# Patient Record
Sex: MALE | Race: WHITE | NOT HISPANIC OR LATINO | Employment: OTHER | URBAN - METROPOLITAN AREA
[De-identification: names, ages, dates, MRNs, and addresses within clinical notes are randomized per-mention and may not be internally consistent; named-entity substitution may affect disease eponyms.]

---

## 2017-01-20 ENCOUNTER — CLINICAL SUPPORT (OUTPATIENT)
Dept: CARDIOLOGY | Facility: CLINIC | Age: 77
End: 2017-01-20
Payer: MEDICARE

## 2017-01-20 ENCOUNTER — OFFICE VISIT (OUTPATIENT)
Dept: CARDIOLOGY | Facility: CLINIC | Age: 77
End: 2017-01-20
Payer: MEDICARE

## 2017-01-20 VITALS
WEIGHT: 241.19 LBS | HEIGHT: 72 IN | HEART RATE: 121 BPM | SYSTOLIC BLOOD PRESSURE: 123 MMHG | BODY MASS INDEX: 32.67 KG/M2 | DIASTOLIC BLOOD PRESSURE: 79 MMHG

## 2017-01-20 DIAGNOSIS — I48.0 PAF (PAROXYSMAL ATRIAL FIBRILLATION): ICD-10-CM

## 2017-01-20 DIAGNOSIS — I48.3 TYPICAL ATRIAL FLUTTER: ICD-10-CM

## 2017-01-20 DIAGNOSIS — Z95.0 CARDIAC PACEMAKER IN SITU: ICD-10-CM

## 2017-01-20 DIAGNOSIS — I25.10 CORONARY ARTERY DISEASE INVOLVING NATIVE CORONARY ARTERY OF NATIVE HEART WITHOUT ANGINA PECTORIS: Chronic | ICD-10-CM

## 2017-01-20 DIAGNOSIS — I49.5 SINUS NODE DYSFUNCTION: ICD-10-CM

## 2017-01-20 DIAGNOSIS — I48.19 PERSISTENT ATRIAL FIBRILLATION: Primary | ICD-10-CM

## 2017-01-20 PROCEDURE — 1160F RVW MEDS BY RX/DR IN RCRD: CPT | Mod: S$GLB,,, | Performed by: INTERNAL MEDICINE

## 2017-01-20 PROCEDURE — 1159F MED LIST DOCD IN RCRD: CPT | Mod: S$GLB,,, | Performed by: INTERNAL MEDICINE

## 2017-01-20 PROCEDURE — 99499 UNLISTED E&M SERVICE: CPT | Mod: S$GLB,,, | Performed by: INTERNAL MEDICINE

## 2017-01-20 PROCEDURE — 1126F AMNT PAIN NOTED NONE PRSNT: CPT | Mod: S$GLB,,, | Performed by: INTERNAL MEDICINE

## 2017-01-20 PROCEDURE — 93000 ELECTROCARDIOGRAM COMPLETE: CPT | Mod: S$GLB,,, | Performed by: INTERNAL MEDICINE

## 2017-01-20 PROCEDURE — 93280 PM DEVICE PROGR EVAL DUAL: CPT | Mod: S$GLB,,, | Performed by: INTERNAL MEDICINE

## 2017-01-20 PROCEDURE — 3078F DIAST BP <80 MM HG: CPT | Mod: S$GLB,,, | Performed by: INTERNAL MEDICINE

## 2017-01-20 PROCEDURE — 99215 OFFICE O/P EST HI 40 MIN: CPT | Mod: S$GLB,,, | Performed by: INTERNAL MEDICINE

## 2017-01-20 PROCEDURE — 99999 PR PBB SHADOW E&M-EST. PATIENT-LVL III: CPT | Mod: PBBFAC,,, | Performed by: INTERNAL MEDICINE

## 2017-01-20 PROCEDURE — 3074F SYST BP LT 130 MM HG: CPT | Mod: S$GLB,,, | Performed by: INTERNAL MEDICINE

## 2017-01-20 PROCEDURE — 1157F ADVNC CARE PLAN IN RCRD: CPT | Mod: S$GLB,,, | Performed by: INTERNAL MEDICINE

## 2017-01-20 NOTE — PROGRESS NOTES
Subjective:    Patient ID:  Heri Muhammad is a 76 y.o. male who presents for follow-up of Atrial Fibrillation (6 month f/u w/ ppm check )      HPI 77 yo male with CAD, atrial fibrillation, atrial flutter, sinus node dysfunction, PPM.  Dual chamber PPM implanted (06/30/14).   Presented with fatigue, found to be in atrial fibrillation. Placed on amiodarone, underwent DCCV.  Symptoms with AF have historically been fatigue and lightheadedness.  DCCV (10/15/15) >> recurrence. DCCV (11/23/15), amiodarone increased to 400 mg daily. Developed atrial flutter.  (01/29/16) PVI + RFA for atrial flutter. Amiodarone discontinued.  Did well for initially in terms of atrial fibrillation, but continued to note dyspnea.  Underwent LHC/RHC 6/10/16, no obstructive CAD, normal rt sided filling pressures.  Developed persistent recurrence >> DCCV 11/22/16.  Device interrogation reveals he went back out of rhythm 1/19/17.  Notes fatigue and irritability.  On Keppra for tremors.    Review of Systems   Constitution: Positive for weakness. Negative for malaise/fatigue.   Cardiovascular: Negative for chest pain, dyspnea on exertion, irregular heartbeat, leg swelling, near-syncope, orthopnea, palpitations, paroxysmal nocturnal dyspnea and syncope.   Respiratory: Negative for cough and shortness of breath.    Neurological: Negative for dizziness and light-headedness.   All other systems reviewed and are negative.       Objective:    Physical Exam   Constitutional: He is oriented to person, place, and time. He appears well-developed and well-nourished.   Eyes: Conjunctivae are normal. No scleral icterus.   Neck: No JVD present. No tracheal deviation present.   Cardiovascular: Regular rhythm and normal heart sounds.  Tachycardia present.  PMI is not displaced.    Pulmonary/Chest: Effort normal and breath sounds normal. No respiratory distress.   Abdominal: Soft. There is no hepatosplenomegaly. There is no tenderness.   Musculoskeletal: He  exhibits no edema (lower extremity) or tenderness.   Neurological: He is alert and oriented to person, place, and time.   Skin: Skin is warm and dry. No rash noted.   Psychiatric: He has a normal mood and affect. His behavior is normal.         Assessment:       1. Persistent atrial fibrillation    2. Sinus node dysfunction    3. Typical atrial flutter    4. Coronary artery disease involving native coronary artery of native heart without angina pectoris    5. Cardiac pacemaker in situ         Plan:           Atrial flutter, suspect atypical.  Offered anti-arrhythmic therapy vs RFA.  He prefers the latter, and I agree with this.  RFA for atrial flutter + repeat PVI.  Risks and benefits discussed, he would like to proceed.  CTA of chest to delineate PV anatomy.  Anesthesia (General).  If we can proceed in the next couple of weeks:  GALILEO day of procedure, hold Eliquis evening prior to procedure.  If we cannot proceed in next couple of weeks:  DCCV (can defer GALILEO as he has been compliant with Eliquis) and initiate Multaq 400 mg BID.  Hold for one week prior to procedure.

## 2017-01-23 DIAGNOSIS — I48.0 PAF (PAROXYSMAL ATRIAL FIBRILLATION): Primary | ICD-10-CM

## 2017-01-26 ENCOUNTER — TELEPHONE (OUTPATIENT)
Dept: CARDIOLOGY | Facility: CLINIC | Age: 77
End: 2017-01-26

## 2017-01-26 NOTE — TELEPHONE ENCOUNTER
Dr Graham  Spoke with Delmy with Dr Alves's office main [place]. Patient to have Ablation but no openings until March.    Isaias wants him to have cardioversion with you next week.  Please intiate multaq post conversion. Please see Dr alves's notes 1/20/17.    Schedule with patient and let me know when. Please advise

## 2017-01-27 NOTE — TELEPHONE ENCOUNTER
DCCV SHEDULED FOR WED 2/1 AT 10 AM PER DR RUIZ TO SCHEDULE WED.  PATIENT AWARE TO NOT HOLD BLOOD THINNERS.

## 2017-01-31 ENCOUNTER — TELEPHONE (OUTPATIENT)
Dept: NEUROLOGY | Facility: CLINIC | Age: 77
End: 2017-01-31

## 2017-01-31 NOTE — TELEPHONE ENCOUNTER
----- Message from Sylwia Webster sent at 1/31/2017 11:15 AM CST -----  Contact: Melania-wife  States she received a message of an earlier appt in March, but it was sent through [facility], she doesn't use the computer that often. Needs to know if there is an earlier appt.  Please call back at

## 2017-02-01 PROBLEM — I48.91 ATRIAL FIBRILLATION: Status: ACTIVE | Noted: 2017-02-01

## 2017-02-05 ENCOUNTER — TELEPHONE (OUTPATIENT)
Dept: ELECTROPHYSIOLOGY | Facility: CLINIC | Age: 77
End: 2017-02-05

## 2017-02-05 DIAGNOSIS — I48.3 TYPICAL ATRIAL FLUTTER: Primary | ICD-10-CM

## 2017-02-05 DIAGNOSIS — I48.91 ATRIAL FIBRILLATION, UNSPECIFIED TYPE: ICD-10-CM

## 2017-02-06 RX ORDER — FUROSEMIDE 20 MG/1
TABLET ORAL
Qty: 90 TABLET | Refills: 3 | Status: SHIPPED | OUTPATIENT
Start: 2017-02-06 | End: 2017-11-06 | Stop reason: SDUPTHER

## 2017-02-07 RX ORDER — PRIMIDONE 250 MG/1
TABLET ORAL
Qty: 360 TABLET | Refills: 3 | Status: SHIPPED | OUTPATIENT
Start: 2017-02-07 | End: 2018-03-05 | Stop reason: SDUPTHER

## 2017-02-22 ENCOUNTER — CLINICAL SUPPORT (OUTPATIENT)
Dept: CARDIOLOGY | Facility: CLINIC | Age: 77
End: 2017-02-22
Payer: MEDICARE

## 2017-02-22 DIAGNOSIS — Z95.0 CARDIAC PACEMAKER IN SITU: ICD-10-CM

## 2017-02-22 DIAGNOSIS — I49.5 SINUS NODE DYSFUNCTION: ICD-10-CM

## 2017-02-22 DIAGNOSIS — I48.19 PERSISTENT ATRIAL FIBRILLATION: ICD-10-CM

## 2017-02-22 PROCEDURE — 93280 PM DEVICE PROGR EVAL DUAL: CPT | Mod: S$GLB,,, | Performed by: INTERNAL MEDICINE

## 2017-03-01 ENCOUNTER — TELEPHONE (OUTPATIENT)
Dept: ELECTROPHYSIOLOGY | Facility: CLINIC | Age: 77
End: 2017-03-01

## 2017-03-01 DIAGNOSIS — I48.4 ATYPICAL ATRIAL FLUTTER: ICD-10-CM

## 2017-03-01 DIAGNOSIS — I48.0 PAF (PAROXYSMAL ATRIAL FIBRILLATION): Primary | Chronic | ICD-10-CM

## 2017-03-01 NOTE — TELEPHONE ENCOUNTER
ABLATION EDUCATION CHECKLIST    3/15/17 @ 0815 AM - CTA   This test has been ordered for you @ [facility], 2nd floor Radiology (next to lab).   Please fast four hours prior to procedure.   Please arrive at least 15-20 minutes prior to CTA.     3/16/17 @ 1130 AM - Lab Work   Pre-procedure labs have been ordered for you @ [facility]  Be sure to arrive at your scheduled time for this lab work!  You do not have to fast for this lab work!    3/23/17 @ 6 AM - GALILEO & Ablation (arrial time)  Report to Cardiology Waiting Room on 3rd floor of the Hospital    (Do not report to clinic)  Directions for Reporting to Cardiology Waiting Area in the Hospital  If you park in the Parking Garage:  Take elevators to the 2nd floor  Walk up ramp and turn right by Gold Elevators  Take elevator to the 3rd floor  Upon exiting the elevator, turn away from the clinic areas  Walk long diallo around to front of hospital to area with windows overlooking [facility]  Check in at Reception Desk  OR  If family is dropping you off:  Have them drop you off at the front of the Hospital  (Near the ER, where all the flags are hung).  Take the E elevators to the 3rd floor.  Check in at the Reception Desk in the waiting room.    Do not eat or drink anything after: 12 mn on the night before your procedure    Medications:   HOLD apixaban (Eliquis) on the evening prior AND morning of your procedure. YOUR LAST DOSE: ON MORNING OF Wednesday, March 22, 2017.   HOLD your fluid pill on the morning of your procedure- furosemide (Lasix).   You may take your OTHER usual morning medications with a sip of water    You will be spending the night after your procedure  You will need someone to drive you home the day after your procedure.    Your pain during your procedure will be managed by the anesthesia team.     THE ABOVE INSTRUCTIONS WERE GIVEN TO THE PATIENT VERBALLY AND THEY VERBALIZED UNDERSTANDING.  THEY DO NOT REQUIRE ANY SPECIAL NEEDS AND  DO NOT HAVE ANY LEARNING BARRIERS.    Any need to reschedule or cancel procedures, or any questions regarding your procedures should be addressed directly with the Arrhythmia Department Nurses at the following phone number: [phone]

## 2017-03-02 ENCOUNTER — OFFICE VISIT (OUTPATIENT)
Dept: NEUROLOGY | Facility: CLINIC | Age: 77
End: 2017-03-02
Payer: MEDICARE

## 2017-03-02 ENCOUNTER — LAB VISIT (OUTPATIENT)
Dept: LAB | Facility: HOSPITAL | Age: 77
End: 2017-03-02
Attending: INTERNAL MEDICINE
Payer: MEDICARE

## 2017-03-02 VITALS
BODY MASS INDEX: 32.51 KG/M2 | DIASTOLIC BLOOD PRESSURE: 69 MMHG | HEIGHT: 72 IN | WEIGHT: 240 LBS | SYSTOLIC BLOOD PRESSURE: 116 MMHG | HEART RATE: 76 BPM | RESPIRATION RATE: 20 BRPM

## 2017-03-02 DIAGNOSIS — R41.3 MEMORY LOSS: ICD-10-CM

## 2017-03-02 DIAGNOSIS — R25.1 TREMOR: Primary | ICD-10-CM

## 2017-03-02 LAB
ALBUMIN SERPL BCP-MCNC: 3.7 G/DL
ALP SERPL-CCNC: 93 U/L
ALT SERPL W/O P-5'-P-CCNC: 17 U/L
ANION GAP SERPL CALC-SCNC: 6 MMOL/L
AST SERPL-CCNC: 16 U/L
BASOPHILS # BLD AUTO: 0.05 K/UL
BASOPHILS NFR BLD: 0.9 %
BILIRUB SERPL-MCNC: 0.3 MG/DL
BUN SERPL-MCNC: 11 MG/DL
CALCIUM SERPL-MCNC: 8.8 MG/DL
CHLORIDE SERPL-SCNC: 103 MMOL/L
CO2 SERPL-SCNC: 31 MMOL/L
CREAT SERPL-MCNC: 0.8 MG/DL
DIFFERENTIAL METHOD: ABNORMAL
EOSINOPHIL # BLD AUTO: 0 K/UL
EOSINOPHIL NFR BLD: 0 %
ERYTHROCYTE [DISTWIDTH] IN BLOOD BY AUTOMATED COUNT: 15 %
EST. GFR  (AFRICAN AMERICAN): >60 ML/MIN/1.73 M^2
EST. GFR  (NON AFRICAN AMERICAN): >60 ML/MIN/1.73 M^2
GLUCOSE SERPL-MCNC: 92 MG/DL
HCT VFR BLD AUTO: 45.8 %
HGB BLD-MCNC: 15.6 G/DL
LYMPHOCYTES # BLD AUTO: 2.2 K/UL
LYMPHOCYTES NFR BLD: 40.9 %
MCH RBC QN AUTO: 31.2 PG
MCHC RBC AUTO-ENTMCNC: 34.1 %
MCV RBC AUTO: 92 FL
MONOCYTES # BLD AUTO: 0.5 K/UL
MONOCYTES NFR BLD: 9.7 %
NEUTROPHILS # BLD AUTO: 2.6 K/UL
NEUTROPHILS NFR BLD: 48.3 %
PLATELET # BLD AUTO: 190 K/UL
PMV BLD AUTO: 10.6 FL
POTASSIUM SERPL-SCNC: 4.2 MMOL/L
PROT SERPL-MCNC: 7.2 G/DL
RBC # BLD AUTO: 5 M/UL
SODIUM SERPL-SCNC: 140 MMOL/L
VIT B12 SERPL-MCNC: 315 PG/ML
WBC # BLD AUTO: 5.38 K/UL

## 2017-03-02 PROCEDURE — 80184 ASSAY OF PHENOBARBITAL: CPT

## 2017-03-02 PROCEDURE — 1160F RVW MEDS BY RX/DR IN RCRD: CPT | Mod: S$GLB,,, | Performed by: PSYCHIATRY & NEUROLOGY

## 2017-03-02 PROCEDURE — 3074F SYST BP LT 130 MM HG: CPT | Mod: S$GLB,,, | Performed by: PSYCHIATRY & NEUROLOGY

## 2017-03-02 PROCEDURE — 36415 COLL VENOUS BLD VENIPUNCTURE: CPT | Mod: PO

## 2017-03-02 PROCEDURE — 1157F ADVNC CARE PLAN IN RCRD: CPT | Mod: S$GLB,,, | Performed by: PSYCHIATRY & NEUROLOGY

## 2017-03-02 PROCEDURE — 85025 COMPLETE CBC W/AUTO DIFF WBC: CPT

## 2017-03-02 PROCEDURE — 3078F DIAST BP <80 MM HG: CPT | Mod: S$GLB,,, | Performed by: PSYCHIATRY & NEUROLOGY

## 2017-03-02 PROCEDURE — 82607 VITAMIN B-12: CPT

## 2017-03-02 PROCEDURE — 1126F AMNT PAIN NOTED NONE PRSNT: CPT | Mod: S$GLB,,, | Performed by: PSYCHIATRY & NEUROLOGY

## 2017-03-02 PROCEDURE — 1159F MED LIST DOCD IN RCRD: CPT | Mod: S$GLB,,, | Performed by: PSYCHIATRY & NEUROLOGY

## 2017-03-02 PROCEDURE — 99999 PR PBB SHADOW E&M-EST. PATIENT-LVL III: CPT | Mod: PBBFAC,,, | Performed by: PSYCHIATRY & NEUROLOGY

## 2017-03-02 PROCEDURE — 80053 COMPREHEN METABOLIC PANEL: CPT

## 2017-03-02 PROCEDURE — 99214 OFFICE O/P EST MOD 30 MIN: CPT | Mod: S$GLB,,, | Performed by: PSYCHIATRY & NEUROLOGY

## 2017-03-02 RX ORDER — LEVETIRACETAM 500 MG/1
1000 TABLET ORAL 2 TIMES DAILY
Qty: 180 TABLET | Refills: 0 | Status: SHIPPED | OUTPATIENT
Start: 2017-03-02 | End: 2017-04-22 | Stop reason: SDUPTHER

## 2017-03-02 NOTE — PROGRESS NOTES
Date of service:  3/2/2017    Chief complaint:  Memory Loss, Tremor    Interval history:  The patient is a 76 y.o. male seen previously for memory loss and essential tremor.  Regarding the former, he feels that his memory is a bit worse, if anything.  He is presently taking Namenda without significant side effects.  The patient feels his tremor has worsened since his last visit.  He reports good compliance with his Neurontin and Mysoline.    History of present illness:  The patient is a 76 y.o. male referred for evaluation of memory loss.  I have seen him previously for tremor.  This issue began a few months ago. It involves short-term memory as well as long-term memory.  He may have some difficulties with executive function, though he is not clear on this.  There are may be issues with multiple step processes. He has no clear problems with ADLs. He does not get lost in familiar areas. There are no hallucinations. There are no clear personality changes.  He does not endorse depression.    Of note, the patient has had episodes of shortness of breath as well.  His wife reports that there are times where he seems more confused, but they deny that this maps to the times where he has issues with breathing.    Finally, the patient's propranolol was stopped by cardiology when his metoprolol was started.  He feels like his tremor is worse since this happened.      Past Medical History:   Diagnosis Date    A-fib     Anticoagulant long-term use     eliquis    Atrial flutter     Benign essential tremor     Cancer     skin cancer on back    Cardiac pacemaker in situ     Coronary artery disease     Cough     Dizziness     intermittant    DJD (degenerative joint disease) of knee     ED (erectile dysfunction)     Hard of hearing 11/2016    History of cataract     HTN (hypertension)     Hyperlipidemia     Memory loss     Seizure disorder     Seizures     last episode 1995    Sinus node dysfunction 08/2016        Past Surgical History:   Procedure Laterality Date    ANKLE SURGERY Right     pins and screws    ATRIAL ABLATION SURGERY      CARDIAC PACEMAKER PLACEMENT  2014    CARDIAC PACEMAKER PLACEMENT      CARDIAC PACEMAKER PLACEMENT  2014    CARDIOVERSION      CATARACT EXTRACTION      OU    COLONOSCOPY      due in 2014    EYE SURGERY      Jesse cataract    HERNIA REPAIR      abdominal    JOINT REPLACEMENT      bill shoulders       Family History   Problem Relation Age of Onset    Heart disease Mother     Cancer Father      colon    Blindness Father      accident    Cataracts Father     Hypertension Father     Heart disease Brother     Melanoma Neg Hx     Amblyopia Neg Hx     Diabetes Neg Hx     Glaucoma Neg Hx     Macular degeneration Neg Hx     Retinal detachment Neg Hx     Strabismus Neg Hx     Stroke Neg Hx     Thyroid disease Neg Hx        Social history:  Social History     Social History    Marital status:      Spouse name: N/A    Number of children: N/A    Years of education: N/A     Occupational History    retired      Social History Main Topics    Smoking status: Never Smoker    Smokeless tobacco: Never Used    Alcohol use No    Drug use: No    Sexual activity: Not on file     Other Topics Concern    Not on file     Social History Narrative        Review of Systems:  A 14 system review is documented in the patient's chart.  It is noteworthy as above.  It is otherwise negative.  Please see the patient's chart for further details.    Physical exam:  /69  Pulse 76  Resp 20  Ht 6' (1.829 m)  Wt 108.9 kg (240 lb)  BMI 32.55 kg/m2  General: Well developed, well nourished.  No acute distress.  HEENT: Atraumatic, normocephalic.  Neck: Supple, trachea midline.  Cardiovascular: Regular rate and rhythm.  Pulmonary: No increased work of breathing.  Abdomen/GI: No guarding.  Musculoskeletal: No obvious joint deformities, moves all extremities well.    Neurological  "exam:  Mental status: Awake and alert.  Oriented x3.  Speech fluent and appropriate.  Recent and remote memory appear to be largely intact.  Fund of knowledge grossly normal.  MMSE = 26/30.  Cranial nerves: Pupils equal round and reactive to light, extraocular movements intact, facial strength and sensation intact bilaterally, palate and tongue midline, hearing grossly intact bilaterally.  Motor: 5 out of 5 strength throughout the upper and lower extremities bilaterally. Normal bulk and tone.  Sensation: Intact to light touch and temperature bilaterally.  DTR: 2+ at the knees and biceps bilaterally.  Coordination: Finger-nose-finger testing intact bilaterally with a more pronounced intention tremor than in the past.  Gait: Normal gait. Good tandem.    Data base:  Previous notes reviewed.      Labs checked by cardiology yesterday include a BMP, CBC, and coags that were essentially unremarkable.    CT head:  "1.  Moderate cerebral volume loss with frontoparietal predominance.  2.  Mild chronic ischemic microvascular change.  3.  Chronic paranasal sinus disease."    EEG:  "IMPRESSION: This is a mildly abnormal EEG, temporal slowing, greatest on the left.  CLINICAL IMPLICATION: Temporal slowing is nonspecific and most often associated with vascular disease in patients of this age group. No epileptiform potentials or electrographic seizures are identified at this time, however."    Neuropsychological testing:  "Mr. Muhammad was referred for Neuropsychological Evaluation on an outpatient basis due to subjective memory decline for the past 1-2 years. His general cognitive abilities as assessed by the RBANS were within the average range, with mild impairment in language due to poor performance on semantic fluency, and no impairment in immediate verbal memory, visuospatial/constructional abilities, attention, or delayed memory (with mildly impaired figure recall). Further assessment of specific cognitive abilities revealed " "mild deficits in semantic fluency and constructional abilities, with intact naming, verbal comprehension, phonemic fluency, abstract reasoning, psychomotor speed, and mental flexibility. Additional memory assessment revealed average immediate auditory/verbal memory, average delayed auditory/verbal memory, average immediate visual memory, and mildly impaired delayed visual memory. Personality test data suggested that depression and anxiety were not likely contributing factors to his cognitive performance. Neuropsychological testing revealed mild impairment in semantic fluency and delayed visual memory; and variability in constructional ability (performances in the average and mildly impaired ranges). The deficits revealed are not sufficient to warrant a diagnosis of mild cognitive impairment. Reassurance may prove useful. Repeat testing is indicated if there is evidence of further decline in the future."    Assessment and plan:  The patient is a 76 y.o. male with memory loss, found to have minimal memory issues and no apparent contribution from depression/anxiety.  I have provided him with reassurance, but we will continue his Namenda as well.  Medication side effects have been discussed.  We will repeat a vitamin B12 level.  He is to take an OTC supplement for this.    His essential tremor is now not well controlled.  Previously, propranolol had worked well.  This, though, was stopped by cardiology and switched to metoprolol.  We will continue his Mysoline and increase his Keppra to 1000 mg BID.  We will have him seen in movement disorder clinic for further recommendations.    "

## 2017-03-03 ENCOUNTER — PATIENT MESSAGE (OUTPATIENT)
Dept: ELECTROPHYSIOLOGY | Facility: CLINIC | Age: 77
End: 2017-03-03

## 2017-03-03 DIAGNOSIS — E53.8 VITAMIN B12 DEFICIENCY: Primary | ICD-10-CM

## 2017-03-07 ENCOUNTER — TELEPHONE (OUTPATIENT)
Dept: NEUROLOGY | Facility: CLINIC | Age: 77
End: 2017-03-07

## 2017-03-07 LAB
PHENOBARBITAL: 28 MCG/ML
PRIMIDONE SERPL-MCNC: 18.9 MCG/ML

## 2017-03-07 NOTE — TELEPHONE ENCOUNTER
Patient's wife notified of critical Primidone value.  Patient is Jena.  He denies any dizziness and expresses no change is drowsiness or fatigue.  Instructed to go to ER for dizziness or excessive drowsiness.

## 2017-03-15 ENCOUNTER — TELEPHONE (OUTPATIENT)
Dept: ELECTROPHYSIOLOGY | Facility: CLINIC | Age: 77
End: 2017-03-15

## 2017-03-15 ENCOUNTER — HOSPITAL ENCOUNTER (OUTPATIENT)
Dept: RADIOLOGY | Facility: HOSPITAL | Age: 77
Discharge: HOME | End: 2017-03-15
Attending: INTERNAL MEDICINE
Payer: MEDICARE

## 2017-03-15 ENCOUNTER — TELEPHONE (OUTPATIENT)
Dept: CARDIOLOGY | Facility: CLINIC | Age: 77
End: 2017-03-15

## 2017-03-15 DIAGNOSIS — I48.4 ATYPICAL ATRIAL FLUTTER: ICD-10-CM

## 2017-03-15 DIAGNOSIS — I48.0 PAF (PAROXYSMAL ATRIAL FIBRILLATION): Chronic | ICD-10-CM

## 2017-03-15 PROCEDURE — 71275 CT ANGIOGRAPHY CHEST: CPT | Mod: 26,,, | Performed by: RADIOLOGY

## 2017-03-15 PROCEDURE — 25500020 PHARM REV CODE 255: Performed by: INTERNAL MEDICINE

## 2017-03-15 PROCEDURE — 71275 CT ANGIOGRAPHY CHEST: CPT | Mod: TC

## 2017-03-15 RX ADMIN — IOHEXOL 100 ML: 350 INJECTION, SOLUTION INTRAVENOUS at 07:03

## 2017-03-15 NOTE — TELEPHONE ENCOUNTER
Mrs. Muhammad confirmed CTA appt today at 345 with lab work at 3 pm. I reminded her that Mr. Muhammad should be fasting 4 hours prior. We also discussed pre-procedure instructions which she has confirmed she received. Verbalized understanding of all discussed. Denied further questions, needs, and concerns.

## 2017-03-15 NOTE — TELEPHONE ENCOUNTER
Patient called about GALILEO scheduled on 3/23/17. Sppoke with wife. Pre-procedural questions asked.  Denies swallowing issues and esophageal issues. Denies previous sedation/anesthesia problems. States does not have sleep apnea.  Denies recent trauma/surgery/radiation therapy to head/neck/airway. States is able to move neck without difficulty. GALILEO described to patient. Instructed NPO past midnight and to have a designated  to drive patient home after the procedures due to sedation being given. Instructed to report to   [facility] at 0600 am/  Verbalizes understanding. Questions answered.

## 2017-03-22 ENCOUNTER — ANESTHESIA EVENT (OUTPATIENT)
Dept: MEDSURG UNIT | Facility: HOSPITAL | Age: 77
End: 2017-03-22
Payer: MEDICARE

## 2017-03-23 ENCOUNTER — HOSPITAL ENCOUNTER (OUTPATIENT)
Dept: CARDIOLOGY | Facility: CLINIC | Age: 77
Discharge: HOME | End: 2017-03-23
Attending: INTERNAL MEDICINE | Admitting: INTERNAL MEDICINE
Payer: MEDICARE

## 2017-03-23 ENCOUNTER — HOSPITAL ENCOUNTER (OUTPATIENT)
Facility: HOSPITAL | Age: 77
Discharge: HOME | End: 2017-03-24
Attending: INTERNAL MEDICINE | Admitting: INTERNAL MEDICINE
Payer: MEDICARE

## 2017-03-23 ENCOUNTER — OUTPATIENT CASE MANAGEMENT (OUTPATIENT)
Dept: ADMINISTRATIVE | Facility: OTHER | Age: 77
End: 2017-03-23

## 2017-03-23 ENCOUNTER — ANESTHESIA (OUTPATIENT)
Dept: MEDSURG UNIT | Facility: HOSPITAL | Age: 77
End: 2017-03-23
Payer: MEDICARE

## 2017-03-23 DIAGNOSIS — I48.4 ATYPICAL ATRIAL FLUTTER: ICD-10-CM

## 2017-03-23 DIAGNOSIS — I48.91 AF (ATRIAL FIBRILLATION): ICD-10-CM

## 2017-03-23 LAB
ABO + RH BLD: NORMAL
AORTIC ATHEROMA: YES
BLD GP AB SCN CELLS X3 SERPL QL: NORMAL
MITRAL VALVE MOBILITY: NORMAL
MITRAL VALVE REGURGITATION: ABNORMAL
POC ACTIVATED CLOTTING TIME K: 147 SEC (ref 74–137)
POC ACTIVATED CLOTTING TIME K: 229 SEC (ref 74–137)
POC ACTIVATED CLOTTING TIME K: 291 SEC (ref 74–137)
POC ACTIVATED CLOTTING TIME K: 337 SEC (ref 74–137)
POC ACTIVATED CLOTTING TIME K: 374 SEC (ref 74–137)
POC ACTIVATED CLOTTING TIME K: 379 SEC (ref 74–137)
POC ACTIVATED CLOTTING TIME K: 389 SEC (ref 74–137)
POC ACTIVATED CLOTTING TIME K: 410 SEC (ref 74–137)
POC ACTIVATED CLOTTING TIME K: 430 SEC (ref 74–137)
SAMPLE: ABNORMAL
TRICUSPID VALVE REGURGITATION: ABNORMAL

## 2017-03-23 PROCEDURE — 93662 INTRACARDIAC ECG (ICE): CPT

## 2017-03-23 PROCEDURE — 93656 COMPRE EP EVAL ABLTJ ATR FIB: CPT | Mod: 22,,, | Performed by: INTERNAL MEDICINE

## 2017-03-23 PROCEDURE — 93355 ECHO TRANSESOPHAGEAL (TEE): CPT

## 2017-03-23 PROCEDURE — D9220A PRA ANESTHESIA: Mod: ANES,,, | Performed by: ANESTHESIOLOGY

## 2017-03-23 PROCEDURE — 76937 US GUIDE VASCULAR ACCESS: CPT | Mod: 26,,, | Performed by: ANESTHESIOLOGY

## 2017-03-23 PROCEDURE — 27201037 HC PRESSURE MONITORING SET UP

## 2017-03-23 PROCEDURE — 93005 ELECTROCARDIOGRAM TRACING: CPT | Mod: 59

## 2017-03-23 PROCEDURE — 63600175 PHARM REV CODE 636 W HCPCS: Performed by: NURSE ANESTHETIST, CERTIFIED REGISTERED

## 2017-03-23 PROCEDURE — 93312 ECHO TRANSESOPHAGEAL: CPT | Mod: 26,,, | Performed by: INTERNAL MEDICINE

## 2017-03-23 PROCEDURE — 25000003 PHARM REV CODE 250: Performed by: INTERNAL MEDICINE

## 2017-03-23 PROCEDURE — 25500020 PHARM REV CODE 255

## 2017-03-23 PROCEDURE — 93325 DOPPLER ECHO COLOR FLOW MAPG: CPT | Mod: 26,,, | Performed by: INTERNAL MEDICINE

## 2017-03-23 PROCEDURE — 37000009 HC ANESTHESIA EA ADD 15 MINS: Performed by: INTERNAL MEDICINE

## 2017-03-23 PROCEDURE — 86900 BLOOD TYPING SEROLOGIC ABO: CPT

## 2017-03-23 PROCEDURE — 25000003 PHARM REV CODE 250: Performed by: NURSE ANESTHETIST, CERTIFIED REGISTERED

## 2017-03-23 PROCEDURE — 93010 ELECTROCARDIOGRAM REPORT: CPT | Mod: 76,,, | Performed by: INTERNAL MEDICINE

## 2017-03-23 PROCEDURE — 25000003 PHARM REV CODE 250

## 2017-03-23 PROCEDURE — 36620 INSERTION CATHETER ARTERY: CPT | Mod: 59,,, | Performed by: ANESTHESIOLOGY

## 2017-03-23 PROCEDURE — 76937 US GUIDE VASCULAR ACCESS: CPT | Performed by: ANESTHESIOLOGY

## 2017-03-23 PROCEDURE — 93655 ICAR CATH ABLTJ DSCRT ARRHYT: CPT | Mod: ,,, | Performed by: INTERNAL MEDICINE

## 2017-03-23 PROCEDURE — 93613 INTRACARDIAC EPHYS 3D MAPG: CPT | Mod: ,,, | Performed by: INTERNAL MEDICINE

## 2017-03-23 PROCEDURE — 93010 ELECTROCARDIOGRAM REPORT: CPT | Mod: ,,, | Performed by: INTERNAL MEDICINE

## 2017-03-23 PROCEDURE — 93320 DOPPLER ECHO COMPLETE: CPT | Mod: 26,,, | Performed by: INTERNAL MEDICINE

## 2017-03-23 PROCEDURE — 63600175 PHARM REV CODE 636 W HCPCS: Performed by: NURSE PRACTITIONER

## 2017-03-23 PROCEDURE — 93662 INTRACARDIAC ECG (ICE): CPT | Mod: 26,,, | Performed by: INTERNAL MEDICINE

## 2017-03-23 PROCEDURE — 37000008 HC ANESTHESIA 1ST 15 MINUTES: Performed by: INTERNAL MEDICINE

## 2017-03-23 PROCEDURE — 86901 BLOOD TYPING SEROLOGIC RH(D): CPT

## 2017-03-23 PROCEDURE — D9220A PRA ANESTHESIA: Mod: CRNA,,, | Performed by: NURSE ANESTHETIST, CERTIFIED REGISTERED

## 2017-03-23 PROCEDURE — 63600175 PHARM REV CODE 636 W HCPCS

## 2017-03-23 RX ORDER — IBUPROFEN 400 MG/1
400 TABLET ORAL EVERY 6 HOURS PRN
Status: DISCONTINUED | OUTPATIENT
Start: 2017-03-23 | End: 2017-03-24 | Stop reason: HOSPADM

## 2017-03-23 RX ORDER — PROPOFOL 10 MG/ML
VIAL (ML) INTRAVENOUS
Status: DISCONTINUED | OUTPATIENT
Start: 2017-03-23 | End: 2017-03-23

## 2017-03-23 RX ORDER — DEXAMETHASONE SODIUM PHOSPHATE 4 MG/ML
INJECTION, SOLUTION INTRA-ARTICULAR; INTRALESIONAL; INTRAMUSCULAR; INTRAVENOUS; SOFT TISSUE
Status: DISCONTINUED | OUTPATIENT
Start: 2017-03-23 | End: 2017-03-23

## 2017-03-23 RX ORDER — CYCLOBENZAPRINE HCL 5 MG
5 TABLET ORAL 3 TIMES DAILY PRN
Status: DISCONTINUED | OUTPATIENT
Start: 2017-03-23 | End: 2017-03-24 | Stop reason: HOSPADM

## 2017-03-23 RX ORDER — ATORVASTATIN CALCIUM 10 MG/1
10 TABLET, FILM COATED ORAL NIGHTLY
Status: DISCONTINUED | OUTPATIENT
Start: 2017-03-23 | End: 2017-03-24 | Stop reason: HOSPADM

## 2017-03-23 RX ORDER — LAMOTRIGINE 100 MG/1
100 TABLET ORAL 2 TIMES DAILY
Status: DISCONTINUED | OUTPATIENT
Start: 2017-03-23 | End: 2017-03-24 | Stop reason: HOSPADM

## 2017-03-23 RX ORDER — CEFAZOLIN SODIUM 2 G/50ML
2 SOLUTION INTRAVENOUS
Status: COMPLETED | OUTPATIENT
Start: 2017-03-23 | End: 2017-03-23

## 2017-03-23 RX ORDER — ONDANSETRON 2 MG/ML
INJECTION INTRAMUSCULAR; INTRAVENOUS
Status: DISCONTINUED | OUTPATIENT
Start: 2017-03-23 | End: 2017-03-23

## 2017-03-23 RX ORDER — PROTAMINE SULFATE 10 MG/ML
INJECTION, SOLUTION INTRAVENOUS
Status: DISCONTINUED | OUTPATIENT
Start: 2017-03-23 | End: 2017-03-23

## 2017-03-23 RX ORDER — SUCCINYLCHOLINE CHLORIDE 20 MG/ML
INJECTION INTRAMUSCULAR; INTRAVENOUS
Status: DISCONTINUED | OUTPATIENT
Start: 2017-03-23 | End: 2017-03-23

## 2017-03-23 RX ORDER — PHENYLEPHRINE HYDROCHLORIDE 10 MG/ML
INJECTION INTRAVENOUS
Status: DISCONTINUED | OUTPATIENT
Start: 2017-03-23 | End: 2017-03-23

## 2017-03-23 RX ORDER — PRIMIDONE 250 MG/1
500 TABLET ORAL 2 TIMES DAILY
Status: DISCONTINUED | OUTPATIENT
Start: 2017-03-23 | End: 2017-03-24 | Stop reason: HOSPADM

## 2017-03-23 RX ORDER — PANTOPRAZOLE SODIUM 40 MG/1
40 TABLET, DELAYED RELEASE ORAL DAILY
Status: DISCONTINUED | OUTPATIENT
Start: 2017-03-23 | End: 2017-03-24 | Stop reason: HOSPADM

## 2017-03-23 RX ORDER — MIDAZOLAM HYDROCHLORIDE 1 MG/ML
INJECTION, SOLUTION INTRAMUSCULAR; INTRAVENOUS
Status: DISCONTINUED | OUTPATIENT
Start: 2017-03-23 | End: 2017-03-23

## 2017-03-23 RX ORDER — VASOPRESSIN 20 [USP'U]/ML
INJECTION, SOLUTION INTRAMUSCULAR; SUBCUTANEOUS
Status: DISCONTINUED | OUTPATIENT
Start: 2017-03-23 | End: 2017-03-23

## 2017-03-23 RX ORDER — ASPIRIN 81 MG/1
81 TABLET ORAL DAILY
Status: DISCONTINUED | OUTPATIENT
Start: 2017-03-23 | End: 2017-03-24 | Stop reason: HOSPADM

## 2017-03-23 RX ORDER — FUROSEMIDE 10 MG/ML
40 INJECTION INTRAMUSCULAR; INTRAVENOUS 2 TIMES DAILY
Status: DISCONTINUED | OUTPATIENT
Start: 2017-03-23 | End: 2017-03-23

## 2017-03-23 RX ORDER — FUROSEMIDE 10 MG/ML
40 INJECTION INTRAMUSCULAR; INTRAVENOUS DAILY
Status: DISCONTINUED | OUTPATIENT
Start: 2017-03-24 | End: 2017-03-24 | Stop reason: HOSPADM

## 2017-03-23 RX ORDER — HEPARIN SODIUM 1000 [USP'U]/ML
INJECTION, SOLUTION INTRAVENOUS; SUBCUTANEOUS
Status: DISCONTINUED | OUTPATIENT
Start: 2017-03-23 | End: 2017-03-23

## 2017-03-23 RX ORDER — SODIUM CHLORIDE 9 MG/ML
INJECTION, SOLUTION INTRAVENOUS CONTINUOUS PRN
Status: DISCONTINUED | OUTPATIENT
Start: 2017-03-23 | End: 2017-03-23

## 2017-03-23 RX ORDER — MEMANTINE HYDROCHLORIDE 5 MG/1
10 TABLET ORAL 2 TIMES DAILY
Status: DISCONTINUED | OUTPATIENT
Start: 2017-03-23 | End: 2017-03-24 | Stop reason: HOSPADM

## 2017-03-23 RX ORDER — LEVETIRACETAM 500 MG/1
1000 TABLET ORAL 2 TIMES DAILY
Status: DISCONTINUED | OUTPATIENT
Start: 2017-03-23 | End: 2017-03-24 | Stop reason: HOSPADM

## 2017-03-23 RX ORDER — SODIUM CHLORIDE 9 MG/ML
INJECTION, SOLUTION INTRAVENOUS ONCE
Status: COMPLETED | OUTPATIENT
Start: 2017-03-23 | End: 2017-03-23

## 2017-03-23 RX ORDER — FUROSEMIDE 10 MG/ML
INJECTION INTRAMUSCULAR; INTRAVENOUS
Status: DISCONTINUED | OUTPATIENT
Start: 2017-03-23 | End: 2017-03-23

## 2017-03-23 RX ORDER — ROCURONIUM BROMIDE 10 MG/ML
INJECTION, SOLUTION INTRAVENOUS
Status: DISCONTINUED | OUTPATIENT
Start: 2017-03-23 | End: 2017-03-23

## 2017-03-23 RX ORDER — METOPROLOL SUCCINATE 25 MG/1
25 TABLET, EXTENDED RELEASE ORAL 2 TIMES DAILY
Status: DISCONTINUED | OUTPATIENT
Start: 2017-03-23 | End: 2017-03-24 | Stop reason: HOSPADM

## 2017-03-23 RX ORDER — FENTANYL CITRATE 50 UG/ML
INJECTION, SOLUTION INTRAMUSCULAR; INTRAVENOUS
Status: DISCONTINUED | OUTPATIENT
Start: 2017-03-23 | End: 2017-03-23

## 2017-03-23 RX ORDER — LIDOCAINE HCL/PF 100 MG/5ML
SYRINGE (ML) INTRAVENOUS
Status: DISCONTINUED | OUTPATIENT
Start: 2017-03-23 | End: 2017-03-23

## 2017-03-23 RX ORDER — NITROGLYCERIN 0.4 MG/1
0.4 TABLET SUBLINGUAL EVERY 5 MIN PRN
Status: DISCONTINUED | OUTPATIENT
Start: 2017-03-23 | End: 2017-03-24 | Stop reason: HOSPADM

## 2017-03-23 RX ADMIN — LEVETIRACETAM 1000 MG: 500 TABLET, FILM COATED ORAL at 09:03

## 2017-03-23 RX ADMIN — APIXABAN 5 MG: 5 TABLET, FILM COATED ORAL at 09:03

## 2017-03-23 RX ADMIN — ASPIRIN 81 MG: 81 TABLET, COATED ORAL at 05:03

## 2017-03-23 RX ADMIN — MIDAZOLAM HYDROCHLORIDE 2 MG: 1 INJECTION INTRAMUSCULAR; INTRAVENOUS at 07:03

## 2017-03-23 RX ADMIN — FUROSEMIDE 40 MG: 10 INJECTION, SOLUTION INTRAMUSCULAR; INTRAVENOUS at 12:03

## 2017-03-23 RX ADMIN — FENTANYL CITRATE 50 MCG: 50 INJECTION, SOLUTION INTRAMUSCULAR; INTRAVENOUS at 09:03

## 2017-03-23 RX ADMIN — ONDANSETRON 4 MG: 2 INJECTION INTRAMUSCULAR; INTRAVENOUS at 09:03

## 2017-03-23 RX ADMIN — PHENYLEPHRINE HYDROCHLORIDE 40 MCG/MIN: 10 INJECTION INTRAVENOUS at 08:03

## 2017-03-23 RX ADMIN — PHENYLEPHRINE HYDROCHLORIDE 200 MCG: 10 INJECTION INTRAVENOUS at 08:03

## 2017-03-23 RX ADMIN — FENTANYL CITRATE 100 MCG: 50 INJECTION, SOLUTION INTRAMUSCULAR; INTRAVENOUS at 07:03

## 2017-03-23 RX ADMIN — DEXAMETHASONE SODIUM PHOSPHATE 4 MG: 4 INJECTION, SOLUTION INTRAMUSCULAR; INTRAVENOUS at 09:03

## 2017-03-23 RX ADMIN — PHENYLEPHRINE HYDROCHLORIDE 100 MCG: 10 INJECTION INTRAVENOUS at 08:03

## 2017-03-23 RX ADMIN — LAMOTRIGINE 100 MG: 100 TABLET ORAL at 09:03

## 2017-03-23 RX ADMIN — METOPROLOL SUCCINATE 25 MG: 25 TABLET, EXTENDED RELEASE ORAL at 09:03

## 2017-03-23 RX ADMIN — CEFAZOLIN SODIUM 2 G: 2 SOLUTION INTRAVENOUS at 08:03

## 2017-03-23 RX ADMIN — HEPARIN SODIUM 10000 UNITS: 1000 INJECTION INTRAVENOUS; SUBCUTANEOUS at 09:03

## 2017-03-23 RX ADMIN — SUCCINYLCHOLINE CHLORIDE 160 MG: 20 INJECTION, SOLUTION INTRAMUSCULAR; INTRAVENOUS at 07:03

## 2017-03-23 RX ADMIN — HEPARIN SODIUM 5000 UNITS: 1000 INJECTION INTRAVENOUS; SUBCUTANEOUS at 09:03

## 2017-03-23 RX ADMIN — PHENYLEPHRINE HYDROCHLORIDE 200 MCG: 10 INJECTION INTRAVENOUS at 09:03

## 2017-03-23 RX ADMIN — PROTAMINE SULFATE 80 MG: 10 INJECTION, SOLUTION INTRAVENOUS at 01:03

## 2017-03-23 RX ADMIN — PANTOPRAZOLE SODIUM 40 MG: 40 TABLET, DELAYED RELEASE ORAL at 05:03

## 2017-03-23 RX ADMIN — HEPARIN SODIUM 5000 UNITS/HR: 1000 INJECTION, SOLUTION INTRAVENOUS; SUBCUTANEOUS at 09:03

## 2017-03-23 RX ADMIN — SODIUM CHLORIDE: 0.9 INJECTION, SOLUTION INTRAVENOUS at 07:03

## 2017-03-23 RX ADMIN — HEPARIN SODIUM 5000 UNITS: 1000 INJECTION INTRAVENOUS; SUBCUTANEOUS at 10:03

## 2017-03-23 RX ADMIN — HEPARIN SODIUM 2500 UNITS: 1000 INJECTION INTRAVENOUS; SUBCUTANEOUS at 11:03

## 2017-03-23 RX ADMIN — ROCURONIUM BROMIDE 10 MG: 10 INJECTION, SOLUTION INTRAVENOUS at 07:03

## 2017-03-23 RX ADMIN — VASOPRESSIN 2 UNITS: 20 INJECTION, SOLUTION INTRAMUSCULAR; SUBCUTANEOUS at 09:03

## 2017-03-23 RX ADMIN — SODIUM CHLORIDE, SODIUM GLUCONATE, SODIUM ACETATE, POTASSIUM CHLORIDE, MAGNESIUM CHLORIDE, SODIUM PHOSPHATE, DIBASIC, AND POTASSIUM PHOSPHATE: .53; .5; .37; .037; .03; .012; .00082 INJECTION, SOLUTION INTRAVENOUS at 08:03

## 2017-03-23 RX ADMIN — PRIMIDONE 500 MG: 250 TABLET ORAL at 09:03

## 2017-03-23 RX ADMIN — PROPOFOL 100 MG: 10 INJECTION, EMULSION INTRAVENOUS at 07:03

## 2017-03-23 RX ADMIN — LIDOCAINE HYDROCHLORIDE 60 MG: 20 INJECTION, SOLUTION INTRAVENOUS at 07:03

## 2017-03-23 RX ADMIN — MEMANTINE 10 MG: 5 TABLET ORAL at 09:03

## 2017-03-23 RX ADMIN — ATORVASTATIN CALCIUM 10 MG: 10 TABLET, FILM COATED ORAL at 09:03

## 2017-03-23 RX ADMIN — SODIUM CHLORIDE 1000 ML: 0.9 INJECTION, SOLUTION INTRAVENOUS at 06:03

## 2017-03-23 NOTE — H&P
History and Physical Exam  Echocardiology    3/23/2017    Heri Muhammad  Age: 76 y.o.  [facility]  Admit Date: 3/23/2017  Length of Stay: 0    SUBJECTIVE:     Reason for consult: GALILEO    History of Presenting Illness:   Patient is a 76 y.o. male with history of CAD, atrial fibrillation s/p PVI, atrial flutter s/p RFA CTI, sinus node dysfunction s/p PPM (dual chamber PPM implanted 06/30/14) who presents for PVI and ablation of atypical atrial flutter. GALILEO requested prior to procedure. Patient denies difficultly with prior GALILEO. He is NPO except for medications since last night. He denies dysphagia or cervical spine dysmotility.     Review of Systems:  General: No syncope, fatigue, fevers, chills, nausea, or vomiting  Neuro: No focal weakness or numbness  HEENT: no change in vision or pallor  Cardiac: No chest pain, palpitations, orthopnea, or PND  Pulmonary: + dyspnea. No cough, hemoptysis, or wheezing  GI: No abdominal distention, pain, constipation, or diarrhea  Hematologic: No night sweats, easy bruising, or enlarged lymph nodes  Extremities: No edema, claudication, arthralgias, or myalgias  Derm: No cyanosis or rash    Past Medical History:   Diagnosis Date    A-fib     Anticoagulant long-term use     eliquis    Atrial flutter     Benign essential tremor     Cancer     skin cancer on back    Cardiac pacemaker in situ     Coronary artery disease     Cough     Dizziness     intermittant    DJD (degenerative joint disease) of knee     ED (erectile dysfunction)     Hard of hearing 11/2016    History of cataract     HTN (hypertension)     Hyperlipidemia     Memory loss     Seizure disorder     Seizures     last episode 1995    Sinus node dysfunction 08/2016       Past Surgical History:   Procedure Laterality Date    ANKLE SURGERY Right     pins and screws    ATRIAL ABLATION SURGERY      CARDIAC PACEMAKER PLACEMENT  2014    CARDIAC PACEMAKER PLACEMENT      CARDIAC PACEMAKER  "PLACEMENT  2014    CARDIOVERSION      CATARACT EXTRACTION      OU    COLONOSCOPY      due in 2014    EYE SURGERY      Jesse cataract    HERNIA REPAIR      abdominal    JOINT REPLACEMENT      bill shoulders       Current Facility-Administered Medications   Medication    cefazolin (ANCEF) 2 gram in dextrose 5% 50 mL IVPB (premix)       Review of patient's allergies indicates:   Allergen Reactions    Bactroban [mupirocin calcium] Rash     Other reaction(s): Rash       Family History   Problem Relation Age of Onset    Heart disease Mother     Cancer Father      colon    Blindness Father      accident    Cataracts Father     Hypertension Father     Heart disease Brother     Melanoma Neg Hx     Amblyopia Neg Hx     Diabetes Neg Hx     Glaucoma Neg Hx     Macular degeneration Neg Hx     Retinal detachment Neg Hx     Strabismus Neg Hx     Stroke Neg Hx     Thyroid disease Neg Hx        Social History   Substance Use Topics    Smoking status: Never Smoker    Smokeless tobacco: Never Used    Alcohol use No       OBJECTIVE:     /77 (BP Location: Left arm, Patient Position: Lying, BP Method: Automatic)  Pulse (!) 116  Temp 97.8 °F (36.6 °C) (Oral)   Resp 20  Ht 6' 1" (1.854 m)  Wt 106.6 kg (235 lb)  SpO2 95%  BMI 31 kg/m2  Body mass index is 31 kg/(m^2).    Temp:  [97.8 °F (36.6 °C)]   Pulse:  [116]   Resp:  [20]   BP: (130-132)/(77-86)   SpO2:  [95 %]     No intake or output data in the 24 hours ending 03/23/17 0710    Physical Exam:  General: CM in NAD  HEENT: No conjunctival injection, pallor, or icterus. No xanthelasma or Pietro's sign present.   Neuro: No focal motor or sensory deficits. Cranial nerves intact.   Neck: No JVD. No carotid bruits.   Heart: PMI not displaced. No thrills or heave. S1, S2 present. No murmurs, rubs, or gallops.   Pulses: Regular. Carotid 2+, radial 2+, dorsalis pedis 2+, posterior tibial 2+.   Pulmonary: Clear to auscultation bilaterally.   Abdominal: Soft, " non-tender. No hepatomegaly or abdominal distention. No abdominal bruits. No pulsatile mass.   Extremities: No clubbing, cyanosis, or edema.   Skin: No bruising, rash, ulceration, xanthomata, or splinter hemorrhage present.     Laboratory:   No results for input(s): NA, K, CL, CO2, BUN, CREATININE, GLU, ANIONGAP in the last 168 hours.  No results for input(s): AST, ALT, ALKPHOS, BILITOT, BILIDIR, ALBUMIN in the last 168 hours.  No results for input(s): CALCIUM, MG, PHOS in the last 168 hours.  Lab Results   Component Value Date     11/27/2015     (H) 10/13/2015     Lab Results   Component Value Date    CHOL 164 10/12/2015    HDL 32 (L) 10/12/2015    TRIG 118 10/12/2015     No results found for: HGBA1C  Lab Results   Component Value Date    TSH 2.426 07/05/2016     No results for input(s): NITRITE, LEUKOCYTESUR, WBCUA, BACTERIA in the last 168 hours.    Invalid input(s): EPITHELIALCE, COGRCA No results for input(s): WBC, HGB, HCT, PLT, GRAN in the last 168 hours.  No results for input(s): PTT, INR in the last 168 hours.  No results for input(s): PHART, TFY6KXW, PO2ART, BVW1DCJ, S3NVLNNJ in the last 168 hours.  No results for input(s): LACTATE in the last 168 hours.  No results for input(s): TROPONINI, CPKMB, CPK, MB in the last 168 hours.        Investigation Results:    EF   Date Value Ref Range Status   05/24/2016 60 55 - 65    03/21/2016 60 55 - 65    01/27/2016 55 55 - 65    10/12/2015 55 55 - 65    01/03/2014 60         ASSESSMENT/PLAN:   Assessment:  76 y.o. male with history of CAD, atrial fibrillation s/p PVI, atrial flutter s/p RFA CTI, sinus node dysfunction, PPM.Dual chamber PPM implanted (06/30/14) presenting for RFA of atypical atrial flutter.     Plan:  1. GALILEO with anesthesia support prior to RFA    The purpose, risks, benefits, and alternatives of transesophageal echo have been explained to the patient. Specifically, the patient understands the risk of sedation, esophageal injury or  perforation, bleeding, infection, stroke, and death. All questions have been answered. The patient has agreed to proceed with the transesophageal echocardiogram procedure. Consent form has been signed, witnessed, and placed in the chart.     Randy Gimenez  Cardiology Fellow  Pager: [phone]

## 2017-03-23 NOTE — ANESTHESIA PREPROCEDURE EVALUATION
03/23/2017  Heri Muhammad is a 76 y.o., male.    [facility] Anesthesia Evaluation    I have reviewed the Patient Summary Reports.        Review of Systems  Anesthesia Hx:  No problems with previous Anesthesia    Social:  Non-Smoker    Hematology/Oncology:  Hematology Normal   Oncology Normal     EENT/Dental:EENT/Dental Normal   Cardiovascular:   Pacemaker Hypertension Dysrhythmias atrial fibrillation CHF (Diastolic dysfunction) FOWLER    Pulmonary:   Shortness of breath    Renal/:  Renal/ Normal     Hepatic/GI:  Hepatic/GI Normal    Musculoskeletal:  Musculoskeletal Normal    Neurological:   Seizures    Endocrine:  Endocrine Normal    Dermatological:  Skin Normal    Psych:  Psychiatric Normal           Physical Exam  General:  Well nourished    Airway/Jaw/Neck:  Airway Findings: Mouth Opening: Normal Tongue: Normal  General Airway Assessment: Adult  Mallampati: II  Improves to II with phonation.  TM Distance: Normal, at least 6 cm  Jaw/Neck Findings:  Neck ROM: Normal ROM      Dental:  Dental Findings: In tact   Chest/Lungs:  Chest/Lungs Findings: Clear to auscultation, Normal Respiratory Rate     Heart/Vascular:  Heart Findings: Rate: Normal  Rhythm: Regular Rhythm  Sounds: Normal             Anesthesia Plan  Type of Anesthesia, risks & benefits discussed:  Anesthesia Type:  general  Patient's Preference: General  Intra-op Monitoring Plan:   Intra-op Monitoring Plan Comments:   Post Op Pain Control Plan:   Post Op Pain Control Plan Comments:   Induction:   IV  Beta Blocker:  Patient is on a Beta-Blocker and has received one dose within the past 24 hours (No further documentation required).       Informed Consent: Patient understands risks and agrees with Anesthesia plan.  Questions answered. Anesthesia consent signed with patient.  ASA Score: 3     Day of Surgery Review of History & Physical: I have  interviewed and examined the patient. I have reviewed the patient's H&P dated:  There are no significant changes.          Ready For Surgery From Anesthesia Perspective.

## 2017-03-23 NOTE — PROCEDURES
Procedure Date: 03/23/2017    Procedure Name: Pulmonary Vein Isolation, RFA    Procedure Indication: symptomatic paroxysmal afib    : Deep Esparza MD  Secondary : Akshat Padilla MD    Procedure Details: The patient was taken to the EP lab and underwent general anesthesia/intubation. The patient's bilateral groins were prepped and draped in usual sterile fashion. Time out was performed. Bilateral groin areas overlying femoral veins were identified. 1 9Fr and 2 7.5Fr sheaths were introduced into the left groin, and 2 7.5Fr sheath was introduced into the right groin. ICE catheter was also introduced. Preprocedure ICE survey showed no pericardial effusion. Nadine and CS electrode catheters were introduced. The patient was fully anticoagulated with IV heparin.     2 transseptal punctures were made under ICE guidance and Agilis sheath with irrigated 4mm ablation catheter and SL1 with Lasso catheter were introduced. RF ablation was applied to re-connected areas around the pulmonary veings. The veins all demonstrated both entrance and exit block. The tachycardia then entrained around the distal, mid, and proximal CS indicative of mitral annular flutter. A mitral annular line was then made. Following that, the activation changed such that the posterior roof the the LA was now within the circuit. A roof line was then made. Again, the activation changed and entrainment of the tachycardia mapped to the base of the left atrial appendage. A few lesions were made with care not to isolate the appendage. Additional atrial tachycardias were induced each terminated with pacing. Please see the report in Epic for full details. 50mg of protamine was given after a test injection. A post-ablation ICE survey was done showing no pericardial effusion. Once ACT was <180sec all sheaths were flushed and removed with manual pressure held until hemostasis was achieved. Both sites were covered with sterile gauze and  tegaderm bandage. There were no immediate complications.     Estimated blood loss: <50cc    Plan:  - Post-groin access and sedation monitoring  - post-procedure EKG  - Re-start Eliquis 5 mg PO bid  6 hrs after hemostasis is achieved. Should there be any groin bleeding prior to starting eliquis, delay resumption of eliquis for 6 hours after hemostasis is re-achieved.   - Continue protonix  - Motrin PRN for pleuritic pain  - Lasix 40 mg IV now and then again in AM    Procedure performed with Dr Esparza.    Akshat Padilla MD  Cardiology Fellow

## 2017-03-23 NOTE — TRANSFER OF CARE
"Anesthesia Transfer of Care Note    Patient: Heri Muhammad    Procedure(s) Performed: Procedure(s) (LRB):  ABLATION (N/A)  TRANSESOPHAGEAL ECHOCARDIOGRAM (GALILEO) (N/A)    Patient location: PACU    Anesthesia Type: general    Transport from OR: Transported from OR on room air with adequate spontaneous ventilation    Post pain: adequate analgesia    Post assessment: tolerated procedure well and no apparent anesthetic complications    Post vital signs: stable    Level of consciousness: awake    Nausea/Vomiting: no nausea/vomiting    Complications: none          Last vitals:   Visit Vitals    /67    Pulse 78    Temp 37.1 °C (98.7 °F) (Oral)    Resp 16    Ht 6' 1" (1.854 m)    Wt 106.6 kg (235 lb)    SpO2 96%    BMI 31 kg/m2     "

## 2017-03-23 NOTE — H&P
Electrophysiology H&P  Attending Physician: Deep Esparza MD  Procedure: ABLATION (N/A), TRANSESOPHAGEAL ECHOCARDIOGRAM (GALILEO) (N/A)    HPI:   76 y.o. gentleman with history of CAD, atrial fibrillation s/p PVI, atrial flutter s/p RFA CTI, sinus node dysfunction, PPM.Dual chamber PPM implanted (06/30/14). He has had recurrent atypical atrial flutter despite multiple DCCVs. He presents today for a re-do PVI and ablation of atypical atrial flutter. He states he feels well and denies any specific symptoms at this time.    ROS:    Constitution: negative for - fever, chills, weight loss or weight gain  HENT: negative for - sore throat, rhinorrhea, or headache  Eyes: negative for - blurred or double vision  Cardiovascular: no chest pain or dyspnea on exertion  Pulmonary: no cough, shortness of breath, or wheezing  Gastrointestinal: negative for - abdominal pain, nausea, vomiting, or diarrhea  : negative for - dysuria  Neurological: negative for - focal weakness or sensory changes  PMH:     Past Medical History:   Diagnosis Date    A-fib     Anticoagulant long-term use     eliquis    Atrial flutter     Benign essential tremor     Cancer     skin cancer on back    Cardiac pacemaker in situ     Coronary artery disease     Cough     Dizziness     intermittant    DJD (degenerative joint disease) of knee     ED (erectile dysfunction)     Hard of hearing 11/2016    History of cataract     HTN (hypertension)     Hyperlipidemia     Memory loss     Seizure disorder     Seizures     last episode 1995    Sinus node dysfunction 08/2016     Past Surgical History:   Procedure Laterality Date    ANKLE SURGERY Right     pins and screws    ATRIAL ABLATION SURGERY      CARDIAC PACEMAKER PLACEMENT  2014    CARDIAC PACEMAKER PLACEMENT      CARDIAC PACEMAKER PLACEMENT  2014    CARDIOVERSION      CATARACT EXTRACTION      OU    COLONOSCOPY      due in 2014    EYE SURGERY      Jesse cataract    HERNIA REPAIR       abdominal    JOINT REPLACEMENT      bill shoulders     Allergies:     Review of patient's allergies indicates:   Allergen Reactions    Bactroban [mupirocin calcium] Rash     Other reaction(s): Rash     Medications:     No current facility-administered medications on file prior to encounter.      Current Outpatient Prescriptions on File Prior to Encounter   Medication Sig Dispense Refill    apixaban (ELIQUIS) 5 mg Tab Take 1 tablet (5 mg total) by mouth 2 (two) times daily. 180 tablet 3    aspirin (ECOTRIN) 81 MG EC tablet Take 81 mg by mouth once daily.      atorvastatin (LIPITOR) 10 MG tablet TAKE 1 TABLET EVERY EVENING 90 tablet 3    furosemide (LASIX) 20 MG tablet TAKE 1 TABLET EVERY MORNING 90 tablet 3    lamotrigine (LAMICTAL) 100 MG tablet Take 1 tablet (100 mg total) by mouth 2 (two) times daily. 180 tablet 3    memantine (NAMENDA) 10 MG Tab Take 1 tablet (10 mg total) by mouth 2 (two) times daily. 60 tablet 11    metoprolol succinate (TOPROL-XL) 25 MG 24 hr tablet Take 1 tablet (25 mg total) by mouth 2 (two) times daily. 180 tablet 5    primidone (MYSOLINE) 250 MG Tab TAKE 2 TABLETS TWICE DAILY 360 tablet 3    cyclobenzaprine (FLEXERIL) 5 MG tablet Take 1 tablet (5 mg total) by mouth 3 (three) times daily as needed for Muscle spasms. 30 tablet 0    ketoconazole (NIZORAL) 2 % cream Apply topically once daily. (Patient taking differently: Apply topically daily as needed. ) 15 g 0    ketoconazole (NIZORAL) 2 % shampoo Wash beard with medicated shampoo at least 2x/week - let soak at least 5 minutes prior to rinsing 120 mL 5    nitroGLYCERIN (NITROSTAT) 0.4 MG SL tablet Place 1 tablet (0.4 mg total) under the tongue every 5 (five) minutes as needed for Chest pain. 25 tablet 6       Inpatient Medications   Continuous Infusions:   Scheduled Meds:   PRN Meds:ceFAZolin (ANCEF) IVPB     Social History:     Social History   Substance Use Topics    Smoking status: Never Smoker    Smokeless tobacco: Never  Used    Alcohol use No     Family History:     Family History   Problem Relation Age of Onset    Heart disease Mother     Cancer Father      colon    Blindness Father      accident    Cataracts Father     Hypertension Father     Heart disease Brother     Melanoma Neg Hx     Amblyopia Neg Hx     Diabetes Neg Hx     Glaucoma Neg Hx     Macular degeneration Neg Hx     Retinal detachment Neg Hx     Strabismus Neg Hx     Stroke Neg Hx     Thyroid disease Neg Hx      Physical Exam:     Vitals:  Temp:  [97.8 °F (36.6 °C)]   Pulse:  [116]   Resp:  [20]   BP: (130-132)/(77-86)   SpO2:  [95 %]  on RA I/O's:  No intake or output data in the 24 hours ending 03/23/17 0703     Constitutional: NAD, conversant  HEENT: Sclera anicteric, PERRLA, EOMI  Neck: No JVD, no carotid bruits  CV: RRR, no murmur, normal S1/S2  Pulm: CTAB, no wheezes, rales, or ronchi  GI: Abdomen soft, NTND, +BS  Extremities: No LE edema, warm and well perfused  Skin: No ecchymosis, erythema, or ulcers  Psych: AOx3, appropriate affect  Neuro: CNII-XII intact, no focal deficits    Labs:     No results for input(s): NA, K, CL, CO2, BUN, CREATININE, GLU, ANIONGAP in the last 168 hours.  No results for input(s): TROPONINI, BNP in the last 168 hours. No results for input(s): WBC, HGB, HCT, PLT in the last 168 hours.  No results for input(s): AST, ALT, ALKPHOS, BILITOT, ALBUMIN in the last 168 hours.     Imaging:     EF   Date Value Ref Range Status   05/24/2016 60 55 - 65    03/21/2016 60 55 - 65    01/27/2016 55 55 - 65    10/12/2015 55 55 - 65    01/03/2014 60         EKG: atypical atrial flutter       Assessment:   76 y.o. gentleman with history of CAD, atrial fibrillation s/p PVI, atrial flutter s/p RFA CTI, sinus node dysfunction, PPM.Dual chamber PPM implanted (06/30/14). He has had recurrent atypical atrial flutter despite multiple DCCVs. He presents today for a re-do PVI and ablation of atypical atrial flutter.     Plan:   - I discussed the  nature of EP study and ablation, including possible transseptal puncture. We discused risks and benefits at length. Our discussion included, but was not limited to the risk of death, infection, bleeding, stroke, MI, cardiac perforation, embolism, cardiac tamponade, skin burns, and other organic injury including the possibility for need for surgery or pacemaker implantation.  - GALILEO +/- RFA    Discussed with Dr Esparza.    Signed:  Akshat Padilla MD  Cardiology Fellow, PGY-6  Pager: [phone]  3/23/2017 7:03 AM

## 2017-03-23 NOTE — PLAN OF CARE
"Problem: Patient Care Overview  Goal: Plan of Care Review  Outcome: Ongoing (interventions implemented as appropriate)  Pt called c/o right groin pain. Pt states "It just came on and it's right by my hip join." gauze dressing to right groin is c/d/i. No active bleeding. No hematoma noted. Vss. resp even and unlabored. Wife at bedside. Dr Hall notified and came to floor to assess pt. No new orders given at this time. Nurse call bell within reach. Will continue to monitor      "

## 2017-03-23 NOTE — PROGRESS NOTES
Act 147. Luisa with cath lab notified. Awaiting staff to discontinue sheaths in ep recovery. Vss.

## 2017-03-23 NOTE — IP AVS SNAPSHOT
[facility]  [address]  [facility] [address]  Phone: [phone]           Patient Discharge Instructions     Our goal is to set you up for success. This packet includes information on your condition, medications, and your home care. It will help you to care for yourself so you don't get sicker and need to go back to the hospital.     Please ask your nurse if you have any questions.        There are many details to remember when preparing to leave the hospital. Here is what you will need to do:    1. Take your medicine. If you are prescribed medications, review your Medication List in the following pages. You may have new medications to  at the pharmacy and others that you'll need to stop taking. Review the instructions for how and when to take your medications. Talk with your doctor or nurses if you are unsure of what to do.     2. Go to your follow-up appointments. Specific follow-up information is listed in the following pages. Your may be contacted by a transition nurse or clinical provider about future appointments. Be sure we have all of the phone numbers to reach you, if needed. Please contact your provider's office if you are unable to make an appointment.     3. Watch for warning signs. Your doctor or nurse will give you detailed warning signs to watch for and when to call for assistance. These instructions may also include educational information about your condition. If you experience any of warning signs to your health, call your doctor.               [facility]  Unless otherwise directed by your provider, please contact [facility], our nurse care line that is available for 24/7 assistance.     [phone] (toll-free)    Registered nurses in the [facility] provide clinical advisement, health education, appointment booking, and other advisory services.                    ** Verify the list of medication(s) below is accurate and up  to date. Carry this with you in case of emergency. If your medications have changed, please notify your healthcare provider.             Medication List      START taking these medications        Additional Info                      pantoprazole 40 MG tablet   Commonly known as:  PROTONIX   Quantity:  30 tablet   Refills:  3   Dose:  40 mg    Last time this was given:  40 mg on 3/23/2017  5:17 PM   Instructions:  Take 1 tablet (40 mg total) by mouth once daily.     Begin Date    AM    Noon    PM    Bedtime         CHANGE how you take these medications        Additional Info                      * ketoconazole 2 % cream   Commonly known as:  NIZORAL   Quantity:  15 g   Refills:  0   What changed:    - when to take this  - reasons to take this    Instructions:  Apply topically once daily.     Begin Date    AM    Noon    PM    Bedtime       * ketoconazole 2 % shampoo   Commonly known as:  NIZORAL   Quantity:  120 mL   Refills:  5   What changed:  Another medication with the same name was changed. Make sure you understand how and when to take each.    Instructions:  Wash beard with medicated shampoo at least 2x/week - let soak at least 5 minutes prior to rinsing     Begin Date    AM    Noon    PM    Bedtime       metoprolol succinate 25 MG 24 hr tablet   Commonly known as:  TOPROL-XL   Quantity:  180 tablet   Refills:  5   Dose:  50 mg   What changed:  how much to take    Last time this was given:  25 mg on 3/23/2017  9:00 PM   Instructions:  Take 2 tablets (50 mg total) by mouth 2 (two) times daily.     Begin Date    AM    Noon    PM    Bedtime       * Notice:  This list has 2 medication(s) that are the same as other medications prescribed for you. Read the directions carefully, and ask your doctor or other care provider to review them with you.      CONTINUE taking these medications        Additional Info                      apixaban 5 mg Tab   Commonly known as:  ELIQUIS   Quantity:  180 tablet   Refills:  3   Dose:   5 mg    Last time this was given:  5 mg on 3/23/2017  9:24 PM   Instructions:  Take 1 tablet (5 mg total) by mouth 2 (two) times daily.     Begin Date    AM    Noon    PM    Bedtime       aspirin 81 MG EC tablet   Commonly known as:  ECOTRIN   Refills:  0   Dose:  81 mg    Last time this was given:  81 mg on 3/23/2017  5:17 PM   Instructions:  Take 81 mg by mouth once daily.     Begin Date    AM    Noon    PM    Bedtime       atorvastatin 10 MG tablet   Commonly known as:  LIPITOR   Quantity:  90 tablet   Refills:  3    Last time this was given:  10 mg on 3/23/2017  9:16 PM   Instructions:  TAKE 1 TABLET EVERY EVENING     Begin Date    AM    Noon    PM    Bedtime       cyclobenzaprine 5 MG tablet   Commonly known as:  FLEXERIL   Quantity:  30 tablet   Refills:  0   Dose:  5 mg    Instructions:  Take 1 tablet (5 mg total) by mouth 3 (three) times daily as needed for Muscle spasms.     Begin Date    AM    Noon    PM    Bedtime       furosemide 20 MG tablet   Commonly known as:  LASIX   Quantity:  90 tablet   Refills:  3    Instructions:  TAKE 1 TABLET EVERY MORNING     Begin Date    AM    Noon    PM    Bedtime       lamotrigine 100 MG tablet   Commonly known as:  LAMICTAL   Quantity:  180 tablet   Refills:  3   Dose:  100 mg    Last time this was given:  100 mg on 3/23/2017  9:18 PM   Instructions:  Take 1 tablet (100 mg total) by mouth 2 (two) times daily.     Begin Date    AM    Noon    PM    Bedtime       levetiracetam 500 MG Tab   Commonly known as:  KEPPRA   Quantity:  180 tablet   Refills:  0   Dose:  1000 mg    Last time this was given:  1,000 mg on 3/23/2017  9:16 PM   Instructions:  Take 2 tablets (1,000 mg total) by mouth 2 (two) times daily.     Begin Date    AM    Noon    PM    Bedtime       memantine 10 MG Tab   Commonly known as:  NAMENDA   Quantity:  60 tablet   Refills:  11   Dose:  10 mg    Last time this was given:  10 mg on 3/23/2017  9:16 PM   Instructions:  Take 1 tablet (10 mg total) by mouth 2  (two) times daily.     Begin Date    AM    Noon    PM    Bedtime       nitroGLYCERIN 0.4 MG SL tablet   Commonly known as:  NITROSTAT   Quantity:  25 tablet   Refills:  6   Dose:  0.4 mg    Instructions:  Place 1 tablet (0.4 mg total) under the tongue every 5 (five) minutes as needed for Chest pain.     Begin Date    AM    Noon    PM    Bedtime       primidone 250 MG Tab   Commonly known as:  MYSOLINE   Quantity:  360 tablet   Refills:  3    Last time this was given:  500 mg on 3/23/2017  9:18 PM   Instructions:  TAKE 2 TABLETS TWICE DAILY     Begin Date    AM    Noon    PM    Bedtime            Where to Get Your Medications      These medications were sent to [id number] - [facility], [address] [facility], [address] [facility] [address]     Phone:  [phone]     metoprolol succinate 25 MG 24 hr tablet    pantoprazole 40 MG tablet                  Please bring to all follow up appointments:    1. A copy of your discharge instructions.  2. All medicines you are currently taking in their original bottles.  3. Identification and insurance card.    Please arrive 15 minutes ahead of scheduled appointment time.    Please call 24 hours in advance if you must reschedule your appointment and/or time.        Your Scheduled Appointments     Apr 06, 2017  1:00 PM CDT   New Patient with Anastacia Quinn MD   [facility]    [address] [facility] [address] [place] [address]   [phone]            May 03, 2017  9:25 AM CDT   Fasting Lab with [facility], [place] [facility]    [address] [facility] [place] [address]   [phone]            Jun 21, 2017  1:40 PM CDT   Established Patient Visit with Akshat Naik Jr., MD   [facility]    [address] [facility] [address] [place] [address]   [phone]              Follow-up Information     Follow up with Deep Esparza MD In 6 weeks.    Specialties:  Electrophysiology,  "Cardiology    Contact information:    [facility]  [facility] [address]  [phone]        Referrals     Future Orders    Ambulatory referral to Outpatient Case Management     Questions:    Does the patient have a chronic or uncontrolled disease process?:      Does the patient have a new diagnosis of a catastrophic or life altering illness/treatment?:      Does the patient have any psycho-social issues that may affect their ability to adhere to treatment plan?:      Does patient have any behaviors or circumstances that may impede ability to adhere to treatment plan?:      Is patient at risk for admission/readmission?:            Discharge Instructions       Remove dressing after 24 hours.  Showers are ok. No soaking site in water.  No lifting >10lb for 1 week.  Avoid straining and climbing stairs if possible.  Monitor site daily for drainage, redness. Call MD if you start to run fever.  If bleeding occurs, Lie flat and hold pressure for 15 minutes. If still bleeding, call 911.  Monitor for any swelling or pain in the area.      Admission Information     Date & Time Provider Department CSN    3/23/2017  6:00 AM Deep Esparza MD [facility] [id number]      Care Providers     Provider Role Specialty Primary office phone    Deep Esparza MD Attending Provider Electrophysiology [phone]    Deep Esparza MD Surgeon  Electrophysiology [phone]      Your Vitals Were     BP Pulse Temp Resp Height Weight    136/64 (BP Location: Left arm, Patient Position: Lying, BP Method: Automatic) 78 98.1 °F (36.7 °C) (Oral) 18 6' 1" (1.854 m) 106.6 kg (235 lb)    SpO2 BMI             96% 31 kg/m2         Recent Lab Values     No lab values to display.      Allergies as of 3/24/2017        Reactions    Bactroban [Mupirocin Calcium] Rash    Other reaction(s): Rash      Advance Directives     An advance directive is a document which, in the event you are no longer able to make decisions for yourself, tells " your healthcare team what kind of treatment you do or do not want to receive, or who you would like to make those decisions for you.  If you do not currently have an advance directive, [facility] encourages you to create one.  For more information call:  [phone] ([phone]), [phone] ([phone]),  or log on to [email or web address].        Language Assistance Services     ATTENTION: Language assistance services are available, free of charge. Please call [phone].      ATENCIÓN: Si habla español, tiene a marks disposición servicios gratuitos de asistencia lingüística. Llame al [phone].     CHÚ Ý: N?u b?n nói Ti?ng Vi?t, có các d?ch v? h? tr? ngôn ng? mi?n phí dành cho b?n. G?i s? [phone].        Eliquis Informaiton            [facility] complies with applicable Federal civil rights laws and does not discriminate on the basis of race, color, national origin, age, disability, or sex.

## 2017-03-23 NOTE — ANESTHESIA PROCEDURE NOTES
Arterial    Diagnosis: Atrial Fibrillation    Patient location during procedure: done in OR  Procedure start time: 3/23/2017 8:10 AM  Timeout: 3/23/2017 8:09 AM  Procedure end time: 3/23/2017 8:11 AM  Staffing  Anesthesiologist: DOREEN DEL RIO  Performed by: anesthesiologist   Anesthesiologist was present at the time of the procedure.  Preanesthetic Checklist  Completed: patient identified, site marked, surgical consent, pre-op evaluation, timeout performed, IV checked, risks and benefits discussed, monitors and equipment checked and anesthesia consent given  Arterial Line  Skin Prep: chlorhexidine gluconate  Local Infiltration: none  Orientation: left  Location: radial  Catheter Size: 20 G{[facility] ANESTHESIA BLOCK ART PLACEMENT  Vessel Caliber: small, patent, compressibility normal  Needle advanced into vessel with real time Ultrasound guidance.  Guidewire confirmed in vessel.  Sterile sheath used.  Image recorded and saved.Insertion Attempts: 2  Assessment  Dressing: secured with tape and tegaderm

## 2017-03-23 NOTE — NURSING TRANSFER
Nursing Transfer Note      3/23/2017     Transfer To: [facility]    Transfer via stretcher    Transfer with cardiac monitoring, tele box 317 room    Transported by dulce kirk     Medicines sent: none    Chart send with patient: Yes    Notified: spouse    Patient reassessed at: 3/23/17 1600

## 2017-03-23 NOTE — ANESTHESIA POSTPROCEDURE EVALUATION
"Anesthesia Post Evaluation    Patient: Heri Muhammad    Procedure(s) Performed: Procedure(s) (LRB):  ABLATION (N/A)  TRANSESOPHAGEAL ECHOCARDIOGRAM (GALILEO) (N/A)    Final Anesthesia Type: general  Patient location during evaluation: PACU  Patient participation: Yes- Able to Participate  Level of consciousness: awake and alert  Post-procedure vital signs: reviewed and stable  Pain management: adequate  Airway patency: patent  PONV status at discharge: No PONV  Anesthetic complications: no      Cardiovascular status: blood pressure returned to baseline and stable  Respiratory status: unassisted  Hydration status: euvolemic  Follow-up not needed.        Visit Vitals    /72    Pulse 81    Temp 36.9 °C (98.4 °F) (Oral)    Resp 16    Ht 6' 1" (1.854 m)    Wt 106.6 kg (235 lb)    SpO2 95%    BMI 31 kg/m2       Pain/Jono Score: Pain Assessment Performed: Yes (3/23/2017  3:00 PM)  Presence of Pain: denies (3/23/2017  3:00 PM)  Pain Rating Prior to Med Admin: 0 (3/23/2017  3:00 PM)  Jono Score: 10 (3/23/2017  3:00 PM)      "

## 2017-03-23 NOTE — PLAN OF CARE
Problem: Patient Care Overview  Goal: Plan of Care Review  Outcome: Ongoing (interventions implemented as appropriate)  Received report from CLARENCE Jha. Patient s/p RFA, AAOx3. VSS, no c/o pain or discomfort at this time, resp even and unlabored. Bilateral groins gauze/tegaderm dressing is CDI.  PT Pulses 2+.  DP pulses 1+. Right groin gauze dressing with bloody saturation. No hematoma noted.  Manual pressure held by Henny Cantu RN x 5 minutes. Hemostasis achieved. Post procedure protocol reviewed with patient and patient's family. Understanding verbalized. Family members at bedside. Nurse call bell within reach. Will continue to monitor per post procedure protocol.

## 2017-03-23 NOTE — PROGRESS NOTES
Please note the following patient has been assigned to Yoli Edwards RN with Outpatient Complex Care Mgmt for screening.    Please contact [facility] at ext [phone] with questions.    Thank you    Karlie Hernandez, SSC

## 2017-03-23 NOTE — ANESTHESIA RELEASE NOTE
"Anesthesia Release from PACU Note    Patient: Heri Muhammad    Procedure(s) Performed: Procedure(s) (LRB):  ABLATION (N/A)  TRANSESOPHAGEAL ECHOCARDIOGRAM (GALILEO) (N/A)    Anesthesia type: GEN    Post pain: Adequate analgesia reported    Post assessment: no apparent anesthetic complications, tolerated procedure well and no evidence of recall    Post vital signs: /72  Pulse 81  Temp 36.9 °C (98.4 °F) (Oral)   Resp 16  Ht 6' 1" (1.854 m)  Wt 106.6 kg (235 lb)  SpO2 95%  BMI 31 kg/m2    Level of consciousness: awake, alert and oriented    Nausea/Vomiting: no nausea/no vomiting    Complications: none    Airway Patency: patent    Respiratory: unassisted, spontaneous ventilation, room air    Cardiovascular: stable and blood pressure at baseline    Hydration: euvolemic    "

## 2017-03-24 VITALS
DIASTOLIC BLOOD PRESSURE: 64 MMHG | BODY MASS INDEX: 31.14 KG/M2 | RESPIRATION RATE: 18 BRPM | TEMPERATURE: 98 F | WEIGHT: 235 LBS | HEIGHT: 73 IN | HEART RATE: 78 BPM | SYSTOLIC BLOOD PRESSURE: 136 MMHG | OXYGEN SATURATION: 96 %

## 2017-03-24 PROCEDURE — 63600175 PHARM REV CODE 636 W HCPCS: Performed by: INTERNAL MEDICINE

## 2017-03-24 PROCEDURE — 25000003 PHARM REV CODE 250: Performed by: INTERNAL MEDICINE

## 2017-03-24 RX ORDER — PANTOPRAZOLE SODIUM 40 MG/1
40 TABLET, DELAYED RELEASE ORAL DAILY
Qty: 30 TABLET | Refills: 3 | Status: SHIPPED | OUTPATIENT
Start: 2017-03-24 | End: 2017-06-21

## 2017-03-24 RX ORDER — METOPROLOL SUCCINATE 25 MG/1
50 TABLET, EXTENDED RELEASE ORAL 2 TIMES DAILY
Qty: 180 TABLET | Refills: 5 | Status: SHIPPED | OUTPATIENT
Start: 2017-03-24 | End: 2017-04-26 | Stop reason: ALTCHOICE

## 2017-03-24 RX ADMIN — LAMOTRIGINE 100 MG: 100 TABLET ORAL at 09:03

## 2017-03-24 RX ADMIN — PRIMIDONE 500 MG: 250 TABLET ORAL at 09:03

## 2017-03-24 RX ADMIN — LEVETIRACETAM 1000 MG: 500 TABLET, FILM COATED ORAL at 09:03

## 2017-03-24 RX ADMIN — APIXABAN 5 MG: 5 TABLET, FILM COATED ORAL at 09:03

## 2017-03-24 RX ADMIN — MEMANTINE 10 MG: 5 TABLET ORAL at 09:03

## 2017-03-24 RX ADMIN — FUROSEMIDE 40 MG: 10 INJECTION, SOLUTION INTRAMUSCULAR; INTRAVENOUS at 05:03

## 2017-03-24 RX ADMIN — METOPROLOL SUCCINATE 25 MG: 25 TABLET, EXTENDED RELEASE ORAL at 10:03

## 2017-03-24 RX ADMIN — ASPIRIN 81 MG: 81 TABLET, COATED ORAL at 09:03

## 2017-03-24 RX ADMIN — PANTOPRAZOLE SODIUM 40 MG: 40 TABLET, DELAYED RELEASE ORAL at 09:03

## 2017-03-24 NOTE — PLAN OF CARE
Problem: Patient Care Overview  Goal: Plan of Care Review  Outcome: Ongoing (interventions implemented as appropriate)  Patient is aaox3.  Bed low and locked.  Call bell in reach.  No complaints voiced.

## 2017-03-24 NOTE — PLAN OF CARE
Problem: Patient Care Overview  Goal: Plan of Care Review  Outcome: Ongoing (interventions implemented as appropriate)  Plan of care discussed with patient and family. All questions answered. Lexa [facility] chris CDI. R side tender. Plan for dc home today. Will monitor.

## 2017-03-24 NOTE — DISCHARGE SUMMARY
[facility]  Cardiology  Discharge Summary      Patient Name: Heri Muhammad  MRN: [id number]  Admission Date: 3/23/2017  Hospital Length of Stay: 1 days  Discharge Date and Time:  03/24/2017 8:02 AM  Attending Physician: Deep Esparza MD  Discharging Provider: Akshat Padilla MD  Primary Care Physician: Mel Hansen MD    Procedure(s) (LRB):  ABLATION (N/A)  TRANSESOPHAGEAL ECHOCARDIOGRAM (GALILEO) (N/A)     Indwelling Lines/Drains at time of discharge:  Lines/Drains/Airways          No matching active lines, drains, or airways          Hospital Course 76 y.o. male with history of CAD, atrial fibrillation s/p PVI, atrial flutter s/p RFA CTI, sinus node dysfunction s/p PPM (dual chamber PPM implanted 06/30/14) who presents for PVI and ablation of atypical atrial flutter. Please see the full report in Epic for details. He successfully tolerated the procedure and there were no complications apparent at the time of discharge. Toprol XL will be increased to 50 mg BID. He will f/u with Dr Esparza in 6 weeks.    Pending Diagnostic Studies:     None          Final Active Diagnoses:    Diagnosis Date Noted POA    AF (atrial fibrillation) [I48.91] 03/23/2017 Yes      Problems Resolved During this Admission:    Diagnosis Date Noted Date Resolved POA       Discharged Condition: good    Follow Up:  Follow-up Information     Follow up with Deep Esparza MD In 6 weeks.    Specialties:  Electrophysiology, Cardiology    Contact information:    [address] [place] [address] [id number] [phone]          Patient Instructions:     Ambulatory referral to Outpatient Case Management   Referral Priority: Routine Referral Type: Consultation   Referral Reason: Specialty Services Required    Number of Visits Requested: 1        Medications:  Reconciled Home Medications:   Current Discharge Medication List      START taking these medications    Details   pantoprazole (PROTONIX) 40 MG tablet Take 1 tablet (40 mg  total) by mouth once daily.  Qty: 30 tablet, Refills: 3         CONTINUE these medications which have CHANGED    Details   metoprolol succinate (TOPROL-XL) 25 MG 24 hr tablet Take 2 tablets (50 mg total) by mouth 2 (two) times daily.  Qty: 180 tablet, Refills: 5         CONTINUE these medications which have NOT CHANGED    Details   apixaban (ELIQUIS) 5 mg Tab Take 1 tablet (5 mg total) by mouth 2 (two) times daily.  Qty: 180 tablet, Refills: 3      aspirin (ECOTRIN) 81 MG EC tablet Take 81 mg by mouth once daily.      atorvastatin (LIPITOR) 10 MG tablet TAKE 1 TABLET EVERY EVENING  Qty: 90 tablet, Refills: 3      furosemide (LASIX) 20 MG tablet TAKE 1 TABLET EVERY MORNING  Qty: 90 tablet, Refills: 3      lamotrigine (LAMICTAL) 100 MG tablet Take 1 tablet (100 mg total) by mouth 2 (two) times daily.  Qty: 180 tablet, Refills: 3    Associated Diagnoses: Epilepsy without status epilepticus, not intractable      levetiracetam (KEPPRA) 500 MG Tab Take 2 tablets (1,000 mg total) by mouth 2 (two) times daily.  Qty: 180 tablet, Refills: 0    Associated Diagnoses: Memory loss      memantine (NAMENDA) 10 MG Tab Take 1 tablet (10 mg total) by mouth 2 (two) times daily.  Qty: 60 tablet, Refills: 11      primidone (MYSOLINE) 250 MG Tab TAKE 2 TABLETS TWICE DAILY  Qty: 360 tablet, Refills: 3      cyclobenzaprine (FLEXERIL) 5 MG tablet Take 1 tablet (5 mg total) by mouth 3 (three) times daily as needed for Muscle spasms.  Qty: 30 tablet, Refills: 0      ketoconazole (NIZORAL) 2 % cream Apply topically once daily.  Qty: 15 g, Refills: 0    Associated Diagnoses: Rash      ketoconazole (NIZORAL) 2 % shampoo Wash beard with medicated shampoo at least 2x/week - let soak at least 5 minutes prior to rinsing  Qty: 120 mL, Refills: 5    Associated Diagnoses: Seborrheic dermatitis      nitroGLYCERIN (NITROSTAT) 0.4 MG SL tablet Place 1 tablet (0.4 mg total) under the tongue every 5 (five) minutes as needed for Chest pain.  Qty: 25 tablet,  Refills: 6             Akshat Padilla MD  Cardiology  [facility]

## 2017-03-24 NOTE — DISCHARGE INSTRUCTIONS
Remove dressing after 24 hours.  Showers are ok. No soaking site in water.  No lifting >10lb for 1 week.  Avoid straining and climbing stairs if possible.  Monitor site daily for drainage, redness. Call MD if you start to run fever.  If bleeding occurs, Lie flat and hold pressure for 15 minutes. If still bleeding, call 911.  Monitor for any swelling or pain in the area.

## 2017-03-27 ENCOUNTER — OUTPATIENT CASE MANAGEMENT (OUTPATIENT)
Dept: ADMINISTRATIVE | Facility: OTHER | Age: 77
End: 2017-03-27

## 2017-03-27 NOTE — PROGRESS NOTES
3/27/17-RN ADALBERTO Called patient's wife in attempt to screen for OPCM. Patient's spouse declined need for assistance at this time. Will send contact information for OPCM and encouraged spouse to contact OPCM in the event that needs develop. Verbalized understanding. Will close case at this time.

## 2017-03-31 ENCOUNTER — TELEPHONE (OUTPATIENT)
Dept: ELECTROPHYSIOLOGY | Facility: CLINIC | Age: 77
End: 2017-03-31

## 2017-03-31 NOTE — TELEPHONE ENCOUNTER
Dr. Esparza notified pt c/o's right side chest pain that feels like someone stomped on his chest. Pt reports pain worsens when he lays down, and feels better when he is sitting up straight or standing. Rates pain 7-8/10 at worst. Pt/wife report pt took Tramadol last night and he was able to fall asleep sitting in recliner.   Instructed pt's wife to have pt start taking Motrin 800 mg three times daily. Instructed them to report to ER if pain worsens over weekend. Also instructed to call clinic on Monday to update us on how pt is doing. Understanding verbalized.

## 2017-04-03 ENCOUNTER — TELEPHONE (OUTPATIENT)
Dept: ELECTROPHYSIOLOGY | Facility: CLINIC | Age: 77
End: 2017-04-03

## 2017-04-03 DIAGNOSIS — I31.9 PERICARDITIS, UNSPECIFIED CHRONICITY, UNSPECIFIED TYPE: Primary | ICD-10-CM

## 2017-04-03 NOTE — TELEPHONE ENCOUNTER
Wife reports pt says chest pain now in upper back. Rates it 5-6/10. He has to sit up really straight for it to go away, but is more comfortable laying down. Dr. Esparza notified of this,a nd wants pt to continue Motrin and get echo tomorrow.

## 2017-04-03 NOTE — TELEPHONE ENCOUNTER
----- Message from Delmy Pat RN sent at 4/3/2017 11:04 AM CDT -----  Contact: Wife of pt called(Melania)      ----- Message -----     From: Brooklynn Messer     Sent: 4/3/2017   8:59 AM       To: [facility] Rn Staff    Wife of pt called(Melania), states she has spoken with Adelina on Friday and was informed to f/u today. Ph [phone]. Thank you

## 2017-04-04 ENCOUNTER — HOSPITAL ENCOUNTER (OUTPATIENT)
Dept: CARDIOLOGY | Facility: CLINIC | Age: 77
Discharge: HOME | End: 2017-04-04
Payer: MEDICARE

## 2017-04-04 DIAGNOSIS — I31.9 PERICARDITIS, UNSPECIFIED CHRONICITY, UNSPECIFIED TYPE: ICD-10-CM

## 2017-04-04 LAB
DIASTOLIC DYSFUNCTION: YES
RETIRED EF AND QEF - SEE NOTES: 65 (ref 55–65)

## 2017-04-04 PROCEDURE — 93307 TTE W/O DOPPLER COMPLETE: CPT | Mod: S$GLB,,, | Performed by: INTERNAL MEDICINE

## 2017-04-05 ENCOUNTER — TELEPHONE (OUTPATIENT)
Dept: ELECTROPHYSIOLOGY | Facility: CLINIC | Age: 77
End: 2017-04-05

## 2017-04-05 NOTE — TELEPHONE ENCOUNTER
Notified pt/wife echo shows no pleural effusion, overload, and normal EF. Understanding verbalized.  Wife also report pt no longer having pain. Pt to stop Motrin by tomorrow as long as pain stays away.

## 2017-04-05 NOTE — TELEPHONE ENCOUNTER
----- Message from Deep Esparza MD sent at 4/4/2017  3:04 PM CDT -----  Echo looks fine, with no evidence of effusion, overload, and normal EF.  Please relay to patient.

## 2017-04-06 ENCOUNTER — OFFICE VISIT (OUTPATIENT)
Dept: NEUROLOGY | Facility: CLINIC | Age: 77
End: 2017-04-06
Payer: MEDICARE

## 2017-04-06 VITALS
HEIGHT: 72 IN | SYSTOLIC BLOOD PRESSURE: 139 MMHG | DIASTOLIC BLOOD PRESSURE: 87 MMHG | BODY MASS INDEX: 32.51 KG/M2 | WEIGHT: 240 LBS | HEART RATE: 83 BPM

## 2017-04-06 DIAGNOSIS — G40.909 SEIZURE DISORDER: ICD-10-CM

## 2017-04-06 DIAGNOSIS — I48.0 PAF (PAROXYSMAL ATRIAL FIBRILLATION): Chronic | ICD-10-CM

## 2017-04-06 DIAGNOSIS — Z79.01 CURRENT USE OF LONG TERM ANTICOAGULATION: ICD-10-CM

## 2017-04-06 DIAGNOSIS — G25.0 ESSENTIAL TREMOR: Primary | ICD-10-CM

## 2017-04-06 DIAGNOSIS — R41.3 MEMORY LOSS: ICD-10-CM

## 2017-04-06 PROCEDURE — 99499 UNLISTED E&M SERVICE: CPT | Mod: S$GLB,,, | Performed by: PSYCHIATRY & NEUROLOGY

## 2017-04-06 PROCEDURE — 99215 OFFICE O/P EST HI 40 MIN: CPT | Mod: S$GLB,,, | Performed by: PSYCHIATRY & NEUROLOGY

## 2017-04-06 PROCEDURE — 1160F RVW MEDS BY RX/DR IN RCRD: CPT | Mod: S$GLB,,, | Performed by: PSYCHIATRY & NEUROLOGY

## 2017-04-06 PROCEDURE — 3075F SYST BP GE 130 - 139MM HG: CPT | Mod: S$GLB,,, | Performed by: PSYCHIATRY & NEUROLOGY

## 2017-04-06 PROCEDURE — 1157F ADVNC CARE PLAN IN RCRD: CPT | Mod: S$GLB,,, | Performed by: PSYCHIATRY & NEUROLOGY

## 2017-04-06 PROCEDURE — 3079F DIAST BP 80-89 MM HG: CPT | Mod: S$GLB,,, | Performed by: PSYCHIATRY & NEUROLOGY

## 2017-04-06 PROCEDURE — 99999 PR PBB SHADOW E&M-EST. PATIENT-LVL III: CPT | Mod: PBBFAC,,, | Performed by: PSYCHIATRY & NEUROLOGY

## 2017-04-06 PROCEDURE — 1125F AMNT PAIN NOTED PAIN PRSNT: CPT | Mod: S$GLB,,, | Performed by: PSYCHIATRY & NEUROLOGY

## 2017-04-06 PROCEDURE — 1159F MED LIST DOCD IN RCRD: CPT | Mod: S$GLB,,, | Performed by: PSYCHIATRY & NEUROLOGY

## 2017-04-06 NOTE — PROGRESS NOTES
Heri Muhammad I. Chief Complaints during this visit:  New Patient visit for evaluation of tremors     Referring Physician:     Akshat Naik Jr., MD  [address] [place] [address]       Primary Care Physician:  Mel Hansen MD  [address] [place] [address]      History of present illness: Accompanied today by his wife. Pt states he is here for evaluation of tremors that he states are hereditary. He has been shaking most of his life he thinks. His brother, son, father, paternal grandfather also had tremor. Tremors are largely in his hands but his head and arms can also shake particularly in stressful situations. 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 Right now he is on Keppra for tremors. On Keppra 1000mg BID. Propranolol used to help him but he had to stop propranolol to get on metoprolol for his heart, was having bradycardia (per patient, but not clear per chart). He does not drink alcohol and does not know if this effects the tremor.     Thinks they are taking primidone (250mg BID)  for seizures as well as Lamictal for seizures as well. Keppra was started for tremor - tremors are reduced but not resolved on Keppra. Last seizure > 20 years ago.     He has tried gabapentin as well for the tremor up to 600mg TID which he states made the shaking worse.     Overall, right now he states the tremors are fairly tolerable. About a 3/10 compared to 8/10 when he was on gabapentin     II.  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 Yes  Falls (Musculoskeletal)?No  Cognitive impairment (Neurologic)?Yes  Psychoses (Psychiatric)?No  Pain/Paresthesia (Neurologic)?Yes  Visual changes  (Eyes)?No  Moles / skin changes (Skin)?No  Stridor / SOB (Pulm)?Yes  Bruising (Heme)?No      III.  Past Medical History:   Diagnosis Date    A-fib     Anticoagulant long-term use     eliquis    Atrial flutter     Benign essential tremor     Cancer     skin cancer on back    Cardiac pacemaker in situ     Coronary artery disease     Cough     Dizziness     intermittant    DJD (degenerative joint disease) of knee     ED (erectile dysfunction)     Hard of hearing 11/2016    History of cataract     HTN (hypertension)     Hyperlipidemia     Memory loss     Seizure disorder     Seizures     last episode 1995    Sinus node dysfunction 08/2016     Family History   Problem Relation Age of Onset    Heart disease Mother     Cancer Father      colon    Blindness Father      accident    Cataracts Father     Hypertension Father     Tremor Father     Heart disease Brother     Tremor Brother     Tremor Paternal Grandfather     Melanoma Neg Hx     Amblyopia Neg Hx     Diabetes Neg Hx     Glaucoma Neg Hx     Macular degeneration Neg Hx     Retinal detachment Neg Hx     Strabismus Neg Hx     Stroke Neg Hx     Thyroid disease Neg Hx      Social History     Social History    Marital status:      Spouse name: N/A    Number of children: N/A    Years of education: N/A     Occupational History    retired      Social History Main Topics    Smoking status: Never Smoker    Smokeless tobacco: Never Used    Alcohol use No    Drug use: No    Sexual activity: Not Asked     Other Topics Concern    None     Social History Narrative     Current Outpatient Prescriptions on File Prior to Visit   Medication Sig Dispense Refill    apixaban (ELIQUIS) 5 mg Tab Take 1 tablet (5 mg total) by mouth 2 (two) times daily. 180 tablet 3    aspirin (ECOTRIN) 81 MG EC tablet Take 81 mg by mouth once daily.      atorvastatin (LIPITOR) 10 MG tablet TAKE 1 TABLET EVERY EVENING 90 tablet 3    cyclobenzaprine  (FLEXERIL) 5 MG tablet Take 1 tablet (5 mg total) by mouth 3 (three) times daily as needed for Muscle spasms. 30 tablet 0    furosemide (LASIX) 20 MG tablet TAKE 1 TABLET EVERY MORNING 90 tablet 3    ketoconazole (NIZORAL) 2 % cream Apply topically once daily. (Patient taking differently: Apply topically daily as needed. ) 15 g 0    ketoconazole (NIZORAL) 2 % shampoo Wash beard with medicated shampoo at least 2x/week - let soak at least 5 minutes prior to rinsing 120 mL 5    lamotrigine (LAMICTAL) 100 MG tablet Take 1 tablet (100 mg total) by mouth 2 (two) times daily. 180 tablet 3    levetiracetam (KEPPRA) 500 MG Tab Take 2 tablets (1,000 mg total) by mouth 2 (two) times daily. 180 tablet 0    memantine (NAMENDA) 10 MG Tab Take 1 tablet (10 mg total) by mouth 2 (two) times daily. 60 tablet 11    metoprolol succinate (TOPROL-XL) 25 MG 24 hr tablet Take 2 tablets (50 mg total) by mouth 2 (two) times daily. 180 tablet 5    nitroGLYCERIN (NITROSTAT) 0.4 MG SL tablet Place 1 tablet (0.4 mg total) under the tongue every 5 (five) minutes as needed for Chest pain. 25 tablet 6    pantoprazole (PROTONIX) 40 MG tablet Take 1 tablet (40 mg total) by mouth once daily. 30 tablet 3    primidone (MYSOLINE) 250 MG Tab TAKE 2 TABLETS TWICE DAILY 360 tablet 3     No current facility-administered medications on file prior to visit.        Current Neuro medications: Keppra 1000mg BID, Primidone 250mg BID, lamictal 100mg BID, Namenda 10mg BID,   PRIOR NEURO MEDICATIONS TRIED:  Propranolol - worked the best but stopped because of bradycardia, Gabapentin - titrated up to 600mg TID then stopped.     Current regimen:     Previous Medications tried and response:  Primidone  - yes, see above  Propranolol- yes, see above  Topamax - no  Gabapentin - yes see above  Mirtazapine - no   Clonazepam - no   Diazepam - no  Lorazepam - no    Onfi - no   Clozapine - no      On any medications that worsen tremor?  Lithium - no   VPA - previously  "on for seizures, but raised ammonia levels   Stimulants - no  Steroids - no      Review of patient's allergies indicates:   Allergen Reactions    Bactroban [mupirocin calcium] Rash     Other reaction(s): Rash         IV. Physical Exam     Vitals:    04/06/17 1303   BP: 139/87   BP Location: Left arm   Patient Position: Sitting   BP Method: Automatic   Pulse: 83   Weight: 108.8 kg (239 lb 15.5 oz)   Height: 6' (1.829 m)       General appearance: Well nourished, well developed, no acute distress   HEENT: NC/AT, MMM, normal pharynx          -------------------------------------------------------------  Affect: full       Orientation to time & place:  Oriented to time, place, person and situation       Attention & concentration:  Able to spell WORLD forwards but backwards -1     Memory:  Recent and remote memory intact  Language:  Spontaneous, fluent; able to repeat and name objects        Fund of knowledge:  Can describe the president but cannot state his name     -------------------------------------------------------  Cranial nerves: optic discs not visualized, pupils equal round and reactive, extraocular movements intact,       facial sensation intact, face symmetrical, hearing intact to whisper, palate raises midline, shoulder shrug strength normal, tongue protrudes midline.        -------------------------------------------------------  Muscle Bulk: all 4 extremities normal                                                   Muscle strength:  5/5 in all 4 extremities        Sensation: All extremities grossly intact to touch               --------------------------------------------------------------  Unified Parkinson's Disease Rating Scale (motor part only)      UPDRS Motor Examination      Speech  0 - Normal.   Facial Expression  1 - Minimal Hypomimia, could be normal "Poker Face".   Rigidity     Neck 0 - Absent.   Upper Extremity: Right 3 - Marked, but full range of motion easily achieved.   Upper Extremity: " Left 0 - Absent.   Lower Extremity: Right 0 - Absent.   Lower Extremity: Left 0 - Absent.     Finger Taps      right 2 - Moderately impaired. Definite and early fatiguing. May have occasional arrests in movement.   left 1 - Mild slowing and/or reduction in amplitude.   Hand Movements      right 2 - Moderately impaired. Definite and early fatiguing. May have occasional arrests in movement.   left 2 - Moderately impaired. Definite and early fatiguing. May have occasional arrests in movement.   Pronation/supination of Hands      right 0 - Normal.   left 1 - Mild slowing and/or reduction in amplitude.     Toe Tapping    right 2 - Moderately impaired. Definite and early fatiguing. May have occasional arrests in movement.   left 3 - Severely impaired. Frequent hesitation in initiating movements or arrests in ongoing movement.     Leg Agility      right 1 - Mild slowing and/or reduction in amplitude.   left 2 - Moderately impaired. Definite and early fatiguing. May have occasional arrests in movement.   Arising from Chair  0 - Normal.   Posture  1 - Not quite erect, slightly stooped posture; could be normal for older person.   Gait  1 - Walks slowly, may shuffle with short steps, but no festination (hastening steps) or propulsion.   Freezing of gait 0   Postural Stability (Response to sudden, strong posterior displacement produced by pull on shoulders while patient erect with eyes open and feet slightly apart.   Deferred due to patient height   Body Bradykinesia and Hypokinesia (Combining slowness, hesitancy, decreased armswing, small amplitude, and poverty of movement in general)  1 - Minimal slowness, giving movement a deliberate character; could be normal for some persons. Possibly reduced amplitude.     Tremor at Rest:      Face, lips, chin 1 - Slight and infrequently present.    Hands:      right 0 - Absent.    left 0 - Absent.    Feet:     right 0 - Absent.    left 0 - Absent.    Constancy of REST tremor: 3- Forehead  "tremor present >75% of exam   Postural tremor:    right 1 - Slight and infrequently present.    left 1 - Slight and infrequently present.    Kinetic tremor:    right 0 - Absent.    left 1 - Slight and infrequently present.    Dyskinesias present? No       Total Motor UPDRS:  29      V.  Laboratory/ Radiological Data:   Lab Results   Component Value Date    TSH 2.426 07/05/2016      Lab Results   Component Value Date    LSTZSIZJ78 315 03/02/2017                VI.   Problem List Items Addressed This Visit     Essential tremor - Primary    Overview     Present for most of patient's life with positive family history. Previously responsive to propranolol which was stopped by an inpatient cardiologist last year in [place] of metoprolol. 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          Current Assessment & Plan     Complicated regimen currently with primidone at max, keppra 1000mg BID. Both of which likely helping tremor.     - Dr. Quinn to contact Dr. Sheehan to find out if we can transition him back to propranolol in hopes of reducting other tremor medications and for better control of tremor.          Seizure disorder    Overview     Managed by Dr. Naik.  Last seizure >20 years ago.         PAF (paroxysmal atrial fibrillation) (Chronic)    Overview     Managed by Dr. Esparza         Memory loss    Overview     Managed by Dr. Naik, on Namenda               Return in about 3 months (around 7/6/2017) for ET.     Guillermo Sapp MD  Movement Disorders Fellow  [facility]     See Fellow's note.  We saw the patient together, I examined individually, and we discussed the assessment and plan as she documented (and I edited) in her note.  We spent much of this visit trying to establish goal of care.  Patient used words like "doesn't work" in describing keppra, but after much discussion, it seems his tremor is less than prior to use of this medication.  He is currently well-controlled, in my opinion, with only 3/10 on my " ""how annoying is your tremor" scale, but it took significant education for him to understand that we are shooting for low numbers, not ZERO.  At end of visit, I'm still not 100% sure that he understood (or that he is reporting accurately).  If we could reduce the polypharmacy (by going back to propranolol), I think that would be of great benefit in the end.  If change is not possible, he'll simply continue on current medications.      "

## 2017-04-06 NOTE — LETTER
April 10, 2017      Akshat Naik Jr., MD  [address] [facility] [address] [place] [address]           [facility]  [address] [facility] [place] [address]  Phone: [phone]  Fax: [phone]          Patient: Heri Muhammad   MR Number: [id number]   YOB: 1940   Date of Visit: 4/6/2017       Dear Dr. Akshat Naik Jr.:    Thank you for referring Heri Muhammad to me for evaluation. Attached you will find relevant portions of my assessment and plan of care.    If you have questions, please do not hesitate to call me. I look forward to following Heri Muhammad along with you.    Sincerely,    Anastacia Quinn MD    Enclosure  CC:  No Recipients    If you would like to receive this communication electronically, please contact [email or web address] or [phone] to request more information on [facility] access.    For providers and/or their staff who would like to refer a patient to [facility], please contact us through our one-stop-shop provider referral line, [facility], at [phone].    If you feel you have received this communication in error or would no longer like to receive these types of communications, please e-mail [email or web address]

## 2017-04-06 NOTE — ASSESSMENT & PLAN NOTE
Complicated regimen currently with primidone at max, keppra 1000mg BID. Both of which likely helping tremor.     - Dr. Quinn to contact Dr. Sheehan to find out if we can transition him back to propranolol in hopes of reducting other tremor medications and for better control of tremor.

## 2017-04-06 NOTE — Clinical Note
Deep, could we use propranolol instead of metoprolol for this man?  He got more tremor suppression when on propranolol in past.   Thank you, [place]

## 2017-04-10 ENCOUNTER — TELEPHONE (OUTPATIENT)
Dept: ELECTROPHYSIOLOGY | Facility: CLINIC | Age: 77
End: 2017-04-10

## 2017-04-10 NOTE — TELEPHONE ENCOUNTER
----- Message from Deep Esparza MD sent at 4/10/2017 11:40 AM CDT -----  Contact: pt's wife-Cheryl  Resume Motrin at 400 mg TID for one week.  Then discontinue.  If recurs >> obtain CTA of chest to further evaluate.  ----- Message -----     From: Adelina Lynn RN     Sent: 4/10/2017  11:05 AM       To: Deep Esparza MD     Spoke with pt's wife this am--she reports he stopped Ibuprofen 800 mg TID last Thursday. Pain in right chest, around to back returned on Saturday. Pt again had to sit up in chair to sleep over weekend. He will restart Ibuprofen TID. How long would you recommend he continue taking it? Is there anything else he needs to do?  ----- Message -----     From: Stacey Sam     Sent: 4/10/2017   8:29 AM       To: Adelina Lynn RN    Pt's wife called this am because her  is still experiencing pains in his chest.  She said she spoke to you last week about this.  She can be reached @ [phone].  Thanks!!

## 2017-04-10 NOTE — TELEPHONE ENCOUNTER
Spoke with pt's wife and advised her of plan. She will call back next week to let us know how he is doing.

## 2017-04-17 ENCOUNTER — TELEPHONE (OUTPATIENT)
Dept: ELECTROPHYSIOLOGY | Facility: CLINIC | Age: 77
End: 2017-04-17

## 2017-04-17 DIAGNOSIS — I48.0 PAF (PAROXYSMAL ATRIAL FIBRILLATION): Primary | Chronic | ICD-10-CM

## 2017-04-17 NOTE — TELEPHONE ENCOUNTER
Called wife to schedule CTA, however, she did answer and I left a voicemail asking her to call me back.

## 2017-04-17 NOTE — TELEPHONE ENCOUNTER
Please see CLARENCE Sahu's previous telephone note. Spoke with Dr. Esparza via telephone @ 1344 pm today. Instead of completing a CTA, Dr. Esparza would prefer the Pt to have his follow up appointment with either himself or STERLING Wilcox, sooner than later to assess intermittent chest pain, S/P Ablation. I called Mrs. Muhammad and she agreed to an appointment with her  and NP Jeri Herbert tomorrow at 330 pm, with EKG prior at 3 pm. Pt is currently sleeping without complications. Verbalized gratefulness and denied further questions, needs, and concerns.

## 2017-04-17 NOTE — TELEPHONE ENCOUNTER
----- Message from Brooklynn Messer sent at 4/17/2017 10:08 AM CDT -----  Contact: Wife of pt called  Wife of pt called, requesting to speak with you in regards to chest pains on right side of pt. She states this has been happening for awhile and will like to discuss.Ph [phone]. Thank you

## 2017-04-18 ENCOUNTER — TELEPHONE (OUTPATIENT)
Dept: ELECTROPHYSIOLOGY | Facility: CLINIC | Age: 77
End: 2017-04-18

## 2017-04-18 ENCOUNTER — OFFICE VISIT (OUTPATIENT)
Dept: ELECTROPHYSIOLOGY | Facility: CLINIC | Age: 77
End: 2017-04-18
Payer: MEDICARE

## 2017-04-18 ENCOUNTER — NURSE TRIAGE (OUTPATIENT)
Dept: ADMINISTRATIVE | Facility: CLINIC | Age: 77
End: 2017-04-18

## 2017-04-18 ENCOUNTER — CLINICAL SUPPORT (OUTPATIENT)
Dept: ELECTROPHYSIOLOGY | Facility: CLINIC | Age: 77
End: 2017-04-18
Payer: MEDICARE

## 2017-04-18 ENCOUNTER — HOSPITAL ENCOUNTER (OUTPATIENT)
Dept: CARDIOLOGY | Facility: CLINIC | Age: 77
Discharge: HOME | End: 2017-04-18
Payer: MEDICARE

## 2017-04-18 VITALS
HEART RATE: 72 BPM | SYSTOLIC BLOOD PRESSURE: 128 MMHG | HEIGHT: 72 IN | WEIGHT: 235.69 LBS | BODY MASS INDEX: 31.92 KG/M2 | DIASTOLIC BLOOD PRESSURE: 72 MMHG

## 2017-04-18 DIAGNOSIS — I48.91 ATRIAL FIBRILLATION, UNSPECIFIED TYPE: Primary | ICD-10-CM

## 2017-04-18 DIAGNOSIS — I49.5 SINUS NODE DYSFUNCTION: ICD-10-CM

## 2017-04-18 DIAGNOSIS — Z98.890 STATUS POST CATHETER ABLATION OF ATRIAL FIBRILLATION: ICD-10-CM

## 2017-04-18 DIAGNOSIS — R41.3 MEMORY LOSS: ICD-10-CM

## 2017-04-18 DIAGNOSIS — G40.909 SEIZURE DISORDER: ICD-10-CM

## 2017-04-18 DIAGNOSIS — I48.4 ATYPICAL ATRIAL FLUTTER: ICD-10-CM

## 2017-04-18 DIAGNOSIS — I48.19 PERSISTENT ATRIAL FIBRILLATION: ICD-10-CM

## 2017-04-18 DIAGNOSIS — Z95.0 CARDIAC PACEMAKER IN SITU: ICD-10-CM

## 2017-04-18 DIAGNOSIS — I48.0 PAF (PAROXYSMAL ATRIAL FIBRILLATION): Primary | Chronic | ICD-10-CM

## 2017-04-18 DIAGNOSIS — I25.10 CORONARY ARTERY DISEASE INVOLVING NATIVE CORONARY ARTERY OF NATIVE HEART WITHOUT ANGINA PECTORIS: Chronic | ICD-10-CM

## 2017-04-18 DIAGNOSIS — I47.19 ATRIAL TACHYCARDIA: ICD-10-CM

## 2017-04-18 DIAGNOSIS — I48.91 ATRIAL FIBRILLATION, UNSPECIFIED TYPE: ICD-10-CM

## 2017-04-18 DIAGNOSIS — E78.5 HYPERLIPIDEMIA, UNSPECIFIED HYPERLIPIDEMIA TYPE: ICD-10-CM

## 2017-04-18 DIAGNOSIS — R10.9 ABDOMINAL PAIN, UNSPECIFIED LOCATION: Primary | ICD-10-CM

## 2017-04-18 DIAGNOSIS — Z79.01 CURRENT USE OF LONG TERM ANTICOAGULATION: ICD-10-CM

## 2017-04-18 PROCEDURE — 3078F DIAST BP <80 MM HG: CPT | Mod: S$GLB,,, | Performed by: NURSE PRACTITIONER

## 2017-04-18 PROCEDURE — 1125F AMNT PAIN NOTED PAIN PRSNT: CPT | Mod: S$GLB,,, | Performed by: NURSE PRACTITIONER

## 2017-04-18 PROCEDURE — 99214 OFFICE O/P EST MOD 30 MIN: CPT | Mod: S$GLB,,, | Performed by: NURSE PRACTITIONER

## 2017-04-18 PROCEDURE — 1160F RVW MEDS BY RX/DR IN RCRD: CPT | Mod: S$GLB,,, | Performed by: NURSE PRACTITIONER

## 2017-04-18 PROCEDURE — 99999 PR PBB SHADOW E&M-EST. PATIENT-LVL III: CPT | Mod: PBBFAC,,, | Performed by: NURSE PRACTITIONER

## 2017-04-18 PROCEDURE — 1159F MED LIST DOCD IN RCRD: CPT | Mod: S$GLB,,, | Performed by: NURSE PRACTITIONER

## 2017-04-18 PROCEDURE — 93280 PM DEVICE PROGR EVAL DUAL: CPT | Mod: S$GLB,,, | Performed by: INTERNAL MEDICINE

## 2017-04-18 PROCEDURE — 93000 ELECTROCARDIOGRAM COMPLETE: CPT | Mod: S$GLB,,, | Performed by: INTERNAL MEDICINE

## 2017-04-18 PROCEDURE — 3074F SYST BP LT 130 MM HG: CPT | Mod: S$GLB,,, | Performed by: NURSE PRACTITIONER

## 2017-04-18 PROCEDURE — 99499 UNLISTED E&M SERVICE: CPT | Mod: S$GLB,,, | Performed by: NURSE PRACTITIONER

## 2017-04-18 RX ORDER — LANOLIN ALCOHOL/MO/W.PET/CERES
100 CREAM (GRAM) TOPICAL DAILY
COMMUNITY
End: 2020-11-02

## 2017-04-18 NOTE — PROGRESS NOTES
Subjective:    Patient ID:  Heri Muhammad is a 76 y.o. male who presents for follow-up of Pacemaker Check.     Mr. Muhammad is a patient of Dr. GONZALEZ     Mr. Muhammad is a 76 y.o. male with pAF s/p PVI, atypical AFL s/p recent AFL RFA + re-do PVI, a hx of AT, sinus node dysfunction s/p DC PPM, CAD, HLD, seizure disorder, and memory loss. Mr. Muhammad underwent dual chamber PPM placement (06/30/14). He was diagnosed with AF, which has historically been symptomatic with associated fatigue and LH. He underwent a DCCV (10/15/15), with AF recurrence. He later underwent a repeat DCCV (11/23/15) and his amiodarone was increased to 400 mg daily; he subsequently developed AFL. Mr. Muhammad underwent a PVI + RFA (01/29/16), and amiodarone was discontinued. He did well initially in terms of AF, but continued to note dyspnea. He subsequently underwent LHC/RHC (06/10/16), which revealed no obstructive CAD, and normal right-sided filling pressures.    Ultimately, Mr. Muhammad developed persistent AF recurrence and underwent a repeat DCCV (11/22/16). A Device Interrogation at the time revealed that Mr. Muhammad went back out of rhythm (01/19/17). At the time, he reported experiencing fatigue and irritability. He had been placed on Keppra for tremors. Mr. Muhammad underwent a repeat PVI/AFL RFA (03/23/17); EPS revealed normal baseline intervals, a successful repeat PVI of the left-superior, right-superior, and right-inferior pulmonary veins, successful RFA of mitral annular and roof-dependent AFLs, and an AT mapped to the BERNADETTE of unclear significance.    Since the procedure, Mr. Muhammad reports experiencing RUQ pain (in the lower part of his right rib cage; anterior aspect radiating to the posterior region); he describes this as sharp, occasionally worsening with inspiration; he states that this has been worse at night and has kept him from sleeping. He has been taking Ibuprofen 4 tablets TID; this helped somewhat, but when the medication  was stopped, the pain returned with the same severity. He reports continued fatigue, and SOB/FOWLER, but denies denies  Fever, dizziness, palpitations, or syncope.     Recent cardiac studies:  Echo (04/04/17) revealed an EF of 60-65%, biatrial enlargement (severe LAE; MADI  measuring 55.42 cc/m2), LVDD, RVE w/normal systolic function, and no evidence of pericardial effusion, intracavity mass, thrombi, or vegetation.   Device Interrogation (04/18/17) reveals an intrinsic SB w/first degree AVB with stable lead and device function. AMS noted (6.9%  [place]; max episode duration 4 minutes); no NSVT. Estimated battery longevity 9-10 years.     I reviewed today's ECG which demonstrated a dual AV-paced rhythm at 72 bpm; QRS 82, and QTc 407.    ROS     Objective:    Physical Exam      Assessment:       1. PAF (paroxysmal atrial fibrillation)    2. Current use of long term anticoagulation    3. Status post catheter ablation of atrial fibrillation    4. Atypical atrial flutter    5. Atrial tachycardia    6. Sinus node dysfunction    7. Cardiac pacemaker in situ    8. Coronary artery disease involving native coronary artery of native heart without angina pectoris    9. Hyperlipidemia, unspecified hyperlipidemia type    10. Seizure disorder    11. Memory loss         Plan:       In summary, Mr. Muhammad is a 76 y.o. male with pAF s/p PVI, atypical AFL s/p recent AFL RFA + re-do PVI, a hx of AT, sinus node dysfunction s/p DC PPM, CAD, HLD, seizure disorder, and memory loss. Mr. Muhammad reports sharp RUQ pain at times radiating around his rib cage to the back since the procedure; Echo post-op revealed no evidence of a pericardial effusion.     Abdominal US given persistent RUQ pain.   Continue current medication regimen.   Follow up with Dr. Graham for device interrogations/cardiac care.   Follow up with EP clinic in 6 months, sooner as needed.     Jeri Herbert, MN, APRN, FNP-C     Case discussed with Dr. Esparza. (A copy of today's  note was sent to Dr. Esparza).

## 2017-04-18 NOTE — TELEPHONE ENCOUNTER
Reason for Disposition   Chest pain lasting longer than 5 minutes    Protocols used: ST CHEST PAIN-A-OH  Spoke to patient's wife who states Mr. Muhammad is complaining of constant chest pain that radiates to his back. She states he has an appointment with cardiology today. Caller is requesting an appointment with patient's PCP Dr. Hansen because she believes the pain could be his gallbladder. Advised caller that patient has a very high risk history, therefore he should be evaluated in ED for constant pain. Caller agrees, but patient does not want to go to ED.

## 2017-04-18 NOTE — MR AVS SNAPSHOT
[facility]  [address]  [facility] [address]  Phone: [phone]  Fax: [phone]                  Heri Muhammad   2017 3:30 PM   Office Visit    Description:  Male : 1940   Provider:  DARON Denney   Department:  [facility]           Reason for Visit     Shortness of Breath     Fatigue     Insomnia           Diagnoses this Visit        Comments    PAF (paroxysmal atrial fibrillation)    -  Primary     Current use of long term anticoagulation         Status post catheter ablation of atrial fibrillation         Atypical atrial flutter         Atrial tachycardia         Sinus node dysfunction         Cardiac pacemaker in situ         Coronary artery disease involving native coronary artery of native heart without angina pectoris         Hyperlipidemia, unspecified hyperlipidemia type         Seizure disorder         Memory loss                To Do List           Future Appointments        Provider Department Dept Phone    5/3/2017 9:25 AM LAB, [place] [facility] [phone]    2017 1:40 PM Akshat Naik Jr., MD [place] - Neurology [phone]      Goals (5 Years of Data)     None      [facility]     [facility] Nurse Care Line -  Assistance  Unless otherwise directed by your provider, please contact [facility], our nurse care line that is available for  assistance.     Registered nurses in the [facility] provide: appointment scheduling, clinical advisement, health education, and other advisory services.  Call: [phone] (toll free)               Medications           Message regarding Medications     Verify the changes and/or additions to your medication regime listed below are the same as discussed with your clinician today.  If any of these changes or additions are incorrect, please notify your healthcare provider.        STOP taking these medications     ketoconazole  (NIZORAL) 2 % cream Apply topically once daily.    ketoconazole (NIZORAL) 2 % shampoo Wash beard with medicated shampoo at least 2x/week - let soak at least 5 minutes prior to rinsing           Verify that the below list of medications is an accurate representation of the medications you are currently taking.  If none reported, the list may be blank. If incorrect, please contact your healthcare provider. Carry this list with you in case of emergency.           Current Medications     apixaban (ELIQUIS) 5 mg Tab Take 1 tablet (5 mg total) by mouth 2 (two) times daily.    aspirin (ECOTRIN) 81 MG EC tablet Take 81 mg by mouth once daily.    atorvastatin (LIPITOR) 10 MG tablet TAKE 1 TABLET EVERY EVENING    cyanocobalamin (VITAMIN B-12) 1000 MCG tablet Take 100 mcg by mouth once daily.    cyclobenzaprine (FLEXERIL) 5 MG tablet Take 1 tablet (5 mg total) by mouth 3 (three) times daily as needed for Muscle spasms.    furosemide (LASIX) 20 MG tablet TAKE 1 TABLET EVERY MORNING    lamotrigine (LAMICTAL) 100 MG tablet Take 1 tablet (100 mg total) by mouth 2 (two) times daily.    levetiracetam (KEPPRA) 500 MG Tab Take 2 tablets (1,000 mg total) by mouth 2 (two) times daily.    memantine (NAMENDA) 10 MG Tab Take 1 tablet (10 mg total) by mouth 2 (two) times daily.    metoprolol succinate (TOPROL-XL) 25 MG 24 hr tablet Take 2 tablets (50 mg total) by mouth 2 (two) times daily.    nitroGLYCERIN (NITROSTAT) 0.4 MG SL tablet Place 1 tablet (0.4 mg total) under the tongue every 5 (five) minutes as needed for Chest pain.    pantoprazole (PROTONIX) 40 MG tablet Take 1 tablet (40 mg total) by mouth once daily.    primidone (MYSOLINE) 250 MG Tab TAKE 2 TABLETS TWICE DAILY           Clinical Reference Information           Your Vitals Were     BP Pulse Height Weight BMI    128/72 72 6' (1.829 m) 106.9 kg (235 lb 10.8 oz) 31.96 kg/m2      Blood Pressure          Most Recent Value    BP  128/72      Allergies as of 4/18/2017     Bactroban  [Mupirocin Calcium]      Immunizations Administered on Date of Encounter - 4/18/2017     None      Language Assistance Services     ATTENTION: Language assistance services are available, free of charge. Please call [phone].      ATENCIÓN: Si dionicio jack, tiene a marks disposición servicios gratuitos de asistencia lingüística. Llame al [phone].     CHÚ Ý: N?u b?n nói Ti?ng Vi?t, có các d?ch v? h? tr? ngôn ng? mi?n phí dành cho b?n. G?i s? [phone].         [facility] complies with applicable Federal civil rights laws and does not discriminate on the basis of race, color, national origin, age, disability, or sex.

## 2017-04-19 ENCOUNTER — HOSPITAL ENCOUNTER (OUTPATIENT)
Dept: RADIOLOGY | Facility: HOSPITAL | Age: 77
Discharge: HOME | End: 2017-04-19
Attending: INTERNAL MEDICINE
Payer: MEDICARE

## 2017-04-19 ENCOUNTER — TELEPHONE (OUTPATIENT)
Dept: ELECTROPHYSIOLOGY | Facility: CLINIC | Age: 77
End: 2017-04-19

## 2017-04-19 ENCOUNTER — TELEPHONE (OUTPATIENT)
Dept: FAMILY MEDICINE | Facility: CLINIC | Age: 77
End: 2017-04-19

## 2017-04-19 DIAGNOSIS — R10.9 ABDOMINAL PAIN, UNSPECIFIED LOCATION: Primary | ICD-10-CM

## 2017-04-19 DIAGNOSIS — R10.9 ABDOMINAL PAIN, UNSPECIFIED LOCATION: ICD-10-CM

## 2017-04-19 PROCEDURE — 76700 US EXAM ABDOM COMPLETE: CPT | Mod: 26,,, | Performed by: RADIOLOGY

## 2017-04-19 PROCEDURE — 76700 US EXAM ABDOM COMPLETE: CPT | Mod: TC,PO

## 2017-04-19 NOTE — TELEPHONE ENCOUNTER
Patients wife said he didn't go to the ER and the cardiologist ordered a ultra sound but she thinks it is his gallbladder. Please advise Or should I tell her to call Cardio back?

## 2017-04-19 NOTE — TELEPHONE ENCOUNTER
----- Message from DARON Denney sent at 4/19/2017  2:04 PM CDT -----  Dr. Isaias Sahu would like the CT of the abdomen and chest to be done with contrast. I really appreciate your help with this.     Ken,   Once the orders are in, can you please schedule Mr. Muhammad for the CT of the abdomen and chest w/contrast per Dr. Esparza; I already messaged Mr. Muhammad via [facility] to let him know that his abdominal US (done yesterday) was negative.     Thank you     CM   ----- Message -----     From: Deep Esparza MD     Sent: 4/19/2017   1:18 PM       To: DARON Denney    with  ----- Message -----     From: DARON Denney     Sent: 4/19/2017  12:59 PM       To: MD Dr. Isaias Vaughn,     His Creatinine is 0.8; do you want the CT with or without contrast?     CM   ----- Message -----     From: Deep Esparza MD     Sent: 4/19/2017  11:34 AM       To: DARON Denney    Ultrasound essentially negative.  If pain is persistent >> CT of abdomen and chest.  If negative >> no further work-up from our end.

## 2017-04-20 ENCOUNTER — HOSPITAL ENCOUNTER (OUTPATIENT)
Dept: RADIOLOGY | Facility: HOSPITAL | Age: 77
Discharge: HOME | End: 2017-04-20
Attending: NURSE PRACTITIONER
Payer: MEDICARE

## 2017-04-20 ENCOUNTER — TELEPHONE (OUTPATIENT)
Dept: FAMILY MEDICINE | Facility: CLINIC | Age: 77
End: 2017-04-20

## 2017-04-20 DIAGNOSIS — R10.9 ABDOMINAL PAIN, UNSPECIFIED LOCATION: ICD-10-CM

## 2017-04-20 PROCEDURE — 74160 CT ABDOMEN W/CONTRAST: CPT | Mod: 26,,, | Performed by: RADIOLOGY

## 2017-04-20 PROCEDURE — 74160 CT ABDOMEN W/CONTRAST: CPT | Mod: TC,PO

## 2017-04-20 PROCEDURE — 71260 CT THORAX DX C+: CPT | Mod: 26,,, | Performed by: RADIOLOGY

## 2017-04-20 PROCEDURE — 25500020 PHARM REV CODE 255: Mod: PO | Performed by: NURSE PRACTITIONER

## 2017-04-20 PROCEDURE — 71260 CT THORAX DX C+: CPT | Mod: TC,PO

## 2017-04-20 RX ADMIN — IOHEXOL 100 ML: 350 INJECTION, SOLUTION INTRAVENOUS at 01:04

## 2017-04-20 RX ADMIN — IOHEXOL 15 ML: 350 INJECTION, SOLUTION INTRAVENOUS at 01:04

## 2017-04-20 NOTE — TELEPHONE ENCOUNTER
Patient scheduled appointment this morning with Nurse Mp.  Nurse Mp noted that Dr Sheehan and Nurse Herbert ordered ct scan.  Call to patient, he was not aware of ct orders.  Scheduled ct today at 2pm and canceled appointment this morning with Nurse Mp.  Advised patient that Nurse Herbert would follow up once she receives ct results.

## 2017-04-22 DIAGNOSIS — R41.3 MEMORY LOSS: ICD-10-CM

## 2017-04-22 RX ORDER — LEVETIRACETAM 500 MG/1
TABLET ORAL
Qty: 180 TABLET | Refills: 0 | Status: SHIPPED | OUTPATIENT
Start: 2017-04-22 | End: 2017-05-26 | Stop reason: SDUPTHER

## 2017-04-24 NOTE — ADDENDUM NOTE
Addendum  created 04/24/17 1346 by Jose Davis MD    Anesthesia Intra Blocks edited, Sign clinical note

## 2017-04-25 ENCOUNTER — HOSPITAL ENCOUNTER (OUTPATIENT)
Dept: RADIOLOGY | Facility: HOSPITAL | Age: 77
Discharge: HOME | End: 2017-04-25
Attending: INTERNAL MEDICINE
Payer: MEDICARE

## 2017-04-25 ENCOUNTER — OFFICE VISIT (OUTPATIENT)
Dept: INTERNAL MEDICINE | Facility: CLINIC | Age: 77
End: 2017-04-25
Payer: MEDICARE

## 2017-04-25 VITALS
DIASTOLIC BLOOD PRESSURE: 62 MMHG | RESPIRATION RATE: 18 BRPM | BODY MASS INDEX: 32.16 KG/M2 | WEIGHT: 237.44 LBS | HEIGHT: 72 IN | HEART RATE: 63 BPM | OXYGEN SATURATION: 97 % | SYSTOLIC BLOOD PRESSURE: 104 MMHG

## 2017-04-25 DIAGNOSIS — M54.6 CHRONIC RIGHT-SIDED THORACIC BACK PAIN: ICD-10-CM

## 2017-04-25 DIAGNOSIS — R10.11 RUQ PAIN: Primary | ICD-10-CM

## 2017-04-25 DIAGNOSIS — G89.29 CHRONIC RIGHT-SIDED THORACIC BACK PAIN: ICD-10-CM

## 2017-04-25 PROCEDURE — 72110 X-RAY EXAM L-2 SPINE 4/>VWS: CPT | Mod: 26,,, | Performed by: RADIOLOGY

## 2017-04-25 PROCEDURE — 3078F DIAST BP <80 MM HG: CPT | Mod: S$GLB,,, | Performed by: INTERNAL MEDICINE

## 2017-04-25 PROCEDURE — 99213 OFFICE O/P EST LOW 20 MIN: CPT | Mod: S$GLB,,, | Performed by: INTERNAL MEDICINE

## 2017-04-25 PROCEDURE — 1159F MED LIST DOCD IN RCRD: CPT | Mod: S$GLB,,, | Performed by: INTERNAL MEDICINE

## 2017-04-25 PROCEDURE — 3074F SYST BP LT 130 MM HG: CPT | Mod: S$GLB,,, | Performed by: INTERNAL MEDICINE

## 2017-04-25 PROCEDURE — 72110 X-RAY EXAM L-2 SPINE 4/>VWS: CPT | Mod: TC,PO

## 2017-04-25 PROCEDURE — 72070 X-RAY EXAM THORAC SPINE 2VWS: CPT | Mod: 26,,, | Performed by: RADIOLOGY

## 2017-04-25 PROCEDURE — 72070 X-RAY EXAM THORAC SPINE 2VWS: CPT | Mod: TC,PO

## 2017-04-25 PROCEDURE — 99999 PR PBB SHADOW E&M-EST. PATIENT-LVL IV: CPT | Mod: PBBFAC,,, | Performed by: INTERNAL MEDICINE

## 2017-04-25 PROCEDURE — 1126F AMNT PAIN NOTED NONE PRSNT: CPT | Mod: S$GLB,,, | Performed by: INTERNAL MEDICINE

## 2017-04-25 PROCEDURE — 1160F RVW MEDS BY RX/DR IN RCRD: CPT | Mod: S$GLB,,, | Performed by: INTERNAL MEDICINE

## 2017-04-25 NOTE — PROGRESS NOTES
"HISTORY OF PRESENT ILLNESS:  Pt. is a 76 y.o. male presents with continued RUQ pain.  He has recently seen cardiology and appreciate their thorough note.  He had CT abdomen and chest that showed:    Impression          1. Several small pulmonary nodules, in a low risk patient no further followup would be necessary. In a high risk patient for followup in one year for size stability would be suggested.    2. Gas within the esophagus as can be seen with a history of reflux or air swallowing.    3. Moderate quantity of stool in the colon, please correlate for constipation.         US abdomen ordered by Dr. Ochoa showed:  "gallbladder is mildly well distended at the time of the examination. There is an area of ring down artifact within the gallbladder fundus with small echogenic foci consistent with adenomyomatosis. No stones or sludge noted."  He had pain after ablation after his 3/23/17, initially thought to be pericardial inflation."    Pain is usually worse when laying down and in the evenings.      ROS:  GENERAL: No fever, chills, fatigability or weight loss.  SKIN: No rashes, itching or changes in color or texture of skin.  HEAD: No headaches or recent head trauma.  EARS: Denies ear pain, discharge or vertigo.  NOSE: No loss of smell, no epistaxis or postnasal drip.  MOUTH & THROAT: No hoarseness or change in voice. No excessive gum bleeding.  NODES: Denies swollen glands.  CHEST: Denies FOWLER, cyanosis, wheezing, cough and sputum production.  CARDIOVASCULAR: Denies chest pain, PND, orthopnea or reduced exercise tolerance.  ABDOMEN: Appetite fine. No weight loss. Denies constipation, diarrhea, abdominal pain, hematemesis or blood in stool.  URINARY: No flank pain, dysuria or hematuria.  PERIPHERAL VASCULAR: No claudication or cyanosis. No edema.  MUSCULOSKELETAL: No joint stiffness or swelling. Denies back pain.  NEUROLOGIC: Denies numbness    PE:   Vitals:   Vitals:    04/25/17 0907   BP: 104/62   Pulse: 63   Resp: " 18     GENERAL: no acute distress, A&Ox3, comfortable.  Male with body mass index of 32   HEENT: tympanic membranes clear, nasal mucosa pink, no pharyngeal erythema or exudate  NECK: supple, no cervical lymphadenopathy, no thyromegaly; no supraclavicular nodes;   CHEST:  clear to auscultation bilaterally, no crackles or wheeze; no increased work of breathing;  CARDIOVASCULAR: regular rate and rhythm, no rubs, murmurs or gallops.  ABDOMEN: normal bowel sounds, soft non-tender, non-distended; no palpable organomegaly;   EXT: no clubbing, cyanosis or edema.     ASSESSMENT/PLAN:    RUQ pain  -     Ambulatory referral to General Surgery    Chronic right-sided thoracic back pain  -     X-Ray Thoracic Spine AP Lateral; Future; Expected date: 4/25/17  -     X-Ray Lumbar Spine Complete 5 View; Future; Expected date: 4/25/17      Call if condition changes or worsens.

## 2017-04-25 NOTE — MR AVS SNAPSHOT
[facility] Internal Medicine  [address] [facility] [address]  [facility] [address]  Phone: [phone]  Fax: [phone]                  Heri Muhammad   2017 9:00 AM   Office Visit    Description:  Male : 1940   Provider:  Mel Hansen MD   Department:  [facility] Internal Medicine           Reason for Visit     Chest Pain           Diagnoses this Visit        Comments    RUQ pain    -  Primary     Chronic right-sided thoracic back pain                To Do List           Future Appointments        Provider Department Dept Phone    2017 12:45 PM [facility] [phone]    2017 1:00 PM [facility] [phone]    2017 1:45 PM Leighton Rice MD [facility] General Surgery [phone]    5/3/2017 9:25 AM [facility] [place] [facility] [phone]    2017 1:40 PM Akshat Naik Jr., MD [facility] Neurology [phone]      Goals (5 Years of Data)     None      [facility] On Call     [facility] Nurse Care Line -  Assistance  Unless otherwise directed by your provider, please contact [facility], our nurse care line that is available for  assistance.     Registered nurses in the [facility] provide: appointment scheduling, clinical advisement, health education, and other advisory services.  Call: [phone] (toll free)               Medications           Message regarding Medications     Verify the changes and/or additions to your medication regime listed below are the same as discussed with your clinician today.  If any of these changes or additions are incorrect, please notify your healthcare provider.             Verify that the below list of medications is an accurate representation of the medications you are currently taking.  If none reported, the list may be blank. If incorrect, please contact your healthcare provider. Carry this list with you in case of  emergency.           Current Medications     apixaban (ELIQUIS) 5 mg Tab Take 1 tablet (5 mg total) by mouth 2 (two) times daily.    aspirin (ECOTRIN) 81 MG EC tablet Take 81 mg by mouth once daily.    atorvastatin (LIPITOR) 10 MG tablet TAKE 1 TABLET EVERY EVENING    cyanocobalamin (VITAMIN B-12) 1000 MCG tablet Take 100 mcg by mouth once daily.    cyclobenzaprine (FLEXERIL) 5 MG tablet Take 1 tablet (5 mg total) by mouth 3 (three) times daily as needed for Muscle spasms.    furosemide (LASIX) 20 MG tablet TAKE 1 TABLET EVERY MORNING    lamotrigine (LAMICTAL) 100 MG tablet Take 1 tablet (100 mg total) by mouth 2 (two) times daily.    levetiracetam (KEPPRA) 500 MG Tab TAKE 2 TABLETS (1,000 MG TOTAL) BY MOUTH 2 (TWO) TIMES DAILY.    memantine (NAMENDA) 10 MG Tab Take 1 tablet (10 mg total) by mouth 2 (two) times daily.    metoprolol succinate (TOPROL-XL) 25 MG 24 hr tablet Take 2 tablets (50 mg total) by mouth 2 (two) times daily.    nitroGLYCERIN (NITROSTAT) 0.4 MG SL tablet Place 1 tablet (0.4 mg total) under the tongue every 5 (five) minutes as needed for Chest pain.    pantoprazole (PROTONIX) 40 MG tablet Take 1 tablet (40 mg total) by mouth once daily.    primidone (MYSOLINE) 250 MG Tab TAKE 2 TABLETS TWICE DAILY           Clinical Reference Information           Your Vitals Were     BP Pulse Resp Height Weight SpO2    104/62 63 18 6' (1.829 m) 107.7 kg (237 lb 7 oz) 97%    BMI                32.2 kg/m2          Blood Pressure          Most Recent Value    BP  104/62      Allergies as of 4/25/2017     Bactroban [Mupirocin Calcium]      Immunizations Administered on Date of Encounter - 4/25/2017     None      Orders Placed During Today's Visit      Normal Orders This Visit    Ambulatory referral to General Surgery     Future Labs/Procedures Expected by Expires    X-Ray Lumbar Spine Complete 5 View  4/25/2017 4/25/2018    X-Ray Thoracic Spine AP Lateral  4/25/2017 4/25/2018      Language Assistance Services      ATTENTION: Language assistance services are available, free of charge. Please call [phone].      ATENCIÓN: Si habla marcie, tiene a marks disposición servicios gratuitos de asistencia lingüística. Llame al [phone].     CHÚ Ý: N?u b?n nói Ti?ng Vi?t, có các d?ch v? h? tr? ngôn ng? mi?n phí dành cho b?n. G?i s? [phone].         [facility] complies with applicable Federal civil rights laws and does not discriminate on the basis of race, color, national origin, age, disability, or sex.

## 2017-04-26 ENCOUNTER — TELEPHONE (OUTPATIENT)
Dept: NEUROLOGY | Facility: CLINIC | Age: 77
End: 2017-04-26

## 2017-04-26 ENCOUNTER — OFFICE VISIT (OUTPATIENT)
Dept: SURGERY | Facility: CLINIC | Age: 77
End: 2017-04-26
Payer: MEDICARE

## 2017-04-26 VITALS
TEMPERATURE: 97 F | HEIGHT: 72 IN | SYSTOLIC BLOOD PRESSURE: 108 MMHG | WEIGHT: 237.19 LBS | DIASTOLIC BLOOD PRESSURE: 58 MMHG | BODY MASS INDEX: 32.13 KG/M2 | HEART RATE: 60 BPM

## 2017-04-26 DIAGNOSIS — G25.0 ESSENTIAL TREMOR: Primary | ICD-10-CM

## 2017-04-26 DIAGNOSIS — I48.0 PAF (PAROXYSMAL ATRIAL FIBRILLATION): Chronic | ICD-10-CM

## 2017-04-26 DIAGNOSIS — R10.11 RUQ PAIN: Primary | ICD-10-CM

## 2017-04-26 PROCEDURE — 1125F AMNT PAIN NOTED PAIN PRSNT: CPT | Mod: S$GLB,,, | Performed by: SURGERY

## 2017-04-26 PROCEDURE — 1160F RVW MEDS BY RX/DR IN RCRD: CPT | Mod: S$GLB,,, | Performed by: SURGERY

## 2017-04-26 PROCEDURE — 99499 UNLISTED E&M SERVICE: CPT | Mod: S$GLB,,, | Performed by: SURGERY

## 2017-04-26 PROCEDURE — 99999 PR PBB SHADOW E&M-EST. PATIENT-LVL III: CPT | Mod: PBBFAC,,, | Performed by: SURGERY

## 2017-04-26 PROCEDURE — 3078F DIAST BP <80 MM HG: CPT | Mod: S$GLB,,, | Performed by: SURGERY

## 2017-04-26 PROCEDURE — 1159F MED LIST DOCD IN RCRD: CPT | Mod: S$GLB,,, | Performed by: SURGERY

## 2017-04-26 PROCEDURE — 99203 OFFICE O/P NEW LOW 30 MIN: CPT | Mod: S$GLB,,, | Performed by: SURGERY

## 2017-04-26 PROCEDURE — 3074F SYST BP LT 130 MM HG: CPT | Mod: S$GLB,,, | Performed by: SURGERY

## 2017-04-26 NOTE — ASSESSMENT & PLAN NOTE
"Rec'd "OK" from Dr. Esparza to change metoprolol to propranolol.  He was last on propranolol 20 bid, but changed to metoprolol xl 50 bid in 2016. In 2013, he was on 160mg propranolol, so I'm going to start with 80mg LA. He may need 160mg, but we'll start here and potentially work up.      Does need f/u with me (not necessary to see Heena in June) in July and needs cardiology followup.  "

## 2017-04-26 NOTE — LETTER
April 26, 2017      Mel Hansen MD  [address] [facility] [address] [place] [address]           [facility]  [address] [facility] [address] [place] [address]  Phone: [phone]          Patient: Heri Muhammad   MR Number: [id number]   YOB: 1940   Date of Visit: 4/26/2017       Dear Dr. Mel Hansen:    Thank you for referring Heri Muhammad to me for evaluation. Attached you will find relevant portions of my assessment and plan of care.    If you have questions, please do not hesitate to call me. I look forward to following Heri Muhammad along with you.    Sincerely,    Leighton Rice MD    Enclosure  CC:  No Recipients    If you would like to receive this communication electronically, please contact [email or web address] or [phone] to request more information on [facility] [employer] access.    For providers and/or their staff who would like to refer a patient to [facility], please contact us through our one-stop-shop provider referral line, [facility], at [phone].    If you feel you have received this communication in error or would no longer like to receive these types of communications, please e-mail [email or web address]

## 2017-04-26 NOTE — TELEPHONE ENCOUNTER
He was last on propranolol 20 bid, but changed to metoprolol xl 50 bid.  In 2013, he was on 160mg propranolol, so I'm going to start with 80mg LA.  He may need 160mg, but we'll start here and potentially work up.      Does need f/u with me (not necessary to see Heena in June) in July and needs cardiology followup.

## 2017-04-26 NOTE — PROGRESS NOTES
Subjective:       Patient ID: Heri Muhammad is a 76 y.o. male.    Chief Complaint: Consult (RUQ  pain )    HPI  Pleasant 75 yo M referred to me for evaluation of pain on the R side beneath the rib cage. He notes that the pain has been fairly consistent and irritating.  He describes it more as a dull ache that is irritating. He denies that it prevents him from performing any daily activities.  Deneis n/v.  No fever/chills.  He does not notice any association with dietary intake.  No alleviating or aggravating factors.  Pt does note that he has reflux.  Syhmptoms started in March following ablation for afib  Review of Systems   Constitutional: Negative for activity change, appetite change, diaphoresis, fever and unexpected weight change.   HENT: Negative for congestion and ear discharge.    Respiratory: Negative for cough, chest tightness and wheezing.    Cardiovascular: Negative for chest pain and palpitations.   Gastrointestinal: Positive for abdominal pain. Negative for abdominal distention, anal bleeding, blood in stool, constipation, diarrhea, nausea, rectal pain and vomiting.   Genitourinary: Negative for difficulty urinating, dysuria and hematuria.   Musculoskeletal: Negative for back pain and gait problem.   Skin: Negative for color change and wound.   Neurological: Negative for tremors and seizures.   Hematological: Negative for adenopathy. Does not bruise/bleed easily.   Psychiatric/Behavioral: Negative for agitation and decreased concentration.       Objective:      Physical Exam   Constitutional: He is oriented to person, place, and time. He appears well-developed and well-nourished.   HENT:   Head: Normocephalic and atraumatic.   Eyes: Pupils are equal, round, and reactive to light.   Neck: Normal range of motion. No tracheal deviation present. No thyromegaly present.   Cardiovascular: Normal rate, regular rhythm and normal heart sounds.  Exam reveals no gallop and no friction rub.    No murmur  heard.  Pulmonary/Chest: Effort normal and breath sounds normal. He has no wheezes. He exhibits no tenderness.   Abdominal: He exhibits no distension and no mass. There is no tenderness. There is no rebound and no guarding. No hernia.   Musculoskeletal: Normal range of motion.   Lymphadenopathy:     He has no cervical adenopathy.   Neurological: He is alert and oriented to person, place, and time. Coordination normal.   Skin: Skin is warm.   Psychiatric: He has a normal mood and affect. His behavior is normal.   Vitals reviewed.        CT scan: 4/20//17    Impression       1. Several small pulmonary nodules, in a low risk patient no further followup would be necessary. In a high risk patient for followup in one year for size stability would be suggested.    2. Gas within the esophagus as can be seen with a history of reflux or air swallowing.    3. Moderate quantity of stool in the colon, please correlate for constipation.           US:4/19/17    The gallbladder is mildly well distended at the time of the examination. There is an area of ring down artifact within the gallbladder fundus with small echogenic foci consistent with adenomyomatosis. No stones or sludge noted. Sonographic Lester sign is negative. The hepatic echogenicity is normal. No focal hepatic mass or intrahepatic biliary dilatation is seen.    The right kidney measures approximately 11.9 cm in length. Normal corticomedullary differentiation is seen. No hydronephrosis, stone or focal mass is seen.    Spleen is normal in size and echogenicity.    The left kidney measures approximately 11.4 cm in length. Normal corticomedullary differentiation is seen. No hydronephrosis, stone or focal mass is seen.    The visualized upper abdominal IVC is unremarkable.   Impression     1.  Mild gallbladder fundal adenomyomatosis. Otherwise unremarkable abdominal ultrasound.         Lab Results   Component Value Date    WBC 5.49 03/15/2017    HGB 14.8 03/15/2017    HCT  44.4 03/15/2017    MCV 94 03/15/2017     03/15/2017     CMP  Sodium   Date Value Ref Range Status   03/15/2017 140 136 - 145 mmol/L Final     Potassium   Date Value Ref Range Status   03/15/2017 4.0 3.5 - 5.1 mmol/L Final     Chloride   Date Value Ref Range Status   03/15/2017 105 95 - 110 mmol/L Final     CO2   Date Value Ref Range Status   03/15/2017 26 23 - 29 mmol/L Final     Glucose   Date Value Ref Range Status   03/15/2017 103 70 - 110 mg/dL Final     BUN, Bld   Date Value Ref Range Status   03/15/2017 14 8 - 23 mg/dL Final     Creatinine   Date Value Ref Range Status   03/15/2017 0.8 0.5 - 1.4 mg/dL Final     Calcium   Date Value Ref Range Status   03/15/2017 8.5 (L) 8.7 - 10.5 mg/dL Final     Total Protein   Date Value Ref Range Status   03/02/2017 7.2 6.0 - 8.4 g/dL Final     Albumin   Date Value Ref Range Status   03/02/2017 3.7 3.5 - 5.2 g/dL Final     Total Bilirubin   Date Value Ref Range Status   03/02/2017 0.3 0.1 - 1.0 mg/dL Final     Comment:     For infants and newborns, interpretation of results should be based  on gestational age, weight and in agreement with clinical  observations.  Premature Infant recommended reference ranges:  Up to 24 hours.............<8.0 mg/dL  Up to 48 hours............<12.0 mg/dL  3-5 days..................<15.0 mg/dL  6-29 days.................<15.0 mg/dL       Alkaline Phosphatase   Date Value Ref Range Status   03/02/2017 93 55 - 135 U/L Final     AST ([place])   Date Value Ref Range Status   11/27/2015 35 17 - 59 U/L Final     AST   Date Value Ref Range Status   03/02/2017 16 10 - 40 U/L Final     ALT   Date Value Ref Range Status   03/02/2017 17 10 - 44 U/L Final     Anion Gap   Date Value Ref Range Status   03/15/2017 9 8 - 16 mmol/L Final     eGFR if    Date Value Ref Range Status   03/15/2017 >60.0 >60 mL/min/1.73 m^2 Final     eGFR if non    Date Value Ref Range Status   03/15/2017 >60.0 >60 mL/min/1.73 m^2 Final      Comment:     Calculation used to obtain the estimated glomerular filtration  rate (eGFR) is the CKD-EPI equation. Since race is unknown   in our information system, the eGFR values for   -American and Non--American patients are given   for each creatinine result.           Assessment:     RuQ/ chest pain  No diagnosis found.    Plan:       D/w pt. Uncertain of etiology.  Pain seems to be muscular in origin.  WIll obtain a HIDA to ensure normal GB function.  IF persist and HIDA is normal would consider GI referral for reflux.

## 2017-04-26 NOTE — TELEPHONE ENCOUNTER
----- Message from Deep Esparza MD sent at 4/10/2017  2:45 PM CDT -----  I am fine with that.    ----- Message -----     From: Anastacia Quinn MD     Sent: 4/10/2017   2:13 PM       To: MD Deep Vaughn, could we use propranolol instead of metoprolol for this man?  He got more tremor suppression when on propranolol in past.    Thank you,  [place]

## 2017-04-26 NOTE — MR AVS SNAPSHOT
[facility]  [address]  [place] [address]  Phone: [phone]                  Heri Muhammad   2017 1:45 PM   Office Visit    Description:  Male : 1940   Provider:  Leighton Rice MD   Department:  [facility]           Reason for Visit     Consult           Diagnoses this Visit        Comments    RUQ pain    -  Primary            To Do List           Future Appointments        Provider Department Dept Phone    5/3/2017 9:25 AM [facility], [place] [facility] [phone]    2017 1:40 PM Akshat Naik Jr., MD [facility] Neurology [phone]      Goals (5 Years of Data)     None      [facility] On Call     [facility] Care Line -  Assistance  Unless otherwise directed by your provider, please contact [facility], our nurse care line that is available for  assistance.     Registered nurses in the [facility] provide: appointment scheduling, clinical advisement, health education, and other advisory services.  Call: [phone] (toll free)               Medications           Message regarding Medications     Verify the changes and/or additions to your medication regime listed below are the same as discussed with your clinician today.  If any of these changes or additions are incorrect, please notify your healthcare provider.             Verify that the below list of medications is an accurate representation of the medications you are currently taking.  If none reported, the list may be blank. If incorrect, please contact your healthcare provider. Carry this list with you in case of emergency.           Current Medications     apixaban (ELIQUIS) 5 mg Tab Take 1 tablet (5 mg total) by mouth 2 (two) times daily.    aspirin (ECOTRIN) 81 MG EC tablet Take 81 mg by mouth once daily.    atorvastatin (LIPITOR) 10 MG tablet TAKE 1 TABLET EVERY EVENING    cyanocobalamin (VITAMIN B-12) 1000 MCG tablet  Take 100 mcg by mouth once daily.    furosemide (LASIX) 20 MG tablet TAKE 1 TABLET EVERY MORNING    levetiracetam (KEPPRA) 500 MG Tab TAKE 2 TABLETS (1,000 MG TOTAL) BY MOUTH 2 (TWO) TIMES DAILY.    memantine (NAMENDA) 10 MG Tab Take 1 tablet (10 mg total) by mouth 2 (two) times daily.    pantoprazole (PROTONIX) 40 MG tablet Take 1 tablet (40 mg total) by mouth once daily.    primidone (MYSOLINE) 250 MG Tab TAKE 2 TABLETS TWICE DAILY    cyclobenzaprine (FLEXERIL) 5 MG tablet Take 1 tablet (5 mg total) by mouth 3 (three) times daily as needed for Muscle spasms.    lamotrigine (LAMICTAL) 100 MG tablet Take 1 tablet (100 mg total) by mouth 2 (two) times daily.    nitroGLYCERIN (NITROSTAT) 0.4 MG SL tablet Place 1 tablet (0.4 mg total) under the tongue every 5 (five) minutes as needed for Chest pain.    propranolol (INNOPRAN XL) 80 mg 24 hr capsule Take 1 capsule (80 mg total) by mouth every evening.           Clinical Reference Information           Your Vitals Were     BP Pulse Temp Height Weight BMI    108/58 60 97.4 °F (36.3 °C) (Oral) 6' (1.829 m) 107.6 kg (237 lb 3.4 oz) 32.17 kg/m2      Blood Pressure          Most Recent Value    BP  (!)  108/58      Allergies as of 4/26/2017     Bactroban [Mupirocin Calcium]      Immunizations Administered on Date of Encounter - 4/26/2017     None      Language Assistance Services     ATTENTION: Language assistance services are available, free of charge. Please call [phone].      ATENCIÓN: Si clivela marcie, tiene a marks disposición servicios gratuitos de asistencia lingüística. Llame al [phone].     DANYELLE Ý: N?u b?n nói Ti?ng Vi?t, có các d?ch v? h? tr? ngôn ng? mi?n phí dành cho b?n. G?i s? [phone].         [facility] complies with applicable Federal civil rights laws and does not discriminate on the basis of race, color, national origin, age, disability, or sex.

## 2017-04-27 ENCOUNTER — TELEPHONE (OUTPATIENT)
Dept: SURGERY | Facility: CLINIC | Age: 77
End: 2017-04-27

## 2017-04-27 PROBLEM — G89.29 CHRONIC RIGHT-SIDED THORACIC BACK PAIN: Status: ACTIVE | Noted: 2017-04-27

## 2017-04-27 PROBLEM — R10.11 RUQ PAIN: Status: ACTIVE | Noted: 2017-04-27

## 2017-04-27 PROBLEM — M54.6 CHRONIC RIGHT-SIDED THORACIC BACK PAIN: Status: ACTIVE | Noted: 2017-04-27

## 2017-04-27 NOTE — TELEPHONE ENCOUNTER
Spoke to wife, NU on 5/2/17 here in [place] at 630 am, NPO 4 hours no narcotics 8 hours prior to procedure.

## 2017-05-02 ENCOUNTER — HOSPITAL ENCOUNTER (OUTPATIENT)
Dept: RADIOLOGY | Facility: HOSPITAL | Age: 77
Discharge: HOME | End: 2017-05-02
Attending: SURGERY
Payer: MEDICARE

## 2017-05-02 DIAGNOSIS — R10.11 RUQ PAIN: ICD-10-CM

## 2017-05-02 PROCEDURE — 78227 HEPATOBIL SYST IMAGE W/DRUG: CPT | Mod: 26,,, | Performed by: RADIOLOGY

## 2017-05-02 PROCEDURE — 25000011 NM HEPATOBILIARY(HIDA) WITH PHARM AND EF: Mod: PO

## 2017-05-02 PROCEDURE — A9537 TC99M MEBROFENIN: HCPCS | Mod: PO

## 2017-05-03 ENCOUNTER — LAB VISIT (OUTPATIENT)
Dept: LAB | Facility: HOSPITAL | Age: 77
End: 2017-05-03
Attending: INTERNAL MEDICINE
Payer: MEDICARE

## 2017-05-03 DIAGNOSIS — E53.8 VITAMIN B12 DEFICIENCY: ICD-10-CM

## 2017-05-03 LAB — VIT B12 SERPL-MCNC: 440 PG/ML

## 2017-05-03 PROCEDURE — 82607 VITAMIN B-12: CPT

## 2017-05-03 PROCEDURE — 36415 COLL VENOUS BLD VENIPUNCTURE: CPT | Mod: PO

## 2017-05-04 ENCOUNTER — TELEPHONE (OUTPATIENT)
Dept: SURGERY | Facility: CLINIC | Age: 77
End: 2017-05-04

## 2017-05-04 DIAGNOSIS — G40.909 EPILEPSY WITHOUT STATUS EPILEPTICUS, NOT INTRACTABLE: ICD-10-CM

## 2017-05-04 DIAGNOSIS — G40.909 NONINTRACTABLE EPILEPSY WITHOUT STATUS EPILEPTICUS, UNSPECIFIED EPILEPSY TYPE: ICD-10-CM

## 2017-05-04 RX ORDER — LAMOTRIGINE 100 MG/1
TABLET ORAL
Qty: 180 TABLET | Refills: 3 | Status: CANCELLED | OUTPATIENT
Start: 2017-05-04

## 2017-05-04 RX ORDER — LAMOTRIGINE 100 MG/1
100 TABLET ORAL 2 TIMES DAILY
Qty: 180 TABLET | Refills: 3 | Status: SHIPPED | OUTPATIENT
Start: 2017-05-04 | End: 2017-05-08 | Stop reason: SDUPTHER

## 2017-05-04 NOTE — TELEPHONE ENCOUNTER
----- Message from Jillian Blanchard sent at 5/4/2017  8:48 AM CDT -----  Contact: wife, Ana Muhammad  Patient needs a refill on Lamictal 10 mg 90 day supplycalled into [facility] pharmacy at [phone]. Patient will be out of medication and would like a bridge prescription for 10 pills sent to [facility] at [phone]. Please call patient's wife, Ana Muhammad at [phone] if you have any questions. Thanks!     [facility] - [place], [place] - [address]  [address] [address]  Phone: [phone] Fax: [phone]        [id number] - [place], [place]  [address]  [address] [address]  Phone: [phone] Fax: [phone]

## 2017-05-04 NOTE — TELEPHONE ENCOUNTER
Results given recent HIDA is negative, states will f/u with pcp for back problem, will call me in 6 months to f/u on gallbladder if needed.

## 2017-05-04 NOTE — TELEPHONE ENCOUNTER
----- Message from Yasmin River sent at 5/4/2017  9:15 AM CDT -----  Pt is in pain today ... Asking for test results from Tuesday ...call pt's wife Ana Muhammad [phone] (home)

## 2017-05-08 DIAGNOSIS — G40.909 NONINTRACTABLE EPILEPSY WITHOUT STATUS EPILEPTICUS, UNSPECIFIED EPILEPSY TYPE: ICD-10-CM

## 2017-05-08 RX ORDER — LAMOTRIGINE 100 MG/1
100 TABLET ORAL 2 TIMES DAILY
Qty: 60 TABLET | Refills: 0 | Status: SHIPPED | OUTPATIENT
Start: 2017-05-08 | End: 2018-05-16 | Stop reason: SDUPTHER

## 2017-05-08 NOTE — TELEPHONE ENCOUNTER
This pt was completely out of his medication Lamictal  and will not receive his delivery from [facility] for at least a week. Called in 1 month supply [facility] local pharmacy.

## 2017-05-17 ENCOUNTER — HOSPITAL ENCOUNTER (OUTPATIENT)
Dept: RADIOLOGY | Facility: HOSPITAL | Age: 77
Discharge: HOME | End: 2017-05-17
Attending: NEUROLOGICAL SURGERY
Payer: MEDICARE

## 2017-05-17 ENCOUNTER — OFFICE VISIT (OUTPATIENT)
Dept: NEUROSURGERY | Facility: CLINIC | Age: 77
End: 2017-05-17
Payer: MEDICARE

## 2017-05-17 VITALS
RESPIRATION RATE: 16 BRPM | HEIGHT: 72 IN | WEIGHT: 237.75 LBS | HEART RATE: 66 BPM | DIASTOLIC BLOOD PRESSURE: 75 MMHG | SYSTOLIC BLOOD PRESSURE: 133 MMHG | BODY MASS INDEX: 32.2 KG/M2

## 2017-05-17 DIAGNOSIS — M54.6 ACUTE RIGHT-SIDED THORACIC BACK PAIN: Primary | ICD-10-CM

## 2017-05-17 DIAGNOSIS — M54.6 ACUTE RIGHT-SIDED THORACIC BACK PAIN: ICD-10-CM

## 2017-05-17 PROCEDURE — 1125F AMNT PAIN NOTED PAIN PRSNT: CPT | Mod: S$GLB,,, | Performed by: PHYSICIAN ASSISTANT

## 2017-05-17 PROCEDURE — 72128 CT CHEST SPINE W/O DYE: CPT | Mod: 26,,, | Performed by: RADIOLOGY

## 2017-05-17 PROCEDURE — 99204 OFFICE O/P NEW MOD 45 MIN: CPT | Mod: S$GLB,,, | Performed by: PHYSICIAN ASSISTANT

## 2017-05-17 PROCEDURE — 72128 CT CHEST SPINE W/O DYE: CPT | Mod: TC,PO

## 2017-05-17 PROCEDURE — 3078F DIAST BP <80 MM HG: CPT | Mod: S$GLB,,, | Performed by: PHYSICIAN ASSISTANT

## 2017-05-17 PROCEDURE — 3075F SYST BP GE 130 - 139MM HG: CPT | Mod: S$GLB,,, | Performed by: PHYSICIAN ASSISTANT

## 2017-05-17 PROCEDURE — 1160F RVW MEDS BY RX/DR IN RCRD: CPT | Mod: S$GLB,,, | Performed by: PHYSICIAN ASSISTANT

## 2017-05-17 PROCEDURE — 1159F MED LIST DOCD IN RCRD: CPT | Mod: S$GLB,,, | Performed by: PHYSICIAN ASSISTANT

## 2017-05-17 NOTE — PROGRESS NOTES
Neurosurgery History & Physical    Patient ID: Heri Muhammad is a 76 y.o. male.    Chief Complaint   Patient presents with    Lumbar Spine Pain (L-Spine)    Chest Pain     2 months, more pain in right rib area, shooting pain in front every now and then, c/o numbness and tingling from lower back pian. no bladder or bowel problems. no tx tried yet.        Review of Systems   Constitutional: Negative for activity change, chills, fatigue and unexpected weight change.   HENT: Negative for hearing loss, tinnitus, trouble swallowing and voice change.    Eyes: Negative for visual disturbance.   Respiratory: Negative for apnea, chest tightness and shortness of breath.    Cardiovascular: Negative for chest pain and palpitations.   Gastrointestinal: Negative for abdominal pain, constipation, diarrhea, nausea and vomiting.   Genitourinary: Negative for difficulty urinating, dysuria and frequency.   Musculoskeletal: Positive for back pain and gait problem. Negative for neck pain and neck stiffness.   Skin: Negative for wound.   Neurological: Negative for dizziness, tremors, seizures, facial asymmetry, speech difficulty, weakness, light-headedness, numbness and headaches.   Psychiatric/Behavioral: Negative for confusion and decreased concentration.       Past Medical History:   Diagnosis Date    A-fib     Anticoagulant long-term use     eliquis    Atrial flutter     Benign essential tremor     Cancer     skin cancer on back    Cardiac pacemaker in situ     Coronary artery disease     Cough     Dizziness     intermittant    DJD (degenerative joint disease) of knee     ED (erectile dysfunction)     Hard of hearing 11/2016    History of cataract     HTN (hypertension)     Hyperlipidemia     Memory loss     Seizure disorder     Seizures     last episode 1995    Sinus node dysfunction 08/2016     Social History     Social History    Marital status:      Spouse name: N/A    Number of children: N/A     Years of education: N/A     Occupational History    retired      Social History Main Topics    Smoking status: Never Smoker    Smokeless tobacco: Never Used    Alcohol use No    Drug use: No    Sexual activity: Not on file     Other Topics Concern    Not on file     Social History Narrative     Family History   Problem Relation Age of Onset    Heart disease Mother     Cancer Father      colon    Blindness Father      accident    Cataracts Father     Hypertension Father     Tremor Father     Heart disease Brother     Tremor Brother     Tremor Paternal Grandfather     Melanoma Neg Hx     Amblyopia Neg Hx     Diabetes Neg Hx     Glaucoma Neg Hx     Macular degeneration Neg Hx     Retinal detachment Neg Hx     Strabismus Neg Hx     Stroke Neg Hx     Thyroid disease Neg Hx      Review of patient's allergies indicates:   Allergen Reactions    Bactroban [mupirocin calcium] Rash     Other reaction(s): Rash       Current Outpatient Prescriptions:     apixaban (ELIQUIS) 5 mg Tab, Take 1 tablet (5 mg total) by mouth 2 (two) times daily., Disp: 180 tablet, Rfl: 3    aspirin (ECOTRIN) 81 MG EC tablet, Take 81 mg by mouth once daily., Disp: , Rfl:     atorvastatin (LIPITOR) 10 MG tablet, TAKE 1 TABLET EVERY EVENING, Disp: 90 tablet, Rfl: 3    cyanocobalamin (VITAMIN B-12) 1000 MCG tablet, Take 100 mcg by mouth once daily., Disp: , Rfl:     cyclobenzaprine (FLEXERIL) 5 MG tablet, Take 1 tablet (5 mg total) by mouth 3 (three) times daily as needed for Muscle spasms., Disp: 30 tablet, Rfl: 0    furosemide (LASIX) 20 MG tablet, TAKE 1 TABLET EVERY MORNING, Disp: 90 tablet, Rfl: 3    lamotrigine (LAMICTAL) 100 MG tablet, Take 1 tablet (100 mg total) by mouth 2 (two) times daily., Disp: 60 tablet, Rfl: 0    levetiracetam (KEPPRA) 500 MG Tab, TAKE 2 TABLETS (1,000 MG TOTAL) BY MOUTH 2 (TWO) TIMES DAILY., Disp: 180 tablet, Rfl: 0    memantine (NAMENDA) 10 MG Tab, Take 1 tablet (10 mg total) by mouth  2 (two) times daily., Disp: 60 tablet, Rfl: 11    nitroGLYCERIN (NITROSTAT) 0.4 MG SL tablet, Place 1 tablet (0.4 mg total) under the tongue every 5 (five) minutes as needed for Chest pain., Disp: 25 tablet, Rfl: 6    pantoprazole (PROTONIX) 40 MG tablet, Take 1 tablet (40 mg total) by mouth once daily., Disp: 30 tablet, Rfl: 3    primidone (MYSOLINE) 250 MG Tab, TAKE 2 TABLETS TWICE DAILY, Disp: 360 tablet, Rfl: 3    propranolol (INNOPRAN XL) 80 mg 24 hr capsule, Take 1 capsule (80 mg total) by mouth every evening., Disp: 90 capsule, Rfl: 4    Vitals:    05/17/17 1024   BP: 133/75   Pulse: 66   Resp: 16   Weight: 107.9 kg (237 lb 12.3 oz)   Height: 6' (1.829 m)       Physical Exam   Constitutional: He is oriented to person, place, and time. He appears well-developed and well-nourished.   HENT:   Head: Normocephalic and atraumatic.   Eyes: Pupils are equal, round, and reactive to light.   Neck: Normal range of motion. Neck supple.   Cardiovascular: Normal rate.    Pulmonary/Chest: Effort normal.   Abdominal: He exhibits no distension.   Musculoskeletal: Normal range of motion. He exhibits no edema.   Neurological: He is alert and oriented to person, place, and time. He has a normal Finger-Nose-Finger Test, a normal Heel to Shin Test, a normal Romberg Test and a normal Tandem Gait Test.   Reflex Scores:       Tricep reflexes are 2+ on the right side and 2+ on the left side.       Bicep reflexes are 2+ on the right side and 2+ on the left side.       Brachioradialis reflexes are 2+ on the right side and 2+ on the left side.       Patellar reflexes are 2+ on the right side and 2+ on the left side.       Achilles reflexes are 2+ on the right side and 2+ on the left side.  Skin: Skin is warm and dry.   Psychiatric: He has a normal mood and affect. His speech is normal and behavior is normal. Judgment and thought content normal.   Nursing note and vitals reviewed.      Neurologic Exam     Mental Status   Oriented to  person, place, and time.   Oriented to person.   Oriented to place.   Oriented to time.   Follows 3 step commands.   Attention: normal. Concentration: normal.   Speech: speech is normal   Level of consciousness: alert  Knowledge: consistent with education.   Able to name object. Able to read. Able to repeat. Able to write. Normal comprehension.     Cranial Nerves     CN II   Visual acuity: normal  Right visual field deficit: none  Left visual field deficit: none     CN III, IV, VI   Pupils are equal, round, and reactive to light.  Right pupil: Size: 3 mm. Shape: regular. Reactivity: brisk. Consensual response: intact.   Left pupil: Size: 3 mm. Shape: regular. Reactivity: brisk. Consensual response: intact.   CN III: no CN III palsy  CN VI: no CN VI palsy  Nystagmus: none   Diplopia: none  Ophthalmoparesis: none  Conjugate gaze: present    CN V   Right facial sensation deficit: none  Left facial sensation deficit: none    CN VII   Right facial weakness: none  Left facial weakness: none    CN VIII   Hearing: intact    CN IX, X   CN IX normal.   CN X normal.     CN XI   Right sternocleidomastoid strength: normal  Left sternocleidomastoid strength: normal  Right trapezius strength: normal  Left trapezius strength: normal    CN XII   Fasciculations: absent  Tongue deviation: none    Motor Exam   Muscle bulk: normal  Overall muscle tone: normal  Right arm pronator drift: absent  Left arm pronator drift: absent    Strength   Right neck flexion: 5/5  Left neck flexion: 5/5  Right neck extension: 5/5  Left neck extension: 5/5  Right deltoid: 5/5  Left deltoid: 5/5  Right biceps: 5/5  Left biceps: 5/5  Right triceps: 5/5  Left triceps: 5/5  Right wrist flexion: 5/5  Left wrist flexion: 5/5  Right wrist extension: 5/5  Left wrist extension: 5/5  Right interossei: 5/5  Left interossei: 5/5  Right abdominals: 5/5  Left abdominals: 5/5  Right iliopsoas: 5/5  Left iliopsoas: 5/5  Right quadriceps: 5/5  Left quadriceps: 5/5  Right  hamstrin/5  Left hamstrin/5  Right glutei: 5/5  Left glutei: 5/5  Right anterior tibial: 5/5  Left anterior tibial: 5/5  Right posterior tibial: 5/5  Left posterior tibial: 5/5  Right peroneal: 5/5  Left peroneal: 5/5  Right gastroc: 5/5  Left gastroc: 5/5    Sensory Exam   Right arm light touch: normal  Left arm light touch: normal  Right leg light touch: normal  Left leg light touch: normal  Right arm vibration: normal  Left arm vibration: normal  Right arm pinprick: normal  Left arm pinprick: normal    Gait, Coordination, and Reflexes     Gait  Gait: (slow/ slight upper thoracic exagerrated kyphosis posture)    Coordination   Romberg: negative  Finger to nose coordination: normal  Heel to shin coordination: normal  Tandem walking coordination: normal    Tremor   Resting tremor: absent  Intention tremor: absent  Action tremor: absent    Reflexes   Right brachioradialis: 2+  Left brachioradialis: 2+  Right biceps: 2+  Left biceps: 2+  Right triceps: 2+  Left triceps: 2+  Right patellar: 2+  Left patellar: 2+  Right achilles: 2+  Left achilles: 2+  Right Mathews: absent  Left Mathews: absent  Right ankle clonus: absent  Left ankle clonus: absent      Provider dictation:  76 year old male with history of chronic back pain and pacemaker placement is self referred for thoracic pain.  His greatest concern today is onset of right thoracic pain that started 2017 after cardiac ablation.  Pain is felt in the right mid thoracic region radiating to the anterior chest wall on the right side.  At times he has stabbing pain in the right anterior chest wall as well.  He has been evaluated by his PCP and cardiology office with multiple chest and abdomen scans without cause of pain determined.  He denies associated numbness and tingling.  He takes acetominophen and ibuprofen for help with pain.  He has not had PT or GAGAN.   Oswestry score: 28%.  PHQ:  10.    On exam, he is an overweight male with a slow gait and slight  upper thoracic exaggerated kyphotic posture.  He has trace reflexes throughout with full 5/5 stregnth and full sensation.  He has no sensory deficits along the thoracic region; nor does have any tenderness to spine palpation.    I have reviewed a CT scan of the thoracic spine from [facility] dated 9-12-14 with mild degenerative changes an no focal foraminal or central canal narrowing.  Thoracic spine xray from 4-25-17 reveals evidence of osseous demineralization and anterior endplate spurring.  He had a Dexa scan 4-22-16 that indicated bone chagnes were in range for his age.    In conclusion, Mr. Liriano is experiening right para mid thoracic pain radiating to the right anterior chest wall.  He had no focal foraminal narrowing on 2014 imaging to explain his symptoms.  At this time, we will obtain an updated CT to look for current nerve compression.  Follow up in clinic after to discuss results further.  Differential at this time include manifestation of shingles (although he has not had a typical rash), costo chondritis and myofascial pain vs thoracic nerve compression.      Visit Diagnosis:  Acute right-sided thoracic back pain  -     CT Thoracic Spine Without Contrast; Future; Expected date: 5/17/17        Total time spent counseling greater than fifty percent of total visit time.  Counseling included discussion regarding imaging findings, diagnosis possibilities, treatment options, risks and benefits.   The patient had many questions regarding the options and long-term effects.

## 2017-05-17 NOTE — MR AVS SNAPSHOT
[facility]  [address] [place] [address]  Phone: [phone]  Fax: [phone]                  Heri Muhammad   2017 10:00 AM   Office Visit    Description:  Male : 1940   Provider:  Chikis Reyes PA-C   Department:  [facility] - Neurosurgery           Reason for Visit     Lumbar Spine Pain (L-Spine)     Chest Pain           Diagnoses this Visit        Comments    Acute right-sided thoracic back pain    -  Primary            To Do List           Future Appointments        Provider Department Dept Phone    2017 3:45 PM EKG, APPT [facility] - [phone]    2017 4:00 PM Jeri Herbert, DARON Matos y - Arrhythmia [phone]    2017 1:40 PM Akshat Naik Jr., MD [facility] [phone]      Goals (5 Years of Data)     None      [facility]     [facility] -  Assistance  Unless otherwise directed by your provider, please contact [facility], our nurse care line that is available for  assistance.     Registered nurses in the [facility] provide: appointment scheduling, clinical advisement, health education, and other advisory services.  Call: [phone] (toll free)               Medications           Message regarding Medications     Verify the changes and/or additions to your medication regime listed below are the same as discussed with your clinician today.  If any of these changes or additions are incorrect, please notify your healthcare provider.             Verify that the below list of medications is an accurate representation of the medications you are currently taking.  If none reported, the list may be blank. If incorrect, please contact your healthcare provider. Carry this list with you in case of emergency.           Current Medications     apixaban (ELIQUIS) 5 mg Tab Take 1 tablet (5 mg total) by mouth 2 (two) times daily.    aspirin (ECOTRIN) 81 MG EC tablet Take 81 mg  by mouth once daily.    atorvastatin (LIPITOR) 10 MG tablet TAKE 1 TABLET EVERY EVENING    cyanocobalamin (VITAMIN B-12) 1000 MCG tablet Take 100 mcg by mouth once daily.    cyclobenzaprine (FLEXERIL) 5 MG tablet Take 1 tablet (5 mg total) by mouth 3 (three) times daily as needed for Muscle spasms.    furosemide (LASIX) 20 MG tablet TAKE 1 TABLET EVERY MORNING    lamotrigine (LAMICTAL) 100 MG tablet Take 1 tablet (100 mg total) by mouth 2 (two) times daily.    levetiracetam (KEPPRA) 500 MG Tab TAKE 2 TABLETS (1,000 MG TOTAL) BY MOUTH 2 (TWO) TIMES DAILY.    memantine (NAMENDA) 10 MG Tab Take 1 tablet (10 mg total) by mouth 2 (two) times daily.    nitroGLYCERIN (NITROSTAT) 0.4 MG SL tablet Place 1 tablet (0.4 mg total) under the tongue every 5 (five) minutes as needed for Chest pain.    pantoprazole (PROTONIX) 40 MG tablet Take 1 tablet (40 mg total) by mouth once daily.    primidone (MYSOLINE) 250 MG Tab TAKE 2 TABLETS TWICE DAILY    propranolol (INNOPRAN XL) 80 mg 24 hr capsule Take 1 capsule (80 mg total) by mouth every evening.           Clinical Reference Information           Your Vitals Were     BP Pulse Resp Height Weight BMI    133/75 66 16 6' (1.829 m) 107.9 kg (237 lb 12.3 oz) 32.25 kg/m2      Blood Pressure          Most Recent Value    BP  133/75      Allergies as of 5/17/2017     Bactroban [Mupirocin Calcium]      Immunizations Administered on Date of Encounter - 5/17/2017     None      Orders Placed During Today's Visit     Future Labs/Procedures Expected by Expires    CT Thoracic Spine Without Contrast  5/17/2017 5/17/2018      Language Assistance Services     ATTENTION: Language assistance services are available, free of charge. Please call [phone].      ATENCIÓN: Si clivela marcie, tiene a marks disposición servicios gratuitos de asistencia lingüística. Llame al [phone].     CHÚ Ý: N?u b?n nói Ti?ng Vi?t, có các d?ch v? h? tr? ngôn ng? mi?n phí dành cho b?n. G?i s? [phone].          [facility] complies with applicable Federal civil rights laws and does not discriminate on the basis of race, color, national origin, age, disability, or sex.

## 2017-05-19 ENCOUNTER — TELEPHONE (OUTPATIENT)
Dept: DERMATOLOGY | Facility: CLINIC | Age: 77
End: 2017-05-19

## 2017-05-19 NOTE — TELEPHONE ENCOUNTER
Patient has spots on back that would like checked out.  Has appt with  for Aug 16 but would like to be seen today.  Informed of dr. quiñones's availability and not in clinic on Fridays.  Suggested to call PCP for sooner appt for lump on back if can get in sooner and keep Aug appt.

## 2017-05-22 ENCOUNTER — TELEPHONE (OUTPATIENT)
Dept: CARDIOLOGY | Facility: CLINIC | Age: 77
End: 2017-05-22

## 2017-05-22 NOTE — TELEPHONE ENCOUNTER
----- Message from Jennifer Lizarraga sent at 5/19/2017  3:53 PM CDT -----  Contact: Melania      ----- Message -----     From: Darby Oden MA     Sent: 5/19/2017  11:37 AM       To: Jennifer Jacobo,    Please see below.    Thanks,  Darby  ----- Message -----     From: Juanis Gooden MA     Sent: 5/18/2017   3:03 PM       To: Darby Oden MA     No appts available on epic. Please call pt  ----- Message -----     From: Georgina Mai     Sent: 5/18/2017  10:33 AM       To: Isai[facility] Staff    Wife called regarding scheduling the patient an appt at [facility] for medication check. Please contact Melania [phone] (home)

## 2017-05-26 ENCOUNTER — TELEPHONE (OUTPATIENT)
Dept: ELECTROPHYSIOLOGY | Facility: CLINIC | Age: 77
End: 2017-05-26

## 2017-05-26 DIAGNOSIS — R41.3 MEMORY LOSS: ICD-10-CM

## 2017-05-26 NOTE — TELEPHONE ENCOUNTER
Discussed CTA of the Abdomen results with Mrs. Muhammad in detail. She verbalized understanding. She requested that I remind Darby to assist in making a follow up appointment with Dr. Esparza on the [place]. States she spoke with her yesterday. I explained that I will message her, however, reassured her Darby is working on the appointment. She verbalized understanding and denied further questions, needs, and concerns.

## 2017-05-26 NOTE — TELEPHONE ENCOUNTER
----- Message from DARON Denney sent at 5/24/2017  9:43 AM CDT -----  Elena,     Can you please call Mr. Muhammad and let him know that his abdominal CT (done for abdominal pain; US prior was negative) was benign.   The study revealed:     1. Several small pulmonary nodules   -unchanged from previous study 03/2017   -recommendations from radiologist for PCP: - in a low risk patient no further followup would be necessary.                                                                            - In a high risk patient for followup in one year for size stability would be suggested.    2. Gas within the esophagus as can be seen with a history of reflux or air swallowing.    3. Moderate quantity of stool in the colon, please correlate for constipation.    Please have him follow up with Dr. Hansen (PCP) for these issues as needed. Thank you.     CM

## 2017-05-29 RX ORDER — LEVETIRACETAM 500 MG/1
TABLET ORAL
Qty: 180 TABLET | Refills: 0 | Status: SHIPPED | OUTPATIENT
Start: 2017-05-29 | End: 2017-07-05

## 2017-05-31 DIAGNOSIS — Z95.0 CARDIAC PACEMAKER IN SITU: ICD-10-CM

## 2017-05-31 DIAGNOSIS — I48.91 ATRIAL FIBRILLATION, UNSPECIFIED TYPE: Primary | ICD-10-CM

## 2017-06-01 ENCOUNTER — OFFICE VISIT (OUTPATIENT)
Dept: FAMILY MEDICINE | Facility: CLINIC | Age: 77
End: 2017-06-01
Payer: MEDICARE

## 2017-06-01 ENCOUNTER — HOSPITAL ENCOUNTER (OUTPATIENT)
Dept: RADIOLOGY | Facility: HOSPITAL | Age: 77
Discharge: HOME | End: 2017-06-01
Attending: FAMILY MEDICINE
Payer: MEDICARE

## 2017-06-01 VITALS
TEMPERATURE: 98 F | DIASTOLIC BLOOD PRESSURE: 80 MMHG | HEIGHT: 72 IN | SYSTOLIC BLOOD PRESSURE: 120 MMHG | OXYGEN SATURATION: 94 % | WEIGHT: 235.25 LBS | HEART RATE: 114 BPM | BODY MASS INDEX: 31.86 KG/M2 | RESPIRATION RATE: 18 BRPM

## 2017-06-01 DIAGNOSIS — J18.9 PNEUMONIA OF LEFT LOWER LOBE DUE TO INFECTIOUS ORGANISM: ICD-10-CM

## 2017-06-01 DIAGNOSIS — J18.9 PNEUMONIA OF LEFT LOWER LOBE DUE TO INFECTIOUS ORGANISM: Primary | ICD-10-CM

## 2017-06-01 DIAGNOSIS — J40 BRONCHITIS: ICD-10-CM

## 2017-06-01 PROCEDURE — 99999 PR PBB SHADOW E&M-EST. PATIENT-LVL IV: CPT | Mod: PBBFAC,,, | Performed by: PHYSICIAN ASSISTANT

## 2017-06-01 PROCEDURE — 1159F MED LIST DOCD IN RCRD: CPT | Mod: S$GLB,,, | Performed by: PHYSICIAN ASSISTANT

## 2017-06-01 PROCEDURE — 71020 XR CHEST PA AND LATERAL: CPT | Mod: TC,PO

## 2017-06-01 PROCEDURE — 71020 XR CHEST PA AND LATERAL: CPT | Mod: 26,,, | Performed by: RADIOLOGY

## 2017-06-01 PROCEDURE — 99214 OFFICE O/P EST MOD 30 MIN: CPT | Mod: S$GLB,,, | Performed by: PHYSICIAN ASSISTANT

## 2017-06-01 RX ORDER — METOPROLOL SUCCINATE 50 MG/1
TABLET, EXTENDED RELEASE ORAL
COMMUNITY
Start: 2017-03-26 | End: 2017-06-21

## 2017-06-01 RX ORDER — PROPRANOLOL HYDROCHLORIDE 80 MG/1
CAPSULE, EXTENDED RELEASE ORAL
COMMUNITY
Start: 2017-04-27 | End: 2017-06-13 | Stop reason: SDUPTHER

## 2017-06-01 RX ORDER — ALBUTEROL SULFATE 90 UG/1
2 AEROSOL, METERED RESPIRATORY (INHALATION) EVERY 4 HOURS PRN
Qty: 1 INHALER | Refills: 5 | Status: SHIPPED | OUTPATIENT
Start: 2017-06-01 | End: 2017-07-03

## 2017-06-01 RX ORDER — BENZONATATE 200 MG/1
200 CAPSULE ORAL 3 TIMES DAILY PRN
COMMUNITY
End: 2017-06-13 | Stop reason: ALTCHOICE

## 2017-06-01 RX ORDER — DOXYCYCLINE 100 MG/1
100 CAPSULE ORAL 2 TIMES DAILY
Qty: 20 CAPSULE | Refills: 0 | Status: SHIPPED | OUTPATIENT
Start: 2017-06-01 | End: 2017-06-11

## 2017-06-01 NOTE — PROGRESS NOTES
Subjective:       Patient ID: Heri Muhammad is a 76 y.o. male.    Chief Complaint: Cough (congestion)    HPI   Pt ill x 2 days   SOB on exertion  Pt has been ill on and off x wks  Review of Systems   Constitutional: Positive for activity change, chills and fatigue. Negative for appetite change, diaphoresis, fever and unexpected weight change.   HENT: Negative.    Eyes: Negative.    Respiratory: Positive for cough, shortness of breath and wheezing.    Cardiovascular: Negative.  Negative for chest pain, palpitations and leg swelling.   Gastrointestinal: Negative.    Endocrine: Negative.    Genitourinary: Negative.    Musculoskeletal: Negative.    Skin: Negative.  Negative for rash.       Objective:      Physical Exam   Constitutional: He appears well-developed and well-nourished. No distress.   Pt appears ill   HENT:   Head: Normocephalic and atraumatic.   Right Ear: External ear normal.   Left Ear: External ear normal.   Nose: Nose normal.   Mouth/Throat: Oropharynx is clear and moist. No oropharyngeal exudate.   Sinuses non tender  Mucus clear   Eyes: Conjunctivae are normal. No scleral icterus.   Neck: Normal range of motion. Neck supple. No tracheal deviation present. No thyromegaly present.   Cardiovascular: Normal rate, regular rhythm, normal heart sounds and intact distal pulses.  Exam reveals no gallop and no friction rub.    No murmur heard.  Pulmonary/Chest: Effort normal. No respiratory distress. He has wheezes. He has no rales.   Few crackles LLL   Musculoskeletal: He exhibits no edema.   Lymphadenopathy:     He has no cervical adenopathy.   Skin: Skin is warm and dry. No rash noted.   Vitals reviewed.      Assessment:       1. Pneumonia of left lower lobe due to infectious organism    2. Bronchitis        Plan:       .Heri was seen today for cough.    Diagnoses and all orders for this visit:    Pneumonia of left lower lobe due to infectious organism  -     X-Ray Chest PA And Lateral; Future  -      doxycycline (MONODOX) 100 MG capsule; Take 1 capsule (100 mg total) by mouth 2 (two) times daily.  -     albuterol 90 mcg/actuation inhaler; Inhale 2 puffs into the lungs every 4 (four) hours as needed for Wheezing.    Bronchitis  -     doxycycline (MONODOX) 100 MG capsule; Take 1 capsule (100 mg total) by mouth 2 (two) times daily.  -     albuterol 90 mcg/actuation inhaler; Inhale 2 puffs into the lungs every 4 (four) hours as needed for Wheezing.    discussed otc's  Immediate f/u if sx worsen  No infiltrate seen on xray today

## 2017-06-06 ENCOUNTER — CLINICAL SUPPORT (OUTPATIENT)
Dept: CARDIOLOGY | Facility: CLINIC | Age: 77
End: 2017-06-06
Payer: MEDICARE

## 2017-06-06 DIAGNOSIS — Z95.0 CARDIAC PACEMAKER IN SITU: ICD-10-CM

## 2017-06-06 DIAGNOSIS — I48.91 ATRIAL FIBRILLATION, UNSPECIFIED TYPE: ICD-10-CM

## 2017-06-06 PROCEDURE — 93280 PM DEVICE PROGR EVAL DUAL: CPT | Mod: S$GLB,,, | Performed by: INTERNAL MEDICINE

## 2017-06-07 ENCOUNTER — HOSPITAL ENCOUNTER (OUTPATIENT)
Dept: RADIOLOGY | Facility: HOSPITAL | Age: 77
Discharge: HOME | End: 2017-06-07
Attending: NURSE PRACTITIONER
Payer: MEDICARE

## 2017-06-07 ENCOUNTER — OFFICE VISIT (OUTPATIENT)
Dept: FAMILY MEDICINE | Facility: CLINIC | Age: 77
End: 2017-06-07
Payer: MEDICARE

## 2017-06-07 ENCOUNTER — TELEPHONE (OUTPATIENT)
Dept: FAMILY MEDICINE | Facility: CLINIC | Age: 77
End: 2017-06-07

## 2017-06-07 VITALS
DIASTOLIC BLOOD PRESSURE: 70 MMHG | BODY MASS INDEX: 31.83 KG/M2 | SYSTOLIC BLOOD PRESSURE: 110 MMHG | WEIGHT: 235 LBS | HEART RATE: 75 BPM | OXYGEN SATURATION: 96 % | TEMPERATURE: 98 F | HEIGHT: 72 IN

## 2017-06-07 DIAGNOSIS — J18.9 PNEUMONIA OF LEFT LOWER LOBE DUE TO INFECTIOUS ORGANISM: ICD-10-CM

## 2017-06-07 DIAGNOSIS — R05.9 COUGH: ICD-10-CM

## 2017-06-07 DIAGNOSIS — R53.83 FATIGUE, UNSPECIFIED TYPE: ICD-10-CM

## 2017-06-07 DIAGNOSIS — R05.9 COUGH: Primary | ICD-10-CM

## 2017-06-07 PROCEDURE — 71020 XR CHEST PA AND LATERAL: CPT | Mod: 26,,, | Performed by: RADIOLOGY

## 2017-06-07 PROCEDURE — 99214 OFFICE O/P EST MOD 30 MIN: CPT | Mod: S$GLB,,, | Performed by: NURSE PRACTITIONER

## 2017-06-07 PROCEDURE — 99999 PR PBB SHADOW E&M-EST. PATIENT-LVL III: CPT | Mod: PBBFAC,,, | Performed by: NURSE PRACTITIONER

## 2017-06-07 PROCEDURE — 1126F AMNT PAIN NOTED NONE PRSNT: CPT | Mod: S$GLB,,, | Performed by: NURSE PRACTITIONER

## 2017-06-07 PROCEDURE — 71020 XR CHEST PA AND LATERAL: CPT | Mod: TC,PO

## 2017-06-07 PROCEDURE — 1159F MED LIST DOCD IN RCRD: CPT | Mod: S$GLB,,, | Performed by: NURSE PRACTITIONER

## 2017-06-07 RX ORDER — AZITHROMYCIN 250 MG/1
TABLET, FILM COATED ORAL
Qty: 6 TABLET | Refills: 0 | Status: SHIPPED | OUTPATIENT
Start: 2017-06-07 | End: 2017-06-13 | Stop reason: ALTCHOICE

## 2017-06-07 NOTE — TELEPHONE ENCOUNTER
Spoke with patient's wife--updated her on CXR results; advised to stop doxy, start azithromycin as prescribed. FU if symptoms worsen or persist. Will call with lab work results once received

## 2017-06-07 NOTE — PROGRESS NOTES
Subjective:       Patient ID: Heri Muhammad is a 76 y.o. male.    Chief Complaint: Pneumonia    This patient is new to me. He presents to clinic for FU on pneumonia. He was diagnosed with PNA on Thursday 06/01/17. He was prescribed doxycycline which he is still taking. He reports he is not coughing as much but continues feeling poor. He has been using albuterol inhaler every 4-6 hours. He reports persistent symptoms include dizziness, shortness or breath, lightheadedness. He reports he is staying hydrated and eating as he can. He is sleeping a lot. No audible shortness of breath during conversation. No s/s dehydration noted. Denies fevers/chills/diaphoresis at home.     Patient reports pacer settings changed yesterday    HPI  Vitals:    06/07/17 1248   BP: 110/70   Pulse: 75   Temp: 97.6 °F (36.4 °C)     Review of Systems   Constitutional: Negative for appetite change, chills, diaphoresis, fatigue and fever.   HENT: Negative.  Negative for congestion, sinus pressure and trouble swallowing.    Eyes: Negative.  Negative for discharge, redness and visual disturbance.   Respiratory: Positive for cough. Negative for chest tightness.         +shortness of breath on exertion   Cardiovascular: Negative.  Negative for palpitations.   Gastrointestinal: Negative.  Negative for abdominal pain, constipation and diarrhea.   Genitourinary: Negative.  Negative for difficulty urinating and flank pain.   Musculoskeletal: Negative.  Negative for back pain and neck pain.   Skin: Negative.  Negative for wound.   Neurological: Positive for weakness and light-headedness. Negative for dizziness.   Hematological: Negative.    Psychiatric/Behavioral: Negative.  Negative for confusion. The patient is not nervous/anxious.        Past Medical History:   Diagnosis Date    A-fib     Anticoagulant long-term use     eliquis    Atrial flutter     Benign essential tremor     Cancer     skin cancer on back    Cardiac pacemaker in situ      Coronary artery disease     Cough     Dizziness     intermittant    DJD (degenerative joint disease) of knee     ED (erectile dysfunction)     Hard of hearing 11/2016    History of cataract     HTN (hypertension)     Hyperlipidemia     Memory loss     Seizure disorder     Seizures     last episode 1995    Sinus node dysfunction 08/2016     Objective:      Physical Exam   Constitutional: He is oriented to person, place, and time. Vital signs are normal. He appears well-developed and well-nourished.  Non-toxic appearance. He does not have a sickly appearance. He does not appear ill. No distress.   HENT:   Head: Normocephalic and atraumatic.   Right Ear: Hearing normal.   Left Ear: Hearing normal.   Nose: Nose normal.   Mouth/Throat: Uvula is midline, oropharynx is clear and moist and mucous membranes are normal.   Eyes: Conjunctivae, EOM and lids are normal. Pupils are equal, round, and reactive to light. Right eye exhibits no discharge. Left eye exhibits no discharge.   Neck: Trachea normal and normal range of motion. Neck supple. No JVD present. No tracheal deviation present.   Cardiovascular: Normal rate, regular rhythm, S1 normal, S2 normal and normal heart sounds.   No extrasystoles are present. Exam reveals no gallop, no S3, no S4, no distant heart sounds and no friction rub.    No murmur heard.  Pulses:       Radial pulses are 2+ on the right side, and 2+ on the left side.   Pulmonary/Chest: Effort normal. No accessory muscle usage. No respiratory distress. He has no decreased breath sounds. He has no wheezes. He has no rhonchi. He has rales in the left lower field. He exhibits no tenderness.   Abdominal: Normal appearance. He exhibits no distension.   Musculoskeletal: Normal range of motion. He exhibits no edema or deformity.   Generalized weakness   Neurological: He is alert and oriented to person, place, and time. He has normal strength. No sensory deficit. Coordination and gait normal.    Skin: Skin is warm, dry and intact. No lesion and no rash noted. He is not diaphoretic. No erythema. No pallor.   Psychiatric: He has a normal mood and affect. His speech is normal and behavior is normal. Judgment and thought content normal. Cognition and memory are normal.   Nursing note and vitals reviewed.      Assessment:       1. Cough    2. Pneumonia of left lower lobe due to infectious organism    3. Fatigue, unspecified type        Plan:       Cough  -     X-Ray Chest PA And Lateral; Future; Expected date: 06/07/2017    Pneumonia of left lower lobe due to infectious organism  -     X-Ray Chest PA And Lateral; Future; Expected date: 06/07/2017  - Advised to stop doxycycline; start Azithromycin as prescribed (500mg PO x 1 day then 250mg PO x 4 days)    Fatigue, unspecified type  -     CBC auto differential; Future; Expected date: 06/07/2017  -     Comprehensive metabolic panel; Future; Expected date: 06/07/2017        Reassured patient that symptoms of pneumonia sometimes linger   Tachycardia and SpO2 improved from last office visit/inital diagnosis  Wheezing resolved    CXR and labs today  Will call with results once reviewed  Ensure hydration, PO intake  Rest        Return if symptoms worsen or fail to improve.

## 2017-06-08 ENCOUNTER — TELEPHONE (OUTPATIENT)
Dept: FAMILY MEDICINE | Facility: CLINIC | Age: 77
End: 2017-06-08

## 2017-06-08 NOTE — TELEPHONE ENCOUNTER
Spoke with patients wife--labs reviewed, no concerns. Advised her to make follow up appointment if patient is not feeling better after finishing Azithromycin.

## 2017-06-09 ENCOUNTER — OFFICE VISIT (OUTPATIENT)
Dept: ELECTROPHYSIOLOGY | Facility: CLINIC | Age: 77
End: 2017-06-09
Payer: MEDICARE

## 2017-06-09 ENCOUNTER — HOSPITAL ENCOUNTER (OUTPATIENT)
Dept: CARDIOLOGY | Facility: CLINIC | Age: 77
Discharge: HOME | End: 2017-06-09
Payer: MEDICARE

## 2017-06-09 VITALS
BODY MASS INDEX: 31.95 KG/M2 | HEART RATE: 106 BPM | WEIGHT: 235.88 LBS | SYSTOLIC BLOOD PRESSURE: 137 MMHG | HEIGHT: 72 IN | DIASTOLIC BLOOD PRESSURE: 77 MMHG

## 2017-06-09 DIAGNOSIS — I48.0 PAF (PAROXYSMAL ATRIAL FIBRILLATION): Primary | Chronic | ICD-10-CM

## 2017-06-09 DIAGNOSIS — I49.5 SINUS NODE DYSFUNCTION: ICD-10-CM

## 2017-06-09 DIAGNOSIS — I25.10 CORONARY ARTERY DISEASE INVOLVING NATIVE CORONARY ARTERY OF NATIVE HEART WITHOUT ANGINA PECTORIS: Chronic | ICD-10-CM

## 2017-06-09 DIAGNOSIS — I10 ESSENTIAL HYPERTENSION: ICD-10-CM

## 2017-06-09 DIAGNOSIS — I48.4 ATYPICAL ATRIAL FLUTTER: ICD-10-CM

## 2017-06-09 DIAGNOSIS — I48.91 ATRIAL FIBRILLATION, UNSPECIFIED TYPE: ICD-10-CM

## 2017-06-09 DIAGNOSIS — G40.909 SEIZURE DISORDER: ICD-10-CM

## 2017-06-09 DIAGNOSIS — Z95.0 CARDIAC PACEMAKER IN SITU: ICD-10-CM

## 2017-06-09 PROCEDURE — 93010 ELECTROCARDIOGRAM REPORT: CPT | Mod: S$GLB,,, | Performed by: INTERNAL MEDICINE

## 2017-06-09 PROCEDURE — 1126F AMNT PAIN NOTED NONE PRSNT: CPT | Mod: S$GLB,,, | Performed by: INTERNAL MEDICINE

## 2017-06-09 PROCEDURE — 99999 PR PBB SHADOW E&M-EST. PATIENT-LVL III: CPT | Mod: PBBFAC,,, | Performed by: INTERNAL MEDICINE

## 2017-06-09 PROCEDURE — 93005 ELECTROCARDIOGRAM TRACING: CPT | Mod: S$GLB,,, | Performed by: INTERNAL MEDICINE

## 2017-06-09 PROCEDURE — 99499 UNLISTED E&M SERVICE: CPT | Mod: S$GLB,,, | Performed by: INTERNAL MEDICINE

## 2017-06-09 PROCEDURE — 1159F MED LIST DOCD IN RCRD: CPT | Mod: S$GLB,,, | Performed by: INTERNAL MEDICINE

## 2017-06-09 PROCEDURE — 99214 OFFICE O/P EST MOD 30 MIN: CPT | Mod: S$GLB,,, | Performed by: INTERNAL MEDICINE

## 2017-06-09 RX ORDER — FLECAINIDE ACETATE 150 MG/1
150 TABLET ORAL EVERY 12 HOURS
Qty: 120 TABLET | Refills: 3 | Status: SHIPPED | OUTPATIENT
Start: 2017-06-09 | End: 2017-12-04 | Stop reason: SDUPTHER

## 2017-06-09 NOTE — PROGRESS NOTES
Subjective:    Patient ID:  Heri Muhammad is a 76 y.o. male who presents for follow-up of Atrial Fibrillation      HPI  75 yo male with CAD, atrial fibrillation, atrial flutter, sinus node dysfunction, PPM.  Dual chamber PPM implanted (06/30/14).   Presented with fatigue, found to be in atrial fibrillation. Placed on amiodarone, underwent DCCV.  Symptoms with AF have historically been fatigue and lightheadedness.  DCCV (10/15/15) >> recurrence. DCCV (11/23/15), amiodarone increased to 400 mg daily. Developed atrial flutter.  (01/29/16) PVI + RFA for atrial flutter. Amiodarone discontinued.  Felt poorly with Multaq.  Did well for initially in terms of atrial fibrillation, but continued to note dyspnea.  Underwent LHC/RHC 6/10/16, no obstructive CAD, normal rt sided filling pressures.  Developed persistent recurrence >> DCCV 11/22/16.   Recurrence.  RFA 3/23/17 Repeat isolation of LSPV, RSPV, RIPV (LIPV remained isolated).  Atrial flutter c/w mitral annular flutter >> anterior mitral annular line created >> flutter converted to what appeared to be a roof flutter >> roof line created.  Tachycardia converted to another tachycardia, ultimately mapped to BERNADETTE >> ablation deferred.  Device interrogation reveals atrial flutter at 220 bpm with 2:1 conduction.  Still notes fatigue, shortness of breath.  Now being treated with pneumonia.      Review of Systems   Constitution: Positive for malaise/fatigue. Negative for weakness.   Cardiovascular: Positive for dyspnea on exertion. Negative for chest pain, irregular heartbeat, leg swelling, near-syncope, orthopnea, palpitations, paroxysmal nocturnal dyspnea and syncope.   Respiratory: Positive for shortness of breath. Negative for cough.    Neurological: Negative for dizziness and light-headedness.   All other systems reviewed and are negative.       Objective:    Physical Exam   Constitutional: He is oriented to person, place, and time. He appears well-developed and  well-nourished.   Eyes: Conjunctivae are normal. No scleral icterus.   Neck: No JVD present. No tracheal deviation present.   Cardiovascular: Regular rhythm and normal heart sounds.  Tachycardia present.  PMI is not displaced.    Pulmonary/Chest: Effort normal and breath sounds normal. No respiratory distress.   Abdominal: Soft. There is no hepatosplenomegaly. There is no tenderness.   Musculoskeletal: He exhibits no edema (lower extremity) or tenderness.   Neurological: He is alert and oriented to person, place, and time.   Skin: Skin is warm and dry. No rash noted.   Psychiatric: He has a normal mood and affect. His behavior is normal.         Assessment:       1. PAF (paroxysmal atrial fibrillation)    2. Atypical atrial flutter    3. Coronary artery disease involving native coronary artery of native heart without angina pectoris    4. Seizure disorder    5. Sinus node dysfunction    6. Essential hypertension    7. Cardiac pacemaker in situ         Plan:           Atypical atrial flutter s/p RFA x 2.  Recommend anti-arrhythmic therapy + DCCV.  Wait until he is cleared from pneumonia, then they will contact us.  After that, initiate Flecainide 150 mg BID, DCCV 3 days later.  He prefers to have this done locally.  Will arrange for this to be done by Dr. Graham.  F/u with me in 3 months.

## 2017-06-12 ENCOUNTER — TELEPHONE (OUTPATIENT)
Dept: NEUROSURGERY | Facility: CLINIC | Age: 77
End: 2017-06-12

## 2017-06-12 NOTE — TELEPHONE ENCOUNTER
----- Message from Chikis Reyes PA-C sent at 6/12/2017 12:46 PM CDT -----  Please call him.  He had an appointment today to follow up on results, but cancelled.    His CT scan shows no change from 2014 and is relatively normal with mild degenerative age related changes.  There is no nerve compression or spinal cord compression.

## 2017-06-12 NOTE — TELEPHONE ENCOUNTER
Called patient to inform him of results. Patient states his pain has improved. Instructed him to contact our office if anything changes.

## 2017-06-13 ENCOUNTER — OFFICE VISIT (OUTPATIENT)
Dept: FAMILY MEDICINE | Facility: CLINIC | Age: 77
End: 2017-06-13
Payer: MEDICARE

## 2017-06-13 VITALS
BODY MASS INDEX: 24.24 KG/M2 | TEMPERATURE: 98 F | WEIGHT: 179 LBS | HEIGHT: 72 IN | DIASTOLIC BLOOD PRESSURE: 60 MMHG | HEART RATE: 80 BPM | OXYGEN SATURATION: 97 % | SYSTOLIC BLOOD PRESSURE: 110 MMHG

## 2017-06-13 DIAGNOSIS — R05.9 COUGH: Primary | ICD-10-CM

## 2017-06-13 DIAGNOSIS — J40 BRONCHITIS: ICD-10-CM

## 2017-06-13 DIAGNOSIS — J41.1 MUCOPURULENT CHRONIC BRONCHITIS: ICD-10-CM

## 2017-06-13 PROCEDURE — 1126F AMNT PAIN NOTED NONE PRSNT: CPT | Mod: S$GLB,,, | Performed by: PHYSICIAN ASSISTANT

## 2017-06-13 PROCEDURE — 99214 OFFICE O/P EST MOD 30 MIN: CPT | Mod: S$GLB,,, | Performed by: PHYSICIAN ASSISTANT

## 2017-06-13 PROCEDURE — 1159F MED LIST DOCD IN RCRD: CPT | Mod: S$GLB,,, | Performed by: PHYSICIAN ASSISTANT

## 2017-06-13 PROCEDURE — 99499 UNLISTED E&M SERVICE: CPT | Mod: S$GLB,,, | Performed by: PHYSICIAN ASSISTANT

## 2017-06-13 PROCEDURE — 99999 PR PBB SHADOW E&M-EST. PATIENT-LVL IV: CPT | Mod: PBBFAC,,, | Performed by: PHYSICIAN ASSISTANT

## 2017-06-13 RX ORDER — IPRATROPIUM BROMIDE AND ALBUTEROL SULFATE 2.5; .5 MG/3ML; MG/3ML
3 SOLUTION RESPIRATORY (INHALATION) EVERY 8 HOURS
Status: DISCONTINUED | OUTPATIENT
Start: 2017-06-13 | End: 2018-02-01 | Stop reason: HOSPADM

## 2017-06-13 NOTE — PROGRESS NOTES
Subjective:       Patient ID: Heri Muhammad is a 76 y.o. male.    Chief Complaint: Follow-up (pneumonia~pt c/o of feeling fatigue)    HPI   Pt is in today for f/u on recent pneumonia/bronchitis  Pt still feels weak and has completed antibiotic  Pt still weak and has a lot of cough  Xray x 2 did not confirm a pneumonia  Review of Systems   Constitutional: Positive for activity change and fatigue. Negative for appetite change, chills, diaphoresis, fever and unexpected weight change.   HENT: Negative.    Eyes: Negative.    Respiratory: Positive for cough, shortness of breath and wheezing.    Cardiovascular: Negative.  Negative for chest pain, palpitations and leg swelling.   Gastrointestinal: Negative.    Endocrine: Negative.    Genitourinary: Negative.    Musculoskeletal: Negative.    Skin: Negative.  Negative for rash.       Objective:      Physical Exam   Constitutional: He appears well-developed and well-nourished. No distress.   HENT:   Head: Normocephalic and atraumatic.   Right Ear: External ear normal.   Left Ear: External ear normal.   Nose: Nose normal.   Mouth/Throat: Oropharynx is clear and moist. No oropharyngeal exudate.   Eyes: Conjunctivae are normal. No scleral icterus.   Neck: Normal range of motion. Neck supple. No tracheal deviation present. No thyromegaly present.   Cardiovascular: Normal rate, regular rhythm, normal heart sounds and intact distal pulses.  Exam reveals no gallop and no friction rub.    No murmur heard.  Pulmonary/Chest: Effort normal. No respiratory distress. He has wheezes. He has no rales.   Pt has mucus wheeze and rhonchi with faint intermittent wheeze LLL    Musculoskeletal: He exhibits no edema.   Lymphadenopathy:     He has no cervical adenopathy.   Skin: Skin is warm and dry. No rash noted.   Vitals reviewed.      Assessment:       1. Cough    2. Bronchitis    3. Mucopurulent chronic bronchitis        Plan:       Heri was seen today for follow-up.    Diagnoses and  all orders for this visit:    Cough  -     NEBULIZER FOR HOME USE  -     albuterol-ipratropium 2.5mg-0.5mg/3mL nebulizer solution 3 mL; Take 3 mLs by nebulization every 8 (eight) hours.    Bronchitis  -     NEBULIZER FOR HOME USE  -     albuterol-ipratropium 2.5mg-0.5mg/3mL nebulizer solution 3 mL; Take 3 mLs by nebulization every 8 (eight) hours.    Mucopurulent chronic bronchitis  -     NEBULIZER FOR HOME USE  -     albuterol-ipratropium 2.5mg-0.5mg/3mL nebulizer solution 3 mL; Take 3 mLs by nebulization every 8 (eight) hours.    discussed otc's  Recheck in 2 wks

## 2017-06-21 ENCOUNTER — OFFICE VISIT (OUTPATIENT)
Dept: NEUROLOGY | Facility: CLINIC | Age: 77
End: 2017-06-21
Payer: MEDICARE

## 2017-06-21 VITALS
HEIGHT: 72 IN | RESPIRATION RATE: 20 BRPM | HEART RATE: 93 BPM | WEIGHT: 235.88 LBS | DIASTOLIC BLOOD PRESSURE: 72 MMHG | SYSTOLIC BLOOD PRESSURE: 112 MMHG | BODY MASS INDEX: 31.95 KG/M2

## 2017-06-21 DIAGNOSIS — G25.0 ESSENTIAL TREMOR: ICD-10-CM

## 2017-06-21 DIAGNOSIS — R41.3 MEMORY LOSS: Primary | ICD-10-CM

## 2017-06-21 PROCEDURE — 99214 OFFICE O/P EST MOD 30 MIN: CPT | Mod: S$GLB,,, | Performed by: PSYCHIATRY & NEUROLOGY

## 2017-06-21 PROCEDURE — 99999 PR PBB SHADOW E&M-EST. PATIENT-LVL III: CPT | Mod: PBBFAC,,, | Performed by: PSYCHIATRY & NEUROLOGY

## 2017-06-21 PROCEDURE — 1159F MED LIST DOCD IN RCRD: CPT | Mod: S$GLB,,, | Performed by: PSYCHIATRY & NEUROLOGY

## 2017-06-21 PROCEDURE — 1126F AMNT PAIN NOTED NONE PRSNT: CPT | Mod: S$GLB,,, | Performed by: PSYCHIATRY & NEUROLOGY

## 2017-06-21 RX ORDER — IPRATROPIUM BROMIDE AND ALBUTEROL SULFATE 2.5; .5 MG/3ML; MG/3ML
3 SOLUTION RESPIRATORY (INHALATION) 2 TIMES DAILY
COMMUNITY
Start: 2017-06-13 | End: 2017-07-10 | Stop reason: SDUPTHER

## 2017-06-21 RX ORDER — NEBULIZER AND COMPRESSOR
EACH MISCELLANEOUS
COMMUNITY
Start: 2017-06-13 | End: 2017-10-18

## 2017-06-21 NOTE — PROGRESS NOTES
Date of service:  6/21/2017    Chief complaint:  Memory Loss, Tremor    Interval history:  The patient is a 76 y.o. male seen previously for memory loss and essential tremor.  Regarding the memory, he feels that it is largely stable.  He is presently taking Namenda without significant side effects.  For his tremor, he has seen Dr. Quinn, who put him back on propranolol after discussing the case with cardiology.  The patient feels his tremor is about the same.      History of present illness:  The patient is a 76 y.o. male referred for evaluation of memory loss.  I have seen him previously for tremor.  This issue began a few months ago. It involves short-term memory as well as long-term memory.  He may have some difficulties with executive function, though he is not clear on this.  There are may be issues with multiple step processes. He has no clear problems with ADLs. He does not get lost in familiar areas. There are no hallucinations. There are no clear personality changes.  He does not endorse depression.    Of note, the patient has had episodes of shortness of breath as well.  His wife reports that there are times where he seems more confused, but they deny that this maps to the times where he has issues with breathing.    Finally, the patient's propranolol was stopped by cardiology when his metoprolol was started.  He feels like his tremor is worse since this happened.      Past Medical History:   Diagnosis Date    A-fib     Anticoagulant long-term use     eliquis    Atrial flutter     Benign essential tremor     Cancer     skin cancer on back    Cardiac pacemaker in situ     Coronary artery disease     Cough     Dizziness     intermittant    DJD (degenerative joint disease) of knee     ED (erectile dysfunction)     Hard of hearing 11/2016    History of cataract     HTN (hypertension)     Hyperlipidemia     Memory loss     Seizure disorder     Seizures     last episode 1995    Sinus node  dysfunction 08/2016       Past Surgical History:   Procedure Laterality Date    ANKLE SURGERY Right     pins and screws    ATRIAL ABLATION SURGERY      CARDIAC PACEMAKER PLACEMENT  2014    CARDIAC PACEMAKER PLACEMENT      CARDIAC PACEMAKER PLACEMENT  2014    CARDIOVERSION      CATARACT EXTRACTION      OU    COLONOSCOPY      due in 2014    EYE SURGERY      Jesse cataract    HERNIA REPAIR      abdominal    JOINT REPLACEMENT      bill shoulders       Family History   Problem Relation Age of Onset    Heart disease Mother     Cancer Father      colon    Blindness Father      accident    Cataracts Father     Hypertension Father     Tremor Father     Heart disease Brother     Tremor Brother     Tremor Paternal Grandfather     Melanoma Neg Hx     Amblyopia Neg Hx     Diabetes Neg Hx     Glaucoma Neg Hx     Macular degeneration Neg Hx     Retinal detachment Neg Hx     Strabismus Neg Hx     Stroke Neg Hx     Thyroid disease Neg Hx        Social history:  Social History     Social History    Marital status:      Spouse name: N/A    Number of children: N/A    Years of education: N/A     Occupational History    retired      Social History Main Topics    Smoking status: Never Smoker    Smokeless tobacco: Never Used    Alcohol use No    Drug use: No    Sexual activity: Not on file     Other Topics Concern    Not on file     Social History Narrative    No narrative on file        Review of Systems:  A 14 system review is documented in the patient's chart.  It is noteworthy as above.  It is otherwise negative.  Please see the patient's chart for further details.    Physical exam:  /72   Pulse 93   Resp 20   Ht 6' (1.829 m)   Wt 107 kg (235 lb 14.3 oz)   BMI 31.99 kg/m²   General: Well developed, well nourished.  No acute distress.  HEENT: Atraumatic, normocephalic.  Neck: Supple, trachea midline.  Cardiovascular: Regular rate and rhythm.  Pulmonary: No increased work of  "breathing.  Abdomen/GI: No guarding.  Musculoskeletal: No obvious joint deformities, moves all extremities well.    Neurological exam:  Mental status: Awake and alert.  Oriented x3.  Speech fluent and appropriate.  Recent and remote memory appear to be largely intact.  Fund of knowledge grossly normal.  MMSE = 26/30.  Cranial nerves: Pupils equal round and reactive to light, extraocular movements intact, facial strength and sensation intact bilaterally, palate and tongue midline, hearing grossly intact bilaterally.  Motor: 5 out of 5 strength throughout the upper and lower extremities bilaterally. Normal bulk and tone.  Sensation: Intact to light touch and temperature bilaterally.  DTR: 2+ at the knees and biceps bilaterally.  Coordination: Finger-nose-finger testing intact bilaterally with a more pronounced intention tremor than in the past.  Gait: Normal gait. Good tandem.    Data base:  Previous notes reviewed.      Labs checked at our last visit include a normal vitamin B12 (440).    CT head:  "1.  Moderate cerebral volume loss with frontoparietal predominance.  2.  Mild chronic ischemic microvascular change.  3.  Chronic paranasal sinus disease."    EEG:  "IMPRESSION: This is a mildly abnormal EEG, temporal slowing, greatest on the left.  CLINICAL IMPLICATION: Temporal slowing is nonspecific and most often associated with vascular disease in patients of this age group. No epileptiform potentials or electrographic seizures are identified at this time, however."    Neuropsychological testing:  "Mr. Muhammad was referred for Neuropsychological Evaluation on an outpatient basis due to subjective memory decline for the past 1-2 years. His general cognitive abilities as assessed by the [facility] were within the average range, with mild impairment in language due to poor performance on semantic fluency, and no impairment in immediate verbal memory, visuospatial/constructional abilities, attention, or delayed memory (with " "mildly impaired figure recall). Further assessment of specific cognitive abilities revealed mild deficits in semantic fluency and constructional abilities, with intact naming, verbal comprehension, phonemic fluency, abstract reasoning, psychomotor speed, and mental flexibility. Additional memory assessment revealed average immediate auditory/verbal memory, average delayed auditory/verbal memory, average immediate visual memory, and mildly impaired delayed visual memory. Personality test data suggested that depression and anxiety were not likely contributing factors to his cognitive performance. Neuropsychological testing revealed mild impairment in semantic fluency and delayed visual memory; and variability in constructional ability (performances in the average and mildly impaired ranges). The deficits revealed are not sufficient to warrant a diagnosis of mild cognitive impairment. Reassurance may prove useful. Repeat testing is indicated if there is evidence of further decline in the future."    Assessment and plan:  The patient is a 76 y.o. male with memory loss, found to have minimal memory issues and no apparent contribution from depression/anxiety.  I have provided him with reassurance, but we will continue his Namenda as well.  Medication side effects have been discussed.  His vitamin B12 level is better.  He is to continue taking an OTC supplement.    He feels his essential tremor is not significantly changed with recent alterations to his medical regimen.  I have encouraged him to continue working with Dr. Quinn on this.    We will see the patient back in a few months.  "

## 2017-06-27 ENCOUNTER — OFFICE VISIT (OUTPATIENT)
Dept: FAMILY MEDICINE | Facility: CLINIC | Age: 77
End: 2017-06-27
Payer: MEDICARE

## 2017-06-27 VITALS
HEIGHT: 72 IN | HEART RATE: 80 BPM | SYSTOLIC BLOOD PRESSURE: 102 MMHG | BODY MASS INDEX: 32.1 KG/M2 | RESPIRATION RATE: 14 BRPM | DIASTOLIC BLOOD PRESSURE: 62 MMHG | WEIGHT: 237 LBS

## 2017-06-27 DIAGNOSIS — J40 BRONCHITIS: Primary | ICD-10-CM

## 2017-06-27 PROCEDURE — 99999 PR PBB SHADOW E&M-EST. PATIENT-LVL III: CPT | Mod: PBBFAC,,, | Performed by: PHYSICIAN ASSISTANT

## 2017-06-27 PROCEDURE — 99213 OFFICE O/P EST LOW 20 MIN: CPT | Mod: S$GLB,,, | Performed by: PHYSICIAN ASSISTANT

## 2017-06-27 PROCEDURE — 1159F MED LIST DOCD IN RCRD: CPT | Mod: S$GLB,,, | Performed by: PHYSICIAN ASSISTANT

## 2017-06-27 NOTE — PROGRESS NOTES
Subjective:       Patient ID: Heri Muhammad is a 76 y.o. male.    Chief Complaint: Cough (Follow up)    HPI   Pt is in today for f/u on clinical pneumonia  Has been using combivent nebulizer rx with good improvement  Review of Systems   Constitutional: Negative.  Negative for activity change, appetite change, chills, diaphoresis, fatigue, fever and unexpected weight change.   HENT: Negative.    Eyes: Negative.    Respiratory: Positive for cough. Negative for wheezing.    Cardiovascular: Negative for chest pain, palpitations and leg swelling.   Gastrointestinal: Negative.    Endocrine: Negative.    Genitourinary: Negative.    Musculoskeletal: Negative.    Skin: Negative.  Negative for rash.       Objective:      Physical Exam   Constitutional: He appears well-developed and well-nourished. No distress.   HENT:   Head: Normocephalic and atraumatic.   Eyes: Conjunctivae are normal. No scleral icterus.   Neck: Normal range of motion. Neck supple. No tracheal deviation present. No thyromegaly present.   Cardiovascular: Normal rate, normal heart sounds and intact distal pulses.  Exam reveals no gallop and no friction rub.    Pulmonary/Chest: Effort normal and breath sounds normal. No respiratory distress. He has no wheezes. He has no rales.   Musculoskeletal: He exhibits no edema.   Lymphadenopathy:     He has no cervical adenopathy.   Skin: Skin is warm and dry. No rash noted.   Vitals reviewed.      Assessment:       1. Bronchitis        Plan:       Heri was seen today for cough.    Diagnoses and all orders for this visit:    Bronchitis    continue nebs  Recheck 2 or 3 mos.

## 2017-06-28 ENCOUNTER — TELEPHONE (OUTPATIENT)
Dept: ELECTROPHYSIOLOGY | Facility: CLINIC | Age: 77
End: 2017-06-28

## 2017-06-28 NOTE — TELEPHONE ENCOUNTER
----- Message from Irish Xiao RN sent at 6/28/2017  4:47 PM CDT -----  Contact: pt's wife      ----- Message -----  From: Delmy Pat RN  Sent: 6/28/2017  11:03 AM  To: Irish Xiao RN    Lets work on this one together after AM clinic.   ----- Message -----  From: Stacey Vargas  Sent: 6/28/2017  10:57 AM  To: [facility] Staff    Pt's wife was told by Dr. Esparza to call when the pt was over his pneumonia to schedule his cardioversion.  He has been released from the doctor and wants to schedule as soon as possible.  They can be reached @ [phone].  Thanks!!

## 2017-06-29 ENCOUNTER — OUTPATIENT CASE MANAGEMENT (OUTPATIENT)
Dept: ADMINISTRATIVE | Facility: OTHER | Age: 77
End: 2017-06-29

## 2017-06-29 ENCOUNTER — TELEPHONE (OUTPATIENT)
Dept: CARDIOLOGY | Facility: CLINIC | Age: 77
End: 2017-06-29

## 2017-06-29 NOTE — TELEPHONE ENCOUNTER
Dr Graham,  When  Can you Cardiovert this pt? I'll schedule    ----- Message from Delmy Pat RN sent at 6/29/2017  9:27 AM CDT -----  Dr. Esparza saw this patient on 6/9 and recommended cardioversion after pt cleared from pneumonia. Pt's spouse called back to state PCP cleared pt. Can Dr. Graham do this cardioversion at [facility] per pt's preference?    Per clinic note:  Wait until he is cleared from pneumonia, then they will contact us.  After that, initiate Flecainide 150 mg BID, DCCV 3 days later.  He prefers to have this done locally.  Will arrange for this to be done by Dr. Graham.    Pt has flecainide Rx already. Please let me know if any issues coordinating.   Thanks! Delmy

## 2017-06-29 NOTE — TELEPHONE ENCOUNTER
----- Message from Delmy Pat RN sent at 6/29/2017  9:27 AM CDT -----  Dr. Esparza saw this patient on 6/9 and recommended cardioversion after pt cleared from pneumonia. Pt's spouse called back to state PCP cleared pt. Can Dr. Graham do this cardioversion at [facility] per pt's preference?    Per clinic note:  Wait until he is cleared from pneumonia, then they will contact us.  After that, initiate Flecainide 150 mg BID, DCCV 3 days later.  He prefers to have this done locally.  Will arrange for this to be done by Dr. Graham.    Pt has flecainide Rx already. Please let me know if any issues coordinating.   Thanks! Delmy

## 2017-06-29 NOTE — PROGRESS NOTES
For your information:    The following patient has been assigned to Shlomo Edwards RN with [facility] for high risk screening.    Reason: High Risk    Please contact [facility] at [phone] with any questions.    Thank you,  Kirstie Goodman

## 2017-06-30 ENCOUNTER — TELEPHONE (OUTPATIENT)
Dept: CARDIOLOGY | Facility: CLINIC | Age: 77
End: 2017-06-30

## 2017-06-30 ENCOUNTER — PATIENT MESSAGE (OUTPATIENT)
Dept: CARDIOLOGY | Facility: CLINIC | Age: 77
End: 2017-06-30

## 2017-06-30 DIAGNOSIS — I48.0 PAF (PAROXYSMAL ATRIAL FIBRILLATION): Primary | ICD-10-CM

## 2017-06-30 NOTE — TELEPHONE ENCOUNTER
Spoke with pts wife and pre-procedure instructions for DCCV given. Instructions sent to pts [email or web address] pt portal also.  Wife verbalized understanding.  Procedure scheduled for Thurs, July 6th at 9am @ [facility].

## 2017-06-30 NOTE — PATIENT INSTRUCTIONS
Cardioversion    Arrive for your procedure at:       ? FASTING:  You MAY NOT have anything to eat or drink AFTER MIDNIGHT.  If your procedure is scheduled in the afternoon, you may have a LIGHT BREAKFAST BEFORE 6:00 A.M.  For example: Two slices of toast; black coffee or black tea.    ? MEDICATIONS:  You may take your regular morning medications with a small sip of water.  Hold or adjust the following:   Fluid pills.   Diabetes medications.   Continue: Coumadin, Plavix, Effient, & Aspirin

## 2017-07-03 ENCOUNTER — OUTPATIENT CASE MANAGEMENT (OUTPATIENT)
Dept: ADMINISTRATIVE | Facility: OTHER | Age: 77
End: 2017-07-03

## 2017-07-03 ENCOUNTER — TELEPHONE (OUTPATIENT)
Dept: ELECTROPHYSIOLOGY | Facility: CLINIC | Age: 77
End: 2017-07-03

## 2017-07-03 NOTE — TELEPHONE ENCOUNTER
Pt wife wanted to verify some pre op instructions, answered all questions, wife verbalized understanding. Encouraged to call back with any further questions or concerns.    ----- Message from Amanda Aguilar sent at 7/3/2017 10:34 AM CDT -----  Contact: pt wife  Please call pt wife at [phone]. Having a cardioversion on 07/06/17 and has additional questions. Dr Espazra    Thank you

## 2017-07-03 NOTE — PROGRESS NOTES
7/3/17-RN CM called and spoke with Mrs. Muhammad, spouse of patient, in attempt to screen for OPCM. RN CM explained to patient purpose of OPCM. Declined need for assistance at this time. RN will send patient contact information for OOC and OPCM. Encouraged patient to contact OPCM in the event that needs develop. Verbalized understanding. Will close case at this time

## 2017-07-05 ENCOUNTER — OFFICE VISIT (OUTPATIENT)
Dept: NEUROLOGY | Facility: CLINIC | Age: 77
End: 2017-07-05
Payer: MEDICARE

## 2017-07-05 VITALS
RESPIRATION RATE: 17 BRPM | HEART RATE: 96 BPM | SYSTOLIC BLOOD PRESSURE: 119 MMHG | HEIGHT: 72 IN | WEIGHT: 240.06 LBS | DIASTOLIC BLOOD PRESSURE: 77 MMHG | BODY MASS INDEX: 32.52 KG/M2

## 2017-07-05 DIAGNOSIS — G25.0 ESSENTIAL TREMOR: Primary | ICD-10-CM

## 2017-07-05 DIAGNOSIS — R06.09 DOE (DYSPNEA ON EXERTION): Chronic | ICD-10-CM

## 2017-07-05 PROCEDURE — 1126F AMNT PAIN NOTED NONE PRSNT: CPT | Mod: S$GLB,,, | Performed by: PSYCHIATRY & NEUROLOGY

## 2017-07-05 PROCEDURE — 99999 PR PBB SHADOW E&M-EST. PATIENT-LVL III: CPT | Mod: PBBFAC,,, | Performed by: PSYCHIATRY & NEUROLOGY

## 2017-07-05 PROCEDURE — 1159F MED LIST DOCD IN RCRD: CPT | Mod: S$GLB,,, | Performed by: PSYCHIATRY & NEUROLOGY

## 2017-07-05 PROCEDURE — 99214 OFFICE O/P EST MOD 30 MIN: CPT | Mod: S$GLB,,, | Performed by: PSYCHIATRY & NEUROLOGY

## 2017-07-05 RX ORDER — PROPRANOLOL HYDROCHLORIDE 80 MG/1
CAPSULE, EXTENDED RELEASE ORAL
COMMUNITY
Start: 2017-07-04 | End: 2017-11-21 | Stop reason: DRUGHIGH

## 2017-07-05 NOTE — PROGRESS NOTES
"Heri Muhammad               RA. Chief Complaints during this visit:  f/u Patient visit for evaluation of tremors     Referring Physician:     No referring provider defined for this encounter.       Primary Care Physician:  Mel Hansen MD  [address] [place] [address]      History of present illness:   76 y.o. M seen in f/u for ET.  Accompanied by wife.  I got his pcp to change him to propranolol, but now he reports he is taking an inhaler for "COPD."    Wife wonders if he can come off of keppra.    He says his tremor is a "3/10" on my debility scale of 1-10.  He says it "doesn't really bother me" unless he is trying to use mouse or grasp small objects.  We again discussed the reasons to even treat this.  After using analogy of dying his gray hair (why would he dye it if he isn't bothered by being gray), he and wife finally understood my position.  Once they understood, then they both wondered if he even needed the primidone which he has been on for many years.    Interval history 4/6/17:  Accompanied today by his wife. Pt states he is here for evaluation of tremors that he states are hereditary. He has been shaking most of his life he thinks. His brother, son, father, paternal grandfather also had tremor. Tremors are largely in his hands but his head and arms can also shake particularly in stressful situations. 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 Right now he is on Keppra for tremors. On Keppra 1000mg BID. Propranolol used to help him but he had to stop propranolol to get on metoprolol for his heart, was having bradycardia (per patient, but not clear per chart). He does not drink alcohol and does not know if this effects the tremor.   Thinks they are taking primidone (250mg BID)  for seizures as well as Lamictal for seizures as well. Keppra was started for tremor - tremors are reduced but not resolved on Keppra. Last seizure > 20 years ago.   He has tried " "gabapentin as well for the tremor up to 600mg TID which he states made the shaking worse.   Overall, right now he states the tremors are fairly tolerable. About a 3/10 compared to 8/10 when he was on gabapentin     We spent much of this visit trying to establish goal of care.  Patient used words like "doesn't work" in describing keppra, but after much discussion, it seems his tremor is less than prior to use of this medication.  He is currently well-controlled, in my opinion, with only 3/10 on my "how annoying is your tremor" scale, but it took significant education for him to understand that we are shooting for low numbers, not ZERO.  At end of visit, I'm still not 100% sure that he understood (or that he is reporting accurately).  If we could reduce the polypharmacy (by going back to propranolol), I think that would be of great benefit in the end.  If change is not possible, he'll simply continue on current medications.      II.  Review of systems:  As in HPI; balance 3 systems reviewed and are negative.      III.  Past Medical History:   Diagnosis Date    A-fib     Anticoagulant long-term use     eliquis    Atrial flutter     Benign essential tremor     Cancer     skin cancer on back    Cardiac pacemaker in situ     Coronary artery disease     Cough     Dizziness     intermittant    DJD (degenerative joint disease) of knee     ED (erectile dysfunction)     Hard of hearing 11/2016    History of cataract     HTN (hypertension)     Hyperlipidemia     Memory loss     Seizure disorder     Seizures     last episode 1995    Sinus node dysfunction 08/2016     Family History   Problem Relation Age of Onset    Heart disease Mother     Cancer Father      colon    Blindness Father      accident    Cataracts Father     Hypertension Father     Tremor Father     Heart disease Brother     Tremor Brother     Tremor Paternal Grandfather     Melanoma Neg Hx     Amblyopia Neg Hx     Diabetes Neg Hx  "    Glaucoma Neg Hx     Macular degeneration Neg Hx     Retinal detachment Neg Hx     Strabismus Neg Hx     Stroke Neg Hx     Thyroid disease Neg Hx      Social History     Social History    Marital status:      Spouse name: N/A    Number of children: N/A    Years of education: N/A     Occupational History    retired      Social History Main Topics    Smoking status: Never Smoker    Smokeless tobacco: Never Used    Alcohol use No    Drug use: No    Sexual activity: Not Asked     Other Topics Concern    None     Social History Narrative    None     Current Outpatient Prescriptions on File Prior to Visit   Medication Sig Dispense Refill    albuterol-ipratropium 2.5mg-0.5mg/3mL (DUO-NEB) 0.5 mg-3 mg(2.5 mg base)/3 mL nebulizer solution Take 3 mLs by nebulization 2 (two) times daily.       apixaban (ELIQUIS) 5 mg Tab Take 1 tablet (5 mg total) by mouth 2 (two) times daily. 180 tablet 3    aspirin (ECOTRIN) 81 MG EC tablet Take 81 mg by mouth once daily.      atorvastatin (LIPITOR) 10 MG tablet TAKE 1 TABLET EVERY EVENING 90 tablet 3    COMP-AIR NEBULIZER COMPRESSOR Magnolia       cyanocobalamin (VITAMIN B-12) 1000 MCG tablet Take 100 mcg by mouth once daily.      cyclobenzaprine (FLEXERIL) 5 MG tablet Take 1 tablet (5 mg total) by mouth 3 (three) times daily as needed for Muscle spasms. 30 tablet 0    flecainide (TAMBOCOR) 150 MG Tab Take 1 tablet (150 mg total) by mouth every 12 (twelve) hours. 120 tablet 3    furosemide (LASIX) 20 MG tablet TAKE 1 TABLET EVERY MORNING 90 tablet 3    lamotrigine (LAMICTAL) 100 MG tablet Take 1 tablet (100 mg total) by mouth 2 (two) times daily. 60 tablet 0    memantine (NAMENDA) 10 MG Tab Take 1 tablet (10 mg total) by mouth 2 (two) times daily. 60 tablet 11    nitroGLYCERIN (NITROSTAT) 0.4 MG SL tablet Place 1 tablet (0.4 mg total) under the tongue every 5 (five) minutes as needed for Chest pain. 25 tablet 6    primidone (MYSOLINE) 250 MG Tab TAKE 2  TABLETS TWICE DAILY 360 tablet 3    propranolol (INNOPRAN XL) 80 mg 24 hr capsule Take 1 capsule (80 mg total) by mouth every evening. 90 capsule 4    [DISCONTINUED] levetiracetam (KEPPRA) 500 MG Tab TAKE 2 TABLETS 2 TIMES DAILY 180 tablet 0     Current Facility-Administered Medications on File Prior to Visit   Medication Dose Route Frequency Provider Last Rate Last Dose    albuterol-ipratropium 2.5mg-0.5mg/3mL nebulizer solution 3 mL  3 mL Nebulization Q8H Akshat Lane PA-C           Current Neuro medications: Keppra 1000mg BID, Primidone 500mg BID, lamictal 100mg BID, Namenda 10mg BID,   PRIOR NEURO MEDICATIONS TRIED:  Propranolol - worked the best but stopped because of bradycardia, Gabapentin - titrated up to 600mg TID then stopped.     Current regimen:     Previous Medications tried and response:  Primidone  - yes, see above  Propranolol- yes, see above  Topamax - no  Gabapentin - yes see above  Mirtazapine - no   Clonazepam - no   Diazepam - no  Lorazepam - no    Onfi - no   Clozapine - no      On any medications that worsen tremor?  Lithium - no   VPA - previously on for seizures, but raised ammonia levels   Stimulants - no  Steroids - no      Review of patient's allergies indicates:   Allergen Reactions    Bactroban [mupirocin calcium] Rash     Other reaction(s): Rash         IV. Physical Exam     Vitals:    07/05/17 1513   BP: 119/77   BP Location: Right arm   Patient Position: Sitting   BP Method: Automatic   Pulse: 96   Resp: 17   Weight: 108.9 kg (240 lb 1.3 oz)   Height: 6' (1.829 m)       General appearance: Well nourished, well developed, no acute distress   HEENT: NC/AT, MMM, normal pharynx          -------------------------------------------------------------  Affect: full       Orientation to time & place:  Oriented to time, place, person and situation       Attention & concentration:  Able to spell WORLD forwards but backwards -1     Memory:  Recent and remote memory intact  Language:   "Spontaneous, fluent; able to repeat and name objects        Fund of knowledge:  Can describe the president but cannot state his name     -------------------------------------------------------  Cranial nerves: optic discs not visualized, pupils equal round and reactive, extraocular movements intact,       facial sensation intact, face symmetrical, hearing intact to whisper, palate raises midline, shoulder shrug strength normal, tongue protrudes midline.        -------------------------------------------------------  Muscle Bulk: all 4 extremities normal                                                   Muscle strength:  5/5 in all 4 extremities        Sensation: All extremities grossly intact to touch               --------------------------------------------------------------  Unified Parkinson's Disease Rating Scale (motor part only)      UPDRS Motor Examination      Speech  0 - Normal.   Facial Expression  1 - Minimal Hypomimia, could be normal "Poker Face".   Rigidity     Neck 0 - Absent.   Upper Extremity: Right 3 - Marked, but full range of motion easily achieved.   Upper Extremity: Left 0 - Absent.   Lower Extremity: Right 0 - Absent.   Lower Extremity: Left 0 - Absent.     Finger Taps      right 2 - Moderately impaired. Definite and early fatiguing. May have occasional arrests in movement.   left 1 - Mild slowing and/or reduction in amplitude.   Hand Movements      right 2 - Moderately impaired. Definite and early fatiguing. May have occasional arrests in movement.   left 2 - Moderately impaired. Definite and early fatiguing. May have occasional arrests in movement.   Pronation/supination of Hands      right 0 - Normal.   left 1 - Mild slowing and/or reduction in amplitude.     Toe Tapping    right 2 - Moderately impaired. Definite and early fatiguing. May have occasional arrests in movement.   left 3 - Severely impaired. Frequent hesitation in initiating movements or arrests in ongoing movement.     Leg " Agility      right 1 - Mild slowing and/or reduction in amplitude.   left 2 - Moderately impaired. Definite and early fatiguing. May have occasional arrests in movement.   Arising from Chair  0 - Normal.   Posture  1 - Not quite erect, slightly stooped posture; could be normal for older person.   Gait  1 - Walks slowly, may shuffle with short steps, but no festination (hastening steps) or propulsion.   Freezing of gait 0   Postural Stability (Response to sudden, strong posterior displacement produced by pull on shoulders while patient erect with eyes open and feet slightly apart.   Deferred due to patient height   Body Bradykinesia and Hypokinesia (Combining slowness, hesitancy, decreased armswing, small amplitude, and poverty of movement in general)  1 - Minimal slowness, giving movement a deliberate character; could be normal for some persons. Possibly reduced amplitude.     Tremor at Rest:      Face, lips, chin 1 - Slight and infrequently present.    Hands:      right 0 - Absent.    left 0 - Absent.    Feet:     right 0 - Absent.    left 0 - Absent.    Constancy of REST tremor: 3- Forehead tremor present >75% of exam   Postural tremor:    right 1 - Slight and infrequently present.    left 1 - Slight and infrequently present.    Kinetic tremor:    right 0 - Absent.    left 1 - Slight and infrequently present.    Dyskinesias present? No       Total Motor UPDRS:  29      V.  Laboratory/ Radiological Data:   Lab Results   Component Value Date    TSH 2.426 07/05/2016      Lab Results   Component Value Date    [id number] 440 05/03/2017                VI.   Problem List Items Addressed This Visit        1 - High    Essential tremor - Primary    Overview     Present for most of patient's life with positive family history.          Current Assessment & Plan     After more education today, wife and patient understand that treatment is symptomatic, not curative nor clinically necessary.  Knowing this, they both want to  reduce medications to see what his lowest dose truly is.   -> see patient instructions to taper and stop keppra, then to start a slow taper of primidone.   -> if medically necessary, he can easily go back to metoprolol as he now understands the goals of care.            Unprioritized    FOWLER (dyspnea on exertion) (Chronic)    Current Assessment & Plan     If this is still an issue (patient says he has to now use a nebulizer), then propranolol may not be best choice to be on and may need to go back to metoprolol.           Other Visit Diagnoses    None.         Return in about 3 months (around 10/5/2017) for ET.

## 2017-07-05 NOTE — ASSESSMENT & PLAN NOTE
After more education today, wife and patient understand that treatment is symptomatic, not curative nor clinically necessary.  Knowing this, they both want to reduce medications to see what his lowest dose truly is.  He currently reports 3/10 debility from tremor.   -> see patient instructions to taper and stop keppra, then to start a slow taper of primidone.   -> if medically necessary, he can easily go back to metoprolol as he now understands the goals of care.

## 2017-07-05 NOTE — PATIENT INSTRUCTIONS
In order for both of us to determine how USEFUL the following medications are for your tremor, we need to slowly come off of them.        1.  Start with keppra:  Take one pill twice a day for a week, then stop taking.    --->  EMAIL me in a couple weeks and let me know if tremors are same or worse?      2.  In August, I want you to start reducing primidone.  Take 1.5 in the morning and 2 at night.     -> again, let me know after a week if tremors are same or worse.

## 2017-07-05 NOTE — ASSESSMENT & PLAN NOTE
If this is still an issue (patient says he has to now use a nebulizer), then propranolol may not be best choice to be on and may need to go back to metoprolol.

## 2017-07-10 RX ORDER — IPRATROPIUM BROMIDE AND ALBUTEROL SULFATE 2.5; .5 MG/3ML; MG/3ML
SOLUTION RESPIRATORY (INHALATION)
Qty: 180 ML | Refills: 0 | Status: SHIPPED | OUTPATIENT
Start: 2017-07-10 | End: 2017-10-18

## 2017-07-11 RX ORDER — ATORVASTATIN CALCIUM 10 MG/1
TABLET, FILM COATED ORAL
Qty: 90 TABLET | Refills: 3 | Status: SHIPPED | OUTPATIENT
Start: 2017-07-11 | End: 2018-09-07 | Stop reason: SDUPTHER

## 2017-07-11 RX ORDER — APIXABAN 5 MG/1
TABLET, FILM COATED ORAL
Qty: 180 TABLET | Refills: 3 | Status: SHIPPED | OUTPATIENT
Start: 2017-07-11 | End: 2018-06-14 | Stop reason: SDUPTHER

## 2017-07-18 ENCOUNTER — NURSE TRIAGE (OUTPATIENT)
Dept: ADMINISTRATIVE | Facility: CLINIC | Age: 77
End: 2017-07-18

## 2017-07-18 NOTE — TELEPHONE ENCOUNTER
"  Reason for Disposition   [1] Caller requests to speak ONLY to PCP AND [2] NON-URGENT question    Answer Assessment - Initial Assessment Questions  1. REASON FOR CALL or QUESTION: "What is your reason for calling today?" or "How can I best  help you?" or "What question do you have that I can help answer?"      Patient is requesting an appointment with PCP only  2. CALLER: Document the source of call. (e.g., laboratory, patient).      Wife Mrs. Muhammad    She states patient has chronic sob and would like to see Dr. Hansen only.    Protocols used: ST PCP CALL - NO TRIAGE-A-    "

## 2017-07-21 ENCOUNTER — OFFICE VISIT (OUTPATIENT)
Dept: ORTHOPEDICS | Facility: CLINIC | Age: 77
End: 2017-07-21
Payer: MEDICARE

## 2017-07-21 ENCOUNTER — HOSPITAL ENCOUNTER (OUTPATIENT)
Dept: RADIOLOGY | Facility: HOSPITAL | Age: 77
Discharge: HOME | End: 2017-07-21
Attending: ORTHOPAEDIC SURGERY
Payer: MEDICARE

## 2017-07-21 VITALS
HEART RATE: 98 BPM | WEIGHT: 240 LBS | SYSTOLIC BLOOD PRESSURE: 104 MMHG | DIASTOLIC BLOOD PRESSURE: 68 MMHG | HEIGHT: 72 IN | BODY MASS INDEX: 32.51 KG/M2

## 2017-07-21 DIAGNOSIS — M18.12 ARTHRITIS OF CARPOMETACARPAL (CMC) JOINT OF LEFT THUMB: Primary | ICD-10-CM

## 2017-07-21 DIAGNOSIS — M79.645 THUMB PAIN, LEFT: Primary | ICD-10-CM

## 2017-07-21 DIAGNOSIS — M79.645 THUMB PAIN, LEFT: ICD-10-CM

## 2017-07-21 DIAGNOSIS — M79.645 PAIN OF LEFT THUMB: ICD-10-CM

## 2017-07-21 PROCEDURE — 1159F MED LIST DOCD IN RCRD: CPT | Mod: S$GLB,,, | Performed by: ORTHOPAEDIC SURGERY

## 2017-07-21 PROCEDURE — 99213 OFFICE O/P EST LOW 20 MIN: CPT | Mod: S$GLB,,, | Performed by: ORTHOPAEDIC SURGERY

## 2017-07-21 PROCEDURE — 1126F AMNT PAIN NOTED NONE PRSNT: CPT | Mod: S$GLB,,, | Performed by: ORTHOPAEDIC SURGERY

## 2017-07-21 PROCEDURE — 99999 PR PBB SHADOW E&M-EST. PATIENT-LVL III: CPT | Mod: PBBFAC,,, | Performed by: ORTHOPAEDIC SURGERY

## 2017-07-21 PROCEDURE — 73140 X-RAY EXAM OF FINGER(S): CPT | Mod: 26,,, | Performed by: RADIOLOGY

## 2017-07-25 PROBLEM — M18.12 ARTHRITIS OF CARPOMETACARPAL (CMC) JOINT OF LEFT THUMB: Status: ACTIVE | Noted: 2017-07-25

## 2017-07-25 PROBLEM — M79.645 PAIN OF LEFT THUMB: Status: ACTIVE | Noted: 2017-07-25

## 2017-07-25 NOTE — PROGRESS NOTES
Subjective:          Chief Complaint: Heri Muhammad is a 76 y.o. male who had concerns including Pain and Numbness of the Left Hand.    Mr. Muhammad is here for evaluation of his left thumb pain. Pain today is minimal. Last night the pain was increased. He has had some swelling as well. He cannot recall any injury although he thought it may have been an insect pain.    Pain: 0/10      Pain   This is a new problem. The current episode started in the past 7 days. The problem occurs daily. The problem has been resolved. Associated symptoms include joint swelling. The treatment provided mild relief.         Past Medical History:   Diagnosis Date    A-fib     Anticoagulant long-term use     eliquis    Atrial flutter     Benign essential tremor     Cancer     skin cancer on back    Cardiac pacemaker in situ     Coronary artery disease     Cough     Dizziness     intermittant    DJD (degenerative joint disease) of knee     ED (erectile dysfunction)     Hard of hearing 11/2016    History of cataract     HTN (hypertension)     Hyperlipidemia     Memory loss     Seizure disorder     Seizures     last episode 1995    Sinus node dysfunction 08/2016       Past Surgical History:   Procedure Laterality Date    ANKLE SURGERY Right     pins and screws    ATRIAL ABLATION SURGERY      CARDIAC PACEMAKER PLACEMENT  2014    CARDIAC PACEMAKER PLACEMENT      CARDIAC PACEMAKER PLACEMENT  2014    CARDIOVERSION      CATARACT EXTRACTION      OU    COLONOSCOPY      due in 2014    EYE SURGERY      Jesse cataract    HERNIA REPAIR      abdominal    JOINT REPLACEMENT      bill shoulders       Family History   Problem Relation Age of Onset    Heart disease Mother     Cancer Father      colon    Blindness Father      accident    Cataracts Father     Hypertension Father     Tremor Father     Heart disease Brother     Tremor Brother     Tremor Paternal Grandfather     Melanoma Neg Hx     Amblyopia Neg Hx      Diabetes Neg Hx     Glaucoma Neg Hx     Macular degeneration Neg Hx     Retinal detachment Neg Hx     Strabismus Neg Hx     Stroke Neg Hx     Thyroid disease Neg Hx          Current Outpatient Prescriptions:     albuterol-ipratropium 2.5mg-0.5mg/3mL (DUO-NEB) 0.5 mg-3 mg(2.5 mg base)/3 mL nebulizer solution, INHALE 3 MLS (1 VIAL) VIA NEBULIZER EVERY 8 HOURS, Disp: 180 mL, Rfl: 0    aspirin (ECOTRIN) 81 MG EC tablet, Take 81 mg by mouth once daily., Disp: , Rfl:     atorvastatin (LIPITOR) 10 MG tablet, TAKE 1 TABLET EVERY EVENING, Disp: 90 tablet, Rfl: 3    COMP-AIR NEBULIZER COMPRESSOR Magnolia, , Disp: , Rfl:     cyanocobalamin (VITAMIN B-12) 1000 MCG tablet, Take 100 mcg by mouth once daily., Disp: , Rfl:     cyclobenzaprine (FLEXERIL) 5 MG tablet, Take 1 tablet (5 mg total) by mouth 3 (three) times daily as needed for Muscle spasms., Disp: 30 tablet, Rfl: 0    ELIQUIS 5 mg Tab, TAKE 1 TABLET TWICE DAILY, Disp: 180 tablet, Rfl: 3    flecainide (TAMBOCOR) 150 MG Tab, Take 1 tablet (150 mg total) by mouth every 12 (twelve) hours., Disp: 120 tablet, Rfl: 3    furosemide (LASIX) 20 MG tablet, TAKE 1 TABLET EVERY MORNING, Disp: 90 tablet, Rfl: 3    lamotrigine (LAMICTAL) 100 MG tablet, Take 1 tablet (100 mg total) by mouth 2 (two) times daily., Disp: 60 tablet, Rfl: 0    memantine (NAMENDA) 10 MG Tab, Take 1 tablet (10 mg total) by mouth 2 (two) times daily., Disp: 60 tablet, Rfl: 11    primidone (MYSOLINE) 250 MG Tab, TAKE 2 TABLETS TWICE DAILY, Disp: 360 tablet, Rfl: 3    propranolol (INDERAL LA) 80 MG 24 hr capsule, , Disp: , Rfl:     propranolol (INNOPRAN XL) 80 mg 24 hr capsule, Take 1 capsule (80 mg total) by mouth every evening., Disp: 90 capsule, Rfl: 4    nitroGLYCERIN (NITROSTAT) 0.4 MG SL tablet, Place 1 tablet (0.4 mg total) under the tongue every 5 (five) minutes as needed for Chest pain., Disp: 25 tablet, Rfl: 6    Current Facility-Administered Medications:     albuterol-ipratropium  2.5mg-0.5mg/3mL nebulizer solution 3 mL, 3 mL, Nebulization, Q8H, Akshat Lane PA-C    Review of patient's allergies indicates:   Allergen Reactions    Bactroban [mupirocin calcium] Rash     Other reaction(s): Rash       Vitals:    07/21/17 0958   BP: 104/68   Pulse: 98       Review of Systems   Cardiovascular: Positive for palpitations.        HTN   Musculoskeletal: Positive for joint pain, joint swelling and stiffness.   All other systems reviewed and are negative.                  Objective:        General: Heri is well-developed, well-nourished, appears stated age, in no acute distress, alert and oriented to time, place and person.     General    Vitals reviewed.  Constitutional: He is oriented to person, place, and time. He appears well-developed and well-nourished. No distress.   HENT:   Head: Normocephalic and atraumatic.   Nose: Nose normal.   Cardiovascular: Normal rate.    Pulmonary/Chest: Effort normal.   Neurological: He is alert and oriented to person, place, and time.   Psychiatric: He has a normal mood and affect. His behavior is normal. Judgment and thought content normal.             Right Hand/Wrist Exam   Right hand exam is normal.      Left Hand/Wrist Exam     Inspection   Scars: Wrist - absent Hand -  absent  Effusion: Hand -  absent  Bruising: Wrist - absent Hand -  absent  Deformity: Wrist - absent Hand -  absent    Pain   Wrist - The patient exhibits pain of the CMC.    Tenderness   The patient is tender to palpation of the radial area.     Tests   Phalens Sign: negative  Tinels Sign (Medial Nerve): negative  Finkelstein: negative  Carpal Tunnel Compression Test: negative  Cubital Tunnel Compression Test: negative    Atrophy  Thenar:  Negative  Hypothenar:  negative  Intrinsic: negative  1st Dorsal Interosseous:  negative    Other     Sensory Exam  Median Distribution: normal  Ulnar Distribution: normal  Radial Distribution: normal      Left Elbow Exam     Tests Tinel's Sign  (cubital tunnel): negative        Muscle Strength   Left Upper Extremity  Wrist Extension: 5/5/5   Wrist Flexion: 5/5/5   :  5/5/5   Index Finger: 5/5  Middle Finger: 5/5  Ring Finger: 5/5  Little Finger: 5/5  Thumb - APB: 5/5  Thumb - FPL: 5/5  Pinch Mechanism: 5/5    Vascular Exam       Capillary Refill  Left Hand: normal capillary refill        Current and previous radiographic studies and results were reviewed with the patient:   No fracture or subluxation are identified.  There is degenerative arthrosis of the triscaphe joint and first carpometacarpal joint.  No erosive changes are evident.   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           Assessment:       Encounter Diagnoses   Name Primary?    Pain of left thumb     Arthritis of carpometacarpal (CMC) joint of left thumb Yes          Plan:         Continue with conservative therapy  NSAIDs PRN

## 2017-07-31 ENCOUNTER — PATIENT MESSAGE (OUTPATIENT)
Dept: NEUROLOGY | Facility: CLINIC | Age: 77
End: 2017-07-31

## 2017-08-02 ENCOUNTER — HOSPITAL ENCOUNTER (OUTPATIENT)
Dept: RADIOLOGY | Facility: HOSPITAL | Age: 77
Discharge: HOME | End: 2017-08-02
Attending: INTERNAL MEDICINE
Payer: MEDICARE

## 2017-08-02 ENCOUNTER — OFFICE VISIT (OUTPATIENT)
Dept: INTERNAL MEDICINE | Facility: CLINIC | Age: 77
End: 2017-08-02
Payer: MEDICARE

## 2017-08-02 VITALS
SYSTOLIC BLOOD PRESSURE: 124 MMHG | HEIGHT: 72 IN | WEIGHT: 237.19 LBS | OXYGEN SATURATION: 95 % | BODY MASS INDEX: 32.13 KG/M2 | DIASTOLIC BLOOD PRESSURE: 79 MMHG | HEART RATE: 70 BPM

## 2017-08-02 DIAGNOSIS — G40.909 SEIZURE DISORDER: ICD-10-CM

## 2017-08-02 DIAGNOSIS — M54.2 CERVICALGIA: ICD-10-CM

## 2017-08-02 DIAGNOSIS — I48.0 PAF (PAROXYSMAL ATRIAL FIBRILLATION): Chronic | ICD-10-CM

## 2017-08-02 DIAGNOSIS — E78.5 HYPERLIPIDEMIA, UNSPECIFIED HYPERLIPIDEMIA TYPE: ICD-10-CM

## 2017-08-02 DIAGNOSIS — I10 ESSENTIAL HYPERTENSION: ICD-10-CM

## 2017-08-02 PROCEDURE — 1159F MED LIST DOCD IN RCRD: CPT | Mod: S$GLB,,, | Performed by: INTERNAL MEDICINE

## 2017-08-02 PROCEDURE — 99214 OFFICE O/P EST MOD 30 MIN: CPT | Mod: S$GLB,,, | Performed by: INTERNAL MEDICINE

## 2017-08-02 PROCEDURE — 72050 X-RAY EXAM NECK SPINE 4/5VWS: CPT | Mod: TC,PO

## 2017-08-02 PROCEDURE — 3008F BODY MASS INDEX DOCD: CPT | Mod: S$GLB,,, | Performed by: INTERNAL MEDICINE

## 2017-08-02 PROCEDURE — 1125F AMNT PAIN NOTED PAIN PRSNT: CPT | Mod: S$GLB,,, | Performed by: INTERNAL MEDICINE

## 2017-08-02 PROCEDURE — 72050 X-RAY EXAM NECK SPINE 4/5VWS: CPT | Mod: 26,,, | Performed by: RADIOLOGY

## 2017-08-02 PROCEDURE — 99499 UNLISTED E&M SERVICE: CPT | Mod: S$GLB,,, | Performed by: INTERNAL MEDICINE

## 2017-08-02 PROCEDURE — 99999 PR PBB SHADOW E&M-EST. PATIENT-LVL III: CPT | Mod: PBBFAC,,, | Performed by: INTERNAL MEDICINE

## 2017-08-02 NOTE — PROGRESS NOTES
HISTORY OF PRESENT ILLNESS:  Pt. is a 76 y.o. male presents for monitoring of his PAF/had recent hsopital visit, seizure disorder, tremor, HTN, hyperlipidemia.  He states he had pneumonia, and then had followup with Rafy Lane for chronic bronchitis.  Was on nebs, then stopped.  Felt cough improved after nebs stopped.  Has weaned off of Keppra from Dr. Quinn.  HTN is in good control.  He has appt. With Dr. Esparza.  In the evening has problems with with his left wrist and forearm causing pain.  Saw ortho, but wondered if something else.    Lab Results   Component Value Date    WBC 6.15 06/07/2017    HGB 15.9 06/07/2017    HCT 46.6 06/07/2017     06/07/2017    CHOL 164 10/12/2015    TRIG 118 10/12/2015    HDL 32 (L) 10/12/2015    ALT 18 06/07/2017    AST 21 06/07/2017     06/07/2017    K 4.3 06/07/2017     06/07/2017    CREATININE 0.8 06/07/2017    BUN 14 06/07/2017    CO2 25 06/07/2017    TSH 2.426 07/05/2016    PSA 1.7 12/01/2010    INR 0.9 03/15/2017    GLUF 90 04/05/2004     Lab Results   Component Value Date    LDLCALC 108.4 10/12/2015             ROS:  GENERAL: No fever, chills, fatigability or weight loss.  SKIN: No rashes, itching or changes in color or texture of skin.  HEAD: No headaches or recent head trauma.  EARS: Denies ear pain, discharge or vertigo.  NOSE: No loss of smell, no epistaxis or postnasal drip.  MOUTH & THROAT: No hoarseness or change in voice. No excessive gum bleeding.  NODES: Denies swollen glands.  CHEST: Denies FOWLER, cyanosis, wheezing, cough and sputum production.  CARDIOVASCULAR: Denies chest pain, PND, orthopnea or reduced exercise tolerance.  ABDOMEN: Appetite fine. No weight loss. Denies constipation, diarrhea, abdominal pain, hematemesis or blood in stool.  URINARY: No flank pain, dysuria or hematuria.  PERIPHERAL VASCULAR: No claudication or cyanosis. No edema.  MUSCULOSKELETAL: No joint stiffness or swelling. Denies back pain.  NEUROLOGIC: Positive numbness to  median nerve distribution on left;    PE:   Vitals:   Vitals:    08/02/17 1528   BP: 124/79   Pulse: 70     GENERAL: no acute distress, A&Ox3, comfortable.   HEENT: tympanic membranes clear, nasal mucosa pink, no pharyngeal erythema or exudate  NECK: supple, no cervical lymphadenopathy, no thyromegaly; no supraclavicular nodes;   CHEST:  clear to auscultation bilaterally, no crackles or wheeze; no increased work of breathing;  CARDIOVASCULAR: regular rate and rhythm, no rubs, murmurs or gallops.  ABDOMEN: normal bowel sounds, soft non-tender, non-distended; no palpable organomegaly;   EXT: no clubbing, cyanosis or edema.   Phalens negative;    ASSESSMENT/PLAN:    Cervicalgia  -     X-Ray Cervical Spine Complete 5 view; Future; Expected date: 08/02/2017    PAF: seeing Dr. Esparza;    Seizure disorder/tremor: being followed by neuro, is off of keppra; Dr. Quinn is adjusting meds;    HTN: in control on current meds;    Hyperlipidemia: on lipitor;      Call if condition changes or worsens.

## 2017-08-07 DIAGNOSIS — M25.539 WRIST PAIN, UNSPECIFIED LATERALITY: Primary | ICD-10-CM

## 2017-08-07 DIAGNOSIS — M25.531 WRIST PAIN, RIGHT: ICD-10-CM

## 2017-08-08 ENCOUNTER — OFFICE VISIT (OUTPATIENT)
Dept: ORTHOPEDICS | Facility: CLINIC | Age: 77
End: 2017-08-08
Payer: MEDICARE

## 2017-08-08 ENCOUNTER — HOSPITAL ENCOUNTER (OUTPATIENT)
Dept: RADIOLOGY | Facility: HOSPITAL | Age: 77
Discharge: HOME | End: 2017-08-08
Attending: ORTHOPAEDIC SURGERY
Payer: MEDICARE

## 2017-08-08 VITALS
SYSTOLIC BLOOD PRESSURE: 128 MMHG | BODY MASS INDEX: 32.1 KG/M2 | DIASTOLIC BLOOD PRESSURE: 84 MMHG | HEART RATE: 87 BPM | WEIGHT: 237 LBS | HEIGHT: 72 IN

## 2017-08-08 DIAGNOSIS — M19.132 POST-TRAUMATIC OSTEOARTHRITIS OF LEFT WRIST: ICD-10-CM

## 2017-08-08 DIAGNOSIS — M25.539 WRIST PAIN, UNSPECIFIED LATERALITY: ICD-10-CM

## 2017-08-08 DIAGNOSIS — M79.645 THUMB PAIN, LEFT: ICD-10-CM

## 2017-08-08 DIAGNOSIS — G56.02 CARPAL TUNNEL SYNDROME ON LEFT: Primary | ICD-10-CM

## 2017-08-08 DIAGNOSIS — M18.12 ARTHRITIS OF CARPOMETACARPAL (CMC) JOINT OF LEFT THUMB: ICD-10-CM

## 2017-08-08 DIAGNOSIS — M25.532 WRIST PAIN, LEFT: ICD-10-CM

## 2017-08-08 DIAGNOSIS — R20.2 NUMBNESS AND TINGLING IN LEFT HAND: ICD-10-CM

## 2017-08-08 DIAGNOSIS — R20.0 NUMBNESS AND TINGLING IN LEFT HAND: ICD-10-CM

## 2017-08-08 PROCEDURE — 1159F MED LIST DOCD IN RCRD: CPT | Mod: S$GLB,,, | Performed by: ORTHOPAEDIC SURGERY

## 2017-08-08 PROCEDURE — 99499 UNLISTED E&M SERVICE: CPT | Mod: S$GLB,,, | Performed by: ORTHOPAEDIC SURGERY

## 2017-08-08 PROCEDURE — 20526 THER INJECTION CARP TUNNEL: CPT | Mod: LT,S$GLB,, | Performed by: ORTHOPAEDIC SURGERY

## 2017-08-08 PROCEDURE — 99999 PR PBB SHADOW E&M-EST. PATIENT-LVL III: CPT | Mod: PBBFAC,,, | Performed by: ORTHOPAEDIC SURGERY

## 2017-08-08 PROCEDURE — 1125F AMNT PAIN NOTED PAIN PRSNT: CPT | Mod: S$GLB,,, | Performed by: ORTHOPAEDIC SURGERY

## 2017-08-08 PROCEDURE — 3079F DIAST BP 80-89 MM HG: CPT | Mod: S$GLB,,, | Performed by: ORTHOPAEDIC SURGERY

## 2017-08-08 PROCEDURE — 3008F BODY MASS INDEX DOCD: CPT | Mod: S$GLB,,, | Performed by: ORTHOPAEDIC SURGERY

## 2017-08-08 PROCEDURE — 73110 X-RAY EXAM OF WRIST: CPT | Mod: 50,TC,PO

## 2017-08-08 PROCEDURE — 73110 X-RAY EXAM OF WRIST: CPT | Mod: 26,50,, | Performed by: RADIOLOGY

## 2017-08-08 PROCEDURE — 99213 OFFICE O/P EST LOW 20 MIN: CPT | Mod: 25,S$GLB,, | Performed by: ORTHOPAEDIC SURGERY

## 2017-08-08 PROCEDURE — 3074F SYST BP LT 130 MM HG: CPT | Mod: S$GLB,,, | Performed by: ORTHOPAEDIC SURGERY

## 2017-08-08 RX ADMIN — TRIAMCINOLONE ACETONIDE 40 MG: 40 INJECTION, SUSPENSION INTRA-ARTICULAR; INTRAMUSCULAR at 09:08

## 2017-08-08 NOTE — PROGRESS NOTES
Subjective:          Chief Complaint: Heri Muhammad is a 76 y.o. male who had concerns including Pain of the Left Wrist.    Mr. Muhammad is here for evaluation of his left thumb and hand pain. Pain today is minimal however he states that the hand pain is increased as well as numbness and tingling. Pain: 0/10      Pain   This is a new problem. The current episode started in the past 7 days. The problem occurs daily. The problem has been resolved. Associated symptoms include joint swelling. The treatment provided mild relief.         Past Medical History:   Diagnosis Date    A-fib     Anticoagulant long-term use     eliquis    Atrial flutter     Benign essential tremor     Cancer     skin cancer on back    Cardiac pacemaker in situ     Coronary artery disease     Cough     Dizziness     intermittant    DJD (degenerative joint disease) of knee     ED (erectile dysfunction)     Hard of hearing 11/2016    History of cataract     HTN (hypertension)     Hyperlipidemia     Memory loss     Seizure disorder     Seizures     last episode 1995    Sinus node dysfunction 08/2016       Past Surgical History:   Procedure Laterality Date    ANKLE SURGERY Right     pins and screws    ATRIAL ABLATION SURGERY      CARDIAC PACEMAKER PLACEMENT  2014    CARDIAC PACEMAKER PLACEMENT      CARDIAC PACEMAKER PLACEMENT  2014    CARDIOVERSION      CATARACT EXTRACTION      OU    COLONOSCOPY      due in 2014    EYE SURGERY      Jesse cataract    HERNIA REPAIR      abdominal    JOINT REPLACEMENT      bill shoulders       Family History   Problem Relation Age of Onset    Heart disease Mother     Cancer Father      colon    Blindness Father      accident    Cataracts Father     Hypertension Father     Tremor Father     Heart disease Brother     Tremor Brother     Tremor Paternal Grandfather     Melanoma Neg Hx     Amblyopia Neg Hx     Diabetes Neg Hx     Glaucoma Neg Hx     Macular degeneration Neg Hx      Retinal detachment Neg Hx     Strabismus Neg Hx     Stroke Neg Hx     Thyroid disease Neg Hx          Current Outpatient Prescriptions:     albuterol-ipratropium 2.5mg-0.5mg/3mL (DUO-NEB) 0.5 mg-3 mg(2.5 mg base)/3 mL nebulizer solution, INHALE 3 MLS (1 VIAL) VIA NEBULIZER EVERY 8 HOURS, Disp: 180 mL, Rfl: 0    aspirin (ECOTRIN) 81 MG EC tablet, Take 81 mg by mouth once daily., Disp: , Rfl:     atorvastatin (LIPITOR) 10 MG tablet, TAKE 1 TABLET EVERY EVENING, Disp: 90 tablet, Rfl: 3    COMP-AIR NEBULIZER COMPRESSOR [place], , Disp: , Rfl:     cyanocobalamin (VITAMIN B-12) 1000 MCG tablet, Take 100 mcg by mouth once daily., Disp: , Rfl:     cyclobenzaprine (FLEXERIL) 5 MG tablet, Take 1 tablet (5 mg total) by mouth 3 (three) times daily as needed for Muscle spasms., Disp: 30 tablet, Rfl: 0    ELIQUIS 5 mg Tab, TAKE 1 TABLET TWICE DAILY, Disp: 180 tablet, Rfl: 3    flecainide (TAMBOCOR) 150 MG Tab, Take 1 tablet (150 mg total) by mouth every 12 (twelve) hours., Disp: 120 tablet, Rfl: 3    furosemide (LASIX) 20 MG tablet, TAKE 1 TABLET EVERY MORNING, Disp: 90 tablet, Rfl: 3    lamotrigine (LAMICTAL) 100 MG tablet, Take 1 tablet (100 mg total) by mouth 2 (two) times daily., Disp: 60 tablet, Rfl: 0    memantine (NAMENDA) 10 MG Tab, Take 1 tablet (10 mg total) by mouth 2 (two) times daily., Disp: 60 tablet, Rfl: 11    primidone (MYSOLINE) 250 MG Tab, TAKE 2 TABLETS TWICE DAILY, Disp: 360 tablet, Rfl: 3    propranolol (INDERAL LA) 80 MG 24 hr capsule, , Disp: , Rfl:     propranolol (INNOPRAN XL) 80 mg 24 hr capsule, Take 1 capsule (80 mg total) by mouth every evening., Disp: 90 capsule, Rfl: 4    nitroGLYCERIN (NITROSTAT) 0.4 MG SL tablet, Place 1 tablet (0.4 mg total) under the tongue every 5 (five) minutes as needed for Chest pain., Disp: 25 tablet, Rfl: 6    Current Facility-Administered Medications:     albuterol-ipratropium 2.5mg-0.5mg/3mL nebulizer solution 3 mL, 3 mL, Nebulization, Q8H, Akshat LEE  [address]    Review of patient's allergies indicates:   Allergen Reactions    Bactroban [mupirocin calcium] Rash     Other reaction(s): Rash       Vitals:    08/08/17 1422   BP: 128/84   Pulse: 87       Review of Systems   Cardiovascular: Positive for palpitations.        HTN   Musculoskeletal: Positive for joint pain, joint swelling and stiffness.   All other systems reviewed and are negative.                  Objective:        General: Heri is well-developed, well-nourished, appears stated age, in no acute distress, alert and oriented to time, place and person.     General    Vitals reviewed.  Constitutional: He is oriented to person, place, and time. He appears well-developed and well-nourished. No distress.   HENT:   Head: Normocephalic and atraumatic.   Nose: Nose normal.   Cardiovascular: Normal rate.    Pulmonary/Chest: Effort normal.   Neurological: He is alert and oriented to person, place, and time.   Psychiatric: He has a normal mood and affect. His behavior is normal. Judgment and thought content normal.             Right Hand/Wrist Exam   Right hand exam is normal.      Left Hand/Wrist Exam     Inspection   Scars: Wrist - absent Hand -  absent  Effusion: Wrist - absent Hand -  absent  Bruising: Wrist - absent Hand -  absent  Deformity: Wrist - absent Hand -  present    Pain   Wrist - The patient exhibits pain of the CMC.    Swelling   Wrist - The patient is swollen on the CMC.    Tenderness   The patient is tender to palpation of the radial area.     Tests   Phalens Sign: negative  Tinels Sign (Medial Nerve): negative  Finkelstein: negative  Carpal Tunnel Compression Test: negative  Cubital Tunnel Compression Test: negative    Atrophy  Thenar:  Negative  Hypothenar:  negative  Intrinsic: negative  1st Dorsal Interosseous:  negative    Other     Sensory Exam  Median Distribution: normal  Ulnar Distribution: normal  Radial Distribution: normal      Left Elbow Exam     Tests Tinel's Sign (cubital  tunnel): negative        Muscle Strength   Left Upper Extremity  Wrist Extension: 5/5/5   Wrist Flexion: 5/5/5   :  5/5/5   Index Finger: 5/5  Middle Finger: 5/5  Ring Finger: 5/5  Little Finger: 5/5  Thumb - APB: 5/5  Thumb - FPL: 5/5  Pinch Mechanism: 5/5    Vascular Exam       Capillary Refill  Left Hand: normal capillary refill        Current and previous radiographic studies and results were reviewed with the patient:   No fracture or subluxation are identified.  There is degenerative arthrosis of the triscaphe joint and first carpometacarpal joint.  No erosive changes are evident.   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           Assessment:       Encounter Diagnoses   Name Primary?    Wrist pain, left     Thumb pain, left     Arthritis of carpometacarpal (CMC) joint of left thumb     Post-traumatic osteoarthritis of left wrist     Carpal tunnel syndrome on left Yes    Numbness and tingling in left hand           Plan:         Left carpal tunnel injection  NSAIDs PRN  F/U as scheduled

## 2017-08-09 NOTE — PROCEDURES
Carpal Tunnel  Date/Time: 8/8/2017 9:48 PM  Performed by: CRISSY GARG  Authorized by: CRISSY GARG     Consent Done?:  Yes (Verbal)  Indications:  Pain  Site marked: The procedure site was marked    Timeout: Prior to procedure the correct patient, procedure, and site was verified      Location:  Wrist  Site:  L intercarpal  Prep: Patient was prepped and draped in usual sterile fashion    Ultrasonic Guidance for needle placement: No  Needle size:  25 G  Approach:  Volar  Medications:  40 mg triamcinolone acetonide 40 mg/mL  Patient tolerance:  Patient tolerated the procedure well with no immediate complications

## 2017-08-10 RX ORDER — TRIAMCINOLONE ACETONIDE 40 MG/ML
40 INJECTION, SUSPENSION INTRA-ARTICULAR; INTRAMUSCULAR
Status: DISCONTINUED | OUTPATIENT
Start: 2017-08-08 | End: 2017-08-10 | Stop reason: HOSPADM

## 2017-08-14 ENCOUNTER — CLINICAL SUPPORT (OUTPATIENT)
Dept: CARDIOLOGY | Facility: CLINIC | Age: 77
End: 2017-08-14
Payer: MEDICARE

## 2017-08-14 ENCOUNTER — OFFICE VISIT (OUTPATIENT)
Dept: CARDIOLOGY | Facility: CLINIC | Age: 77
End: 2017-08-14
Payer: MEDICARE

## 2017-08-14 VITALS
DIASTOLIC BLOOD PRESSURE: 81 MMHG | WEIGHT: 235.69 LBS | SYSTOLIC BLOOD PRESSURE: 122 MMHG | HEART RATE: 72 BPM | BODY MASS INDEX: 31.92 KG/M2 | HEIGHT: 72 IN

## 2017-08-14 DIAGNOSIS — I48.91 ATRIAL FIBRILLATION, UNSPECIFIED TYPE: ICD-10-CM

## 2017-08-14 DIAGNOSIS — Z95.0 CARDIAC PACEMAKER IN SITU: ICD-10-CM

## 2017-08-14 DIAGNOSIS — I49.5 SINUS NODE DYSFUNCTION: ICD-10-CM

## 2017-08-14 DIAGNOSIS — I10 ESSENTIAL HYPERTENSION: ICD-10-CM

## 2017-08-14 DIAGNOSIS — I47.19 ATRIAL TACHYCARDIA: ICD-10-CM

## 2017-08-14 DIAGNOSIS — I48.4 ATYPICAL ATRIAL FLUTTER: ICD-10-CM

## 2017-08-14 DIAGNOSIS — I48.0 PAF (PAROXYSMAL ATRIAL FIBRILLATION): Primary | Chronic | ICD-10-CM

## 2017-08-14 PROCEDURE — 93000 ELECTROCARDIOGRAM COMPLETE: CPT | Mod: S$GLB,,, | Performed by: INTERNAL MEDICINE

## 2017-08-14 PROCEDURE — 3079F DIAST BP 80-89 MM HG: CPT | Mod: S$GLB,,, | Performed by: INTERNAL MEDICINE

## 2017-08-14 PROCEDURE — 3074F SYST BP LT 130 MM HG: CPT | Mod: S$GLB,,, | Performed by: INTERNAL MEDICINE

## 2017-08-14 PROCEDURE — 93280 PM DEVICE PROGR EVAL DUAL: CPT | Mod: S$GLB,,, | Performed by: INTERNAL MEDICINE

## 2017-08-14 PROCEDURE — 3008F BODY MASS INDEX DOCD: CPT | Mod: S$GLB,,, | Performed by: INTERNAL MEDICINE

## 2017-08-14 PROCEDURE — 1159F MED LIST DOCD IN RCRD: CPT | Mod: S$GLB,,, | Performed by: INTERNAL MEDICINE

## 2017-08-14 PROCEDURE — 99499 UNLISTED E&M SERVICE: CPT | Mod: S$GLB,,, | Performed by: INTERNAL MEDICINE

## 2017-08-14 PROCEDURE — 99215 OFFICE O/P EST HI 40 MIN: CPT | Mod: S$GLB,,, | Performed by: INTERNAL MEDICINE

## 2017-08-14 PROCEDURE — 99999 PR PBB SHADOW E&M-EST. PATIENT-LVL III: CPT | Mod: PBBFAC,,, | Performed by: INTERNAL MEDICINE

## 2017-08-14 PROCEDURE — 1126F AMNT PAIN NOTED NONE PRSNT: CPT | Mod: S$GLB,,, | Performed by: INTERNAL MEDICINE

## 2017-08-14 NOTE — PROGRESS NOTES
Subjective:    Patient ID:  Heri Muhammad is a 76 y.o. male who presents for follow-up of PAF (3 month f/u )      HPI 77 yo male with CAD, atrial fibrillation, atrial flutter, sinus node dysfunction, PPM.  Dual chamber PPM implanted (06/30/14).   Presented with fatigue, found to be in atrial fibrillation. Placed on amiodarone, underwent DCCV.  Symptoms with AF have historically been fatigue and lightheadedness.  DCCV (10/15/15) >> recurrence. DCCV (11/23/15), amiodarone increased to 400 mg daily. Developed atrial flutter.  (01/29/16) PVI + RFA for atrial flutter. Amiodarone discontinued.  Felt poorly with Multaq.  Did well for initially in terms of atrial fibrillation, but continued to note dyspnea.  Underwent LHC/RHC 6/10/16, no obstructive CAD, normal rt sided filling pressures.  Developed persistent recurrence >> DCCV 11/22/16.   Recurrence.  RFA 3/23/17 Repeat isolation of LSPV, RSPV, RIPV (LIPV remained isolated).  Atrial flutter c/w mitral annular flutter >> anterior mitral annular line created >> flutter converted to what appeared to be a roof flutter >> roof line created.  Tachycardia converted to another tachycardia, ultimately mapped to BERNADETTE >> ablation deferred.  Developed recurrence of flutter during blanking period. Flecainide added > converted without need for DCCV.  Device interrogation reveals atrial arrhythmias occurring 34% of the time.  Appears to be overestimating secondary to PAC's.  Still having what appears to be an atrial tachycardia.  Overall feeling better since initiation of Flecainide.  ECG reveals atrial pacing.    Review of Systems   Constitution: Negative. Negative for weakness and malaise/fatigue.   Cardiovascular: Negative for chest pain, dyspnea on exertion, irregular heartbeat, leg swelling, near-syncope, orthopnea, palpitations, paroxysmal nocturnal dyspnea and syncope.   Respiratory: Negative for cough and shortness of breath.    Neurological: Negative for dizziness and  light-headedness.   All other systems reviewed and are negative.       Objective:    Physical Exam   Constitutional: He is oriented to person, place, and time. He appears well-developed and well-nourished.   Eyes: Conjunctivae are normal. No scleral icterus.   Neck: No JVD present. No tracheal deviation present.   Cardiovascular: Normal rate, regular rhythm and normal heart sounds.  PMI is not displaced.    Pulmonary/Chest: Effort normal and breath sounds normal. No respiratory distress.   Abdominal: Soft. There is no hepatosplenomegaly. There is no tenderness.   Musculoskeletal: He exhibits no edema (lower extremity) or tenderness.   Neurological: He is alert and oriented to person, place, and time.   Skin: Skin is warm and dry. No rash noted.   Psychiatric: He has a normal mood and affect. His behavior is normal.         Assessment:       1. PAF (paroxysmal atrial fibrillation)    2. Atypical atrial flutter    3. Sinus node dysfunction    4. Atrial tachycardia    5. Essential hypertension    6. Cardiac pacemaker in situ         Plan:       Doing well overall.  This is the best he has looked in a while.  I would endorse continuation of Flecainide at this time.  F/u in 6 months.

## 2017-08-15 DIAGNOSIS — M79.642 LEFT HAND PAIN: Primary | ICD-10-CM

## 2017-08-15 DIAGNOSIS — R20.0 NUMBNESS AND TINGLING IN LEFT HAND: ICD-10-CM

## 2017-08-15 DIAGNOSIS — R20.2 NUMBNESS AND TINGLING IN LEFT HAND: ICD-10-CM

## 2017-08-15 NOTE — ADDENDUM NOTE
Encounter addended by: Shaniqua Olson on: 8/15/2017 10:06 AM<BR>    Actions taken: Child order released for a procedure order

## 2017-08-16 ENCOUNTER — OFFICE VISIT (OUTPATIENT)
Dept: DERMATOLOGY | Facility: CLINIC | Age: 77
End: 2017-08-16
Payer: MEDICARE

## 2017-08-16 VITALS — HEIGHT: 72 IN | WEIGHT: 235 LBS | RESPIRATION RATE: 18 BRPM | BODY MASS INDEX: 31.83 KG/M2

## 2017-08-16 DIAGNOSIS — Z12.83 SKIN CANCER SCREENING: ICD-10-CM

## 2017-08-16 DIAGNOSIS — D17.1 LIPOMA OF TORSO: ICD-10-CM

## 2017-08-16 DIAGNOSIS — I48.0 PAF (PAROXYSMAL ATRIAL FIBRILLATION): ICD-10-CM

## 2017-08-16 DIAGNOSIS — I48.0 PAF (PAROXYSMAL ATRIAL FIBRILLATION): Primary | ICD-10-CM

## 2017-08-16 DIAGNOSIS — L21.9 SEBORRHEIC DERMATITIS: ICD-10-CM

## 2017-08-16 DIAGNOSIS — Z85.828 PERSONAL HISTORY OF OTHER MALIGNANT NEOPLASM OF SKIN: Primary | ICD-10-CM

## 2017-08-16 PROCEDURE — 1126F AMNT PAIN NOTED NONE PRSNT: CPT | Mod: S$GLB,,, | Performed by: DERMATOLOGY

## 2017-08-16 PROCEDURE — 3008F BODY MASS INDEX DOCD: CPT | Mod: S$GLB,,, | Performed by: DERMATOLOGY

## 2017-08-16 PROCEDURE — 1159F MED LIST DOCD IN RCRD: CPT | Mod: S$GLB,,, | Performed by: DERMATOLOGY

## 2017-08-16 PROCEDURE — 99214 OFFICE O/P EST MOD 30 MIN: CPT | Mod: S$GLB,,, | Performed by: DERMATOLOGY

## 2017-08-16 PROCEDURE — 99999 PR PBB SHADOW E&M-EST. PATIENT-LVL II: CPT | Mod: PBBFAC,,, | Performed by: DERMATOLOGY

## 2017-08-16 NOTE — PROGRESS NOTES
Subjective:       Patient ID:  Heri Muhammad is a 76 y.o. male who presents for   Chief Complaint   Patient presents with    Follow-up    Lesion     Last seen 9-8-16   Lump R upper back -itches at times never drains - never treated     Phx Actinic keratosis -S/P  Cryosurgery   Phx Seborrheic dermatitis - beard  -    ketoconazole (NIZORAL) 2 % shampoo - has resolved   -     fluocinolone (SYNALAR) 0.01 % external solution      SCC left back S/p excision 9/2015    recently with pneumonia, left hand weakness.     Review of Systems   Skin: Positive for itching and dry skin. Negative for daily sunscreen use, activity-related sunscreen use, sensitivity to antibiotic ointment and sensitivity to bandage adhesive.   Hematologic/Lymphatic: Bruises/bleeds easily.        Objective:    Physical Exam   Constitutional: He appears well-developed and well-nourished. No distress.   HENT:   Mouth/Throat: Lips normal.    Eyes: Lids are normal.  No conjunctival no injection.   Cardiovascular: There is no local extremity swelling and no dependent edema.     Neurological: He is alert and oriented to person, place, and time. He is not disoriented.   Psychiatric: He has a normal mood and affect.   Skin:   Areas Examined (abnormalities noted in diagram):   Scalp / Hair Palpated and Inspected  Head / Face Inspection Performed  Neck Inspection Performed  Chest / Axilla Inspection Performed  Abdomen Inspection Performed  Back Inspection Performed  RUE Inspected  LUE Inspection Performed  RLE Inspected  LLE Inspection Performed                   Diagram Legend     Erythematous scaling macule/papule c/w actinic keratosis       Vascular papule c/w angioma      Pigmented verrucoid papule/plaque c/w seborrheic keratosis      Yellow umbilicated papule c/w sebaceous hyperplasia      Irregularly shaped tan macule c/w lentigo     1-2 mm smooth white papules consistent with Milia      Movable subcutaneous cyst with punctum c/w epidermal  inclusion cyst      Subcutaneous movable cyst c/w pilar cyst      Firm pink to brown papule c/w dermatofibroma      Pedunculated fleshy papule(s) c/w skin tag(s)      Evenly pigmented macule c/w junctional nevus     Mildly variegated pigmented, slightly irregular-bordered macule c/w mildly atypical nevus      Flesh colored to evenly pigmented papule c/w intradermal nevus       Pink pearly papule/plaque c/w basal cell carcinoma      Erythematous hyperkeratotic cursted plaque c/w SCC      Surgical scar with no sign of skin cancer recurrence      Open and closed comedones      Inflammatory papules and pustules      Verrucoid papule consistent consistent with wart     Erythematous eczematous patches and plaques     Dystrophic onycholytic nail with subungual debris c/w onychomycosis     Umbilicated papule    Erythematous-base heme-crusted tan verrucoid plaque consistent with inflamed seborrheic keratosis     Erythematous Silvery Scaling Plaque c/w Psoriasis     See annotation      Assessment / Plan:        Personal history of other malignant neoplasm of skin  Area(s) of previous NMSC evaluated with no signs of recurrence.    Upper body skin examination performed today including at least 6 points as noted in physical examination. No lesions suspicious for malignancy noted.      Seborrheic dermatitis  Resolved, reassurance    Lipoma of torso, right back  Reassurance   Recommend referral to Gen Surg if desires removal due to large size    Skin cancer screening  Area(s) of previous NMSC evaluated with no signs of recurrence.    Upper body skin examination performed today including at least 6 points as noted in physical examination. No lesions suspicious for malignancy noted.           Return in about 6 months (around 2/16/2018).

## 2017-08-30 ENCOUNTER — LAB VISIT (OUTPATIENT)
Dept: LAB | Facility: HOSPITAL | Age: 77
End: 2017-08-30
Attending: INTERNAL MEDICINE
Payer: MEDICARE

## 2017-08-30 ENCOUNTER — PROCEDURE VISIT (OUTPATIENT)
Dept: PHYSICAL MEDICINE AND REHAB | Facility: CLINIC | Age: 77
End: 2017-08-30
Payer: MEDICARE

## 2017-08-30 DIAGNOSIS — N32.0 BLADDER NECK OBSTRUCTION: ICD-10-CM

## 2017-08-30 DIAGNOSIS — R20.0 NUMBNESS AND TINGLING IN LEFT HAND: ICD-10-CM

## 2017-08-30 DIAGNOSIS — I48.19 PERSISTENT ATRIAL FIBRILLATION: ICD-10-CM

## 2017-08-30 DIAGNOSIS — Z95.0 CARDIAC PACEMAKER IN SITU: ICD-10-CM

## 2017-08-30 DIAGNOSIS — I47.19 ATRIAL TACHYCARDIA: ICD-10-CM

## 2017-08-30 DIAGNOSIS — M79.642 LEFT HAND PAIN: ICD-10-CM

## 2017-08-30 DIAGNOSIS — I48.0 PAF (PAROXYSMAL ATRIAL FIBRILLATION): Chronic | ICD-10-CM

## 2017-08-30 DIAGNOSIS — I25.84 CORONARY ARTERY DISEASE DUE TO CALCIFIED CORONARY LESION: Chronic | ICD-10-CM

## 2017-08-30 DIAGNOSIS — R20.2 NUMBNESS AND TINGLING IN LEFT HAND: ICD-10-CM

## 2017-08-30 DIAGNOSIS — E78.5 HYPERLIPIDEMIA, UNSPECIFIED HYPERLIPIDEMIA TYPE: ICD-10-CM

## 2017-08-30 DIAGNOSIS — I25.10 CORONARY ARTERY DISEASE DUE TO CALCIFIED CORONARY LESION: Chronic | ICD-10-CM

## 2017-08-30 DIAGNOSIS — I48.3 TYPICAL ATRIAL FLUTTER: ICD-10-CM

## 2017-08-30 LAB
ALBUMIN SERPL BCP-MCNC: 3.6 G/DL
ALP SERPL-CCNC: 104 U/L
ALT SERPL W/O P-5'-P-CCNC: 17 U/L
ANION GAP SERPL CALC-SCNC: 9 MMOL/L
AST SERPL-CCNC: 17 U/L
BASOPHILS # BLD AUTO: 0.02 K/UL
BASOPHILS NFR BLD: 0.3 %
BILIRUB SERPL-MCNC: 0.4 MG/DL
BUN SERPL-MCNC: 13 MG/DL
CALCIUM SERPL-MCNC: 8.9 MG/DL
CHLORIDE SERPL-SCNC: 101 MMOL/L
CHOLEST SERPL-MCNC: 173 MG/DL
CHOLEST/HDLC SERPL: 4.1 {RATIO}
CO2 SERPL-SCNC: 31 MMOL/L
COMPLEXED PSA SERPL-MCNC: 3 NG/ML
CREAT SERPL-MCNC: 0.8 MG/DL
DIFFERENTIAL METHOD: ABNORMAL
EOSINOPHIL # BLD AUTO: 0 K/UL
EOSINOPHIL NFR BLD: 0 %
ERYTHROCYTE [DISTWIDTH] IN BLOOD BY AUTOMATED COUNT: 14.6 %
EST. GFR  (AFRICAN AMERICAN): >60 ML/MIN/1.73 M^2
EST. GFR  (NON AFRICAN AMERICAN): >60 ML/MIN/1.73 M^2
GLUCOSE SERPL-MCNC: 106 MG/DL
HCT VFR BLD AUTO: 45.3 %
HDLC SERPL-MCNC: 42 MG/DL
HDLC SERPL: 24.3 %
HGB BLD-MCNC: 15.6 G/DL
LDLC SERPL CALC-MCNC: 109.4 MG/DL
LYMPHOCYTES # BLD AUTO: 1.9 K/UL
LYMPHOCYTES NFR BLD: 31.5 %
MCH RBC QN AUTO: 31.8 PG
MCHC RBC AUTO-ENTMCNC: 34.4 G/DL
MCV RBC AUTO: 92 FL
MONOCYTES # BLD AUTO: 0.6 K/UL
MONOCYTES NFR BLD: 9.9 %
NEUTROPHILS # BLD AUTO: 3.5 K/UL
NEUTROPHILS NFR BLD: 58.1 %
NONHDLC SERPL-MCNC: 131 MG/DL
PLATELET # BLD AUTO: 215 K/UL
PMV BLD AUTO: 10.3 FL
POTASSIUM SERPL-SCNC: 4.4 MMOL/L
PROT SERPL-MCNC: 7.5 G/DL
RBC # BLD AUTO: 4.9 M/UL
SODIUM SERPL-SCNC: 141 MMOL/L
TRIGL SERPL-MCNC: 108 MG/DL
WBC # BLD AUTO: 6.09 K/UL

## 2017-08-30 PROCEDURE — 99499 UNLISTED E&M SERVICE: CPT | Mod: S$GLB,,, | Performed by: PHYSICAL MEDICINE & REHABILITATION

## 2017-08-30 PROCEDURE — 95886 MUSC TEST DONE W/N TEST COMP: CPT | Mod: S$GLB,,, | Performed by: PHYSICAL MEDICINE & REHABILITATION

## 2017-08-30 PROCEDURE — 85025 COMPLETE CBC W/AUTO DIFF WBC: CPT

## 2017-08-30 PROCEDURE — 80053 COMPREHEN METABOLIC PANEL: CPT

## 2017-08-30 PROCEDURE — 84153 ASSAY OF PSA TOTAL: CPT

## 2017-08-30 PROCEDURE — 36415 COLL VENOUS BLD VENIPUNCTURE: CPT | Mod: PO

## 2017-08-30 PROCEDURE — 80061 LIPID PANEL: CPT

## 2017-08-30 PROCEDURE — 95908 NRV CNDJ TST 3-4 STUDIES: CPT | Mod: S$GLB,,, | Performed by: PHYSICAL MEDICINE & REHABILITATION

## 2017-08-30 NOTE — PROCEDURES
Procedures   [facility]  Jessie Santiago D.O.  Physical Medicine and Rehab  Phone: [phone]  Fax: [phone]              Patient: Heri Muhammad   Patient ID: [id number]   Sex: Male   YOB: 1940   Age: 76 Years 9 Months   Notes:     Last visit date: 8/30/2017             Visit date and time: 8/30/2017 11:44   Patient Age on Visit Date: 76 Years 9 Months   Referring Physician:     Diagnoses:               Sensory NCS      Nerve / Sites Rec. Site Onset Lat Peak Lat Ref. NP Amp Ref. PP Amp Ref. Segments Distance Velocity     ms ms ms µV µV µV µV  cm m/s   L Median - Digit II (Antidromic)      Wrist Dig II 4.27 5.21 ?3.40 3.8 ?15.0 4.7 ?20.0 Wrist - Dig II 13 30   L Ulnar - Digit V (Antidromic)      Wrist Dig V 3.07 3.65 ?3.10 5.0 ?10.0 5.5 ?15.0 Wrist - Dig V 11 36           Motor NCS      Nerve / Sites Muscle Latency Ref. Amplitude Ref. Amp % Duration Segments Distance Lat Diff Velocity Ref.     ms ms mV mV % ms  cm ms m/s m/s   L Median - APB      Wrist APB 6.25 ?4.40 2.5 ?4.0 100 8.39 Wrist - APB 7         Elbow APB 11.88  2.0  77.3  Elbow - Wrist 23 5.63 41 ?49   L Ulnar - ADM      Wrist ADM 3.75 ?3.60 3.4 ?5.0 100 8.07 Wrist - ADM 7         B.Elbow ADM 9.43  1.2  34.1 7.55 B.Elbow - Wrist 21.5 5.68 38 ?49      A.Elbow ADM 12.29  1.6  46.5 6.15 A.Elbow - B.Elbow 10 2.86 35 ?49           EMG          EMG Summary Table     Spontaneous MUAP Recruitment   Muscle IA Fib PSW Fasc H.F. Amp Dur. PPP Pattern   L. Triceps brachii N None None None None N N N N   L. Deltoid N None None None None 1+ 1+ 1+ Reduced   L. Biceps brachii N None None None None N N 1+ Reduced   L. Extensor carpi radialis brevis N None None None None N N N N   L. First dorsal interosseous N None None None None N N N N   L. Cervical paraspinals (mid) 1+ 1+ None None None N N N N   L. Cervical paraspinals (up) 1+ None None None None N N N N           Summary    The motor conduction test had results outside of the specified  normal range in all 2 of the tested nerves:   In the L Median - APB study  o the take off latency result was increased for Wrist stimulation  o the peak amplitude result was reduced for Wrist stimulation  o the take off velocity result was reduced for Elbow - Wrist segment   In the L Ulnar - ADM study  o the take off latency result was increased for Wrist stimulation  o the peak amplitude result was reduced for Wrist stimulation  o the take off velocity result was reduced for B.Elbow - Wrist segment  o the take off velocity result was reduced for A.Elbow - B.Elbow segment    The sensory conduction test had results outside of the specified normal range in all 2 of the tested nerves:   In the L Median - Digit II (Antidromic) study  o the peak latency result was increased for Wrist stimulation  o the peak amplitude result was reduced for Wrist stimulation   In the L Ulnar - Digit V (Antidromic) study  o the peak latency result was increased for Wrist stimulation  o the peak amplitude result was reduced for Wrist stimulation    The needle EMG examination was performed in 7 muscles. It was normal in 3 muscle(s): L. Triceps brachii, L. Extensor carpi radialis brevis, L. First dorsal interosseous. The study was abnormal in 4 muscle(s), with the following distribution:   Abnormal spontaneous/insertional activity was found in L. Cervical paraspinals (mid), L. Cervical paraspinals (up).   The MUP waveform abnormality was found in L. Deltoid, L. Biceps brachii.   Abnormal interference pattern was found in L. Deltoid, L. Biceps brachii.              Conclusion:     1. Severe left carpal tunnel syndrome  2. 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 Mild to moderate left C5/C6 radiculopathy w. mild active denervation of cervical paraspinals as noted above.          ____________________________  Jessie Santiago D.O.

## 2017-09-01 ENCOUNTER — OFFICE VISIT (OUTPATIENT)
Dept: ORTHOPEDICS | Facility: CLINIC | Age: 77
End: 2017-09-01
Payer: MEDICARE

## 2017-09-01 VITALS
SYSTOLIC BLOOD PRESSURE: 107 MMHG | HEIGHT: 72 IN | BODY MASS INDEX: 31.83 KG/M2 | WEIGHT: 235 LBS | DIASTOLIC BLOOD PRESSURE: 67 MMHG | HEART RATE: 78 BPM

## 2017-09-01 DIAGNOSIS — M79.642 LEFT HAND PAIN: ICD-10-CM

## 2017-09-01 DIAGNOSIS — M25.532 WRIST PAIN, LEFT: ICD-10-CM

## 2017-09-01 DIAGNOSIS — G56.22 CUBITAL TUNNEL SYNDROME ON LEFT: ICD-10-CM

## 2017-09-01 DIAGNOSIS — G56.02 LEFT CARPAL TUNNEL SYNDROME: Primary | ICD-10-CM

## 2017-09-01 PROCEDURE — 3074F SYST BP LT 130 MM HG: CPT | Mod: S$GLB,,, | Performed by: ORTHOPAEDIC SURGERY

## 2017-09-01 PROCEDURE — 1159F MED LIST DOCD IN RCRD: CPT | Mod: S$GLB,,, | Performed by: ORTHOPAEDIC SURGERY

## 2017-09-01 PROCEDURE — 99999 PR PBB SHADOW E&M-EST. PATIENT-LVL III: CPT | Mod: PBBFAC,,, | Performed by: ORTHOPAEDIC SURGERY

## 2017-09-01 PROCEDURE — 99213 OFFICE O/P EST LOW 20 MIN: CPT | Mod: S$GLB,,, | Performed by: ORTHOPAEDIC SURGERY

## 2017-09-01 PROCEDURE — 1125F AMNT PAIN NOTED PAIN PRSNT: CPT | Mod: S$GLB,,, | Performed by: ORTHOPAEDIC SURGERY

## 2017-09-01 PROCEDURE — 3008F BODY MASS INDEX DOCD: CPT | Mod: S$GLB,,, | Performed by: ORTHOPAEDIC SURGERY

## 2017-09-01 PROCEDURE — 3078F DIAST BP <80 MM HG: CPT | Mod: S$GLB,,, | Performed by: ORTHOPAEDIC SURGERY

## 2017-09-01 NOTE — PROGRESS NOTES
Subjective:          Chief Complaint: Heri Muhammad is a 76 y.o. male who had concerns including Pain of the Left Wrist.    Mr. Muhammad is here for evaluation of his left thumb and hand pain. Pain today is minimal however he states that the hand pain is increased as well as numbness and tingling. Pain: 4/10 He is here for his EMG f/u.      Pain   This is a new problem. The current episode started in the past 7 days. The problem occurs daily. The problem has been resolved. Associated symptoms include joint swelling. The treatment provided mild relief.         Past Medical History:   Diagnosis Date    A-fib     Anticoagulant long-term use     eliquis    Atrial flutter     Benign essential tremor     Cancer     skin cancer on back    Cardiac pacemaker in situ     Coronary artery disease     Cough     Dizziness     intermittant    DJD (degenerative joint disease) of knee     ED (erectile dysfunction)     Hard of hearing 11/2016    History of cataract     HTN (hypertension)     Hyperlipidemia     Memory loss     Seizure disorder     Seizures     last episode 1995    Sinus node dysfunction 08/2016       Past Surgical History:   Procedure Laterality Date    ANKLE SURGERY Right     pins and screws    ATRIAL ABLATION SURGERY      CARDIAC PACEMAKER PLACEMENT  2014    CARDIAC PACEMAKER PLACEMENT      CARDIAC PACEMAKER PLACEMENT  2014    CARDIOVERSION      CATARACT EXTRACTION      OU    COLONOSCOPY      due in 2014    EYE SURGERY      Jesse cataract    HERNIA REPAIR      abdominal    JOINT REPLACEMENT      bill shoulders       Family History   Problem Relation Age of Onset    Heart disease Mother     Cancer Father      colon    Blindness Father      accident    Cataracts Father     Hypertension Father     Tremor Father     Heart disease Brother     Tremor Brother     Tremor Paternal Grandfather     Melanoma Neg Hx     Amblyopia Neg Hx     Diabetes Neg Hx     Glaucoma Neg Hx      Macular degeneration Neg Hx     Retinal detachment Neg Hx     Strabismus Neg Hx     Stroke Neg Hx     Thyroid disease Neg Hx          Current Outpatient Prescriptions:     albuterol-ipratropium 2.5mg-0.5mg/3mL (DUO-NEB) 0.5 mg-3 mg(2.5 mg base)/3 mL nebulizer solution, INHALE 3 MLS (1 VIAL) VIA NEBULIZER EVERY 8 HOURS, Disp: 180 mL, Rfl: 0    aspirin (ECOTRIN) 81 MG EC tablet, Take 81 mg by mouth once daily., Disp: , Rfl:     atorvastatin (LIPITOR) 10 MG tablet, TAKE 1 TABLET EVERY EVENING, Disp: 90 tablet, Rfl: 3    COMP-AIR NEBULIZER COMPRESSOR [place], , Disp: , Rfl:     cyanocobalamin (VITAMIN B-12) 1000 MCG tablet, Take 100 mcg by mouth once daily., Disp: , Rfl:     cyclobenzaprine (FLEXERIL) 5 MG tablet, Take 1 tablet (5 mg total) by mouth 3 (three) times daily as needed for Muscle spasms., Disp: 30 tablet, Rfl: 0    ELIQUIS 5 mg Tab, TAKE 1 TABLET TWICE DAILY, Disp: 180 tablet, Rfl: 3    flecainide (TAMBOCOR) 150 MG Tab, Take 1 tablet (150 mg total) by mouth every 12 (twelve) hours., Disp: 120 tablet, Rfl: 3    furosemide (LASIX) 20 MG tablet, TAKE 1 TABLET EVERY MORNING, Disp: 90 tablet, Rfl: 3    lamotrigine (LAMICTAL) 100 MG tablet, Take 1 tablet (100 mg total) by mouth 2 (two) times daily., Disp: 60 tablet, Rfl: 0    memantine (NAMENDA) 10 MG Tab, Take 1 tablet (10 mg total) by mouth 2 (two) times daily., Disp: 60 tablet, Rfl: 11    primidone (MYSOLINE) 250 MG Tab, TAKE 2 TABLETS TWICE DAILY, Disp: 360 tablet, Rfl: 3    propranolol (INDERAL LA) 80 MG 24 hr capsule, , Disp: , Rfl:     nitroGLYCERIN (NITROSTAT) 0.4 MG SL tablet, Place 1 tablet (0.4 mg total) under the tongue every 5 (five) minutes as needed for Chest pain., Disp: 25 tablet, Rfl: 6    Current Facility-Administered Medications:     albuterol-ipratropium 2.5mg-0.5mg/3mL nebulizer solution 3 mL, 3 mL, Nebulization, Q8H, Akshat Lane PA-C    Review of patient's allergies indicates:   Allergen Reactions    Bactroban  [mupirocin calcium] Rash     Other reaction(s): Rash       Vitals:    09/01/17 1005   BP: 107/67   Pulse: 78       Review of Systems   Cardiovascular: Positive for palpitations.        HTN   Musculoskeletal: Positive for joint pain, joint swelling and stiffness.   All other systems reviewed and are negative.                  Objective:        General: Heri is well-developed, well-nourished, appears stated age, in no acute distress, alert and oriented to time, place and person.     General    Vitals reviewed.  Constitutional: He is oriented to person, place, and time. He appears well-developed and well-nourished. No distress.   HENT:   Head: Normocephalic and atraumatic.   Nose: Nose normal.   Cardiovascular: Normal rate.    Pulmonary/Chest: Effort normal.   Neurological: He is alert and oriented to person, place, and time.   Psychiatric: He has a normal mood and affect. His behavior is normal. Judgment and thought content normal.             Right Hand/Wrist Exam   Right hand exam is normal.      Left Hand/Wrist Exam     Inspection   Scars: Wrist - absent Hand -  absent  Effusion: Wrist - absent Hand -  absent  Bruising: Wrist - absent Hand -  absent  Deformity: Wrist - absent Hand -  present    Pain   Wrist - The patient exhibits pain of the CMC.    Swelling   Wrist - The patient is swollen on the CMC.    Tenderness   The patient is tender to palpation of the radial area.     Tests   Phalens Sign: negative  Tinels Sign (Medial Nerve): negative  Finkelstein: negative  Carpal Tunnel Compression Test: negative  Cubital Tunnel Compression Test: negative    Atrophy  Thenar:  Negative  Hypothenar:  negative  Intrinsic: negative  1st Dorsal Interosseous:  negative    Other     Sensory Exam  Median Distribution: normal  Ulnar Distribution: normal  Radial Distribution: normal      Left Elbow Exam     Tests Tinel's Sign (cubital tunnel): negative        Muscle Strength   Left Upper Extremity  Wrist Extension: 5/5/5    Wrist Flexion: 5/5/5   :  5/5/5   Index Finger: 5/5  Middle Finger: 5/5  Ring Finger: 5/5  Little Finger: 5/5  Thumb - APB: 5/5  Thumb - FPL: 5/5  Pinch Mechanism: 5/5    Vascular Exam       Capillary Refill  Left Hand: normal capillary refill        Current and previous radiographic studies and results were reviewed with the patient:   No fracture or subluxation are identified.  There is degenerative arthrosis of the triscaphe joint and first carpometacarpal joint.  No erosive changes are evident.   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     EMG:  Conclusion:      1. Severe left carpal tunnel syndrome  2. 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 Mild to moderate left C5/C6 radiculopathy w. mild active denervation of cervical paraspinals as noted above.         Assessment:       Encounter Diagnoses   Name Primary?    Left hand pain     Wrist pain, left     Left carpal tunnel syndrome Yes    Cubital tunnel syndrome on left           Plan:         Referral to Dr. Jaquez for evaluation and management of left CTS with concomitant left cubital tunnel syndrome

## 2017-09-04 RX ORDER — MEMANTINE HYDROCHLORIDE 10 MG/1
TABLET ORAL
Qty: 180 TABLET | Refills: 11 | Status: SHIPPED | OUTPATIENT
Start: 2017-09-04 | End: 2018-10-22 | Stop reason: SDUPTHER

## 2017-09-18 ENCOUNTER — OFFICE VISIT (OUTPATIENT)
Dept: FAMILY MEDICINE | Facility: CLINIC | Age: 77
End: 2017-09-18
Payer: MEDICARE

## 2017-09-18 VITALS
HEART RATE: 70 BPM | RESPIRATION RATE: 18 BRPM | BODY MASS INDEX: 31.8 KG/M2 | HEIGHT: 72 IN | WEIGHT: 234.81 LBS | SYSTOLIC BLOOD PRESSURE: 116 MMHG | OXYGEN SATURATION: 95 % | DIASTOLIC BLOOD PRESSURE: 68 MMHG

## 2017-09-18 DIAGNOSIS — M47.816 LUMBAR SPONDYLOSIS: ICD-10-CM

## 2017-09-18 DIAGNOSIS — G25.0 ESSENTIAL TREMOR: ICD-10-CM

## 2017-09-18 DIAGNOSIS — J84.10 PULMONARY GRANULOMA: ICD-10-CM

## 2017-09-18 DIAGNOSIS — I10 ESSENTIAL HYPERTENSION: ICD-10-CM

## 2017-09-18 DIAGNOSIS — E53.8 B12 DEFICIENCY: ICD-10-CM

## 2017-09-18 DIAGNOSIS — I48.0 PAF (PAROXYSMAL ATRIAL FIBRILLATION): Chronic | ICD-10-CM

## 2017-09-18 DIAGNOSIS — Z79.01 CURRENT USE OF LONG TERM ANTICOAGULATION: ICD-10-CM

## 2017-09-18 DIAGNOSIS — E78.5 HYPERLIPIDEMIA, UNSPECIFIED HYPERLIPIDEMIA TYPE: ICD-10-CM

## 2017-09-18 DIAGNOSIS — R41.3 MEMORY LOSS: ICD-10-CM

## 2017-09-18 DIAGNOSIS — I70.0 AORTIC ATHEROSCLEROSIS: ICD-10-CM

## 2017-09-18 DIAGNOSIS — D64.9 ANEMIA, UNSPECIFIED TYPE: ICD-10-CM

## 2017-09-18 DIAGNOSIS — G56.02 LEFT CARPAL TUNNEL SYNDROME: ICD-10-CM

## 2017-09-18 DIAGNOSIS — Z98.890 STATUS POST CATHETER ABLATION OF ATRIAL FIBRILLATION: ICD-10-CM

## 2017-09-18 DIAGNOSIS — R06.09 DOE (DYSPNEA ON EXERTION): Chronic | ICD-10-CM

## 2017-09-18 DIAGNOSIS — G89.29 CHRONIC RIGHT-SIDED THORACIC BACK PAIN: ICD-10-CM

## 2017-09-18 DIAGNOSIS — I47.19 ATRIAL TACHYCARDIA: ICD-10-CM

## 2017-09-18 DIAGNOSIS — I48.4 ATYPICAL ATRIAL FLUTTER: ICD-10-CM

## 2017-09-18 DIAGNOSIS — I25.10 CORONARY ARTERY DISEASE INVOLVING NATIVE CORONARY ARTERY OF NATIVE HEART WITHOUT ANGINA PECTORIS: Chronic | ICD-10-CM

## 2017-09-18 DIAGNOSIS — G40.909 SEIZURE DISORDER: ICD-10-CM

## 2017-09-18 DIAGNOSIS — M54.6 CHRONIC RIGHT-SIDED THORACIC BACK PAIN: ICD-10-CM

## 2017-09-18 DIAGNOSIS — Z00.00 ENCOUNTER FOR PREVENTIVE HEALTH EXAMINATION: Primary | ICD-10-CM

## 2017-09-18 DIAGNOSIS — I49.5 SINUS NODE DYSFUNCTION: ICD-10-CM

## 2017-09-18 DIAGNOSIS — Z95.0 CARDIAC PACEMAKER IN SITU: ICD-10-CM

## 2017-09-18 PROBLEM — R10.11 RUQ PAIN: Status: RESOLVED | Noted: 2017-04-27 | Resolved: 2017-09-18

## 2017-09-18 PROCEDURE — 99499 UNLISTED E&M SERVICE: CPT | Mod: S$GLB,,, | Performed by: NURSE PRACTITIONER

## 2017-09-18 PROCEDURE — G0439 PPPS, SUBSEQ VISIT: HCPCS | Mod: S$GLB,,, | Performed by: NURSE PRACTITIONER

## 2017-09-18 PROCEDURE — 99999 PR PBB SHADOW E&M-EST. PATIENT-LVL IV: CPT | Mod: PBBFAC,,, | Performed by: NURSE PRACTITIONER

## 2017-09-18 RX ORDER — CYCLOBENZAPRINE HCL 5 MG
5 TABLET ORAL 3 TIMES DAILY PRN
Qty: 30 TABLET | Refills: 0 | Status: SHIPPED | OUTPATIENT
Start: 2017-09-18 | End: 2018-01-19

## 2017-09-18 NOTE — PROGRESS NOTES
Heri Muhammad presented for a  Medicare AWV and comprehensive Health Risk Assessment today. The following components were reviewed and updated:    · Medical history  · Family History  · Social history  · Allergies and Current Medications  · Health Risk Assessment  · Health Maintenance  · Care Team     ** See Completed Assessments for Annual Wellness Visit within the encounter summary.**       The following assessments were completed:  · Living Situation  · CAGE  · Depression Screening  · Timed Get Up and Go  · Whisper Test  · Cognitive Function Screening  · Nutrition Screening  · ADL Screening  · PAQ Screening    Vitals:    09/18/17 1312   BP: 116/68   Pulse: 70   Resp: 18   SpO2: 95%   Weight: 106.5 kg (234 lb 12.6 oz)   Height: 6' (1.829 m)     Body mass index is 31.84 kg/m².  Physical Exam   Constitutional: He appears well-developed and well-nourished. No distress.   HENT:   Head: Normocephalic and atraumatic.   Eyes: Conjunctivae are normal. No scleral icterus.   Cardiovascular: Normal rate, normal heart sounds and intact distal pulses.  Exam reveals no gallop and no friction rub.    Pulmonary/Chest: Effort normal and breath sounds normal. No respiratory distress. He has no wheezes.   Musculoskeletal: He exhibits no edema.   Skin: Skin is warm and dry. No rash noted.   Psychiatric: His speech is normal and behavior is normal. Judgment normal. His affect is blunt. Cognition and memory are normal.   Vitals reviewed.        Diagnoses and health risks identified today and associated recommendations/orders:    1. Encounter for preventive health examination  Reviewed health  Maintenance and provided recommendations  Written rx for flu provided  Recommend [id number] after 10/24/17    2. PAF (paroxysmal atrial fibrillation)  Stable.   Taking flecanide, b-blocker and eliquis  Followed by Isaias.       3. Sinus node dysfunction  Stable.   Has pacemaker  Followed by Isaias.       4. Status post catheter ablation of atrial  fibrillation  Stable.     Followed by Isaias.       5. Coronary artery disease involving native coronary artery of native heart without angina pectoris  Stable.   No cp  Taking asa, b-blocker, has ntg  Followed by Gladys.       6. Atrial tachycardia  Stable.   Continue to monitor   Controlled with current medications   Followed by Isaias.       7. Atypical atrial flutter  Stable.   Controlled on current medications.  Followed by Isaias.       8. Seizure disorder  Stable.     Followed by Heena.       9. Aortic atherosclerosis  Stable.     Followed by Gladys.   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   CTA chest 3/15/17    11. Essential tremor  Stable.     Followed by Heena.       12. Left carpal tunnel syndrome  Unchanged  Followed by Mel Hansen MD .       13. Memory loss  Taking namenda  Followed by Heena.       14. Cardiac pacemaker in situ  Stable.     Followed by Isaias.       15. Essential hypertension  Stable.   Controlled on current medications.  Followed by Gladys.       16. Hyperlipidemia, unspecified hyperlipidemia type  Taking statin  Followed by Gladys.       17. Current use of long term anticoagulation  Stable.     Followed by Gladys.       18. Anemia, unspecified type  Continue to monitor hgb  Followed by Mel Hansen MD .       19. B12 deficiency  Taking b12  Followed by Mel Hansen MD .       20. Chronic right-sided thoracic back pain  unchanged  Followed by Frederick.       21. FOWLER (dyspnea on exertion)  Unchanged  Followed by Mel Hansen MD .       22. Lumbar spondylosis  unchanged  Followed by Mel Hansen MD .         Provided Heri with a 5-10 year written screening schedule and personal prevention plan. Recommendations were developed using the USPSTF age appropriate recommendations. Education, counseling, and referrals were provided as needed. After Visit Summary printed and given to patient which  includes a list of additional screenings\tests needed.    Return in about 1 year (around 9/18/2018).    Danielle Brooks NP

## 2017-09-18 NOTE — PATIENT INSTRUCTIONS
Counseling and Referral of Other Preventative  (Italic type indicates deductible and co-insurance are waived)    Patient Name: Heri Muhammad  Today's Date: 9/18/2017      SERVICE LIMITATIONS RECOMMENDATION    Vaccines    · Pneumococcal (once after 65)    · Influenza (annually)    · Hepatitis B (if medium/high risk)    · Prevnar 13      Hepatitis B medium/high risk factors:       - End-stage renal disease       - Hemophiliacs who received Factor VII or         IX concentrates       - Clients of institutions for the mentally             retarded       - Persons who live in the same house as          a HepB carrier       - Homosexual men       - Illicit injectable drug abusers     Pneumococcal: N/A     Influenza: Scheduled - see appointments     Hepatitis B: N/A     Prevnar 13: Done, no repeat necessary    Prostate cancer screening (annually to age 75)     Prostate specific antigen (PSA) Shared decision making with Provider. Sometimes a co-pay may be required if the patient decides to have this test. The USPSTF no longer recommends prostate cancer screening routinely in medicine: not to follow    Colorectal cancer screening (to age 75)    · Fecal occult blood test (annual)  · Flexible sigmoidoscopy (5y)  · Screening colonoscopy (10y)  · Barium enema   N/A    Diabetes self-management training (no USPSTF recommendations)  Requires referral by treating physician for patient with diabetes or renal disease. 10 hours of initial DSMT sessions of no less than 30 minutes each in a continuous 12-month period. 2 hours of follow-up DSMT in subsequent years.  N/A    Glaucoma screening (no USPSTF recommendation)  Diabetes mellitus, family history   , age 50 or over    American, age 65 or over  Recommend follow up with eye care professional regularly    Medical nutrition therapy for diabetes or renal disease (no recommended schedule)  Requires referral by treating physician for patient with diabetes or renal  disease or kidney transplant within the past 3 years.  Can be provided in same year as diabetes self-management training (DSMT), and CMS recommends medical nutrition therapy take place after DSMT. Up to 3 hours for initial year and 2 hours in subsequent years.  N/A    Cardiovascular screening blood tests (every 5 years)  · Fasting lipid panel  Order as a panel if possible  Done this year, repeat every year    Diabetes screening tests (at least every 3 years, Medicare covers annually or at 6-month intervals for prediabetic patients)  · Fasting blood sugar (FBS) or glucose tolerance test (GTT)  Patient must be diagnosed with one of the following:       - Hypertension       - Dyslipidemia       - Obesity (BMI 30kg/m2)       - Previous elevated impaired FBS or GTT       ... or any two of the following:       - Overweight (BMI 25 but <30)       - Family history of diabetes       - Age 65 or older       - History of gestational diabetes or birth of baby weighing more than 9 pounds  Done this year, repeat every year    Abdominal aortic aneurysm screening (once)  · Sonogram   Limited to patients who meet one of the following criteria:       - Men who are 65-75 years old and have smoked more than 100 cigarette in their lifetime       - Anyone with a family history of abdominal aortic aneurysm       - Anyone recommended for screening by the USPSTF  N/A    HIV screening (annually for increased risk patients)  · HIV-1 and HIV-2 by EIA, or CHRIS, rapid antibody test or oral mucosa transudate  Patients must be at increased risk for HIV infection per USPSTF guidelines or pregnant. Tests covered annually for patient at increased risk or as requested by the patient. Pregnant patients may receive up to 3 tests during pregnancy.  Risks discussed, screening is not recommended    Smoking cessation counseling (up to 8 sessions per year)  Patients must be asymptomatic of tobacco-related conditions to receive as a preventative service.   Non-smoker    Subsequent annual wellness visit  At least 12 months since last AWV  Return in one year     The following information is provided to all patients.  This information is to help you find resources for any of the problems found today that may be affecting your health:                Living healthy guide: www.[facility].[place].[facility]      Understanding Diabetes: www.diabetes.org      Eating healthy: www.cdc.gov/healthyweight      CDC home safety checklist: www.cdc.gov/steadi/patient.html      Agency on Aging: www.goea.[place].[facility]      Alcoholics anonymous (AA): www.aa.org      Physical Activity: www.mimi.nih.gov/gm1inpq      Tobacco use: www.quitwithusla.org

## 2017-10-02 ENCOUNTER — OFFICE VISIT (OUTPATIENT)
Dept: ORTHOPEDICS | Facility: CLINIC | Age: 77
End: 2017-10-02
Payer: MEDICARE

## 2017-10-02 VITALS — WEIGHT: 234 LBS | BODY MASS INDEX: 31.69 KG/M2 | HEIGHT: 72 IN

## 2017-10-02 DIAGNOSIS — G56.02 LEFT CARPAL TUNNEL SYNDROME: Primary | ICD-10-CM

## 2017-10-02 PROCEDURE — 99204 OFFICE O/P NEW MOD 45 MIN: CPT | Mod: S$GLB,,, | Performed by: ORTHOPAEDIC SURGERY

## 2017-10-02 PROCEDURE — 3008F BODY MASS INDEX DOCD: CPT | Mod: S$GLB,,, | Performed by: ORTHOPAEDIC SURGERY

## 2017-10-02 PROCEDURE — 99999 PR PBB SHADOW E&M-EST. PATIENT-LVL III: CPT | Mod: PBBFAC,,, | Performed by: ORTHOPAEDIC SURGERY

## 2017-10-02 PROCEDURE — 1159F MED LIST DOCD IN RCRD: CPT | Mod: S$GLB,,, | Performed by: ORTHOPAEDIC SURGERY

## 2017-10-02 PROCEDURE — 1125F AMNT PAIN NOTED PAIN PRSNT: CPT | Mod: S$GLB,,, | Performed by: ORTHOPAEDIC SURGERY

## 2017-10-02 NOTE — LETTER
October 2, 2017      Basim Brown MD  [address] [facility] [address]  [facility] [address]           [facility]  [address] [place] [address] [address]  [facility] [address]  Phone: [phone]          Patient: Heri Muhammad   MR Number: [id number]   YOB: 1940   Date of Visit: 10/2/2017       Dear Dr. Basim Brown:    Thank you for referring Heri Muhammad to me for evaluation. Attached you will find relevant portions of my assessment and plan of care.    If you have questions, please do not hesitate to call me. I look forward to following Heri Muhammad along with you.    Sincerely,    Bennett Jaquez Jr., MD    Enclosure  CC:  No Recipients    If you would like to receive this communication electronically, please contact [email or web address] or [phone] to request more information on [facility] Link access.    For providers and/or their staff who would like to refer a patient to [facility], please contact us through our one-stop-shop provider referral line, [facility] , at [phone].    If you feel you have received this communication in error or would no longer like to receive these types of communications, please e-mail [email or web address]

## 2017-10-02 NOTE — PROGRESS NOTES
OFFICE VISIT AND PREOP H AND P    CHIEF COMPLAINT:  Left carpal tunnel syndrome.    HISTORY OF PRESENT ILLNESS:  A 76-year-old male with ongoing symptoms in left   hand for the past several months.  Symptoms are getting worse in terms of pain,   numbness and tingling in the left hand and thumb.  He recently had a nerve   conduction study, which showed evidence of severe left carpal tunnel syndrome,   moderate left cubital tunnel syndrome and cervical radiculopathy.    I went over this with him today.  I believe the majority of his symptoms are   coming from the carpal tunnel and I have recommended surgical treatment.  He is   in agreement.    PAST MEDICAL HISTORY:  Significant for atrial fibrillation, atrial flutter,   tremor, cancer, cardiac pacemaker, cataracts, hypertension, hyperlipidemia and a   seizure disorder.    PAST SURGICAL HISTORY:  Includes ankle surgery, colonoscopy, eye surgery,   cardiac pacemaker placement and cardioversion.    FAMILY HISTORY:  Positive for heart disease and blindness.    SOCIAL HISTORY:  The patient does not smoke or drink.    REVIEW OF SYSTEMS:  Negative fever, chills, rashes.    CURRENT MEDICATIONS:  Reviewed on chart.    ALLERGIES:  Bactroban.    PHYSICAL EXAMINATION:  GENERAL:  Elderly male in no acute distress, alert and oriented x3.  HEENT:  Unremarkable.  LUNGS:  Clear to auscultation.  HEART:  Regular rate and rhythm.  ABDOMEN:  Soft, nontender.  EXTREMITIES:  Significant for the left hand demonstrating thenar atrophy.    Positive Tinel sign.  Positive Phalen test.  Range of motion of fingers slightly   decreased.   strength decreased.  Sensation decreased in left thumb and   index finger.  Tinel sign is negative at the elbow and neck is nontender.    IMPRESSION:  1.  Left carpal tunnel syndrome, severe.  2.  Probable chronic cervical radiculopathy.  3.  Subclinical left cubital tunnel syndrome.    PLAN:  I have recommended surgical treatment.  He is in agreement.  He  would   like to set this up as an outpatient surgery for left carpal tunnel release.  I   did explain that some of his symptoms may be coming from his neck, so I cannot   guarantee that all of the symptoms will resolve after surgery.  Also, he has   some elements of cubital tunnel, but not really clinical or causing any   symptoms, so I have just recommended observation for that.  He agrees.      CRYSTAL  dd: 10/02/2017 15:10:44 (CDT)  td: 10/03/2017 02:15:19 (CDT)  Doc ID   #[id number]  Job ID #[id number]    CC:

## 2017-10-03 ENCOUNTER — HOSPITAL ENCOUNTER (OUTPATIENT)
Dept: PREADMISSION TESTING | Facility: OTHER | Age: 77
Discharge: HOME | End: 2017-10-03
Attending: ORTHOPAEDIC SURGERY
Payer: MEDICARE

## 2017-10-03 ENCOUNTER — ANESTHESIA EVENT (OUTPATIENT)
Dept: SURGERY | Facility: OTHER | Age: 77
End: 2017-10-03
Payer: MEDICARE

## 2017-10-03 VITALS
TEMPERATURE: 98 F | OXYGEN SATURATION: 96 % | DIASTOLIC BLOOD PRESSURE: 68 MMHG | BODY MASS INDEX: 31.69 KG/M2 | HEART RATE: 70 BPM | HEIGHT: 72 IN | WEIGHT: 234 LBS | SYSTOLIC BLOOD PRESSURE: 119 MMHG

## 2017-10-03 RX ORDER — SODIUM CHLORIDE, SODIUM LACTATE, POTASSIUM CHLORIDE, CALCIUM CHLORIDE 600; 310; 30; 20 MG/100ML; MG/100ML; MG/100ML; MG/100ML
INJECTION, SOLUTION INTRAVENOUS CONTINUOUS
Status: CANCELLED | OUTPATIENT
Start: 2017-10-03

## 2017-10-03 NOTE — PLAN OF CARE
10/03/17 1103   ED Admissions Case Approval   ED Admissions Case Approval CM Approved  (OP )

## 2017-10-03 NOTE — ANESTHESIA PREPROCEDURE EVALUATION
10/03/2017  Heri Muhammad is a 76 y.o., male.    Anesthesia Evaluation    I have reviewed the Patient Summary Reports.    I have reviewed the Nursing Notes.   I have reviewed the Medications.     Review of Systems  Anesthesia Hx:  No problems with previous Anesthesia  Denies Family Hx of Anesthesia complications.   Denies Personal Hx of Anesthesia complications.   Social:  Non-Smoker    Hematology/Oncology:  Hematology Normal   Oncology Normal     EENT/Dental:EENT/Dental Normal   Cardiovascular:   Exercise tolerance: poor Pacemaker Hypertension CAD  Dysrhythmias atrial fibrillation FOWLER Multiple cardioversions. None recently due to medical control   Pulmonary:   Shortness of breath    Renal/:  Renal/ Normal     Musculoskeletal:  Musculoskeletal Normal    Neurological:   Seizures, well controlled Last 30 years ago   Endocrine:  Endocrine Normal    Dermatological:  Skin Normal    Psych:  Psychiatric Normal           Physical Exam  General:  Well nourished, Large Beard    Airway/Jaw/Neck:  Airway Findings: Mouth Opening: Normal Tongue: Normal  General Airway Assessment: Adult  Mallampati: I  TM Distance: Normal, at least 6 cm  Jaw/Neck Findings:  Neck ROM: Normal ROM      Dental:  Dental Findings: In tact             Anesthesia Plan  Type of Anesthesia, risks & benefits discussed:  Anesthesia Type:  MAC  Patient's Preference:   Intra-op Monitoring Plan: standard ASA monitors  Intra-op Monitoring Plan Comments:   Post Op Pain Control Plan:   Post Op Pain Control Plan Comments:   Induction:   IV  Beta Blocker:         Informed Consent: Patient understands risks and agrees with Anesthesia plan.  Questions answered. Anesthesia consent signed with patient.  ASA Score: 3     Day of Surgery Review of History & Physical:    H&P update referred to the surgeon.     Anesthesia Plan Notes: Labs  And EKG in  EPIC        Ready For Surgery From Anesthesia Perspective.

## 2017-10-03 NOTE — DISCHARGE INSTRUCTIONS
PRE-ADMIT TESTING -  [phone]    [address]  [place] [facility]          Your surgery has been scheduled at [facility]. We are pleased to have the opportunity to serve you. For Further Information please call [phone].    On the day of surgery please report to the Information Desk on the 1st floor.    · CONTACT YOUR PHYSICIAN'S OFFICE THE DAY PRIOR TO YOUR SURGERY TO OBTAIN YOUR ARRIVAL TIME.     · The evening before surgery do not eat anything after 9 p.m. ( this includes hard candy, chewing gum and mints).  You may only have GATORADE, POWERADE AND WATER  from 9 p.m. until you leave your home.   DO NOT DRINK ANY LIQUIDS ON THE WAY TO THE HOSPITAL.      SPECIAL MEDICATION INSTRUCTIONS: TAKE medications checked off by the Anesthesiologist on your Medication List.    Angiogram Patients: Take medications as instructed by your physician, including aspirin.     Surgery Patients:    If you take ASPIRIN - Your PHYSICIAN/SURGEON will need to inform you IF/OR when you need to stop taking aspirin prior to your surgery.     Do Not take any medications containing IBUPROFEN.  Do Not Wear any make-up or dark nail polish   (especially eye make-up) to surgery. If you come to surgery with makeup on you will be required to remove the makeup or nail polish.    Do not shave your surgical area at least 5 days prior to your surgery. The surgical prep will be performed at the hospital according to Infection Control regulations.    Leave all valuables at home.   Do Not wear any jewelry or watches, including any metal in body piercings.  Contact Lens must be removed before surgery. Either do not wear the contact lens or bring a case and solution for storage.  Please bring a container for eyeglasses or dentures as required.  Bring any paperwork your physician has provided, such as consent forms,  history and physicals, doctor's orders, etc.   Bring comfortable clothes that are loose fitting to wear upon  discharge. Take into consideration the type of surgery being performed.  Maintain your diet as advised per your physician the day prior to surgery.      Adequate rest the night before surgery is advised.   Park in the Parking lot behind the hospital or in the [facility] across the street from the parking lot. Parking is complimentary.  If you will be discharged the same day as your procedure, please arrange for a responsible adult to drive you home or to accompany you if traveling by taxi.   YOU WILL NOT BE PERMITTED TO DRIVE OR TO LEAVE THE HOSPITAL ALONE AFTER SURGERY.   It is strongly recommended that you arrange for someone to remain with you for the first 24 hrs following your surgery.       Thank you for your cooperation.  The Staff of [facility].        Bathing Instructions                                                                 Please shower the evening before and morning of your procedure with    ANTIBACTERIAL SOAP. ( DIAL, etc )  Concentrate on the surgical area   for at least 3 minutes and rinse completely. Dry off as usual.   Do not use any deodorant, powder, body lotions, perfume, after shave or    cologne.

## 2017-10-04 ENCOUNTER — HOSPITAL ENCOUNTER (OUTPATIENT)
Facility: OTHER | Age: 77
Discharge: HOME | End: 2017-10-04
Attending: ORTHOPAEDIC SURGERY | Admitting: ORTHOPAEDIC SURGERY
Payer: MEDICARE

## 2017-10-04 ENCOUNTER — ANESTHESIA (OUTPATIENT)
Dept: SURGERY | Facility: OTHER | Age: 77
End: 2017-10-04
Payer: MEDICARE

## 2017-10-04 VITALS
BODY MASS INDEX: 31.69 KG/M2 | SYSTOLIC BLOOD PRESSURE: 114 MMHG | TEMPERATURE: 98 F | HEIGHT: 72 IN | DIASTOLIC BLOOD PRESSURE: 57 MMHG | OXYGEN SATURATION: 95 % | RESPIRATION RATE: 16 BRPM | HEART RATE: 80 BPM | WEIGHT: 234 LBS

## 2017-10-04 DIAGNOSIS — G56.02 LEFT CARPAL TUNNEL SYNDROME: ICD-10-CM

## 2017-10-04 PROCEDURE — 64721 CARPAL TUNNEL SURGERY: CPT | Mod: LT,,, | Performed by: ORTHOPAEDIC SURGERY

## 2017-10-04 PROCEDURE — 71000016 HC POSTOP RECOV ADDL HR: Performed by: ORTHOPAEDIC SURGERY

## 2017-10-04 PROCEDURE — 71000015 HC POSTOP RECOV 1ST HR: Performed by: ORTHOPAEDIC SURGERY

## 2017-10-04 PROCEDURE — S0020 INJECTION, BUPIVICAINE HYDRO: HCPCS | Performed by: ORTHOPAEDIC SURGERY

## 2017-10-04 PROCEDURE — 63600175 PHARM REV CODE 636 W HCPCS: Performed by: NURSE ANESTHETIST, CERTIFIED REGISTERED

## 2017-10-04 PROCEDURE — 37000009 HC ANESTHESIA EA ADD 15 MINS: Performed by: ORTHOPAEDIC SURGERY

## 2017-10-04 PROCEDURE — 37000008 HC ANESTHESIA 1ST 15 MINUTES: Performed by: ORTHOPAEDIC SURGERY

## 2017-10-04 PROCEDURE — 25000003 PHARM REV CODE 250: Performed by: ORTHOPAEDIC SURGERY

## 2017-10-04 PROCEDURE — 63600175 PHARM REV CODE 636 W HCPCS: Performed by: ORTHOPAEDIC SURGERY

## 2017-10-04 PROCEDURE — 36000707: Performed by: ORTHOPAEDIC SURGERY

## 2017-10-04 PROCEDURE — 36000706: Performed by: ORTHOPAEDIC SURGERY

## 2017-10-04 PROCEDURE — 25000003 PHARM REV CODE 250: Performed by: ANESTHESIOLOGY

## 2017-10-04 RX ORDER — PROPOFOL 10 MG/ML
VIAL (ML) INTRAVENOUS CONTINUOUS PRN
Status: DISCONTINUED | OUTPATIENT
Start: 2017-10-04 | End: 2017-10-04

## 2017-10-04 RX ORDER — ONDANSETRON 8 MG/1
8 TABLET, ORALLY DISINTEGRATING ORAL EVERY 8 HOURS PRN
Status: DISCONTINUED | OUTPATIENT
Start: 2017-10-04 | End: 2017-10-04

## 2017-10-04 RX ORDER — PROPOFOL 10 MG/ML
VIAL (ML) INTRAVENOUS
Status: DISCONTINUED | OUTPATIENT
Start: 2017-10-04 | End: 2017-10-04

## 2017-10-04 RX ORDER — HYDROCODONE BITARTRATE AND ACETAMINOPHEN 5; 325 MG/1; MG/1
1 TABLET ORAL EVERY 6 HOURS PRN
Qty: 12 TABLET | Refills: 0 | Status: SHIPPED | OUTPATIENT
Start: 2017-10-04 | End: 2017-10-18

## 2017-10-04 RX ORDER — LIDOCAINE HCL/PF 100 MG/5ML
SYRINGE (ML) INTRAVENOUS
Status: DISCONTINUED | OUTPATIENT
Start: 2017-10-04 | End: 2017-10-04

## 2017-10-04 RX ORDER — SODIUM CHLORIDE, SODIUM LACTATE, POTASSIUM CHLORIDE, CALCIUM CHLORIDE 600; 310; 30; 20 MG/100ML; MG/100ML; MG/100ML; MG/100ML
INJECTION, SOLUTION INTRAVENOUS CONTINUOUS
Status: DISCONTINUED | OUTPATIENT
Start: 2017-10-04 | End: 2017-10-04 | Stop reason: HOSPADM

## 2017-10-04 RX ORDER — BUPIVACAINE HYDROCHLORIDE 5 MG/ML
INJECTION, SOLUTION EPIDURAL; INTRACAUDAL
Status: DISCONTINUED | OUTPATIENT
Start: 2017-10-04 | End: 2017-10-04 | Stop reason: HOSPADM

## 2017-10-04 RX ORDER — OXYCODONE HYDROCHLORIDE 5 MG/1
10 TABLET ORAL EVERY 4 HOURS PRN
Status: DISCONTINUED | OUTPATIENT
Start: 2017-10-04 | End: 2017-10-04

## 2017-10-04 RX ORDER — FENTANYL CITRATE 50 UG/ML
INJECTION, SOLUTION INTRAMUSCULAR; INTRAVENOUS
Status: DISCONTINUED | OUTPATIENT
Start: 2017-10-04 | End: 2017-10-04

## 2017-10-04 RX ORDER — KETOROLAC TROMETHAMINE 30 MG/ML
15 INJECTION, SOLUTION INTRAMUSCULAR; INTRAVENOUS ONCE
Status: COMPLETED | OUTPATIENT
Start: 2017-10-04 | End: 2017-10-04

## 2017-10-04 RX ORDER — MIDAZOLAM HYDROCHLORIDE 1 MG/ML
INJECTION INTRAMUSCULAR; INTRAVENOUS
Status: DISCONTINUED | OUTPATIENT
Start: 2017-10-04 | End: 2017-10-04

## 2017-10-04 RX ORDER — ACETAMINOPHEN 325 MG/1
650 TABLET ORAL EVERY 4 HOURS PRN
Status: DISCONTINUED | OUTPATIENT
Start: 2017-10-04 | End: 2017-10-04

## 2017-10-04 RX ORDER — CEFAZOLIN SODIUM 2 G/50ML
2 SOLUTION INTRAVENOUS
Status: DISCONTINUED | OUTPATIENT
Start: 2017-10-04 | End: 2017-10-04 | Stop reason: HOSPADM

## 2017-10-04 RX ORDER — ACETAMINOPHEN 10 MG/ML
INJECTION, SOLUTION INTRAVENOUS
Status: DISCONTINUED | OUTPATIENT
Start: 2017-10-04 | End: 2017-10-04

## 2017-10-04 RX ADMIN — SODIUM CHLORIDE, SODIUM LACTATE, POTASSIUM CHLORIDE, AND CALCIUM CHLORIDE: 600; 310; 30; 20 INJECTION, SOLUTION INTRAVENOUS at 06:10

## 2017-10-04 RX ADMIN — KETOROLAC TROMETHAMINE 15 MG: 30 INJECTION, SOLUTION INTRAMUSCULAR at 08:10

## 2017-10-04 RX ADMIN — PROPOFOL 50 MCG/KG/MIN: 10 INJECTION, EMULSION INTRAVENOUS at 07:10

## 2017-10-04 RX ADMIN — PROPOFOL 20 MG: 10 INJECTION, EMULSION INTRAVENOUS at 08:10

## 2017-10-04 RX ADMIN — FENTANYL CITRATE 100 MCG: 50 INJECTION, SOLUTION INTRAMUSCULAR; INTRAVENOUS at 08:10

## 2017-10-04 RX ADMIN — MIDAZOLAM HYDROCHLORIDE 2 MG: 1 INJECTION, SOLUTION INTRAMUSCULAR; INTRAVENOUS at 08:10

## 2017-10-04 RX ADMIN — CEFAZOLIN SODIUM 2 G: 2 SOLUTION INTRAVENOUS at 07:10

## 2017-10-04 RX ADMIN — LIDOCAINE HYDROCHLORIDE 75 MG: 20 INJECTION, SOLUTION INTRAVENOUS at 08:10

## 2017-10-04 RX ADMIN — ACETAMINOPHEN 1000 MG: 10 INJECTION, SOLUTION INTRAVENOUS at 08:10

## 2017-10-04 NOTE — BRIEF OP NOTE
[facility]  Brief Operative Note     SUMMARY     Surgery Date: 10/4/2017     Surgeon(s) and Role:     * Bennett Jaquez Jr., MD - Primary    Assisting Surgeon: None    Pre-op Diagnosis:  Left carpal tunnel syndrome [G56.02]    Post-op Diagnosis:  Post-Op Diagnosis Codes:     * Left carpal tunnel syndrome [G56.02]    Procedure(s) (LRB):  RELEASE-CARPAL TUNNEL (Left)    Anesthesia: Local MAC    Description of the findings of the procedure: left CTS    Findings/Key Components: left CTR    Estimated Blood Loss: * No values recorded between 10/4/2017  8:13 AM and 10/4/2017  8:37 AM *         Specimens:   Specimen (12h ago through future)    None          Discharge Note    SUMMARY     Admit Date: 10/4/2017    Discharge Date and Time:  10/04/2017 8:37 AM    Hospital Course (synopsis of major diagnoses, care, treatment, and services provided during the course of the hospital stay): see op note     Final Diagnosis: Post-Op Diagnosis Codes:     * Left carpal tunnel syndrome [G56.02]    Disposition: Home or Self Care    Follow Up/Patient Instructions:     Medications:  Reconciled Home Medications:   Current Discharge Medication List      START taking these medications    Details   hydrocodone-acetaminophen 5-325mg (NORCO) 5-325 mg per tablet Take 1 tablet by mouth every 6 (six) hours as needed for Pain.  Qty: 12 tablet, Refills: 0         CONTINUE these medications which have NOT CHANGED    Details   aspirin (ECOTRIN) 81 MG EC tablet Take 81 mg by mouth once daily.      atorvastatin (LIPITOR) 10 MG tablet TAKE 1 TABLET EVERY EVENING  Qty: 90 tablet, Refills: 3      cyanocobalamin (VITAMIN B-12) 1000 MCG tablet Take 100 mcg by mouth once daily.      flecainide (TAMBOCOR) 150 MG Tab Take 1 tablet (150 mg total) by mouth every 12 (twelve) hours.  Qty: 120 tablet, Refills: 3      furosemide (LASIX) 20 MG tablet TAKE 1 TABLET EVERY MORNING  Qty: 90 tablet, Refills: 3      lamotrigine (LAMICTAL) 100 MG tablet  Take 1 tablet (100 mg total) by mouth 2 (two) times daily.  Qty: 60 tablet, Refills: 0    Associated Diagnoses: Nonintractable epilepsy without status epilepticus, unspecified epilepsy type      memantine (NAMENDA) 10 MG Tab TAKE 1 TABLET TWICE DAILY  Qty: 180 tablet, Refills: 11      primidone (MYSOLINE) 250 MG Tab TAKE 2 TABLETS TWICE DAILY  Qty: 360 tablet, Refills: 3      propranolol (INDERAL LA) 80 MG 24 hr capsule       albuterol-ipratropium 2.5mg-0.5mg/3mL (DUO-NEB) 0.5 mg-3 mg(2.5 mg base)/3 mL nebulizer solution INHALE 3 MLS (1 VIAL) VIA NEBULIZER EVERY 8 HOURS  Qty: 180 mL, Refills: 0      COMP-AIR NEBULIZER COMPRESSOR [place]       cyclobenzaprine (FLEXERIL) 5 MG tablet Take 1 tablet (5 mg total) by mouth 3 (three) times daily as needed for Muscle spasms.  Qty: 30 tablet, Refills: 0      ELIQUIS 5 mg Tab TAKE 1 TABLET TWICE DAILY  Qty: 180 tablet, Refills: 3      nitroGLYCERIN (NITROSTAT) 0.4 MG SL tablet Place 1 tablet (0.4 mg total) under the tongue every 5 (five) minutes as needed for Chest pain.  Qty: 25 tablet, Refills: 6             Discharge Procedure Orders  Diet general     Shower on day dressing removed (No bath)     Call MD for:  temperature >100.4     Call MD for:  persistent nausea and vomiting     Call MD for:  severe uncontrolled pain     Keep surgical extremity elevated     Remove dressing in 72 hours       Follow-up Information     Bennett Jaquez Jr, MD. Schedule an appointment as soon as possible for a visit in 8 days.    Specialties:  Hand Surgery, Orthopedic Surgery  Why:  For wound re-check  Contact information:  [address]  [address]  [facility] [address] [phone]

## 2017-10-04 NOTE — H&P (VIEW-ONLY)
OFFICE VISIT AND PREOP H AND P    CHIEF COMPLAINT:  Left carpal tunnel syndrome.    HISTORY OF PRESENT ILLNESS:  A 76-year-old male with ongoing symptoms in left   hand for the past several months.  Symptoms are getting worse in terms of pain,   numbness and tingling in the left hand and thumb.  He recently had a nerve   conduction study, which showed evidence of severe left carpal tunnel syndrome,   moderate left cubital tunnel syndrome and cervical radiculopathy.    I went over this with him today.  I believe the majority of his symptoms are   coming from the carpal tunnel and I have recommended surgical treatment.  He is   in agreement.    PAST MEDICAL HISTORY:  Significant for atrial fibrillation, atrial flutter,   tremor, cancer, cardiac pacemaker, cataracts, hypertension, hyperlipidemia and a   seizure disorder.    PAST SURGICAL HISTORY:  Includes ankle surgery, colonoscopy, eye surgery,   cardiac pacemaker placement and cardioversion.    FAMILY HISTORY:  Positive for heart disease and blindness.    SOCIAL HISTORY:  The patient does not smoke or drink.    REVIEW OF SYSTEMS:  Negative fever, chills, rashes.    CURRENT MEDICATIONS:  Reviewed on chart.    ALLERGIES:  Bactroban.    PHYSICAL EXAMINATION:  GENERAL:  Elderly male in no acute distress, alert and oriented x3.  HEENT:  Unremarkable.  LUNGS:  Clear to auscultation.  HEART:  Regular rate and rhythm.  ABDOMEN:  Soft, nontender.  EXTREMITIES:  Significant for the left hand demonstrating thenar atrophy.    Positive Tinel sign.  Positive Phalen test.  Range of motion of fingers slightly   decreased.   strength decreased.  Sensation decreased in left thumb and   index finger.  Tinel sign is negative at the elbow and neck is nontender.    IMPRESSION:  1.  Left carpal tunnel syndrome, severe.  2.  Probable chronic cervical radiculopathy.  3.  Subclinical left cubital tunnel syndrome.    PLAN:  I have recommended surgical treatment.  He is in agreement.  He  would   like to set this up as an outpatient surgery for left carpal tunnel release.  I   did explain that some of his symptoms may be coming from his neck, so I cannot   guarantee that all of the symptoms will resolve after surgery.  Also, he has   some elements of cubital tunnel, but not really clinical or causing any   symptoms, so I have just recommended observation for that.  He agrees.      CRYSTAL  dd: 10/02/2017 15:10:44 (CDT)  td: 10/03/2017 02:15:19 (CDT)  Doc ID   #[id number]  Job ID #[id number]    CC:

## 2017-10-04 NOTE — DISCHARGE INSTRUCTIONS
Anesthesia: Monitored Anesthesia Care (MAC)  Youre due to have surgery. During surgery, youll be given medicine called anesthesia. This will keep you comfortable and pain-free. Your surgeon will use monitored anesthesia care (MAC). This sheet tells you more about this type of anesthesia.    What is monitored anesthesia care?  MAC keeps you very drowsy during surgery. You may be awake, but you will likely not remember much. And you wont feel pain. With MAC, medicines are given through an IV line into a vein in your arm or hand. A local anesthetic will usually be injected into the skin and muscle around the surgical site to numb it. The anesthesia provider monitors you during the procedure. He or she checks your heart rate and rhythm, blood pressure, and blood oxygen level.    Anesthesia tools and medicines that may be near you during your procedure  You will likely have:  · A pulse oximeter on the end of your finger. This measures your blood oxygen level.  · Electrocardiography leads (electrodes) on your chest. These record your heart rate and rhythm.  · Medicines given through an IV. These relax you and prevent pain. You may be awake or sleep lightly. If you have local anesthetic, it is injected directly into your skin.  · A facemask to give you oxygen, if needed.    Risks and possible complications  MAC has some risks. These include:  · Breathing problems  · Nausea and vomiting  · Allergic reaction to the anesthetic      Anesthesia safety  Tips for anesthesia safety include the following:   · Follow all instructions you are given for how long not to eat or drink before your procedure.  · Be sure your healthcare provider knows what medicines you take, especially any anti-inflammatory medicine or blood thinners. This includes aspirin and any other over-the-counter medicines, herbs, and supplements.  · Have an adult family member or friend drive you home after the procedure.  · For the first 24 hours after your  surgery:  ¨ Do not drive or use heavy equipment.  ¨ Do not make important decisions or sign documents.  ¨ Avoid alcohol.  ¨ Have someone stay with you, if possible. They can watch for problems and help keep you safe.    Date Last Reviewed: 12/1/2016  © 9360-9684 [employer]. [address] [place] [address], [place] [address]. All rights reserved. This information is not intended as a substitute for professional medical care. Always follow your healthcare professional's instructions.

## 2017-10-04 NOTE — OP NOTE
DATE OF PROCEDURE:  10/04/2017.    PREOPERATIVE DIAGNOSIS:  Left carpal tunnel syndrome.    POSTOPERATIVE DIAGNOSIS:  Left carpal tunnel syndrome.    OPERATIVE PROCEDURE:  Left carpal tunnel release.    SURGEON:  Bennett Jaquez Jr., M.D.    ANESTHESIA:  MAC.    ESTIMATED BLOOD LOSS:  Minimal.    COMPLICATIONS:  None.    SPECIMENS:  None.    BRIEF INDICATIONS:  This is a 76-year-old male with severe left carpal tunnel   syndrome including thenar atrophy, taken to Surgery for the above procedure.    OPERATIVE PROCEDURE IN DETAIL:  After operative consent was obtained, the   patient was brought to the Operating Room and placed supine on the operating   room table.  Anesthesia by IV sedation followed by injection of Xylocaine and   Marcaine combination of the operative site of the left palm.  Following this,   tourniquet applied to the left arm.  Left upper extremity was prepped and draped   out in the normal sterile fashion.  Esmarch used to exsanguinate the limb and   the tourniquet inflated to 225 mmHg.    Following this, curved incision made of the thenar crease of the left palm with   a #15 blade.  Palmar fascia was divided, deep retractors were placed, transverse   carpal ligament was identified, and carefully divided with the [place] blade.    The median nerve was protected with a Childress elevator.  The division of ligament   continued proximally and distally under direct visualization.  After complete   release of the median nerve, the contents of the carpal canal were inspected and   noted to be intact including the recurrent branch.  The wound was irrigated   with antibiotic saline solution.  Hemostasis was achieved with the Bovie.    Slight external neurolysis was performed because the nerve did seem to have a   thickened epineurium which had sort of a degenerative look to it, but the nerve   was protected during this procedure.  The subcutaneous tissue was closed with   4-0 Monocryl, Dermabond on the skin.   Sterile dressing was applied followed by   light wrap.  Tourniquet was deflated.  The patient was brought to Recovery Room   in stable condition.  All sponge and needle counts reported as correct.  No   complications.      CRYSTAL  dd: 10/04/2017 08:40:20 (CDT)  td: 10/04/2017 10:04:26 (CDT)  Doc ID   #[id number]  Job ID #[id number]    CC:

## 2017-10-04 NOTE — PLAN OF CARE
Heri Muhammad has met all discharge criteria from Phase II. Vital Signs are stable, ambulating  without difficulty. Discharge instructions given, patient verbalized understanding. Discharged from facility via wheelchair in stable condition.

## 2017-10-04 NOTE — ANESTHESIA POSTPROCEDURE EVALUATION
Anesthesia Post Evaluation    Patient: Heri Muhammad    Procedure(s) Performed: Procedure(s) (LRB):  RELEASE-CARPAL TUNNEL (Left)    Final Anesthesia Type: general  Patient location during evaluation: [facility]  Patient participation: Yes- Able to Participate  Level of consciousness: awake and alert  Post-procedure vital signs: reviewed and stable (B/P-117/60, HR-69, RR-16, O2%-97)  Pain management: adequate  Airway patency: patent  PONV status at discharge: No PONV  Anesthetic complications: no      Cardiovascular status: hemodynamically stable  Respiratory status: unassisted, spontaneous ventilation and room air  Hydration status: euvolemic  Follow-up not needed.        Visit Vitals  BP (!) 156/74 (BP Location: Left arm, Patient Position: Lying)   Pulse 70   Temp 36.6 °C (97.9 °F) (Oral)   Resp 18   Ht 6' (1.829 m)   Wt 106.1 kg (234 lb)   SpO2 97%   BMI 31.74 kg/m²       Pain/Jono Score: Pain Assessment Performed: Yes (10/4/2017  6:14 AM)  Presence of Pain: complains of pain/discomfort (10/4/2017  6:14 AM)

## 2017-10-05 ENCOUNTER — TELEPHONE (OUTPATIENT)
Dept: ORTHOPEDICS | Facility: CLINIC | Age: 77
End: 2017-10-05

## 2017-10-05 NOTE — TELEPHONE ENCOUNTER
I spoke with the patients wife and made him a post op appointment. She was made aware of date, time and location.

## 2017-10-05 NOTE — TELEPHONE ENCOUNTER
----- Message from Joyce Hinton sent at 10/5/2017 10:08 AM CDT -----  Contact: Self/ [phone]  Patient would like to be seen for a sooner appointment per surgery follow up instruction.      Please call and advise.

## 2017-10-12 ENCOUNTER — OFFICE VISIT (OUTPATIENT)
Dept: ORTHOPEDICS | Facility: CLINIC | Age: 77
End: 2017-10-12
Payer: MEDICARE

## 2017-10-12 VITALS — WEIGHT: 234 LBS | BODY MASS INDEX: 31.69 KG/M2 | HEIGHT: 72 IN

## 2017-10-12 DIAGNOSIS — G56.02 CARPAL TUNNEL SYNDROME OF LEFT WRIST: Primary | ICD-10-CM

## 2017-10-12 PROCEDURE — 99024 POSTOP FOLLOW-UP VISIT: CPT | Mod: S$GLB,,, | Performed by: ORTHOPAEDIC SURGERY

## 2017-10-12 PROCEDURE — 99999 PR PBB SHADOW E&M-EST. PATIENT-LVL III: CPT | Mod: PBBFAC,,, | Performed by: ORTHOPAEDIC SURGERY

## 2017-10-13 NOTE — PROGRESS NOTES
Mr. Muhammad in followup of left carpal tunnel release.  He is about 10 days   postop. He is doing well, still having a little bit of residual numbness in his   left thumb, but the index and middle finger are feeling better since surgery.    PHYSICAL EXAMINATION:  LEFT HAND:  The incision looks good, healing well.  He does have some mild   swelling and stiffness of the digits.  Sensation intact in the left index and   middle fingers, slightly decreased in the left thumb.  No evidence of infection.    PLAN:  Routine wound care for the incision.  Light activities.  I would like him   to start some warm water soaks with a sponge, to work on range of motion.  We   will see how he does with that.  If he does not make much progress, we may order   some formal therapy.  Increase activities as tolerated.  Follow up in three to   four weeks.      CRYSTAL  dd: 10/12/2017 10:27:16 (CDT)  td: 10/13/2017 03:17:45 (CDT)  Doc ID   #[id number]  Job ID #[id number]    CC:

## 2017-10-18 ENCOUNTER — OFFICE VISIT (OUTPATIENT)
Dept: CARDIOLOGY | Facility: CLINIC | Age: 77
End: 2017-10-18
Payer: MEDICARE

## 2017-10-18 VITALS
HEART RATE: 69 BPM | SYSTOLIC BLOOD PRESSURE: 103 MMHG | WEIGHT: 235.69 LBS | HEIGHT: 72 IN | BODY MASS INDEX: 31.92 KG/M2 | DIASTOLIC BLOOD PRESSURE: 68 MMHG

## 2017-10-18 DIAGNOSIS — E78.2 MIXED HYPERLIPIDEMIA: ICD-10-CM

## 2017-10-18 DIAGNOSIS — Z95.0 CARDIAC PACEMAKER IN SITU: ICD-10-CM

## 2017-10-18 DIAGNOSIS — I48.0 PAF (PAROXYSMAL ATRIAL FIBRILLATION): Chronic | ICD-10-CM

## 2017-10-18 DIAGNOSIS — Z98.890 STATUS POST CATHETER ABLATION OF ATRIAL FIBRILLATION: Primary | ICD-10-CM

## 2017-10-18 DIAGNOSIS — I10 ESSENTIAL HYPERTENSION: ICD-10-CM

## 2017-10-18 DIAGNOSIS — I48.92 ATRIAL FLUTTER, UNSPECIFIED TYPE: ICD-10-CM

## 2017-10-18 DIAGNOSIS — Z79.01 CURRENT USE OF LONG TERM ANTICOAGULATION: ICD-10-CM

## 2017-10-18 PROCEDURE — 99214 OFFICE O/P EST MOD 30 MIN: CPT | Mod: S$GLB,,, | Performed by: INTERNAL MEDICINE

## 2017-10-18 PROCEDURE — 99999 PR PBB SHADOW E&M-EST. PATIENT-LVL III: CPT | Mod: PBBFAC,,, | Performed by: INTERNAL MEDICINE

## 2017-10-18 PROCEDURE — 99499 UNLISTED E&M SERVICE: CPT | Mod: S$GLB,,, | Performed by: INTERNAL MEDICINE

## 2017-10-18 RX ORDER — POTASSIUM CHLORIDE 750 MG/1
10 TABLET, EXTENDED RELEASE ORAL EVERY MORNING
Qty: 90 TABLET | Refills: 6 | Status: SHIPPED | OUTPATIENT
Start: 2017-10-18 | End: 2018-02-17

## 2017-10-18 NOTE — PROGRESS NOTES
Subjective:    Patient ID:  Heri Muhammad is a 76 y.o. male who presents for follow-up of Hyperlipidemia (labs)      HPI  Here for f/u PAF/CAD/ PPM implant. Patient denies palpitations, syncope, presyncope, lightheadedness or dizziness.  Patients states is doing well no chest pain, SOB or change in exertional tolerence.   Has had mild increase in LE edema.     Review of Systems   Constitution: Negative for malaise/fatigue.   Eyes: Negative for blurred vision.   Cardiovascular: Negative for chest pain, claudication, cyanosis, dyspnea on exertion, irregular heartbeat, leg swelling, near-syncope, orthopnea, palpitations, paroxysmal nocturnal dyspnea and syncope.   Respiratory: Negative for cough and shortness of breath.    Hematologic/Lymphatic: Does not bruise/bleed easily.   Musculoskeletal: Negative for back pain, falls, joint pain, muscle cramps, muscle weakness and myalgias.   Gastrointestinal: Negative for abdominal pain, change in bowel habit, nausea and vomiting.   Genitourinary: Negative for urgency.   Neurological: Negative for dizziness, focal weakness and light-headedness.        Objective:    Physical Exam   Constitutional: He is oriented to person, place, and time. He appears well-developed and well-nourished. He is cooperative.   HENT:   Head: Normocephalic and atraumatic.   Eyes: Conjunctivae are normal. Right eye exhibits no exudate. Left eye exhibits no exudate.   Neck: Normal range of motion. Neck supple. Normal carotid pulses and no JVD present. Carotid bruit is not present. No thyromegaly present.   Cardiovascular: Normal rate, regular rhythm, normal heart sounds and intact distal pulses.    Pulses:       Carotid pulses are 2+ on the right side, and 2+ on the left side.       Radial pulses are 2+ on the right side, and 2+ on the left side.        Dorsalis pedis pulses are 2+ on the right side, and 2+ on the left side.        Posterior tibial pulses are 2+ on the right side, and 2+ on the left  side.     Pacer site clean and dry, well healed.   Pulmonary/Chest: Effort normal and breath sounds normal.   Abdominal: Soft. Bowel sounds are normal.   Musculoskeletal: Normal range of motion. He exhibits no edema.   Neurological: He is alert and oriented to person, place, and time. Gait normal.   Skin: Skin is warm, dry and intact. No cyanosis. Nails show no clubbing.   Psychiatric: He has a normal mood and affect. His speech is normal and behavior is normal. Judgment and thought content normal.   Nursing note and vitals reviewed.            ..    Chemistry        Component Value Date/Time     08/30/2017 0959    K 4.4 08/30/2017 0959     08/30/2017 0959    CO2 31 (H) 08/30/2017 0959    BUN 13 08/30/2017 0959    CREATININE 0.8 08/30/2017 0959     08/30/2017 0959        Component Value Date/Time    CALCIUM 8.9 08/30/2017 0959    ALKPHOS 104 08/30/2017 0959    AST 17 08/30/2017 0959    AST 35 11/27/2015 1703    ALT 17 08/30/2017 0959    BILITOT 0.4 08/30/2017 0959    ESTGFRAFRICA >60.0 08/30/2017 0959    EGFRNONAA >60.0 08/30/2017 0959            ..  Lab Results   Component Value Date    CHOL 173 08/30/2017    CHOL 164 10/12/2015    CHOL 162 09/12/2014     Lab Results   Component Value Date    HDL 42 08/30/2017    HDL 32 (L) 10/12/2015    HDL 36 (L) 09/12/2014     Lab Results   Component Value Date    LDLCALC 109.4 08/30/2017    LDLCALC 108.4 10/12/2015    LDLCALC 103.6 09/12/2014     Lab Results   Component Value Date    TRIG 108 08/30/2017    TRIG 118 10/12/2015    TRIG 112 09/12/2014     Lab Results   Component Value Date    CHOLHDL 24.3 08/30/2017    CHOLHDL 19.5 (L) 10/12/2015    CHOLHDL 22.2 09/12/2014     ..  Lab Results   Component Value Date    WBC 6.09 08/30/2017    HGB 15.6 08/30/2017    HCT 45.3 08/30/2017    MCV 92 08/30/2017     08/30/2017       Test(s) Reviewed  I have reviewed the following in detail:  [] Stress test   [] Angiography   [x] Echocardiogram   [x] Labs   []  Other:       Assessment:         ICD-10-CM ICD-9-CM   1. Status post catheter ablation of atrial fibrillation Z98.890 V45.89   2. Atrial flutter, unspecified type I48.92 427.32   3. PAF (paroxysmal atrial fibrillation) I48.0 427.31   4. Mixed hyperlipidemia E78.2 272.2   5. Cardiac pacemaker in situ Z95.0 V45.01   6. Essential hypertension I10 401.9   7. Current use of long term anticoagulation Z79.01 V58.61     Problem List Items Addressed This Visit     Atrial flutter    Overview     11/2015          Cardiac pacemaker in situ    Overview     [employer] [id number] PACE ACCENT RF DR S/N:[id number]  Sadi Otto  6/30/2014 -  DDD               Current use of long term anticoagulation    Overview     (Eliquis)         HTN (hypertension)    Hyperlipidemia    PAF (paroxysmal atrial fibrillation) (Chronic)    Overview     Managed by Dr. Esparza         Status post catheter ablation of atrial fibrillation - Primary      Other Visit Diagnoses    None.          Plan:           Return to clinic 9 months   Aerobic exercise 5x's/wk. Heart healthy diet and risk factor modification.    See labs and med orders.  F/u EP

## 2017-10-23 ENCOUNTER — TELEPHONE (OUTPATIENT)
Dept: NEUROLOGY | Facility: CLINIC | Age: 77
End: 2017-10-23

## 2017-10-23 NOTE — TELEPHONE ENCOUNTER
----- Message from Joey Reyes sent at 10/23/2017 12:28 PM CDT -----  Contact: Wife, Ana Perez want to speak with a nurse regarding scheduling appointment was unable to schedule please call back at [phone]

## 2017-10-27 PROCEDURE — 90732 PPSV23 VACC 2 YRS+ SUBQ/IM: CPT | Mod: S$GLB,,, | Performed by: INTERNAL MEDICINE

## 2017-10-27 PROCEDURE — G0009 ADMIN PNEUMOCOCCAL VACCINE: HCPCS | Mod: S$GLB,,, | Performed by: INTERNAL MEDICINE

## 2017-11-07 RX ORDER — FUROSEMIDE 20 MG/1
TABLET ORAL
Qty: 90 TABLET | Refills: 3 | Status: ON HOLD | OUTPATIENT
Start: 2017-11-07 | End: 2018-02-01 | Stop reason: HOSPADM

## 2017-11-09 ENCOUNTER — OFFICE VISIT (OUTPATIENT)
Dept: ORTHOPEDICS | Facility: CLINIC | Age: 77
End: 2017-11-09
Payer: MEDICARE

## 2017-11-09 VITALS — BODY MASS INDEX: 31.83 KG/M2 | WEIGHT: 235 LBS | HEIGHT: 72 IN

## 2017-11-09 DIAGNOSIS — G56.02 CARPAL TUNNEL SYNDROME OF LEFT WRIST: Primary | ICD-10-CM

## 2017-11-09 PROCEDURE — 99024 POSTOP FOLLOW-UP VISIT: CPT | Mod: S$GLB,,, | Performed by: ORTHOPAEDIC SURGERY

## 2017-11-09 PROCEDURE — 99999 PR PBB SHADOW E&M-EST. PATIENT-LVL II: CPT | Mod: PBBFAC,,, | Performed by: ORTHOPAEDIC SURGERY

## 2017-11-09 RX ORDER — DICLOFENAC SODIUM 10 MG/G
2 GEL TOPICAL 3 TIMES DAILY
Qty: 1 TUBE | Refills: 3 | Status: ON HOLD | OUTPATIENT
Start: 2017-11-09 | End: 2018-02-01 | Stop reason: HOSPADM

## 2017-11-09 NOTE — PROGRESS NOTES
HISTORY OF PRESENT ILLNESS:  Mr. Muhammad is about a month out from [facility] still has some   numbness in the left thumb and pain, which is improving a little bit, but not   much.    PHYSICAL EXAMINATION:  LEFT HAND:  The incision is well healed, swelling minimal, mild tenderness.    Range of motion of fingers full.   strength decreased.  Sensation decreased   left thumb.    PLAN:  I think he would benefit from some therapy.  We have discussed that   today.  He would like to set that up in [place] close to where he lives.    Additionally, I have written a prescription and called in Voltaren gel for   topical use left hand and palm.  Increase activities as tolerated.  Follow up in   one month.      CRYSTAL  dd: 11/09/2017 13:49:51 (CST)  td: 11/10/2017 09:23:47 (CST)  Doc ID   #[id number]  Job ID #[id number]    CC:

## 2017-11-10 ENCOUNTER — TELEPHONE (OUTPATIENT)
Dept: ORTHOPEDICS | Facility: CLINIC | Age: 77
End: 2017-11-10

## 2017-11-10 NOTE — TELEPHONE ENCOUNTER
----- Message from Summer Gu sent at 11/10/2017  2:14 PM CST -----  Contact: [phone]/Wife  Patient's spouse calling in regarding medication diclofenac sodium (VOLTAREN) 1 % Gel prescribed by Dr. Jaquez yesterday. Please call and advise.

## 2017-11-13 ENCOUNTER — TELEPHONE (OUTPATIENT)
Dept: ORTHOPEDICS | Facility: CLINIC | Age: 77
End: 2017-11-13

## 2017-11-13 NOTE — TELEPHONE ENCOUNTER
Spoke with pt's wife. Informed her Dr. Jaquez stated that the pt can continue using, it's up to the pt. Understanding verbalized.

## 2017-11-20 ENCOUNTER — CLINICAL SUPPORT (OUTPATIENT)
Dept: CARDIOLOGY | Facility: CLINIC | Age: 77
End: 2017-11-20
Payer: MEDICARE

## 2017-11-20 DIAGNOSIS — I48.91 ATRIAL FIBRILLATION, UNSPECIFIED TYPE: ICD-10-CM

## 2017-11-20 DIAGNOSIS — Z95.0 CARDIAC PACEMAKER IN SITU: ICD-10-CM

## 2017-11-20 PROCEDURE — 93280 PM DEVICE PROGR EVAL DUAL: CPT | Mod: S$GLB,,, | Performed by: INTERNAL MEDICINE

## 2017-11-21 ENCOUNTER — CLINICAL SUPPORT (OUTPATIENT)
Dept: CARDIOLOGY | Facility: CLINIC | Age: 77
End: 2017-11-21
Payer: MEDICARE

## 2017-11-21 DIAGNOSIS — I48.0 PAROXYSMAL ATRIAL FIBRILLATION: Primary | ICD-10-CM

## 2017-11-21 PROCEDURE — 99999 PR PBB SHADOW E&M-EST. PATIENT-LVL II: CPT | Mod: PBBFAC,,,

## 2017-11-21 RX ORDER — PROPRANOLOL HYDROCHLORIDE 40 MG/1
40 TABLET ORAL
COMMUNITY
Start: 2017-11-21 | End: 2017-12-13 | Stop reason: SDUPTHER

## 2017-11-21 NOTE — PROGRESS NOTES
Pt in today w/ c/o feeling very tired today and when he checked his BP this morning it was 107/68  bpm.   We saw him yesterday in the Device clinic and adjusted his AV delay, he said he felt fine yesterday.   Pacemaker interrogated, no atrial or ventricular arrhythmias recorded. Presenting rhythm  AP-VS, underlying rhythm SB.  Reviewed w/ Dr. Graham, will have patient decreased his propranolol from 80mg capsule every HS to 40mg tablet every HS.  Instructions to patient, he verbalized understanding.  Script called to [facility] in [place] [phone].  F/u in clinic in 6 months or sooner if needed.

## 2017-11-27 ENCOUNTER — CLINICAL SUPPORT (OUTPATIENT)
Dept: REHABILITATION | Facility: HOSPITAL | Age: 77
End: 2017-11-27
Attending: ORTHOPAEDIC SURGERY
Payer: MEDICARE

## 2017-11-27 DIAGNOSIS — M25.639 DECREASED RANGE OF MOTION OF WRIST: ICD-10-CM

## 2017-11-27 DIAGNOSIS — R29.898 DECREASED PINCH STRENGTH: ICD-10-CM

## 2017-11-27 DIAGNOSIS — M25.532 WRIST PAIN, LEFT: Primary | ICD-10-CM

## 2017-11-27 DIAGNOSIS — L90.5 SCARRING ADHERENT: ICD-10-CM

## 2017-11-27 DIAGNOSIS — R29.898 DECREASED GRIP STRENGTH OF LEFT HAND: ICD-10-CM

## 2017-11-27 PROCEDURE — 97110 THERAPEUTIC EXERCISES: CPT | Mod: 59,PN

## 2017-11-27 PROCEDURE — G8984 CARRY CURRENT STATUS: HCPCS | Mod: CK,PN

## 2017-11-27 PROCEDURE — 97165 OT EVAL LOW COMPLEX 30 MIN: CPT | Mod: PN

## 2017-11-27 PROCEDURE — G8985 CARRY GOAL STATUS: HCPCS | Mod: CJ,PN

## 2017-11-27 NOTE — PROGRESS NOTES
"Occupational Therapy Evaluation    Patient:  Heri Muhammad  Date of Therapy Visit:  11/27/2017  Referring Physician:  Dr Jaquez  Diagnosis: MRN : [id number]  Referral Orders:  Eval and treat     Start Time: 300pm  End Time:  400pm  Total Time:  60 mins    Certification period to 12/31/17  Visit # 1 of 20  Diagnosis: Left Carpal Tunnel Release    HISTORY/OCCUPATIONAL PROFILE/ Medical and Therapy History:  Subjective:  Patient is a 77 year old right hand dominant male who presents for occupational therapy today,11/27/2017 and  is 7 weeks 5 days s/p Left Carpal Tunnel Release on 10/4/17.  Heri states he does "not want to be asked a lot of questions and would like to just go home".  His chief complaint is pain in wrist and thumb numbess and reports difficulty with most ADLS using left hand.  Date of onset:  10/4/17  Location of injury:  Left CT  Current History of Injury /Mechanism of Injury:  insidious   Rehabilitation Expectation/Goals: Patient's goals for therapy are to be able to - minimize: pain  - normalize: loss of function - improve: strength  Pain:   During no work/At Rest:    0  out of 10   While working/ With Activity: 7 out of 10   Sleeping:  3-4 out of 10    Location of Pain:  Left volar wrist from CT area proximally 6 inches   Description of Pain:  Constant throb   Pain relived by: rest    Previous Medical Management/ Past Medical History/Physical Systems Review:    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  Past Medical History:   Diagnosis Date    A-fib     Anticoagulant long-term use     eliquis    Atrial flutter     Benign essential tremor     Cancer     skin cancer on back    Cardiac pacemaker in situ     Coronary artery disease     Cough     Dizziness     intermittant    DJD (degenerative joint disease) of knee     ED (erectile dysfunction)     Hard of hearing 11/2016    History of cataract     HTN (hypertension)     Hyperlipidemia     Hyperlipidemia     Memory loss     " Seizure disorder     Seizures     last episode 1995    Sinus node dysfunction 08/2016     Review of patient's allergies indicates:   Allergen Reactions    Bactroban [mupirocin calcium] Rash     Other reaction(s): Rash       Occupation:  retired; currently subs as [employer]; retired [employer]; retired security  Working presently:  no     FUNCTIONAL STATUS:   Previous functional status includes: Independent with all ADLs and ambulating without assistance   Current FunctionalStatus: dependent on wife for most ADLs; has a history of back pain with disc compression   A typical day includes: non- typical day   DME owned:  Shower seat; grab bar   Home/Living environment :  Lives with wife and small dog; wife assists with majority of his daily tasks   Limitation of Functional Status:   ADLs (difficulty with the following tasks):    - Feeding: dependent on wife to cut meat    - Meal Prep:  Cannot hold dishes to clean     - Bathing: cannot shampoo     - Dressing: cannot button    - Grooming: dependent on wife to groom beard      Self Care / ADL:     IADLs: (difficulty with the following tasks):    - managing finances/ writing:  Uses dominant side for writing    - driving/handling transportation: does not desire to drive, dependent on wife    - shopping: n/a    - using phone: does not use phone    - computer: does not use computer/ uses tablet with right/ pain using left to lift tablet    - managing medications: dependent on his wife    - housework & basic home maintenance: cannot vacuum    Leisure: watches tv    Environmental Concerns/ Fall Risk:  None   Barriers to Learning:  None   Cultural/Spiritual : None   Developmental/Education: None  Nutritional Deficit: None   Abuse/Neglect : None    Language: None   Hearing/Vision Deficit: Difficulty hearing; need to speak loudly    Objective:  Edema/ Girth/Volume: Pre-Treatment Girth (cm):     Date: 11/27/17 11/27/17    Left Right   mcps 21.8cm 21.4cm   wrist 19.8cm 18.8cm  "    Observations:    Sensation Test:    Stereognosis:  Intact  Rosie-Laina Testing:  Not performed today  Sensitivity:  Hyposensitive to median nerve distribution of thumb  Patient does complain numbness & tingling from volar thumb tip proximally to thenar eminence at radial border ; Temp, touch and pressure sense are intact in left hand    Palpation:  Note: 1. RF and MF nodules at volar mcps bilaterally; 2. 13 year old mod dense curvilinear scar to radial border of pad of thumb tip; He states he regained normal sensation to this area  Skin Condition/Scarring/ Integument Scars/ Characteristics:    Edema: is localized at wrist and pillar of left palm level  Scar Type: 3.6cm linear pink well healed scar with moderate density with moderate adhesions noted; scar has little to no pliability (7 weeks 5 days p/o)  Description: no signs and symptoms of infection    Range of Motion: HOLDEN Liriano is able to make a composite fist on the left but has a "tight sensation".  Intrinsic minus on left < right        LEFT      RIGHT       Date:   11/27/17 11/27/17         Elbow Sup/Pro 65/80 85/90        Wrist Ext/Flex 65/60 75/80        Wrist RD/UD 17/20 15/30     Special Testing:  Phalen's Test: +  Durkan's Test: +  Tinel's @ cubital tunnel: -  Tinel's @ carpal tunnel: -  Coordination:  moderately impaired for FMC in left  in ADL/IADL's   Endurance: moderately decreased for light ADL/IADL's w/ use of left         Strength: (ANA Dynamometer in lbs.), Average 3 trials, Position II           LEFT           RIGHT      Date:          Gross  II Elbow flex 36,37,39# 65,65,63#  GG L is 57% of R     Gross  II Elbow ext 47,48,49  Assist with elbow extension over red weighted ball 75,75,75# GG L is 64% of R with elbow extended     Lateral Pinch 11,11,11# 14,15,13# LP L is 79% of R     Palmar Pinch/ 3JC 10,10,10# 14,15,15# PP L is 79% of R     Tip Pinch 8,8,8# 12,12,12# TP is L is 66% of R           Treatment/ Patient and " family/caregiver learning and education: :     OT eval performed today and instruction in written HEP including wrist ROM exercises with scar massage instructions  Patient did require both verbal and physical cues to perform exercises correctly.  MANUAL THERAPY x 5 mins: to increase tissue elasticity, to decrease scar adhesions  -DFM to volar CT scar x 5'  THERAPEUTIC EXERCISES x 15 mins: to increase ROM, increase strength and increase functional use  -Wrist ROM e/f, rd/ud, s/p 2 x 10reps  -TGEx 2 x 10  -Intrinsic minus & ip extension 2 x 10    Assessment:   Profile and History Assessment of Occupational Performance Level of Clinical Decision Making Complexity Score   Occupational Profile:   Heri Muhammad is a 77 y.o. male who lives with their spouse and is currently self-employed as [employer]. Heri Muhammad has difficulty with  feeding, bathing, grooming and dressing  shopping, housework/household chores and medication management  affecting his/her daily functional abilities. His/her main goal for therapy is to decrease his pain and increase his functional use.     Comorbidities:   Medical and Therapy History Review:   Past Medical History:   Diagnosis Date    A-fib     Anticoagulant long-term use     eliquis    Atrial flutter     Benign essential tremor     Cancer     skin cancer on back    Cardiac pacemaker in situ     Coronary artery disease     Cough     Dizziness     intermittant    DJD (degenerative joint disease) of knee     ED (erectile dysfunction)     Hard of hearing 11/2016    History of cataract     HTN (hypertension)     Hyperlipidemia     Hyperlipidemia     Memory loss     Seizure disorder     Seizures     last episode 1995    Sinus node dysfunction 08/2016                  Performance Deficits    Physical:  Joint Mobility  Muscle Power/Strength  Muscle Endurance  Skin Integrity/Scar Formation  Edema   Strength  Pinch Strength  Fine Motor  "Coordination  Pain    Cognitive:  Attention  Memory    Psychosocial:    No Deficits     Clinical Decision Making:  low    Assessment Process:  Problem-Focused Assessments    Modification/Need for Assistance:  Not Necessary    Intervention Selection:  Limited Treatment Options       low  Based on PMHX, co morbidities , data from assessments and functional level of assistance required with task and clinical presentation directly impacting function.       Medical necessity is demonstrated by the following IMPAIRMENTS/PROBLEMS:  Patient Lack of Knowledge/Awareness in Home Program  Increased Pain in LEFT hand  Decreased functional use/ unable to perform ADLs/iADLs  Increased Edema  Decreased ROM   Decreased  strength  Decreased pinch strength  Scar Adherent  Hypersensitivity    Heri presents to occupational therapy with an OT diagnosis of  Left Carpal Tunnel Syndrome with CTR on 10/7/17 and presents with the above listed problem areas.  We reviewed a detailed home program to address these areas and after several attempts patient was able to independently demonstrate these while in therapy today. It was difficult to get a complete history on his current issues as he states he "did not like answering all these questions". Heri requires skilled occupational therapy to address the problems identified above and to achieve the individual patient goals as outlined in the problems and goals section.  Overall rehab potential is GOOD.  Heri and his wife were educated regarding the details of their diagnosis, prognosis, related pathology, plan of care and modality use.  The patient demonstrates a FAIR understanding of the risks, benefits, precautions/ contraindications and prognosis of their skilled occupational therapy rehabilitation program.  We reviewed therapy expectations and goals and it was understood clearly that they will be active participants in their rehabilitation.    Heri demonstrated a fair understanding of " the education provided including HEP and modalities for pain management.     Patient prefers to attend therapy: 1-2 times a week with time preference of afternoon    G CODE TOOL: FOTO    Category: self care/ carrying      Current Score  = 57% impaired  Goal at Discharge Score = 39% impaired    Score interpretation is as follows:     TEST SCORE  Modifier  Impairment Limitation Restriction    0%  CH  0 % impaired, limited or restricted    1-19%  CI  @ least 1% but less than 20% impaired, limited or restricted    20-39%  CJ  @ least 20%<40% impaired, limited or restricted    40-59%  CK  @ least 40%<60% impaired, limited or restricted    60-79%  CL  @ least 60% <80% impaired, limited or restricted    80-99%  CM  @ least 80%<100% impaired limited or restricted    100%  CN  100% impaired, limited or restricted     GOALS:  Short Therm Goals: (2 weeks)    STG: Patient will be independent in home exercise and self care program    STG : Symptomatic Improvements: Decreasing Pain: to 2/10 for increased activity tolerance    STG: Patient to be IND with HEP and modalities for pain management   Long Term Goals: (8-10 weeks)   LTG: Decrease edema in right wrist to WNLs for increased ability to hold a cup of coffee with his left hand   LTG: Decrease scar adherence to trace levels for increased skin pliability and increased ROM   LTG: Decrease complaints of pain to 0 out of 10 to increase tolerance for ADLs    LTG: Increase independence in the following ADLs/iADLs: use utensils to cut his own food with a knife   LTG: Increase ROM to right = left in order to hold the TV remote control    LTG: Increase functional use of left to comb his own beard   (LTG: Increase  strength of left when appropriate)    (LTG: Increase lateral pinch strength when appropriate)    Pt's spiritual, cultural and educational needs considered and patient is agreeable to plan of care and goals as stated above.     There are NO Anticipated Barriers for  Occupational  therapy      Plan:   Patient to be treated by Occupational Therapy 2 times per week for 8 weeks  to achieve the established goals. Treatment to include modalities including but not limited to paraffin, fluidotherapy, moist heat pack, edema control techniques, A/PROM, Manual therapy/mobilizations, Therapeutic exercises/activities, ultrasound, scar maturation techniques, desensitization, strengthening, neural mobilizations/nerve gliding, stretching as well as any other treatments deemed necessary based on the patient's needs or progress.                     I certify the need for these services furnished under this plan of treatment and while under my care    ____________________________________                        ______________  Physician/Referring Practitioner                Date of Signature

## 2017-11-29 PROBLEM — M25.532 WRIST PAIN, LEFT: Status: ACTIVE | Noted: 2017-11-29

## 2017-11-29 PROBLEM — R29.898 DECREASED PINCH STRENGTH: Status: ACTIVE | Noted: 2017-11-29

## 2017-11-29 PROBLEM — R29.898 DECREASED GRIP STRENGTH OF LEFT HAND: Status: ACTIVE | Noted: 2017-11-29

## 2017-11-29 PROBLEM — L90.5 SCARRING ADHERENT: Status: ACTIVE | Noted: 2017-11-29

## 2017-11-29 PROBLEM — M25.639 DECREASED RANGE OF MOTION OF WRIST: Status: ACTIVE | Noted: 2017-11-29

## 2017-11-29 NOTE — PLAN OF CARE
"Occupational Therapy Evaluation    Patient:  Heri Muhammad  Date of Therapy Visit:  11/27/2017  Referring Physician:  Dr Jaquez  Diagnosis: MRN : [id number]  Referral Orders:  Eval and treat     Start Time: 300pm  End Time:  400pm  Total Time:  60 mins    Certification period to 12/31/17  Visit # 1 of 20  Diagnosis: Left Carpal Tunnel Release    HISTORY/OCCUPATIONAL PROFILE/ Medical and Therapy History:  Subjective:  Patient is a 77 year old right hand dominant male who presents for occupational therapy today,11/27/2017 and  is 7 weeks 5 days s/p Left Carpal Tunnel Release on 10/4/17.  Heri states he does "not want to be asked a lot of questions and would like to just go home".  His chief complaint is pain in wrist and thumb numbess and reports difficulty with most ADLS using left hand.  Date of onset:  10/4/17  Location of injury:  Left CT  Current History of Injury /Mechanism of Injury:  insidious   Rehabilitation Expectation/Goals: Patient's goals for therapy are to be able to - minimize: pain  - normalize: loss of function - improve: strength  Pain:   During no work/At Rest:    0  out of 10   While working/ With Activity: 7 out of 10   Sleeping:  3-4 out of 10    Location of Pain:  Left volar wrist from CT area proximally 6 inches   Description of Pain:  Constant throb   Pain relived by: rest    Previous Medical Management/ Past Medical History/Physical Systems Review:    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  Past Medical History:   Diagnosis Date    A-fib     Anticoagulant long-term use     eliquis    Atrial flutter     Benign essential tremor     Cancer     skin cancer on back    Cardiac pacemaker in situ     Coronary artery disease     Cough     Dizziness     intermittant    DJD (degenerative joint disease) of knee     ED (erectile dysfunction)     Hard of hearing 11/2016    History of cataract     HTN (hypertension)     Hyperlipidemia     Hyperlipidemia     Memory loss     " Seizure disorder     Seizures     last episode 1995    Sinus node dysfunction 08/2016     Review of patient's allergies indicates:   Allergen Reactions    Bactroban [mupirocin calcium] Rash     Other reaction(s): Rash       Occupation:  retired; currently subs as [employer]; retired [employer]; retired security  Working presently:  no     FUNCTIONAL STATUS:   Previous functional status includes: Independent with all ADLs and ambulating without assistance   Current FunctionalStatus: dependent on wife for most ADLs; has a history of back pain with disc compression   A typical day includes: non- typical day   DME owned:  Shower seat; grab bar   Home/Living environment :  Lives with wife and small dog; wife assists with majority of his daily tasks   Limitation of Functional Status:   ADLs (difficulty with the following tasks):    - Feeding: dependent on wife to cut meat    - Meal Prep:  Cannot hold dishes to clean     - Bathing: cannot shampoo     - Dressing: cannot button    - Grooming: dependent on wife to groom beard      Self Care / ADL:     IADLs: (difficulty with the following tasks):    - managing finances/ writing:  Uses dominant side for writing    - driving/handling transportation: does not desire to drive, dependent on wife    - shopping: n/a    - using phone: does not use phone    - computer: does not use computer/ uses tablet with right/ pain using left to lift tablet    - managing medications: dependent on his wife    - housework & basic home maintenance: cannot vacuum    Leisure: watches tv    Environmental Concerns/ Fall Risk:  None   Barriers to Learning:  None   Cultural/Spiritual : None   Developmental/Education: None  Nutritional Deficit: None   Abuse/Neglect : None    Language: None   Hearing/Vision Deficit: Difficulty hearing; need to speak loudly    Objective:  Edema/ Girth/Volume: Pre-Treatment Girth (cm):     Date: 11/27/17 11/27/17    Left Right   mcps 21.8cm 21.4cm   wrist 19.8cm 18.8cm  "    Observations:    Sensation Test:    Stereognosis:  Intact  Independence-Laina Testing:  Not performed today  Sensitivity:  Hyposensitive to median nerve distribution of thumb  Patient does complain numbness & tingling from volar thumb tip proximally to thenar eminence at radial border ; Temp, touch and pressure sense are intact in left hand    Palpation:  Note: 1. RF and MF nodules at volar mcps bilaterally; 2. 13 year old mod dense curvilinear scar to radial border of pad of thumb tip; He states he regained normal sensation to this area  Skin Condition/Scarring/ Integument Scars/ Characteristics:    Edema: is localized at wrist and pillar of left palm level  Scar Type: 3.6cm linear pink well healed scar with moderate density with moderate adhesions noted; scar has little to no pliability (7 weeks 5 days p/o)  Description: no signs and symptoms of infection    Range of Motion: HOLDEN Liriano is able to make a composite fist on the left but has a "tight sensation".  Intrinsic minus on left < right        LEFT      RIGHT       Date:   11/27/17 11/27/17         Elbow Sup/Pro 65/80 85/90        Wrist Ext/Flex 65/60 75/80        Wrist RD/UD 17/20 15/30     Special Testing:  Phalen's Test: +  Durkan's Test: +  Tinel's @ cubital tunnel: -  Tinel's @ carpal tunnel: -  Coordination:  moderately impaired for FMC in left  in ADL/IADL's   Endurance: moderately decreased for light ADL/IADL's w/ use of left         Strength: (ANA Dynamometer in lbs.), Average 3 trials, Position II           LEFT           RIGHT      Date:          Gross  II Elbow flex 36,37,39# 65,65,63#  GG L is 57% of R     Gross  II Elbow ext 47,48,49  Assist with elbow extension over red weighted ball 75,75,75# GG L is 64% of R with elbow extended     Lateral Pinch 11,11,11# 14,15,13# LP L is 79% of R     Palmar Pinch/ 3JC 10,10,10# 14,15,15# PP L is 79% of R     Tip Pinch 8,8,8# 12,12,12# TP is L is 66% of R           Treatment/ Patient and " family/caregiver learning and education: :     OT eval performed today and instruction in written HEP including wrist ROM exercises with scar massage instructions  Patient did require both verbal and physical cues to perform exercises correctly.  MANUAL THERAPY x 5 mins: to increase tissue elasticity, to decrease scar adhesions  -DFM to volar CT scar x 5'  THERAPEUTIC EXERCISES x 15 mins: to increase ROM, increase strength and increase functional use  -Wrist ROM e/f, rd/ud, s/p 2 x 10reps  -TGEx 2 x 10  -Intrinsic minus & ip extension 2 x 10    Assessment:   Profile and History Assessment of Occupational Performance Level of Clinical Decision Making Complexity Score   Occupational Profile:   Heri Muhammad is a 77 y.o. male who lives with their spouse and is currently self-employed as [employer]. Heri Muhammad has difficulty with  feeding, bathing, grooming and dressing  shopping, housework/household chores and medication management  affecting his/her daily functional abilities. His/her main goal for therapy is to decrease his pain and increase his functional use.     Comorbidities:   Medical and Therapy History Review:   Past Medical History:   Diagnosis Date    A-fib     Anticoagulant long-term use     eliquis    Atrial flutter     Benign essential tremor     Cancer     skin cancer on back    Cardiac pacemaker in situ     Coronary artery disease     Cough     Dizziness     intermittant    DJD (degenerative joint disease) of knee     ED (erectile dysfunction)     Hard of hearing 11/2016    History of cataract     HTN (hypertension)     Hyperlipidemia     Hyperlipidemia     Memory loss     Seizure disorder     Seizures     last episode 1995    Sinus node dysfunction 08/2016                  Performance Deficits    Physical:  Joint Mobility  Muscle Power/Strength  Muscle Endurance  Skin Integrity/Scar Formation  Edema   Strength  Pinch Strength  Fine Motor  "Coordination  Pain    Cognitive:  Attention  Memory    Psychosocial:    No Deficits     Clinical Decision Making:  low    Assessment Process:  Problem-Focused Assessments    Modification/Need for Assistance:  Not Necessary    Intervention Selection:  Limited Treatment Options       low  Based on PMHX, co morbidities , data from assessments and functional level of assistance required with task and clinical presentation directly impacting function.       Medical necessity is demonstrated by the following IMPAIRMENTS/PROBLEMS:  Patient Lack of Knowledge/Awareness in Home Program  Increased Pain in LEFT hand  Decreased functional use/ unable to perform ADLs/iADLs  Increased Edema  Decreased ROM   Decreased  strength  Decreased pinch strength  Scar Adherent  Hypersensitivity    Heri presents to occupational therapy with an OT diagnosis of  Left Carpal Tunnel Syndrome with CTR on 10/7/17 and presents with the above listed problem areas.  We reviewed a detailed home program to address these areas and after several attempts patient was able to independently demonstrate these while in therapy today. It was difficult to get a complete history on his current issues as he states he "did not like answering all these questions". Heri requires skilled occupational therapy to address the problems identified above and to achieve the individual patient goals as outlined in the problems and goals section.  Overall rehab potential is GOOD.  Heri and his wife were educated regarding the details of their diagnosis, prognosis, related pathology, plan of care and modality use.  The patient demonstrates a FAIR understanding of the risks, benefits, precautions/ contraindications and prognosis of their skilled occupational therapy rehabilitation program.  We reviewed therapy expectations and goals and it was understood clearly that they will be active participants in their rehabilitation.    Heri demonstrated a fair understanding of " the education provided including HEP and modalities for pain management.     Patient prefers to attend therapy: 1-2 times a week with time preference of afternoon    G CODE TOOL: FOTO    Category: self care/ carrying      Current Score  = 57% impaired  Goal at Discharge Score = 39% impaired    Score interpretation is as follows:     TEST SCORE  Modifier  Impairment Limitation Restriction    0%  CH  0 % impaired, limited or restricted    1-19%  CI  @ least 1% but less than 20% impaired, limited or restricted    20-39%  CJ  @ least 20%<40% impaired, limited or restricted    40-59%  CK  @ least 40%<60% impaired, limited or restricted    60-79%  CL  @ least 60% <80% impaired, limited or restricted    80-99%  CM  @ least 80%<100% impaired limited or restricted    100%  CN  100% impaired, limited or restricted     GOALS:  Short Therm Goals: (2 weeks)    STG: Patient will be independent in home exercise and self care program    STG : Symptomatic Improvements: Decreasing Pain: to 2/10 for increased activity tolerance    STG: Patient to be IND with HEP and modalities for pain management   Long Term Goals: (8-10 weeks)   LTG: Decrease edema in right wrist to WNLs for increased ability to hold a cup of coffee with his left hand   LTG: Decrease scar adherence to trace levels for increased skin pliability and increased ROM   LTG: Decrease complaints of pain to 0 out of 10 to increase tolerance for ADLs    LTG: Increase independence in the following ADLs/iADLs: use utensils to cut his own food with a knife   LTG: Increase ROM to right = left in order to hold the TV remote control    LTG: Increase functional use of left to comb his own beard   (LTG: Increase  strength of left when appropriate)    (LTG: Increase lateral pinch strength when appropriate)    Pt's spiritual, cultural and educational needs considered and patient is agreeable to plan of care and goals as stated above.     There are NO Anticipated Barriers for  Occupational  therapy      Plan:   Patient to be treated by Occupational Therapy 2 times per week for 8 weeks  to achieve the established goals. Treatment to include modalities including but not limited to paraffin, fluidotherapy, moist heat pack, edema control techniques, A/PROM, Manual therapy/mobilizations, Therapeutic exercises/activities, ultrasound, scar maturation techniques, desensitization, strengthening, neural mobilizations/nerve gliding, stretching as well as any other treatments deemed necessary based on the patient's needs or progress.                     I certify the need for these services furnished under this plan of treatment and while under my care    ____________________________________                        ______________  Physician/Referring Practitioner                Date of Signature

## 2017-12-04 RX ORDER — FLECAINIDE ACETATE 150 MG/1
150 TABLET ORAL EVERY 12 HOURS
Qty: 120 TABLET | Refills: 3 | Status: SHIPPED | OUTPATIENT
Start: 2017-12-04 | End: 2018-02-12

## 2017-12-05 ENCOUNTER — TELEPHONE (OUTPATIENT)
Dept: CARDIOLOGY | Facility: CLINIC | Age: 77
End: 2017-12-05

## 2017-12-07 ENCOUNTER — CLINICAL SUPPORT (OUTPATIENT)
Dept: REHABILITATION | Facility: HOSPITAL | Age: 77
End: 2017-12-07
Attending: ORTHOPAEDIC SURGERY
Payer: MEDICARE

## 2017-12-07 ENCOUNTER — OFFICE VISIT (OUTPATIENT)
Dept: ORTHOPEDICS | Facility: CLINIC | Age: 77
End: 2017-12-07
Payer: MEDICARE

## 2017-12-07 DIAGNOSIS — G56.02 CARPAL TUNNEL SYNDROME OF LEFT WRIST: Primary | ICD-10-CM

## 2017-12-07 PROCEDURE — 99999 PR PBB SHADOW E&M-EST. PATIENT-LVL III: CPT | Mod: PBBFAC,,, | Performed by: ORTHOPAEDIC SURGERY

## 2017-12-07 PROCEDURE — 97035 APP MDLTY 1+ULTRASOUND EA 15: CPT | Mod: PN

## 2017-12-07 PROCEDURE — 97140 MANUAL THERAPY 1/> REGIONS: CPT | Mod: PN

## 2017-12-07 PROCEDURE — 97110 THERAPEUTIC EXERCISES: CPT | Mod: PN

## 2017-12-07 PROCEDURE — 99024 POSTOP FOLLOW-UP VISIT: CPT | Mod: S$GLB,,, | Performed by: ORTHOPAEDIC SURGERY

## 2017-12-07 NOTE — PROGRESS NOTES
"Occupational Therapy Evaluation    Patient:  Heri Muhammad  Date of Therapy Visit:  12/7/2017  Referring Physician:  Dr Jaquez  MRN : [id number]  Referral Orders:  Eval and treat     Start Time: 200pm  End Time:  302pm  Total Time:  62 mins    Certification period to 12/31/17  Visit # 2 of 20  Diagnosis: Left Carpal Tunnel Release    HISTORY/OCCUPATIONAL PROFILE/ Medical and Therapy History:  Subjective:  "Its doing fine, yeah it has pain" pt not forthcoming with status  Date of onset:  10/4/17  Location of injury:  Left CT  Current History of Injury /Mechanism of Injury:  insidious   Rehabilitation Expectation/Goals: Patient's goals for therapy are to be able to - minimize: pain  - normalize: loss of function - improve: strength  Pain:   During no work/At Rest:    0  out of 10   While working/ With Activity: 4 out of 10   Sleeping:  3-4 out of 10    Location of Pain:  Left volar wrist from CT area proximally 6 inches   Description of Pain:  Constant throb   Pain relived by: rest    Objective: 11/27/17  Edema/ Girth/Volume: Pre-Treatment Girth (cm):     Date: 11/27/17 11/27/17    Left Right   mcps 21.8cm 21.4cm   wrist 19.8cm 18.8cm     Observations:  11/27/17  Sensation Test:    Stereognosis:  Intact  Jadwin-Laina Testing:  Not performed today  Sensitivity:  Hyposensitive to median nerve distribution of thumb  Patient does complain numbness & tingling from volar thumb tip proximally to thenar eminence at radial border ; Temp, touch and pressure sense are intact in left hand    Palpation: 12/27/17  Note: 1. RF and MF nodules at volar mcps bilaterally; 2. 13 year old mod dense curvilinear scar to radial border of pad of thumb tip; He states he regained normal sensation to this area  Skin Condition/Scarring/ Integument Scars/ Characteristics:    Edema: is localized at wrist and pillar of left palm level  Scar Type: 3.6cm linear pink well healed scar with moderate density with moderate adhesions noted; scar has little " "to no pliability (7 weeks 5 days p/o)  Description: no signs and symptoms of infection    Range of Motion: HOLDEN Liriano is able to make a composite fist on the left but has a "tight sensation".  Intrinsic minus on left < right        LEFT      RIGHT       Date:   17         Elbow Sup/Pro 65/80 85/90        Wrist Ext/Flex 65/60 75/80        Wrist RD/UD 17/20 15/30     Special Testin17  Phalen's Test: +  Durkan's Test: +  Tinel's @ cubital tunnel: -  Tinel's @ carpal tunnel: -  Coordination:  moderately impaired for FMC in left  in ADL/IADL's   Endurance: moderately decreased for light ADL/IADL's w/ use of left         Strength: (ANA Dynamometer in lbs.), Average 3 trials, Position II           LEFT           RIGHT      Date:          Gross  II Elbow flex 36,37,39# 65,65,63#  GG L is 57% of R     Gross  II Elbow ext 47,48,49  Assist with elbow extension over red weighted ball 75,75,75# GG L is 64% of R with elbow extended     Lateral Pinch 11,11,11# 14,15,13# LP L is 79% of R     Palmar Pinch/ 3JC 10,10,10# 14,15,15# PP L is 79% of R     Tip Pinch 8,8,8# 12,12,12# TP is L is 66% of R           Treatment:  MHP to left hand 8 min    MANUAL THERAPY x 8 mins: to increase tissue elasticity, to decrease scar adhesions  -DFM to volar CT scar x 5'  -retrograde massage to forearm for edema   -carpal mobilization distal carpal row       THERAPEUTIC EXERCISES x 15 mins: to increase ROM, increase strength and increase functional use  -Wrist ROM e/f, rd/ud, s/p 2 x 10 reps  -Thera putty manipulation: mass grasp, lateral, 2pt 3pt, intrinsic scissoring, finger abduction, rolling into snake, chopping with cutting tool- thermoplastic  -TGEx 2 x 10  -Intrinsic minus & ip extension 2 x 10     Ultrasound: Patient received ultrasound to  Carpal tunnel area to increase blood flow, circulation, tissue elasticity, pain management and for wound/scar management for 8 minutes @ 3.3 Mhz, Intensity .8 w/cm2 at "  50% duty cycle.       G CODE TOOL: FOTO    Category: self care/ carrying      Current Score  = 57% impaired  Goal at Discharge Score = 39% impaired    Assessment: The patient arrives to therapy not forthcoming with verbal feedback. The patient reports pain but cannot indicate if it is improved or decreased. STM and carpal mobilization was well jo-ann. With moderate crepitus indicated. Pt provided with KT taping to open carpal space. Pt able to complete all therapeutic exercises with mild muscular weakness noted.     GOALS:  Short Therm Goals: (2 weeks)    STG: Patient will be independent in home exercise and self care program    STG : Symptomatic Improvements: Decreasing Pain: to 2/10 for increased activity tolerance    STG: Patient to be IND with HEP and modalities for pain management   Long Term Goals: (8-10 weeks)   LTG: Decrease edema in right wrist to WNLs for increased ability to hold a cup of coffee with his left hand   LTG: Decrease scar adherence to trace levels for increased skin pliability and increased ROM   LTG: Decrease complaints of pain to 0 out of 10 to increase tolerance for ADLs    LTG: Increase independence in the following ADLs/iADLs: use utensils to cut his own food with a knife   LTG: Increase ROM to right = left in order to hold the TV remote control    LTG: Increase functional use of left to comb his own beard   (LTG: Increase  strength of left when appropriate)    (LTG: Increase lateral pinch strength when appropriate)          Plan:   Patient to be treated by Occupational Therapy 2 times per week for 8 weeks  to achieve the established goals. Treatment to include modalities including but not limited to paraffin, fluidotherapy, moist heat pack, edema control techniques, A/PROM, Manual therapy/mobilizations, Therapeutic exercises/activities, ultrasound, scar maturation techniques, desensitization, strengthening, neural mobilizations/nerve gliding, stretching as well as any other treatments  deemed necessary based on the patient's needs or progress.

## 2017-12-07 NOTE — PROGRESS NOTES
HISTORY OF PRESENT ILLNESS:  Mr. Muhammad in followup of left carpal tunnel   release.  He is about two months' postop, still having some numbness in the   hand, but the pain is getting better, currently in therapy in [employer].    PHYSICAL EXAMINATION:  LEFT HAND:  The incision is well healed.  There is still some slight swelling   and thickening of the scar in the palm.  Range of motion of fingers full.     strength slightly decreased.  Sensation improved a bit.    PLAN:  I would like him to finish up the therapy, then switch to home exercise   program.  I would also like him to start with some Voltaren gel for topical use   on the palm of the hand.  He had been using skin cream, but I think we should   switch him over to Voltaren.  Increase activities as tolerated.  Follow up in   one month.      CRYSTAL  dd: 12/07/2017 10:26:09 (CST)  td: 12/08/2017 04:58:32 (CST)  Doc ID   #[id number]  Job ID #[id number]    CC:

## 2017-12-13 RX ORDER — PROPRANOLOL HYDROCHLORIDE 40 MG/1
40 TABLET ORAL
Qty: 90 TABLET | Refills: 4 | Status: ON HOLD | OUTPATIENT
Start: 2017-12-13 | End: 2018-02-01 | Stop reason: HOSPADM

## 2017-12-13 NOTE — TELEPHONE ENCOUNTER
----- Message from Trinity Yusuf sent at 12/13/2017  8:33 AM CST -----  Contact: wife-Scheril  Pt wife -Scheril calling states regarding pt Rx-propranolol (INDERAL) 40 MG tablet needs sent to mail order pharmacy..[phone]      .  [facility] - [facility] [address]  [address] [address]  Phone: [phone] Fax: [phone]

## 2018-01-05 ENCOUNTER — OFFICE VISIT (OUTPATIENT)
Dept: NEUROLOGY | Facility: CLINIC | Age: 78
End: 2018-01-05
Payer: MEDICARE

## 2018-01-05 VITALS
BODY MASS INDEX: 32.39 KG/M2 | WEIGHT: 239.13 LBS | DIASTOLIC BLOOD PRESSURE: 66 MMHG | HEART RATE: 104 BPM | RESPIRATION RATE: 16 BRPM | SYSTOLIC BLOOD PRESSURE: 119 MMHG | HEIGHT: 72 IN

## 2018-01-05 DIAGNOSIS — R25.1 TREMOR: ICD-10-CM

## 2018-01-05 DIAGNOSIS — R29.898 WEAKNESS OF BOTH LOWER EXTREMITIES: ICD-10-CM

## 2018-01-05 DIAGNOSIS — R41.3 MEMORY LOSS: Primary | ICD-10-CM

## 2018-01-05 DIAGNOSIS — M47.816 LUMBAR SPONDYLOSIS: ICD-10-CM

## 2018-01-05 PROCEDURE — 99214 OFFICE O/P EST MOD 30 MIN: CPT | Mod: S$GLB,,, | Performed by: PSYCHIATRY & NEUROLOGY

## 2018-01-05 PROCEDURE — 99999 PR PBB SHADOW E&M-EST. PATIENT-LVL III: CPT | Mod: PBBFAC,,, | Performed by: PSYCHIATRY & NEUROLOGY

## 2018-01-05 NOTE — PROGRESS NOTES
Date of service:  1/5/2018    Chief complaint:  Memory Loss, Tremor    Interval history:  The patient is a 77 y.o. male seen previously for memory loss and essential tremor.  Regarding the memory, he feels that it is largely stable.  He is presently taking Namenda without significant side effects.  For his tremor, he has seen Dr. Quinn, who has adjusted his medications.  The patient feels his tremor is about the same.      The patient does complain of 2 falls recently.  Both occurred while he was bending over.  He denies dizziness and loss of consciousness.  He reports that his legs became weak and gave out.  He had difficulty getting up afterwards.    History of present illness:  The patient is a 77 y.o. male referred for evaluation of memory loss.  I have seen him previously for tremor.  This issue began a few months ago. It involves short-term memory as well as long-term memory.  He may have some difficulties with executive function, though he is not clear on this.  There are may be issues with multiple step processes. He has no clear problems with ADLs. He does not get lost in familiar areas. There are no hallucinations. There are no clear personality changes.  He does not endorse depression.    Of note, the patient has had episodes of shortness of breath as well.  His wife reports that there are times where he seems more confused, but they deny that this maps to the times where he has issues with breathing.    Finally, the patient's propranolol was stopped by cardiology when his metoprolol was started.  He feels like his tremor is worse since this happened.      Past Medical History:   Diagnosis Date    A-fib     Anticoagulant long-term use     eliquis    Atrial flutter     Benign essential tremor     Cancer     skin cancer on back    Cardiac pacemaker in situ     Coronary artery disease     Cough     Dizziness     intermittant    DJD (degenerative joint disease) of knee     ED (erectile dysfunction)      Hard of hearing 11/2016    History of cataract     HTN (hypertension)     Hyperlipidemia     Hyperlipidemia     Memory loss     Seizure disorder     Seizures     last episode 1995    Sinus node dysfunction 08/2016       Past Surgical History:   Procedure Laterality Date    ANKLE SURGERY Right     pins and screws    ATRIAL ABLATION SURGERY      CARDIAC PACEMAKER PLACEMENT  2014    CARDIAC PACEMAKER PLACEMENT      CARDIAC PACEMAKER PLACEMENT  2014    CARDIOVERSION      CATARACT EXTRACTION      OU    COLONOSCOPY      due in 2014    EYE SURGERY      Jesse cataract    HERNIA REPAIR      abdominal    JOINT REPLACEMENT      bill shoulders       Family History   Problem Relation Age of Onset    Heart disease Mother     Cancer Father      colon    Blindness Father      accident    Cataracts Father     Hypertension Father     Tremor Father     Heart disease Brother     Tremor Brother     Tremor Paternal Grandfather     Melanoma Neg Hx     Amblyopia Neg Hx     Diabetes Neg Hx     Glaucoma Neg Hx     Macular degeneration Neg Hx     Retinal detachment Neg Hx     Strabismus Neg Hx     Stroke Neg Hx     Thyroid disease Neg Hx        Social history:  Social History     Social History    Marital status:      Spouse name: N/A    Number of children: N/A    Years of education: N/A     Occupational History    retired      Social History Main Topics    Smoking status: Never Smoker    Smokeless tobacco: Never Used    Alcohol use No    Drug use: No    Sexual activity: Not on file     Other Topics Concern    Not on file     Social History Narrative    No narrative on file        Review of Systems:  A 14 system review is documented in the patient's chart.  It is noteworthy as above.  It is otherwise negative.  Please see the patient's chart for further details.    Physical exam:  /66 (BP Location: Left arm, Patient Position: Sitting, BP Method: Large (Automatic))   Pulse 104    "Resp 16   Ht 6' (1.829 m)   Wt 108.5 kg (239 lb 1.6 oz)   BMI 32.43 kg/m²   General: Well developed, well nourished.  No acute distress.  HEENT: Atraumatic, normocephalic.  Neck: Supple, trachea midline.  Cardiovascular: Regular rate and rhythm.  Pulmonary: No increased work of breathing.  Abdomen/GI: No guarding.  Musculoskeletal: No obvious joint deformities, moves all extremities well.    Neurological exam:  Mental status: Awake and alert.  Oriented x3.  Speech fluent and appropriate.  Recent and remote memory appear to be largely intact.  Fund of knowledge grossly normal.  MMSE = 26/30.  Cranial nerves: Pupils equal round and reactive to light, extraocular movements intact, facial strength and sensation intact bilaterally, palate and tongue midline, hearing grossly intact bilaterally.  Motor: 5 out of 5 strength throughout the upper and lower extremities bilaterally aside from 4/5 bilateral IP. Normal bulk and tone.  Sensation: Intact to light touch and temperature bilaterally.  DTR: 2+ at the knees and biceps bilaterally.  Coordination: Finger-nose-finger testing intact bilaterally with a more pronounced intention tremor than in the past.  Gait: Normal gait. Good tandem.    Data base:  Previous notes reviewed.      Labs checked at our last visit include a normal vitamin B12 (440).    CT head:  "1.  Moderate cerebral volume loss with frontoparietal predominance.  2.  Mild chronic ischemic microvascular change.  3.  Chronic paranasal sinus disease."    EEG:  "IMPRESSION: This is a mildly abnormal EEG, temporal slowing, greatest on the left.  CLINICAL IMPLICATION: Temporal slowing is nonspecific and most often associated with vascular disease in patients of this age group. No epileptiform potentials or electrographic seizures are identified at this time, however."    Neuropsychological testing:  "Mr. Muhammad was referred for Neuropsychological Evaluation on an outpatient basis due to subjective memory decline " "for the past 1-2 years. His general cognitive abilities as assessed by the RBANS were within the average range, with mild impairment in language due to poor performance on semantic fluency, and no impairment in immediate verbal memory, visuospatial/constructional abilities, attention, or delayed memory (with mildly impaired figure recall). Further assessment of specific cognitive abilities revealed mild deficits in semantic fluency and constructional abilities, with intact naming, verbal comprehension, phonemic fluency, abstract reasoning, psychomotor speed, and mental flexibility. Additional memory assessment revealed average immediate auditory/verbal memory, average delayed auditory/verbal memory, average immediate visual memory, and mildly impaired delayed visual memory. Personality test data suggested that depression and anxiety were not likely contributing factors to his cognitive performance. Neuropsychological testing revealed mild impairment in semantic fluency and delayed visual memory; and variability in constructional ability (performances in the average and mildly impaired ranges). The deficits revealed are not sufficient to warrant a diagnosis of mild cognitive impairment. Reassurance may prove useful. Repeat testing is indicated if there is evidence of further decline in the future."    Assessment and plan:  The patient is a 77 y.o. male with memory loss, found to have minimal memory issues and no apparent contribution from depression/anxiety.  I have provided him with reassurance, but we will continue his Namenda as well.  Medication side effects have been discussed.  His vitamin B12 level is better.  He is to continue taking an OTC supplement.    He feels his essential tremor is not significantly changed with recent alterations to his medical regimen.  I have encouraged him to continue working with Dr. Quinn on this.    The etiology of the patient's fall is not entirely clear.  We will check labs " including CK to rule out myopathy.  We will obtain CT of the L-spine to assess for significant lumbar stenosis.  We may consider EMG and/or physical therapy.    We will see the patient back in a few months.

## 2018-01-08 ENCOUNTER — LAB VISIT (OUTPATIENT)
Dept: LAB | Facility: HOSPITAL | Age: 78
End: 2018-01-08
Attending: PSYCHIATRY & NEUROLOGY
Payer: MEDICARE

## 2018-01-08 DIAGNOSIS — M47.816 LUMBAR SPONDYLOSIS: ICD-10-CM

## 2018-01-08 DIAGNOSIS — R29.898 WEAKNESS OF BOTH LOWER EXTREMITIES: ICD-10-CM

## 2018-01-08 DIAGNOSIS — R25.1 TREMOR: ICD-10-CM

## 2018-01-08 DIAGNOSIS — R41.3 MEMORY LOSS: ICD-10-CM

## 2018-01-08 LAB
ALBUMIN SERPL BCP-MCNC: 3.6 G/DL
ALP SERPL-CCNC: 104 U/L
ALT SERPL W/O P-5'-P-CCNC: 17 U/L
ANION GAP SERPL CALC-SCNC: 7 MMOL/L
AST SERPL-CCNC: 19 U/L
BASOPHILS # BLD AUTO: 0.08 K/UL
BASOPHILS NFR BLD: 1.2 %
BILIRUB SERPL-MCNC: 0.5 MG/DL
BUN SERPL-MCNC: 12 MG/DL
CALCIUM SERPL-MCNC: 9 MG/DL
CHLORIDE SERPL-SCNC: 100 MMOL/L
CK SERPL-CCNC: 58 U/L
CO2 SERPL-SCNC: 31 MMOL/L
CREAT SERPL-MCNC: 1 MG/DL
DIFFERENTIAL METHOD: ABNORMAL
EOSINOPHIL # BLD AUTO: 0 K/UL
EOSINOPHIL NFR BLD: 0 %
ERYTHROCYTE [DISTWIDTH] IN BLOOD BY AUTOMATED COUNT: 14 %
EST. GFR  (AFRICAN AMERICAN): >60 ML/MIN/1.73 M^2
EST. GFR  (NON AFRICAN AMERICAN): >60 ML/MIN/1.73 M^2
GLUCOSE SERPL-MCNC: 117 MG/DL
HCT VFR BLD AUTO: 48 %
HGB BLD-MCNC: 16 G/DL
IMM GRANULOCYTES # BLD AUTO: 0.03 K/UL
IMM GRANULOCYTES NFR BLD AUTO: 0.4 %
LYMPHOCYTES # BLD AUTO: 2.5 K/UL
LYMPHOCYTES NFR BLD: 36.4 %
MCH RBC QN AUTO: 31.8 PG
MCHC RBC AUTO-ENTMCNC: 33.3 G/DL
MCV RBC AUTO: 95 FL
MONOCYTES # BLD AUTO: 0.6 K/UL
MONOCYTES NFR BLD: 8.8 %
NEUTROPHILS # BLD AUTO: 3.7 K/UL
NEUTROPHILS NFR BLD: 53.2 %
NRBC BLD-RTO: 0 /100 WBC
PLATELET # BLD AUTO: 243 K/UL
PMV BLD AUTO: 9.8 FL
POTASSIUM SERPL-SCNC: 5.1 MMOL/L
PROT SERPL-MCNC: 7.4 G/DL
RBC # BLD AUTO: 5.03 M/UL
SODIUM SERPL-SCNC: 138 MMOL/L
WBC # BLD AUTO: 6.95 K/UL

## 2018-01-08 PROCEDURE — 82550 ASSAY OF CK (CPK): CPT

## 2018-01-08 PROCEDURE — 36415 COLL VENOUS BLD VENIPUNCTURE: CPT | Mod: PO

## 2018-01-08 PROCEDURE — 85025 COMPLETE CBC W/AUTO DIFF WBC: CPT

## 2018-01-08 PROCEDURE — 80053 COMPREHEN METABOLIC PANEL: CPT

## 2018-01-11 ENCOUNTER — NURSE TRIAGE (OUTPATIENT)
Dept: ADMINISTRATIVE | Facility: CLINIC | Age: 78
End: 2018-01-11

## 2018-01-11 ENCOUNTER — TELEPHONE (OUTPATIENT)
Dept: CARDIOLOGY | Facility: CLINIC | Age: 78
End: 2018-01-11

## 2018-01-11 NOTE — TELEPHONE ENCOUNTER
Spoke to patient wife schedule appointment with Dr. Marcelo pt. Wife verbally accepted the appointment.

## 2018-01-11 NOTE — TELEPHONE ENCOUNTER
----- Message from Ki Davis sent at 1/11/2018 12:24 PM CST -----  Contact: Wife - Melania Muhammad  States that the patient was triaged today, 01/11/2018, but the patient feels that he is okay.  He has an appointment for tomorrow to do a CT Scan and would like to be seen by Dr Graham too.  There aren't any appointments available to schedule for Friday, 01/12/2018.  Can you please call the wife, Melania, back at [phone].  Thank you

## 2018-01-11 NOTE — TELEPHONE ENCOUNTER
Reason for Disposition   SEVERE dizziness (e.g., unable to stand, requires support to walk, feels like passing out now)    Protocols used: ST DIZZINESS-A-OH    Heri' wife calling on his behalf reporting he is very weak today. She states earlier today his blood pressure was 95/77 pule 120 and now it is 120/88 pulse 105. She also reports that he is having some shortness of breath. He has a history of Afib. She states these symptoms are consistent with last time he had an episode of afib.  Advised to have him seen in the ED for evaluation. Discussed if he becomes very sob/ too weak to stand she should call 911. She agrees to plan. Please contact caller with any further care advice.

## 2018-01-12 ENCOUNTER — HOSPITAL ENCOUNTER (OUTPATIENT)
Dept: RADIOLOGY | Facility: HOSPITAL | Age: 78
Discharge: HOME | End: 2018-01-12
Attending: PSYCHIATRY & NEUROLOGY
Payer: MEDICARE

## 2018-01-12 ENCOUNTER — OFFICE VISIT (OUTPATIENT)
Dept: CARDIOLOGY | Facility: CLINIC | Age: 78
End: 2018-01-12
Payer: MEDICARE

## 2018-01-12 VITALS
HEIGHT: 72 IN | WEIGHT: 240.06 LBS | BODY MASS INDEX: 32.52 KG/M2 | HEART RATE: 106 BPM | DIASTOLIC BLOOD PRESSURE: 106 MMHG | SYSTOLIC BLOOD PRESSURE: 155 MMHG

## 2018-01-12 DIAGNOSIS — Z79.01 CURRENT USE OF LONG TERM ANTICOAGULATION: ICD-10-CM

## 2018-01-12 DIAGNOSIS — R06.09 DOE (DYSPNEA ON EXERTION): Chronic | ICD-10-CM

## 2018-01-12 DIAGNOSIS — R29.898 WEAKNESS OF BOTH LOWER EXTREMITIES: ICD-10-CM

## 2018-01-12 DIAGNOSIS — Z95.0 CARDIAC PACEMAKER IN SITU: ICD-10-CM

## 2018-01-12 DIAGNOSIS — I48.0 PAF (PAROXYSMAL ATRIAL FIBRILLATION): Primary | Chronic | ICD-10-CM

## 2018-01-12 DIAGNOSIS — E78.2 MIXED HYPERLIPIDEMIA: ICD-10-CM

## 2018-01-12 DIAGNOSIS — M47.816 LUMBAR SPONDYLOSIS: ICD-10-CM

## 2018-01-12 DIAGNOSIS — Z98.890 STATUS POST CATHETER ABLATION OF ATRIAL FIBRILLATION: ICD-10-CM

## 2018-01-12 DIAGNOSIS — R00.2 PALPITATIONS: ICD-10-CM

## 2018-01-12 DIAGNOSIS — I25.10 CORONARY ARTERY DISEASE INVOLVING NATIVE CORONARY ARTERY OF NATIVE HEART WITHOUT ANGINA PECTORIS: Chronic | ICD-10-CM

## 2018-01-12 DIAGNOSIS — I10 ESSENTIAL HYPERTENSION: ICD-10-CM

## 2018-01-12 PROCEDURE — 93000 ELECTROCARDIOGRAM COMPLETE: CPT | Mod: S$GLB,,, | Performed by: INTERNAL MEDICINE

## 2018-01-12 PROCEDURE — 99499 UNLISTED E&M SERVICE: CPT | Mod: S$GLB,,, | Performed by: INTERNAL MEDICINE

## 2018-01-12 PROCEDURE — 99214 OFFICE O/P EST MOD 30 MIN: CPT | Mod: S$GLB,,, | Performed by: INTERNAL MEDICINE

## 2018-01-12 PROCEDURE — 72131 CT LUMBAR SPINE W/O DYE: CPT | Mod: 26,,, | Performed by: RADIOLOGY

## 2018-01-12 PROCEDURE — 99999 PR PBB SHADOW E&M-EST. PATIENT-LVL III: CPT | Mod: PBBFAC,,, | Performed by: INTERNAL MEDICINE

## 2018-01-12 PROCEDURE — 72131 CT LUMBAR SPINE W/O DYE: CPT | Mod: TC,PO

## 2018-01-12 NOTE — PROGRESS NOTES
Subjective:    Patient ID:  Heri Muhammad is a 77 y.o. male who presents for evaluation of Follow-up (follow up ); Shortness of Breath; and Coronary Artery Disease      HPI 78 yo with hx of atrial arrhythmias, normal LV function by echo and non-obstructive disease by cath in 2016. Here because of fatigue, SOB and disorientation. States cannot remember when he wasn't SOB. Also states his BP and pulse have been high.Had blood work several days ago and HCT normal and kidney function normal. Did EKG in office and heart rate at 100 and paced.    CONCLUSIONS     1 - Normal left ventricular systolic function (EF 60-65%).     2 - Left ventricular diastolic dysfunction.     3 - Biatrial enlargement.     4 - Right ventricular enlargement with normal systolic function.             This document has been electronically    SIGNED BY: Travis Venegas MD On: 04/04/2017 14:59    Review of Systems   Constitution: Positive for weakness and malaise/fatigue. Negative for decreased appetite, fever, weight gain and weight loss.   HENT: Negative for hearing loss and nosebleeds.    Eyes: Negative for visual disturbance.   Cardiovascular: Positive for dyspnea on exertion and near-syncope. Negative for chest pain, claudication, cyanosis, irregular heartbeat, leg swelling, orthopnea, palpitations, paroxysmal nocturnal dyspnea and syncope.   Respiratory: Positive for shortness of breath. Negative for cough, hemoptysis, sleep disturbances due to breathing, snoring and wheezing.    Endocrine: Negative for cold intolerance, heat intolerance, polydipsia and polyuria.   Hematologic/Lymphatic: Negative for adenopathy and bleeding problem. Does not bruise/bleed easily.   Skin: Negative for color change, itching, poor wound healing, rash and suspicious lesions.   Musculoskeletal: Positive for back pain and joint pain. Negative for arthritis, falls, joint swelling, muscle cramps, muscle weakness and myalgias.   Gastrointestinal: Negative for bloating,  abdominal pain, change in bowel habit, constipation, flatus, heartburn, hematemesis, hematochezia, hemorrhoids, jaundice, melena, nausea and vomiting.   Genitourinary: Negative for bladder incontinence, decreased libido, frequency, hematuria, hesitancy and urgency.   Neurological: Negative for brief paralysis, difficulty with concentration, excessive daytime sleepiness, dizziness, focal weakness, headaches, light-headedness, loss of balance, numbness and vertigo.   Psychiatric/Behavioral: Positive for memory loss. Negative for altered mental status and depression. The patient does not have insomnia and is not nervous/anxious.    Allergic/Immunologic: Negative for environmental allergies, hives and persistent infections.        Objective:    Physical Exam   Constitutional: He is oriented to person, place, and time. He appears well-developed and well-nourished. No distress.   BP (!) 155/106 (BP Location: Right arm, Patient Position: Sitting, BP Method: Large (Automatic))   Pulse 106   Ht 6' (1.829 m)   Wt 108.9 kg (240 lb 1.3 oz)   BMI 32.56 kg/m²      HENT:   Head: Normocephalic and atraumatic.   Eyes: Conjunctivae and lids are normal. Pupils are equal, round, and reactive to light. Right eye exhibits no discharge. No scleral icterus.   Neck: Normal range of motion. Neck supple. No JVD present. No tracheal deviation present. No thyromegaly present.   Cardiovascular: Normal rate, regular rhythm, S1 normal, S2 normal, normal heart sounds and intact distal pulses.  Exam reveals no gallop and no friction rub.    No murmur heard.  Pulses:       Carotid pulses are 2+ on the right side, and 2+ on the left side.       Radial pulses are 2+ on the right side, and 2+ on the left side.        Femoral pulses are 2+ on the right side, and 2+ on the left side.       Popliteal pulses are 2+ on the right side, and 2+ on the left side.        Dorsalis pedis pulses are 2+ on the right side, and 2+ on the left side.        Posterior  tibial pulses are 2+ on the right side, and 2+ on the left side.   Pulmonary/Chest: Effort normal and breath sounds normal. No respiratory distress. He has no wheezes. He has no rales. He exhibits no tenderness.   Pacer     Abdominal: Soft. Bowel sounds are normal. He exhibits no distension and no mass. There is no hepatosplenomegaly or hepatomegaly. There is no tenderness. There is no rebound and no guarding.   Musculoskeletal: Normal range of motion. He exhibits no edema or tenderness.   Lymphadenopathy:     He has no cervical adenopathy.   Neurological: He is alert and oriented to person, place, and time. He has normal reflexes. No cranial nerve deficit. Coordination normal.   Skin: Skin is warm and dry. No rash noted. He is not diaphoretic. No erythema. No pallor.   Psychiatric: He has a normal mood and affect. His speech is normal and behavior is normal. Judgment and thought content normal. Cognition and memory are normal.         Assessment:       1. PAF (paroxysmal atrial fibrillation)    2. Coronary artery disease involving native coronary artery of native heart without angina pectoris    3. Cardiac pacemaker in situ    4. Essential hypertension    5. Mixed hyperlipidemia    6. Status post catheter ablation of atrial fibrillation    7. Current use of long term anticoagulation    8. FOWLER (dyspnea on exertion)         Plan:     Patient may be in atrial arrhythmia and pacer tracking     Will increase propanolol to 20mg in AM and 40 pm. If remains fast and BP OK will do 40 and 40   Has follow up with DR Sorenson  No orders of the defined types were placed in this encounter.    Return in about 3 months (around 4/12/2018).

## 2018-01-12 NOTE — LETTER
January 12, 2018      Austin Graham MD  [address] [facility] [address] [place] [address]           [facility]  [address] [facility] [address] [place] [address]  Phone: [phone]          Patient: Heri Muhammad   MR Number: [id number]   YOB: 1940   Date of Visit: 1/12/2018       Dear Dr. Austin Graham:    Thank you for referring Heri Muhammad to me for evaluation. Attached you will find relevant portions of my assessment and plan of care.    If you have questions, please do not hesitate to call me. I look forward to following Heri Muhammad along with you.    Sincerely,    Tad Marcelo Jr., MD    Enclosure  CC:  No Recipients    If you would like to receive this communication electronically, please contact [email or web address] or [phone] to request more information on [facility] access.    For providers and/or their staff who would like to refer a patient to [facility], please contact us through our one-stop-shop provider referral line, [facility], at [phone].    If you feel you have received this communication in error or would no longer like to receive these types of communications, please e-mail [email or web address]

## 2018-01-19 ENCOUNTER — NURSE TRIAGE (OUTPATIENT)
Dept: ADMINISTRATIVE | Facility: CLINIC | Age: 78
End: 2018-01-19

## 2018-01-19 ENCOUNTER — TELEPHONE (OUTPATIENT)
Dept: FAMILY MEDICINE | Facility: CLINIC | Age: 78
End: 2018-01-19

## 2018-01-19 PROBLEM — I50.33 ACUTE ON CHRONIC DIASTOLIC (CONGESTIVE) HEART FAILURE: Status: ACTIVE | Noted: 2018-01-19

## 2018-01-19 PROBLEM — R00.0 SINUS TACHYCARDIA: Status: ACTIVE | Noted: 2018-01-19

## 2018-01-19 PROBLEM — J96.01 ACUTE HYPOXEMIC RESPIRATORY FAILURE: Status: ACTIVE | Noted: 2018-01-19

## 2018-01-19 PROBLEM — R09.02 HYPOXIA: Status: ACTIVE | Noted: 2018-01-19

## 2018-01-19 NOTE — TELEPHONE ENCOUNTER
----- Message from Marie Salas sent at 1/19/2018 12:51 PM CST -----  Contact: Wife Mrs Muhammad [phone]  Call placed to On  Call Nurse Patient's wife just called to say her s  is not feeling well he has Shortness of Breath and his pulse is High. On Call Nurse took the call.

## 2018-01-19 NOTE — TELEPHONE ENCOUNTER
"1/12    Reason for Disposition   Difficulty breathing    Answer Assessment - Initial Assessment Questions  1. DESCRIPTION: "Please describe your heart rate or heart beat that you are having" (e.g., fast/slow, regular/irregular, skipped or extra beats, "palpitations")    SOB at rest, HR= 117 now - earlier 120, o2 sat wnl per spouse.   2. ONSET: "When did it start?" (Minutes, hours or days)      Ongoing to 1/12   3. DURATION: "How long does it last" (e.g., seconds, minutes, hours)     SOB when talking   4. PATTERN "Does it come and go, or has it been constant since it started?"  "Does it get worse with exertion?"   "Are you feeling it now?"     Constant   5. TAP: "Using your hand, can you tap out what you are feeling on a chair or table in front of you, so that I can hear?" (Note: not all patients can do this)       N/a   6. HEART RATE: "Can you tell me your heart rate?" "How many beats in 15 seconds?"  (Note: not all patients can do this)       112-120   7. RECURRENT SYMPTOM: "Have you ever had this before?" If so, ask: "When was the last time?" and "What happened that time?"      Yes   8. CAUSE: "What do you think is causing the palpitations?"     afib   9. CARDIAC HISTORY: "Do you have any history of heart disease?" (e.g., heart attack, angina, bypass surgery, angioplasty, arrhythmia)     Afib, ablation x2. Pacer   10. OTHER SYMPTOMS: "Do you have any other symptoms?" (e.g., dizziness, chest pain, sweating, difficulty breathing)    Nausea,  No CP    Protocols used: ST HEART RATE AND HEARTBEAT [id number]  wife states that she is going to take him to ED now. Call back with questions.     "

## 2018-01-20 PROBLEM — R79.89 ELEVATED TSH: Status: ACTIVE | Noted: 2018-01-20

## 2018-01-20 PROBLEM — I51.89 DIASTOLIC DYSFUNCTION: Status: ACTIVE | Noted: 2018-01-20

## 2018-01-21 PROBLEM — R53.1 WEAKNESS: Status: ACTIVE | Noted: 2018-01-21

## 2018-01-22 DIAGNOSIS — R00.2 PALPITATIONS: Primary | ICD-10-CM

## 2018-01-22 PROBLEM — R09.02 HYPOXIA: Status: ACTIVE | Noted: 2018-01-22

## 2018-01-23 PROBLEM — E87.20 METABOLIC ACIDOSIS: Status: ACTIVE | Noted: 2018-01-23
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
## 2018-01-23 PROBLEM — I50.21 ACUTE SYSTOLIC HEART FAILURE: Status: ACTIVE | Noted: 2018-01-23

## 2018-01-23 PROBLEM — R79.89 ELEVATED TROPONIN: Status: ACTIVE | Noted: 2018-01-23

## 2018-01-24 PROBLEM — R94.31 ABNORMAL EKG: Status: ACTIVE | Noted: 2018-01-24

## 2018-01-24 PROBLEM — R53.83 LETHARGIC: Status: ACTIVE | Noted: 2018-01-24

## 2018-01-28 PROBLEM — R40.0 UNCONTROLLED DAYTIME SOMNOLENCE: Status: ACTIVE | Noted: 2018-01-28

## 2018-01-30 DIAGNOSIS — Z95.0 CARDIAC PACEMAKER IN SITU: Primary | ICD-10-CM

## 2018-01-30 DIAGNOSIS — I49.5 SINUS NODE DYSFUNCTION: ICD-10-CM

## 2018-01-30 PROBLEM — R40.4 TRANSIENT ALTERATION OF AWARENESS: Status: ACTIVE | Noted: 2018-01-28

## 2018-01-30 PROBLEM — I48.0 PAF (PAROXYSMAL ATRIAL FIBRILLATION): Chronic | Status: ACTIVE | Noted: 2018-01-30

## 2018-01-31 PROBLEM — Z75.8 DISCHARGE PLANNING ISSUES: Status: ACTIVE | Noted: 2018-01-31

## 2018-01-31 PROBLEM — Z02.9 DISCHARGE PLANNING ISSUES: Status: ACTIVE | Noted: 2018-01-31

## 2018-02-01 ENCOUNTER — TELEPHONE (OUTPATIENT)
Dept: CARDIOLOGY | Facility: CLINIC | Age: 78
End: 2018-02-01

## 2018-02-01 ENCOUNTER — TELEPHONE (OUTPATIENT)
Dept: NEUROLOGY | Facility: CLINIC | Age: 78
End: 2018-02-01

## 2018-02-01 NOTE — TELEPHONE ENCOUNTER
----- Message from Georgina Polanco sent at 2/1/2018 10:32 AM CST -----  Please call patient to schedule 2 wk [facility] f/u DC:02/01/18 Resptory failure/stroke symptoms, [phone] (home)

## 2018-02-01 NOTE — TELEPHONE ENCOUNTER
----- Message from Georgina Polanco sent at 2/1/2018 10:34 AM CST -----  Please call patient to schedule 1 month [facility] f/u DC:02/01/18 Resptory failure/stroke, There are no phone numbers on file.   symptoms

## 2018-02-01 NOTE — TELEPHONE ENCOUNTER
Spoke to patient wife, schedule an appointment for hospital f/u wife verbally accepted the appointment.

## 2018-02-02 ENCOUNTER — TELEPHONE (OUTPATIENT)
Dept: FAMILY MEDICINE | Facility: CLINIC | Age: 78
End: 2018-02-02

## 2018-02-02 NOTE — TELEPHONE ENCOUNTER
Spoke to Sylwia and she states pt was discharged with no orders to care for pt red heels. States she has been putting ointment on the pt feet and keeping them propped. But has no written orders for anything.  Sylwia also states that pt had a pacemaker placed and they did not send any wound care orders. States pt still has 12 stiches. States she has been cleaning it then placing a bandage over it. Sylwia would like to know if this is sufficient. States pt has a lot of upcoming f/u appts but would like to know what to do for this weekend. Dr. Hansen is out of the office and in a meeting. Please advise. Thanks.

## 2018-02-02 NOTE — TELEPHONE ENCOUNTER
----- Message from Lacey Beltran sent at 2/2/2018  1:19 PM CST -----  Sylwia with [facility]/stated patient was release yesterday from hospital/stated patient's heels are very red/needs advice about medication/please call back at [phone].

## 2018-02-02 NOTE — TELEPHONE ENCOUNTER
Spoke to Sylwia and notified her of Dr. Regalado's recommendations and she verbalized understanding.

## 2018-02-05 ENCOUNTER — CLINICAL SUPPORT (OUTPATIENT)
Dept: CARDIOLOGY | Facility: CLINIC | Age: 78
End: 2018-02-05
Payer: MEDICARE

## 2018-02-05 DIAGNOSIS — I49.5 SINUS NODE DYSFUNCTION: ICD-10-CM

## 2018-02-05 DIAGNOSIS — Z95.0 CARDIAC PACEMAKER IN SITU: ICD-10-CM

## 2018-02-05 DIAGNOSIS — Z95.0 CARDIAC PACEMAKER IN SITU: Primary | ICD-10-CM

## 2018-02-05 PROCEDURE — 93281 PM DEVICE PROGR EVAL MULTI: CPT | Mod: S$GLB,,, | Performed by: INTERNAL MEDICINE

## 2018-02-08 PROBLEM — G30.1 LATE ONSET ALZHEIMER'S DISEASE WITHOUT BEHAVIORAL DISTURBANCE: Status: ACTIVE | Noted: 2018-02-08

## 2018-02-08 PROBLEM — F02.80 LATE ONSET ALZHEIMER'S DISEASE WITHOUT BEHAVIORAL DISTURBANCE: Status: ACTIVE | Noted: 2018-02-08

## 2018-02-12 ENCOUNTER — OFFICE VISIT (OUTPATIENT)
Dept: CARDIOLOGY | Facility: CLINIC | Age: 78
End: 2018-02-12
Payer: MEDICARE

## 2018-02-12 VITALS
HEIGHT: 72 IN | WEIGHT: 228.63 LBS | BODY MASS INDEX: 30.97 KG/M2 | HEART RATE: 70 BPM | DIASTOLIC BLOOD PRESSURE: 70 MMHG | SYSTOLIC BLOOD PRESSURE: 122 MMHG

## 2018-02-12 DIAGNOSIS — I48.19 PERSISTENT ATRIAL FIBRILLATION: Primary | ICD-10-CM

## 2018-02-12 DIAGNOSIS — I49.5 SINUS NODE DYSFUNCTION: ICD-10-CM

## 2018-02-12 DIAGNOSIS — I42.8 CARDIOMYOPATHY, NONISCHEMIC: ICD-10-CM

## 2018-02-12 DIAGNOSIS — I10 ESSENTIAL HYPERTENSION: ICD-10-CM

## 2018-02-12 DIAGNOSIS — F02.80 LATE ONSET ALZHEIMER'S DISEASE WITHOUT BEHAVIORAL DISTURBANCE: ICD-10-CM

## 2018-02-12 DIAGNOSIS — G30.1 LATE ONSET ALZHEIMER'S DISEASE WITHOUT BEHAVIORAL DISTURBANCE: ICD-10-CM

## 2018-02-12 DIAGNOSIS — I50.33 ACUTE ON CHRONIC DIASTOLIC (CONGESTIVE) HEART FAILURE: ICD-10-CM

## 2018-02-12 DIAGNOSIS — I48.3 TYPICAL ATRIAL FLUTTER: ICD-10-CM

## 2018-02-12 DIAGNOSIS — I70.0 AORTIC ATHEROSCLEROSIS: ICD-10-CM

## 2018-02-12 DIAGNOSIS — I47.19 ATRIAL TACHYCARDIA: ICD-10-CM

## 2018-02-12 PROCEDURE — 3008F BODY MASS INDEX DOCD: CPT | Mod: S$GLB,,, | Performed by: INTERNAL MEDICINE

## 2018-02-12 PROCEDURE — 1159F MED LIST DOCD IN RCRD: CPT | Mod: S$GLB,,, | Performed by: INTERNAL MEDICINE

## 2018-02-12 PROCEDURE — 99499 UNLISTED E&M SERVICE: CPT | Mod: S$GLB,,, | Performed by: INTERNAL MEDICINE

## 2018-02-12 PROCEDURE — 99215 OFFICE O/P EST HI 40 MIN: CPT | Mod: S$GLB,,, | Performed by: INTERNAL MEDICINE

## 2018-02-12 PROCEDURE — 1126F AMNT PAIN NOTED NONE PRSNT: CPT | Mod: S$GLB,,, | Performed by: INTERNAL MEDICINE

## 2018-02-12 PROCEDURE — 99999 PR PBB SHADOW E&M-EST. PATIENT-LVL III: CPT | Mod: PBBFAC,,, | Performed by: INTERNAL MEDICINE

## 2018-02-12 NOTE — PROGRESS NOTES
Subjective:    Patient ID:  Heri Muhammad is a 77 y.o. male who presents for follow-up of Follow-up (6 month f/u,  device check 02-)      HPI yo male with CAD, atrial fibrillation, atrial flutter, sinus node dysfunction, PPM.  Dual chamber PPM implanted (06/30/14).   Presented with fatigue, found to be in atrial fibrillation. Placed on amiodarone, underwent DCCV.  Symptoms with AF have historically been fatigue and lightheadedness.  DCCV (10/15/15) >> recurrence. DCCV (11/23/15), amiodarone increased to 400 mg daily. Developed atrial flutter.  (01/29/16) PVI + RFA for atrial flutter. Amiodarone discontinued.  Felt poorly with Multaq.  Did well for initially in terms of atrial fibrillation, but continued to note dyspnea.  Underwent LHC/RHC 6/10/16, no obstructive CAD, normal rt sided filling pressures.  Developed persistent recurrence >> DCCV 11/22/16.   Recurrence.  RFA 3/23/17 Repeat isolation of LSPV, RSPV, RIPV (LIPV remained isolated).  Atrial flutter c/w mitral annular flutter >> anterior mitral annular line created >> flutter converted to what appeared to be a roof flutter >> roof line created.  Tachycardia converted to another tachycardia, ultimately mapped to BERNADETTE >> ablation deferred.  Developed recurrence of flutter during blanking period. Flecainide added > converted without need for DCCV.  Device interrogation revealed atrial arrhythmias occurring 34% of the time.  Appears to be overestimating secondary to PAC's.  Still having what appears to be an atrial tachycardia.  Since last visit he was admitted with acute CHF to [facility].  Was hospitalized for 2 weeks.  Propanolol discontinued and switched to lopressor.  Tremors are slightly increased, but he is ok with this.  Echo1/20/18 EF 30-35%.  Was RV pacing 70% of the time.  C 1/22/18 non-obstructive CAD  1/26/18 Upgraded to CRT-P by Dr. Otto.  Device interrogation reveals stable function of leads, 100% biventricular pacing, no  arrhythmias.  Feeling better at this time, but still working to regain his strength.      Review of Systems   Constitution: Positive for weakness. Negative for malaise/fatigue.   Cardiovascular: Negative for chest pain, dyspnea on exertion, irregular heartbeat, leg swelling, near-syncope, orthopnea, palpitations, paroxysmal nocturnal dyspnea and syncope.   Respiratory: Negative for cough and shortness of breath.    Neurological: Negative for dizziness and light-headedness.   All other systems reviewed and are negative.       Objective:    Physical Exam   Constitutional: He is oriented to person, place, and time. He appears well-developed and well-nourished.   Eyes: Conjunctivae are normal. No scleral icterus.   Neck: No JVD present. No tracheal deviation present.   Cardiovascular: Normal rate, regular rhythm and normal heart sounds.  PMI is not displaced.    Pulmonary/Chest: Effort normal and breath sounds normal. No respiratory distress.   Abdominal: Soft. There is no hepatosplenomegaly. There is no tenderness.   Musculoskeletal: He exhibits no edema (lower extremity) or tenderness.   Neurological: He is alert and oriented to person, place, and time.   Skin: Skin is warm and dry. No rash noted.   Psychiatric: He has a normal mood and affect. His behavior is normal.         Assessment:       1. Persistent atrial fibrillation    2. Essential hypertension    3. Typical atrial flutter    4. Atrial tachycardia    5. Aortic atherosclerosis    6. Acute on chronic diastolic (congestive) heart failure    7. Late onset Alzheimer's disease without behavioral disturbance    8. Sinus node dysfunction         Plan:           Discontinue Flecainide, given the CHF.  Doing well.  I agree with the upgrade.  Echo in 6 months.  F/u with me in 6 months.  If EF remains <35%, discuss consideration of upgrade to ICD.

## 2018-02-15 ENCOUNTER — PATIENT MESSAGE (OUTPATIENT)
Dept: CARDIOLOGY | Facility: CLINIC | Age: 78
End: 2018-02-15

## 2018-02-16 ENCOUNTER — TELEPHONE (OUTPATIENT)
Dept: ELECTROPHYSIOLOGY | Facility: CLINIC | Age: 78
End: 2018-02-16

## 2018-02-16 ENCOUNTER — PATIENT MESSAGE (OUTPATIENT)
Dept: CARDIOLOGY | Facility: CLINIC | Age: 78
End: 2018-02-16

## 2018-02-16 DIAGNOSIS — I48.19 PERSISTENT ATRIAL FIBRILLATION: Primary | ICD-10-CM

## 2018-02-16 NOTE — TELEPHONE ENCOUNTER
Spoke with wife. She was unable to get anyone to schedule the EKG today, the staff insisted that he needed echo. I scheduled the EKG in [place] for 9am on Monday and reassured her that this was not an emergency. He is on anticoagulation and HR is 112. If he becomes symptomatic over the weekend, she will take him to the ER. She verbalizes understanding.

## 2018-02-16 NOTE — TELEPHONE ENCOUNTER
----- Message from Juanis Gooden MA sent at 2/16/2018  2:29 PM CST -----  Contact: Wife/Cheryl      ----- Message -----  From: Murray Bull  Sent: 2/16/2018   1:53 PM  To: Isaias [facility] Staff    Cheryl called in regarding the attached patient ().  Cheryl stated patients home health nurse had called office earlier because patients pulse had been running high.  Cheryl stated that patient was taking off his Rx medication of Flexainide 150 mg on Monday 2/12/18.  Cheryl thinks that this could have something to do with patients pulse running high.      Cheryl stated that patients pulse right now is 114 but he is resting.  When patients moves around it goes to about 121 to 124.  Cheryl would like a call back to discuss at [phone]

## 2018-02-16 NOTE — TELEPHONE ENCOUNTER
----- Message from Juanis Gooden MA sent at 2/16/2018  3:49 PM CST -----  Contact: wife-Melania [phone]  Spoke to wife... I am not sure whats going on with pt. I offered soonest available here at [facility] on tues but wife stated she does not want pt to wait. Please speak to wife and advise  ----- Message -----  From: Ayala Hinton  Sent: 2/16/2018   2:55 PM  To: Isaias Ad[facility] Staff    Patient's wife asking for an earlier appointment for echo stress test for the patient. The next available appointment is Wednesday. hanks!

## 2018-02-16 NOTE — TELEPHONE ENCOUNTER
Spoke with wife. Dr Esparza stopped the flecainide on Monday due to patient having CHF. Today his HR has been elevated and is currently 114. He is feeling tired today. They are on the [facility]. I put in order for an EKG and they will call to see if they can get an EKG done this afternoon. She will call me back as soon as they get the EKG done.

## 2018-02-19 ENCOUNTER — TELEPHONE (OUTPATIENT)
Dept: FAMILY MEDICINE | Facility: CLINIC | Age: 78
End: 2018-02-19

## 2018-02-19 ENCOUNTER — TELEPHONE (OUTPATIENT)
Dept: ELECTROPHYSIOLOGY | Facility: CLINIC | Age: 78
End: 2018-02-19

## 2018-02-19 ENCOUNTER — TELEPHONE (OUTPATIENT)
Dept: CARDIOLOGY | Facility: CLINIC | Age: 78
End: 2018-02-19

## 2018-02-19 NOTE — TELEPHONE ENCOUNTER
----- Message from Suzanne Weinstein sent at 2/19/2018 12:32 PM CST -----  Contact: pt wife   Pt says her 's pulse is rising again and at 12:30 pm it was 123. Please call at the number listed.    Thanks

## 2018-02-19 NOTE — TELEPHONE ENCOUNTER
Spoke with patient's wife. He was supposed to have EKG in [place] this morning, but wound up going to the ER on Saturday for sob, weakness and elevated HR in 150's. He was found to be dehydrated and given a liter of fluids. HR came down and BP came up. Spironolactone was put on hold. Patient has been severely limiting fluids and salt intake over the past month and has lost 30 lbs in one month. Device was interrogated prior to discharge and patient was told he was back in NSR. She just wanted to let us know what happened over the weekend. He is feeling much better and his symptoms have resolved. HR now in 70-80's. Will pass info on to Dr Esparza.

## 2018-02-19 NOTE — TELEPHONE ENCOUNTER
Pt called in needing a er f/u appt but Dr. Hansen states pt needs to be seen by Dr. Graham before she sees him. Please assist pt in getting an appt. Thanks.

## 2018-02-19 NOTE — TELEPHONE ENCOUNTER
See message below. Please advise. Thank you.  You have no 40 min openings until 3/8/18. Would you like to put him in a 20 min or see if he can get in with cardiology sooner. Please advise. Thanks.

## 2018-02-19 NOTE — TELEPHONE ENCOUNTER
----- Message from Serina Thomas sent at 2/19/2018  8:33 AM CST -----  Wife/Cheryl is calling to get patient in for a [facility] ER follow up on 2/17/18. He was treated for   short winded with high potassium, A-Fib. The earliest I could get him in would have been 3/8/18 but wife said that was entirely too long to wait. The ER told them to get in with PCP as soon as possible. Please call with availability at [phone]

## 2018-02-19 NOTE — TELEPHONE ENCOUNTER
----- Message from Trinity Yusuf sent at 2/19/2018  3:47 PM CST -----  Contact: Home health nurse [facility]  Home health nurse [facility] pt is new ,have a history ,seen this weekend at [facility] pulse at the begin visit was 120 at end 112 /76 pulse 18,pt wife states had contacted the office. Elevated heart rate throughout the day....[phone]

## 2018-02-19 NOTE — TELEPHONE ENCOUNTER
Patient has an appointment in April and is on the waiting list for an earlier appointment, will consult with Dr. Graham about patient condition.

## 2018-02-19 NOTE — TELEPHONE ENCOUNTER
----- Message from Amanda Aguilar sent at 2/19/2018  8:48 AM CST -----  Contact: pt wife  Please call pt wife at [phone]. Patient was seen in the ED at [facility] on Saturday 02/17/18. Patient is at home doing ok but need to discuss the EKG done at [facility]. Dr Esparza pt    Thank you

## 2018-02-19 NOTE — TELEPHONE ENCOUNTER
Spoke with patient's wife. She checked his HR while we were on the phone. It is now 78. Patient's only complaint is fatigue. He is on Eliquis and Metoprolol 25mg Qd. She is very anxious and is not sure what to call for. Advised that I will speak with Dr Esparza and call her back. His BP is 102/81.

## 2018-02-19 NOTE — TELEPHONE ENCOUNTER
Spoke to pt wife and notified her of Dr. Hansen's recommendations pt wife verbalized understanding. Will send another message to cardiology.

## 2018-02-20 ENCOUNTER — TELEPHONE (OUTPATIENT)
Dept: ELECTROPHYSIOLOGY | Facility: CLINIC | Age: 78
End: 2018-02-20

## 2018-02-20 ENCOUNTER — DOCUMENTATION ONLY (OUTPATIENT)
Dept: CARDIOLOGY | Facility: CLINIC | Age: 78
End: 2018-02-20

## 2018-02-20 RX ORDER — METOPROLOL SUCCINATE 25 MG/1
25 TABLET, EXTENDED RELEASE ORAL DAILY
Qty: 90 TABLET | Refills: 3 | Status: SHIPPED | OUTPATIENT
Start: 2018-02-20 | End: 2018-03-06

## 2018-02-20 NOTE — TELEPHONE ENCOUNTER
Spoke with Jana/Dr Graham's office. She checked the transmissions and discussed with Dr Graham. Pt may have had episode for VT yesterday, but is having episodes of AF with RVR.  Dr Graham is increasing Metoprolol to 50mg/d and stopping the ramipril. Will have Dr Esparza review transmission in am. Spoke with wife and she states that Jana has already called her and she understands the medication changes.

## 2018-02-20 NOTE — PROGRESS NOTES
Rec'd message from Viktoria Murray, RN stating the patients wife called Dr. Esparza's office concerned about the patients HR and also requesting a refill on RX Altace.  Reviewed  txm with Dr. Graham; txm shows VT episode lasting 2 mins 10 sec, approx 173 bpm.  Freeze capture EGM from this AM shows -140bpm with Bi-V pace tracking.  New orders per Dr. Graham, d/c Altace (last BP per  102/81), increase Toprol XL to 50 mg PO daily.  Wife notified, verbalized understanding.  Wife also requesting refill of RX Demadex, ok'd per Dr. Graham.  Toprol XL 50mg PO once daily, Demadex 10 mg PO daily in AM called into [facility] in [place], spoke to Sharon.  Copy of  txm forwarded to Viktoria Murray for Dr. Esparza to review.

## 2018-02-20 NOTE — TELEPHONE ENCOUNTER
Spoke with wife again. Advised that I did speak with Dr Esparza. He wants to find out what the PPM transmission looks like. Again, I asked if patient was symptomatic or is she just checking his pulse. She admitted that he really isn't complaining, she keeps checking his pulse. Advised that I was going to send a message to Jana Trinh to see what the transmissions look like. Also advised her not to focus on the actual HR but to monitor the patient for symptoms. She verbalizes understanding. Dr Graham would not refill his metoprolol or ramipril and asked that they get the refills from us. Advised that Dr Esparza will refill the metoprolol but not the ramipril as that is for BP and he does not follow that. Message sent to Jana asking why Dr Graham will not fill BP med. Rx for metoprolol sent to [facility] for 90 day supply and rx called in to [facility] for 30 day, per wife's request. Jana is looking at his transmissions now.

## 2018-02-26 ENCOUNTER — TELEPHONE (OUTPATIENT)
Dept: FAMILY MEDICINE | Facility: CLINIC | Age: 78
End: 2018-02-26

## 2018-02-26 NOTE — TELEPHONE ENCOUNTER
Spoke to nurse and gave her message from pt chart per Dr. Grhaam. Nurse verbalized understanding.

## 2018-02-26 NOTE — TELEPHONE ENCOUNTER
----- Message from Mandi Familia sent at 2/26/2018 11:13 AM CST -----  Contact: Jada with  [facility]   _  1st Request  _  2nd Request  _  3rd Request        Who: Jada with  [facility]     Why: She states the patient's resting  heatbeat was 120-130 bpm today but he has no signs of shortness of breath or weakness.     What Number to Call Back: [phone]    When to Expect a call back: (Before the end of the day)   -- if call after 3:00 call back will be tomorrow.

## 2018-03-06 ENCOUNTER — OFFICE VISIT (OUTPATIENT)
Dept: NEUROLOGY | Facility: CLINIC | Age: 78
End: 2018-03-06
Payer: MEDICARE

## 2018-03-06 VITALS — HEIGHT: 72 IN | WEIGHT: 228.88 LBS | RESPIRATION RATE: 20 BRPM | BODY MASS INDEX: 31 KG/M2

## 2018-03-06 DIAGNOSIS — F07.81 POST CONCUSSION SYNDROME: Primary | ICD-10-CM

## 2018-03-06 PROCEDURE — 99499 UNLISTED E&M SERVICE: CPT | Mod: S$GLB,,, | Performed by: PSYCHIATRY & NEUROLOGY

## 2018-03-06 PROCEDURE — 99214 OFFICE O/P EST MOD 30 MIN: CPT | Mod: S$GLB,,, | Performed by: PSYCHIATRY & NEUROLOGY

## 2018-03-06 PROCEDURE — 99999 PR PBB SHADOW E&M-EST. PATIENT-LVL III: CPT | Mod: PBBFAC,,, | Performed by: PSYCHIATRY & NEUROLOGY

## 2018-03-06 RX ORDER — METOPROLOL SUCCINATE 50 MG/1
50 TABLET, EXTENDED RELEASE ORAL DAILY
COMMUNITY
Start: 2018-02-20 | End: 2018-03-09 | Stop reason: SDUPTHER

## 2018-03-06 RX ORDER — PRIMIDONE 250 MG/1
TABLET ORAL
Qty: 360 TABLET | Refills: 3 | Status: SHIPPED | OUTPATIENT
Start: 2018-03-06 | End: 2018-12-17 | Stop reason: SDUPTHER

## 2018-03-06 NOTE — PROGRESS NOTES
Date of service:  3/6/2018    Chief complaint:  Concussion    Interval history:  Since I last saw the patient he was hospitalized for a CHF exacerbation.  He did suffer a fall, which resulted in a concussion.  I have not previously seen him for this.  He saw Dr. Byrd and Dr. River for this as an inpatient.  Since his discharge, his wife feels like he has been returning to his baseline.  She feels like it took a couple of weeks to really see an improvement.      Prior history (1/18):  The patient is a 77 y.o. male seen previously for memory loss and essential tremor.  Regarding the memory, he feels that it is largely stable.  He is presently taking Namenda without significant side effects.  For his tremor, he has seen Dr. Quinn, who has adjusted his medications.  The patient feels his tremor is about the same.      The patient does complain of 2 falls recently.  Both occurred while he was bending over.  He denies dizziness and loss of consciousness.  He reports that his legs became weak and gave out.  He had difficulty getting up afterwards.    History of present illness:  The patient is a 77 y.o. male referred for evaluation of memory loss.  I have seen him previously for tremor.  This issue began a few months ago. It involves short-term memory as well as long-term memory.  He may have some difficulties with executive function, though he is not clear on this.  There are may be issues with multiple step processes. He has no clear problems with ADLs. He does not get lost in familiar areas. There are no hallucinations. There are no clear personality changes.  He does not endorse depression.    Of note, the patient has had episodes of shortness of breath as well.  His wife reports that there are times where he seems more confused, but they deny that this maps to the times where he has issues with breathing.    Finally, the patient's propranolol was stopped by cardiology when his metoprolol was started.  He feels like his  tremor is worse since this happened.      Past Medical History:   Diagnosis Date    A-fib     Anticoagulant long-term use     eliquis    Atrial flutter     Benign essential tremor     Cancer     skin cancer on back    Cardiac pacemaker in situ     CHF (congestive heart failure)     Coronary artery disease     Cough     Dizziness     intermittant    DJD (degenerative joint disease) of knee     ED (erectile dysfunction)     Hard of hearing 11/2016    History of cataract     HTN (hypertension)     Hyperlipidemia     Hyperlipidemia     Memory loss     Seizure disorder     Seizures     last episode 1995    Sinus node dysfunction 08/2016       Past Surgical History:   Procedure Laterality Date    ANKLE SURGERY Right     pins and screws    ATRIAL ABLATION SURGERY      CARDIAC PACEMAKER PLACEMENT  2014    CARDIAC PACEMAKER PLACEMENT      CARDIAC PACEMAKER PLACEMENT  2014    CARDIOVERSION      CATARACT EXTRACTION      OU    COLONOSCOPY      due in 2014    EYE SURGERY      Jesse cataract    HERNIA REPAIR      abdominal    JOINT REPLACEMENT      bill shoulders       Family History   Problem Relation Age of Onset    Heart disease Mother     Cancer Father      colon    Blindness Father      accident    Cataracts Father     Hypertension Father     Tremor Father     Heart disease Brother     Tremor Brother     Tremor Paternal Grandfather     Melanoma Neg Hx     Amblyopia Neg Hx     Diabetes Neg Hx     Glaucoma Neg Hx     Macular degeneration Neg Hx     Retinal detachment Neg Hx     Strabismus Neg Hx     Stroke Neg Hx     Thyroid disease Neg Hx        Social history:  Social History     Social History    Marital status:      Spouse name: N/A    Number of children: N/A    Years of education: N/A     Occupational History    retired      Social History Main Topics    Smoking status: Never Smoker    Smokeless tobacco: Never Used    Alcohol use No    Drug use: No    Sexual  "activity: Not on file     Other Topics Concern    Not on file     Social History Narrative    No narrative on file        Review of Systems:  A 14 system review is documented in the patient's chart.  It is noteworthy as above.  It is otherwise negative.  Please see the patient's chart for further details.    Physical exam:  Resp 20   Ht 6' (1.829 m)   Wt 103.8 kg (228 lb 14.4 oz)   BMI 31.04 kg/m²   General: Well developed, well nourished.  No acute distress.  HEENT: Atraumatic, normocephalic.  Neck: Supple, trachea midline.  Cardiovascular: Regular rate and rhythm.  Pulmonary: No increased work of breathing.  Abdomen/GI: No guarding.  Musculoskeletal: No obvious joint deformities, moves all extremities well.    Neurological exam:  Mental status: Awake and alert.  Oriented x3.  Speech fluent and appropriate.  Recent and remote memory appear to be largely intact.  Fund of knowledge grossly normal.  MMSE = 26/30.  Cranial nerves: Pupils equal round and reactive to light, extraocular movements intact, facial strength and sensation intact bilaterally, palate and tongue midline, hearing grossly intact bilaterally.  Motor: 5 out of 5 strength throughout the upper and lower extremities bilaterally aside from 4/5 bilateral IP. Normal bulk and tone.  Sensation: Intact to light touch and temperature bilaterally.  DTR: 2+ at the knees and biceps bilaterally.  Coordination: Finger-nose-finger testing intact bilaterally with a more pronounced intention tremor than in the past.  Gait: Normal gait. Good tandem.    Data base:  Previous notes reviewed.      Recent labs include a CMP with a cr=0.8.    CT head (1/18):  "There is moderate generalized cerebral atrophy.  There are no acute findings."  I independently visualized and interpreted this study.     EEG (1/30):  "This EEG shows a mild degree of diffuse nonspecific slowing, indicative of an encephalopathy or a nonspecific disturbance of cerebral function. No evidence of " "seizures or epileptiform activity was seen during the recording."    Neuropsychological testing:  "Mr. Muhammad was referred for Neuropsychological Evaluation on an outpatient basis due to subjective memory decline for the past 1-2 years. His general cognitive abilities as assessed by the RBANS were within the average range, with mild impairment in language due to poor performance on semantic fluency, and no impairment in immediate verbal memory, visuospatial/constructional abilities, attention, or delayed memory (with mildly impaired figure recall). Further assessment of specific cognitive abilities revealed mild deficits in semantic fluency and constructional abilities, with intact naming, verbal comprehension, phonemic fluency, abstract reasoning, psychomotor speed, and mental flexibility. Additional memory assessment revealed average immediate auditory/verbal memory, average delayed auditory/verbal memory, average immediate visual memory, and mildly impaired delayed visual memory. Personality test data suggested that depression and anxiety were not likely contributing factors to his cognitive performance. Neuropsychological testing revealed mild impairment in semantic fluency and delayed visual memory; and variability in constructional ability (performances in the average and mildly impaired ranges). The deficits revealed are not sufficient to warrant a diagnosis of mild cognitive impairment. Reassurance may prove useful. Repeat testing is indicated if there is evidence of further decline in the future."    Assessment and plan:  The patient is a 77 y.o. male seen previously for a variety of issues.  Today's visit centered upon his recent hospitalization.  From a neurologic perspective, it does sound as though the patient has a post-concussive syndrome.  We spoke in detail about the nature of his diagnosis and the anticipated course.  As he is improving, I would continue with conservative management, and we will " hold off on further testing.  Should he plateau or suffer setbacks, we may consider further evaluation at that point.  We will see the patient back in a few months.    Greater than 50% of the patient's 25 minute clinic visit was spent on counseling and arranging care.  Topics covered include diagnosis, prognosis, testing, and treatment.

## 2018-03-13 ENCOUNTER — CLINICAL SUPPORT (OUTPATIENT)
Dept: CARDIOLOGY | Facility: CLINIC | Age: 78
End: 2018-03-13
Attending: INTERNAL MEDICINE
Payer: MEDICARE

## 2018-03-13 DIAGNOSIS — I49.5 SINUS NODE DYSFUNCTION: ICD-10-CM

## 2018-03-13 DIAGNOSIS — Z95.0 CARDIAC PACEMAKER IN SITU: ICD-10-CM

## 2018-03-13 PROCEDURE — 93281 PM DEVICE PROGR EVAL MULTI: CPT | Mod: S$GLB,,, | Performed by: INTERNAL MEDICINE

## 2018-03-13 RX ORDER — METOPROLOL SUCCINATE 50 MG/1
50 TABLET, EXTENDED RELEASE ORAL DAILY
Qty: 90 TABLET | Refills: 4 | Status: SHIPPED | OUTPATIENT
Start: 2018-03-13 | End: 2019-03-04 | Stop reason: SDUPTHER

## 2018-04-05 ENCOUNTER — OFFICE VISIT (OUTPATIENT)
Dept: CARDIOLOGY | Facility: CLINIC | Age: 78
End: 2018-04-05
Payer: MEDICARE

## 2018-04-05 VITALS
DIASTOLIC BLOOD PRESSURE: 77 MMHG | SYSTOLIC BLOOD PRESSURE: 127 MMHG | HEIGHT: 72 IN | WEIGHT: 230.63 LBS | HEART RATE: 90 BPM | BODY MASS INDEX: 31.24 KG/M2

## 2018-04-05 DIAGNOSIS — I48.19 PERSISTENT ATRIAL FIBRILLATION: Primary | ICD-10-CM

## 2018-04-05 DIAGNOSIS — E78.2 MIXED HYPERLIPIDEMIA: ICD-10-CM

## 2018-04-05 DIAGNOSIS — Z95.0 CARDIAC PACEMAKER IN SITU: ICD-10-CM

## 2018-04-05 DIAGNOSIS — R06.09 DOE (DYSPNEA ON EXERTION): Chronic | ICD-10-CM

## 2018-04-05 DIAGNOSIS — R40.4 TRANSIENT ALTERATION OF AWARENESS: ICD-10-CM

## 2018-04-05 DIAGNOSIS — I70.0 AORTIC ATHEROSCLEROSIS: ICD-10-CM

## 2018-04-05 DIAGNOSIS — I48.92 ATRIAL FLUTTER, UNSPECIFIED TYPE: ICD-10-CM

## 2018-04-05 DIAGNOSIS — I42.8 CARDIOMYOPATHY, NONISCHEMIC: ICD-10-CM

## 2018-04-05 DIAGNOSIS — G25.0 ESSENTIAL TREMOR: ICD-10-CM

## 2018-04-05 DIAGNOSIS — I25.10 CORONARY ARTERY DISEASE INVOLVING NATIVE CORONARY ARTERY OF NATIVE HEART WITHOUT ANGINA PECTORIS: Chronic | ICD-10-CM

## 2018-04-05 PROCEDURE — 3078F DIAST BP <80 MM HG: CPT | Mod: CPTII,S$GLB,, | Performed by: INTERNAL MEDICINE

## 2018-04-05 PROCEDURE — 99214 OFFICE O/P EST MOD 30 MIN: CPT | Mod: S$GLB,,, | Performed by: INTERNAL MEDICINE

## 2018-04-05 PROCEDURE — 99999 PR PBB SHADOW E&M-EST. PATIENT-LVL III: CPT | Mod: PBBFAC,,, | Performed by: INTERNAL MEDICINE

## 2018-04-05 PROCEDURE — 99499 UNLISTED E&M SERVICE: CPT | Mod: S$PBB,,, | Performed by: INTERNAL MEDICINE

## 2018-04-05 PROCEDURE — 3074F SYST BP LT 130 MM HG: CPT | Mod: CPTII,S$GLB,, | Performed by: INTERNAL MEDICINE

## 2018-04-05 RX ORDER — TORSEMIDE 10 MG/1
10 TABLET ORAL EVERY MORNING
Qty: 90 TABLET | Refills: 5 | Status: SHIPPED | OUTPATIENT
Start: 2018-04-05 | End: 2019-04-16 | Stop reason: SDUPTHER

## 2018-04-05 NOTE — PROGRESS NOTES
Subjective:    Patient ID:  Heri Muhammad is a 77 y.o. male who presents for follow-up of No chief complaint on file.      HPI  Here for f/u PAF/CAD/ Bi-v PPM implant (due to decreasing EF w/o sig CAD). S/p hosp stay for decreased awareness/lethargy.  Slowly improving. No angina. Home wts stable.     Review of Systems   Constitution: Negative for malaise/fatigue.   Eyes: Negative for blurred vision.   Cardiovascular: Negative for chest pain, claudication, cyanosis, dyspnea on exertion, irregular heartbeat, leg swelling, near-syncope, orthopnea, palpitations, paroxysmal nocturnal dyspnea and syncope.   Respiratory: Negative for cough and shortness of breath.    Hematologic/Lymphatic: Does not bruise/bleed easily.   Musculoskeletal: Negative for back pain, falls, joint pain, muscle cramps, muscle weakness and myalgias.   Gastrointestinal: Negative for abdominal pain, change in bowel habit, nausea and vomiting.   Genitourinary: Negative for urgency.   Neurological: Negative for dizziness, focal weakness and light-headedness.        Objective:    Physical Exam   Constitutional: He is oriented to person, place, and time. He appears well-developed and well-nourished.   Neck: Normal range of motion. Neck supple. No JVD present.   Cardiovascular: Normal rate, regular rhythm, normal heart sounds and intact distal pulses.    Pulses:       Carotid pulses are 2+ on the right side, and 2+ on the left side.       Radial pulses are 2+ on the right side, and 2+ on the left side.   The pacemaker site is doing well and without drainage.       Pulmonary/Chest: Effort normal and breath sounds normal.   Musculoskeletal: Normal range of motion. He exhibits no edema.   Neurological: He is alert and oriented to person, place, and time.   Skin: Skin is warm and dry.   Psychiatric: He has a normal mood and affect. His speech is normal. Cognition and memory are normal.   Nursing note and vitals reviewed.            ..    Chemistry         Component Value Date/Time     02/17/2018 1543    K 5.5 (H) 02/17/2018 1543    CL 99 02/17/2018 1543    CO2 25 02/17/2018 1543    BUN 23 (H) 02/17/2018 1543    CREATININE 0.75 02/17/2018 1543     (H) 02/17/2018 1543        Component Value Date/Time    CALCIUM 8.9 02/17/2018 1543    ALKPHOS 113 02/17/2018 1543    AST 64 (H) 02/17/2018 1543    AST 35 11/27/2015 1703    ALT 50 (H) 02/17/2018 1543    BILITOT 1.3 02/17/2018 1543    ESTGFRAFRICA >60 02/17/2018 1543    EGFRNONAA >60 02/17/2018 1543            ..  Lab Results   Component Value Date    CHOL 128 01/20/2018    CHOL 173 08/30/2017    CHOL 164 10/12/2015     Lab Results   Component Value Date    HDL 27 (L) 01/20/2018    HDL 42 08/30/2017    HDL 32 (L) 10/12/2015     Lab Results   Component Value Date    LDLCALC 81.6 01/20/2018    LDLCALC 109.4 08/30/2017    LDLCALC 108.4 10/12/2015     Lab Results   Component Value Date    TRIG 97 01/20/2018    TRIG 108 08/30/2017    TRIG 118 10/12/2015     Lab Results   Component Value Date    CHOLHDL 21.1 01/20/2018    CHOLHDL 24.3 08/30/2017    CHOLHDL 19.5 (L) 10/12/2015     ..  Lab Results   Component Value Date    WBC 6.21 02/17/2018    HGB 16.9 02/17/2018    HCT 49.5 02/17/2018    MCV 93 02/17/2018     02/17/2018       Test(s) Reviewed  I have reviewed the following in detail:  [] Stress test   [] Angiography   [] Echocardiogram   [x] Labs   [x] Other:       Assessment:         ICD-10-CM ICD-9-CM   1. Persistent atrial fibrillation I48.1 427.31   2. Mixed hyperlipidemia E78.2 272.2   3. Atrial flutter, unspecified type I48.92 427.32   4. Cardiomyopathy, nonischemic I42.8 425.4   5. Cardiac pacemaker in situ Z95.0 V45.01   6. FOWLER (dyspnea on exertion) R06.09 786.09   7. Aortic atherosclerosis I70.0 440.0   8. Coronary artery disease involving native coronary artery of native heart without angina pectoris I25.10 414.01   9. Essential tremor G25.0 333.1   10. Transient alteration of awareness R40.4  780.02     Problem List Items Addressed This Visit     Aortic atherosclerosis    Overview     CTA chest 3/15/17         Atrial flutter    Overview     11/2015          CAD (coronary artery disease) (Chronic)    Overview     9/2013 [facility]  LAD- 60% prox (neg FFR), nl, ramus and RCA 20%    6/206 [facility]   Mild/moderate LAD verified by FFR  Mild diastolic dysfunction.  Normal right heart pressures    .          Cardiac pacemaker in situ    Overview     [employer] [id number] PACE ACCENT RF DR S/N:[id number]  Sadi Otto  6/30/2014 -  DDD               Cardiomyopathy, nonischemic    FOWLER (dyspnea on exertion) (Chronic)    Essential tremor    Overview     Present for most of patient's life with positive family history.          Mixed hyperlipidemia    Persistent atrial fibrillation - Primary    Overview     Managed by Dr. Esparza         Transient alteration of awareness           Plan:           Return to clinic 6 months   Low level/low impact aerobic exercise 5x's/wk. Heart healthy diet and risk factor modification.    See labs and med orders.  cfd next visit

## 2018-04-12 ENCOUNTER — TELEPHONE (OUTPATIENT)
Dept: CARDIOLOGY | Facility: CLINIC | Age: 78
End: 2018-04-12

## 2018-04-12 NOTE — TELEPHONE ENCOUNTER
----- Message from Ayala Hinton sent at 4/12/2018 11:17 AM CDT -----  Contact: wife- Melania -[phone]  Patient's wife called stating [facility] need to speak with the nurse regarding two rx generic MultaQ 400 mg, rx  torsemide (DEMADEX) 10 MG Tab. Thanks!

## 2018-04-16 ENCOUNTER — OFFICE VISIT (OUTPATIENT)
Dept: INTERNAL MEDICINE | Facility: CLINIC | Age: 78
End: 2018-04-16
Payer: MEDICARE

## 2018-04-16 ENCOUNTER — TELEPHONE (OUTPATIENT)
Dept: CARDIOLOGY | Facility: CLINIC | Age: 78
End: 2018-04-16

## 2018-04-16 VITALS
HEIGHT: 72 IN | DIASTOLIC BLOOD PRESSURE: 74 MMHG | TEMPERATURE: 99 F | OXYGEN SATURATION: 95 % | SYSTOLIC BLOOD PRESSURE: 124 MMHG | WEIGHT: 229.75 LBS | RESPIRATION RATE: 18 BRPM | HEART RATE: 73 BPM | BODY MASS INDEX: 31.12 KG/M2

## 2018-04-16 DIAGNOSIS — J40 BRONCHITIS: Primary | ICD-10-CM

## 2018-04-16 PROCEDURE — 99999 PR PBB SHADOW E&M-EST. PATIENT-LVL IV: CPT | Mod: PBBFAC,,, | Performed by: INTERNAL MEDICINE

## 2018-04-16 PROCEDURE — 3078F DIAST BP <80 MM HG: CPT | Mod: CPTII,S$GLB,, | Performed by: INTERNAL MEDICINE

## 2018-04-16 PROCEDURE — 99213 OFFICE O/P EST LOW 20 MIN: CPT | Mod: S$GLB,,, | Performed by: INTERNAL MEDICINE

## 2018-04-16 PROCEDURE — 3074F SYST BP LT 130 MM HG: CPT | Mod: CPTII,S$GLB,, | Performed by: INTERNAL MEDICINE

## 2018-04-16 RX ORDER — AMOXICILLIN AND CLAVULANATE POTASSIUM 875; 125 MG/1; MG/1
1 TABLET, FILM COATED ORAL 2 TIMES DAILY
Qty: 20 TABLET | Refills: 0 | Status: SHIPPED | OUTPATIENT
Start: 2018-04-16 | End: 2018-04-26

## 2018-04-16 RX ORDER — BENZONATATE 200 MG/1
200 CAPSULE ORAL 2 TIMES DAILY PRN
Qty: 20 CAPSULE | Refills: 0 | Status: SHIPPED | OUTPATIENT
Start: 2018-04-16 | End: 2018-04-23

## 2018-04-16 NOTE — PROGRESS NOTES
HISTORY OF PRESENT ILLNESS:  Pt. is a 77 y.o. male presents with onset of sore throat for the last week.  It has progressed to a cough, states is a dry cough at this point.  Wife is concerned as she is ill at this point.    Health Maintenance Topics with due status: Not Due       Topic Last Completion Date    TETANUS VACCINE 12/06/2016    Lipid Panel 01/20/2018     There are no preventive care reminders to display for this patient.    Lab Results   Component Value Date    WBC 6.21 02/17/2018    HGB 16.9 02/17/2018    HCT 49.5 02/17/2018     02/17/2018    CHOL 128 01/20/2018    TRIG 97 01/20/2018    HDL 27 (L) 01/20/2018    LDLCALC 81.6 01/20/2018    ALT 50 (H) 02/17/2018    AST 64 (H) 02/17/2018     02/17/2018    K 5.5 (H) 02/17/2018    CL 99 02/17/2018    CREATININE 0.75 02/17/2018    BUN 23 (H) 02/17/2018    CO2 25 02/17/2018    ALBUMIN 4.6 02/17/2018    TSH 4.050 (H) 01/19/2018    PSA 3.0 08/30/2017    INR 1.1 02/17/2018    GLUF 90 04/05/2004    HGBA1C 6.0 (H) 01/19/2018       Past Medical History:   Diagnosis Date    A-fib     Anticoagulant long-term use     eliquis    Atrial flutter     Benign essential tremor     Cancer     skin cancer on back    Cardiac pacemaker in situ     CHF (congestive heart failure)     Coronary artery disease     Cough     Dizziness     intermittant    DJD (degenerative joint disease) of knee     ED (erectile dysfunction)     Hard of hearing 11/2016    History of cataract     HTN (hypertension)     Hyperlipidemia     Hyperlipidemia     Memory loss     Seizure disorder     Seizures     last episode 1995    Sinus node dysfunction 08/2016       Past Surgical History:   Procedure Laterality Date    ANKLE SURGERY Right     pins and screws    ATRIAL ABLATION SURGERY      CARDIAC PACEMAKER PLACEMENT  2014    CARDIAC PACEMAKER PLACEMENT      CARDIAC PACEMAKER PLACEMENT  2014    CARDIOVERSION      CATARACT EXTRACTION      OU    COLONOSCOPY      due in  2014    EYE SURGERY      Jesse cataract    HERNIA REPAIR      abdominal    JOINT REPLACEMENT      bill shoulders       Social History     Social History    Marital status:      Spouse name: N/A    Number of children: N/A    Years of education: N/A     Occupational History    retired      Social History Main Topics    Smoking status: Never Smoker    Smokeless tobacco: Never Used    Alcohol use No    Drug use: No    Sexual activity: Not Asked     Other Topics Concern    None     Social History Narrative    None       ROS:  GENERAL: No fever, chills, fatigability or weight loss.  SKIN: No rashes, itching or changes in color or texture of skin.  HEAD: No headaches or recent head trauma.  EARS: Denies ear pain, discharge or vertigo.  NOSE: No loss of smell, no epistaxis;positive postnasal drip.  MOUTH & THROAT: No hoarseness or change in voice. No excessive gum bleeding.  NODES: Denies swollen glands.  CHEST: Denies FOWLER, cyanosis, wheezing, positive wet cough and no sputum production.  CARDIOVASCULAR: Denies chest pain, PND, orthopnea or reduced exercise tolerance.  ABDOMEN: Appetite fine. No weight loss. Denies constipation, diarrhea, abdominal pain, hematemesis or blood in stool.  URINARY: No flank pain, dysuria or hematuria.  PERIPHERAL VASCULAR: No claudication or cyanosis. No edema.  MUSCULOSKELETAL: No joint stiffness or swelling. Denies back pain.  NEUROLOGIC: Denies numbness    PE:   Vitals:   Vitals:    04/16/18 1342   BP: 124/74   Pulse: 73   Resp: 18   Temp: 98.5 °F (36.9 °C)     GENERAL: no acute distress, A&Ox3, comfortable.  Male with BMI of 31   HEENT: tympanic membranes clear, nasal mucosa pink, no pharyngeal erythema or exudate; positive cobblestoning;  NECK: supple, no cervical lymphadenopathy, no thyromegaly; no supraclavicular nodes;   CHEST:  clear to auscultation bilaterally, no crackles or wheeze; no increased work of breathing; positive wet cough;  CARDIOVASCULAR: regular rate  and rhythm, no rubs, murmurs or gallops.  ABDOMEN: normal bowel sounds, soft non-tender, non-distended; no palpable organomegaly;   EXT: no clubbing, cyanosis or edema.     ASSESSMENT/PLAN:    Bronchitis  -     amoxicillin-clavulanate 875-125mg (AUGMENTIN) 875-125 mg per tablet; Take 1 tablet by mouth 2 (two) times daily.  Dispense: 20 tablet; Refill: 0  -     benzonatate (TESSALON) 200 MG capsule; Take 1 capsule (200 mg total) by mouth 2 (two) times daily as needed for Cough.  Dispense: 20 capsule; Refill: 0        Medication List with Changes/Refills   New Medications    AMOXICILLIN-CLAVULANATE 875-125MG (AUGMENTIN) 875-125 MG PER TABLET    Take 1 tablet by mouth 2 (two) times daily.    BENZONATATE (TESSALON) 200 MG CAPSULE    Take 1 capsule (200 mg total) by mouth 2 (two) times daily as needed for Cough.   Current Medications    ATORVASTATIN (LIPITOR) 10 MG TABLET    TAKE 1 TABLET EVERY EVENING    CYANOCOBALAMIN (VITAMIN B-12) 1000 MCG TABLET    Take 100 mcg by mouth once daily.    DRONEDARONE (MULTAQ) 400 MG TAB    Take 1 tablet (400 mg total) by mouth 2 (two) times daily with meals.    ELIQUIS 5 MG TAB    TAKE 1 TABLET TWICE DAILY    LAMOTRIGINE (LAMICTAL) 100 MG TABLET    Take 1 tablet (100 mg total) by mouth 2 (two) times daily.    MEMANTINE (NAMENDA) 10 MG TAB    TAKE 1 TABLET TWICE DAILY    METOPROLOL SUCCINATE (TOPROL-XL) 50 MG 24 HR TABLET    Take 1 tablet (50 mg total) by mouth once daily.    PRIMIDONE (MYSOLINE) 250 MG TAB    TAKE 2 TABLETS TWICE DAILY    TORSEMIDE (DEMADEX) 10 MG TAB    Take 1 tablet (10 mg total) by mouth every morning.     Call if condition changes or worsens.

## 2018-04-16 NOTE — TELEPHONE ENCOUNTER
----- Message from Jillian Blanchard sent at 4/16/2018  3:47 PM CDT -----  Contact: Dana with [facility]  Dana with [facility] calling to check status of orders sent. Please call Dana at [phone]. Thanks!

## 2018-04-17 ENCOUNTER — TELEPHONE (OUTPATIENT)
Dept: CARDIOLOGY | Facility: CLINIC | Age: 78
End: 2018-04-17

## 2018-04-17 NOTE — TELEPHONE ENCOUNTER
Please advise: [facility] needs to confirm Multaq 400 mg and Flecainide 150 mg drug interaction. Is this ok for pt. To take?

## 2018-04-24 ENCOUNTER — TELEPHONE (OUTPATIENT)
Dept: CARDIOLOGY | Facility: CLINIC | Age: 78
End: 2018-04-24

## 2018-04-24 NOTE — TELEPHONE ENCOUNTER
----- Message from Gwen Ryan sent at 4/24/2018 11:27 AM CDT -----  Contact: Dana with [facility]   Dana with [facility] called regarding medications potassium chloride, spironolactone that patient took back in February. She need to know what  ordered them. Please call back at [phone]

## 2018-04-27 ENCOUNTER — TELEPHONE (OUTPATIENT)
Dept: CARDIOLOGY | Facility: CLINIC | Age: 78
End: 2018-04-27

## 2018-04-27 NOTE — TELEPHONE ENCOUNTER
Please advise:  Pt. Wife reports pt. Weight gain 3-4 lbs within a week and has headaches. Wanted to know if the fluid med should be adjusted?

## 2018-04-27 NOTE — TELEPHONE ENCOUNTER
----- Message from RT Nohemi sent at 4/27/2018  9:40 AM CDT -----  Contact: Teresitaril,wife, [phone]   Scheril,wife, [phone], requesting a call back soon she is seeking medical advise for the pt retaining fluid, thanks.

## 2018-05-02 NOTE — TELEPHONE ENCOUNTER
Spoke to patient wife. Informed her of Dr. Graham response to message as to take an extra dose of Demadex for fluid build up. Advise wife to have pt. See PCP for headache. Wife verbalized understanding.

## 2018-05-16 DIAGNOSIS — G40.909 NONINTRACTABLE EPILEPSY WITHOUT STATUS EPILEPTICUS, UNSPECIFIED EPILEPSY TYPE: ICD-10-CM

## 2018-05-17 RX ORDER — LAMOTRIGINE 100 MG/1
TABLET ORAL
Qty: 180 TABLET | Refills: 3 | Status: SHIPPED | OUTPATIENT
Start: 2018-05-17 | End: 2018-06-19 | Stop reason: SDUPTHER

## 2018-05-18 ENCOUNTER — TELEPHONE (OUTPATIENT)
Dept: CARDIOLOGY | Facility: CLINIC | Age: 78
End: 2018-05-18

## 2018-05-18 NOTE — TELEPHONE ENCOUNTER
Spoke to pt. Wife, stated pt. Has been having diarrhea since taking multaq. Notified Dr. Graham, as per Dr. Graham, pt. Is to hold Multaq and if any symptoms of a-fib is notice please give Dr. Graham a call ASAP. Wife verbalized understanding.

## 2018-05-18 NOTE — TELEPHONE ENCOUNTER
----- Message from Gwen Ryan sent at 5/18/2018  9:38 AM CDT -----  Contact: wife Ana   Type: Needs Medical Advice    Who Called: wife Ana   Symptoms (please be specific): diarrhea since taking multaq  Best Call Back Number: [phone] (home)

## 2018-06-14 RX ORDER — APIXABAN 5 MG/1
TABLET, FILM COATED ORAL
Qty: 180 TABLET | Refills: 4 | Status: SHIPPED | OUTPATIENT
Start: 2018-06-14 | End: 2019-08-19 | Stop reason: SDUPTHER

## 2018-06-18 ENCOUNTER — CLINICAL SUPPORT (OUTPATIENT)
Dept: CARDIOLOGY | Facility: CLINIC | Age: 78
End: 2018-06-18
Attending: INTERNAL MEDICINE
Payer: MEDICARE

## 2018-06-18 DIAGNOSIS — I49.5 SINUS NODE DYSFUNCTION: ICD-10-CM

## 2018-06-18 DIAGNOSIS — Z95.0 CARDIAC PACEMAKER IN SITU: ICD-10-CM

## 2018-06-18 DIAGNOSIS — Z95.0 CARDIAC PACEMAKER IN SITU: Primary | ICD-10-CM

## 2018-06-18 PROCEDURE — 93296 REM INTERROG EVL PM/IDS: CPT | Mod: S$GLB,,, | Performed by: INTERNAL MEDICINE

## 2018-06-18 PROCEDURE — 93295 DEV INTERROG REMOTE 1/2/MLT: CPT | Mod: S$GLB,,, | Performed by: INTERNAL MEDICINE

## 2018-06-19 ENCOUNTER — TELEPHONE (OUTPATIENT)
Dept: NEUROLOGY | Facility: CLINIC | Age: 78
End: 2018-06-19

## 2018-06-19 ENCOUNTER — OFFICE VISIT (OUTPATIENT)
Dept: NEUROLOGY | Facility: CLINIC | Age: 78
End: 2018-06-19
Payer: MEDICARE

## 2018-06-19 VITALS
SYSTOLIC BLOOD PRESSURE: 127 MMHG | WEIGHT: 226.69 LBS | RESPIRATION RATE: 20 BRPM | HEIGHT: 72 IN | HEART RATE: 71 BPM | DIASTOLIC BLOOD PRESSURE: 65 MMHG | BODY MASS INDEX: 30.7 KG/M2

## 2018-06-19 DIAGNOSIS — F07.81 POSTCONCUSSIVE SYNDROME: ICD-10-CM

## 2018-06-19 DIAGNOSIS — R41.3 MEMORY LOSS: Primary | ICD-10-CM

## 2018-06-19 DIAGNOSIS — G40.909 NONINTRACTABLE EPILEPSY WITHOUT STATUS EPILEPTICUS, UNSPECIFIED EPILEPSY TYPE: ICD-10-CM

## 2018-06-19 DIAGNOSIS — G25.0 ESSENTIAL TREMOR: ICD-10-CM

## 2018-06-19 DIAGNOSIS — S06.0X9D CONCUSSION WITH LOSS OF CONSCIOUSNESS, SUBSEQUENT ENCOUNTER: ICD-10-CM

## 2018-06-19 PROCEDURE — 99214 OFFICE O/P EST MOD 30 MIN: CPT | Mod: S$GLB,,, | Performed by: PSYCHIATRY & NEUROLOGY

## 2018-06-19 PROCEDURE — 3078F DIAST BP <80 MM HG: CPT | Mod: CPTII,S$GLB,, | Performed by: PSYCHIATRY & NEUROLOGY

## 2018-06-19 PROCEDURE — 3074F SYST BP LT 130 MM HG: CPT | Mod: CPTII,S$GLB,, | Performed by: PSYCHIATRY & NEUROLOGY

## 2018-06-19 PROCEDURE — 99999 PR PBB SHADOW E&M-EST. PATIENT-LVL III: CPT | Mod: PBBFAC,,, | Performed by: PSYCHIATRY & NEUROLOGY

## 2018-06-19 PROCEDURE — 99499 UNLISTED E&M SERVICE: CPT | Mod: S$GLB,,, | Performed by: PSYCHIATRY & NEUROLOGY

## 2018-06-19 RX ORDER — LAMOTRIGINE 100 MG/1
100 TABLET ORAL 2 TIMES DAILY
Qty: 180 TABLET | Refills: 3 | Status: SHIPPED | OUTPATIENT
Start: 2018-06-19 | End: 2019-03-04 | Stop reason: SDUPTHER

## 2018-06-19 NOTE — PROGRESS NOTES
Date of service:  6/19/2018    Chief complaint:  Concussion, memory loss    Interval history:  The patient is a 77 y.o. male seen previously for a variety of issues.  Overall, it sounds like he is doing better from a concussion perspective.  He has had some improvement in particular with his headaches, though he still does get them.  They do feel like his memory is worsening over time.  He also does have ET.  He has not seen Dr. Quinn recently.  He reports now that the tremor is not bothersome and that he desires no additional treatment.  He has a remote history of seizures and remains seizure free.  He continues to take Lamictal with no side effects.    Prior history (1/18):  The patient is a 77 y.o. male seen previously for memory loss and essential tremor.  Regarding the memory, he feels that it is largely stable.  He is presently taking Namenda without significant side effects.  For his tremor, he has seen Dr. Quinn, who has adjusted his medications.  The patient feels his tremor is about the same.      The patient does complain of 2 falls recently.  Both occurred while he was bending over.  He denies dizziness and loss of consciousness.  He reports that his legs became weak and gave out.  He had difficulty getting up afterwards.    History of present illness:  The patient is a 77 y.o. male referred for evaluation of memory loss.  I have seen him previously for tremor.  This issue began a few months ago. It involves short-term memory as well as long-term memory.  He may have some difficulties with executive function, though he is not clear on this.  There are may be issues with multiple step processes. He has no clear problems with ADLs. He does not get lost in familiar areas. There are no hallucinations. There are no clear personality changes.  He does not endorse depression.    Of note, the patient has had episodes of shortness of breath as well.  His wife reports that there are times where he seems more confused,  but they deny that this maps to the times where he has issues with breathing.    Finally, the patient's propranolol was stopped by cardiology when his metoprolol was started.  He feels like his tremor is worse since this happened.      Past Medical History:   Diagnosis Date    A-fib     Anticoagulant long-term use     eliquis    Atrial flutter     Benign essential tremor     Cancer     skin cancer on back    Cardiac pacemaker in situ     CHF (congestive heart failure)     Coronary artery disease     Cough     Dizziness     intermittant    DJD (degenerative joint disease) of knee     ED (erectile dysfunction)     Hard of hearing 11/2016    History of cataract     HTN (hypertension)     Hyperlipidemia     Hyperlipidemia     Memory loss     Seizure disorder     Seizures     last episode 1995    Sinus node dysfunction 08/2016       Past Surgical History:   Procedure Laterality Date    ANKLE SURGERY Right     pins and screws    ATRIAL ABLATION SURGERY      CARDIAC PACEMAKER PLACEMENT  2014    CARDIAC PACEMAKER PLACEMENT      CARDIAC PACEMAKER PLACEMENT  2014    CARDIOVERSION      CATARACT EXTRACTION      OU    COLONOSCOPY      due in 2014    EYE SURGERY      Jesse cataract    HERNIA REPAIR      abdominal    JOINT REPLACEMENT      bill shoulders       Family History   Problem Relation Age of Onset    Heart disease Mother     Cancer Father         colon    Blindness Father         accident    Cataracts Father     Hypertension Father     Tremor Father     Heart disease Brother     Tremor Brother     Tremor Paternal Grandfather     Melanoma Neg Hx     Amblyopia Neg Hx     Diabetes Neg Hx     Glaucoma Neg Hx     Macular degeneration Neg Hx     Retinal detachment Neg Hx     Strabismus Neg Hx     Stroke Neg Hx     Thyroid disease Neg Hx        Social History     Social History    Marital status:      Spouse name: N/A    Number of children: N/A    Years of education: N/A  "    Occupational History    retired      Social History Main Topics    Smoking status: Never Smoker    Smokeless tobacco: Never Used    Alcohol use No    Drug use: No    Sexual activity: Not on file     Other Topics Concern    Not on file     Social History Narrative    No narrative on file        Review of Systems:  A 14 system review is documented in the patient's chart.  It is noteworthy as above.  It is otherwise negative.  Please see the patient's chart for further details.    Physical exam:  /65   Pulse 71   Resp 20   Ht 6' (1.829 m)   Wt 102.8 kg (226 lb 11.2 oz)   BMI 30.75 kg/m²   General: Well developed, well nourished.  No acute distress.  HEENT: Atraumatic, normocephalic.  Neck: Supple, trachea midline.  Cardiovascular: Regular rate and rhythm.  Pulmonary: No increased work of breathing.  Abdomen/GI: No guarding.  Musculoskeletal: No obvious joint deformities, moves all extremities well.    Neurological exam:  Mental status: Awake and alert.  Oriented x3.  Speech fluent and appropriate.  Recent and remote memory appear to be largely intact.  Fund of knowledge grossly normal.  MMSE = 26/30.  Cranial nerves: Pupils equal round and reactive to light, extraocular movements intact, facial strength and sensation intact bilaterally, palate and tongue midline, hearing grossly intact bilaterally.  Motor: 5 out of 5 strength throughout the upper and lower extremities bilaterally aside from 4/5 bilateral IP. Normal bulk and tone.  Sensation: Intact to light touch and temperature bilaterally.  DTR: 2+ at the knees and biceps bilaterally.  Coordination: Finger-nose-finger testing intact bilaterally with a more pronounced intention tremor than in the past.  Gait: Normal gait. Good tandem.    Data base:  Previous notes reviewed.      Recent labs include a CMP with a cr=0.8.    CT head (1/18):  "There is moderate generalized cerebral atrophy.  There are no acute findings."  I independently visualized " "and interpreted this study.     EEG (1/30):  "This EEG shows a mild degree of diffuse nonspecific slowing, indicative of an encephalopathy or a nonspecific disturbance of cerebral function. No evidence of seizures or epileptiform activity was seen during the recording."    Neuropsychological testing:  "Mr. Muhammad was referred for Neuropsychological Evaluation on an outpatient basis due to subjective memory decline for the past 1-2 years. His general cognitive abilities as assessed by the RBANS were within the average range, with mild impairment in language due to poor performance on semantic fluency, and no impairment in immediate verbal memory, visuospatial/constructional abilities, attention, or delayed memory (with mildly impaired figure recall). Further assessment of specific cognitive abilities revealed mild deficits in semantic fluency and constructional abilities, with intact naming, verbal comprehension, phonemic fluency, abstract reasoning, psychomotor speed, and mental flexibility. Additional memory assessment revealed average immediate auditory/verbal memory, average delayed auditory/verbal memory, average immediate visual memory, and mildly impaired delayed visual memory. Personality test data suggested that depression and anxiety were not likely contributing factors to his cognitive performance. Neuropsychological testing revealed mild impairment in semantic fluency and delayed visual memory; and variability in constructional ability (performances in the average and mildly impaired ranges). The deficits revealed are not sufficient to warrant a diagnosis of mild cognitive impairment. Reassurance may prove useful. Repeat testing is indicated if there is evidence of further decline in the future."    Assessment and plan:  The patient is a 77 y.o. male seen previously for a variety of issues.  The patient has a post-concussive syndrome which seems to be improving.  We spoke in detail about the nature of his " diagnosis and the anticipated course.  Given his reported history of worsening memory, we will repeat his neuropsychological testing.  His tremor disorder is satisfactorily controlled from his perspective.  Should it worsen, we may have him meet with Dr. Quinn again in the future.  Regarding his seizure disorder, this remains stable on Lamictal.  We will continue this medication.  We will see the patient back in a few months.    Greater than 50% of the patient's 25 minute clinic visit was spent on counseling and arranging care.  Topics covered include diagnosis, prognosis, testing, and treatment.

## 2018-06-19 NOTE — TELEPHONE ENCOUNTER
Please contact the pt to schedule  follow up neuro-psych testing due to memory changes. Thank you.

## 2018-06-22 ENCOUNTER — OFFICE VISIT (OUTPATIENT)
Dept: FAMILY MEDICINE | Facility: CLINIC | Age: 78
End: 2018-06-22
Payer: MEDICARE

## 2018-06-22 VITALS
WEIGHT: 227.75 LBS | HEART RATE: 69 BPM | SYSTOLIC BLOOD PRESSURE: 128 MMHG | OXYGEN SATURATION: 95 % | BODY MASS INDEX: 30.85 KG/M2 | HEIGHT: 72 IN | DIASTOLIC BLOOD PRESSURE: 66 MMHG

## 2018-06-22 VITALS
DIASTOLIC BLOOD PRESSURE: 66 MMHG | HEART RATE: 69 BPM | WEIGHT: 227.75 LBS | OXYGEN SATURATION: 95 % | TEMPERATURE: 99 F | HEIGHT: 72 IN | SYSTOLIC BLOOD PRESSURE: 128 MMHG | RESPIRATION RATE: 18 BRPM | BODY MASS INDEX: 30.85 KG/M2

## 2018-06-22 DIAGNOSIS — I10 ESSENTIAL HYPERTENSION: Primary | ICD-10-CM

## 2018-06-22 DIAGNOSIS — I10 ESSENTIAL HYPERTENSION: ICD-10-CM

## 2018-06-22 DIAGNOSIS — E78.2 MIXED HYPERLIPIDEMIA: ICD-10-CM

## 2018-06-22 DIAGNOSIS — I50.33 ACUTE ON CHRONIC DIASTOLIC (CONGESTIVE) HEART FAILURE: ICD-10-CM

## 2018-06-22 DIAGNOSIS — I51.89 DIASTOLIC DYSFUNCTION: ICD-10-CM

## 2018-06-22 DIAGNOSIS — I42.8 CARDIOMYOPATHY, NONISCHEMIC: ICD-10-CM

## 2018-06-22 DIAGNOSIS — Z12.5 PROSTATE CANCER SCREENING: ICD-10-CM

## 2018-06-22 DIAGNOSIS — I25.10 CORONARY ARTERY DISEASE INVOLVING NATIVE CORONARY ARTERY OF NATIVE HEART WITHOUT ANGINA PECTORIS: Chronic | ICD-10-CM

## 2018-06-22 DIAGNOSIS — J84.10 PULMONARY GRANULOMA: ICD-10-CM

## 2018-06-22 DIAGNOSIS — G25.0 ESSENTIAL TREMOR: ICD-10-CM

## 2018-06-22 DIAGNOSIS — I47.19 ATRIAL TACHYCARDIA: ICD-10-CM

## 2018-06-22 DIAGNOSIS — I70.0 AORTIC ATHEROSCLEROSIS: ICD-10-CM

## 2018-06-22 DIAGNOSIS — R00.0 SINUS TACHYCARDIA: ICD-10-CM

## 2018-06-22 DIAGNOSIS — R41.3 MEMORY LOSS: ICD-10-CM

## 2018-06-22 DIAGNOSIS — Z79.01 CURRENT USE OF LONG TERM ANTICOAGULATION: ICD-10-CM

## 2018-06-22 DIAGNOSIS — Z00.00 ENCOUNTER FOR PREVENTIVE HEALTH EXAMINATION: Primary | ICD-10-CM

## 2018-06-22 DIAGNOSIS — Z95.0 CARDIAC PACEMAKER IN SITU: ICD-10-CM

## 2018-06-22 DIAGNOSIS — I48.19 PERSISTENT ATRIAL FIBRILLATION: ICD-10-CM

## 2018-06-22 PROBLEM — J96.01 ACUTE HYPOXEMIC RESPIRATORY FAILURE: Status: RESOLVED | Noted: 2018-01-19 | Resolved: 2018-06-22

## 2018-06-22 PROBLEM — R09.02 HYPOXIA: Status: RESOLVED | Noted: 2018-01-22 | Resolved: 2018-06-22

## 2018-06-22 PROBLEM — R53.1 WEAKNESS: Status: RESOLVED | Noted: 2018-01-21 | Resolved: 2018-06-22

## 2018-06-22 PROBLEM — R79.89 ELEVATED TROPONIN: Status: RESOLVED | Noted: 2018-01-23 | Resolved: 2018-06-22

## 2018-06-22 PROBLEM — E87.20 METABOLIC ACIDOSIS: Status: RESOLVED | Noted: 2018-01-23 | Resolved: 2018-06-22

## 2018-06-22 PROBLEM — R94.31 ABNORMAL EKG: Status: RESOLVED | Noted: 2018-01-24 | Resolved: 2018-06-22

## 2018-06-22 PROBLEM — I50.21 ACUTE SYSTOLIC HEART FAILURE: Status: RESOLVED | Noted: 2018-01-23 | Resolved: 2018-06-22

## 2018-06-22 PROBLEM — Z75.8 DISCHARGE PLANNING ISSUES: Status: RESOLVED | Noted: 2018-01-31 | Resolved: 2018-06-22

## 2018-06-22 PROBLEM — R53.83 LETHARGIC: Status: RESOLVED | Noted: 2018-01-24 | Resolved: 2018-06-22

## 2018-06-22 PROBLEM — Z02.9 DISCHARGE PLANNING ISSUES: Status: RESOLVED | Noted: 2018-01-31 | Resolved: 2018-06-22

## 2018-06-22 PROCEDURE — 99999 PR PBB SHADOW E&M-EST. PATIENT-LVL IV: CPT | Mod: PBBFAC,,, | Performed by: NURSE PRACTITIONER

## 2018-06-22 PROCEDURE — 3074F SYST BP LT 130 MM HG: CPT | Mod: CPTII,S$GLB,, | Performed by: FAMILY MEDICINE

## 2018-06-22 PROCEDURE — 3078F DIAST BP <80 MM HG: CPT | Mod: CPTII,S$GLB,, | Performed by: NURSE PRACTITIONER

## 2018-06-22 PROCEDURE — 3078F DIAST BP <80 MM HG: CPT | Mod: CPTII,S$GLB,, | Performed by: FAMILY MEDICINE

## 2018-06-22 PROCEDURE — 99499 UNLISTED E&M SERVICE: CPT | Mod: S$GLB,,, | Performed by: NURSE PRACTITIONER

## 2018-06-22 PROCEDURE — 99214 OFFICE O/P EST MOD 30 MIN: CPT | Mod: S$GLB,,, | Performed by: FAMILY MEDICINE

## 2018-06-22 PROCEDURE — G0439 PPPS, SUBSEQ VISIT: HCPCS | Mod: S$GLB,,, | Performed by: NURSE PRACTITIONER

## 2018-06-22 PROCEDURE — 99499 UNLISTED E&M SERVICE: CPT | Mod: S$GLB,,, | Performed by: FAMILY MEDICINE

## 2018-06-22 PROCEDURE — 3074F SYST BP LT 130 MM HG: CPT | Mod: CPTII,S$GLB,, | Performed by: NURSE PRACTITIONER

## 2018-06-22 PROCEDURE — 99999 PR PBB SHADOW E&M-EST. PATIENT-LVL III: CPT | Mod: PBBFAC,,, | Performed by: FAMILY MEDICINE

## 2018-06-22 NOTE — PROGRESS NOTES
"Subjective:       Patient ID: Heri Muhammad is a 77 y.o. male.    Chief Complaint: Establish Care and Annual Exam    This pt is new to me.  Pt is here for followup of chronic medical issues.  Pt is followed by Dr. Naik for seizure disorder--well controlled.  He also sees Dr. Graham --has a pacemaker.  Pt was hospitalized earlier this year for "heart failure."      Review of Systems   Constitutional: Negative for activity change, appetite change, fatigue and unexpected weight change.   Eyes: Negative for visual disturbance.   Respiratory: Positive for shortness of breath (with exertion). Negative for cough and chest tightness.    Cardiovascular: Negative for chest pain, palpitations and leg swelling.   Gastrointestinal: Negative for abdominal pain, constipation, diarrhea, nausea and vomiting.   Endocrine: Negative for cold intolerance, heat intolerance and polyuria.   Genitourinary: Negative for decreased urine volume, dysuria and frequency.   Musculoskeletal: Positive for arthralgias, back pain (" bad back") and gait problem (uses a cane).   Skin: Negative for rash.   Neurological: Positive for weakness (generalized). Negative for seizures (none recently), numbness and headaches.   Psychiatric/Behavioral:        Has some memory loss--followed by Dr. Naik--on Namenda       Objective:       Vitals:    06/22/18 1351   BP: 128/66   BP Location: Left arm   Patient Position: Sitting   BP Method: Medium (Manual)   Pulse: 69   Resp: 18   Temp: 99 °F (37.2 °C)   TempSrc: Oral   SpO2: 95%   Weight: 103.3 kg (227 lb 11.8 oz)   Height: 6' (1.829 m)     Physical Exam   Constitutional: He is oriented to person, place, and time. He appears well-developed and well-nourished.   Pt somewhat frail   HENT:   Head: Normocephalic.   Eyes: Conjunctivae and EOM are normal. Pupils are equal, round, and reactive to light.   Neck: Normal range of motion. Neck supple. No thyromegaly present.   Cardiovascular: Normal rate, regular rhythm and " normal heart sounds.    Pulmonary/Chest: Effort normal and breath sounds normal.   Abdominal: Soft. Bowel sounds are normal. There is no tenderness.   Musculoskeletal: Normal range of motion. He exhibits no tenderness or deformity.   Lymphadenopathy:     He has no cervical adenopathy.   Neurological: He is alert and oriented to person, place, and time. He displays normal reflexes. No cranial nerve deficit. He exhibits normal muscle tone. Coordination normal.   Skin: Skin is warm and dry.   Psychiatric: He has a normal mood and affect. His behavior is normal.       Assessment:       1. Essential hypertension    2. Mixed hyperlipidemia    3. Cardiomyopathy, nonischemic    4. Prostate cancer screening        Plan:       Heri was seen today for establish care and annual exam.    Diagnoses and all orders for this visit:    Essential hypertension    Mixed hyperlipidemia    Cardiomyopathy, nonischemic    Prostate cancer screening  -     PSA, Screening; Future      During this visit, I reviewed the pt's history, medications, allergies, and problem list.

## 2018-06-22 NOTE — PATIENT INSTRUCTIONS
Counseling and Referral of Other Preventative  (Italic type indicates deductible and co-insurance are waived)    Patient Name: Heri Muhammad  Today's Date: 6/22/2018    Health Maintenance       Date Due Completion Date    Influenza Vaccine 08/01/2018 9/18/2017    Override on 11/11/2013: (N/S) (done in hospital)    Lipid Panel 01/20/2023 1/20/2018    TETANUS VACCINE 12/06/2026 12/6/2016        No orders of the defined types were placed in this encounter.    The following information is provided to all patients.  This information is to help you find resources for any of the problems found today that may be affecting your health:                Living healthy guide: www.[facility].[place].gov      Understanding Diabetes: www.diabetes.org      Eating healthy: www.cdc.gov/healthyweight      CDC home safety checklist: www.cdc.gov/steadi/patient.html      Agency on Aging: www.goea.[place].[facility]      Alcoholics anonymous (AA): www.aa.org      Physical Activity: www.mimi.nih.gov/fm4hzvq      Tobacco use: www.quitwithusla.org

## 2018-06-25 NOTE — PROGRESS NOTES
Heri Muhammad presented for a  Medicare AWV and comprehensive Health Risk Assessment today. The following components were reviewed and updated:    · Medical history  · Family History  · Social history  · Allergies and Current Medications  · Health Risk Assessment  · Health Maintenance  · Care Team     Review of Systems   Constitutional: Negative for chills, fever, malaise/fatigue and weight loss.   Respiratory: Negative for cough, shortness of breath and wheezing.    Cardiovascular: Negative for chest pain.   Gastrointestinal: Negative for abdominal pain, blood in stool, constipation, diarrhea, nausea and vomiting.   Skin: Negative for rash.   Neurological: Negative for dizziness, weakness and headaches.     ** See Completed Assessments for Annual Wellness Visit within the encounter summary.**     The following assessments were completed:  · Living Situation  · CAGE  · Depression Screening  · Timed Get Up and Go  · Whisper Test  · Cognitive Function Screening      · Nutrition Screening  · ADL Screening  · PAQ Screening    Vitals:    06/22/18 1143   BP: 128/66   BP Location: Left arm   Patient Position: Sitting   BP Method: Medium (Manual)   Pulse: 69   SpO2: 95%   Weight: 103.3 kg (227 lb 11.8 oz)   Height: 6' (1.829 m)     Body mass index is 30.89 kg/m².  Physical Exam   Constitutional: He is oriented to person, place, and time. He appears well-nourished.   Cardiovascular: Normal rate, regular rhythm, normal heart sounds and intact distal pulses.    Pulmonary/Chest: Effort normal and breath sounds normal.   Neurological: He is alert and oriented to person, place, and time.   Skin: Skin is warm and dry. No rash noted.   Vitals reviewed.        Diagnoses and health risks identified today and associated recommendations/orders:    1. Encounter for preventive health examination  Reviewed and discussed health maintenance.      2. Pulmonary granuloma  Stable- continue current treatment and follow up routinely with PCP      3. Persistent atrial fibrillation  Stable- continue current treatment and follow up routinely with PCP and cardiology (Dr. Graham)    4. Mixed hyperlipidemia  Stable- continue current treatment and follow up routinely with PCP and cardiology (Dr. Graham)    5. Cardiac pacemaker in situ  Stable- continue current treatment and follow up routinely with PCP and cardiology (Dr. Graham)    6. Coronary artery disease involving native coronary artery of native heart without angina pectoris  Stable- continue current treatment and follow up routinely with PCP and cardiology (Dr. Graham)    7. Cardiomyopathy, nonischemic  Stable- continue current treatment and follow up routinely with PCP and cardiology (Dr. Graham)    8. Diastolic dysfunction  Stable- continue current treatment and follow up routinely with PCP and cardiology (Dr. Graham)    9. Sinus tachycardia  Stable- continue current treatment and follow up routinely with PCP and cardiology (Dr. Graham)    10. Acute on chronic diastolic (congestive) heart failure  Stable- continue current treatment and follow up routinely with PCP and cardiology (Dr. Graham)    11. Aortic atherosclerosis  Stable- continue current treatment and follow up routinely with PCP and cardiology (Dr. Graham)    12. Essential hypertension  Stable- continue current treatment and follow up routinely with PCP and cardiology (Dr. Graham)    13. Atrial tachycardia  Stable- continue current treatment and follow up routinely with PCP and cardiology (Dr. Graham)    14. Memory loss  Stable- continue current treatment and follow up routinely with PCP and neurology (Dr. Naik)    15. Essential tremor  Stable- continue current treatment and follow up routinely with PCP and neurology (Dr. Naik)    16. Current use of long term anticoagulation  Stable- continue current treatment and follow up routinely with PCP and cardiology (Dr. Graham)    Jorge Liriano with a 5-10 year written screening schedule and personal prevention plan. Recommendations  were developed using the USPSTF age appropriate recommendations. Education, counseling, and referrals were provided as needed. After Visit Summary printed and given to patient which includes a list of additional screenings\tests needed.    Carol Gross NP

## 2018-07-04 LAB
AV DELAY - LONGEST: 200 MSEC
AV DELAY - SHORTEST: 100 MSEC
BATTERY VOLTAGE (V): 2.99 V
IMPEDANCE RA LEAD (DONOR): 1075 OHMS
IMPEDANCE RA LEAD (NATIVE): 560 OHMS
IMPEDANCE RA LEAD: 380 OHMS
OHS CV DC PP MS1: 0.4 MS
OHS CV DC PP MS2: 0.4 MS
OHS CV DC PP MS3: 0.8 MS
OHS CV DC PP V1: NORMAL V
OHS CV DC PP V2: NORMAL V
OHS CV DC PP V3: NORMAL V
P/R-WAVE RA LEAD (NATIVE): 12 MV
P/R-WAVE RA LEAD: 2.2 MV
PV DELAY - LONGEST: 150 MSEC
PV DELAY - SHORTEST: 100 MSEC
THRESHOLD MS RA LEAD (DONOR): 0.8 MS
THRESHOLD MS RA LEAD (NATIVE): 0.4 MS
THRESHOLD MS RA LEAD: 0.4 MS
THRESHOLD V RA LEAD (DONOR): 1.88 V
THRESHOLD V RA LEAD (NATIVE): 0.5 V
THRESHOLD V RA LEAD: 0.62 V
VV DELAY: 0 MSEC

## 2018-08-03 ENCOUNTER — OFFICE VISIT (OUTPATIENT)
Dept: DERMATOLOGY | Facility: CLINIC | Age: 78
End: 2018-08-03
Payer: MEDICARE

## 2018-08-03 ENCOUNTER — TELEPHONE (OUTPATIENT)
Dept: CARDIOLOGY | Facility: CLINIC | Age: 78
End: 2018-08-03

## 2018-08-03 VITALS — BODY MASS INDEX: 30.09 KG/M2 | HEIGHT: 73 IN | WEIGHT: 227 LBS

## 2018-08-03 DIAGNOSIS — L72.0 EPIDERMAL INCLUSION CYST: ICD-10-CM

## 2018-08-03 DIAGNOSIS — L82.1 SEBORRHEIC KERATOSES: ICD-10-CM

## 2018-08-03 DIAGNOSIS — L91.8 CUTANEOUS SKIN TAGS: ICD-10-CM

## 2018-08-03 DIAGNOSIS — L72.0 MILIUM: Primary | ICD-10-CM

## 2018-08-03 PROCEDURE — 99213 OFFICE O/P EST LOW 20 MIN: CPT | Mod: S$GLB,,, | Performed by: DERMATOLOGY

## 2018-08-03 PROCEDURE — 99999 PR PBB SHADOW E&M-EST. PATIENT-LVL II: CPT | Mod: PBBFAC,,, | Performed by: DERMATOLOGY

## 2018-08-03 NOTE — TELEPHONE ENCOUNTER
Left message for pt to call back to schedule 6 month f/u that is due with Dr. Esparza on the [facility].

## 2018-08-03 NOTE — PROGRESS NOTES
Subjective:       Patient ID:  Heri Muhammad is a 77 y.o. male who presents for   Chief Complaint   Patient presents with    Lesion     Last seen 8- by Dr. Skinner. Today c/o a lesion near his right eye present for 1 month, asymptomatic, not treated  Lesions on his back present for several years, asymptomatic, not treated       Derm Hx:   AK treated with cryo in the past  SCC left back S/p excision 9/2015        Past Medical History:   Diagnosis Date    A-fib     Anticoagulant long-term use     eliquis    Atrial flutter     Benign essential tremor     Cancer     skin cancer on back    Cardiac pacemaker in situ     CHF (congestive heart failure)     Coronary artery disease     Cough     Dizziness     intermittant    DJD (degenerative joint disease) of knee     ED (erectile dysfunction)     Hard of hearing 11/2016    History of cataract     HTN (hypertension)     Hyperlipidemia     Hyperlipidemia     Memory loss     Seizure disorder     Seizures     last episode 1995    Sinus node dysfunction 08/2016     Review of Systems   Skin: Positive for itching and dry skin. Negative for daily sunscreen use, activity-related sunscreen use, sensitivity to antibiotic ointment and sensitivity to bandage adhesive.   Hematologic/Lymphatic: Bruises/bleeds easily.        Objective:    Physical Exam   Constitutional: He appears well-developed and well-nourished.   Neurological: He is alert and oriented to person, place, and time.   Psychiatric: He has a normal mood and affect. He has a flat affect.   Skin:   Areas Examined (abnormalities noted in diagram):   Head / Face Inspection Performed  Neck Inspection Performed  Back Inspection Performed  RUE Inspected  LUE Inspection Performed                   Diagram Legend     Erythematous scaling macule/papule c/w actinic keratosis       Vascular papule c/w angioma      Pigmented verrucoid papule/plaque c/w seborrheic keratosis      Yellow umbilicated papule  c/w sebaceous hyperplasia      Irregularly shaped tan macule c/w lentigo     1-2 mm smooth white papules consistent with Milia      Movable subcutaneous cyst with punctum c/w epidermal inclusion cyst      Subcutaneous movable cyst c/w pilar cyst      Firm pink to brown papule c/w dermatofibroma      Pedunculated fleshy papule(s) c/w skin tag(s)      Evenly pigmented macule c/w junctional nevus     Mildly variegated pigmented, slightly irregular-bordered macule c/w mildly atypical nevus      Flesh colored to evenly pigmented papule c/w intradermal nevus       Pink pearly papule/plaque c/w basal cell carcinoma      Erythematous hyperkeratotic cursted plaque c/w SCC      Surgical scar with no sign of skin cancer recurrence      Open and closed comedones      Inflammatory papules and pustules      Verrucoid papule consistent consistent with wart     Erythematous eczematous patches and plaques     Dystrophic onycholytic nail with subungual debris c/w onychomycosis     Umbilicated papule    Erythematous-base heme-crusted tan verrucoid plaque consistent with inflamed seborrheic keratosis     Erythematous Silvery Scaling Plaque c/w Psoriasis     See annotation      Assessment / Plan:        Milium  Reassurance    Cutaneous skin tags  Reassurance    Seborrheic keratoses  These are benign inherited growths without a malignant potential. Reassurance given to patient. No treatment is necessary.     Epidermal inclusion cyst  Discussed that this is a benign lesion and that mgmt options include observation vs excision.  Because the cyst is asymptomatic, pt would like to observe for now              Follow-up if symptoms worsen or fail to improve.

## 2018-08-03 NOTE — TELEPHONE ENCOUNTER
----- Message from Rozina Zelaya sent at 8/3/2018  3:09 PM CDT -----  Regarding: Apt needed   Good Evening,   This pt has a recall to see Isaias.  Can you get this booked after September 19.  I will coordinate our apt here in the pacemaker clinic with Noris phillip.  Thank You

## 2018-08-29 ENCOUNTER — LAB VISIT (OUTPATIENT)
Dept: LAB | Facility: HOSPITAL | Age: 78
End: 2018-08-29
Attending: INTERNAL MEDICINE
Payer: MEDICARE

## 2018-08-29 ENCOUNTER — CLINICAL SUPPORT (OUTPATIENT)
Dept: CARDIOLOGY | Facility: CLINIC | Age: 78
End: 2018-08-29
Attending: INTERNAL MEDICINE
Payer: MEDICARE

## 2018-08-29 VITALS
BODY MASS INDEX: 30.09 KG/M2 | DIASTOLIC BLOOD PRESSURE: 60 MMHG | SYSTOLIC BLOOD PRESSURE: 120 MMHG | HEART RATE: 72 BPM | HEIGHT: 73 IN | WEIGHT: 227 LBS

## 2018-08-29 DIAGNOSIS — I42.8 CARDIOMYOPATHY, NONISCHEMIC: ICD-10-CM

## 2018-08-29 DIAGNOSIS — I48.92 ATRIAL FLUTTER, UNSPECIFIED TYPE: ICD-10-CM

## 2018-08-29 DIAGNOSIS — Z12.5 PROSTATE CANCER SCREENING: ICD-10-CM

## 2018-08-29 DIAGNOSIS — R06.09 DOE (DYSPNEA ON EXERTION): Chronic | ICD-10-CM

## 2018-08-29 DIAGNOSIS — I48.19 PERSISTENT ATRIAL FIBRILLATION: ICD-10-CM

## 2018-08-29 DIAGNOSIS — I70.0 AORTIC ATHEROSCLEROSIS: ICD-10-CM

## 2018-08-29 DIAGNOSIS — E78.2 MIXED HYPERLIPIDEMIA: ICD-10-CM

## 2018-08-29 DIAGNOSIS — Z95.0 CARDIAC PACEMAKER IN SITU: ICD-10-CM

## 2018-08-29 DIAGNOSIS — R06.09 DOE (DYSPNEA ON EXERTION): ICD-10-CM

## 2018-08-29 LAB
ALBUMIN SERPL BCP-MCNC: 3.7 G/DL
ALP SERPL-CCNC: 98 U/L
ALT SERPL W/O P-5'-P-CCNC: 21 U/L
ANION GAP SERPL CALC-SCNC: 9 MMOL/L
AST SERPL-CCNC: 20 U/L
BASOPHILS # BLD AUTO: 0.04 K/UL
BASOPHILS NFR BLD: 0.7 %
BILIRUB SERPL-MCNC: 0.4 MG/DL
BNP SERPL-MCNC: 92 PG/ML
BUN SERPL-MCNC: 8 MG/DL
CALCIUM SERPL-MCNC: 9 MG/DL
CHLORIDE SERPL-SCNC: 99 MMOL/L
CHOLEST SERPL-MCNC: 173 MG/DL
CHOLEST/HDLC SERPL: 4.8 {RATIO}
CO2 SERPL-SCNC: 31 MMOL/L
COMPLEXED PSA SERPL-MCNC: 3.1 NG/ML
CREAT SERPL-MCNC: 0.8 MG/DL
DIFFERENTIAL METHOD: ABNORMAL
EOSINOPHIL # BLD AUTO: 0 K/UL
EOSINOPHIL NFR BLD: 0 %
ERYTHROCYTE [DISTWIDTH] IN BLOOD BY AUTOMATED COUNT: 13.8 %
EST. GFR  (AFRICAN AMERICAN): >60 ML/MIN/1.73 M^2
EST. GFR  (NON AFRICAN AMERICAN): >60 ML/MIN/1.73 M^2
GLUCOSE SERPL-MCNC: 98 MG/DL
HCT VFR BLD AUTO: 46.4 %
HDLC SERPL-MCNC: 36 MG/DL
HDLC SERPL: 20.8 %
HGB BLD-MCNC: 15.3 G/DL
IMM GRANULOCYTES # BLD AUTO: 0.01 K/UL
IMM GRANULOCYTES NFR BLD AUTO: 0.2 %
LDLC SERPL CALC-MCNC: 117 MG/DL
LYMPHOCYTES # BLD AUTO: 1.9 K/UL
LYMPHOCYTES NFR BLD: 36.3 %
MCH RBC QN AUTO: 31.5 PG
MCHC RBC AUTO-ENTMCNC: 33 G/DL
MCV RBC AUTO: 96 FL
MONOCYTES # BLD AUTO: 0.5 K/UL
MONOCYTES NFR BLD: 9 %
NEUTROPHILS # BLD AUTO: 2.9 K/UL
NEUTROPHILS NFR BLD: 53.8 %
NONHDLC SERPL-MCNC: 137 MG/DL
NRBC BLD-RTO: 0 /100 WBC
PLATELET # BLD AUTO: 170 K/UL
PMV BLD AUTO: 10.6 FL
POTASSIUM SERPL-SCNC: 3.9 MMOL/L
PROT SERPL-MCNC: 7.3 G/DL
RBC # BLD AUTO: 4.86 M/UL
SODIUM SERPL-SCNC: 139 MMOL/L
TRIGL SERPL-MCNC: 100 MG/DL
WBC # BLD AUTO: 5.34 K/UL

## 2018-08-29 PROCEDURE — 80061 LIPID PANEL: CPT

## 2018-08-29 PROCEDURE — 93306 TTE W/DOPPLER COMPLETE: CPT | Mod: S$GLB,,, | Performed by: INTERNAL MEDICINE

## 2018-08-29 PROCEDURE — 36415 COLL VENOUS BLD VENIPUNCTURE: CPT | Mod: PO

## 2018-08-29 PROCEDURE — 99999 PR PBB SHADOW E&M-EST. PATIENT-LVL II: CPT | Mod: PBBFAC,,,

## 2018-08-29 PROCEDURE — 80053 COMPREHEN METABOLIC PANEL: CPT

## 2018-08-29 PROCEDURE — 83880 ASSAY OF NATRIURETIC PEPTIDE: CPT

## 2018-08-29 PROCEDURE — 85025 COMPLETE CBC W/AUTO DIFF WBC: CPT

## 2018-08-29 PROCEDURE — 84153 ASSAY OF PSA TOTAL: CPT

## 2018-08-30 ENCOUNTER — TELEPHONE (OUTPATIENT)
Dept: CARDIOLOGY | Facility: CLINIC | Age: 78
End: 2018-08-30

## 2018-08-30 LAB
ASCENDING AORTA: 3.72 CM
AV MEAN GRADIENT: 4.78 MMHG
AV PEAK GRADIENT: 8.76 MMHG
AV VALVE AREA: 3.03 CM2
BSA FOR ECHO PROCEDURE: 2.3 M2
CV ECHO LV RWT: 0.43 CM
DOP CALC AO PEAK VEL: 1.48 M/S
DOP CALC AO VTI: 23.31 CM
DOP CALC LVOT AREA: 3.63 CM2
DOP CALC LVOT DIAMETER: 2.15 CM
DOP CALC LVOT STROKE VOLUME: 70.65 CM3
DOP CALCLVOT PEAK VEL VTI: 19.47 CM
E WAVE DECELERATION TIME: 155.97 MSEC
E/A RATIO: 2.18
E/E' RATIO: 9.6
ECHO LV POSTERIOR WALL: 1.1 CM (ref 0.6–1.1)
FRACTIONAL SHORTENING: 20 % (ref 28–44)
INTERVENTRICULAR SEPTUM: 1.12 CM (ref 0.6–1.1)
IVRT: 0.07 MSEC
LA MAJOR: 6.21 CM
LA MINOR: 5.21 CM
LA WIDTH: 4.57 CM
LEFT ATRIUM SIZE: 5.02 CM
LEFT ATRIUM VOLUME INDEX: 48 ML/M2
LEFT ATRIUM VOLUME: 110.49 CM3
LEFT INTERNAL DIMENSION IN SYSTOLE: 4.16 CM (ref 2.1–4)
LEFT VENTRICLE MASS INDEX: 97.2 G/M2
LEFT VENTRICULAR INTERNAL DIMENSION IN DIASTOLE: 5.2 CM (ref 3.5–6)
LEFT VENTRICULAR MASS: 223.5 G
LV LATERAL E/E' RATIO: 8
LV SEPTAL E/E' RATIO: 12
MV PEAK A VEL: 0.33 M/S
MV PEAK E VEL: 0.72 M/S
MV STENOSIS PRESSURE HALF TIME: 45.23 MS
MV VALVE AREA P 1/2 METHOD: 4.86 CM2
PISA TR MAX VEL: 2.94 M/S
PULM VEIN S/D RATIO: 0.33
PV PEAK D VEL: 0.75 M/S
PV PEAK S VEL: 0.25 M/S
RA MAJOR: 4.25 CM
RA PRESSURE: 3 MMHG
RA WIDTH: 3.88 CM
RIGHT VENTRICULAR END-DIASTOLIC DIMENSION: 3.54 CM
SINUS: 3.29 CM
STJ: 3.65 CM
TDI LATERAL: 0.09
TDI SEPTAL: 0.06
TDI: 0.08
TR MAX PG: 34.57 MMHG
TRICUSPID ANNULAR PLANE SYSTOLIC EXCURSION: 0.02 CM
TV REST PULMONARY ARTERY PRESSURE: 37.57 MMHG

## 2018-08-30 NOTE — TELEPHONE ENCOUNTER
----- Message from Nilesh Eugene sent at 8/30/2018  8:37 AM CDT -----  Contact: Ana  Type:  Patient Returning Call    Who Called:  Ana patient's wife  Who Left Message for Patient:  Odalis  Does the patient know what this is regarding?:  yes  Best Call Back Number:  [phone]  Additional Information:  Requesting a call back, I offered the next available appointment 10/31 patient declined,stated can't wait that long to be seen,since doctor is canceling

## 2018-09-12 RX ORDER — ATORVASTATIN CALCIUM 10 MG/1
TABLET, FILM COATED ORAL
Qty: 90 TABLET | Refills: 4 | Status: SHIPPED | OUTPATIENT
Start: 2018-09-12 | End: 2019-10-24 | Stop reason: SDUPTHER

## 2018-09-24 ENCOUNTER — OFFICE VISIT (OUTPATIENT)
Dept: CARDIOLOGY | Facility: CLINIC | Age: 78
End: 2018-09-24
Payer: MEDICARE

## 2018-09-24 ENCOUNTER — CLINICAL SUPPORT (OUTPATIENT)
Dept: CARDIOLOGY | Facility: CLINIC | Age: 78
End: 2018-09-24
Attending: INTERNAL MEDICINE
Payer: MEDICARE

## 2018-09-24 VITALS
SYSTOLIC BLOOD PRESSURE: 115 MMHG | HEIGHT: 73 IN | HEART RATE: 71 BPM | DIASTOLIC BLOOD PRESSURE: 65 MMHG | BODY MASS INDEX: 30.21 KG/M2 | WEIGHT: 227.94 LBS

## 2018-09-24 DIAGNOSIS — I47.19 ATRIAL TACHYCARDIA: ICD-10-CM

## 2018-09-24 DIAGNOSIS — I25.10 CORONARY ARTERY DISEASE INVOLVING NATIVE CORONARY ARTERY OF NATIVE HEART WITHOUT ANGINA PECTORIS: Chronic | ICD-10-CM

## 2018-09-24 DIAGNOSIS — I49.5 SINUS NODE DYSFUNCTION: ICD-10-CM

## 2018-09-24 DIAGNOSIS — I42.8 CARDIOMYOPATHY, NONISCHEMIC: ICD-10-CM

## 2018-09-24 DIAGNOSIS — Z95.0 BIVENTRICULAR CARDIAC PACEMAKER IN SITU: ICD-10-CM

## 2018-09-24 DIAGNOSIS — I10 ESSENTIAL HYPERTENSION: ICD-10-CM

## 2018-09-24 DIAGNOSIS — I48.92 ATRIAL FLUTTER, UNSPECIFIED TYPE: ICD-10-CM

## 2018-09-24 DIAGNOSIS — G40.909 SEIZURE DISORDER: ICD-10-CM

## 2018-09-24 DIAGNOSIS — G30.1 LATE ONSET ALZHEIMER'S DISEASE WITHOUT BEHAVIORAL DISTURBANCE: ICD-10-CM

## 2018-09-24 DIAGNOSIS — I48.19 PERSISTENT ATRIAL FIBRILLATION: Primary | ICD-10-CM

## 2018-09-24 DIAGNOSIS — Z95.0 CARDIAC PACEMAKER IN SITU: ICD-10-CM

## 2018-09-24 DIAGNOSIS — F02.80 LATE ONSET ALZHEIMER'S DISEASE WITHOUT BEHAVIORAL DISTURBANCE: ICD-10-CM

## 2018-09-24 PROCEDURE — 3078F DIAST BP <80 MM HG: CPT | Mod: CPTII,,, | Performed by: INTERNAL MEDICINE

## 2018-09-24 PROCEDURE — 93281 PM DEVICE PROGR EVAL MULTI: CPT | Mod: S$GLB,,, | Performed by: INTERNAL MEDICINE

## 2018-09-24 PROCEDURE — 99999 PR PBB SHADOW E&M-EST. PATIENT-LVL III: CPT | Mod: PBBFAC,,, | Performed by: INTERNAL MEDICINE

## 2018-09-24 PROCEDURE — 3074F SYST BP LT 130 MM HG: CPT | Mod: CPTII,,, | Performed by: INTERNAL MEDICINE

## 2018-09-24 PROCEDURE — 99213 OFFICE O/P EST LOW 20 MIN: CPT | Mod: PBBFAC,PO | Performed by: INTERNAL MEDICINE

## 2018-09-24 PROCEDURE — 1101F PT FALLS ASSESS-DOCD LE1/YR: CPT | Mod: CPTII,,, | Performed by: INTERNAL MEDICINE

## 2018-09-24 PROCEDURE — 93005 ELECTROCARDIOGRAM TRACING: CPT | Mod: PBBFAC,PO | Performed by: INTERNAL MEDICINE

## 2018-09-24 PROCEDURE — 93010 ELECTROCARDIOGRAM REPORT: CPT | Mod: S$PBB,,, | Performed by: INTERNAL MEDICINE

## 2018-09-24 PROCEDURE — 99214 OFFICE O/P EST MOD 30 MIN: CPT | Mod: S$PBB,,, | Performed by: INTERNAL MEDICINE

## 2018-09-24 NOTE — PROGRESS NOTES
Subjective:    Patient ID:  Heri Muhammad is a 77 y.o. male who presents for follow-up of Follow-up      HPI 76 yo male with CAD, atrial fibrillation, atrial flutter, sinus node dysfunction, CRT-P.  Dual chamber PPM implanted (06/30/14).   Presented with fatigue, found to be in atrial fibrillation. Placed on amiodarone, underwent DCCV.  Symptoms with AF have historically been fatigue and lightheadedness.  DCCV (10/15/15) >> recurrence. DCCV (11/23/15), amiodarone increased to 400 mg daily. Developed atrial flutter.  (01/29/16) PVI + RFA for atrial flutter. Amiodarone discontinued.  Felt poorly with Multaq.  Did well for initially in terms of atrial fibrillation, but continued to note dyspnea.  Underwent LHC/RHC 6/10/16, no obstructive CAD, normal rt sided filling pressures.  Developed persistent recurrence >> DCCV 11/22/16.   Recurrence.  RFA 3/23/17 Repeat isolation of LSPV, RSPV, RIPV (LIPV remained isolated).  Atrial flutter c/w mitral annular flutter >> anterior mitral annular line created >> flutter converted to what appeared to be a roof flutter >> roof line created.  Tachycardia converted to another tachycardia, ultimately mapped to BERNADETTE >> ablation deferred.  Developed recurrence of flutter during blanking period. Flecainide added > converted without need for DCCV.    Echo1/20/18 EF 30-35%.  Was RV pacing 70% of the time.  C 1/22/18 non-obstructive CAD  1/26/18 Upgraded to CRT-P by Dr. Otto      Echo 8/29/18 EF 55%  Device interrogation reveals stable function of leads, AF burden 1.6%  Still with significant shortness of breath.  Denies palpitations, dizziness.      Review of Systems   Constitution: Negative. Negative for weakness and malaise/fatigue.   Cardiovascular: Negative for chest pain, dyspnea on exertion, irregular heartbeat, leg swelling, near-syncope, orthopnea, palpitations, paroxysmal nocturnal dyspnea and syncope.   Respiratory: Positive for shortness of breath. Negative for cough.     Neurological: Negative for dizziness and light-headedness.   All other systems reviewed and are negative.       Objective:    Physical Exam   Constitutional: He is oriented to person, place, and time. He appears well-developed and well-nourished.   Eyes: Conjunctivae are normal. No scleral icterus.   Neck: No JVD present. No tracheal deviation present.   Cardiovascular: Normal rate, regular rhythm and normal heart sounds. PMI is not displaced.   Pulmonary/Chest: Effort normal and breath sounds normal. No respiratory distress.   Abdominal: Soft. There is no hepatosplenomegaly. There is no tenderness.   Musculoskeletal: He exhibits no edema (lower extremity) or tenderness.   Neurological: He is alert and oriented to person, place, and time.   Skin: Skin is warm and dry. No rash noted.   Psychiatric: He has a normal mood and affect. His behavior is normal.         Assessment:       1. Persistent atrial fibrillation    2. Atrial flutter, unspecified type    3. Atrial tachycardia    4. Coronary artery disease involving native coronary artery of native heart without angina pectoris    5. Cardiac pacemaker in situ    6. Cardiomyopathy, nonischemic    7. Essential hypertension    8. Sinus node dysfunction    9. Seizure disorder    10. Late onset Alzheimer's disease without behavioral disturbance         Plan:           Doing well from an arrhythmia standpoint.  EF normalized since CRT.  Continue current settings.  F/u in 6 months.

## 2018-10-03 ENCOUNTER — OFFICE VISIT (OUTPATIENT)
Dept: FAMILY MEDICINE | Facility: CLINIC | Age: 78
End: 2018-10-03
Payer: MEDICARE

## 2018-10-03 VITALS
TEMPERATURE: 98 F | HEIGHT: 73 IN | BODY MASS INDEX: 30.07 KG/M2 | HEART RATE: 70 BPM | WEIGHT: 226.88 LBS | SYSTOLIC BLOOD PRESSURE: 110 MMHG | OXYGEN SATURATION: 95 % | DIASTOLIC BLOOD PRESSURE: 76 MMHG

## 2018-10-03 DIAGNOSIS — J44.9 CHRONIC OBSTRUCTIVE PULMONARY DISEASE, UNSPECIFIED COPD TYPE: Primary | ICD-10-CM

## 2018-10-03 DIAGNOSIS — F02.80 LATE ONSET ALZHEIMER'S DISEASE WITHOUT BEHAVIORAL DISTURBANCE: ICD-10-CM

## 2018-10-03 DIAGNOSIS — R06.09 DOE (DYSPNEA ON EXERTION): Chronic | ICD-10-CM

## 2018-10-03 DIAGNOSIS — G30.1 LATE ONSET ALZHEIMER'S DISEASE WITHOUT BEHAVIORAL DISTURBANCE: ICD-10-CM

## 2018-10-03 DIAGNOSIS — I42.8 CARDIOMYOPATHY, NONISCHEMIC: ICD-10-CM

## 2018-10-03 PROCEDURE — 99999 PR PBB SHADOW E&M-EST. PATIENT-LVL III: CPT | Mod: PBBFAC,,, | Performed by: FAMILY MEDICINE

## 2018-10-03 PROCEDURE — 3074F SYST BP LT 130 MM HG: CPT | Mod: CPTII,,, | Performed by: FAMILY MEDICINE

## 2018-10-03 PROCEDURE — 99213 OFFICE O/P EST LOW 20 MIN: CPT | Mod: PBBFAC,PO | Performed by: FAMILY MEDICINE

## 2018-10-03 PROCEDURE — 1101F PT FALLS ASSESS-DOCD LE1/YR: CPT | Mod: CPTII,,, | Performed by: FAMILY MEDICINE

## 2018-10-03 PROCEDURE — 99213 OFFICE O/P EST LOW 20 MIN: CPT | Mod: S$PBB,,, | Performed by: FAMILY MEDICINE

## 2018-10-03 PROCEDURE — 3078F DIAST BP <80 MM HG: CPT | Mod: CPTII,,, | Performed by: FAMILY MEDICINE

## 2018-10-03 NOTE — PROGRESS NOTES
"Subjective:       Patient ID: Heri Muhammad is a 77 y.o. male.    Chief Complaint: Shortness of Breath (wheezing at night x " forever" )    Pt is known to me.  The pt reports frequent SOB and wheezing at night.  He has been diagnosed with COPD years ago--he smoked in the very distant past and did not smoke for long.  He rarely coughs.  He denies heartburn.  He recently saw his cardiologist who did not feel the SOB is heart-related.  Recent blood work was good including CBC.  Pt was started in January on nebulized Atrovent from [facility] for COPD but pt say it makes him cough.        Review of Systems   Constitutional: Negative for activity change, appetite change, fatigue and unexpected weight change.   Eyes: Negative for visual disturbance.   Respiratory: Positive for shortness of breath and wheezing. Negative for cough and chest tightness.    Cardiovascular: Negative for chest pain, palpitations and leg swelling.   Gastrointestinal: Negative for abdominal pain, constipation, diarrhea, nausea and vomiting.   Endocrine: Negative for cold intolerance, heat intolerance and polyuria.   Genitourinary: Negative for decreased urine volume and dysuria.   Musculoskeletal: Negative for arthralgias and back pain.   Skin: Negative for rash.   Neurological: Negative for numbness and headaches.       Objective:       Vitals:    10/03/18 1046   BP: 110/76   Pulse: 70   Temp: 98 °F (36.7 °C)   SpO2: 95%   Weight: 102.9 kg (226 lb 13.7 oz)   Height: 6' 1" (1.854 m)     Physical Exam   Constitutional: He is oriented to person, place, and time. He appears well-developed and well-nourished.   HENT:   Head: Normocephalic.   Eyes: Conjunctivae and EOM are normal. Pupils are equal, round, and reactive to light.   Neck: Normal range of motion. Neck supple. No thyromegaly present.   Cardiovascular: Normal rate, regular rhythm and normal heart sounds.   Pulmonary/Chest: Effort normal.   Mildly distant BS   Abdominal: Soft. Bowel sounds " are normal. There is no tenderness.   Musculoskeletal: Normal range of motion. He exhibits no tenderness or deformity.   Lymphadenopathy:     He has no cervical adenopathy.   Neurological: He is alert and oriented to person, place, and time. He displays normal reflexes. No cranial nerve deficit. He exhibits normal muscle tone. Coordination normal.   Skin: Skin is warm and dry.   Psychiatric: He has a normal mood and affect. His behavior is normal.       Assessment:       1. Chronic obstructive pulmonary disease, unspecified COPD type    2. FOWLER (dyspnea on exertion)    3. Late onset Alzheimer's disease without behavioral disturbance    4. Cardiomyopathy, nonischemic        Plan:       Heri was seen today for shortness of breath.    Diagnoses and all orders for this visit:    Chronic obstructive pulmonary disease, unspecified COPD type  -     umeclidinium-vilanterol (ANORO ELLIPTA) 62.5-25 mcg/actuation DsDv; Inhale 1 puff into the lungs Daily. Controller    FOWLER (dyspnea on exertion)    Late onset Alzheimer's disease without behavioral disturbance    Cardiomyopathy, nonischemic      During this visit, I reviewed the pt's history, medications, allergies, and problem list.

## 2018-10-17 ENCOUNTER — OFFICE VISIT (OUTPATIENT)
Dept: PSYCHIATRY | Facility: CLINIC | Age: 78
End: 2018-10-17
Payer: MEDICARE

## 2018-10-17 DIAGNOSIS — R41.89 COGNITIVE IMPAIRMENT: ICD-10-CM

## 2018-10-17 DIAGNOSIS — R41.3 MEMORY LOSS: Primary | ICD-10-CM

## 2018-10-17 DIAGNOSIS — F07.81 POST CONCUSSION SYNDROME: ICD-10-CM

## 2018-10-17 PROCEDURE — 96118 PR NEUROPSYCH TESTING BY PSYCH/PHYS: CPT | Mod: 59,PBBFAC | Performed by: PSYCHOLOGIST

## 2018-10-17 PROCEDURE — 96119 PR NEUROPSYCH TESTING BY TECHNICIAN: CPT | Mod: 59,PBBFAC | Performed by: PSYCHOLOGIST

## 2018-10-17 PROCEDURE — 96119 PR NEUROPSYCH TESTING BY TECHNICIAN: CPT | Mod: 59,S$PBB,, | Performed by: PSYCHOLOGIST

## 2018-10-17 PROCEDURE — 96118 PR NEUROPSYCH TESTING BY PSYCH/PHYS: CPT | Mod: 59,S$PBB,, | Performed by: PSYCHOLOGIST

## 2018-10-17 PROCEDURE — 90791 PSYCH DIAGNOSTIC EVALUATION: CPT | Mod: PBBFAC | Performed by: PSYCHOLOGIST

## 2018-10-17 PROCEDURE — 90791 PSYCH DIAGNOSTIC EVALUATION: CPT | Mod: S$PBB,,, | Performed by: PSYCHOLOGIST

## 2018-10-18 DIAGNOSIS — J44.9 CHRONIC OBSTRUCTIVE PULMONARY DISEASE, UNSPECIFIED COPD TYPE: ICD-10-CM

## 2018-10-18 NOTE — TELEPHONE ENCOUNTER
----- Message from Kim Serrato sent at 10/18/2018  9:59 AM CDT -----  Contact: Wife Melania [phone]  Since he is on the inhaler, his breathing is better.  Please call with any questions. If you are going to give him another prescription, please send it to:     [facility] - [place], [place] - [address]  [address] [address]  Phone: [phone] Fax: [phone]    Thank you!

## 2018-10-22 RX ORDER — MEMANTINE HYDROCHLORIDE 10 MG/1
TABLET ORAL
Qty: 180 TABLET | Refills: 11 | Status: SHIPPED | OUTPATIENT
Start: 2018-10-22 | End: 2018-10-23 | Stop reason: SDUPTHER

## 2018-10-23 RX ORDER — MEMANTINE HYDROCHLORIDE 10 MG/1
10 TABLET ORAL 2 TIMES DAILY
Qty: 60 TABLET | Refills: 0 | Status: SHIPPED | OUTPATIENT
Start: 2018-10-23 | End: 2018-10-24 | Stop reason: SDUPTHER

## 2018-10-24 ENCOUNTER — TELEPHONE (OUTPATIENT)
Dept: NEUROLOGY | Facility: CLINIC | Age: 78
End: 2018-10-24

## 2018-10-24 ENCOUNTER — TELEPHONE (OUTPATIENT)
Dept: PSYCHIATRY | Facility: CLINIC | Age: 78
End: 2018-10-24

## 2018-10-24 RX ORDER — MEMANTINE HYDROCHLORIDE 10 MG/1
10 TABLET ORAL 2 TIMES DAILY
Qty: 180 TABLET | Refills: 3 | Status: SHIPPED | OUTPATIENT
Start: 2018-10-24 | End: 2019-07-02 | Stop reason: SDUPTHER

## 2018-10-24 NOTE — PSYCH TESTING
[facility] [address] [place]  [address]  [phone]    REPORT OF NEUROPSYCHOLOGICAL EVALUATION    NAME:  Heri Muhammad   #: [id number]  :  1940    REFERRED BY:  Akshat Naik Jr., M.D.    EVALUATED BY:  Kelli Diop, Ph.D., Clinical Psychologist  Vandana Zhu, Ph.D., Psychometrician    DATE OF EVALUATION: 10/17/2018    EVALUATION PROCEDURES AND TIME:  Conducted by Psychologist:  Interpretation and report of test data  Review and integration of diagnostic interview, medical record, and test data  Conducted by Technician:  Temporal Orientation Test   Repeatable Battery for the Assessment of Neuropsychological Status (RBANS)  [place] Naming Test (BNT)  Complex Ideational Material (verbal comprehension)  Controlled Oral Word Association Test (COWAT)  Drawing intersecting pentagons, 3-D cube, and clock face drawings  Facial Recognition Test  [place] Card Sorting Test - 64 (WCST-64)  Trail Making Test, Parts A & B  Wechsler Memory Scale IV Logical Memory and Visual Reproduction tests (WMS-LM and WMS-VR)  Disla Depression Inventory - II (BDI-II)  Disla Anxiety Inventory (KENROY)  Time: CPT Code 73547 - 2 hours; CPT Code 03313 - 3 hours    EVALUATION FINDINGS:  The diagnostic interview revealed that Mr. Heri Muhammad is a 77-year-old white  male referred for Neuropsychological Evaluation on an outpatient basis due to subjective memory decline for the past few years which got much worse after he fell and hit his forehead on the corner of a wall while hospitalized in January of this year.  He did not lose consciousness and did not bleed, but he did have a concussion. His wife reported that he was quite impaired cognitively following the fall. He has had some improvement over time as would be expected with a head injury but they feel his memory is still impaired. Previous Neuropsychological Evaluation completed on 2016 yielded these results: Neuropsychological testing  revealed mild impairment in semantic fluency and delayed visual memory; and variability in constructional ability (performances in the average and mildly impaired ranges). The deficits revealed are not sufficient to warrant a diagnosis of mild cognitive impairment. Both Mr. Muhammad and his wife feel his memory is worse than when he was previously tested. He reported that he has to ask his wife to repeat what she has said and to tell him again what they are planning to do. His wife reported that he has good days and bad days. As she reported in 2016, it seems to her he zones out at times and does not attend to what is happening. Upon direct questioning, they also reported that he misplaces personal items in the home; forgets conversations; gets confused about what day it is; relies on his wife to remind himself of things; has difficulty operating the new washing machine at home; needs help managing his medications; loses his train of thought in conversation; and reports word-finding difficulty (not observed).  He does not forget what he reads. He does not cook or drive. His wife has always managed the family finances. Family history is significant for mother with dementia. Mr. Muhammad has no prior psychiatric history. He also denies current adjustment problems. He was cooperative in interview but rather quiet. He had a dry sense of humor. The Mental Status Exam suggested reduced concentration, fund of general knowledge, and abstraction ability.    The medical record also revealed memory loss, epilepsy, concussion, essential tremor, and postconcussive syndrome. CT of the head completed on 1/23/2018 after he fell revealed No skull fracture or acute intracranial findings.  CT of the head completed on 1/30/2018 revealed moderate generalized cerebral atrophy but no acute findings.   MMSE completed in Neurology on 6/19/2018 yielded a score of 26/30, in the average range.    TEST DATA:  Neuropsychological tests  administered by the technician revealed that the patient reported he is a high school graduate with 2 years of college education.  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  He was alert and cooperative during the evaluation.  Effort on all tests was satisfactory to produce valid results.    He was oriented to month, year, and day of the week, but missed the time by 45 minutes and the date by 1 day. His score on this measure suggested intact temporal orientation.    Mr. Muhammad obtained total score of 86 on the RBANS, which is at the 18th percentile, suggesting low average performance in general cognitive functioning.  Five areas were tested.  The Immediate Memory Index revealed no impairment in the ability to remember verbal information immediately after it is presented, with a score of 90 at the 25th percentile.  The Visuospatial/Constructional Index revealed no impairment in the ability to perceive spatial relations and to construct a spatially accurate copy of a drawing, with a score of 105 at the 63rd percentile. The Language Index revealed low average performance in the ability to respond verbally to either naming or retrieving learned material, with a score of 88 at the 21stpercentile. His naming was intact, but his semantic fluency was moderately impaired. Attention, or the capacity to remember and manipulate both visually and orally presented information in short-term storage, was moderately impaired, with a score of 72 at the 3rd percentile. He was impaired in both digit span and coding. Delayed memory, or anterograde memory capacity, was not impaired, with a score of 95 at the 37th percentile.  For reference, the expected average score on each index is 100, which is at the 50th percentile. His performance in 2016 was as follows: total 93 (32nd percentile), immediate memory 100 (50th percentile), visuospatial/constructional 105 (63rd percentile),  language 82 (12th percentile), attention 91 (27th percentile), and delayed memory 98 (45th percentile). Similarly, his semantic fluency and coding were impaired, but he performed better on digit span. Compared to 2016, attention and story recall have declined.    Naming, as assessed by the BNT, was in the superior range with a score of 58/60 correct without cueing (97th percentile) and he named the additional 2 words when provided the beginning sound of the word.  There was essentially no change as he scored 60/60.     Verbal comprehension, as assessed by Complex Ideational Material, was not impaired with a score of 12/12 (73rd percentile). There was no change.    On the Verbal Fluency (COWAT) task, he listed 21words beginning with the letters F, A and S in 3 minutes (moderately impaired, 3rd percentile) and listed 10 words in the semantic category animals in 1 minute (moderately impaired, 2nd percentile). His performance suggested moderate impairment in verbal fluency which is consistent with his semantic fluency on the RBANS. There was a slight decline in phonemic fluency from 29 to 21 words and no change in semantic fluency.     Drawings or intersecting pentagons, 3-D cube, and clock face drawings suggested mild impairment in constructional abilities as he had difficulty with the 3-D cube. There was essentially no change. He could not copy the 3-D cube in 2016.     Performance on the Facial Recognition Test suggested no prosopagnosia was present, as his score was in the average range.    The WCST-64 was administered to assess abstract reasoning and conceptualization.  His categories completed score (4) was in the average range. His total errors score (10) was in the superior range and his perseverative errors score (6) was in the superior range.  His performance suggested no impairment in abstract reasoning skills. He performed better in the current assessment as he only completed 2 categories in 2016.      His score on the Trail Making Test, Part A, (47 seconds for completion) indicated that psychomotor speed was in the low average range (19th percentile).  His score on Part B (162 seconds for completion) indicated that his ability to handle complex relationships which require rapid change of set, or mental flexibility, was in the mildly impaired range (8th percentile).There was no change in Trails A which he completed in 50 seconds, but a decline in Trails B which he completed in 97 seconds. In the current assessment, he made 2 errors that slowed him down considerably.     The WMS-IV LMVR was administered to further assess memory.  His Immediate Memory Index of 93 was in the average range. His Delayed Memory Index of 97 was in the average range. His Auditory Memory Index of 98 was in the average range and his Visual Memory Index of 95was in the average range. The expected average score for each index is 100.There was no significant change. He scored better on 2 subtests in the current assessment and worse in the other two, but all were in the average range during both assessments.     The BDI-II was administered to assess for depression.  His score of 12 suggested minimal depression was present. The KENROY was administered to assess for anxiety. His score of 7 suggested minimal anxiety was present. His score for depression is slightly higher at this time, but he still does not surpass the cutoff for clinical depression.     SUMMARY AND RECOMMENDATIONS:  Mr. Muhammad was referred for Neuropsychological Evaluation on an outpatient basis due to subjective memory decline for the past few years which got much worse after he fell and hit his forehead on the corner of a wall while hospitalized in January of this year.  His general cognitive abilities as assessed by the RBANS were within the low average range, with moderate impairment in attention, low average language (due to reduced semantic fluency), and no impairment in  immediate verbal memory, visuospatial/constructional abilities, or delayed memory.  Further assessment of specific cognitive abilities revealed deficits in verbal fluency, constructional abilities, and mental flexibility, with low average psychomotor speed and intact naming, verbal comprehension, facial recognition, and abstract reasoning.  Additional memory assessment revealed no impairment, consistent with the memory subtests of the RBANS. Personality test data show his score for depression is slightly higher at this time, but he still does not surpass the cutoff for clinical depression. Neuropsychological testing revealed impairment in attention, verbal fluency, constructional abilities, and mental flexibility. Memory was intact. When compared to his previous assessment in 2016, there were some changes indicating a decline in attention, mental flexibility, and phonemic fluency, but no significant changes in other areas. This decline is likely related to his fall, as the areas of deficit are often related to a post-concussive syndrome. If that is the case, he may continue to improve somewhat over the next several months. However, if he reports continued cognitive decline over the next year, repeat testing could be considered after a year to assess for any further decline given the family history of dementia.      Interpretation and report and coding were completed on 10/24/2018.

## 2018-10-24 NOTE — TELEPHONE ENCOUNTER
Spoke to the patient's wife she going to check to see that the patients Namenda is still being shipped form [facility].

## 2018-10-24 NOTE — PROGRESS NOTES
Psychiatry Initial Visit (PhD/LCSW)    Date:10/17/2018    CPT Code: 66807    Referred by:Akshat Naik Jr., MD    Chief complaint/reason for encounter:  Neuropsychological Evaluation    Clinical status of patient:  Outpatient    Met with:  Patient and wife    History of present illness: Mr. Heri Muhammad is a 77-year-old white  male referred for Neuropsychological Evaluation on an outpatient basis due to subjective memory decline for the past few years which got much worse after he fell and hit his forehead on the corner of a wall while hospitalized in January of this year.  He did not lose consciousness and did not bleed, but he did have a concussion. His wife reported that he was quite impaired cognitively following the fall. He has had some improvement over time as would be expected with a head injury but they feel his memory is still impaired. Previous Neuropsychological Evaluation completed on 9/9/2016 yielded these results: Neuropsychological testing revealed mild impairment in semantic fluency and delayed visual memory; and variability in constructional ability (performances in the average and mildly impaired ranges). The deficits revealed are not sufficient to warrant a diagnosis of mild cognitive impairment. Both Mr. Muhammad and his wife feel his memory is worse than when he was previously tested. He reported that he has to ask his wife to repeat what she has said and to tell him again what they are planning to do. His wife reported that he has good days and bad days. As she reported in 2016, it seems to her he zones out at times and does not attend to what is happening. Upon direct questioning, they also reported that he misplaces personal items in the home; forgets conversations; gets confused about what day it is; relies on his wife to remind himself of things; has difficulty operating the new washing machine at home; needs help managing his medications; loses his train of thought in conversation;  and reports word-finding difficulty (not observed).  He does not forget what he reads. He does not cook or drive. His wife has always managed the family finances. Family history is significant for mother with dementia. Mr. Muhammad has no prior psychiatric history. He also denies current adjustment problems. He was cooperative in interview but rather quiet. He had a dry sense of humor.    Pain scale: noncontributory    Symptoms:  Mood: denied  Anxiety:  denied  Substance abuse: denied  Cognitive functioning:  memory decline    Psychiatric history: none    Medical history: memory loss, epilepsy, concussion, essential tremor, and post-concussive syndrome. CT of the head completed on 1/23/2018 after he fell revealed No skull fracture or acute intracranial findings.  CT of the head completed on 1/30/2018 revealed moderate generalized cerebral atrophy but no acute findings.   MMSE completed in Neurology on 6/19/2018 yielded a score of 26/30, in the average range.    Family history of psychiatric illness: mother with dementia    Social history (marriage, employment, etc.): 2 years of college education. 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  Has 3 children from first wife, one adopted daughter. Lives with second wife.  Enjoys playing games on the computer (hidden objects, solitaire, casino games).    Substance use:   Alcohol: no   Drugs: no   Tobacco: quit smoking 60 years ago   Caffeine: 1 cup coffee per day    Strengths and Liabilities:   Strength:  Pt has supportive wife.   Liability:  Pt has subjective memory loss.    Mental Status Exam:  General appearance:  appears stated age, casually dressed, full white beard  Speech:  normal rate and tone  Level of cooperation:  cooperative  Thought processes:  logical, goal-directed  Mood:  euthymic  Thought content:  no illusions, no visual hallucinations, no auditory hallucinations, no  delusions, no active or passive homicidal thoughts, no active or passive suicidal ideation, no obsessions, no compulsions, no violence  Affect:  appropriate  Orientation:  oriented to person, place, and time  Memory:  Recent memory:  3 of 3 objects after brief delay.    Remote memory - intact  Attention span and concentration: refused to try to spell HOUSE backwards  Fund of general knowledge: 2 of 4 recent presidents - could not name current president  Abstract reasoning:  Similarities: concrete  Proverbs: abstract.  Judgment and insight: fair  Language:  intact    Diagnostic impressions:  Memory loss R41.3  Cognitive impairment R41.89  Post-concussive syndrome F07.81    Plan:  Pt will complete Neuropsychological testing.  Report of Neuropsychological Evaluation will follow. It can be accessed through the Chart Review activity in Epic under the Notes tab.  It will be titled Psych Testing.    Return to clinic:  as scheduled    Length of time: 50 minutes

## 2018-10-24 NOTE — TELEPHONE ENCOUNTER
----- Message from Gina Everett sent at 10/24/2018  9:35 AM CDT -----  Contact: wife Melania  Patients wife Melania returning Dana call with the answer to the question she had asked.  Call back at [phone].  Thanks

## 2018-10-31 ENCOUNTER — OFFICE VISIT (OUTPATIENT)
Dept: CARDIOLOGY | Facility: CLINIC | Age: 78
End: 2018-10-31
Payer: MEDICARE

## 2018-10-31 VITALS
DIASTOLIC BLOOD PRESSURE: 71 MMHG | HEART RATE: 83 BPM | WEIGHT: 226.19 LBS | BODY MASS INDEX: 29.98 KG/M2 | SYSTOLIC BLOOD PRESSURE: 121 MMHG | HEIGHT: 73 IN

## 2018-10-31 DIAGNOSIS — E78.2 MIXED HYPERLIPIDEMIA: ICD-10-CM

## 2018-10-31 DIAGNOSIS — I48.92 ATRIAL FLUTTER, UNSPECIFIED TYPE: ICD-10-CM

## 2018-10-31 DIAGNOSIS — Z79.01 CURRENT USE OF LONG TERM ANTICOAGULATION: ICD-10-CM

## 2018-10-31 DIAGNOSIS — Z95.0 BIVENTRICULAR CARDIAC PACEMAKER IN SITU: Primary | ICD-10-CM

## 2018-10-31 DIAGNOSIS — I25.10 CORONARY ARTERY DISEASE INVOLVING NATIVE CORONARY ARTERY OF NATIVE HEART WITHOUT ANGINA PECTORIS: Chronic | ICD-10-CM

## 2018-10-31 DIAGNOSIS — I48.19 PERSISTENT ATRIAL FIBRILLATION: ICD-10-CM

## 2018-10-31 PROCEDURE — 3078F DIAST BP <80 MM HG: CPT | Mod: CPTII,HCWC,, | Performed by: INTERNAL MEDICINE

## 2018-10-31 PROCEDURE — 3074F SYST BP LT 130 MM HG: CPT | Mod: CPTII,HCWC,, | Performed by: INTERNAL MEDICINE

## 2018-10-31 PROCEDURE — 1101F PT FALLS ASSESS-DOCD LE1/YR: CPT | Mod: CPTII,HCWC,, | Performed by: INTERNAL MEDICINE

## 2018-10-31 PROCEDURE — 99214 OFFICE O/P EST MOD 30 MIN: CPT | Mod: S$PBB,HCWC,, | Performed by: INTERNAL MEDICINE

## 2018-10-31 PROCEDURE — 99213 OFFICE O/P EST LOW 20 MIN: CPT | Mod: PBBFAC,PO | Performed by: INTERNAL MEDICINE

## 2018-10-31 PROCEDURE — 99999 PR PBB SHADOW E&M-EST. PATIENT-LVL III: CPT | Mod: PBBFAC,,, | Performed by: INTERNAL MEDICINE

## 2018-10-31 NOTE — PROGRESS NOTES
Subjective:    Patient ID:  Heri Muhammad is a 77 y.o. male who presents for follow-up of Hyperlipidemia (5 month f/u w/ echo and labs ) and Atrial Fibrillation      HPI  Here for f/u PAF/CAD/ Bi-v PPM implant (due to decreasing EF w/o sig CAD).Feeling better since on anoro. No angina. Patient denies palpitations, syncope, presyncope, lightheadedness or dizziness.      Review of Systems   Constitution: Negative for malaise/fatigue.   Eyes: Negative for blurred vision.   Cardiovascular: Negative for chest pain, claudication, cyanosis, dyspnea on exertion, irregular heartbeat, leg swelling, near-syncope, orthopnea, palpitations, paroxysmal nocturnal dyspnea and syncope.   Respiratory: Negative for cough and shortness of breath.    Hematologic/Lymphatic: Does not bruise/bleed easily.   Musculoskeletal: Negative for back pain, falls, joint pain, muscle cramps, muscle weakness and myalgias.   Gastrointestinal: Negative for abdominal pain, change in bowel habit, nausea and vomiting.   Genitourinary: Negative for urgency.   Neurological: Negative for dizziness, focal weakness and light-headedness.        Objective:    Physical Exam   Constitutional: He is oriented to person, place, and time. He appears well-developed and well-nourished.   Neck: Normal range of motion. Neck supple. No JVD present.   Cardiovascular: Normal rate, regular rhythm, normal heart sounds and intact distal pulses.   Pulses:       Carotid pulses are 2+ on the right side, and 2+ on the left side.       Radial pulses are 2+ on the right side, and 2+ on the left side.   The pacemaker site is doing well and without drainage.       Pulmonary/Chest: Effort normal and breath sounds normal.   Musculoskeletal: Normal range of motion. He exhibits no edema.   Neurological: He is alert and oriented to person, place, and time.   Skin: Skin is warm and dry.   Psychiatric: He has a normal mood and affect. His speech is normal. Cognition and memory are normal.    Nursing note and vitals reviewed.              ..    Chemistry        Component Value Date/Time     08/29/2018 0929    K 3.9 08/29/2018 0929    CL 99 08/29/2018 0929    CO2 31 (H) 08/29/2018 0929    BUN 8 08/29/2018 0929    CREATININE 0.8 08/29/2018 0929    GLU 98 08/29/2018 0929        Component Value Date/Time    CALCIUM 9.0 08/29/2018 0929    ALKPHOS 98 08/29/2018 0929    AST 20 08/29/2018 0929    AST 35 11/27/2015 1703    ALT 21 08/29/2018 0929    BILITOT 0.4 08/29/2018 0929    ESTGFRAFRICA >60.0 08/29/2018 0929    EGFRNONAA >60.0 08/29/2018 0929            ..  Lab Results   Component Value Date    CHOL 173 08/29/2018    CHOL 128 01/20/2018    CHOL 173 08/30/2017     Lab Results   Component Value Date    HDL 36 (L) 08/29/2018    HDL 27 (L) 01/20/2018    HDL 42 08/30/2017     Lab Results   Component Value Date    LDLCALC 117.0 08/29/2018    LDLCALC 81.6 01/20/2018    LDLCALC 109.4 08/30/2017     Lab Results   Component Value Date    TRIG 100 08/29/2018    TRIG 97 01/20/2018    TRIG 108 08/30/2017     Lab Results   Component Value Date    CHOLHDL 20.8 08/29/2018    CHOLHDL 21.1 01/20/2018    CHOLHDL 24.3 08/30/2017     ..  Lab Results   Component Value Date    WBC 5.34 08/29/2018    HGB 15.3 08/29/2018    HCT 46.4 08/29/2018    MCV 96 08/29/2018     08/29/2018       Test(s) Reviewed  I have reviewed the following in detail:  [] Stress test   [] Angiography   [x] Echocardiogram   [x] Labs   [] Other:       Assessment:         ICD-10-CM ICD-9-CM   1. Biventricular cardiac pacemaker in situ Z95.0 V45.01   2. Persistent atrial fibrillation I48.1 427.31   3. Mixed hyperlipidemia E78.2 272.2   4. Coronary artery disease involving native coronary artery of native heart without angina pectoris I25.10 414.01   5. Atrial flutter, unspecified type I48.92 427.32   6. Current use of long term anticoagulation Z79.01 V58.61     Problem List Items Addressed This Visit     Atrial flutter    Overview     11/2015           Biventricular cardiac pacemaker in situ - Primary    CAD (coronary artery disease) (Chronic)    Overview     9/2013 [facility]  LAD- 60% prox (neg FFR), nl, ramus and RCA 20%    6/206 [facility]   Mild/moderate LAD verified by FFR  Mild diastolic dysfunction.  Normal right heart pressures    .          Current use of long term anticoagulation    Overview     (Eliquis)         Mixed hyperlipidemia    Persistent atrial fibrillation    Overview     Managed by Dr. Esparza                Plan:           Return to clinic 12 months   Low level/low impact aerobic exercise 5x's/wk. Heart healthy diet and risk factor modification.    See labs and med orders.  EF back to normal since CRT

## 2018-12-11 ENCOUNTER — HOSPITAL ENCOUNTER (OUTPATIENT)
Dept: RADIOLOGY | Facility: HOSPITAL | Age: 78
Discharge: HOME | End: 2018-12-11
Attending: NURSE PRACTITIONER
Payer: MEDICARE

## 2018-12-11 ENCOUNTER — OFFICE VISIT (OUTPATIENT)
Dept: FAMILY MEDICINE | Facility: CLINIC | Age: 78
End: 2018-12-11
Payer: MEDICARE

## 2018-12-11 VITALS
SYSTOLIC BLOOD PRESSURE: 108 MMHG | OXYGEN SATURATION: 97 % | HEART RATE: 80 BPM | TEMPERATURE: 98 F | WEIGHT: 229.94 LBS | HEIGHT: 73 IN | BODY MASS INDEX: 30.47 KG/M2 | DIASTOLIC BLOOD PRESSURE: 64 MMHG

## 2018-12-11 DIAGNOSIS — M25.512 ACUTE PAIN OF LEFT SHOULDER: ICD-10-CM

## 2018-12-11 DIAGNOSIS — M47.816 OSTEOARTHRITIS OF LUMBAR SPINE, UNSPECIFIED SPINAL OSTEOARTHRITIS COMPLICATION STATUS: ICD-10-CM

## 2018-12-11 DIAGNOSIS — M54.50 LOW BACK PAIN, NON-SPECIFIC: ICD-10-CM

## 2018-12-11 DIAGNOSIS — E66.09 CLASS 1 OBESITY DUE TO EXCESS CALORIES WITHOUT SERIOUS COMORBIDITY WITH BODY MASS INDEX (BMI) OF 30.0 TO 30.9 IN ADULT: ICD-10-CM

## 2018-12-11 DIAGNOSIS — R29.6 FREQUENT FALLS: ICD-10-CM
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
## 2018-12-11 PROCEDURE — 73030 X-RAY EXAM OF SHOULDER: CPT | Mod: TC,FY,HCWC,PO,LT

## 2018-12-11 PROCEDURE — 1101F PT FALLS ASSESS-DOCD LE1/YR: CPT | Mod: CPTII,HCWC,S$GLB, | Performed by: NURSE PRACTITIONER

## 2018-12-11 PROCEDURE — 3074F SYST BP LT 130 MM HG: CPT | Mod: CPTII,HCWC,S$GLB, | Performed by: NURSE PRACTITIONER

## 2018-12-11 PROCEDURE — 72100 X-RAY EXAM L-S SPINE 2/3 VWS: CPT | Mod: 26,HCWC,, | Performed by: RADIOLOGY

## 2018-12-11 PROCEDURE — 3078F DIAST BP <80 MM HG: CPT | Mod: CPTII,HCWC,S$GLB, | Performed by: NURSE PRACTITIONER

## 2018-12-11 PROCEDURE — 99999 PR PBB SHADOW E&M-EST. PATIENT-LVL V: CPT | Mod: PBBFAC,HCWC,, | Performed by: NURSE PRACTITIONER

## 2018-12-11 PROCEDURE — 73030 X-RAY EXAM OF SHOULDER: CPT | Mod: 26,HCWC,LT, | Performed by: RADIOLOGY

## 2018-12-11 PROCEDURE — 72100 X-RAY EXAM L-S SPINE 2/3 VWS: CPT | Mod: TC,FY,HCWC,PO

## 2018-12-11 PROCEDURE — 99213 OFFICE O/P EST LOW 20 MIN: CPT | Mod: HCWC,S$GLB,, | Performed by: NURSE PRACTITIONER

## 2018-12-11 NOTE — PROGRESS NOTES
Please call the patient and let him know that the shoulder xray shows his shoulder replacement is well aligned, no dislocations noted. If pain does not improve in 1-2 weeks he needs to follow up for ortho referral. Thanks.

## 2018-12-11 NOTE — PROGRESS NOTES
"Subjective:       Patient ID: Heri Muhammad is a 78 y.o. male.    Chief Complaint: Back Pain (fell a few days ago; feels like lump on left shoulder)    Patient who is new to me presents with a fall. He has been having multiple falls this year (5 or 6). He feels an "electric tingling" to his right lower back and his legs "just get weak and I fall." The back pain has been occurring for "years" and the falls have become more frequent. This past fall occurred 1 week ago and he fell against the wall and his shoulder is hurting. Patient with a history of shoulder replacement X2 to left shoulder.He is unable to raise the arm past his shoulder, this is his normal. He has been using lidocaine patch with relief.       Fall   The accident occurred more than 1 week ago. The fall occurred while walking (He felt a shooting pain in his right leg and he fell. ). Impact surface: hit against the wall. There was no blood loss. The point of impact was the left shoulder. The pain is present in the left upper arm and back. The pain is at a severity of 2/10. The pain is mild. The symptoms are aggravated by ambulation, movement and pressure on injury. Pertinent negatives include no abdominal pain, fever, headaches, loss of consciousness, nausea, numbness, tingling or vomiting. Treatments tried: lidocaine patches, flexeril,  The treatment provided mild relief.     Review of Systems   Constitutional: Negative for chills, fatigue and fever.   HENT: Negative for congestion, ear pain, sinus pressure and sore throat.    Respiratory: Negative for shortness of breath and wheezing.    Cardiovascular: Negative for chest pain and leg swelling.   Gastrointestinal: Negative for abdominal distention, abdominal pain, nausea and vomiting.   Genitourinary: Negative for dysuria and flank pain.   Musculoskeletal: Positive for arthralgias, back pain, gait problem and myalgias.   Skin: Negative for color change and pallor.   Neurological: Negative for " tingling, loss of consciousness, light-headedness, numbness and headaches.       Objective:      Physical Exam   Constitutional: He is oriented to person, place, and time. He appears well-developed and well-nourished.   Patient presents with a cane   HENT:   Head: Normocephalic and atraumatic.   Right Ear: External ear normal.   Left Ear: External ear normal.   Eyes: Conjunctivae and EOM are normal. Pupils are equal, round, and reactive to light.   Neck: Normal range of motion. Neck supple.   Cardiovascular: Normal rate and regular rhythm.   Pulmonary/Chest: Effort normal and breath sounds normal.   Abdominal: Soft. Bowel sounds are normal.   Musculoskeletal:        Left shoulder: He exhibits decreased range of motion and pain. He exhibits no tenderness, no bony tenderness, no swelling, no deformity and no laceration.        Lumbar back: He exhibits decreased range of motion, tenderness and pain. He exhibits no bony tenderness, no swelling, no edema and no deformity.        Back:    Patient unable to abduct shoulder past 90 degrees, patient states this is his normal. No pain with supination or pronation. No pain with cross over test.    Neurological: He is alert and oriented to person, place, and time. He has normal strength and normal reflexes. No cranial nerve deficit or sensory deficit.   Skin: Skin is warm and dry.   Nursing note and vitals reviewed.      Assessment:       1. Fall, initial encounter    2. Low back pain, non-specific    3. Osteoarthritis of lumbar spine, unspecified spinal osteoarthritis complication status    4. Frequent falls    5. Acute pain of left shoulder    6. Class 1 obesity due to excess calories without serious comorbidity with body mass index (BMI) of 30.0 to 30.9 in adult        Plan:       Fall, initial encounter  -     X-Ray Shoulder 2 or More Views Left; Future; Expected date: 12/11/2018  -     X-Ray Lumbar Spine AP And Lateral; Future; Expected date: 12/11/2018  -     Ambulatory  Referral to [facility] Sp[facility] Clinic    Low back pain, non-specific  -     X-Ray Lumbar Spine AP And Lateral; Future; Expected date: 12/11/2018  -     Ambulatory Referral to [facility] Sp[facility] Clinic    Osteoarthritis of lumbar spine, unspecified spinal osteoarthritis complication status  -     X-Ray Lumbar Spine AP And Lateral; Future; Expected date: 12/11/2018  -     Ambulatory Referral to [facility] Spine Clinic    Frequent falls  Advised physical therapy for possible gait training and patient declined at this time.   Acute pain of left shoulder  -     X-Ray Shoulder 2 or More Views Left; Future; Expected date: 12/11/2018    Class 1 obesity due to excess calories without serious comorbidity with body mass index (BMI) of 30.0 to 30.9 in adult  Weight loss recommended with well balanced diet and portion controlled meals and increased activity.     Xray today. Patient declined physical therapy. Will refer to back and spine for chronic back pain with increasing fall. Patient to follow up as needed. Advised to continue pain medication, lidocaine patches, and flexeril. Advised adding muscle rubs. Follow up as needed.

## 2018-12-11 NOTE — PATIENT INSTRUCTIONS
Causes of Lumbar (Low Back) Pain  Low back pain can be caused by problems with any part of the lumbar spine. A disk can herniate (push out) and press on a nerve. Vertebrae can rub against each other or slip out of place. This can irritate facet joints and nerves. It can also lead to stenosis, a narrowing of the spinal canal or foramen.  Pressure from a disk  Constant wear and tear on a disk can cause it to weaken and push outward. Part of the disk may then press on nearby nerves. There are two common types of herniated disks:  Contained means the soft nucleus is protruding outward.   Extruded means the firm annulus has torn, letting the soft center squeeze through.     Pressure from bone  An unstable spine   With age, a disk may thin and wear out. Vertebrae above and below the disk may begin to touch. This can put pressure on nerves. It can also cause bone spurs (growths) to form where the bones rub together.    Stenosis results when bone spurs narrow the foramen or spinal canal. This also puts pressure on nerves. Slipping vertebrae can irritate nerves and joints. They can also worsen stenosis.    In some cases, vertebrae become unstable and slip forward. This is called spondylolisthesis.     Date Last Reviewed: 10/12/2015  © 1377-0968 [employer]. [address] [place] [address], [place] [address]. All rights reserved. This information is not intended as a substitute for professional medical care. Always follow your healthcare professional's instructions.

## 2018-12-11 NOTE — PROGRESS NOTES
Please call the patient and let him know that his back xray shows no broken bones or misalignment. It shows arthritis and narrowing of the disk. I want him to follow up with back and spine. Referral is placed please schedule.

## 2018-12-17 ENCOUNTER — OFFICE VISIT (OUTPATIENT)
Dept: SPINE | Facility: CLINIC | Age: 78
End: 2018-12-17
Payer: MEDICARE

## 2018-12-17 VITALS
DIASTOLIC BLOOD PRESSURE: 71 MMHG | BODY MASS INDEX: 31.18 KG/M2 | HEIGHT: 72 IN | SYSTOLIC BLOOD PRESSURE: 117 MMHG | WEIGHT: 230.19 LBS | HEART RATE: 68 BPM

## 2018-12-17 DIAGNOSIS — M89.8X1 PAIN IN SCAPULA: ICD-10-CM

## 2018-12-17 DIAGNOSIS — M54.50 CHRONIC RIGHT-SIDED LOW BACK PAIN WITHOUT SCIATICA: Primary | ICD-10-CM

## 2018-12-17 DIAGNOSIS — G89.29 CHRONIC RIGHT-SIDED LOW BACK PAIN WITHOUT SCIATICA: Primary | ICD-10-CM

## 2018-12-17 PROCEDURE — 99213 OFFICE O/P EST LOW 20 MIN: CPT | Mod: S$GLB,,, | Performed by: PHYSICIAN ASSISTANT

## 2018-12-17 PROCEDURE — 3078F DIAST BP <80 MM HG: CPT | Mod: CPTII,S$GLB,, | Performed by: PHYSICIAN ASSISTANT

## 2018-12-17 PROCEDURE — 3074F SYST BP LT 130 MM HG: CPT | Mod: CPTII,S$GLB,, | Performed by: PHYSICIAN ASSISTANT

## 2018-12-17 PROCEDURE — 99999 PR PBB SHADOW E&M-EST. PATIENT-LVL III: CPT | Mod: PBBFAC,,, | Performed by: PHYSICIAN ASSISTANT

## 2018-12-17 PROCEDURE — 99499 UNLISTED E&M SERVICE: CPT | Mod: HCNC,S$GLB,, | Performed by: PHYSICIAN ASSISTANT

## 2018-12-17 PROCEDURE — 1101F PT FALLS ASSESS-DOCD LE1/YR: CPT | Mod: CPTII,S$GLB,, | Performed by: PHYSICIAN ASSISTANT

## 2018-12-17 NOTE — LETTER
December 19, 2018      Courtney Vickers, STERLING  [address] [facility] [address] [place] [address]           [place] [facility]  [address] [facility] [address]  [facility] [address]  Phone: [phone]  Fax: [phone]          Patient: Heri Muhammad   MR Number: [id number]   YOB: 1940   Date of Visit: 12/17/2018       Dear Courtney Vickers:    Thank you for referring Heri Muhammad to me for evaluation. Attached you will find relevant portions of my assessment and plan of care.    If you have questions, please do not hesitate to call me. I look forward to following Heri Muhammad along with you.    Sincerely,    Chikis Reyes PA-C    Enclosure  CC:  No Recipients    If you would like to receive this communication electronically, please contact [email or web address] or [phone] to request more information on [employer] Link access.    For providers and/or their staff who would like to refer a patient to [facility], please contact us through our one-stop-shop provider referral line, [facility], at [phone].    If you feel you have received this communication in error or would no longer like to receive these types of communications, please e-mail [email or web address]

## 2018-12-18 RX ORDER — PRIMIDONE 250 MG/1
TABLET ORAL
Qty: 360 TABLET | Refills: 3 | Status: SHIPPED | OUTPATIENT
Start: 2018-12-18 | End: 2019-07-02 | Stop reason: SDUPTHER

## 2018-12-18 NOTE — PROGRESS NOTES
Neurosurgery History & Physical    Patient ID: Heri Muhammad is a 78 y.o. male.    Chief Complaint   Patient presents with    Low-back Pain     Has had low back pain for years. Fell 2 weeks ago, but has many falls in the last year. Pain is constant. Pain patch, Flexaril, and pain medication helps pain, but he does not taking pain medication very often. He feels like a shock in his back. He has a feeling like something is in his left shoulder blade area, and it hurts to lay on it.       Review of Systems   Constitutional: Negative for activity change, chills, fatigue and unexpected weight change.   HENT: Negative for hearing loss, tinnitus, trouble swallowing and voice change.    Eyes: Negative for visual disturbance.   Respiratory: Negative for apnea, chest tightness and shortness of breath.    Cardiovascular: Negative for chest pain and palpitations.   Gastrointestinal: Negative for abdominal pain, constipation, diarrhea, nausea and vomiting.   Genitourinary: Negative for difficulty urinating, dysuria and frequency.   Musculoskeletal: Positive for back pain. Negative for gait problem, neck pain and neck stiffness.   Skin: Negative for wound.   Neurological: Negative for dizziness, tremors, seizures, facial asymmetry, speech difficulty, weakness, light-headedness, numbness and headaches.   Psychiatric/Behavioral: Negative for confusion and decreased concentration.       Past Medical History:   Diagnosis Date    A-fib     Anticoagulant long-term use     eliquis    Atrial flutter     Benign essential tremor     Cancer     skin cancer on back    Cardiac pacemaker in situ     CHF (congestive heart failure)     Coronary artery disease     Cough     Dizziness     intermittant    DJD (degenerative joint disease) of knee     ED (erectile dysfunction)     Hard of hearing 11/2016    History of cataract     HTN (hypertension)     Hyperlipidemia     Hyperlipidemia     Memory loss     Seizure disorder      Seizures     last episode 1995    Sinus node dysfunction 08/2016     Social History     Socioeconomic History    Marital status:      Spouse name: Not on file    Number of children: Not on file    Years of education: Not on file    Highest education level: Not on file   Social Needs    Financial resource strain: Not on file    Food insecurity - worry: Not on file    Food insecurity - inability: Not on file    Transportation needs - medical: Not on file    Transportation needs - non-medical: Not on file   Occupational History    Occupation: retired   Tobacco Use    Smoking status: Never Smoker    Smokeless tobacco: Never Used   Substance and Sexual Activity    Alcohol use: No    Drug use: No    Sexual activity: Not on file   Other Topics Concern    Not on file   Social History Narrative    Not on file     Family History   Problem Relation Age of Onset    Heart disease Mother     Cancer Father         colon    Blindness Father         accident    Cataracts Father     Hypertension Father     Tremor Father     Heart disease Brother     Tremor Brother     Tremor Paternal Grandfather     Melanoma Neg Hx     Amblyopia Neg Hx     Diabetes Neg Hx     Glaucoma Neg Hx     Macular degeneration Neg Hx     Retinal detachment Neg Hx     Strabismus Neg Hx     Stroke Neg Hx     Thyroid disease Neg Hx      Review of patient's allergies indicates:   Allergen Reactions    Bactroban [mupirocin calcium] Rash     Other reaction(s): Rash       Current Outpatient Medications:     atorvastatin (LIPITOR) 10 MG tablet, TAKE 1 TABLET EVERY EVENING, Disp: 90 tablet, Rfl: 4    cyanocobalamin (VITAMIN B-12) 1000 MCG tablet, Take 100 mcg by mouth once daily., Disp: , Rfl:     ELIQUIS 5 mg Tab, TAKE 1 TABLET TWICE DAILY, Disp: 180 tablet, Rfl: 4    lamoTRIgine (LAMICTAL) 100 MG tablet, Take 1 tablet (100 mg total) by mouth 2 (two) times daily., Disp: 180 tablet, Rfl: 3    memantine (NAMENDA) 10 MG  Tab, Take 1 tablet (10 mg total) by mouth 2 (two) times daily., Disp: 180 tablet, Rfl: 3    metoprolol succinate (TOPROL-XL) 50 MG 24 hr tablet, Take 1 tablet (50 mg total) by mouth once daily., Disp: 90 tablet, Rfl: 4    torsemide (DEMADEX) 10 MG Tab, Take 1 tablet (10 mg total) by mouth every morning., Disp: 90 tablet, Rfl: 5    umeclidinium-vilanterol (ANORO ELLIPTA) 62.5-25 mcg/actuation DsDv, Inhale 1 puff into the lungs Daily. Controller, Disp: 60 each, Rfl: 11    influenza (FLUZONE HIGH-DOSE) 180 mcg/0.5 mL vaccine, Inject 0.5 mLs into the muscle., Disp: 0.5 mL, Rfl: 0    primidone (MYSOLINE) 250 MG Tab, TAKE 2 TABLETS TWICE DAILY, Disp: 360 tablet, Rfl: 3    Vitals:    12/17/18 1502   BP: 117/71   BP Location: Right arm   Patient Position: Sitting   BP Method: Large (Automatic)   Pulse: 68   Weight: 104.4 kg (230 lb 2.6 oz)   Height: 6' (1.829 m)       Physical Exam   Constitutional: He is oriented to person, place, and time. He appears well-developed and well-nourished.   HENT:   Head: Normocephalic and atraumatic.   Eyes: Pupils are equal, round, and reactive to light.   Neck: Normal range of motion. Neck supple.   Cardiovascular: Normal rate.   Pulmonary/Chest: Effort normal.   Abdominal: He exhibits no distension.   Musculoskeletal: Normal range of motion. He exhibits no edema.   Neurological: He is alert and oriented to person, place, and time. He has a normal Finger-Nose-Finger Test, a normal Heel to Shin Test, a normal Romberg Test and a normal Tandem Gait Test. Gait normal.   Reflex Scores:       Tricep reflexes are 2+ on the right side and 2+ on the left side.       Bicep reflexes are 2+ on the right side and 2+ on the left side.       Brachioradialis reflexes are 2+ on the right side and 2+ on the left side.       Patellar reflexes are 2+ on the right side and 2+ on the left side.       Achilles reflexes are 2+ on the right side and 2+ on the left side.  Skin: Skin is warm and dry.    Psychiatric: He has a normal mood and affect. His speech is normal and behavior is normal. Judgment and thought content normal.   Nursing note and vitals reviewed.      Neurologic Exam     Mental Status   Oriented to person, place, and time.   Oriented to person.   Oriented to place.   Oriented to time.   Follows 3 step commands.   Attention: normal. Concentration: normal.   Speech: speech is normal   Level of consciousness: alert  Knowledge: consistent with education.   Able to name object. Able to read. Able to repeat. Able to write. Normal comprehension.     Cranial Nerves     CN II   Visual acuity: normal  Right visual field deficit: none  Left visual field deficit: none     CN III, IV, VI   Pupils are equal, round, and reactive to light.  Right pupil: Size: 3 mm. Shape: regular. Reactivity: brisk. Consensual response: intact.   Left pupil: Size: 3 mm. Shape: regular. Reactivity: brisk. Consensual response: intact.   CN III: no CN III palsy  CN VI: no CN VI palsy  Nystagmus: none   Diplopia: none  Ophthalmoparesis: none  Conjugate gaze: present    CN V   Right facial sensation deficit: none  Left facial sensation deficit: none    CN VII   Right facial weakness: none  Left facial weakness: none    CN VIII   Hearing: intact    CN IX, X   CN IX normal.   CN X normal.     CN XI   Right sternocleidomastoid strength: normal  Left sternocleidomastoid strength: normal  Right trapezius strength: normal  Left trapezius strength: normal    CN XII   Fasciculations: absent  Tongue deviation: none    Motor Exam   Muscle bulk: normal  Overall muscle tone: normal  Right arm pronator drift: absent  Left arm pronator drift: absent    Strength   Right neck flexion: 5/5  Left neck flexion: 5/5  Right neck extension: 5/5  Left neck extension: 5/5  Right deltoid: 5/5  Left deltoid: 5/5  Right biceps: 5/5  Left biceps: 5/5  Right triceps: 5/5  Left triceps: 5/5  Right wrist flexion: 5/5  Left wrist flexion: 5/5  Right wrist  "extension: 5/5  Left wrist extension: 55  Right interossei: 5/5  Left interossei: 5/5  Right abdominals: 5/5  Left abdominals: 5/5  Right iliopsoas: 5/5  Left iliopsoas: 5/  Right quadriceps: 5/5  Left quadriceps: 5/5  Right hamstrin/  Left hamstrin/  Right glutei: 5  Left glutei: 5  Right anterior tibial: 5  Left anterior tibial: 5  Right posterior tibial: 5  Left posterior tibial: 5  Right peroneal: 5  Left peroneal: 5  Right gastroc: 5  Left gastroc:     Sensory Exam   Right arm light touch: normal  Left arm light touch: normal  Right leg light touch: normal  Left leg light touch: normal  Right arm vibration: normal  Left arm vibration: normal  Right arm pinprick: normal  Left arm pinprick: normal    Gait, Coordination, and Reflexes     Gait  Gait: normal    Coordination   Romberg: negative  Finger to nose coordination: normal  Heel to shin coordination: normal  Tandem walking coordination: normal    Tremor   Resting tremor: absent  Intention tremor: absent  Action tremor: absent    Reflexes   Right brachioradialis: 2+  Left brachioradialis: 2+  Right biceps: 2+  Left biceps: 2+  Right triceps: 2+  Left triceps: 2+  Right patellar: 2+  Left patellar: 2+  Right achilles: 2+  Left achilles: 2+  Right Mathews: absent  Left Mathews: absent  Right ankle clonus: absent  Left ankle clonus: absent      Provider dictation:  78 year old male with CHF, CAD, COPD, tremor, HTN, atrial fibrilallation, pacemaker placement, and memory loss is referred by Courtney Encinas for back pain, but is actually an established patient last seen by me in 2017 for back pain.  He describes chronic lower back pain for years.  He has experienced many falls over the course of his adult life including one 2 weeks ago.  Pain is felt along the entire length of the right lumbar paraspinal muscles.  He also describes "electrical shocks" in the lower back that cause him to fall to the floor due to pain.  He has tried pain " patches and flexeril.  He has not had recent PT and has not had GAGAN.  In addition, he describes pain inferior to the left scapula in the soft tissue that developed after his most recent fall.  Oswestry score: 22%.  PHQ:  10.    On exam,  He is neurologically intact without any deficits.    I have reviewed xrays of the lumbar spine from 12-11-18 demonstrates good alignment.  There is disk space narrowing at L4/5.  Ladet arthropathy is seen at L4/5 and L5/S1.  There are bridging osteophytes and endplate sclerosis appreciated.      I have reviewed a CT lumbar spine from 1-12-18 demonstrating: degenerative changes in the lower lumbar region at L4/5 and L5/S1.  At both of these levels there is facet arthropathy and a disk/ osteohpyte complex resulting in right greater than left foraminal narrowing.    In conclusion, this is an 78 year old male with chronic right sided lower back pain and acute onset left inferior scapular region pain.  Pain is located in the soft tissues and he has no radicular symptoms - suggesting a myofascial cause to pain which can certainly be due to multiple falls.  I am sending him to PT to help with spasms and improve balance/ strengthening to prevent falls.  Follow up if no improvement at which time we could refer to pain management.      He does have lumbar degenerative changes with foraminal narrowing at L4/5 and L5/S1 that is asymptomatic as he has no radicular symptoms.    Visit Diagnosis:  Chronic right-sided low back pain without sciatica  -     Ambulatory Referral to Physical/Occupational Therapy    Pain in scapula        Total time spent counseling greater than fifty percent of total visit time.  Counseling included discussion regarding imaging findings, diagnosis possibilities, treatment options, risks and benefits.   The patient had many questions regarding the options and long-term effects.

## 2018-12-20 ENCOUNTER — CLINICAL SUPPORT (OUTPATIENT)
Dept: REHABILITATION | Facility: HOSPITAL | Age: 78
End: 2018-12-20
Payer: MEDICARE

## 2018-12-20 DIAGNOSIS — M54.50 LUMBAR PAIN: ICD-10-CM

## 2018-12-20 PROCEDURE — G8979 MOBILITY GOAL STATUS: HCPCS | Mod: CJ,PO | Performed by: PHYSICAL THERAPIST

## 2018-12-20 PROCEDURE — G8978 MOBILITY CURRENT STATUS: HCPCS | Mod: CK,PO | Performed by: PHYSICAL THERAPIST

## 2018-12-20 PROCEDURE — 97110 THERAPEUTIC EXERCISES: CPT | Mod: PO | Performed by: PHYSICAL THERAPIST

## 2018-12-20 PROCEDURE — 97161 PT EVAL LOW COMPLEX 20 MIN: CPT | Mod: PO | Performed by: PHYSICAL THERAPIST

## 2018-12-20 NOTE — PATIENT INSTRUCTIONS
Lower Back Stretch (Sitting)        Sit in chair with knees spread apart. Bend forward to floor. A comfortable stretch should be felt in lower back. Hold ____ seconds.  Repeat ____ times per set. Do ____ sets per session. Do ____ sessions per day.     [email or web address]     Copyright © [employer]. All rights reserved.   Lower Trunk Rotation Stretch        Keeping back flat and feet together, rotate knees to left side. Hold ____ seconds.  Repeat ____ times per set. Do ____ sets per session. Do ____ sessions per day.     [email or web address]     Copyright © [employer]. All rights reserved.   Knee-to-Chest Stretch: Unilateral        With hand behind right knee, pull knee in to chest until a comfortable stretch is felt in lower back and buttocks. Keep back relaxed. Hold ____ seconds.  Repeat ____ times per set. Do ____ sets per session. Do ____ sessions per day.     [email or web address]     Copyright © [employer]. All rights reserved.   Knee-to-Chest Stretch: Bilateral        With hands behind knees, pull both knees in to chest until a comfortable stretch is felt in lower back and buttocks. Keep back relaxed. Hold ____ seconds.  Repeat ____ times per set. Do ____ sets per session. Do ____ sessions per day.     [email or web address]     Copyright © [employer]. All rights reserved.   AROM: Neck Extension        Bend head backward. Hold ____ seconds.  Repeat ____ times per set. Do ____ sets per session. Do ____ sessions per day.     [email or web address]     Copyright © [employer]. All rights reserved.   AROM: Lateral Neck Flexion        Slowly tilt head toward one shoulder, then the other. Hold each position ____ seconds.  Repeat ____ times per set. Do ____ sets per session. Do ____ sessions per day.     [email or web address]     Copyright © [employer]. All rights reserved.   Flexibility: Neck Stretch        Grasp left arm above wrist and pull down across body while gently tilting head same direction. Hold ____ seconds. Relax.  Repeat ____  times per set. Do ____ sets per session. Do ____ sessions per day.     [email or web address]     Copyright © [employer]. All rights reserved.   Scapular Retraction: Elbow Flexion (Standing)        With elbows bent to 90°, pinch shoulder blades together and rotate arms out, keeping elbows bent.  Repeat ____ times per set. Do ____ sets per session. Do ____ sessions per day.     [email or web address]     Copyright © [employer]. All rights reserved.

## 2018-12-20 NOTE — PLAN OF CARE
"[facility]  Physical Therapy Initial Evaluation    Name: Heri Muhammad  Clinic Number: [id number]    Therapy Diagnosis:   Encounter Diagnosis   Name Primary?    Lumbar pain      Physician: Chikis Reyes PA-C    Physician Orders: PT Eval and Treat   Medical Diagnosis from Referral: Chronic right-sided low back pain without sciatica  Evaluation Date: 12/20/2018  Authorization Period Expiration: 12/31/2018  Plan of Care Expiration: 01/31/2019  Visit # / Visits authorized: 1/ 20    Time In: 12:00 PM   Time Out: 12:50 PM   Total Billable Time: 50 minutes    Precautions: Standard    Subjective   Date of onset: 2 months ago   History of current condition - Heri reports: An onset of pain near the left shoulder blade that started 4-5 months ago. He states that it feels like there is a "lump" underneath his shoulder blade. His pain is intermittent in nature and is brought on with lying down and leaning on his shoulder. He denies having any neck pain or numbness/tingling into his UE's. His low back pain is on the right side and doesn't have any specific activity that triggers it. He denies any pain, numbness or tingling into the LE's.        Past Medical History:   Diagnosis Date    A-fib     Anticoagulant long-term use     eliquis    Atrial flutter     Benign essential tremor     Cancer     skin cancer on back    Cardiac pacemaker in situ     CHF (congestive heart failure)     Coronary artery disease     Cough     Dizziness     intermittant    DJD (degenerative joint disease) of knee     ED (erectile dysfunction)     Hard of hearing 11/2016    History of cataract     HTN (hypertension)     Hyperlipidemia     Hyperlipidemia     Memory loss     Seizure disorder     Seizures     last episode 1995    Sinus node dysfunction 08/2016     Heri Muhammad  has a past surgical history that includes Ankle surgery (Right); Colonoscopy; Cataract extraction; Eye surgery; Cardiac pacemaker " "placement (2014); Cardiac pacemaker placement; Cardiac pacemaker placement (2014); Joint replacement; Hernia repair; Atrial ablation surgery; Cardioversion; [id number]-RM # 206 B (N/A, 1/26/2018); Left heart cath (Left, 1/22/2018); RELEASE-CARPAL TUNNEL (Left, 10/4/2017); ABLATION (N/A, 3/23/2017); TRANSESOPHAGEAL ECHOCARDIOGRAM (GALILEO) (N/A, 3/23/2017); CARDIOVERSION (N/A, 2/1/2017); CARDIOVERSION (N/A, 11/22/2016); CARDIOVERSION (N/A, 8/5/2016); HEART CATH-LEFT (N/A, 6/10/2016); ABLATION (N/A, 1/29/2016); CARDIOVERSION (N/A, 11/23/2015); CARDIOVERSION (N/A, 10/15/2015); and INSERTION-PACEMAKER-DUAL (N/A, 6/30/2014).    Heri has a current medication list which includes the following prescription(s): atorvastatin, cyanocobalamin, eliquis, influenza, lamotrigine, memantine, metoprolol succinate, primidone, torsemide, and umeclidinium-vilanterol.    Review of patient's allergies indicates:   Allergen Reactions    Bactroban [mupirocin calcium] Rash     Other reaction(s): Rash        Imaging: x-rays--> arthritis     Prior Therapy: Previous PT for his back   Social History:  lives with their spouse  Occupation: Retired  Prior Level of Function: Limited with overhead reaching   Current Level of Function: Reports difficulty with bending over    Pain:  Current 0/10, worst 8/10, best 0/10   Location: right back   Description: Electric  Aggravating Factors: No specific aggravating factors   Easing Factors: spontaneously resolves     Pts goals: To find out what is causing the "shocking pain" and get rid of it     Objective   Mental status: oriented x3  Posture/ Alignment: Protruded Head, Protracted Scapula    GAIT DEVIATIONS: Heri amb with decreased carl.    ROM:  Cervical ROM limited by ~ 50% in side flexion, extension, rotation   ROM:   AROM  Comment   Flexion: 50%     Extension: 25%     Lat Flex R: Lateral thigh     Lat Flex L: Lateral thigh     Rotation R: 50%     Rotation L: 50%   "   *pain    Strength: Dermatomes:   Right Left Comment   L2 intact intact    L3 intact intact    L4 intact intact    L5 intact intact    S1 intact intact    S2 intact intact    Saddle intact intact      Myotomes:   Right Left Comment   Hip flexion (L2-3): 5/5 5/5    Knee extension (L3-4): 5/5 5/5    DF (L4-5): 5/5 5/5    Great Toe Ext (L5-S1): 5/5 5/5    Great Toe Flex (S1-S2): 5/5 5/5      DTR:   Right Left Comment   Patellar (L3-4) 2+ 2+    Achilles (S1) 2+ 2+        Special Tests:  Repeated ROM ROM (% of normal) Pain? Radiation?   Flex Stand: 50%     Ext Stand: 25%     Flex Supine: 75%     Ext Prone: 25%     *Unable to reproduce back pain     Functional Tests (* indicates w/ pain)   Gait: mild antalgic gait with decreased weightbearing on LLE   Sitàstand: antalgic    Palpation:  Increased tone in lumbar paraspinals bilaterally     Joint Play:  Hypomobility at all lumbar levels     Pt/family was provided educational information, including: role of PT, goals for PT, scheduling - pt verbalized understanding. Discussed insurance plan with pt.       CMS Impairment/Limitation/Restriction for FOTO Lumbar Survey    Therapist reviewed FOTO scores for Heri Muhammad on 12/20/2018.   FOTO documents entered into [email or web address] - see Media section.    Limitation Score: 41%  Category: Mobility    Current : CK = at least 40% but < 60% impaired, limited or restricted  Goal: CJ = at least 20% but < 40% impaired, limited or restricted             TREATMENT   Treatment Time In: 12:45 PM  Treatment Time Out: 12:55 PM  Total Treatment time separate from Evaluation: 10 minutes    Heri received therapeutic exercises to develop ROM and posture for 10 minutes including:  HEP setup with instruction. Picture demonstrations were issued.     Home Exercises and Patient Education Provided    Education provided:   - HEP  - Nature of condition  - POC     Written Home Exercises Provided: yes.  Exercises were reviewed and Heri was able to demonstrate them  prior to the end of the session.  Heri demonstrated good  understanding of the education provided.     See EMR under Patient Instructions for exercises provided 12/20/2018.      Assessment   Pt presents with Chronic right-sided low back pain without sciatica. He has ROM limitations in the lumbar spine and bilateral hips, intermittent pain and significant shortening in the hamstrings. His symptoms appear to be mechanical in nature. He will benefit from physical therapy treatment to assist in reducing impairments and improving ease of ADL's.     Pt prognosis is Good.   Pt will benefit from skilled outpatient Physical Therapy to address the deficits stated above and in the chart below, provide pt/family education, and to maximize pt's level of independence.     Plan of care discussed with patient: Yes  Pt's spiritual, cultural and educational needs considered and patient is agreeable to the plan of care and goals as stated below:     Anticipated Barriers for therapy: Excessive commute and high copay    Medical Necessity is demonstrated by the following  History  Co-morbidities and personal factors that may impact the plan of care Co-morbidities:   advanced age, CAD, excessive commute time/distance, financial considerations, high BMI, HTN and dementia    Personal Factors:   lifestyle     high   Examination  Body Structures and Functions, activity limitations and participation restrictions that may impact the plan of care Body Regions:   neck  back    Body Systems:    gross symmetry  ROM  balance    Participation Restrictions:   None    Activity limitations:   Learning and applying knowledge  no deficits    General Tasks and Commands  no deficits    Communication  no deficits    Mobility  lifting and carrying objects  walking    Self care  no deficits    Domestic Life  doing house work (cleaning house, washing dishes, laundry)    Interactions/Relationships  no deficits    Life Areas  no deficits    Community and Social  Life  no deficits         low   Clinical Presentation stable and uncomplicated low   Decision Making/ Complexity Score: low     Goals:  Short Term Goals: 3 weeks   1) Pt will be I and compliant with established HEP   2) Pain will decrease by 25%    Long Term Goals: 6 weeks   1) Pt. Will demonstrate improved efficiency of ADL activities   2) Pt. Will perform ADL activities without reporting shooting pain in lumbar spine         Plan   Plan of care Certification: 12/20/2018 to 01/31/2019.    Outpatient Physical Therapy 1 time weekly for  1 additional visit to include the following interventions: Patient Education and Therapeutic Exercise. He will participate in a HEP due to having a high copay.     Stan Renae, PT

## 2018-12-21 LAB
AV DELAY - FIXED: 200 MSEC
BATTERY VOLTAGE (V): 2.99 V
IMPEDANCE RA LEAD (DONOR): 1175 OHMS
IMPEDANCE RA LEAD (NATIVE): 590 OHMS
IMPEDANCE RA LEAD: 380 OHMS
OHS CV DC PP MS1: 0.4 MS
OHS CV DC PP MS2: 0.4 MS
OHS CV DC PP MS3: 0.8 MS
OHS CV DC PP V1: NORMAL V
OHS CV DC PP V2: NORMAL V
OHS CV DC PP V3: NORMAL V
P/R-WAVE RA LEAD (NATIVE): 12 MV
P/R-WAVE RA LEAD: 1.6 MV
PV DELAY - FIXED: 150 MSEC
THRESHOLD MS LV LEAD: 0.8 MS
THRESHOLD MS RA LEAD (DONOR): 0.4 MS
THRESHOLD MS RA LEAD (NATIVE): 0.4 MS
THRESHOLD MS RA LEAD: 0.4 MS
THRESHOLD MS RV LEAD: 0.4 V
THRESHOLD MS RVOT LEAD: 0.8 MS
THRESHOLD V LV LEAD: 2 V
THRESHOLD V RA LEAD (DONOR): 0.5 V
THRESHOLD V RA LEAD (NATIVE): 0.75 V
THRESHOLD V RA LEAD: 0.62 V
THRESHOLD V RV LEAD: 0.5 V
THRESHOLD V RVOT LEAD: 2 V

## 2019-01-07 ENCOUNTER — CLINICAL SUPPORT (OUTPATIENT)
Dept: CARDIOLOGY | Facility: CLINIC | Age: 79
End: 2019-01-07
Attending: INTERNAL MEDICINE
Payer: MEDICARE

## 2019-01-07 DIAGNOSIS — I49.5 SINUS NODE DYSFUNCTION: ICD-10-CM

## 2019-01-07 DIAGNOSIS — Z95.0 CARDIAC PACEMAKER IN SITU: ICD-10-CM

## 2019-01-07 PROCEDURE — 93294 CARDIAC DEVICE CHECK CHECK - REMOTE: ICD-10-PCS | Mod: S$GLB,,, | Performed by: INTERNAL MEDICINE

## 2019-01-07 PROCEDURE — 93296 REM INTERROG EVL PM/IDS: CPT | Mod: S$GLB,,, | Performed by: INTERNAL MEDICINE

## 2019-01-07 PROCEDURE — 93296 CARDIAC DEVICE CHECK CHECK - REMOTE: ICD-10-PCS | Mod: S$GLB,,, | Performed by: INTERNAL MEDICINE

## 2019-01-07 PROCEDURE — 93294 REM INTERROG EVL PM/LDLS PM: CPT | Mod: S$GLB,,, | Performed by: INTERNAL MEDICINE

## 2019-01-11 LAB
AV DELAY - FIXED: 200 MSEC
BATTERY VOLTAGE (V): 2.98 V
IMPEDANCE RA LEAD (DONOR): 1200 OHMS
IMPEDANCE RA LEAD (NATIVE): 600 OHMS
IMPEDANCE RA LEAD: 400 OHMS
OHS CV DC PP MS1: 0.4 MS
OHS CV DC PP MS2: 0.4 MS
OHS CV DC PP MS3: 0.8 MS
OHS CV DC PP V1: NORMAL V
OHS CV DC PP V2: 2 V
OHS CV DC PP V3: 3.12 V
P/R-WAVE RA LEAD (NATIVE): 12 MV
P/R-WAVE RA LEAD: 2.4 MV
PV DELAY - FIXED: 150 MSEC
THRESHOLD MS RA LEAD (DONOR): 0.8 MS
THRESHOLD MS RA LEAD (NATIVE): 0.4 MS
THRESHOLD MS RA LEAD: 0.4 MS
THRESHOLD V RA LEAD (DONOR): 2.12 V
THRESHOLD V RA LEAD (NATIVE): 0.5 V
THRESHOLD V RA LEAD: 0.75 V

## 2019-01-15 ENCOUNTER — OFFICE VISIT (OUTPATIENT)
Dept: NEUROLOGY | Facility: CLINIC | Age: 79
End: 2019-01-15
Payer: MEDICARE

## 2019-01-15 ENCOUNTER — OFFICE VISIT (OUTPATIENT)
Dept: PAIN MEDICINE | Facility: CLINIC | Age: 79
End: 2019-01-15
Payer: MEDICARE

## 2019-01-15 ENCOUNTER — DOCUMENTATION ONLY (OUTPATIENT)
Dept: REHABILITATION | Facility: HOSPITAL | Age: 79
End: 2019-01-15

## 2019-01-15 VITALS
BODY MASS INDEX: 31.56 KG/M2 | WEIGHT: 232.69 LBS | SYSTOLIC BLOOD PRESSURE: 104 MMHG | DIASTOLIC BLOOD PRESSURE: 61 MMHG | HEART RATE: 77 BPM

## 2019-01-15 VITALS — WEIGHT: 231.38 LBS | HEIGHT: 72 IN | BODY MASS INDEX: 31.34 KG/M2

## 2019-01-15 DIAGNOSIS — G30.1 LATE ONSET ALZHEIMER'S DISEASE WITHOUT BEHAVIORAL DISTURBANCE: ICD-10-CM

## 2019-01-15 DIAGNOSIS — M47.816 LUMBAR SPONDYLOSIS: Primary | ICD-10-CM

## 2019-01-15 DIAGNOSIS — F07.81 POST CONCUSSIVE SYNDROME: ICD-10-CM

## 2019-01-15 DIAGNOSIS — R25.1 TREMOR: ICD-10-CM

## 2019-01-15 DIAGNOSIS — G89.29 CHRONIC BILATERAL LOW BACK PAIN WITHOUT SCIATICA: ICD-10-CM

## 2019-01-15 DIAGNOSIS — F02.80 LATE ONSET ALZHEIMER'S DISEASE WITHOUT BEHAVIORAL DISTURBANCE: ICD-10-CM

## 2019-01-15 DIAGNOSIS — G40.909 SEIZURE DISORDER: Primary | ICD-10-CM

## 2019-01-15 DIAGNOSIS — M54.50 CHRONIC BILATERAL LOW BACK PAIN WITHOUT SCIATICA: ICD-10-CM

## 2019-01-15 PROCEDURE — 3074F SYST BP LT 130 MM HG: CPT | Mod: CPTII,S$GLB,, | Performed by: ANESTHESIOLOGY

## 2019-01-15 PROCEDURE — 99999 PR PBB SHADOW E&M-EST. PATIENT-LVL III: ICD-10-PCS | Mod: PBBFAC,,, | Performed by: PSYCHIATRY & NEUROLOGY

## 2019-01-15 PROCEDURE — 1101F PT FALLS ASSESS-DOCD LE1/YR: CPT | Mod: CPTII,S$GLB,, | Performed by: ANESTHESIOLOGY

## 2019-01-15 PROCEDURE — 3078F DIAST BP <80 MM HG: CPT | Mod: CPTII,S$GLB,, | Performed by: PSYCHIATRY & NEUROLOGY

## 2019-01-15 PROCEDURE — 3074F PR MOST RECENT SYSTOLIC BLOOD PRESSURE < 130 MM HG: ICD-10-PCS | Mod: CPTII,S$GLB,, | Performed by: PSYCHIATRY & NEUROLOGY

## 2019-01-15 PROCEDURE — 1101F PR PT FALLS ASSESS DOC 0-1 FALLS W/OUT INJ PAST YR: ICD-10-PCS | Mod: CPTII,S$GLB,, | Performed by: ANESTHESIOLOGY

## 2019-01-15 PROCEDURE — 3074F PR MOST RECENT SYSTOLIC BLOOD PRESSURE < 130 MM HG: ICD-10-PCS | Mod: CPTII,S$GLB,, | Performed by: ANESTHESIOLOGY

## 2019-01-15 PROCEDURE — 99999 PR PBB SHADOW E&M-EST. PATIENT-LVL III: CPT | Mod: PBBFAC,,, | Performed by: ANESTHESIOLOGY

## 2019-01-15 PROCEDURE — 1101F PR PT FALLS ASSESS DOC 0-1 FALLS W/OUT INJ PAST YR: ICD-10-PCS | Mod: CPTII,S$GLB,, | Performed by: PSYCHIATRY & NEUROLOGY

## 2019-01-15 PROCEDURE — 3074F SYST BP LT 130 MM HG: CPT | Mod: CPTII,S$GLB,, | Performed by: PSYCHIATRY & NEUROLOGY

## 2019-01-15 PROCEDURE — 99999 PR PBB SHADOW E&M-EST. PATIENT-LVL III: ICD-10-PCS | Mod: PBBFAC,,, | Performed by: ANESTHESIOLOGY

## 2019-01-15 PROCEDURE — 99214 PR OFFICE/OUTPT VISIT, EST, LEVL IV, 30-39 MIN: ICD-10-PCS | Mod: S$GLB,,, | Performed by: PSYCHIATRY & NEUROLOGY

## 2019-01-15 PROCEDURE — 1101F PT FALLS ASSESS-DOCD LE1/YR: CPT | Mod: CPTII,S$GLB,, | Performed by: PSYCHIATRY & NEUROLOGY

## 2019-01-15 PROCEDURE — 3078F DIAST BP <80 MM HG: CPT | Mod: CPTII,S$GLB,, | Performed by: ANESTHESIOLOGY

## 2019-01-15 PROCEDURE — 99204 OFFICE O/P NEW MOD 45 MIN: CPT | Mod: S$GLB,,, | Performed by: ANESTHESIOLOGY

## 2019-01-15 PROCEDURE — 99214 OFFICE O/P EST MOD 30 MIN: CPT | Mod: S$GLB,,, | Performed by: PSYCHIATRY & NEUROLOGY

## 2019-01-15 PROCEDURE — 3078F PR MOST RECENT DIASTOLIC BLOOD PRESSURE < 80 MM HG: ICD-10-PCS | Mod: CPTII,S$GLB,, | Performed by: ANESTHESIOLOGY

## 2019-01-15 PROCEDURE — 3078F PR MOST RECENT DIASTOLIC BLOOD PRESSURE < 80 MM HG: ICD-10-PCS | Mod: CPTII,S$GLB,, | Performed by: PSYCHIATRY & NEUROLOGY

## 2019-01-15 PROCEDURE — 99999 PR PBB SHADOW E&M-EST. PATIENT-LVL III: CPT | Mod: PBBFAC,,, | Performed by: PSYCHIATRY & NEUROLOGY

## 2019-01-15 PROCEDURE — 99204 PR OFFICE/OUTPT VISIT, NEW, LEVL IV, 45-59 MIN: ICD-10-PCS | Mod: S$GLB,,, | Performed by: ANESTHESIOLOGY

## 2019-01-15 RX ORDER — ALPRAZOLAM 0.5 MG/1
0.5 TABLET, ORALLY DISINTEGRATING ORAL ONCE AS NEEDED
Status: CANCELLED | OUTPATIENT
Start: 2019-01-17 | End: 2030-06-14

## 2019-01-15 RX ORDER — CYCLOBENZAPRINE HCL 5 MG
5 TABLET ORAL 3 TIMES DAILY PRN
COMMUNITY
End: 2019-02-11 | Stop reason: SDUPTHER

## 2019-01-15 NOTE — PROGRESS NOTES
Date of service:  1/15/2019    Chief complaint:  Concussion, memory loss    Interval history:  The patient is a 78 y.o. male seen previously for a variety of issues.  Overall, it sounds like he is doing better from a concussion perspective.  He has had some improvement in particular with his headaches, though he still does get them.  They have noted no significant memory changes since his last visit.  He also does have ET.  He has not seen Dr. Quinn recently.  He reports now that the tremor is not bothersome and that he desires no additional treatment.  He has a remote history of seizures and remains seizure free.  He continues to take Lamictal with no side effects.  He is following with Dr. Moffett with pain management for his back issues now.    Prior history (1/18):  The patient is a 78 y.o. male seen previously for memory loss and essential tremor.  Regarding the memory, he feels that it is largely stable.  He is presently taking Namenda without significant side effects.  For his tremor, he has seen Dr. Quinn, who has adjusted his medications.  The patient feels his tremor is about the same.      The patient does complain of 2 falls recently.  Both occurred while he was bending over.  He denies dizziness and loss of consciousness.  He reports that his legs became weak and gave out.  He had difficulty getting up afterwards.    History of present illness:  The patient is a 78 y.o. male referred for evaluation of memory loss.  I have seen him previously for tremor.  This issue began a few months ago. It involves short-term memory as well as long-term memory.  He may have some difficulties with executive function, though he is not clear on this.  There are may be issues with multiple step processes. He has no clear problems with ADLs. He does not get lost in familiar areas. There are no hallucinations. There are no clear personality changes.  He does not endorse depression.    Of note, the patient has had episodes of  shortness of breath as well.  His wife reports that there are times where he seems more confused, but they deny that this maps to the times where he has issues with breathing.    Finally, the patient's propranolol was stopped by cardiology when his metoprolol was started.  He feels like his tremor is worse since this happened.      Past Medical History:   Diagnosis Date    A-fib     Anticoagulant long-term use     eliquis    Atrial flutter     Benign essential tremor     Cancer     skin cancer on back    Cardiac pacemaker in situ     CHF (congestive heart failure)     Coronary artery disease     Cough     Dizziness     intermittant    DJD (degenerative joint disease) of knee     ED (erectile dysfunction)     Hard of hearing 11/2016    History of cataract     HTN (hypertension)     Hyperlipidemia     Hyperlipidemia     Memory loss     Seizure disorder     Seizures     last episode 1995    Sinus node dysfunction 08/2016       Past Surgical History:   Procedure Laterality Date    ABLATION N/A 3/23/2017    [facility] LAB    ABLATION N/A 1/29/2016    [facility]    ANKLE SURGERY Right     pins and screws    ATRIAL ABLATION SURGERY      CARDIAC PACEMAKER PLACEMENT  2014    CARDIAC PACEMAKER PLACEMENT      CARDIAC PACEMAKER PLACEMENT  2014    CARDIOVERSION      CARDIOVERSION N/A 2/1/2017    [facility]    CARDIOVERSION N/A 11/22/2016    [facility]    CARDIOVERSION N/A 8/5/2016    [facility]    CARDIOVERSION N/A 11/23/2015    [facility] CATH LAB    CARDIOVERSION N/A 10/15/2015    [facility] CATH LAB    CATARACT EXTRACTION      OU    COLONOSCOPY      due in 2014    EYE SURGERY      Jesse cataract    HEART CATH-LEFT N/A 6/10/2016    [facility]  CATH    HERNIA REPAIR      abdominal    [id number] # 206 B N/A 1/26/2018    [facility] CATH    INSERTION-PACEMAKER-DUAL N/A 6/30/2014    [facility] CATH LAB    JOINT REPLACEMENT      bill shoulders    Left heart cath Left 1/22/2018    [facility] CATH    RELEASE-CARPAL TUNNEL Left 10/4/2017    [facility] OR    TRANSESOPHAGEAL ECHOCARDIOGRAM (GALILEO) N/A 3/23/2017    [facility] CATH LAB       Family History   Problem Relation Age of Onset    Heart disease Mother     Cancer Father         colon    Blindness Father         accident    Cataracts Father     Hypertension Father     Tremor Father     Heart disease Brother     Tremor Brother     Tremor Paternal Grandfather     Melanoma Neg Hx     Amblyopia Neg Hx     Diabetes Neg Hx     Glaucoma Neg Hx     Macular degeneration Neg Hx     Retinal detachment Neg Hx     Strabismus Neg Hx     Stroke Neg Hx     Thyroid disease Neg Hx        Social History     Socioeconomic History    Marital status:      Spouse name: Not on file    Number of children: Not on file    Years of education: Not on file    Highest education level: Not on file   Social Needs    Financial resource strain: Not on file    Food insecurity - worry: Not on file    Food insecurity - inability: Not on file    Transportation needs - medical: Not on file    Transportation needs - non-medical: Not on file   Occupational History    Occupation: retired   Tobacco Use    Smoking status: Never Smoker    Smokeless tobacco: Never Used   Substance and Sexual Activity    Alcohol use: No    Drug use: No    Sexual activity: Not on file   Other Topics Concern    Not on file   Social History Narrative    Not on file        Review of Systems:  A 14 system review is documented in the  "patient's chart.  It is noteworthy as above.  It is otherwise negative.  Please see the patient's chart for further details.    Physical exam:  /61   Pulse 77   Wt 105.6 kg (232 lb 11.2 oz)   BMI 31.56 kg/m²   General: Well developed, well nourished.  No acute distress.  HEENT: Atraumatic, normocephalic.  Neck: Supple, trachea midline.  Cardiovascular: Regular rate and rhythm.  Pulmonary: No increased work of breathing.  Abdomen/GI: No guarding.  Musculoskeletal: No obvious joint deformities, moves all extremities well.    Neurological exam:  Mental status: Awake and alert.  Oriented x3.  Speech fluent and appropriate.  Recent and remote memory appear to be largely intact.  Fund of knowledge grossly normal.  MMSE = 26/30.  Cranial nerves: Pupils equal round and reactive to light, extraocular movements intact, facial strength and sensation intact bilaterally, palate and tongue midline, hearing grossly intact bilaterally.  Motor: 5 out of 5 strength throughout the upper and lower extremities bilaterally aside from 4/5 bilateral IP. Normal bulk and tone.  Sensation: Intact to light touch and temperature bilaterally.  DTR: 2+ at the knees and biceps bilaterally.  Coordination: Finger-nose-finger testing intact bilaterally with a more pronounced intention tremor than in the past.  Gait: Normal gait. Good tandem.    Data base:  Previous notes reviewed.      Recent labs include a CMP with a cr=0.8.    CT head (1/18):  "There is moderate generalized cerebral atrophy.  There are no acute findings."  I independently visualized and interpreted this study.     EEG (1/30):  "This EEG shows a mild degree of diffuse nonspecific slowing, indicative of an encephalopathy or a nonspecific disturbance of cerebral function. No evidence of seizures or epileptiform activity was seen during the recording."    Neuropsychological testing (3/17):  "Mr. Muhammad was referred for Neuropsychological Evaluation on an outpatient basis due " "to subjective memory decline for the past 1-2 years. His general cognitive abilities as assessed by the RBANS were within the average range, with mild impairment in language due to poor performance on semantic fluency, and no impairment in immediate verbal memory, visuospatial/constructional abilities, attention, or delayed memory (with mildly impaired figure recall). Further assessment of specific cognitive abilities revealed mild deficits in semantic fluency and constructional abilities, with intact naming, verbal comprehension, phonemic fluency, abstract reasoning, psychomotor speed, and mental flexibility. Additional memory assessment revealed average immediate auditory/verbal memory, average delayed auditory/verbal memory, average immediate visual memory, and mildly impaired delayed visual memory. Personality test data suggested that depression and anxiety were not likely contributing factors to his cognitive performance. Neuropsychological testing revealed mild impairment in semantic fluency and delayed visual memory; and variability in constructional ability (performances in the average and mildly impaired ranges). The deficits revealed are not sufficient to warrant a diagnosis of mild cognitive impairment. Reassurance may prove useful. Repeat testing is indicated if there is evidence of further decline in the future."    Neuropsychological testing (10/18):  "Mr. Muhammad was referred for Neuropsychological Evaluation on an outpatient basis due to subjective memory decline for the past few years which got much worse after he fell and hit his forehead on the corner of a wall while hospitalized in January of this year.  His general cognitive abilities as assessed by the RBANS were within the low average range, with moderate impairment in attention, low average language (due to reduced semantic fluency), and no impairment in immediate verbal memory, visuospatial/constructional abilities, or delayed memory.  " "Further assessment of specific cognitive abilities revealed deficits in verbal fluency, constructional abilities, and mental flexibility, with low average psychomotor speed and intact naming, verbal comprehension, facial recognition, and abstract reasoning.  Additional memory assessment revealed no impairment, consistent with the memory subtests of the RBANS. Personality test data show his score for depression is slightly higher at this time, but he still does not surpass the cutoff for clinical depression. Neuropsychological testing revealed impairment in attention, verbal fluency, constructional abilities, and mental flexibility. Memory was intact. When compared to his previous assessment in 2016, there were some changes indicating a decline in attention, mental flexibility, and phonemic fluency, but no significant changes in other areas. This decline is likely related to his fall, as the areas of deficit are often related to a post-concussive syndrome. If that is the case, he may continue to improve somewhat over the next several months. However, if he reports continued cognitive decline over the next year, repeat testing could be considered after a year to assess for any further decline given the family history of dementia."    Assessment and plan:  The patient is a 78 y.o. male seen previously for a variety of issues.  The patient has a post-concussive syndrome which seems to be improving.  We spoke in detail about the nature of his diagnosis and the anticipated course.  His neuropsychological testing was largely reassuring.      His tremor disorder is satisfactorily controlled from his perspective.  Should it worsen, we may have him meet with Dr. Quinn again in the future.      Regarding his seizure disorder, this remains stable on Lamictal.  We will continue this medication.  We will see the patient back in a few months.    Greater than 50% of the patient's 25 minute clinic visit was spent on counseling and " arranging care.  Topics covered include diagnosis, prognosis, testing, and treatment.

## 2019-01-15 NOTE — H&P (VIEW-ONLY)
[facility] New Patient Evaluation    CC:   Chief Complaint   Patient presents with    Low-back Pain      Last 3 PDI Scores 1/15/2019 10/2/2014   Pain Disability Index (PDI) 43 0       HPI:   Heri Muhammad is a 78 y.o. male who complains of back pain    Onset: > 10 years  Progression: since onset, pain is gradually worsening  Current Pain Score: 8/10  Typical Range: 4-8/10  Timing: frequent  Quality: Aching, Dull and Electric  Radiation: no  Associated numbness or weakness: no numbness, no weakness  Exacerbated by: physical therapy  Allievated by:   Is Pain Level Acceptable?: No    Previous Therapies:  PT/OT: yes tried and made his pain worse  HEP:   Interventions:   Surgery:  Medications:   - NSAIDS:   - MSK Relaxants: flexeril  - TCAs:   - SNRIs:   - Topicals: lidocaine patch  - Anticonvulsants:  - Opioids:     Current Pain Medications:  1. Lidocaine patch, flexeril, ibuprofen, tylenol    History:    Current Outpatient Medications:     atorvastatin (LIPITOR) 10 MG tablet, TAKE 1 TABLET EVERY EVENING, Disp: 90 tablet, Rfl: 4    cyanocobalamin (VITAMIN B-12) 1000 MCG tablet, Take 100 mcg by mouth once daily., Disp: , Rfl:     cyclobenzaprine (FLEXERIL) 5 MG tablet, Take 5 mg by mouth 3 (three) times daily as needed for Muscle spasms., Disp: , Rfl:     ELIQUIS 5 mg Tab, TAKE 1 TABLET TWICE DAILY, Disp: 180 tablet, Rfl: 4    influenza (FLUZONE HIGH-DOSE) 180 mcg/0.5 mL vaccine, Inject 0.5 mLs into the muscle., Disp: 0.5 mL, Rfl: 0    lamoTRIgine (LAMICTAL) 100 MG tablet, Take 1 tablet (100 mg total) by mouth 2 (two) times daily., Disp: 180 tablet, Rfl: 3    memantine (NAMENDA) 10 MG Tab, Take 1 tablet (10 mg total) by mouth 2 (two) times daily., Disp: 180 tablet, Rfl: 3    metoprolol succinate (TOPROL-XL) 50 MG 24 hr tablet, Take 1 tablet (50 mg total) by mouth once daily., Disp: 90 tablet, Rfl: 4    primidone (MYSOLINE) 250 MG Tab, TAKE 2 TABLETS TWICE DAILY, Disp: 360 tablet, Rfl: 3     torsemide (DEMADEX) 10 MG Tab, Take 1 tablet (10 mg total) by mouth every morning., Disp: 90 tablet, Rfl: 5    umeclidinium-vilanterol (ANORO ELLIPTA) 62.5-25 mcg/actuation DsDv, Inhale 1 puff into the lungs Daily. Controller, Disp: 60 each, Rfl: 11    Past Medical History:   Diagnosis Date    A-fib     Anticoagulant long-term use     eliquis    Atrial flutter     Benign essential tremor     Cancer     skin cancer on back    Cardiac pacemaker in situ     CHF (congestive heart failure)     Coronary artery disease     Cough     Dizziness     intermittant    DJD (degenerative joint disease) of knee     ED (erectile dysfunction)     Hard of hearing 11/2016    History of cataract     HTN (hypertension)     Hyperlipidemia     Hyperlipidemia     Memory loss     Seizure disorder     Seizures     last episode 1995    Sinus node dysfunction 08/2016       Past Surgical History:   Procedure Laterality Date    ABLATION N/A 3/23/2017    [facility]    ABLATION N/A 1/29/2016    [facility]    ANKLE SURGERY Right     pins and screws    ATRIAL ABLATION SURGERY      CARDIAC PACEMAKER PLACEMENT  2014    CARDIAC PACEMAKER PLACEMENT      CARDIAC PACEMAKER PLACEMENT  2014    CARDIOVERSION      CARDIOVERSION N/A 2/1/2017    [facility]    CARDIOVERSION N/A 11/22/2016    [facility]    CARDIOVERSION N/A 8/5/2016    [facility]    CARDIOVERSION N/A 11/23/2015    [facility] at [facility] CATH LAB    CARDIOVERSION N/A 10/15/2015    [facility] CATH LAB    CATARACT EXTRACTION      OU    COLONOSCOPY      due in 2014    EYE SURGERY      Jesse cataract    HEART CATH-LEFT N/A 6/10/2016    [facility]ATH    HERNIA REPAIR      abdominal    SPYNMOADF-KWERJZPEW-KAHYPLKRKVNER- #  206 B N/A 1/26/2018    [facility] CATH    INSERTION-PACEMAKER-DUAL N/A 6/30/2014    Per[facility] by [facility] at [facility] CATH LAB    JOINT REPLACEMENT      bill shoulders    Left heart cath Left 1/22/2018    [facility] CATH    RELEASE-CARPAL TUNNEL Left 10/4/2017    [facility] Jr[facility] OR    TRANSESOPHAGEAL ECHOCARDIOGRAM (GALILEO) N/A 3/23/2017    [facility] CATH LAB       Family History   Problem Relation Age of Onset    Heart disease Mother     Cancer Father         colon    Blindness Father         accident    Cataracts Father     Hypertension Father     Tremor Father     Heart disease Brother     Tremor Brother     Tremor Paternal Grandfather     Melanoma Neg Hx     Amblyopia Neg Hx     Diabetes Neg Hx     Glaucoma Neg Hx     Macular degeneration Neg Hx     Retinal detachment Neg Hx     Strabismus Neg Hx     Stroke Neg Hx     Thyroid disease Neg Hx        Social History     Socioeconomic History    Marital status:      Spouse name: None    Number of children: None    Years of education: None    Highest education level: None   Social Needs    Financial resource strain: None    Food insecurity - worry: None    Food insecurity - inability: None    Transportation needs - medical: None    Transportation needs - non-medical: None   Occupational History    Occupation: retired   Tobacco Use    Smoking status: Never Smoker    Smokeless tobacco: Never Used   Substance and Sexual Activity    Alcohol use: No    Drug use: No    Sexual activity: None   Other Topics Concern    None   Social History Narrative    None       Review of patient's allergies indicates:   Allergen Reactions    Bactroban [mupirocin calcium] Rash     Other reaction(s): Rash       Review of Systems:  General ROS: negative for - fever  Psychological ROS: negative for -  hostility  Hematological and Lymphatic ROS: negative for - bleeding problems  Endocrine ROS: negative for - unexpected weight changes  Respiratory ROS: no cough, shortness of breath, or wheezing  Cardiovascular ROS: no chest pain or dyspnea on exertion  Gastrointestinal ROS: no abdominal pain, change in bowel habits, or black or bloody stools  Musculoskeletal ROS: negative for - muscular weakness  Neurological ROS: negative for - bowel and bladder control changes  Dermatological ROS: negative for rash    Physical Exam:  Vitals:    01/15/19 1305   Weight: 104.9 kg (231 lb 6 oz)   Height: 6' (1.829 m)   PainSc:   8   PainLoc: Back   Pulse 78  /63  Body mass index is 31.38 kg/m².     Gen: NAD  Gait: gait intact  Psych:  Mood appropriate for given condition  HEENT: eyes anicteric   GI: Abd soft  CV: RRR  Lungs: breathing unlabored   ROM: limited AROM of the L spine in all planes, full ROM at ankles, knees and hips  Lumbar flexion 90 degrees, extension 50 degrees, side bending 30 degrees.    Sensation: intact to light touch in all dermatomes tested from L2-S1 bilaterally  Reflexes: 0/0 b/l patella and Achilles, biceps and triceps, plantar response down going   Palpation: Diffusely tender over lumbar paraspinals  -TTP over the b/l greater trochanters and bilateral SI joint  Tone: normal in the b/l knees and hips   Lymph: no lymphadenopathy at groin or popliteal fossa  Skin: intact  Extremities: No edema in b/l ankles or hands  Provacative tests: - SLR, + axial facet loading b/l, - MONROE testing, - FADIR testing       Right Left   L2/3 Iliacus Hip flexion  5  5   L3/4 Qudratus Femoris Knee Extension  5  5   L4/5 Tib Anterior Ankle Dorsiflexion   5  5   L5/S1 Extensor Hallicus Longus Great toe extension  5  5   L4/5 Tib Anterior/Posterior Inversion  5  5   L5/S1 Extensor Digitorum Longus, Peronues Eversion  5  5   L5/S1 Glut Med Hip Abudction  5  5   S1/2 Glut max and Hamstring Hip Extension  5  5   S1/2  Gastroc/Soleus Plantar Fexion  5  5       Imaging:  Xray lumbar spine 12/11/18  FINDINGS:  There is no definite change in the appearance of the lumbar spine compared to the prior study.  There is no fracture or malalignment.  There is moderate to marked disc space narrowing at the L4-5 level.  There is moderate-to-marked facet joint arthropathy at the L4-5 and L5-S1 levels.  Otherwise, there is mild disc space narrowing in the remainder of the lumbar spine.  There is moderate to marked disc space narrowing at the thoracolumbar junction where there is also bridging osteophyte formation and endplate sclerosis.  These findings were all present previously.    CT lumbar spine 1/2018  FINDINGS:    Vertebral column: There is stable mild grade 1 spondylolisthesis at the L5-S1 level with 4 mm anterolisthesis of L5 on S1 which is degenerative in etiology and related to facet arthropathy.  There are no pars defects.  The remainder of the vertebral bodies maintain normal alignment.  The bones are mildly osteopenic.  There is no acute fracture.  There is mild to moderate disc space sparing at the L4-5 level where there is endplate sclerosis and osteophyte formation.  There is mild disc space narrowing anteriorly at the L5-S1 level.  There is a very minimal levocurvature of the lumbar spine.  This could in part be positional.  The patient is slightly tilted in the scanner.      Spinal canal, epidural space, conus: The spinal canal is developmentally normal.  The conus terminates at the level of T12-L1.  There is no abnormal epidural collection or mass.      Findings by level:  T11-12: There is no spinal canal or foraminal stenosis.  There is mild left facet joint arthropathy.    T12-L1: There is no spinal canal or foraminal stenosis.  There is mild left facet joint arthropathy.    L1-L2: There is minimal bulging of the annulus and mild facet joint arthropathy without spinal canal or foraminal stenosis.  There is no significant  change.    L2-L3: There is a mild diffuse disc bulge and mild facet arthropathy.  There is no spinal canal or foraminal stenosis.  There is little change.    L3-L4: There is a mild diffuse disc bulge with mild facet arthropathy.  There is no spinal canal or foraminal stenosis and there is no significant change.    L4-L5: There is mild to moderate facet arthropathy.  There is a diffuse disc bulge with osteophytic ridging.  There is no spinal stenosis.  There is moderate left and severe right foraminal stenosis without significant change.    L5-S1: There is 4 mm anterolisthesis of L5 on S1.  There is moderate right and mild bilateral facet joint arthropathy with ligamentum flavum thickening and ossification.  There is unroofing of a broad disc protrusion.  This approaches both S1 roots and may contact the roots without obvious compression or displacement.  There is no significant spinal stenosis.  There is moderate left and severe right foraminal stenosis without significant change.    Soft tissues/other: The prevertebral soft tissues are normal.  There is mild atherosclerosis.  The aorta is of normal caliber.    Labs:  BMP  Lab Results   Component Value Date     08/29/2018    K 3.9 08/29/2018    CL 99 08/29/2018    CO2 31 (H) 08/29/2018    BUN 8 08/29/2018    CREATININE 0.8 08/29/2018    CALCIUM 9.0 08/29/2018    ANIONGAP 9 08/29/2018    ESTGFRAFRICA >60.0 08/29/2018    EGFRNONAA >60.0 08/29/2018     Lab Results   Component Value Date    ALT 21 08/29/2018    AST 20 08/29/2018    ALKPHOS 98 08/29/2018    BILITOT 0.4 08/29/2018       Assessment:  Problem List Items Addressed This Visit        Neuro    Lumbar spondylosis - Primary    Relevant Orders    Case Request Operating Room: Block-nerve-medial branch-lumbar L2-L5 (Completed)       Orthopedic    Chronic bilateral low back pain without sciatica          Treatment Plan:  78 y.o. year old male with PMH seizure disorder, HTN, CAD, A-fib presents to the office  today with chronic axial lower back pain that has been bothering him for the past 10 years.  Today his pain is 8/10, dull, aching, worse with back extension.  He denies any radicular pain or radiculopathy.  He has tried physical therapy however he feels like it made his pain worse and couldn't complete it.  He continues lidocaine patches, flexeril prn, ibuprofen prn, and tylenol prn with only mild pain relief.  CT and xray lumbar spine consistent with multilevel facet arthropathy.  His pain is limiting his mobility and interfering with his ADL's.  Will schedule for bilateral L2-L5 medial branch blocks followed by RFA if appropriate.  Procedure explained using an anatomical model.  Risks, benefits, alternatives explained to patient who verbalized understanding, including increased risk of infection, bleeding, need for additional procedures or surgery, and nerve damage.  Questions regarding the procedure, risks, expected outcome, and possible side effects were solicited and answered to the patient's satisfaction.  Heri wishes to proceed with the injection.  Verbal and written consent were obtained in clinic today.  Follow up 1 week post injection.  Of note patient had initial HR of 143 via pulse ox.  Repeat manual check was 91 and then 78 20 mins later. /63.  Was asymptomatic throughout encounter.  Given history of a-fib, patient advised to go to ED should he experience any chest pain, sob, dizziness.     Procedures: bilateral L2-L5 medial branch blocks  PT/OT/HEP: unable to complete PT 2/2 pain  Medications: continue ibuprofen, tylenol, flexeril prn  Labs: Reviewed and medications are appropriately dosed for current hepatorenal function.  Imaging: No additional recommended at this time.    : Not applicable    Anthony Moffett M.D.  Interventional Pain Medicine / Anesthesiology

## 2019-01-15 NOTE — PROGRESS NOTES
[facility] New Patient Evaluation    CC:   Chief Complaint   Patient presents with    Low-back Pain      Last 3 PDI Scores 1/15/2019 10/2/2014   Pain Disability Index (PDI) 43 0       HPI:   Heri Muhammad is a 78 y.o. male who complains of back pain    Onset: > 10 years  Progression: since onset, pain is gradually worsening  Current Pain Score: 8/10  Typical Range: 4-8/10  Timing: frequent  Quality: Aching, Dull and Electric  Radiation: no  Associated numbness or weakness: no numbness, no weakness  Exacerbated by: physical therapy  Allievated by:   Is Pain Level Acceptable?: No    Previous Therapies:  PT/OT: yes tried and made his pain worse  HEP:   Interventions:   Surgery:  Medications:   - NSAIDS:   - MSK Relaxants: flexeril  - TCAs:   - SNRIs:   - Topicals: lidocaine patch  - Anticonvulsants:  - Opioids:     Current Pain Medications:  1. Lidocaine patch, flexeril, ibuprofen, tylenol    History:    Current Outpatient Medications:     atorvastatin (LIPITOR) 10 MG tablet, TAKE 1 TABLET EVERY EVENING, Disp: 90 tablet, Rfl: 4    cyanocobalamin (VITAMIN B-12) 1000 MCG tablet, Take 100 mcg by mouth once daily., Disp: , Rfl:     cyclobenzaprine (FLEXERIL) 5 MG tablet, Take 5 mg by mouth 3 (three) times daily as needed for Muscle spasms., Disp: , Rfl:     ELIQUIS 5 mg Tab, TAKE 1 TABLET TWICE DAILY, Disp: 180 tablet, Rfl: 4    influenza (FLUZONE HIGH-DOSE) 180 mcg/0.5 mL vaccine, Inject 0.5 mLs into the muscle., Disp: 0.5 mL, Rfl: 0    lamoTRIgine (LAMICTAL) 100 MG tablet, Take 1 tablet (100 mg total) by mouth 2 (two) times daily., Disp: 180 tablet, Rfl: 3    memantine (NAMENDA) 10 MG Tab, Take 1 tablet (10 mg total) by mouth 2 (two) times daily., Disp: 180 tablet, Rfl: 3    metoprolol succinate (TOPROL-XL) 50 MG 24 hr tablet, Take 1 tablet (50 mg total) by mouth once daily., Disp: 90 tablet, Rfl: 4    primidone (MYSOLINE) 250 MG Tab, TAKE 2 TABLETS TWICE DAILY, Disp: 360 tablet, Rfl: 3     torsemide (DEMADEX) 10 MG Tab, Take 1 tablet (10 mg total) by mouth every morning., Disp: 90 tablet, Rfl: 5    umeclidinium-vilanterol (ANORO ELLIPTA) 62.5-25 mcg/actuation DsDv, Inhale 1 puff into the lungs Daily. Controller, Disp: 60 each, Rfl: 11    Past Medical History:   Diagnosis Date    A-fib     Anticoagulant long-term use     eliquis    Atrial flutter     Benign essential tremor     Cancer     skin cancer on back    Cardiac pacemaker in situ     CHF (congestive heart failure)     Coronary artery disease     Cough     Dizziness     intermittant    DJD (degenerative joint disease) of knee     ED (erectile dysfunction)     Hard of hearing 11/2016    History of cataract     HTN (hypertension)     Hyperlipidemia     Hyperlipidemia     Memory loss     Seizure disorder     Seizures     last episode 1995    Sinus node dysfunction 08/2016       Past Surgical History:   Procedure Laterality Date    ABLATION N/A 3/23/2017    [facility] CATH LAB    ABLATION N/A 1/29/2016    [facility] C[facility]    ANKLE SURGERY Right     pins and screws    ATRIAL ABLATION SURGERY      CARDIAC PACEMAKER PLACEMENT  2014    CARDIAC PACEMAKER PLACEMENT      CARDIAC PACEMAKER PLACEMENT  2014    CARDIOVERSION      CARDIOVERSION N/A 2/1/2017    [facility]    CARDIOVERSION N/A 11/22/2016    [facility]    CARDIOVERSION N/A 8/5/2016    [facility]    CARDIOVERSION N/A 11/23/2015    [facility] CATH [facility]    CARDIOVERSION N/A 10/15/2015    [facility] CATH LAB    CATARACT EXTRACTION      OU    COLONOSCOPY      due in 2014    EYE SURGERY      Jesse cataract    HEART CATH-LEFT N/A 6/10/2016    [facility]ATH    HERNIA REPAIR      abdominal    [id number]- #  206 B N/A 1/26/2018    [facility] CATH    INSERTION-PACEMAKER-DUAL N/A 6/30/2014    [facility] CATH [facility]    JOINT REPLACEMENT      bill shoulders    Left heart cath Left 1/22/2018    [facility] CATH    RELEASE-CARPAL TUNNEL Left 10/4/2017    [facility] Jr[facility] OR    TRANSESOPHAGEAL ECHOCARDIOGRAM (GALILEO) N/A 3/23/2017    [facility] CATH LAB       Family History   Problem Relation Age of Onset    Heart disease Mother     Cancer Father         colon    Blindness Father         accident    Cataracts Father     Hypertension Father     Tremor Father     Heart disease Brother     Tremor Brother     Tremor Paternal Grandfather     Melanoma Neg Hx     Amblyopia Neg Hx     Diabetes Neg Hx     Glaucoma Neg Hx     Macular degeneration Neg Hx     Retinal detachment Neg Hx     Strabismus Neg Hx     Stroke Neg Hx     Thyroid disease Neg Hx        Social History     Socioeconomic History    Marital status:      Spouse name: None    Number of children: None    Years of education: None    Highest education level: None   Social Needs    Financial resource strain: None    Food insecurity - worry: None    Food insecurity - inability: None    Transportation needs - medical: None    Transportation needs - non-medical: None   Occupational History    Occupation: retired   Tobacco Use    Smoking status: Never Smoker    Smokeless tobacco: Never Used   Substance and Sexual Activity    Alcohol use: No    Drug use: No    Sexual activity: None   Other Topics Concern    None   Social History Narrative    None       Review of patient's allergies indicates:   Allergen Reactions    Bactroban [mupirocin calcium] Rash     Other reaction(s): Rash       Review of Systems:  General ROS: negative for - fever  Psychological ROS: negative for -  hostility  Hematological and Lymphatic ROS: negative for - bleeding problems  Endocrine ROS: negative for - unexpected weight changes  Respiratory ROS: no cough, shortness of breath, or wheezing  Cardiovascular ROS: no chest pain or dyspnea on exertion  Gastrointestinal ROS: no abdominal pain, change in bowel habits, or black or bloody stools  Musculoskeletal ROS: negative for - muscular weakness  Neurological ROS: negative for - bowel and bladder control changes  Dermatological ROS: negative for rash    Physical Exam:  Vitals:    01/15/19 1305   Weight: 104.9 kg (231 lb 6 oz)   Height: 6' (1.829 m)   PainSc:   8   PainLoc: Back   Pulse 78  /63  Body mass index is 31.38 kg/m².     Gen: NAD  Gait: gait intact  Psych:  Mood appropriate for given condition  HEENT: eyes anicteric   GI: Abd soft  CV: RRR  Lungs: breathing unlabored   ROM: limited AROM of the L spine in all planes, full ROM at ankles, knees and hips  Lumbar flexion 90 degrees, extension 50 degrees, side bending 30 degrees.    Sensation: intact to light touch in all dermatomes tested from L2-S1 bilaterally  Reflexes: 0/0 b/l patella and Achilles, biceps and triceps, plantar response down going   Palpation: Diffusely tender over lumbar paraspinals  -TTP over the b/l greater trochanters and bilateral SI joint  Tone: normal in the b/l knees and hips   Lymph: no lymphadenopathy at groin or popliteal fossa  Skin: intact  Extremities: No edema in b/l ankles or hands  Provacative tests: - SLR, + axial facet loading b/l, - MONROE testing, - FADIR testing       Right Left   L2/3 Iliacus Hip flexion  5  5   L3/4 Qudratus Femoris Knee Extension  5  5   L4/5 Tib Anterior Ankle Dorsiflexion   5  5   L5/S1 Extensor Hallicus Longus Great toe extension  5  5   L4/5 Tib Anterior/Posterior Inversion  5  5   L5/S1 Extensor Digitorum Longus, Peronues Eversion  5  5   L5/S1 Glut Med Hip Abudction  5  5   S1/2 Glut max and Hamstring Hip Extension  5  5   S1/2  Gastroc/Soleus Plantar Fexion  5  5       Imaging:  Xray lumbar spine 12/11/18  FINDINGS:  There is no definite change in the appearance of the lumbar spine compared to the prior study.  There is no fracture or malalignment.  There is moderate to marked disc space narrowing at the L4-5 level.  There is moderate-to-marked facet joint arthropathy at the L4-5 and L5-S1 levels.  Otherwise, there is mild disc space narrowing in the remainder of the lumbar spine.  There is moderate to marked disc space narrowing at the thoracolumbar junction where there is also bridging osteophyte formation and endplate sclerosis.  These findings were all present previously.    CT lumbar spine 1/2018  FINDINGS:    Vertebral column: There is stable mild grade 1 spondylolisthesis at the L5-S1 level with 4 mm anterolisthesis of L5 on S1 which is degenerative in etiology and related to facet arthropathy.  There are no pars defects.  The remainder of the vertebral bodies maintain normal alignment.  The bones are mildly osteopenic.  There is no acute fracture.  There is mild to moderate disc space sparing at the L4-5 level where there is endplate sclerosis and osteophyte formation.  There is mild disc space narrowing anteriorly at the L5-S1 level.  There is a very minimal levocurvature of the lumbar spine.  This could in part be positional.  The patient is slightly tilted in the scanner.      Spinal canal, epidural space, conus: The spinal canal is developmentally normal.  The conus terminates at the level of T12-L1.  There is no abnormal epidural collection or mass.      Findings by level:  T11-12: There is no spinal canal or foraminal stenosis.  There is mild left facet joint arthropathy.    T12-L1: There is no spinal canal or foraminal stenosis.  There is mild left facet joint arthropathy.    L1-L2: There is minimal bulging of the annulus and mild facet joint arthropathy without spinal canal or foraminal stenosis.  There is no significant  change.    L2-L3: There is a mild diffuse disc bulge and mild facet arthropathy.  There is no spinal canal or foraminal stenosis.  There is little change.    L3-L4: There is a mild diffuse disc bulge with mild facet arthropathy.  There is no spinal canal or foraminal stenosis and there is no significant change.    L4-L5: There is mild to moderate facet arthropathy.  There is a diffuse disc bulge with osteophytic ridging.  There is no spinal stenosis.  There is moderate left and severe right foraminal stenosis without significant change.    L5-S1: There is 4 mm anterolisthesis of L5 on S1.  There is moderate right and mild bilateral facet joint arthropathy with ligamentum flavum thickening and ossification.  There is unroofing of a broad disc protrusion.  This approaches both S1 roots and may contact the roots without obvious compression or displacement.  There is no significant spinal stenosis.  There is moderate left and severe right foraminal stenosis without significant change.    Soft tissues/other: The prevertebral soft tissues are normal.  There is mild atherosclerosis.  The aorta is of normal caliber.    Labs:  BMP  Lab Results   Component Value Date     08/29/2018    K 3.9 08/29/2018    CL 99 08/29/2018    CO2 31 (H) 08/29/2018    BUN 8 08/29/2018    CREATININE 0.8 08/29/2018    CALCIUM 9.0 08/29/2018    ANIONGAP 9 08/29/2018    ESTGFRAFRICA >60.0 08/29/2018    EGFRNONAA >60.0 08/29/2018     Lab Results   Component Value Date    ALT 21 08/29/2018    AST 20 08/29/2018    ALKPHOS 98 08/29/2018    BILITOT 0.4 08/29/2018       Assessment:  Problem List Items Addressed This Visit        Neuro    Lumbar spondylosis - Primary    Relevant Orders    Case Request Operating Room: Block-nerve-medial branch-lumbar L2-L5 (Completed)       Orthopedic    Chronic bilateral low back pain without sciatica          Treatment Plan:  78 y.o. year old male with PMH seizure disorder, HTN, CAD, A-fib presents to the office  today with chronic axial lower back pain that has been bothering him for the past 10 years.  Today his pain is 8/10, dull, aching, worse with back extension.  He denies any radicular pain or radiculopathy.  He has tried physical therapy however he feels like it made his pain worse and couldn't complete it.  He continues lidocaine patches, flexeril prn, ibuprofen prn, and tylenol prn with only mild pain relief.  CT and xray lumbar spine consistent with multilevel facet arthropathy.  His pain is limiting his mobility and interfering with his ADL's.  Will schedule for bilateral L2-L5 medial branch blocks followed by RFA if appropriate.  Procedure explained using an anatomical model.  Risks, benefits, alternatives explained to patient who verbalized understanding, including increased risk of infection, bleeding, need for additional procedures or surgery, and nerve damage.  Questions regarding the procedure, risks, expected outcome, and possible side effects were solicited and answered to the patient's satisfaction.  Heri wishes to proceed with the injection.  Verbal and written consent were obtained in clinic today.  Follow up 1 week post injection.  Of note patient had initial HR of 143 via pulse ox.  Repeat manual check was 91 and then 78 20 mins later. /63.  Was asymptomatic throughout encounter.  Given history of a-fib, patient advised to go to ED should he experience any chest pain, sob, dizziness.     Procedures: bilateral L2-L5 medial branch blocks  PT/OT/HEP: unable to complete PT 2/2 pain  Medications: continue ibuprofen, tylenol, flexeril prn  Labs: Reviewed and medications are appropriately dosed for current hepatorenal function.  Imaging: No additional recommended at this time.    : Not applicable    Anthony Moffett M.D.  Interventional Pain Medicine / Anesthesiology

## 2019-01-15 NOTE — PROGRESS NOTES
Patient was seen for 1 outpatient PT visits on 12/20/2018.Treatment included: evaluation, HEP and pt education, . Pt has not met his established goals. He returned for his follow up visit stating that the exercises make his pain worse. This patient is discharged from outpatient PT Services.

## 2019-01-15 NOTE — H&P (VIEW-ONLY)
[facility] New Patient Evaluation    CC:   Chief Complaint   Patient presents with    Low-back Pain      Last 3 PDI Scores 1/15/2019 10/2/2014   Pain Disability Index (PDI) 43 0       HPI:   Heri Muhammad is a 78 y.o. male who complains of back pain    Onset: > 10 years  Progression: since onset, pain is gradually worsening  Current Pain Score: 8/10  Typical Range: 4-8/10  Timing: frequent  Quality: Aching, Dull and Electric  Radiation: no  Associated numbness or weakness: no numbness, no weakness  Exacerbated by: physical therapy  Allievated by:   Is Pain Level Acceptable?: No    Previous Therapies:  PT/OT: yes tried and made his pain worse  HEP:   Interventions:   Surgery:  Medications:   - NSAIDS:   - MSK Relaxants: flexeril  - TCAs:   - SNRIs:   - Topicals: lidocaine patch  - Anticonvulsants:  - Opioids:     Current Pain Medications:  1. Lidocaine patch, flexeril, ibuprofen, tylenol    History:    Current Outpatient Medications:     atorvastatin (LIPITOR) 10 MG tablet, TAKE 1 TABLET EVERY EVENING, Disp: 90 tablet, Rfl: 4    cyanocobalamin (VITAMIN B-12) 1000 MCG tablet, Take 100 mcg by mouth once daily., Disp: , Rfl:     cyclobenzaprine (FLEXERIL) 5 MG tablet, Take 5 mg by mouth 3 (three) times daily as needed for Muscle spasms., Disp: , Rfl:     ELIQUIS 5 mg Tab, TAKE 1 TABLET TWICE DAILY, Disp: 180 tablet, Rfl: 4    influenza (FLUZONE HIGH-DOSE) 180 mcg/0.5 mL vaccine, Inject 0.5 mLs into the muscle., Disp: 0.5 mL, Rfl: 0    lamoTRIgine (LAMICTAL) 100 MG tablet, Take 1 tablet (100 mg total) by mouth 2 (two) times daily., Disp: 180 tablet, Rfl: 3    memantine (NAMENDA) 10 MG Tab, Take 1 tablet (10 mg total) by mouth 2 (two) times daily., Disp: 180 tablet, Rfl: 3    metoprolol succinate (TOPROL-XL) 50 MG 24 hr tablet, Take 1 tablet (50 mg total) by mouth once daily., Disp: 90 tablet, Rfl: 4    primidone (MYSOLINE) 250 MG Tab, TAKE 2 TABLETS TWICE DAILY, Disp: 360 tablet, Rfl: 3     torsemide (DEMADEX) 10 MG Tab, Take 1 tablet (10 mg total) by mouth every morning., Disp: 90 tablet, Rfl: 5    umeclidinium-vilanterol (ANORO ELLIPTA) 62.5-25 mcg/actuation DsDv, Inhale 1 puff into the lungs Daily. Controller, Disp: 60 each, Rfl: 11    Past Medical History:   Diagnosis Date    A-fib     Anticoagulant long-term use     eliquis    Atrial flutter     Benign essential tremor     Cancer     skin cancer on back    Cardiac pacemaker in situ     CHF (congestive heart failure)     Coronary artery disease     Cough     Dizziness     intermittant    DJD (degenerative joint disease) of knee     ED (erectile dysfunction)     Hard of hearing 11/2016    History of cataract     HTN (hypertension)     Hyperlipidemia     Hyperlipidemia     Memory loss     Seizure disorder     Seizures     last episode 1995    Sinus node dysfunction 08/2016       Past Surgical History:   Procedure Laterality Date    ABLATION N/A 3/23/2017    [facility] LAB    ABLATION N/A 1/29/2016    [facility]    ANKLE SURGERY Right     pins and screws    ATRIAL ABLATION SURGERY      CARDIAC PACEMAKER PLACEMENT  2014    CARDIAC PACEMAKER PLACEMENT      CARDIAC PACEMAKER PLACEMENT  2014    CARDIOVERSION      CARDIOVERSION N/A 2/1/2017    [facility]    CARDIOVERSION N/A 11/22/2016    [facility]    CARDIOVERSION N/A 8/5/2016    [facility]    CARDIOVERSION N/A 11/23/2015    [facility]    CARDIOVERSION N/A 10/15/2015    [facility] CATH LAB    CATARACT EXTRACTION      OU    COLONOSCOPY      due in 2014    EYE SURGERY      Jesse cataract    HEART CATH-LEFT N/A 6/10/2016    [facility]ATH    HERNIA REPAIR      abdominal    [id number]- #  [id number] B N/A 1/26/2018    [facility] CATH    INSERTION-PACEMAKER-DUAL N/A 6/30/2014    [facility]    JOINT REPLACEMENT      bill shoulders    Left heart cath Left 1/22/2018    [facility] CATH    RELEASE-CARPAL TUNNEL Left 10/4/2017    [facility]    TRANSESOPHAGEAL ECHOCARDIOGRAM (GALILEO) N/A 3/23/2017    [facility] CATH LAB       Family History   Problem Relation Age of Onset    Heart disease Mother     Cancer Father         colon    Blindness Father         accident    Cataracts Father     Hypertension Father     Tremor Father     Heart disease Brother     Tremor Brother     Tremor Paternal Grandfather     Melanoma Neg Hx     Amblyopia Neg Hx     Diabetes Neg Hx     Glaucoma Neg Hx     Macular degeneration Neg Hx     Retinal detachment Neg Hx     Strabismus Neg Hx     Stroke Neg Hx     Thyroid disease Neg Hx        Social History     Socioeconomic History    Marital status:      Spouse name: None    Number of children: None    Years of education: None    Highest education level: None   Social Needs    Financial resource strain: None    Food insecurity - worry: None    Food insecurity - inability: None    Transportation needs - medical: None    Transportation needs - non-medical: None   Occupational History    Occupation: retired   Tobacco Use    Smoking status: Never Smoker    Smokeless tobacco: Never Used   Substance and Sexual Activity    Alcohol use: No    Drug use: No    Sexual activity: None   Other Topics Concern    None   Social History Narrative    None       Review of patient's allergies indicates:   Allergen Reactions    Bactroban [mupirocin calcium] Rash     Other reaction(s): Rash       Review of Systems:  General ROS: negative for - fever  Psychological ROS: negative for -  hostility  Hematological and Lymphatic ROS: negative for - bleeding problems  Endocrine ROS: negative for - unexpected weight changes  Respiratory ROS: no cough, shortness of breath, or wheezing  Cardiovascular ROS: no chest pain or dyspnea on exertion  Gastrointestinal ROS: no abdominal pain, change in bowel habits, or black or bloody stools  Musculoskeletal ROS: negative for - muscular weakness  Neurological ROS: negative for - bowel and bladder control changes  Dermatological ROS: negative for rash    Physical Exam:  Vitals:    01/15/19 1305   Weight: 104.9 kg (231 lb 6 oz)   Height: 6' (1.829 m)   PainSc:   8   PainLoc: Back   Pulse 78  /63  Body mass index is 31.38 kg/m².     Gen: NAD  Gait: gait intact  Psych:  Mood appropriate for given condition  HEENT: eyes anicteric   GI: Abd soft  CV: RRR  Lungs: breathing unlabored   ROM: limited AROM of the L spine in all planes, full ROM at ankles, knees and hips  Lumbar flexion 90 degrees, extension 50 degrees, side bending 30 degrees.    Sensation: intact to light touch in all dermatomes tested from L2-S1 bilaterally  Reflexes: 0/0 b/l patella and Achilles, biceps and triceps, plantar response down going   Palpation: Diffusely tender over lumbar paraspinals  -TTP over the b/l greater trochanters and bilateral SI joint  Tone: normal in the b/l knees and hips   Lymph: no lymphadenopathy at groin or popliteal fossa  Skin: intact  Extremities: No edema in b/l ankles or hands  Provacative tests: - SLR, + axial facet loading b/l, - MONROE testing, - FADIR testing       Right Left   L2/3 Iliacus Hip flexion  5  5   L3/4 Qudratus Femoris Knee Extension  5  5   L4/5 Tib Anterior Ankle Dorsiflexion   5  5   L5/S1 Extensor Hallicus Longus Great toe extension  5  5   L4/5 Tib Anterior/Posterior Inversion  5  5   L5/S1 Extensor Digitorum Longus, Peronues Eversion  5  5   L5/S1 Glut Med Hip Abudction  5  5   S1/2 Glut max and Hamstring Hip Extension  5  5   S1/2  Gastroc/Soleus Plantar Fexion  5  5       Imaging:  Xray lumbar spine 12/11/18  FINDINGS:  There is no definite change in the appearance of the lumbar spine compared to the prior study.  There is no fracture or malalignment.  There is moderate to marked disc space narrowing at the L4-5 level.  There is moderate-to-marked facet joint arthropathy at the L4-5 and L5-S1 levels.  Otherwise, there is mild disc space narrowing in the remainder of the lumbar spine.  There is moderate to marked disc space narrowing at the thoracolumbar junction where there is also bridging osteophyte formation and endplate sclerosis.  These findings were all present previously.    CT lumbar spine 1/2018  FINDINGS:    Vertebral column: There is stable mild grade 1 spondylolisthesis at the L5-S1 level with 4 mm anterolisthesis of L5 on S1 which is degenerative in etiology and related to facet arthropathy.  There are no pars defects.  The remainder of the vertebral bodies maintain normal alignment.  The bones are mildly osteopenic.  There is no acute fracture.  There is mild to moderate disc space sparing at the L4-5 level where there is endplate sclerosis and osteophyte formation.  There is mild disc space narrowing anteriorly at the L5-S1 level.  There is a very minimal levocurvature of the lumbar spine.  This could in part be positional.  The patient is slightly tilted in the scanner.      Spinal canal, epidural space, conus: The spinal canal is developmentally normal.  The conus terminates at the level of T12-L1.  There is no abnormal epidural collection or mass.      Findings by level:  T11-12: There is no spinal canal or foraminal stenosis.  There is mild left facet joint arthropathy.    T12-L1: There is no spinal canal or foraminal stenosis.  There is mild left facet joint arthropathy.    L1-L2: There is minimal bulging of the annulus and mild facet joint arthropathy without spinal canal or foraminal stenosis.  There is no significant  change.    L2-L3: There is a mild diffuse disc bulge and mild facet arthropathy.  There is no spinal canal or foraminal stenosis.  There is little change.    L3-L4: There is a mild diffuse disc bulge with mild facet arthropathy.  There is no spinal canal or foraminal stenosis and there is no significant change.    L4-L5: There is mild to moderate facet arthropathy.  There is a diffuse disc bulge with osteophytic ridging.  There is no spinal stenosis.  There is moderate left and severe right foraminal stenosis without significant change.    L5-S1: There is 4 mm anterolisthesis of L5 on S1.  There is moderate right and mild bilateral facet joint arthropathy with ligamentum flavum thickening and ossification.  There is unroofing of a broad disc protrusion.  This approaches both S1 roots and may contact the roots without obvious compression or displacement.  There is no significant spinal stenosis.  There is moderate left and severe right foraminal stenosis without significant change.    Soft tissues/other: The prevertebral soft tissues are normal.  There is mild atherosclerosis.  The aorta is of normal caliber.    Labs:  BMP  Lab Results   Component Value Date     08/29/2018    K 3.9 08/29/2018    CL 99 08/29/2018    CO2 31 (H) 08/29/2018    BUN 8 08/29/2018    CREATININE 0.8 08/29/2018    CALCIUM 9.0 08/29/2018    ANIONGAP 9 08/29/2018    ESTGFRAFRICA >60.0 08/29/2018    EGFRNONAA >60.0 08/29/2018     Lab Results   Component Value Date    ALT 21 08/29/2018    AST 20 08/29/2018    ALKPHOS 98 08/29/2018    BILITOT 0.4 08/29/2018       Assessment:  Problem List Items Addressed This Visit        Neuro    Lumbar spondylosis - Primary    Relevant Orders    Case Request Operating Room: Block-nerve-medial branch-lumbar L2-L5 (Completed)       Orthopedic    Chronic bilateral low back pain without sciatica          Treatment Plan:  78 y.o. year old male with PMH seizure disorder, HTN, CAD, A-fib presents to the office  today with chronic axial lower back pain that has been bothering him for the past 10 years.  Today his pain is 8/10, dull, aching, worse with back extension.  He denies any radicular pain or radiculopathy.  He has tried physical therapy however he feels like it made his pain worse and couldn't complete it.  He continues lidocaine patches, flexeril prn, ibuprofen prn, and tylenol prn with only mild pain relief.  CT and xray lumbar spine consistent with multilevel facet arthropathy.  His pain is limiting his mobility and interfering with his ADL's.  Will schedule for bilateral L2-L5 medial branch blocks followed by RFA if appropriate.  Procedure explained using an anatomical model.  Risks, benefits, alternatives explained to patient who verbalized understanding, including increased risk of infection, bleeding, need for additional procedures or surgery, and nerve damage.  Questions regarding the procedure, risks, expected outcome, and possible side effects were solicited and answered to the patient's satisfaction.  Heri wishes to proceed with the injection.  Verbal and written consent were obtained in clinic today.  Follow up 1 week post injection.  Of note patient had initial HR of 143 via pulse ox.  Repeat manual check was 91 and then 78 20 mins later. /63.  Was asymptomatic throughout encounter.  Given history of a-fib, patient advised to go to ED should he experience any chest pain, sob, dizziness.     Procedures: bilateral L2-L5 medial branch blocks  PT/OT/HEP: unable to complete PT 2/2 pain  Medications: continue ibuprofen, tylenol, flexeril prn  Labs: Reviewed and medications are appropriately dosed for current hepatorenal function.  Imaging: No additional recommended at this time.    : Not applicable    Anthony Moffett M.D.  Interventional Pain Medicine / Anesthesiology

## 2019-01-15 NOTE — LETTER
January 16, 2019        CRYSTAL Alexandre MD  [address] [facility] [address] [place] [address]             [facility]  [address] [facility] [address] [place] [address]  Phone: [phone]  Fax: [phone]   Patient: Heri Muhammad   MR Number: [id number]   YOB: 1940   Date of Visit: 1/15/2019       Dear Dr. Alexandre:    Thank you for referring Heri Muhammad to me for evaluation. Below are the relevant portions of my assessment and plan of care.            If you have questions, please do not hesitate to call me. I look forward to following Heri along with you.    Sincerely,      Anthony Moffett MD           CC  No Recipients

## 2019-01-15 NOTE — LETTER
January 15, 2019      [address] [address]           [facility]  [address] [facility] [address] [place] [address]  Phone: [phone]  Fax: [phone]          Patient: Heri Muhammad   MR Number: [id number]   YOB: 1940   Date of Visit: 1/15/2019       Dear [facility]:    Thank you for referring Heri Muhammad to me for evaluation. Attached you will find relevant portions of my assessment and plan of care.    If you have questions, please do not hesitate to call me. I look forward to following Heri Muhammad along with you.    Sincerely,    Anthony Moffett MD    Enclosure  CC:  No Recipients    If you would like to receive this communication electronically, please contact [email or web address] or [phone] to request more information on [employer] access.    For providers and/or their staff who would like to refer a patient to [facility], please contact us through our one-stop-shop provider referral line, [facility]uf[facility]rizuela, at [phone].    If you feel you have received this communication in error or would no longer like to receive these types of communications, please e-mail [email or web address]

## 2019-01-16 DIAGNOSIS — M51.36 DDD (DEGENERATIVE DISC DISEASE), LUMBAR: Primary | ICD-10-CM

## 2019-01-17 ENCOUNTER — HOSPITAL ENCOUNTER (OUTPATIENT)
Dept: RADIOLOGY | Facility: HOSPITAL | Age: 79
Discharge: HOME | End: 2019-01-17
Attending: ANESTHESIOLOGY | Admitting: ANESTHESIOLOGY
Payer: MEDICARE

## 2019-01-17 ENCOUNTER — DOCUMENTATION ONLY (OUTPATIENT)
Dept: CARDIOLOGY | Facility: CLINIC | Age: 79
End: 2019-01-17

## 2019-01-17 ENCOUNTER — OFFICE VISIT (OUTPATIENT)
Dept: DERMATOLOGY | Facility: CLINIC | Age: 79
End: 2019-01-17
Payer: MEDICARE

## 2019-01-17 ENCOUNTER — TELEPHONE (OUTPATIENT)
Dept: CARDIOLOGY | Facility: CLINIC | Age: 79
End: 2019-01-17

## 2019-01-17 ENCOUNTER — OFFICE VISIT (OUTPATIENT)
Dept: CARDIOLOGY | Facility: CLINIC | Age: 79
End: 2019-01-17
Payer: MEDICARE

## 2019-01-17 ENCOUNTER — CLINICAL SUPPORT (OUTPATIENT)
Dept: CARDIOLOGY | Facility: CLINIC | Age: 79
End: 2019-01-17
Attending: INTERNAL MEDICINE
Payer: MEDICARE

## 2019-01-17 ENCOUNTER — HOSPITAL ENCOUNTER (OUTPATIENT)
Facility: HOSPITAL | Age: 79
Discharge: HOME | End: 2019-01-17
Attending: ANESTHESIOLOGY | Admitting: ANESTHESIOLOGY
Payer: MEDICARE

## 2019-01-17 VITALS
HEIGHT: 72 IN | OXYGEN SATURATION: 94 % | HEART RATE: 89 BPM | TEMPERATURE: 98 F | DIASTOLIC BLOOD PRESSURE: 67 MMHG | BODY MASS INDEX: 30.48 KG/M2 | SYSTOLIC BLOOD PRESSURE: 142 MMHG | RESPIRATION RATE: 16 BRPM | WEIGHT: 225 LBS

## 2019-01-17 VITALS
HEART RATE: 78 BPM | WEIGHT: 229.06 LBS | BODY MASS INDEX: 31.03 KG/M2 | DIASTOLIC BLOOD PRESSURE: 65 MMHG | SYSTOLIC BLOOD PRESSURE: 109 MMHG | HEIGHT: 72 IN

## 2019-01-17 DIAGNOSIS — Z95.0 CARDIAC PACEMAKER IN SITU: Primary | ICD-10-CM

## 2019-01-17 DIAGNOSIS — I48.92 ATRIAL FLUTTER, UNSPECIFIED TYPE: Primary | ICD-10-CM

## 2019-01-17 DIAGNOSIS — Z95.0 CARDIAC PACEMAKER IN SITU: ICD-10-CM

## 2019-01-17 DIAGNOSIS — I42.8 CARDIOMYOPATHY, NONISCHEMIC: ICD-10-CM

## 2019-01-17 DIAGNOSIS — I48.19 PERSISTENT ATRIAL FIBRILLATION: ICD-10-CM

## 2019-01-17 DIAGNOSIS — M51.36 DDD (DEGENERATIVE DISC DISEASE), LUMBAR: ICD-10-CM

## 2019-01-17 DIAGNOSIS — I49.5 SINUS NODE DYSFUNCTION: ICD-10-CM

## 2019-01-17 DIAGNOSIS — L28.0 LICHEN SIMPLEX CHRONICUS: Primary | ICD-10-CM

## 2019-01-17 DIAGNOSIS — Z95.0 BIVENTRICULAR CARDIAC PACEMAKER IN SITU: ICD-10-CM

## 2019-01-17 DIAGNOSIS — M47.816 LUMBAR SPONDYLOSIS: Primary | ICD-10-CM

## 2019-01-17 PROCEDURE — 99999 PR PBB SHADOW E&M-EST. PATIENT-LVL III: ICD-10-PCS | Mod: PBBFAC,,, | Performed by: INTERNAL MEDICINE

## 2019-01-17 PROCEDURE — 3074F PR MOST RECENT SYSTOLIC BLOOD PRESSURE < 130 MM HG: ICD-10-PCS | Mod: CPTII,S$GLB,, | Performed by: DERMATOLOGY

## 2019-01-17 PROCEDURE — 1101F PR PT FALLS ASSESS DOC 0-1 FALLS W/OUT INJ PAST YR: ICD-10-PCS | Mod: CPTII,S$GLB,, | Performed by: DERMATOLOGY

## 2019-01-17 PROCEDURE — 64493 INJ PARAVERT F JNT L/S 1 LEV: CPT | Mod: 50,PO | Performed by: ANESTHESIOLOGY

## 2019-01-17 PROCEDURE — 3074F SYST BP LT 130 MM HG: CPT | Mod: CPTII,S$GLB,, | Performed by: DERMATOLOGY

## 2019-01-17 PROCEDURE — 3078F PR MOST RECENT DIASTOLIC BLOOD PRESSURE < 80 MM HG: ICD-10-PCS | Mod: CPTII,S$GLB,, | Performed by: DERMATOLOGY

## 2019-01-17 PROCEDURE — 99499 UNLISTED E&M SERVICE: CPT | Mod: S$GLB,,, | Performed by: INTERNAL MEDICINE

## 2019-01-17 PROCEDURE — 3078F DIAST BP <80 MM HG: CPT | Mod: CPTII,S$GLB,, | Performed by: DERMATOLOGY

## 2019-01-17 PROCEDURE — 1101F PT FALLS ASSESS-DOCD LE1/YR: CPT | Mod: CPTII,S$GLB,, | Performed by: DERMATOLOGY

## 2019-01-17 PROCEDURE — 99213 PR OFFICE/OUTPT VISIT, EST, LEVL III, 20-29 MIN: ICD-10-PCS | Mod: S$GLB,,, | Performed by: DERMATOLOGY

## 2019-01-17 PROCEDURE — 99499 RISK ADDL DX/OHS AUDIT: ICD-10-PCS | Mod: S$GLB,,, | Performed by: INTERNAL MEDICINE

## 2019-01-17 PROCEDURE — 3074F PR MOST RECENT SYSTOLIC BLOOD PRESSURE < 130 MM HG: ICD-10-PCS | Mod: CPTII,S$GLB,, | Performed by: INTERNAL MEDICINE

## 2019-01-17 PROCEDURE — 3078F PR MOST RECENT DIASTOLIC BLOOD PRESSURE < 80 MM HG: ICD-10-PCS | Mod: CPTII,S$GLB,, | Performed by: INTERNAL MEDICINE

## 2019-01-17 PROCEDURE — 99999 PR PBB SHADOW E&M-EST. PATIENT-LVL III: ICD-10-PCS | Mod: PBBFAC,,, | Performed by: DERMATOLOGY

## 2019-01-17 PROCEDURE — 99999 PR PBB SHADOW E&M-EST. PATIENT-LVL III: CPT | Mod: PBBFAC,,, | Performed by: INTERNAL MEDICINE

## 2019-01-17 PROCEDURE — 76000 FLUOROSCOPY <1 HR PHYS/QHP: CPT | Mod: TC,PO

## 2019-01-17 PROCEDURE — 64493 INJ PARAVERT F JNT L/S 1 LEV: CPT | Mod: 50,,, | Performed by: ANESTHESIOLOGY

## 2019-01-17 PROCEDURE — 64495 INJ PARAVERT F JNT L/S 3 LEV: CPT | Mod: 50,PO | Performed by: ANESTHESIOLOGY

## 2019-01-17 PROCEDURE — 25000003 PHARM REV CODE 250: Mod: PO | Performed by: ANESTHESIOLOGY

## 2019-01-17 PROCEDURE — 99214 OFFICE O/P EST MOD 30 MIN: CPT | Mod: S$GLB,,, | Performed by: INTERNAL MEDICINE

## 2019-01-17 PROCEDURE — 99999 PR PBB SHADOW E&M-EST. PATIENT-LVL III: CPT | Mod: PBBFAC,,, | Performed by: DERMATOLOGY

## 2019-01-17 PROCEDURE — 99214 PR OFFICE/OUTPT VISIT, EST, LEVL IV, 30-39 MIN: ICD-10-PCS | Mod: S$GLB,,, | Performed by: INTERNAL MEDICINE

## 2019-01-17 PROCEDURE — 64495 INJ PARAVERT F JNT L/S 3 LEV: CPT | Mod: 50,,, | Performed by: ANESTHESIOLOGY

## 2019-01-17 PROCEDURE — 1101F PT FALLS ASSESS-DOCD LE1/YR: CPT | Mod: CPTII,S$GLB,, | Performed by: INTERNAL MEDICINE

## 2019-01-17 PROCEDURE — 3074F SYST BP LT 130 MM HG: CPT | Mod: CPTII,S$GLB,, | Performed by: INTERNAL MEDICINE

## 2019-01-17 PROCEDURE — 3078F DIAST BP <80 MM HG: CPT | Mod: CPTII,S$GLB,, | Performed by: INTERNAL MEDICINE

## 2019-01-17 PROCEDURE — 1101F PR PT FALLS ASSESS DOC 0-1 FALLS W/OUT INJ PAST YR: ICD-10-PCS | Mod: CPTII,S$GLB,, | Performed by: INTERNAL MEDICINE

## 2019-01-17 PROCEDURE — 64494 INJ PARAVERT F JNT L/S 2 LEV: CPT | Mod: 50,PO | Performed by: ANESTHESIOLOGY

## 2019-01-17 PROCEDURE — 64493 PR INJ DX/THER AGNT PARAVERT FACET JOINT,IMG GUIDE,LUMBAR/SAC,1ST LVL: ICD-10-PCS | Mod: 50,,, | Performed by: ANESTHESIOLOGY

## 2019-01-17 PROCEDURE — 64495 PR INJ DX/THER AGNT PARAVERT FACET JOINT,IMG GUIDE,LUMBAR/SAC, ADD LEVEL: ICD-10-PCS | Mod: 50,,, | Performed by: ANESTHESIOLOGY

## 2019-01-17 PROCEDURE — 99213 OFFICE O/P EST LOW 20 MIN: CPT | Mod: S$GLB,,, | Performed by: DERMATOLOGY

## 2019-01-17 PROCEDURE — 64494 PR INJ DX/THER AGNT PARAVERT FACET JOINT,IMG GUIDE,LUMBAR/SAC, 2ND LEVEL: ICD-10-PCS | Mod: 50,,, | Performed by: ANESTHESIOLOGY

## 2019-01-17 PROCEDURE — 64494 INJ PARAVERT F JNT L/S 2 LEV: CPT | Mod: 50,,, | Performed by: ANESTHESIOLOGY

## 2019-01-17 RX ORDER — ALPRAZOLAM 0.5 MG/1
0.5 TABLET, ORALLY DISINTEGRATING ORAL ONCE AS NEEDED
Status: COMPLETED | OUTPATIENT
Start: 2019-01-17 | End: 2019-01-17

## 2019-01-17 RX ORDER — LIDOCAINE HYDROCHLORIDE 10 MG/ML
INJECTION, SOLUTION EPIDURAL; INFILTRATION; INTRACAUDAL; PERINEURAL
Status: DISCONTINUED | OUTPATIENT
Start: 2019-01-17 | End: 2019-01-17 | Stop reason: HOSPADM

## 2019-01-17 RX ORDER — BUPIVACAINE HYDROCHLORIDE 2.5 MG/ML
INJECTION, SOLUTION EPIDURAL; INFILTRATION; INTRACAUDAL
Status: DISCONTINUED | OUTPATIENT
Start: 2019-01-17 | End: 2019-01-17 | Stop reason: HOSPADM

## 2019-01-17 RX ORDER — FLUOCINONIDE 0.5 MG/G
OINTMENT TOPICAL 2 TIMES DAILY
Qty: 30 G | Refills: 0 | Status: SHIPPED | OUTPATIENT
Start: 2019-01-17 | End: 2019-05-29

## 2019-01-17 RX ADMIN — ALPRAZOLAM 0.5 MG: 0.5 TABLET, ORALLY DISINTEGRATING ORAL at 08:01

## 2019-01-17 NOTE — DISCHARGE SUMMARY
[facility]  Discharge Note  Short Stay    Admit Date: 1/17/2019    Discharge Date: 1/17/2019    Attending Physician: Anthony Moffett     Discharge Provider: Anthony Moffett    Diagnoses:  Active Hospital Problems    Diagnosis  POA    Lumbar spondylosis [M47.816]  Yes      Resolved Hospital Problems   No resolved problems to display.       Discharged Condition: Good    Final Diagnoses: Lumbar spondylosis [M47.816]    Disposition: Home or Self Care    Hospital Course: No complications, uneventful    Outcome of Hospitalization, Treatment, Procedure, or Surgery:  Patient was admitted for outpatient interventional pain management procedure. The patient tolerated the procedure well with no complications.    Follow up/Patient Instructions:  Follow up as scheduled in Pain Management office in 3-4 weeks.  Patient has received instructions and follow up date and time.    Medications:  Continue previous medications    Discharge Procedure Orders   Notify your health care provider if you experience any of the following:  temperature >100.4     Notify your health care provider if you experience any of the following:  persistent nausea and vomiting or diarrhea     Notify your health care provider if you experience any of the following:  severe uncontrolled pain     Notify your health care provider if you experience any of the following:  redness, tenderness, or signs of infection (pain, swelling, redness, odor or green/yellow discharge around incision site)     Notify your health care provider if you experience any of the following:  difficulty breathing or increased cough     Notify your health care provider if you experience any of the following:  severe persistent headache     Notify your health care provider if you experience any of the following:  worsening rash     Notify your health care provider if you experience any of the following:  persistent dizziness, light-headedness, or visual disturbances     Notify your  health care provider if you experience any of the following:  increased confusion or weakness     Activity as tolerated         Discharge Procedure Orders (must include Diet, Follow-up, Activity):   Discharge Procedure Orders (must include Diet, Follow-up, Activity)   Notify your health care provider if you experience any of the following:  temperature >100.4     Notify your health care provider if you experience any of the following:  persistent nausea and vomiting or diarrhea     Notify your health care provider if you experience any of the following:  severe uncontrolled pain     Notify your health care provider if you experience any of the following:  redness, tenderness, or signs of infection (pain, swelling, redness, odor or green/yellow discharge around incision site)     Notify your health care provider if you experience any of the following:  difficulty breathing or increased cough     Notify your health care provider if you experience any of the following:  severe persistent headache     Notify your health care provider if you experience any of the following:  worsening rash     Notify your health care provider if you experience any of the following:  persistent dizziness, light-headedness, or visual disturbances     Notify your health care provider if you experience any of the following:  increased confusion or weakness     Activity as tolerated

## 2019-01-17 NOTE — PROGRESS NOTES
Patient seeing Dr. Marcelo for increased HR noted at earlier doctors appointment.    SJM ICD interrogated, presenting rhythm AFL-VS/BP.    AT/AF burden 13%.  V rate during atrial arrhythmia 70-110bpm.    Patient asymptomatic, and is on Eliquis.      Information given to Dr. Marcelo.

## 2019-01-17 NOTE — OP NOTE
Procedure Note    Procedure Date: 1/17/2019    Procedure Performed:  bilateral Lumbar Facet Block(s) (Medial Branch) @ L2-L5, with Fluoroscopic Guidance    Indications: Patient has failed conservative therapy.      Pre-op diagnosis: Lumbar Spondylosis    Post-op diagnosis: same    Physician: Anthony Moffett MD    Sedation medications: none    Medications injected: 8 ml Bupivacaine 0.25%    Local anesthetic used: 1% Lidocaine, 4 ml, 8.4% sodium bicarb 0.25ml    Estimated Blood Loss: none    Complications:  none    Technique:  The patient was interviewed in the holding area and Risks/Benefits were discussed, including, but not limited to, the possibility of new or different pain, bleeding or infection.   All questions were answered.  The patient understood and accepted risks.  Consent was reviewed.  A time out was taken to identify the patient, procedure and side of the procedure. The patient was placed in a prone position, then prepped and draped in the usual sterile fashion using ChloraPrep and sterile towels.  The levels were determined under fluoroscopic guidance and then marked.  1% Lidocaine was given by raising a wheal at the skin over each site and then infiltrated approximately 2cm deeper.  A 25-gauge 3.5 inch needle was introduced to the anatomic location of the L2-L5 medial branch nerves on the bilateral side.  Then, after negative aspiration, 1.5 cc of 8 ml Bupivacaine 0.25% was injected @ each level.  The patient tolerated the procedure well.  The patient tolerated the procedure well and was transferred to the [facility] in stable condition.  The patient was monitored after the procedure.  Patient was given post procedure and discharge instructions to follow at home.  The patient was discharged in a stable condition.

## 2019-01-17 NOTE — TELEPHONE ENCOUNTER
----- Message from Joey Reyes sent at 1/17/2019  8:27 AM CST -----  Contact: self   Placed call to pod, Patient want to speak with a nurse regarding scheduling appointment for high pulse rate 130 please call back at [phone] (home)

## 2019-01-17 NOTE — PROGRESS NOTES
Subjective:    Patient ID:  Heri Muhammad is a 78 y.o. male who presents for evaluation of Gladys Loza pt (fast heart rate)      HPI78 yo with persistent atrial fib-flutter, PPM, and non-obstructive CAD. Was having back procedure and heart rate went to 130. Patient's pacer interrogated and average HR 70-90 and had brief episode of 130 which is tracking limit for pacer. Patient feels well from cardiac standpoint.     Review of Systems   Constitution: Negative for decreased appetite, fever, weakness, malaise/fatigue, weight gain and weight loss.   HENT: Negative for hearing loss and nosebleeds.    Eyes: Negative for visual disturbance.   Cardiovascular: Negative for chest pain, claudication, cyanosis, dyspnea on exertion, irregular heartbeat, leg swelling, near-syncope, orthopnea, palpitations, paroxysmal nocturnal dyspnea and syncope.   Respiratory: Negative for cough, hemoptysis, shortness of breath, sleep disturbances due to breathing, snoring and wheezing.    Endocrine: Negative for cold intolerance, heat intolerance, polydipsia and polyuria.   Hematologic/Lymphatic: Negative for adenopathy and bleeding problem. Does not bruise/bleed easily.   Skin: Negative for color change, itching, poor wound healing, rash and suspicious lesions.   Musculoskeletal: Positive for back pain. Negative for arthritis, falls, joint pain, joint swelling, muscle cramps, muscle weakness and myalgias.   Gastrointestinal: Negative for bloating, abdominal pain, change in bowel habit, constipation, flatus, heartburn, hematemesis, hematochezia, hemorrhoids, jaundice, melena, nausea and vomiting.   Genitourinary: Negative for bladder incontinence, decreased libido, frequency, hematuria, hesitancy and urgency.   Neurological: Negative for brief paralysis, difficulty with concentration, excessive daytime sleepiness, dizziness, focal weakness, headaches, light-headedness, loss of balance, numbness and vertigo.   Psychiatric/Behavioral: Negative for  altered mental status, depression and memory loss. The patient does not have insomnia and is not nervous/anxious.    Allergic/Immunologic: Negative for environmental allergies, hives and persistent infections.        Objective:    Physical Exam   Constitutional: He is oriented to person, place, and time. He appears well-developed and well-nourished. No distress.   /65 (BP Location: Left arm, Patient Position: Sitting, BP Method: Large (Automatic))   Pulse 78   Ht 6' (1.829 m)   Wt 103.9 kg (229 lb 0.9 oz)   BMI 31.07 kg/m²      HENT:   Head: Normocephalic and atraumatic.   Eyes: Conjunctivae and lids are normal. Pupils are equal, round, and reactive to light. Right eye exhibits no discharge. No scleral icterus.   Neck: Normal range of motion. Neck supple. No JVD present. No tracheal deviation present. No thyromegaly present.   Cardiovascular: Normal rate, regular rhythm, S1 normal, S2 normal, normal heart sounds and intact distal pulses. Exam reveals no gallop and no friction rub.   No murmur heard.  Pulses:       Carotid pulses are 2+ on the right side, and 2+ on the left side.       Radial pulses are 2+ on the right side, and 2+ on the left side.        Femoral pulses are 2+ on the right side, and 2+ on the left side.       Popliteal pulses are 2+ on the right side, and 2+ on the left side.        Dorsalis pedis pulses are 2+ on the right side, and 2+ on the left side.        Posterior tibial pulses are 2+ on the right side, and 2+ on the left side.   Pulmonary/Chest: Effort normal and breath sounds normal. No respiratory distress. He has no wheezes. He has no rales. He exhibits no tenderness.   Pacer left chest   Abdominal: Soft. Bowel sounds are normal. He exhibits no distension and no mass. There is no hepatosplenomegaly or hepatomegaly. There is no tenderness. There is no rebound and no guarding.   Musculoskeletal: Normal range of motion. He exhibits no edema or tenderness.   Lymphadenopathy:     He  has no cervical adenopathy.   Neurological: He is alert and oriented to person, place, and time. He has normal reflexes. No cranial nerve deficit. Coordination normal.   Skin: Skin is warm and dry. No rash noted. He is not diaphoretic. No erythema. No pallor.   Psychiatric: He has a normal mood and affect. His speech is normal and behavior is normal. Judgment and thought content normal. Cognition and memory are normal.         Assessment:       1. Atrial flutter, unspecified type    2. Biventricular cardiac pacemaker in situ    3. Cardiomyopathy, nonischemic    4. Persistent atrial fibrillation         Plan:     Patient's cardiac status unchanged    Continue same meds    Has follow up with Dr Graham and Yas    No orders of the defined types were placed in this encounter.    Follow-up if symptoms worsen or fail to improve.

## 2019-01-17 NOTE — PROGRESS NOTES
Subjective:       Patient ID:  Heri Muhammad is a 78 y.o. male who presents for   Chief Complaint   Patient presents with    Lesion     79 y/o M here for lesion on buttock x few months. It is described as rough, tender when he sits on it.  He sits for long periods throughout the day.  Tx with OTC cream with no improvment.    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; [facility]      Past Medical History:   Diagnosis Date    A-fib     Anticoagulant long-term use     eliquis    Atrial flutter     Benign essential tremor     Cancer     skin cancer on back    Cardiac pacemaker in situ     CHF (congestive heart failure)     Coronary artery disease     Cough     Dizziness     intermittant    DJD (degenerative joint disease) of knee     ED (erectile dysfunction)     Hard of hearing 11/2016    History of cataract     HTN (hypertension)     Hyperlipidemia     Hyperlipidemia     Memory loss     Seizure disorder     Seizures     last episode 1995    Sinus node dysfunction 08/2016     Review of Systems   Constitutional: Negative for fever, chills and fatigue.   Skin: Positive for itching and dry skin. Negative for rash, daily sunscreen use, activity-related sunscreen use and wears hat.        Objective:    Physical Exam   Constitutional: He appears well-developed and well-nourished.   Neurological: He is alert and oriented to person, place, and time.   Psychiatric: He has a normal mood and affect.   Skin:   Areas Examined (abnormalities noted in diagram):   Head / Face Inspection Performed  Neck Inspection Performed  Genitals / Buttocks / Groin Inspection Performed  Back Inspection Performed              Diagram Legend     Erythematous scaling macule/papule c/w actinic keratosis       Vascular papule c/w angioma      Pigmented verrucoid papule/plaque c/w seborrheic keratosis      Yellow umbilicated papule c/w sebaceous hyperplasia      Irregularly shaped tan macule c/w lentigo     1-2 mm smooth white papules  consistent with Milia      Movable subcutaneous cyst with punctum c/w epidermal inclusion cyst      Subcutaneous movable cyst c/w pilar cyst      Firm pink to brown papule c/w dermatofibroma      Pedunculated fleshy papule(s) c/w skin tag(s)      Evenly pigmented macule c/w junctional nevus     Mildly variegated pigmented, slightly irregular-bordered macule c/w mildly atypical nevus      Flesh colored to evenly pigmented papule c/w intradermal nevus       Pink pearly papule/plaque c/w basal cell carcinoma      Erythematous hyperkeratotic cursted plaque c/w SCC      Surgical scar with no sign of skin cancer recurrence      Open and closed comedones      Inflammatory papules and pustules      Verrucoid papule consistent consistent with wart     Erythematous eczematous patches and plaques     Dystrophic onycholytic nail with subungual debris c/w onychomycosis     Umbilicated papule    Erythematous-base heme-crusted tan verrucoid plaque consistent with inflamed seborrheic keratosis     Erythematous Silvery Scaling Plaque c/w Psoriasis     See annotation      Assessment / Plan:        Lichen simplex chronicus  -     fluocinonide (LIDEX) 0.05 % ointment; Apply topically 2 (two) times daily.  Dispense: 30 g; Refill: 0    Recommended sitting on donut pillow to redistribute pressure on buttock as this is likely the underlying problem           Follow-up in about 3 months (around 4/17/2019).

## 2019-01-17 NOTE — DISCHARGE INSTRUCTIONS
Home care instructions  Apply ice pack to the injection site for 20 minutes periods for the first 24 hrs for soreness/discomfort at injection site DO NOT USE HEAT FOR 24 HOURS  Keep site clean and dry for 24 hours, remove bandaid when desired  Do not drive until tomorrow  Take care when walking after a lumbar injection  Resume normal activities today. Block will last about 8 hours.  Dr. Moffett's nurse will call to check on you tomorrow.  Resume home medication as prescribed today  Resume Aspirin, Plavix, or Coumadin the day after the procedure unless otherwise instructed.    SEE IMMEDIATE MEDICAL HELP FOR:  Severe increase in your usual pain or appearance of new pain  Prolonged or increasing weakness or numbness in the legs or arms  Drainage, redness, active bleeding, or increased swelling at the injection site  Temperature over 100.0 degrees F.  Headache that increases when your head is upright and decreases when you lie flat    CALL 911 OR GO DIRECTLY TO EMERGENCY DEPARTMENT FOR:  Shortness of breath, chest pain, or problems breathing      Recovery After Procedural Sedation (Adult)  You have been given medicine by vein to make you sleep during your surgery. This may have included both a pain medicine and sleeping medicine. Most of the effects have worn off. But you may still have some drowsiness for the next 6 to 8 hours.  Home care  Follow these guidelines when you get home:  · For the next 8 hours, you should be watched by a responsible adult. This person should make sure your condition is not getting worse.  · Don't drink any alcohol for the next 24 hours.  · Don't drive, operate dangerous machinery, or make important business or personal decisions during the next 24 hours.  Note: Your healthcare provider may tell you not to take any medicine by mouth for pain or sleep in the next 4 hours. These medicines may react with the medicines you were given in the hospital. This could cause a much stronger response than  usual.  Follow-up care  Follow up with your healthcare provider if you are not alert and back to your usual level of activity within 12 hours.  When to seek medical advice  Call your healthcare provider right away if any of these occur:  · Drowsiness gets worse  · Weakness or dizziness gets worse  · Repeated vomiting  · You can't be awakened   Date Last Reviewed: 10/18/2016  © 8868-2331 [employer]. [address] [place] [address], [place] [address]. All rights reserved. This information is not intended as a substitute for professional medical care. Always follow your healthcare professional's instructions.

## 2019-01-17 NOTE — PLAN OF CARE
Patient tolerating oral liquids without difficulty. No apparent s&s of distress noted at this time, no complaints voiced at this time. Injection site dressing from drainage.  Discharge instructions reviewed with patient/family/friend with good verbal feedback received. Patient ready for discharge

## 2019-01-17 NOTE — PLAN OF CARE
Pt in Afib in preop. HR 96-128bpm. Dr. Moffett at bedside and aware. Pt denies CP/SOB at this time. Pt's wife notified and Dr. Moffett requests pt follow up with cardiologist. Dr. Moffett ok to proceed with procedure. No new orders received at this time. All questions answered. Denies recent fever or illness. Pt states ready for procedure.

## 2019-01-17 NOTE — TELEPHONE ENCOUNTER
Spoke to pt. Wife, stated pt. Has been having tachycardia P 130 today and need to be seen today. Offered appointment to see Dr. Marcelo, pt. Wife verbally accepted appointment.

## 2019-01-18 ENCOUNTER — TELEPHONE (OUTPATIENT)
Dept: PAIN MEDICINE | Facility: CLINIC | Age: 79
End: 2019-01-18

## 2019-01-18 DIAGNOSIS — M47.816 LUMBAR SPONDYLOSIS: Primary | ICD-10-CM

## 2019-01-18 RX ORDER — SODIUM CHLORIDE, SODIUM LACTATE, POTASSIUM CHLORIDE, CALCIUM CHLORIDE 600; 310; 30; 20 MG/100ML; MG/100ML; MG/100ML; MG/100ML
INJECTION, SOLUTION INTRAVENOUS CONTINUOUS
Status: CANCELLED | OUTPATIENT
Start: 2019-01-23

## 2019-01-18 NOTE — TELEPHONE ENCOUNTER
Spoke to patient and he reports that after the block he had 100% relief and it lasted for over 12 hours. During that time he went grocery shopping and walked around the store, he also was able to do the dishes all with little to no pain. He reqested that we proceed with the ablation and this will be scheduled for 1/23.

## 2019-01-18 NOTE — TELEPHONE ENCOUNTER
----- Message from Georgina Mai sent at 1/18/2019 10:05 AM CST -----  Type: Needs Medical Advice    Who Called:  Wife- Ana Muhammad     Best Call Back Number:[phone] (home)     Additional Information: Checking status of back procedure completed yesterday, supposed to receive follow up call regarding scheduling second procedure

## 2019-01-23 ENCOUNTER — HOSPITAL ENCOUNTER (OUTPATIENT)
Dept: RADIOLOGY | Facility: HOSPITAL | Age: 79
Discharge: HOME | End: 2019-01-23
Attending: ANESTHESIOLOGY | Admitting: ANESTHESIOLOGY
Payer: MEDICARE

## 2019-01-23 ENCOUNTER — HOSPITAL ENCOUNTER (OUTPATIENT)
Facility: HOSPITAL | Age: 79
Discharge: HOME | End: 2019-01-23
Attending: ANESTHESIOLOGY | Admitting: ANESTHESIOLOGY
Payer: MEDICARE

## 2019-01-23 DIAGNOSIS — M51.36 DDD (DEGENERATIVE DISC DISEASE), LUMBAR: ICD-10-CM

## 2019-01-23 DIAGNOSIS — M47.816 LUMBAR SPONDYLOSIS: Primary | ICD-10-CM

## 2019-01-23 PROCEDURE — 64635 DESTROY LUMB/SAC FACET JNT: CPT | Mod: 50,PO | Performed by: ANESTHESIOLOGY

## 2019-01-23 PROCEDURE — 63600175 PHARM REV CODE 636 W HCPCS: Mod: PO | Performed by: ANESTHESIOLOGY

## 2019-01-23 PROCEDURE — 76000 FLUOROSCOPY <1 HR PHYS/QHP: CPT | Mod: TC,PO

## 2019-01-23 PROCEDURE — 99152 PR MOD CONSCIOUS SEDATION, SAME PHYS, 5+ YRS, FIRST 15 MIN: ICD-10-PCS | Mod: ,,, | Performed by: ANESTHESIOLOGY

## 2019-01-23 PROCEDURE — 64635 DESTROY LUMB/SAC FACET JNT: CPT | Mod: 50,,, | Performed by: ANESTHESIOLOGY

## 2019-01-23 PROCEDURE — 99152 MOD SED SAME PHYS/QHP 5/>YRS: CPT | Mod: ,,, | Performed by: ANESTHESIOLOGY

## 2019-01-23 PROCEDURE — 64636 PR DESTROY L/S FACET JNT ADDL: ICD-10-PCS | Mod: 50,,, | Performed by: ANESTHESIOLOGY

## 2019-01-23 PROCEDURE — 64635 PR DESTROY LUMB/SAC FACET JNT: ICD-10-PCS | Mod: 50,,, | Performed by: ANESTHESIOLOGY

## 2019-01-23 PROCEDURE — 64636 DESTROY L/S FACET JNT ADDL: CPT | Mod: 50,PO | Performed by: ANESTHESIOLOGY

## 2019-01-23 PROCEDURE — 64636 DESTROY L/S FACET JNT ADDL: CPT | Mod: 50,,, | Performed by: ANESTHESIOLOGY

## 2019-01-23 PROCEDURE — 25000003 PHARM REV CODE 250: Mod: PO | Performed by: ANESTHESIOLOGY

## 2019-01-23 RX ORDER — LIDOCAINE HYDROCHLORIDE 10 MG/ML
INJECTION, SOLUTION EPIDURAL; INFILTRATION; INTRACAUDAL; PERINEURAL
Status: DISCONTINUED | OUTPATIENT
Start: 2019-01-23 | End: 2019-01-23 | Stop reason: HOSPADM

## 2019-01-23 RX ORDER — SODIUM CHLORIDE, SODIUM LACTATE, POTASSIUM CHLORIDE, CALCIUM CHLORIDE 600; 310; 30; 20 MG/100ML; MG/100ML; MG/100ML; MG/100ML
INJECTION, SOLUTION INTRAVENOUS CONTINUOUS
Status: DISCONTINUED | OUTPATIENT
Start: 2019-01-23 | End: 2019-01-23 | Stop reason: HOSPADM

## 2019-01-23 RX ORDER — BUPIVACAINE HYDROCHLORIDE 2.5 MG/ML
INJECTION, SOLUTION EPIDURAL; INFILTRATION; INTRACAUDAL
Status: DISCONTINUED | OUTPATIENT
Start: 2019-01-23 | End: 2019-01-23 | Stop reason: HOSPADM

## 2019-01-23 RX ORDER — LIDOCAINE HYDROCHLORIDE 20 MG/ML
INJECTION, SOLUTION INFILTRATION; PERINEURAL
Status: DISCONTINUED | OUTPATIENT
Start: 2019-01-23 | End: 2019-01-23 | Stop reason: HOSPADM

## 2019-01-23 RX ORDER — SODIUM BICARBONATE 1 MEQ/ML
SYRINGE (ML) INTRAVENOUS
Status: DISCONTINUED | OUTPATIENT
Start: 2019-01-23 | End: 2019-01-23 | Stop reason: HOSPADM

## 2019-01-23 RX ORDER — TRIAMCINOLONE ACETONIDE 40 MG/ML
INJECTION, SUSPENSION INTRA-ARTICULAR; INTRAMUSCULAR
Status: DISCONTINUED | OUTPATIENT
Start: 2019-01-23 | End: 2019-01-23 | Stop reason: HOSPADM

## 2019-01-23 RX ORDER — MIDAZOLAM HYDROCHLORIDE 1 MG/ML
INJECTION INTRAMUSCULAR; INTRAVENOUS
Status: DISCONTINUED | OUTPATIENT
Start: 2019-01-23 | End: 2019-01-23 | Stop reason: HOSPADM

## 2019-01-23 RX ADMIN — SODIUM CHLORIDE, SODIUM LACTATE, POTASSIUM CHLORIDE, AND CALCIUM CHLORIDE: .6; .31; .03; .02 INJECTION, SOLUTION INTRAVENOUS at 12:01

## 2019-01-23 NOTE — DISCHARGE SUMMARY
[facility]  Discharge Note  Short Stay    Admit Date: 1/23/2019    Discharge Date: 1/23/2019    Attending Physician: Anthony Moffett     Discharge Provider: Anthony Moffett    Diagnoses:  Active Hospital Problems    Diagnosis  POA    Lumbar spondylosis [M47.816]  Yes      Resolved Hospital Problems   No resolved problems to display.       Discharged Condition: Good    Final Diagnoses: Lumbar spondylosis [M47.816]    Disposition: Home or Self Care    Hospital Course: No complications, uneventful    Outcome of Hospitalization, Treatment, Procedure, or Surgery:  Patient was admitted for outpatient interventional pain management procedure. The patient tolerated the procedure well with no complications.    Follow up/Patient Instructions:  Follow up as scheduled in Pain Management office in 3-4 weeks.  Patient has received instructions and follow up date and time.    Medications:  Continue previous medications    Discharge Procedure Orders   Notify your health care provider if you experience any of the following:  temperature >100.4     Notify your health care provider if you experience any of the following:  persistent nausea and vomiting or diarrhea     Notify your health care provider if you experience any of the following:  severe uncontrolled pain     Notify your health care provider if you experience any of the following:  redness, tenderness, or signs of infection (pain, swelling, redness, odor or green/yellow discharge around incision site)     Notify your health care provider if you experience any of the following:  difficulty breathing or increased cough     Notify your health care provider if you experience any of the following:  severe persistent headache     Notify your health care provider if you experience any of the following:  worsening rash     Notify your health care provider if you experience any of the following:  persistent dizziness, light-headedness, or visual disturbances     Notify your  health care provider if you experience any of the following:  increased confusion or weakness     Activity as tolerated         Discharge Procedure Orders (must include Diet, Follow-up, Activity):   Discharge Procedure Orders (must include Diet, Follow-up, Activity)   Notify your health care provider if you experience any of the following:  temperature >100.4     Notify your health care provider if you experience any of the following:  persistent nausea and vomiting or diarrhea     Notify your health care provider if you experience any of the following:  severe uncontrolled pain     Notify your health care provider if you experience any of the following:  redness, tenderness, or signs of infection (pain, swelling, redness, odor or green/yellow discharge around incision site)     Notify your health care provider if you experience any of the following:  difficulty breathing or increased cough     Notify your health care provider if you experience any of the following:  severe persistent headache     Notify your health care provider if you experience any of the following:  worsening rash     Notify your health care provider if you experience any of the following:  persistent dizziness, light-headedness, or visual disturbances     Notify your health care provider if you experience any of the following:  increased confusion or weakness     Activity as tolerated

## 2019-01-23 NOTE — OP NOTE
Procedure Note    Procedure Date: 1/23/2019    Procedure Performed:  Radiofrequency ablation of the L2-L5 medial branch nerves on the  bilateral side utilizing fluoroscopy    Indications: Patient has failed conservative therapy.      Pre-op diagnosis: Lumbar Spondylosis    Post-op diagnosis: same    Physician: Anthony Moffett MD    Sedation medications: versed 2mg    Medications injected:  Pre-lesion, 1ml of 2% lidocaine at each level.  From a mixture of 5ml of 0.25% bupivacaine and 40mg of methylprednisolone, 1ml of this solution was injected at each level post-lesion.    Local anesthetic used: 1% Lidocaine, 5 ml, 8.45 sodium bicarb 0.25ml    Estimated Blood Loss: none    Complications:  none    Technique:  The patient was interviewed in the holding area and Risks/Benefits were discussed, including, but not limited to, the possibility of new or different pain, bleeding or infection.   All questions were answered.  The patient understood and accepted risks.  Consent was reviewed.  A time out was taken to identify the patient, procedure and side of the procedure. The patient was placed in a prone position, then prepped and draped in the usual sterile fashion using ChloraPrep and sterile towels.  The levels were determined under fluoroscopic guidance and then marked.  AP and oblique fluoroscopy were used to identify and peter the junctions between the superior articular processes and transverse processes at the L2-L5 levels on the Bilateral side.  The Bilateral sacral ala was also identified and marked.  The skin and subcutaneous tissues in these identified areas were anesthetized with 1% lidocaine. A 20-gauge 100 mm Leon venom RF needle was advanced towards each of these points under fluoroscopic guidance such that the tip of the needle was positioned posterior to the Neuro-foramen on lateral fluoroscopic view.  Then, each needle was positioned such that, on stimulation, the patient had an appropriate pain response  between 0.3-0.5 V, with no motor response, other than Lumbar Paraspinal Muscles up to 2.0V.  One mL of 2% lidocaine was then injected at each level prior to lesioning, which was performed for 90 seconds at 80°C.  Once the lesion was complete, 1 mL of a solution consisting of 5 mL 0.25% bupivacaine and 40mg methylprednisolone was injected through each probe. The probes were removed with a 1% lidocaine flush.  The patient tolerated the procedure well.  The patient was monitored after the procedure.  Patient was given post procedure and discharge instructions to follow at home.  The patient was discharged in a stable condition.

## 2019-01-23 NOTE — INTERVAL H&P NOTE
The patient has been examined and the H&P has been reviewed:    I concur with the findings and changes have been noted since the H&P was written: s/p b/l L2-L5 medial branch blocks on 1/17/19.  Mr. Muhammad reported that after the block he had 100% relief and it last the duration of the local anesthetic.  During that time he went grocery shopping and walked around the store, he also was able to do the dishes all with little to no pain.  Will proceed with b/l L2-L5 RFA. Procedure explained using an anatomical model.  Risks, benefits, alternatives explained to patient who verbalized understanding, including increased risk of infection, bleeding, need for additional procedures or surgery, and nerve damage.  Questions regarding the procedure, risks, expected outcome, and possible side effects were solicited and answered to the patient's satisfaction.  Heri wishes to proceed with the injection.  Verbal and written consent were obtained in clinic today.      Anesthesia/Surgery risks, benefits and alternative options discussed and understood by patient/family.          Active Hospital Problems    Diagnosis  POA    Lumbar spondylosis [M47.816]  Yes      Resolved Hospital Problems   No resolved problems to display.

## 2019-01-23 NOTE — DISCHARGE INSTRUCTIONS
Recovery After Procedural Sedation (Adult)  You have been given medicine by vein to make you sleep during your surgery. This may have included both a pain medicine and sleeping medicine. Most of the effects have worn off. But you may still have some drowsiness for the next 6 to 8 hours.  Home care  Follow these guidelines when you get home:  · For the next 8 hours, you should be watched by a responsible adult. This person should make sure your condition is not getting worse.  · Don't drink any alcohol for the next 24 hours.  · Don't drive, operate dangerous machinery, or make important business or personal decisions during the next 24 hours.  Note: Your healthcare provider may tell you not to take any medicine by mouth for pain or sleep in the next 4 hours. These medicines may react with the medicines you were given in the hospital. This could cause a much stronger response than usual.  Follow-up care  Follow up with your healthcare provider if you are not alert and back to your usual level of activity within 12 hours.  When to seek medical advice  Call your healthcare provider right away if any of these occur:  · Drowsiness gets worse  · Weakness or dizziness gets worse  · Repeated vomiting  · You can't be awakened   Date Last Reviewed: 10/18/2016  © 2849-6871 [employer]. [address] [place] [address], [place] [address]. All rights reserved. This information is not intended as a substitute for professional medical care. Always follow your healthcare professional's instructions.    Home care instructions  Apply ice pack to the injection site for 20 minutes periods for the first 24 hrs for soreness/discomfort at injection site DO NOT USE HEAT FOR 24 HOURS  Keep site clean and dry for 24 hours, remove bandaid when desired  Do not drive until tomorrow  Take care when walking after a lumbar injection  Avoid strenuous activities for 2 days  Make take 2 weeks to feel the full effects   Resume home medication as  prescribed today  Resume Aspirin, Plavix, or Coumadin the day after the procedure unless otherwise instructed.    SEE IMMEDIATE MEDICAL HELP FOR:  Severe increase in your usual pain or appearance of new pain  Prolonged or increasing weakness or numbness in the legs or arms  Drainage, redness, active bleeding, or increased swelling at the injection site  Temperature over 100.0 degrees F.  Headache that increases when your head is upright and decreases when you lie flat    CALL 911 OR GO DIRECTLY TO EMERGENCY DEPARTMENT FOR:  Shortness of breath, chest pain, or problems breathing

## 2019-01-24 VITALS
HEART RATE: 72 BPM | SYSTOLIC BLOOD PRESSURE: 127 MMHG | DIASTOLIC BLOOD PRESSURE: 78 MMHG | RESPIRATION RATE: 18 BRPM | TEMPERATURE: 97 F | WEIGHT: 227.06 LBS | OXYGEN SATURATION: 97 % | HEIGHT: 72 IN | BODY MASS INDEX: 30.75 KG/M2

## 2019-01-28 ENCOUNTER — TELEPHONE (OUTPATIENT)
Dept: PAIN MEDICINE | Facility: CLINIC | Age: 79
End: 2019-01-28

## 2019-01-28 NOTE — TELEPHONE ENCOUNTER
----- Message from Ayala Hinton sent at 1/28/2019 11:31 AM CST -----  Type: Needs Medical Advice    Who Called:  Wife - Melania Symptoms (please be specific):  Back pain  How long has patient had these symptoms: started today  Pharmacy name and phone #:  NA   Best Call Back Number: [phone] Information: Patient had surgery last week, complain of back pain. Call was placed to the pod.

## 2019-01-28 NOTE — TELEPHONE ENCOUNTER
Patient had a flare up of pain this morning but after taking some pain medication and moving around during the day he is feeling better. Patient or his wife will call back and let us know if this worsens.

## 2019-01-30 ENCOUNTER — TELEPHONE (OUTPATIENT)
Dept: ELECTROPHYSIOLOGY | Facility: CLINIC | Age: 79
End: 2019-01-30

## 2019-02-04 ENCOUNTER — TELEPHONE (OUTPATIENT)
Dept: PAIN MEDICINE | Facility: CLINIC | Age: 79
End: 2019-02-04

## 2019-02-04 NOTE — TELEPHONE ENCOUNTER
Spoke to patient's wife and she describes that in the mornings the patient is having a lot of left sided pain. He is taking pain medication for this, and putting lidocaine patches on. The pain is lasting at least half a day, it hurts the most when he first gets up in the morning. The right side doesn't have any pain.

## 2019-02-04 NOTE — TELEPHONE ENCOUNTER
----- Message from Ayala Hinton sent at 2/4/2019  7:53 AM CST -----  Type: Needs Medical Advice    Who Called: wifeSanket Velázquez   Symptoms (please be specific): back pain  How long has patient had these symptoms:    Pharmacy name and phone #:  NA  Best Call Back Number: [phone]  Additional Information: Patient complain of left side back pain is extremely painful

## 2019-02-04 NOTE — TELEPHONE ENCOUNTER
Spoke to patient's wife.  Recommendations for left sided back pain. Tylenol 625mg tabs bid prn.  Ibuprofen 600mg tid prn.  Restart flexeril 5mg bid prn. Patient without numbness or weakness.  May make followup appointment earlier if symtpoms don't improve.  All questions answered.

## 2019-02-11 ENCOUNTER — OFFICE VISIT (OUTPATIENT)
Dept: PAIN MEDICINE | Facility: CLINIC | Age: 79
End: 2019-02-11
Payer: MEDICARE

## 2019-02-11 VITALS
BODY MASS INDEX: 30.83 KG/M2 | WEIGHT: 227.63 LBS | SYSTOLIC BLOOD PRESSURE: 128 MMHG | DIASTOLIC BLOOD PRESSURE: 72 MMHG | HEIGHT: 72 IN | HEART RATE: 78 BPM

## 2019-02-11 DIAGNOSIS — M79.10 MYALGIA: ICD-10-CM

## 2019-02-11 DIAGNOSIS — G89.29 CHRONIC BILATERAL LOW BACK PAIN WITHOUT SCIATICA: ICD-10-CM

## 2019-02-11 DIAGNOSIS — M47.816 LUMBAR SPONDYLOSIS: Primary | ICD-10-CM

## 2019-02-11 DIAGNOSIS — M54.50 CHRONIC BILATERAL LOW BACK PAIN WITHOUT SCIATICA: ICD-10-CM

## 2019-02-11 PROCEDURE — 3074F SYST BP LT 130 MM HG: CPT | Mod: HCNC,CPTII,S$GLB, | Performed by: ANESTHESIOLOGY

## 2019-02-11 PROCEDURE — 3078F PR MOST RECENT DIASTOLIC BLOOD PRESSURE < 80 MM HG: ICD-10-PCS | Mod: HCNC,CPTII,S$GLB, | Performed by: ANESTHESIOLOGY

## 2019-02-11 PROCEDURE — 99214 OFFICE O/P EST MOD 30 MIN: CPT | Mod: 25,HCNC,S$GLB, | Performed by: ANESTHESIOLOGY

## 2019-02-11 PROCEDURE — 20552 PR INJECT TRIGGER POINT, 1 OR 2: ICD-10-PCS | Mod: HCNC,S$GLB,, | Performed by: ANESTHESIOLOGY

## 2019-02-11 PROCEDURE — 20552 NJX 1/MLT TRIGGER POINT 1/2: CPT | Mod: HCNC,S$GLB,, | Performed by: ANESTHESIOLOGY

## 2019-02-11 PROCEDURE — 99999 PR PBB SHADOW E&M-EST. PATIENT-LVL III: ICD-10-PCS | Mod: PBBFAC,HCNC,, | Performed by: ANESTHESIOLOGY

## 2019-02-11 PROCEDURE — 3078F DIAST BP <80 MM HG: CPT | Mod: HCNC,CPTII,S$GLB, | Performed by: ANESTHESIOLOGY

## 2019-02-11 PROCEDURE — 3074F PR MOST RECENT SYSTOLIC BLOOD PRESSURE < 130 MM HG: ICD-10-PCS | Mod: HCNC,CPTII,S$GLB, | Performed by: ANESTHESIOLOGY

## 2019-02-11 PROCEDURE — 1101F PR PT FALLS ASSESS DOC 0-1 FALLS W/OUT INJ PAST YR: ICD-10-PCS | Mod: HCNC,CPTII,S$GLB, | Performed by: ANESTHESIOLOGY

## 2019-02-11 PROCEDURE — 99214 PR OFFICE/OUTPT VISIT, EST, LEVL IV, 30-39 MIN: ICD-10-PCS | Mod: 25,HCNC,S$GLB, | Performed by: ANESTHESIOLOGY

## 2019-02-11 PROCEDURE — 99999 PR PBB SHADOW E&M-EST. PATIENT-LVL III: CPT | Mod: PBBFAC,HCNC,, | Performed by: ANESTHESIOLOGY

## 2019-02-11 PROCEDURE — 1101F PT FALLS ASSESS-DOCD LE1/YR: CPT | Mod: HCNC,CPTII,S$GLB, | Performed by: ANESTHESIOLOGY

## 2019-02-11 RX ORDER — CYCLOBENZAPRINE HCL 5 MG
5 TABLET ORAL 2 TIMES DAILY PRN
Qty: 40 TABLET | Refills: 1 | Status: SHIPPED | OUTPATIENT
Start: 2019-02-11 | End: 2021-05-04 | Stop reason: SDUPTHER

## 2019-02-11 RX ORDER — METHYLPREDNISOLONE ACETATE 40 MG/ML
40 INJECTION, SUSPENSION INTRA-ARTICULAR; INTRALESIONAL; INTRAMUSCULAR; SOFT TISSUE
Status: COMPLETED | OUTPATIENT
Start: 2019-02-11 | End: 2019-02-11

## 2019-02-11 RX ADMIN — METHYLPREDNISOLONE ACETATE 40 MG: 40 INJECTION, SUSPENSION INTRA-ARTICULAR; INTRALESIONAL; INTRAMUSCULAR; SOFT TISSUE at 12:02

## 2019-02-11 NOTE — PROGRESS NOTES
[facility] Follow Up Evaluation    CC:   Chief Complaint   Patient presents with    Low-back Pain     left      Last 3 PDI Scores 1/15/2019 10/2/2014   Pain Disability Index (PDI) 43 0       Interval HPI 2/11/19: Mr. Muhammad returns for post injection visit.  He is s/p bilateral L2-L5 RFA on 1/23/19 with 100% relief of the right side and 0% of the left side.  Today his pain is on the left side, 8/10, sharp, no radicular pain or radiculopathy.  Worse with walking and back flexion.  No numbness, tingling.  Continues flexeril, tylenol, ibuprofen prn with good relief.    HPI:   Heri Muhammad is a 78 y.o. male who complains of back pain    Onset: > 10 years  Progression: since onset, pain is gradually worsening  Current Pain Score: 8/10  Typical Range: 4-8/10  Timing: frequent  Quality: Aching, Dull and Electric  Radiation: no  Associated numbness or weakness: no numbness, no weakness  Exacerbated by: physical therapy  Allievated by:   Is Pain Level Acceptable?: No    Previous Therapies:  PT/OT: yes tried and made his pain worse  HEP:   Interventions:   Surgery:  Medications:   - NSAIDS:   - MSK Relaxants: flexeril  - TCAs:   - SNRIs:   - Topicals: lidocaine patch  - Anticonvulsants:  - Opioids:     Current Pain Medications:  1. Lidocaine patch, flexeril, ibuprofen, tylenol    History:    Current Outpatient Medications:     atorvastatin (LIPITOR) 10 MG tablet, TAKE 1 TABLET EVERY EVENING, Disp: 90 tablet, Rfl: 4    cyanocobalamin (VITAMIN B-12) 1000 MCG tablet, Take 100 mcg by mouth once daily., Disp: , Rfl:     cyclobenzaprine (FLEXERIL) 5 MG tablet, Take 1 tablet (5 mg total) by mouth 2 (two) times daily as needed for Muscle spasms., Disp: 40 tablet, Rfl: 1    ELIQUIS 5 mg Tab, TAKE 1 TABLET TWICE DAILY, Disp: 180 tablet, Rfl: 4    fluocinonide (LIDEX) 0.05 % ointment, Apply topically 2 (two) times daily., Disp: 30 g, Rfl: 0    influenza (FLUZONE HIGH-DOSE) 180 mcg/0.5 mL vaccine, Inject 0.5 mLs into  the muscle., Disp: 0.5 mL, Rfl: 0    lamoTRIgine (LAMICTAL) 100 MG tablet, Take 1 tablet (100 mg total) by mouth 2 (two) times daily., Disp: 180 tablet, Rfl: 3    memantine (NAMENDA) 10 MG Tab, Take 1 tablet (10 mg total) by mouth 2 (two) times daily., Disp: 180 tablet, Rfl: 3    metoprolol succinate (TOPROL-XL) 50 MG 24 hr tablet, Take 1 tablet (50 mg total) by mouth once daily., Disp: 90 tablet, Rfl: 4    primidone (MYSOLINE) 250 MG Tab, TAKE 2 TABLETS TWICE DAILY, Disp: 360 tablet, Rfl: 3    torsemide (DEMADEX) 10 MG Tab, Take 1 tablet (10 mg total) by mouth every morning., Disp: 90 tablet, Rfl: 5    umeclidinium-vilanterol (ANORO ELLIPTA) 62.5-25 mcg/actuation DsDv, Inhale 1 puff into the lungs Daily. Controller, Disp: 60 each, Rfl: 11    Past Medical History:   Diagnosis Date    A-fib     Anticoagulant long-term use     eliquis    Atrial flutter     Benign essential tremor     Cancer     skin cancer on back    Cardiac pacemaker in situ     CHF (congestive heart failure)     Coronary artery disease     Cough     Dizziness     intermittant    DJD (degenerative joint disease) of knee     ED (erectile dysfunction)     Hard of hearing 11/2016    History of cataract     HTN (hypertension)     Hyperlipidemia     Hyperlipidemia     Memory loss     Seizure disorder     Seizures     last episode 1995    Sinus node dysfunction 08/2016       Past Surgical History:   Procedure Laterality Date    ABLATION N/A 3/23/2017    [facility]    ABLATION N/A 1/29/2016    [facility]    ANKLE SURGERY Right     pins and screws    ATRIAL ABLATION SURGERY      Block-nerve-medial branch-lumbar L2-L5 Bilateral 1/17/2019    [facility] at [facility]    CARDIAC PACEMAKER PLACEMENT  2014    CARDIAC PACEMAKER PLACEMENT      CARDIAC PACEMAKER PLACEMENT  2014    CARDIOVERSION      CARDIOVERSION N/A 2/1/2017    Performed by  [facility]    CARDIOVERSION N/A 11/22/2016    [facility]    CARDIOVERSION N/A 8/5/2016    [facility]    CARDIOVERSION N/A 11/23/2015    [facility] C[facility]    CARDIOVERSION N/A 10/15/2015    [facility] C[facility]    CATARACT EXTRACTION      OU    COLONOSCOPY      due in 2014    EYE SURGERY      Jesse cataract    HEART CATH-LEFT N/A 6/10/2016    [facility]    HERNIA REPAIR      abdominal    [id number]-[id number]-[id number]-RM # [id number] B N/A 1/26/2018    [facility] Sadi Otto [facility] at [facility]    INSERTION-PACEMAKER-DUAL N/A 6/30/2014    [facility] at [facility] CATH LAB    JOINT REPLACEMENT      bill shoulders    Left heart cath Left 1/22/2018    [facility]ATH    Radiofrequency Ablation, Nerve, Spinal, Lumbar, Medial Branch, L2-L5 Bilateral 1/23/2019    [facility] at [facility] OR    RELEASE-CARPAL TUNNEL Left 10/4/2017    Per[facility] Bennett [facility] OR    TRANSESOPHAGEAL ECHOCARDIOGRAM (GALILEO) N/A 3/23/2017    Per[facility] by [facility] at [facility] CATH LAB       Family History   Problem Relation Age of Onset    Heart disease Mother     Cancer Father         colon    Blindness Father         accident    Cataracts Father     Hypertension Father     Tremor Father     Heart disease Brother     Tremor Brother     Tremor Paternal Grandfather     Melanoma Neg Hx     Amblyopia Neg Hx     Diabetes Neg Hx     Glaucoma Neg Hx     Macular degeneration Neg Hx     Retinal detachment Neg Hx     Strabismus Neg Hx     Stroke Neg Hx     Thyroid disease Neg Hx        Social History     Socioeconomic History    Marital status:      Spouse name: None    Number of children: None    Years of education: None    Highest education  level: None   Social Needs    Financial resource strain: None    Food insecurity - worry: None    Food insecurity - inability: None    Transportation needs - medical: None    Transportation needs - non-medical: None   Occupational History    Occupation: retired   Tobacco Use    Smoking status: Never Smoker    Smokeless tobacco: Never Used   Substance and Sexual Activity    Alcohol use: No    Drug use: No    Sexual activity: None   Other Topics Concern    None   Social History Narrative    None       Review of patient's allergies indicates:   Allergen Reactions    Bactroban [mupirocin calcium] Rash     Other reaction(s): Rash       Review of Systems: no changes since the patient was last seen by me in the office on 1/15/2019    Physical Exam:  Vitals:    02/11/19 1150   BP: 128/72   Pulse: 78   Weight: 103.3 kg (227 lb 10 oz)   Height: 6' (1.829 m)   PainSc:   8   PainLoc: Back     Body mass index is 30.87 kg/m².     Gen: NAD  Gait: gait intact  Psych:  Mood appropriate for given condition  HEENT: eyes anicteric   GI: Abd soft  CV: RRR  Lungs: breathing unlabored   ROM: limited AROM of the L spine in all planes, full ROM at ankles, knees and hips  Lumbar flexion 90 degrees, extension 50 degrees, side bending 30 degrees.    Sensation: intact to light touch in all dermatomes tested from L2-S1 bilaterally  Reflexes: 0/0 b/l patella and Achilles, biceps and triceps, plantar response down going   Palpation: Diffusely tender over left lumbar paraspinals  -TTP over the b/l greater trochanters and bilateral SI joint  Tone: normal in the b/l knees and hips   Skin: intact  Extremities: No edema in b/l ankles or hands       Right Left   L2/3 Iliacus Hip flexion  5  5   L3/4 Qudratus Femoris Knee Extension  5  5   L4/5 Tib Anterior Ankle Dorsiflexion   5  5   L5/S1 Extensor Hallicus Longus Great toe extension  5  5   L4/5 Tib Anterior/Posterior Inversion  5  5   L5/S1 Extensor Digitorum Longus, Peronues Eversion  5   5   L5/S1 Glut Med Hip Abudction  5  5   S1/2 Glut max and Hamstring Hip Extension  5  5   S1/2 Gastroc/Soleus Plantar Fexion  5  5       Imaging:  Xray lumbar spine 12/11/18  FINDINGS:  There is no definite change in the appearance of the lumbar spine compared to the prior study.  There is no fracture or malalignment.  There is moderate to marked disc space narrowing at the L4-5 level.  There is moderate-to-marked facet joint arthropathy at the L4-5 and L5-S1 levels.  Otherwise, there is mild disc space narrowing in the remainder of the lumbar spine.  There is moderate to marked disc space narrowing at the thoracolumbar junction where there is also bridging osteophyte formation and endplate sclerosis.  These findings were all present previously.    CT lumbar spine 1/2018  FINDINGS:    Vertebral column: There is stable mild grade 1 spondylolisthesis at the L5-S1 level with 4 mm anterolisthesis of L5 on S1 which is degenerative in etiology and related to facet arthropathy.  There are no pars defects.  The remainder of the vertebral bodies maintain normal alignment.  The bones are mildly osteopenic.  There is no acute fracture.  There is mild to moderate disc space sparing at the L4-5 level where there is endplate sclerosis and osteophyte formation.  There is mild disc space narrowing anteriorly at the L5-S1 level.  There is a very minimal levocurvature of the lumbar spine.  This could in part be positional.  The patient is slightly tilted in the scanner.    Spinal canal, epidural space, conus: The spinal canal is developmentally normal.  The conus terminates at the level of T12-L1.  There is no abnormal epidural collection or mass.      Findings by level:  T11-12: There is no spinal canal or foraminal stenosis.  There is mild left facet joint arthropathy.  T12-L1: There is no spinal canal or foraminal stenosis.  There is mild left facet joint arthropathy.  L1-L2: There is minimal bulging of the annulus and mild  facet joint arthropathy without spinal canal or foraminal stenosis.  There is no significant change.  L2-L3: There is a mild diffuse disc bulge and mild facet arthropathy.  There is no spinal canal or foraminal stenosis.  There is little change.  L3-L4: There is a mild diffuse disc bulge with mild facet arthropathy.  There is no spinal canal or foraminal stenosis and there is no significant change.  L4-L5: There is mild to moderate facet arthropathy.  There is a diffuse disc bulge with osteophytic ridging.  There is no spinal stenosis.  There is moderate left and severe right foraminal stenosis without significant change.  L5-S1: There is 4 mm anterolisthesis of L5 on S1.  There is moderate right and mild bilateral facet joint arthropathy with ligamentum flavum thickening and ossification.  There is unroofing of a broad disc protrusion.  This approaches both S1 roots and may contact the roots without obvious compression or displacement.  There is no significant spinal stenosis.  There is moderate left and severe right foraminal stenosis without significant change.    Soft tissues/other: The prevertebral soft tissues are normal.  There is mild atherosclerosis.  The aorta is of normal caliber.    Labs:  BMP  Lab Results   Component Value Date     08/29/2018    K 3.9 08/29/2018    CL 99 08/29/2018    CO2 31 (H) 08/29/2018    BUN 8 08/29/2018    CREATININE 0.8 08/29/2018    CALCIUM 9.0 08/29/2018    ANIONGAP 9 08/29/2018    ESTGFRAFRICA >60.0 08/29/2018    EGFRNONAA >60.0 08/29/2018     Lab Results   Component Value Date    ALT 21 08/29/2018    AST 20 08/29/2018    ALKPHOS 98 08/29/2018    BILITOT 0.4 08/29/2018       Assessment:  Problem List Items Addressed This Visit        Neuro    Lumbar spondylosis - Primary       Orthopedic    Chronic bilateral low back pain without sciatica    Myalgia          Treatment Plan:  78 y.o. year old male with PMH seizure disorder, HTN, CAD, A-fib presents to the office today  with chronic axial lower back pain that has been bothering him for the past 10 years.  Today his pain is 8/10, dull, aching, worse with back extension.  He denies any radicular pain or radiculopathy.  He has tried physical therapy however he feels like it made his pain worse and couldn't complete it.  He continues lidocaine patches, flexeril prn, ibuprofen prn, and tylenol prn with only mild pain relief.  CT and xray lumbar spine consistent with multilevel facet arthropathy.  His pain is limiting his mobility and interfering with his ADL's.  Will schedule for bilateral L2-L5 medial branch blocks followed by RFA if appropriate.  Procedure explained using an anatomical model.  Risks, benefits, alternatives explained to patient who verbalized understanding, including increased risk of infection, bleeding, need for additional procedures or surgery, and nerve damage.  Questions regarding the procedure, risks, expected outcome, and possible side effects were solicited and answered to the patient's satisfaction.  Heri wishes to proceed with the injection.  Verbal and written consent were obtained in clinic today.  Follow up 1 week post injection.  Of note patient had initial HR of 143 via pulse ox.  Repeat manual check was 91 and then 78 20 mins later. /63.  Was asymptomatic throughout encounter.  Given history of a-fib, patient advised to go to ED should he experience any chest pain, sob, dizziness.     2/11/19 - Mr. Muhammad returns for post injection visit.  He is s/p bilateral L2-L5 RFA on 1/23/19 with 100% relief of the right side and 0% of the left side.  Today his pain is on the left side, 8/10, sharp, no radicular pain or radiculopathy.  Worse with walking and back flexion.  No numbness, tingling.  Continues flexeril, tylenol, ibuprofen prn with good relief.  Low suspicion for post procedure neuritis as patient without neuropathic pain.  I think his left sided pain is predominantly myofascial as its worse with  back flexion and walking.  He gets almost complete relief of his pain with ibuprofen 600mg bid, flexeril 5mg bid and lidocaine 4% patch.  I want to avoid chronic NSAIDs use given his use of eliquis.  Start tylenol 500mg po prn max 6 tabs per day.   Will also do TPI to left QL today in the office.  He will avoid bending and heavy lifting.  Procedure explained using an anatomical model.  Risks, benefits, alternatives explained to patient who verbalized understanding, including increased risk of infection, bleeding, need for additional procedures or surgery, and nerve damage.  Questions regarding the procedure, risks, expected outcome, and possible side effects were solicited and answered to the patient's satisfaction.  Heri wishes to proceed with the injection.  Verbal and written consent were obtained in clinic today.  Follow up in 6 weeks.    Procedures: none at this time  PT/OT/HEP: unable to complete PT 2/2 pain  Medications: continue tylenol, flexeril prn, and lidocaine patch  Labs: Reviewed and medications are appropriately dosed for current hepatorenal function.  Imaging: No additional recommended at this time.    : Not applicable    Anthony Moffett M.D.  Interventional Pain Medicine / Anesthesiology    Trigger Point Injection    This is a pain clinic procedure note.    Procedure: Trigger point injection  Procedure Date: 02/11/2019  Muscles Injected: left QL  Subjective: Multiple trigger points and areas of tenderness were identified and marked.  Preoperative Diagnosis: myalgia  Post Procedure Diagnosis: myalgia  Findings: Radiating trigger points  Complications: None  Anesthetic: 50:50 Mixture of Lidocaine 2% and Bupivacaine 0.5% + 40mg depomedrol 5 cc total    Procedure details: Skin overlying target injection sites cleaned with alcohol swabs.  Each identified trigger point was injected with the aforementioned anesthetic using a 1.25 inch 25G needle followed by needling technique.  Twitch response was  elicited at some sites.    Dispo: no complications, pt tolerated the procedure well.

## 2019-03-04 ENCOUNTER — PES CALL (OUTPATIENT)
Dept: ADMINISTRATIVE | Facility: CLINIC | Age: 79
End: 2019-03-04

## 2019-03-04 DIAGNOSIS — G40.909 NONINTRACTABLE EPILEPSY WITHOUT STATUS EPILEPTICUS, UNSPECIFIED EPILEPSY TYPE: ICD-10-CM

## 2019-03-06 RX ORDER — METOPROLOL SUCCINATE 50 MG/1
50 TABLET, EXTENDED RELEASE ORAL DAILY
Qty: 90 TABLET | Refills: 4 | Status: SHIPPED | OUTPATIENT
Start: 2019-03-06 | End: 2020-03-18

## 2019-03-07 RX ORDER — LAMOTRIGINE 100 MG/1
TABLET ORAL
Qty: 180 TABLET | Refills: 3 | Status: SHIPPED | OUTPATIENT
Start: 2019-03-07 | End: 2019-07-02 | Stop reason: SDUPTHER

## 2019-03-12 ENCOUNTER — HOSPITAL ENCOUNTER (OUTPATIENT)
Dept: RADIOLOGY | Facility: HOSPITAL | Age: 79
Discharge: HOME | End: 2019-03-12
Attending: ANESTHESIOLOGY
Payer: MEDICARE

## 2019-03-12 ENCOUNTER — OFFICE VISIT (OUTPATIENT)
Dept: PAIN MEDICINE | Facility: CLINIC | Age: 79
End: 2019-03-12
Payer: MEDICARE

## 2019-03-12 VITALS
HEIGHT: 72 IN | BODY MASS INDEX: 30.85 KG/M2 | HEART RATE: 75 BPM | SYSTOLIC BLOOD PRESSURE: 124 MMHG | WEIGHT: 227.75 LBS | DIASTOLIC BLOOD PRESSURE: 71 MMHG

## 2019-03-12 DIAGNOSIS — G89.29 CHRONIC BILATERAL LOW BACK PAIN WITHOUT SCIATICA: ICD-10-CM

## 2019-03-12 DIAGNOSIS — M79.10 MYALGIA: Primary | ICD-10-CM

## 2019-03-12 DIAGNOSIS — M54.50 CHRONIC BILATERAL LOW BACK PAIN WITHOUT SCIATICA: ICD-10-CM

## 2019-03-12 PROCEDURE — 3078F PR MOST RECENT DIASTOLIC BLOOD PRESSURE < 80 MM HG: ICD-10-PCS | Mod: HCNC,CPTII,S$GLB, | Performed by: ANESTHESIOLOGY

## 2019-03-12 PROCEDURE — 1101F PR PT FALLS ASSESS DOC 0-1 FALLS W/OUT INJ PAST YR: ICD-10-PCS | Mod: HCNC,CPTII,S$GLB, | Performed by: ANESTHESIOLOGY

## 2019-03-12 PROCEDURE — 99999 PR PBB SHADOW E&M-EST. PATIENT-LVL III: CPT | Mod: PBBFAC,HCNC,, | Performed by: ANESTHESIOLOGY

## 2019-03-12 PROCEDURE — 99214 OFFICE O/P EST MOD 30 MIN: CPT | Mod: HCNC,S$GLB,, | Performed by: ANESTHESIOLOGY

## 2019-03-12 PROCEDURE — 72100 XR LUMBAR SPINE AP AND LATERAL: ICD-10-PCS | Mod: 26,HCNC,, | Performed by: RADIOLOGY

## 2019-03-12 PROCEDURE — 99214 PR OFFICE/OUTPT VISIT, EST, LEVL IV, 30-39 MIN: ICD-10-PCS | Mod: HCNC,S$GLB,, | Performed by: ANESTHESIOLOGY

## 2019-03-12 PROCEDURE — 72100 X-RAY EXAM L-S SPINE 2/3 VWS: CPT | Mod: TC,HCNC,PO

## 2019-03-12 PROCEDURE — 3074F PR MOST RECENT SYSTOLIC BLOOD PRESSURE < 130 MM HG: ICD-10-PCS | Mod: HCNC,CPTII,S$GLB, | Performed by: ANESTHESIOLOGY

## 2019-03-12 PROCEDURE — 3078F DIAST BP <80 MM HG: CPT | Mod: HCNC,CPTII,S$GLB, | Performed by: ANESTHESIOLOGY

## 2019-03-12 PROCEDURE — 3074F SYST BP LT 130 MM HG: CPT | Mod: HCNC,CPTII,S$GLB, | Performed by: ANESTHESIOLOGY

## 2019-03-12 PROCEDURE — 1101F PT FALLS ASSESS-DOCD LE1/YR: CPT | Mod: HCNC,CPTII,S$GLB, | Performed by: ANESTHESIOLOGY

## 2019-03-12 PROCEDURE — 99499 RISK ADDL DX/OHS AUDIT: ICD-10-PCS | Mod: S$GLB,,, | Performed by: ANESTHESIOLOGY

## 2019-03-12 PROCEDURE — 99499 UNLISTED E&M SERVICE: CPT | Mod: S$GLB,,, | Performed by: ANESTHESIOLOGY

## 2019-03-12 PROCEDURE — 72100 X-RAY EXAM L-S SPINE 2/3 VWS: CPT | Mod: 26,HCNC,, | Performed by: RADIOLOGY

## 2019-03-12 PROCEDURE — 99999 PR PBB SHADOW E&M-EST. PATIENT-LVL III: ICD-10-PCS | Mod: PBBFAC,HCNC,, | Performed by: ANESTHESIOLOGY

## 2019-03-12 NOTE — PROGRESS NOTES
[facility] Follow Up Evaluation    CC:   Chief Complaint   Patient presents with    Low-back Pain     worse when standing    Hip Pain     front bilateral      Last 3 PDI Scores 1/15/2019 10/2/2014   Pain Disability Index (PDI) 43 0     Interval HPI 3/12/19: Mr. Muhammad returns to the office for follow up. Today with left greater than right sided back pain, 7/10, intermittent, sharp, relieved with back flexion.  TPI in the office on 2/11/19 with mild relief.  No radicular pain or radiculopathy.  Continues flexeril, lidocaine patch, and tylenol prn    Interval HPI 2/11/19: Mr. Muhammad returns for post injection visit.  He is s/p bilateral L2-L5 RFA on 1/23/19 with 100% relief of the right side and 0% of the left side.  Today his pain is on the left side, 8/10, sharp, no radicular pain or radiculopathy.  Worse with walking and back flexion.  No numbness, tingling.  Continues flexeril, tylenol, ibuprofen prn with good relief.    HPI:   Heri Muhammad is a 78 y.o. male who complains of back pain    Onset: > 10 years  Progression: since onset, pain is gradually worsening  Current Pain Score: 8/10  Typical Range: 4-8/10  Timing: frequent  Quality: Aching, Dull and Electric  Radiation: no  Associated numbness or weakness: no numbness, no weakness  Exacerbated by: physical therapy  Allievated by:   Is Pain Level Acceptable?: No    Previous Therapies:  PT/OT: yes tried and made his pain worse  HEP:   Interventions:   Surgery:  Medications:   - NSAIDS:   - MSK Relaxants: flexeril  - TCAs:   - SNRIs:   - Topicals: lidocaine patch  - Anticonvulsants:  - Opioids:     Current Pain Medications:  1. Lidocaine patch, flexeril, ibuprofen, tylenol    History:    Current Outpatient Medications:     atorvastatin (LIPITOR) 10 MG tablet, TAKE 1 TABLET EVERY EVENING, Disp: 90 tablet, Rfl: 4    cyanocobalamin (VITAMIN B-12) 1000 MCG tablet, Take 100 mcg by mouth once daily., Disp: , Rfl:     cyclobenzaprine (FLEXERIL) 5 MG  tablet, Take 1 tablet (5 mg total) by mouth 2 (two) times daily as needed for Muscle spasms., Disp: 40 tablet, Rfl: 1    ELIQUIS 5 mg Tab, TAKE 1 TABLET TWICE DAILY, Disp: 180 tablet, Rfl: 4    fluocinonide (LIDEX) 0.05 % ointment, Apply topically 2 (two) times daily., Disp: 30 g, Rfl: 0    influenza (FLUZONE HIGH-DOSE) 180 mcg/0.5 mL vaccine, Inject 0.5 mLs into the muscle., Disp: 0.5 mL, Rfl: 0    lamoTRIgine (LAMICTAL) 100 MG tablet, TAKE 1 TABLET TWICE DAILY, Disp: 180 tablet, Rfl: 3    memantine (NAMENDA) 10 MG Tab, Take 1 tablet (10 mg total) by mouth 2 (two) times daily., Disp: 180 tablet, Rfl: 3    metoprolol succinate (TOPROL-XL) 50 MG 24 hr tablet, TAKE 1 TABLET (50 MG TOTAL) BY MOUTH ONCE DAILY., Disp: 90 tablet, Rfl: 4    primidone (MYSOLINE) 250 MG Tab, TAKE 2 TABLETS TWICE DAILY, Disp: 360 tablet, Rfl: 3    torsemide (DEMADEX) 10 MG Tab, Take 1 tablet (10 mg total) by mouth every morning., Disp: 90 tablet, Rfl: 5    umeclidinium-vilanterol (ANORO ELLIPTA) 62.5-25 mcg/actuation DsDv, Inhale 1 puff into the lungs Daily. Controller, Disp: 60 each, Rfl: 11    Past Medical History:   Diagnosis Date    A-fib     Anticoagulant long-term use     eliquis    Atrial flutter     Benign essential tremor     Cancer     skin cancer on back    Cardiac pacemaker in situ     CHF (congestive heart failure)     Coronary artery disease     Cough     Dizziness     intermittant    DJD (degenerative joint disease) of knee     ED (erectile dysfunction)     Hard of hearing 11/2016    History of cataract     HTN (hypertension)     Hyperlipidemia     Hyperlipidemia     Memory loss     Seizure disorder     Seizures     last episode 1995    Sinus node dysfunction 08/2016       Past Surgical History:   Procedure Laterality Date    ABLATION N/A 3/23/2017    Per[facility] CATH LAB    ABLATION N/A 1/29/2016    [facility] at [facility] CATH LAB    ANKLE SURGERY Right      pins and screws    ATRIAL ABLATION SURGERY      Block-nerve-medial branch-lumbar L2-L5 Bilateral 1/17/2019    [facility] at [facility] OR    CARDIAC PACEMAKER PLACEMENT  2014    CARDIAC PACEMAKER PLACEMENT      CARDIAC PACEMAKER PLACEMENT  2014    CARDIOVERSION      CARDIOVERSION N/A 2/1/2017    Performed by [facility]    CARDIOVERSION N/A 11/22/2016    [facility]    CARDIOVERSION N/A 8/5/2016    [facility]    CARDIOVERSION N/A 11/23/2015    [facility] CATH LAB    CARDIOVERSION N/A 10/15/2015    [facility] CATH LAB    CATARACT EXTRACTION      OU    COLONOSCOPY      due in 2014    EYE SURGERY      Jesse cataract    HEART CATH-LEFT N/A 6/10/2016    [facility] CATH    HERNIA REPAIR      abdominal    [id number] # [id number] B N/A 1/26/2018    Performed by Sadi Otto [facility] at [facility] CATH    INSERTION-PACEMAKER-DUAL N/A 6/30/2014    [facility] CATH LAB    JOINT REPLACEMENT      bill shoulders    Left heart cath Left 1/22/2018    [facility] CATH    Radiofrequency Ablation, Nerve, Spinal, Lumbar, Medial Branch, L2-L5 Bilateral 1/23/2019    [facility] at [facility] OR    RELEASE-CARPAL TUNNEL Left 10/4/2017    Per[facility] by Bennett [facility]z Jr[facility] at [facility] OR    TRANSESOPHAGEAL ECHOCARDIOGRAM (GALILEO) N/A 3/23/2017    [facility] CATH LAB       Family History   Problem Relation Age of Onset    Heart disease Mother     Cancer Father         colon    Blindness Father         accident    Cataracts Father     Hypertension Father     Tremor Father     Heart disease Brother     Tremor Brother     Tremor Paternal Grandfather     Melanoma Neg Hx     Amblyopia Neg Hx     Diabetes Neg  Hx     Glaucoma Neg Hx     Macular degeneration Neg Hx     Retinal detachment Neg Hx     Strabismus Neg Hx     Stroke Neg Hx     Thyroid disease Neg Hx        Social History     Socioeconomic History    Marital status:      Spouse name: None    Number of children: None    Years of education: None    Highest education level: None   Social Needs    Financial resource strain: None    Food insecurity - worry: None    Food insecurity - inability: None    Transportation needs - medical: None    Transportation needs - non-medical: None   Occupational History    Occupation: retired   Tobacco Use    Smoking status: Never Smoker    Smokeless tobacco: Never Used   Substance and Sexual Activity    Alcohol use: No    Drug use: No    Sexual activity: None   Other Topics Concern    None   Social History Narrative    None       Review of patient's allergies indicates:   Allergen Reactions    Bactroban [mupirocin calcium] Rash     Other reaction(s): Rash       Review of Systems: no changes since the patient was last seen by me in the office on 2/11/19    Physical Exam:  Vitals:    03/12/19 1333   BP: 124/71   Pulse: 75   Weight: 103.3 kg (227 lb 11.8 oz)   Height: 6' (1.829 m)   PainSc:   7   PainLoc: Back     Body mass index is 30.89 kg/m².     Gen: NAD  Gait: gait intact  Psych:  Mood appropriate for given condition  HEENT: eyes anicteric   GI: Abd soft  CV: RRR  Lungs: breathing unlabored   ROM: limited AROM of the L spine in all planes, full ROM at ankles, knees and hips  Lumbar flexion 90 degrees, extension 50 degrees, side bending 30 degrees.    Sensation: intact to light touch in all dermatomes tested from L2-S1 bilaterally  Reflexes: 0/0 b/l patella and Achilles, biceps and triceps, plantar response down going   Palpation: Diffusely tender over left lumbar paraspinals  -TTP over the b/l greater trochanters and bilateral SI joint  Tone: normal in the b/l knees and hips   Skin:  intact  Extremities: No edema in b/l ankles or hands       Right Left   L2/3 Iliacus Hip flexion  5  5   L3/4 Qudratus Femoris Knee Extension  5  5   L4/5 Tib Anterior Ankle Dorsiflexion   5  5   L5/S1 Extensor Hallicus Longus Great toe extension  5  5   L4/5 Tib Anterior/Posterior Inversion  5  5   L5/S1 Extensor Digitorum Longus, Peronues Eversion  5  5   L5/S1 Glut Med Hip Abudction  5  5   S1/2 Glut max and Hamstring Hip Extension  5  5   S1/2 Gastroc/Soleus Plantar Fexion  5  5       Imaging:  Xray lumbar spine 12/11/18  FINDINGS:  There is no definite change in the appearance of the lumbar spine compared to the prior study.  There is no fracture or malalignment.  There is moderate to marked disc space narrowing at the L4-5 level.  There is moderate-to-marked facet joint arthropathy at the L4-5 and L5-S1 levels.  Otherwise, there is mild disc space narrowing in the remainder of the lumbar spine.  There is moderate to marked disc space narrowing at the thoracolumbar junction where there is also bridging osteophyte formation and endplate sclerosis.  These findings were all present previously.    CT lumbar spine 1/2018  FINDINGS:    Vertebral column: There is stable mild grade 1 spondylolisthesis at the L5-S1 level with 4 mm anterolisthesis of L5 on S1 which is degenerative in etiology and related to facet arthropathy.  There are no pars defects.  The remainder of the vertebral bodies maintain normal alignment.  The bones are mildly osteopenic.  There is no acute fracture.  There is mild to moderate disc space sparing at the L4-5 level where there is endplate sclerosis and osteophyte formation.  There is mild disc space narrowing anteriorly at the L5-S1 level.  There is a very minimal levocurvature of the lumbar spine.  This could in part be positional.  The patient is slightly tilted in the scanner.    Spinal canal, epidural space, conus: The spinal canal is developmentally normal.  The conus terminates at the  level of T12-L1.  There is no abnormal epidural collection or mass.      Findings by level:  T11-12: There is no spinal canal or foraminal stenosis.  There is mild left facet joint arthropathy.  T12-L1: There is no spinal canal or foraminal stenosis.  There is mild left facet joint arthropathy.  L1-L2: There is minimal bulging of the annulus and mild facet joint arthropathy without spinal canal or foraminal stenosis.  There is no significant change.  L2-L3: There is a mild diffuse disc bulge and mild facet arthropathy.  There is no spinal canal or foraminal stenosis.  There is little change.  L3-L4: There is a mild diffuse disc bulge with mild facet arthropathy.  There is no spinal canal or foraminal stenosis and there is no significant change.  L4-L5: There is mild to moderate facet arthropathy.  There is a diffuse disc bulge with osteophytic ridging.  There is no spinal stenosis.  There is moderate left and severe right foraminal stenosis without significant change.  L5-S1: There is 4 mm anterolisthesis of L5 on S1.  There is moderate right and mild bilateral facet joint arthropathy with ligamentum flavum thickening and ossification.  There is unroofing of a broad disc protrusion.  This approaches both S1 roots and may contact the roots without obvious compression or displacement.  There is no significant spinal stenosis.  There is moderate left and severe right foraminal stenosis without significant change.    Soft tissues/other: The prevertebral soft tissues are normal.  There is mild atherosclerosis.  The aorta is of normal caliber.    Labs:  BMP  Lab Results   Component Value Date     08/29/2018    K 3.9 08/29/2018    CL 99 08/29/2018    CO2 31 (H) 08/29/2018    BUN 8 08/29/2018    CREATININE 0.8 08/29/2018    CALCIUM 9.0 08/29/2018    ANIONGAP 9 08/29/2018    ESTGFRAFRICA >60.0 08/29/2018    EGFRNONAA >60.0 08/29/2018     Lab Results   Component Value Date    ALT 21 08/29/2018    AST 20 08/29/2018     ALKPHOS 98 08/29/2018    BILITOT 0.4 08/29/2018       Assessment:  Problem List Items Addressed This Visit        Orthopedic    Chronic bilateral low back pain without sciatica    Myalgia - Primary          Treatment Plan:  78 y.o. year old male with PMH seizure disorder, HTN, CAD, A-fib presents to the office today with chronic axial lower back pain that has been bothering him for the past 10 years.  Today his pain is 8/10, dull, aching, worse with back extension.  He denies any radicular pain or radiculopathy.  He has tried physical therapy however he feels like it made his pain worse and couldn't complete it.  He continues lidocaine patches, flexeril prn, ibuprofen prn, and tylenol prn with only mild pain relief.  CT and xray lumbar spine consistent with multilevel facet arthropathy.  His pain is limiting his mobility and interfering with his ADL's.  Will schedule for bilateral L2-L5 medial branch blocks followed by RFA if appropriate.  Procedure explained using an anatomical model.  Risks, benefits, alternatives explained to patient who verbalized understanding, including increased risk of infection, bleeding, need for additional procedures or surgery, and nerve damage.  Questions regarding the procedure, risks, expected outcome, and possible side effects were solicited and answered to the patient's satisfaction.  Heri wishes to proceed with the injection.  Verbal and written consent were obtained in clinic today.  Follow up 1 week post injection.  Of note patient had initial HR of 143 via pulse ox.  Repeat manual check was 91 and then 78 20 mins later. /63.  Was asymptomatic throughout encounter.  Given history of a-fib, patient advised to go to ED should he experience any chest pain, sob, dizziness.     2/11/19 - Mr. Muhammad returns for post injection visit.  He is s/p bilateral L2-L5 RFA on 1/23/19 with 100% relief of the right side and 0% of the left side.  Today his pain is on the left side, 8/10,  sharp, no radicular pain or radiculopathy.  Worse with walking and back flexion.  No numbness, tingling.  Continues flexeril, tylenol, ibuprofen prn with good relief.  Low suspicion for post procedure neuritis as patient without neuropathic pain.  I think his left sided pain is predominantly myofascial as its worse with back flexion and walking.  He gets almost complete relief of his pain with ibuprofen 600mg bid, flexeril 5mg bid and lidocaine 4% patch.  I want to avoid chronic NSAIDs use given his use of eliquis.  Start tylenol 500mg po prn max 6 tabs per day.   Will also do TPI to left QL today in the office.  He will avoid bending and heavy lifting.  Procedure explained using an anatomical model.  Risks, benefits, alternatives explained to patient who verbalized understanding, including increased risk of infection, bleeding, need for additional procedures or surgery, and nerve damage.  Questions regarding the procedure, risks, expected outcome, and possible side effects were solicited and answered to the patient's satisfaction.  Heri wishes to proceed with the injection.  Verbal and written consent were obtained in clinic today.  Follow up in 6 weeks.    3/12/19 - Mr. Muhammad returns to the office for follow up. Today with left greater than right sided back pain, 7/10, intermittent, sharp, relieved with back flexion.  TPI in the office on 2/11/19 with mild relief.  No radicular pain or radiculopathy.  I suspect large component is myofascial given intermittent, relief with back flexion and it improves throughout the day.  Will give new referral to start formal PT to improve function and strength, and to receive training towards establishing a safe and effective home exercise program (HEP).  He continues flexeril prn with mild relief.  Will get lumbar xray today to r/o any possible underlying compression fracture that may have occurred since prior xray in December as patient reports a fall or two since that time.   Follow up in the office in 8 weeks.    Procedures: none at this time  PT/OT/HEP: restart formal PT  Medications: continue tylenol, flexeril prn, and lidocaine patch  Labs: Reviewed and medications are appropriately dosed for current hepatorenal function.  Imaging: lumbar xray    : Not applicable    Anthony Moffett M.D.  Interventional Pain Medicine / Anesthesiology

## 2019-03-14 ENCOUNTER — CLINICAL SUPPORT (OUTPATIENT)
Dept: REHABILITATION | Facility: HOSPITAL | Age: 79
End: 2019-03-14
Attending: ANESTHESIOLOGY
Payer: MEDICARE

## 2019-03-14 DIAGNOSIS — G89.29 CHRONIC BILATERAL LOW BACK PAIN WITHOUT SCIATICA: ICD-10-CM

## 2019-03-14 DIAGNOSIS — M54.50 CHRONIC BILATERAL LOW BACK PAIN WITHOUT SCIATICA: ICD-10-CM

## 2019-03-14 PROCEDURE — 97161 PT EVAL LOW COMPLEX 20 MIN: CPT | Mod: HCNC,PO | Performed by: PHYSICAL THERAPIST

## 2019-03-14 NOTE — PATIENT INSTRUCTIONS
Pelvic Tilt        Flatten back by tightening stomach muscles and buttocks.  Repeat 10 times per set. Do 2 sets per session. Do 1-2 sessions per day.     [email or web address]     Copyright © [employer]. All rights reserved.     Lower Trunk Rotation Stretch        Keeping back flat and feet together, rotate knees to left side. Hold 3 seconds.  Repeat 10 times per set. Do 2 sets per session. Do 1-2 sessions per day.     [email or web address]     Copyright © [employer]. All rights reserved.   Knee-to-Chest Stretch: Unilateral        With hand behind right knee, pull knee in to chest until a comfortable stretch is felt in lower back and buttocks. Keep back relaxed. Hold 30 seconds.  Repeat 3 times per set. Do 1 sets per session. Do 2 sessions per day.     [email or web address]     Copyright © [employer]. All rights reserved.     Bridging        Slowly raise buttocks from floor, keeping stomach tight.  Repeat 10 times per set. Do 1 sets per session. Do 2 sessions per day.     [email or web address]     Copyright © [employer]. All rights reserved.     Clam Shell 45 Degrees        Lying with hips and knees bent 45°, one pillow between knees and ankles. Lift knee. Be sure pelvis does not roll backward. Do not arch back.  Do 10 times, each leg, 2 times per day.     [email or web address]

## 2019-03-14 NOTE — PLAN OF CARE
[facility]  Physical Therapy Initial Evaluation    Name: Heri Muhammad  Clinic Number: [id number]    Therapy Diagnosis:   Encounter Diagnosis   Name Primary?    Chronic bilateral low back pain without sciatica      Physician: Anthony Moffett MD    Physician Orders: PT Eval and Treat   Medical Diagnosis from Referral:   Myalgia   Chronic bilateral low back pain without sciatica     Evaluation Date: 3/14/2019  Authorization Period Expiration: 12/31/2019  Plan of Care Expiration: 04/26/2019  Visit # / Visits authorized: 1/ 20    Time In: 1300  Time Out: 1400  Total Billable Time: 60 minutes    Precautions: Standard and pacemaker    Subjective   Date of onset: 03/12/2019  History of current condition - Heri reports: having low back pain (R>L) for the past 2 weeks. The low back pain is localized. No radicular pain. No numbness/tingling. No buttock pain. No groin pain. Patient reported having low back pain with extension and has been packing boxes due to moving soon. Low back pain is intermittent.       Past Medical History:   Diagnosis Date    A-fib     Anticoagulant long-term use     eliquis    Atrial flutter     Benign essential tremor     Cancer     skin cancer on back    Cardiac pacemaker in situ     CHF (congestive heart failure)     Coronary artery disease     Cough     Dizziness     intermittant    DJD (degenerative joint disease) of knee     ED (erectile dysfunction)     Hard of hearing 11/2016    History of cataract     HTN (hypertension)     Hyperlipidemia     Hyperlipidemia     Memory loss     Seizure disorder     Seizures     last episode 1995    Sinus node dysfunction 08/2016     Heri Muhammad  has a past surgical history that includes Ankle surgery (Right); Colonoscopy; Cataract extraction; Eye surgery; Cardiac pacemaker placement (2014); Cardiac pacemaker placement; Cardiac pacemaker placement (2014); Joint replacement; Hernia repair; Atrial ablation  surgery; Cardioversion; Injection of anesthetic agent around medial branch nerves innervating lumbar facet joint (Bilateral, 1/17/2019); and Radiofrequency ablation of lumbar medial branch nerve at single level (Bilateral, 1/23/2019).    Heri has a current medication list which includes the following prescription(s): atorvastatin, cyanocobalamin, cyclobenzaprine, eliquis, fluocinonide, influenza, lamotrigine, memantine, metoprolol succinate, primidone, torsemide, and umeclidinium-vilanterol.    Review of patient's allergies indicates:   Allergen Reactions    Bactroban [mupirocin calcium] Rash     Other reaction(s): Rash      Imaging:   FINDINGS:  There is no definite change in the appearance of the lumbar spine compared to the prior study.  There is no fracture.  The vertebral bodies maintain normal height.  There may be trace anterolisthesis of L5 on S1.  There is disc space narrowing at the L4-5 level which is unchanged.  There is facet joint arthropathy at the lower 2 lumbar levels.  There is degenerative change at the thoracolumbar junction with disc space narrowing and osteophyte formation, unchanged       Prior Therapy: PT in December 2018  Social History:  lives with their spouse. [place] the [place] [facility] ([place])  Occupation: Retired  Prior Level of Function: independent  Current Level of Function: modified independent, increase time noted with transfers  Recent or major surgery: none noted  Accidents: none noted    Pain:  Current 7/10, worst 9/10, best 0/10   Location: bilateral and R>L low back, localized   Description: Aching, Dull and Variable  Aggravating Factors: Standing and Extension  Easing Factors: flexion, bending     Night pain: none    Pts goals: Decrease pain to the low back. Work on mobility. Increase strength.    Objective     Posture: FHP, rounded shoulders, thoracic kyphosis  Sensation: Dermatomes     Right Left Comment   L2 (lateral thigh) intact intact    L3  "(medial thigh) intact intact    L4 (medial calf) intact intact    L5 (lateral calf) intact intact    S1 (lateral foot) intact intact    S2 (gastro/HS) intact intact    Saddle (cauda equina) intact intact      Myotomes:   Right Left Comment   Hip flexion (L2-3): 4-/5 4/5    Knee extension (L3-4): 4+/5 5/5    DF (L4-5): 5/5 5/5    Great Toe Ext (L5-S1):   5/5 5/5    Glut Medius (L5) 4-/5 4/5    Great Toe Flex/HS (S1-S2) 5/5 5/5        Thoracic/Lumbar AROM:     % limitation Pain/Dysfunction/movement   Flexion  (55-60 N)    50% Cannot touch toes  Non-uniform curvature  Spine of scapula does not clear heels   Extension (25 N)  50% UE does not maintain 170  ASIS does not clear toes  Lack of posterior weight shift     Special Tests:  -Repeated Flexion: feels better  -Repeated Ext: increase low back discomfort    Bridge test/Endurance: (mean= 76.7 " compared to no low back pain, 172.9") +       Hamstring 90/90  L= 45 degrees  R= 42 degrees    Lumbar:   SLR (with leg dominant pain)-  Clonus-  Babinski-    UMN testing:  Cross over sign: -    Palpation: point tender to left QL    GAIT: Heri ambulates 100 feet with no assistive device with modified independently.     GAIT DEVIATIONS: Heri displays decreased step length to LLE.    Transfers:  Sit to stands: modified independent, increase time noted with weight shifting LLE    Pt/family was provided educational information, including: role of PT, goals for PT, scheduling - pt verbalized understanding. Discussed insurance limitations with pt.       CMS Impairment/Limitation/Restriction for FOTO Lumbar Spine Survey    Therapist reviewed FOTO scores for Heri Muhammad on 3/14/2019.   FOTO documents entered into [employer] - see Media section.    Limitation Score: 47%  Category: Mobility    Current : CK = at least 40% but < 60% impaired, limited or restricted  Goal: CK = at least 40% but < 60% impaired, limited or restricted         TREATMENT   Treatment Time In: 1450  Treatment Time " Out: 1500  Total Treatment time separate from Evaluation: 10 minutes    Heri received therapeutic exercises to develop strength, endurance, ROM, flexibility, posture and core stabilization for 10 minutes including:  Supine TA activation  LTR  SKTC  Clam shells    Home Exercises and Patient Education Provided    Education provided:   - Yes    Written Home Exercises Provided: yes.  Exercises were reviewed and Heri was able to demonstrate them prior to the end of the session.  Heri demonstrated good  understanding of the education provided.     See EMR under Patient Instructions for exercises provided 3/14/2019.    Assessment   Heri is a 78 y.o. male referred to outpatient Physical Therapy with a medical diagnosis of Myalgia   Chronic bilateral low back pain without sciatica. Pt presents with postural imbalance, decrease lumbar mobility, low back pain.    Problem List: pain, decreased ROM, decreased flexibility, decreased strength, decreased balance and stability, decreased motor control, antalgic gait and inability to participate fully in vocation pursuits.    Pt prognosis is Good.   Pt will benefit from skilled outpatient Physical Therapy to address the deficits stated above and in the chart below, provide pt/family education, and to maximize pt's level of independence.     Plan of care discussed with patient: Yes  Pt's spiritual, cultural and educational needs considered and patient is agreeable to the plan of care and goals as stated below:     Anticipated Barriers for therapy: none    Medical Necessity is demonstrated by the following  History  Co-morbidities and personal factors that may impact the plan of care Co-morbidities:   anxiety, high BMI and pace-maker    Personal Factors:   no deficits     high   Examination  Body Structures and Functions, activity limitations and participation restrictions that may impact the plan of care Body Regions:   back  lower extremities    Body Systems:    gross  symmetry  ROM  strength  balance  gait  transfers  transitions  motor control    Participation Restrictions:   Home management    Activity limitations:   Learning and applying knowledge  no deficits    General Tasks and Commands  no deficits    Communication  no deficits    Mobility  lifting and carrying objects  walking  driving (bike, car, motorcycle)    Self care  no deficits    Domestic Life  doing house work (cleaning house, washing dishes, laundry)    Interactions/Relationships  no deficits    Life Areas  no deficits    Community and Social Life  no deficits         high   Clinical Presentation stable and uncomplicated low   Decision Making/ Complexity Score: low     Short Term GOALS:  In 4 weeks, pt. will:  - increase hip/core MMT 1/2 grade for ADL purposes.  - decrease outcome measure limitation to <45%  -  Negotiate one flight of stairs mod-I with < difficulty.    Long Term GOALS:  In 6 weeks, pt. will:  - be independent and compliant with HEP and SX management   - decrease outcome measure limitation to <40%  - report 50% reduction in low back pain episodes with home management.    Plan   Plan of care Certification: 3/14/2019 to 04/26/2019.  Outpatient Physical Therapy 2 times weekly for 6 weeks to include the following interventions: Gait Training, Manual Therapy, Moist Heat/ Ice, Neuromuscular Re-ed, Patient Education and Therapeutic Exercise.      Heri may at times be seen by a PTA as part of the Rehab Team.    Rex Nova, PT

## 2019-04-18 RX ORDER — TORSEMIDE 10 MG/1
10 TABLET ORAL EVERY MORNING
Qty: 90 TABLET | Refills: 4 | Status: SHIPPED | OUTPATIENT
Start: 2019-04-18 | End: 2020-04-21

## 2019-04-22 ENCOUNTER — CLINICAL SUPPORT (OUTPATIENT)
Dept: CARDIOLOGY | Facility: CLINIC | Age: 79
End: 2019-04-22
Attending: INTERNAL MEDICINE
Payer: MEDICARE

## 2019-04-22 ENCOUNTER — OFFICE VISIT (OUTPATIENT)
Dept: CARDIOLOGY | Facility: CLINIC | Age: 79
End: 2019-04-22
Payer: MEDICARE

## 2019-04-22 VITALS
BODY MASS INDEX: 30.61 KG/M2 | WEIGHT: 226 LBS | OXYGEN SATURATION: 97 % | HEART RATE: 71 BPM | HEIGHT: 72 IN | SYSTOLIC BLOOD PRESSURE: 118 MMHG | DIASTOLIC BLOOD PRESSURE: 70 MMHG

## 2019-04-22 DIAGNOSIS — I42.8 CARDIOMYOPATHY, NONISCHEMIC: ICD-10-CM

## 2019-04-22 DIAGNOSIS — I50.33 ACUTE ON CHRONIC DIASTOLIC (CONGESTIVE) HEART FAILURE: Primary | ICD-10-CM

## 2019-04-22 DIAGNOSIS — I70.0 AORTIC ATHEROSCLEROSIS: ICD-10-CM

## 2019-04-22 DIAGNOSIS — I49.5 SINUS NODE DYSFUNCTION: ICD-10-CM

## 2019-04-22 DIAGNOSIS — I48.3 TYPICAL ATRIAL FLUTTER: ICD-10-CM

## 2019-04-22 DIAGNOSIS — I25.10 CORONARY ARTERY DISEASE INVOLVING NATIVE CORONARY ARTERY OF NATIVE HEART WITHOUT ANGINA PECTORIS: Chronic | ICD-10-CM

## 2019-04-22 DIAGNOSIS — Z95.0 BIVENTRICULAR CARDIAC PACEMAKER IN SITU: ICD-10-CM

## 2019-04-22 DIAGNOSIS — Z95.0 CARDIAC PACEMAKER IN SITU: ICD-10-CM

## 2019-04-22 DIAGNOSIS — E78.2 MIXED HYPERLIPIDEMIA: ICD-10-CM

## 2019-04-22 DIAGNOSIS — I51.89 DIASTOLIC DYSFUNCTION: ICD-10-CM

## 2019-04-22 DIAGNOSIS — Z79.01 CURRENT USE OF LONG TERM ANTICOAGULATION: ICD-10-CM

## 2019-04-22 DIAGNOSIS — I10 ESSENTIAL HYPERTENSION: ICD-10-CM

## 2019-04-22 PROCEDURE — 3078F PR MOST RECENT DIASTOLIC BLOOD PRESSURE < 80 MM HG: ICD-10-PCS | Mod: HCNC,CPTII,S$GLB, | Performed by: INTERNAL MEDICINE

## 2019-04-22 PROCEDURE — 3288F FALL RISK ASSESSMENT DOCD: CPT | Mod: HCNC,CPTII,S$GLB, | Performed by: INTERNAL MEDICINE

## 2019-04-22 PROCEDURE — 3074F SYST BP LT 130 MM HG: CPT | Mod: HCNC,CPTII,S$GLB, | Performed by: INTERNAL MEDICINE

## 2019-04-22 PROCEDURE — 3078F DIAST BP <80 MM HG: CPT | Mod: HCNC,CPTII,S$GLB, | Performed by: INTERNAL MEDICINE

## 2019-04-22 PROCEDURE — 1100F PR PT FALLS ASSESS DOC 2+ FALLS/FALL W/INJURY/YR: ICD-10-PCS | Mod: HCNC,CPTII,S$GLB, | Performed by: INTERNAL MEDICINE

## 2019-04-22 PROCEDURE — 3288F PR FALLS RISK ASSESSMENT DOCUMENTED: ICD-10-PCS | Mod: HCNC,CPTII,S$GLB, | Performed by: INTERNAL MEDICINE

## 2019-04-22 PROCEDURE — 3074F PR MOST RECENT SYSTOLIC BLOOD PRESSURE < 130 MM HG: ICD-10-PCS | Mod: HCNC,CPTII,S$GLB, | Performed by: INTERNAL MEDICINE

## 2019-04-22 PROCEDURE — 1100F PTFALLS ASSESS-DOCD GE2>/YR: CPT | Mod: HCNC,CPTII,S$GLB, | Performed by: INTERNAL MEDICINE

## 2019-04-22 PROCEDURE — 99215 PR OFFICE/OUTPT VISIT, EST, LEVL V, 40-54 MIN: ICD-10-PCS | Mod: HCNC,S$GLB,, | Performed by: INTERNAL MEDICINE

## 2019-04-22 PROCEDURE — 99999 PR PBB SHADOW E&M-EST. PATIENT-LVL IV: ICD-10-PCS | Mod: PBBFAC,HCNC,, | Performed by: INTERNAL MEDICINE

## 2019-04-22 PROCEDURE — 99215 OFFICE O/P EST HI 40 MIN: CPT | Mod: HCNC,S$GLB,, | Performed by: INTERNAL MEDICINE

## 2019-04-22 PROCEDURE — 99999 PR PBB SHADOW E&M-EST. PATIENT-LVL IV: CPT | Mod: PBBFAC,HCNC,, | Performed by: INTERNAL MEDICINE

## 2019-04-22 RX ORDER — ACETAMINOPHEN 325 MG/1
325 TABLET ORAL EVERY 6 HOURS PRN
COMMUNITY
End: 2020-11-02

## 2019-04-22 NOTE — PROGRESS NOTES
[facility]      Chief Complaint   Patient presents with    Establish Care       Patient ID: Heri Muhammad is a 78 y.o. male with a past medical history of Afib/aflutter (on Eliquis) s/p ablation, CHF s/p biventricular pacemaker placement, non-obstructive CAD, HTN, HLD, who presents for an initial appointment.  Pertinent history/events are as follows:    -Pt presents to establish cardiology care.    -Pt followed by Dr. Graham and Fozia in the past.    HPI:  Mr. Muhammad reports no chest pain, SOB, LE edema, palpitations, TIA symptoms or syncope.  Blood pressure today is 118/70.      Past Medical History:   Diagnosis Date    A-fib     Anticoagulant long-term use     eliquis    Atrial flutter     Benign essential tremor     Cancer     skin cancer on back    Cardiac pacemaker in situ     CHF (congestive heart failure)     Coronary artery disease     Cough     Dizziness     intermittant    DJD (degenerative joint disease) of knee     ED (erectile dysfunction)     Hard of hearing 11/2016    History of cataract     HTN (hypertension)     Hyperlipidemia     Hyperlipidemia     Memory loss     Seizure disorder     Seizures     last episode 1995    Sinus node dysfunction 08/2016     Past Surgical History:   Procedure Laterality Date    ABLATION N/A 3/23/2017    [facility] CATH LAB    ABLATION N/A 1/29/2016    [facility] at [facility] CATH LAB    ANKLE SURGERY Right     pins and screws    ATRIAL ABLATION SURGERY      Block-nerve-medial branch-lumbar L2-L5 Bilateral 1/17/2019    [facility] by [facility] at [facility] OR    CARDIAC PACEMAKER PLACEMENT  2014    CARDIAC PACEMAKER PLACEMENT      CARDIAC PACEMAKER PLACEMENT  2014    CARDIOVERSION      CARDIOVERSION N/A 2/1/2017    [facility] at [facility]    CARDIOVERSION N/A 11/22/2016    [facility] at [facility]    CARDIOVERSION N/A 8/5/2016    [facility]formed [facility]    CARDIOVERSION N/A 11/23/2015    [facility] at [facility] CATH LAB    CARDIOVERSION N/A 10/15/2015    [facility] at [facility] CATH LAB    CATARACT EXTRACTION      OU    COLONOSCOPY      due in 2014    EYE SURGERY      Jesse cataract    HEART CATH-LEFT N/A 6/10/2016    Performed by Austin [facility] at [facility] CATH    HERNIA REPAIR      abdominal    [id number]-RM # 206 B N/A 1/26/2018    Performed by Sadi Otto [facility] at [facility] CATH    INSERTION-PACEMAKER-DUAL N/A 6/30/2014    Per[facility] at [facility] CATH LAB    JOINT REPLACEMENT      bill shoulders    Left heart cath Left 1/22/2018    Performed by [facility] at [facility] CATH    Radiofrequency Ablation, Nerve, Spinal, Lumbar, Medial Branch, L2-L5 Bilateral 1/23/2019    [facility] at [facility] OR    RELEASE-CARPAL TUNNEL Left 10/4/2017    Per[facility] Jr[facility] OR    TRANSESOPHAGEAL ECHOCARDIOGRAM (GALILEO) N/A 3/23/2017    [facility] CATH LAB     Social History     Socioeconomic History    Marital status:      Spouse name: Not on file    Number of children: Not on file    Years of education: Not on file    Highest education level: Not on file   Occupational History    Occupation: retired   Social Needs    Financial resource strain: Not on file    Food insecurity:     Worry: Not on file     Inability: Not on file    Transportation needs:     Medical: Not on file     Non-medical: Not on file   Tobacco Use    Smoking status: Never Smoker    Smokeless tobacco: Never Used   Substance and Sexual Activity    Alcohol use: No    Drug use: No    Sexual activity: Not on file   Lifestyle    Physical activity:     Days per week: Not on file     Minutes per session: Not on file    Stress: Not on file   Relationships    Social connections:     Talks on phone: Not on  file     Gets together: Not on file     Attends Anabaptist service: Not on file     Active member of club or organization: Not on file     Attends meetings of clubs or organizations: Not on file     Relationship status: Not on file   Other Topics Concern    Not on file   Social History Narrative    Not on file     Family History   Problem Relation Age of Onset    Heart disease Mother     Cancer Father         colon    Blindness Father         accident    Cataracts Father     Hypertension Father     Tremor Father     Heart disease Brother     Tremor Brother     Tremor Paternal Grandfather     Melanoma Neg Hx     Amblyopia Neg Hx     Diabetes Neg Hx     Glaucoma Neg Hx     Macular degeneration Neg Hx     Retinal detachment Neg Hx     Strabismus Neg Hx     Stroke Neg Hx     Thyroid disease Neg Hx        Review of patient's allergies indicates:   Allergen Reactions    Bactroban [mupirocin calcium] Rash     Other reaction(s): Rash       Medication List with Changes/Refills   Current Medications    ACETAMINOPHEN (TYLENOL) 325 MG TABLET    Take 325 mg by mouth every 6 (six) hours as needed for Pain.    ATORVASTATIN (LIPITOR) 10 MG TABLET    TAKE 1 TABLET EVERY EVENING    CYANOCOBALAMIN (VITAMIN B-12) 1000 MCG TABLET    Take 100 mcg by mouth once daily.    CYCLOBENZAPRINE (FLEXERIL) 5 MG TABLET    Take 1 tablet (5 mg total) by mouth 2 (two) times daily as needed for Muscle spasms.    ELIQUIS 5 MG TAB    TAKE 1 TABLET TWICE DAILY    FLUOCINONIDE (LIDEX) 0.05 % OINTMENT    Apply topically 2 (two) times daily.    INFLUENZA (FLUZONE HIGH-DOSE) 180 MCG/0.5 ML VACCINE    Inject 0.5 mLs into the muscle.    LAMOTRIGINE (LAMICTAL) 100 MG TABLET    TAKE 1 TABLET TWICE DAILY    MEMANTINE (NAMENDA) 10 MG TAB    Take 1 tablet (10 mg total) by mouth 2 (two) times daily.    METOPROLOL SUCCINATE (TOPROL-XL) 50 MG 24 HR TABLET    TAKE 1 TABLET (50 MG TOTAL) BY MOUTH ONCE DAILY.    PRIMIDONE (MYSOLINE) 250 MG TAB    TAKE  2 TABLETS TWICE DAILY    TORSEMIDE (DEMADEX) 10 MG TAB    TAKE 1 TABLET (10 MG TOTAL) BY MOUTH EVERY MORNING.    UMECLIDINIUM-VILANTEROL (ANORO ELLIPTA) 62.5-25 MCG/ACTUATION DSDV    Inhale 1 puff into the lungs Daily. Controller       Review of Systems  Constitution: Denies chills, fever, and sweats.  HENT: Denies headaches or blurry vision.  Cardiovascular: Denies chest pain or irregular heart beat.  Respiratory: Denies cough or shortness of breath.  Gastrointestinal: Denies abdominal pain, nausea, or vomiting.  Musculoskeletal: Denies muscle cramps.  Neurological: Denies dizziness or focal weakness.  Psychiatric/Behavioral: Normal mental status.  Hematologic/Lymphatic: Denies bleeding problem or easy bruising/bleeding.  Skin: Denies rash or suspicious lesions    Physical Examination  /70 (BP Location: Left arm, Patient Position: Sitting, BP Method: X-Large (Manual))   Pulse 71   Ht 6' (1.829 m)   Wt 102.5 kg (226 lb)   SpO2 97%   BMI 30.65 kg/m²     Constitutional: No acute distress, conversant  HEENT: Sclera anicteric, Pupils equal, round and reactive to light, extraocular motions intact, Oropharynx clear  Neck: No JVD, no carotid bruits  Cardiovascular: regular rate and rhythm, no murmur, rubs or gallops, normal S1/S2  Pulmonary: Clear to auscultation bilaterally  Abdominal: Abdomen soft, nontender, nondistended, positive bowel sounds  Extremities: No lower extremity edema,   Pulses:  Carotid pulses are 2+ on the right side, and 2+ on the left side.  Radial pulses are 2+ on the right side, and 2+ on the left side.   Femoral pulses are 2+ on the right side, and 2+ on the left side.  Popliteal pulses are 2+ on the right side, and 2+ on the left side.   Dorsalis pedis pulses are 2+ on the right side, and 2+ on the left side.   Posterior tibial pulses are 2+ on the right side, and 2+ on the left side.    Skin: No ecchymosis, erythema, or ulcers  Psych: Alert and oriented x 3, appropriate affect  Neuro:  CNII-XII intact, no focal deficits    Labs:  Most Recent Data  CBC:   Lab Results   Component Value Date    WBC 5.34 08/29/2018    HGB 15.3 08/29/2018    HCT 46.4 08/29/2018     08/29/2018    MCV 96 08/29/2018    RDW 13.8 08/29/2018     BMP:   Lab Results   Component Value Date     08/29/2018    K 3.9 08/29/2018    CL 99 08/29/2018    CO2 31 (H) 08/29/2018    BUN 8 08/29/2018    CREATININE 0.8 08/29/2018    GLU 98 08/29/2018    CALCIUM 9.0 08/29/2018    MG 2.2 01/29/2018    PHOS 3.7 01/25/2018     LFTS;   Lab Results   Component Value Date    PROT 7.3 08/29/2018    ALBUMIN 3.7 08/29/2018    BILITOT 0.4 08/29/2018    AST 20 08/29/2018    ALKPHOS 98 08/29/2018    ALT 21 08/29/2018     COAGS:   Lab Results   Component Value Date    INR 1.1 02/17/2018     FLP:   Lab Results   Component Value Date    CHOL 173 08/29/2018    HDL 36 (L) 08/29/2018    LDLCALC 117.0 08/29/2018    TRIG 100 08/29/2018    CHOLHDL 20.8 08/29/2018     CARDIAC:   Lab Results   Component Value Date    TROPONINI 0.020 02/17/2018    BNP 92 08/29/2018       EKG 9/24/2018:   AV dual-paced rhythm  Biventricular pacemaker detected    Echo 1/20/2018:    CONCLUSIONS     1 - Biatrial enlargement.     2 - Wall motion abnormalities.     3 - Moderately depressed left ventricular systolic function (EF 30-35%).     4 - Moderately depressed right ventricular systolic function .     5 - The estimated PA systolic pressure is greater than 19 mmHg.     6 - Trivial mitral regurgitation.     7 - Mild tricuspid regurgitation.     Assessment/Plan:  Heri Muhammad is a 78 y.o. male with a past medical history of Afib/aflutter (on Eliquis) s/p ablation, CHF (EF 30-35%) s/p biventricular pacemaker placement, non-obstructive CAD, HTN, HLD, who presents for an initial appointment.    1.  CHF (EF 30-35%)  s/p biventricular pacemaker placement- Pt is well compensated on exam with clear lungs and no elevated JVD.  Continue daily weights and low salt diet.  Continue  beta blocker, torsemide.  Continue remote monitoring.      2. Afib/Aflutter s/p ablation- Continue Eliquis for anticoagulation.  Continue remote monitoring.  Continue beta blocker.      3. Non-obstructive CAD- Pt with no angina currently.  Continue statin, Eliquis, beta blocker.    4. S/p biventricular pacemaker placement- Continue remote monitoring.  Schedule device check with Dr. Esparza.     Follow up in 6 months    Total duration of face to face visit time 30 minutes.  Total time spent counseling greater than fifty percent of total visit time.  Counseling included discussion regarding imaging findings, diagnosis, possibilities, treatment options, risks and benefits.  The patient had many questions regarding the options and long-term effects.    Kevin Whipple MD, PhD  Interventional Cardiology

## 2019-04-22 NOTE — PATIENT INSTRUCTIONS
Assessment/Plan:  Heri Muhammad is a 78 y.o. male with a past medical history of Afib/aflutter (on Eliquis) s/p ablation, CHF (EF 30-35%) s/p biventricular pacemaker placement, non-obstructive CAD, HTN, HLD, who presents for an initial appointment.    1.  CHF (EF 30-35%)  s/p biventricular pacemaker placement- Pt is well compensated on exam with clear lungs and no elevated JVD.  Continue daily weights and low salt diet.  Continue beta blocker, torsemide.  Continue remote monitoring.      2. Afib/Aflutter s/p ablation- Continue Eliquis for anticoagulation.  Continue remote monitoring.  Continue beta blocker.      3. Non-obstructive CAD- Pt with no angina currently.  Continue statin, Eliquis, beta blocker.    4. S/p biventricular pacemaker placement- Continue remote monitoring.  Schedule device check with Dr. Esparza.     Follow up in 6 months

## 2019-05-02 DIAGNOSIS — Z95.0 CARDIAC PACEMAKER IN SITU: Primary | ICD-10-CM

## 2019-05-02 DIAGNOSIS — I42.8 CARDIOMYOPATHY, NONISCHEMIC: ICD-10-CM

## 2019-05-08 DIAGNOSIS — I48.19 PERSISTENT ATRIAL FIBRILLATION: Primary | ICD-10-CM

## 2019-05-17 ENCOUNTER — TELEPHONE (OUTPATIENT)
Dept: ELECTROPHYSIOLOGY | Facility: CLINIC | Age: 79
End: 2019-05-17

## 2019-05-17 NOTE — TELEPHONE ENCOUNTER
Called patient to inform him that we needed him to come in at 1:00 for a device appointment followed by his EKG and his appointment with Hilaria. Left voice mail with this information will mail appointment slip.

## 2019-05-29 ENCOUNTER — OFFICE VISIT (OUTPATIENT)
Dept: ELECTROPHYSIOLOGY | Facility: CLINIC | Age: 79
End: 2019-05-29
Payer: MEDICARE

## 2019-05-29 ENCOUNTER — HOSPITAL ENCOUNTER (OUTPATIENT)
Dept: CARDIOLOGY | Facility: CLINIC | Age: 79
Discharge: HOME | End: 2019-05-29
Payer: MEDICARE

## 2019-05-29 ENCOUNTER — CLINICAL SUPPORT (OUTPATIENT)
Dept: CARDIOLOGY | Facility: HOSPITAL | Age: 79
End: 2019-05-29
Attending: INTERNAL MEDICINE
Payer: MEDICARE

## 2019-05-29 VITALS
DIASTOLIC BLOOD PRESSURE: 80 MMHG | HEIGHT: 72 IN | BODY MASS INDEX: 30.73 KG/M2 | HEART RATE: 70 BPM | WEIGHT: 226.88 LBS | SYSTOLIC BLOOD PRESSURE: 120 MMHG

## 2019-05-29 DIAGNOSIS — I48.19 PERSISTENT ATRIAL FIBRILLATION: ICD-10-CM

## 2019-05-29 DIAGNOSIS — I48.3 TYPICAL ATRIAL FLUTTER: ICD-10-CM

## 2019-05-29 DIAGNOSIS — I10 ESSENTIAL HYPERTENSION: ICD-10-CM

## 2019-05-29 DIAGNOSIS — G40.909 SEIZURE DISORDER: ICD-10-CM

## 2019-05-29 DIAGNOSIS — F02.80 LATE ONSET ALZHEIMER'S DISEASE WITHOUT BEHAVIORAL DISTURBANCE: ICD-10-CM

## 2019-05-29 DIAGNOSIS — I49.5 SINUS NODE DYSFUNCTION: ICD-10-CM

## 2019-05-29 DIAGNOSIS — I50.33 ACUTE ON CHRONIC DIASTOLIC (CONGESTIVE) HEART FAILURE: ICD-10-CM

## 2019-05-29 DIAGNOSIS — Z95.0 BIVENTRICULAR CARDIAC PACEMAKER IN SITU: ICD-10-CM

## 2019-05-29 DIAGNOSIS — I42.8 CARDIOMYOPATHY, NONISCHEMIC: Primary | ICD-10-CM

## 2019-05-29 DIAGNOSIS — I25.10 CORONARY ARTERY DISEASE INVOLVING NATIVE CORONARY ARTERY OF NATIVE HEART WITHOUT ANGINA PECTORIS: Chronic | ICD-10-CM

## 2019-05-29 DIAGNOSIS — G89.29 CHRONIC BILATERAL LOW BACK PAIN WITHOUT SCIATICA: ICD-10-CM

## 2019-05-29 DIAGNOSIS — M54.50 CHRONIC BILATERAL LOW BACK PAIN WITHOUT SCIATICA: ICD-10-CM

## 2019-05-29 DIAGNOSIS — G30.1 LATE ONSET ALZHEIMER'S DISEASE WITHOUT BEHAVIORAL DISTURBANCE: ICD-10-CM

## 2019-05-29 DIAGNOSIS — Z79.01 CURRENT USE OF LONG TERM ANTICOAGULATION: ICD-10-CM

## 2019-05-29 PROCEDURE — 93010 ELECTROCARDIOGRAM REPORT: CPT | Mod: S$GLB,,, | Performed by: INTERNAL MEDICINE

## 2019-05-29 PROCEDURE — 93281 PM DEVICE PROGR EVAL MULTI: CPT

## 2019-05-29 PROCEDURE — 3074F PR MOST RECENT SYSTOLIC BLOOD PRESSURE < 130 MM HG: ICD-10-PCS | Mod: CPTII,S$GLB,, | Performed by: NURSE PRACTITIONER

## 2019-05-29 PROCEDURE — 99999 PR PBB SHADOW E&M-EST. PATIENT-LVL III: ICD-10-PCS | Mod: PBBFAC,,, | Performed by: NURSE PRACTITIONER

## 2019-05-29 PROCEDURE — 3288F PR FALLS RISK ASSESSMENT DOCUMENTED: ICD-10-PCS | Mod: CPTII,S$GLB,, | Performed by: NURSE PRACTITIONER

## 2019-05-29 PROCEDURE — 1100F PTFALLS ASSESS-DOCD GE2>/YR: CPT | Mod: CPTII,S$GLB,, | Performed by: NURSE PRACTITIONER

## 2019-05-29 PROCEDURE — 3079F DIAST BP 80-89 MM HG: CPT | Mod: CPTII,S$GLB,, | Performed by: NURSE PRACTITIONER

## 2019-05-29 PROCEDURE — 93005 ELECTROCARDIOGRAM TRACING: CPT | Mod: S$GLB,,, | Performed by: INTERNAL MEDICINE

## 2019-05-29 PROCEDURE — 1100F PR PT FALLS ASSESS DOC 2+ FALLS/FALL W/INJURY/YR: ICD-10-PCS | Mod: CPTII,S$GLB,, | Performed by: NURSE PRACTITIONER

## 2019-05-29 PROCEDURE — 3074F SYST BP LT 130 MM HG: CPT | Mod: CPTII,S$GLB,, | Performed by: NURSE PRACTITIONER

## 2019-05-29 PROCEDURE — 93005 RHYTHM STRIP: ICD-10-PCS | Mod: S$GLB,,, | Performed by: INTERNAL MEDICINE

## 2019-05-29 PROCEDURE — 99214 PR OFFICE/OUTPT VISIT, EST, LEVL IV, 30-39 MIN: ICD-10-PCS | Mod: S$GLB,,, | Performed by: NURSE PRACTITIONER

## 2019-05-29 PROCEDURE — 93010 RHYTHM STRIP: ICD-10-PCS | Mod: S$GLB,,, | Performed by: INTERNAL MEDICINE

## 2019-05-29 PROCEDURE — 99999 PR PBB SHADOW E&M-EST. PATIENT-LVL III: CPT | Mod: PBBFAC,,, | Performed by: NURSE PRACTITIONER

## 2019-05-29 PROCEDURE — 3288F FALL RISK ASSESSMENT DOCD: CPT | Mod: CPTII,S$GLB,, | Performed by: NURSE PRACTITIONER

## 2019-05-29 PROCEDURE — 99214 OFFICE O/P EST MOD 30 MIN: CPT | Mod: S$GLB,,, | Performed by: NURSE PRACTITIONER

## 2019-05-29 PROCEDURE — 3079F PR MOST RECENT DIASTOLIC BLOOD PRESSURE 80-89 MM HG: ICD-10-PCS | Mod: CPTII,S$GLB,, | Performed by: NURSE PRACTITIONER

## 2019-05-29 NOTE — PROGRESS NOTES
Mr. Muhammad is a patient of [facility] and was last seen in clinic 9/24/2018.    Subjective:   Patient ID:  Heri Muhammad is a 78 y.o. male who presents for follow-up of atrial fibrillation and device check.     HPI:  Mr. Muhammad is a 78 y.o. male with CAD, atrial fibrillation, atrial flutter, sinus node dysfunction, and CRT-P.    Background:  Dual chamber PPM implanted (06/30/14).   Presented with fatigue, found to be in atrial fibrillation. Placed on amiodarone, underwent DCCV.  Symptoms with AF have historically been fatigue and lightheadedness.  DCCV (10/15/15) >> recurrence. DCCV (11/23/15), amiodarone increased to 400 mg daily. Developed atrial flutter.  (01/29/16) PVI + RFA for atrial flutter. Amiodarone discontinued.  Felt poorly with Multaq.  Did well for initially in terms of atrial fibrillation, but continued to note dyspnea.  Underwent LHC/RHC 6/10/16, no obstructive CAD, normal rt sided filling pressures.  Developed persistent recurrence >> DCCV 11/22/16. Recurrence.  RFA 3/23/17 Repeat isolation of LSPV, RSPV, RIPV (LIPV remained isolated).  Atrial flutter c/w mitral annular flutter >> anterior mitral annular line created >> flutter converted to what appeared to be a roof flutter >> roof line created. Tachycardia converted to another tachycardia, ultimately mapped to BERNADETTE >> ablation deferred.  Developed recurrence of flutter during blanking period. Flecainide added > converted without need for DCCV.     Echo1/20/18 EF 30-35%.  Was RV pacing 70% of the time.  LHC 1/22/18 non-obstructive CAD  1/26/18 Upgraded to CRT-P by Dr. Otto  Echo 8/29/18 EF 55%  Device interrogation reveals stable function of leads, AF burden 1.6%    Update (05/29/2019):    Today he is doing well from a device and arrhythmia perspective. Device interrogation from 4/22/19 revealed AT/AF burden of 31% with A-pacing 52% and BiV pacing 88%. Patient states during this time he was undergoing PT for his back and he and his wife were  moving from the [facility] to the [place]. He stated he was under more stress than usual at this time. Device interrogated today in clinic to assess AT/AF burden which is <1% with A-pacing 94% and BiV pacing 100%. He requested a referral to a neurologist on the [place] to transfer his care closer to his new home. Sees Dr. Whipple for general cardiology.   He is currently taking eliquis 5 mg BID for stroke prophylaxis and denies significant bleeding episodes. He is currently being treated with Toprol 50 mg daily for HR control. Kidney function is stable, with a creatinine of 0.8 on 8/29/18.     Device Interrogation (5/29/2019) reveals an intrinsic sinus rhythm with stable lead and device function. No treated episodes were noted. He paces 94% in the RA and 100% in the RV. AF burden <1%. Estimated battery longevity 6-7 years.     I have personally reviewed the patient's EKG today, which shows A-paced BiV paced at 70 bpm. QTc is 438.    Recent Cardiac Tests:  2D Echo (8/28/18):  · Left ventricle ejection fraction is normal at 55%  · Grade III (severe) left ventricular diastolic dysfunction consistent with restrictive physiology.  · RV systolic function is normal.  · Left atrium is moderately dilated.  · Mild regurgitation is present in the aortic valve..  · Mitral valve shows mild regurgitation.  · Tricuspid valve shows mild regurgitation.  · Normal central venous pressure (3 mm Hg).  · The estimated PA systolic pressure is 37.57 mm Hg    Past Medical History:   Diagnosis Date    A-fib     Anticoagulant long-term use     eliquis    Atrial flutter     Benign essential tremor     Cancer     skin cancer on back    Cardiac pacemaker in situ     CHF (congestive heart failure)     Coronary artery disease     Cough     Dizziness     intermittant    DJD (degenerative joint disease) of knee     ED (erectile dysfunction)     Hard of hearing 11/2016    History of cataract     HTN (hypertension)      Hyperlipidemia     Hyperlipidemia     Memory loss     Seizure disorder     Seizures     last episode 1995    Sinus node dysfunction 08/2016     Past Surgical History:   Procedure Laterality Date    ABLATION N/A 3/23/2017    [facility] C[facility] LAB    ABLATION N/A 1/29/2016    [facility] at [facility] C[facility] LAB    ANKLE SURGERY Right     pins and screws    ATRIAL ABLATION SURGERY      Block-nerve-medial branch-lumbar L2-L5 Bilateral 1/17/2019    Per[facility] by [facility] at [facility] OR    CARDIAC PACEMAKER PLACEMENT  2014    CARDIAC PACEMAKER PLACEMENT      CARDIAC PACEMAKER PLACEMENT  2014    CARDIOVERSION      CARDIOVERSION N/A 2/1/2017    [facility]formed by [facility] at [facility]    CARDIOVERSION N/A 11/22/2016    [facility] by [facility] at [facility]    CARDIOVERSION N/A 8/5/2016    Performed by [facility] at [facility]    CARDIOVERSION N/A 11/23/2015    Performed by [facility] CATH LAB    CARDIOVERSION N/A 10/15/2015    Performed by Austin [facility] at [facility] CATH LAB    CATARACT EXTRACTION      OU    COLONOSCOPY      due in 2014    EYE SURGERY      Jesse cataract    HEART CATH-LEFT N/A 6/10/2016    [facility] CATH    HERNIA REPAIR      abdominal    CIHNAFTIH-[id number]-RM # 206 B N/A 1/26/2018    Performed by Sadi Otto [facility] at [facility] CATH    INSERTION-PACEMAKER-DUAL N/A 6/30/2014    Performed by [facility] Otto [facility] at [facility] CATH LAB    JOINT REPLACEMENT      bill shoulders    Left heart cath Left 1/22/2018    [facility] at [facility] CATH    Radiofrequency Ablation, Nerve, Spinal, Lumbar, Medial Branch, L2-L5 Bilateral 1/23/2019    Performed by [facility]ett [facility] at [facility] OR    RELEASE-CARPAL TUNNEL Left 10/4/2017    Performed by Bennett Jaquez Jr., [facility] at [facility] OR    TRANSESOPHAGEAL ECHOCARDIOGRAM (GALILEO) N/A 3/23/2017    Performed by Deep  MD [facility] at [facility] CATH LAB       Current Outpatient Medications   Medication Sig    acetaminophen (TYLENOL) 325 MG tablet Take 325 mg by mouth every 6 (six) hours as needed for Pain.    atorvastatin (LIPITOR) 10 MG tablet TAKE 1 TABLET EVERY EVENING    cyanocobalamin (VITAMIN B-12) 1000 MCG tablet Take 100 mcg by mouth once daily.    cyclobenzaprine (FLEXERIL) 5 MG tablet Take 1 tablet (5 mg total) by mouth 2 (two) times daily as needed for Muscle spasms.    ELIQUIS 5 mg Tab TAKE 1 TABLET TWICE DAILY    lamoTRIgine (LAMICTAL) 100 MG tablet TAKE 1 TABLET TWICE DAILY    memantine (NAMENDA) 10 MG Tab Take 1 tablet (10 mg total) by mouth 2 (two) times daily.    metoprolol succinate (TOPROL-XL) 50 MG 24 hr tablet TAKE 1 TABLET (50 MG TOTAL) BY MOUTH ONCE DAILY.    primidone (MYSOLINE) 250 MG Tab TAKE 2 TABLETS TWICE DAILY    torsemide (DEMADEX) 10 MG Tab TAKE 1 TABLET (10 MG TOTAL) BY MOUTH EVERY MORNING.    umeclidinium-vilanterol (ANORO ELLIPTA) 62.5-25 mcg/actuation DsDv Inhale 1 puff into the lungs Daily. Controller     No current facility-administered medications for this visit.        Review of Systems   Constitution: Negative for chills, fever and malaise/fatigue.   HENT: Negative for congestion and nosebleeds.    Eyes: Negative for blurred vision.   Cardiovascular: Negative for chest pain, dyspnea on exertion, irregular heartbeat, leg swelling, near-syncope, orthopnea, palpitations, paroxysmal nocturnal dyspnea and syncope.   Respiratory: Negative for cough, hemoptysis, shortness of breath, sleep disturbances due to breathing and wheezing.    Endocrine: Negative for polyphagia.   Hematologic/Lymphatic: Negative for bleeding problem. Does not bruise/bleed easily.   Skin: Negative for itching and rash.   Musculoskeletal: Negative for back pain, joint pain, muscle cramps and muscle weakness.   Gastrointestinal: Negative for bloating, abdominal pain, hematemesis and hematochezia.   Genitourinary: Negative  for dysuria and hematuria.   Neurological: Negative for dizziness, focal weakness, headaches, light-headedness, loss of balance, numbness, paresthesias and weakness.   Psychiatric/Behavioral: Negative for altered mental status. The patient is not nervous/anxious.      Objective:     /80   Pulse 70   Ht 6' (1.829 m)   Wt 102.9 kg (226 lb 13.7 oz)   BMI 30.77 kg/m²     Physical Exam   Constitutional: He is oriented to person, place, and time. He appears well-developed and well-nourished. He does not appear ill. No distress.   HENT:   Head: Normocephalic and atraumatic.   Eyes: Pupils are equal, round, and reactive to light. Conjunctivae, EOM and lids are normal.   Neck: Normal range of motion. Neck supple.   Cardiovascular: Normal rate, regular rhythm, S1 normal, S2 normal, normal heart sounds, intact distal pulses and normal pulses.  No extrasystoles are present. PMI is not displaced. Exam reveals no gallop and no friction rub.   No murmur heard.  Pulses:       Radial pulses are 2+ on the right side, and 2+ on the left side.   Pulmonary/Chest: Effort normal and breath sounds normal. No accessory muscle usage. No respiratory distress. He has no decreased breath sounds. He has no wheezes. He has no rhonchi. He has no rales. He exhibits no tenderness.   Left chest wall device site in good condition.   Abdominal: Soft. Bowel sounds are normal. He exhibits no distension. There is no tenderness. There is no rebound and no guarding.   Musculoskeletal: Normal range of motion. He exhibits no edema.   Neurological: He is alert and oriented to person, place, and time. He has normal strength. He is not disoriented. No sensory deficit. Coordination and gait normal.   Skin: Skin is warm and dry. No bruising noted. He is not diaphoretic. No erythema.   Psychiatric: He has a normal mood and affect. His speech is normal and behavior is normal. Judgment and thought content normal.   Nursing note and vitals reviewed.    Lab  Results   Component Value Date     08/29/2018    K 3.9 08/29/2018    MG 2.2 01/29/2018    BUN 8 08/29/2018    CREATININE 0.8 08/29/2018    ALT 21 08/29/2018    AST 20 08/29/2018    AST 35 11/27/2015    HGB 15.3 08/29/2018    HCT 46.4 08/29/2018    TSH 4.050 (H) 01/19/2018    LDLCALC 117.0 08/29/2018       Recent Labs   Lab 11/21/16  1220 03/15/17  1500 01/19/18  1512 02/17/18  1543   INR 1.0 0.9 1.2 1.1       Assessment:     1. Cardiomyopathy, nonischemic    2. Persistent atrial fibrillation    3. Typical atrial flutter    4. Acute on chronic diastolic (congestive) heart failure    5. Biventricular cardiac pacemaker in situ    6. Coronary artery disease involving native coronary artery of native heart without angina pectoris    7. Sinus node dysfunction    8. Current use of long term anticoagulation    9. Essential hypertension    10. Seizure disorder    11. Late onset Alzheimer's disease without behavioral disturbance    12. Chronic bilateral low back pain without sciatica      Plan:     In summary, Mr. Muhammad is a 78 y.o. male with a history of CAD, atrial fibrillation, atrial flutter, sinus node dysfunction, and CRT-P. He is doing well from an arrhythmia and device perspective. Device interrogation from 4/22/19 revealed AT/AF burden of 31% with A-pacing 52% and BiV pacing 88%. Patient states during the time of increased AT/AF he was under more stress than usual at the time. Device interrogated today in clinic to assess AT/AF burden revealing <1% with A-pacing 94% and BiV pacing 100%.  Echo 8/28/18 EF 55%.  He is currently taking eliquis 5 mg BID for stroke prophylaxis and denies significant bleeding episodes. He is currently being treated with Toprol 50 mg daily for HR control. Kidney function is stable, with a creatinine of 0.8 on 8/29/18.     Plan:  -Echo in 6 months.  -Continue current medication regimen and device settings.   -Follow up in device clinic as scheduled.   -Follow up in EP clinic in 6  months, sooner as needed.   -Neuro referral for transfer of care.     *Case was discussed with Dr. Esparza, who also counseled the patient.*    Follow up in about 6 months (around 11/29/2019).    ------------------------------------------------------------------    JANINA Gupta, KASIAP-C  [facility]

## 2019-06-06 ENCOUNTER — OFFICE VISIT (OUTPATIENT)
Dept: FAMILY MEDICINE | Facility: CLINIC | Age: 79
End: 2019-06-06
Payer: MEDICARE

## 2019-06-06 VITALS
HEART RATE: 75 BPM | RESPIRATION RATE: 18 BRPM | SYSTOLIC BLOOD PRESSURE: 121 MMHG | OXYGEN SATURATION: 96 % | DIASTOLIC BLOOD PRESSURE: 66 MMHG | WEIGHT: 229 LBS | BODY MASS INDEX: 31.02 KG/M2 | HEIGHT: 72 IN

## 2019-06-06 DIAGNOSIS — I48.3 TYPICAL ATRIAL FLUTTER: ICD-10-CM

## 2019-06-06 DIAGNOSIS — I48.19 PERSISTENT ATRIAL FIBRILLATION: ICD-10-CM

## 2019-06-06 DIAGNOSIS — E78.2 MIXED HYPERLIPIDEMIA: ICD-10-CM

## 2019-06-06 DIAGNOSIS — M79.2 PAIN, NEUROPATHIC: ICD-10-CM

## 2019-06-06 DIAGNOSIS — I49.5 SINUS NODE DYSFUNCTION: ICD-10-CM

## 2019-06-06 DIAGNOSIS — G25.0 ESSENTIAL TREMOR: ICD-10-CM

## 2019-06-06 DIAGNOSIS — G40.909 SEIZURE DISORDER: ICD-10-CM

## 2019-06-06 DIAGNOSIS — M47.816 FACET ARTHRITIS OF LUMBAR REGION: ICD-10-CM

## 2019-06-06 DIAGNOSIS — Z95.0 PACEMAKER: ICD-10-CM

## 2019-06-06 DIAGNOSIS — Z00.00 ENCOUNTER FOR PREVENTIVE HEALTH EXAMINATION: Primary | ICD-10-CM

## 2019-06-06 DIAGNOSIS — I70.0 AORTIC ATHEROSCLEROSIS: ICD-10-CM

## 2019-06-06 DIAGNOSIS — Z01.00 ROUTINE EYE EXAM: ICD-10-CM

## 2019-06-06 DIAGNOSIS — I10 ESSENTIAL HYPERTENSION: ICD-10-CM

## 2019-06-06 DIAGNOSIS — I50.33 ACUTE ON CHRONIC DIASTOLIC (CONGESTIVE) HEART FAILURE: ICD-10-CM

## 2019-06-06 DIAGNOSIS — E66.9 OBESITY (BMI 30.0-34.9): ICD-10-CM

## 2019-06-06 DIAGNOSIS — M47.816 OSTEOARTHRITIS OF LUMBAR SPINE, UNSPECIFIED SPINAL OSTEOARTHRITIS COMPLICATION STATUS: ICD-10-CM

## 2019-06-06 DIAGNOSIS — M47.816 LUMBAR SPONDYLOSIS: ICD-10-CM

## 2019-06-06 DIAGNOSIS — I25.10 CORONARY ARTERY DISEASE INVOLVING NATIVE CORONARY ARTERY OF NATIVE HEART WITHOUT ANGINA PECTORIS: Chronic | ICD-10-CM

## 2019-06-06 DIAGNOSIS — I42.8 CARDIOMYOPATHY, NONISCHEMIC: ICD-10-CM

## 2019-06-06 DIAGNOSIS — G30.1 LATE ONSET ALZHEIMER'S DISEASE WITHOUT BEHAVIORAL DISTURBANCE: ICD-10-CM

## 2019-06-06 DIAGNOSIS — J44.9 CHRONIC OBSTRUCTIVE PULMONARY DISEASE, UNSPECIFIED COPD TYPE: ICD-10-CM

## 2019-06-06 DIAGNOSIS — J84.10 PULMONARY GRANULOMA: ICD-10-CM

## 2019-06-06 DIAGNOSIS — F02.80 LATE ONSET ALZHEIMER'S DISEASE WITHOUT BEHAVIORAL DISTURBANCE: ICD-10-CM

## 2019-06-06 PROBLEM — E66.811 OBESITY (BMI 30.0-34.9): Status: ACTIVE | Noted: 2019-06-06

## 2019-06-06 PROCEDURE — 99499 UNLISTED E&M SERVICE: CPT | Mod: S$GLB,,, | Performed by: NURSE PRACTITIONER

## 2019-06-06 PROCEDURE — 3074F SYST BP LT 130 MM HG: CPT | Mod: CPTII,S$GLB,, | Performed by: NURSE PRACTITIONER

## 2019-06-06 PROCEDURE — G0439 PPPS, SUBSEQ VISIT: HCPCS | Mod: S$GLB,,, | Performed by: NURSE PRACTITIONER

## 2019-06-06 PROCEDURE — 3074F PR MOST RECENT SYSTOLIC BLOOD PRESSURE < 130 MM HG: ICD-10-PCS | Mod: CPTII,S$GLB,, | Performed by: NURSE PRACTITIONER

## 2019-06-06 PROCEDURE — 3078F DIAST BP <80 MM HG: CPT | Mod: CPTII,S$GLB,, | Performed by: NURSE PRACTITIONER

## 2019-06-06 PROCEDURE — G0439 PR MEDICARE ANNUAL WELLNESS SUBSEQUENT VISIT: ICD-10-PCS | Mod: S$GLB,,, | Performed by: NURSE PRACTITIONER

## 2019-06-06 PROCEDURE — 99999 PR PBB SHADOW E&M-EST. PATIENT-LVL V: ICD-10-PCS | Mod: PBBFAC,,, | Performed by: NURSE PRACTITIONER

## 2019-06-06 PROCEDURE — 3078F PR MOST RECENT DIASTOLIC BLOOD PRESSURE < 80 MM HG: ICD-10-PCS | Mod: CPTII,S$GLB,, | Performed by: NURSE PRACTITIONER

## 2019-06-06 PROCEDURE — 99499 RISK ADDL DX/OHS AUDIT: ICD-10-PCS | Mod: S$GLB,,, | Performed by: NURSE PRACTITIONER

## 2019-06-06 PROCEDURE — 99999 PR PBB SHADOW E&M-EST. PATIENT-LVL V: CPT | Mod: PBBFAC,,, | Performed by: NURSE PRACTITIONER

## 2019-06-06 NOTE — PATIENT INSTRUCTIONS
Counseling and Referral of Other Preventative  (Italic type indicates deductible and co-insurance are waived)    Patient Name: Heri Muhammad  Today's Date: 6/6/2019    Health Maintenance       Date Due Completion Date    Shingles Vaccine (2 of 3) 09/03/2007 7/9/2007    Influenza Vaccine 08/01/2019 9/7/2018    Override on 11/11/2013: (N/S) (done in hospital)    Lipid Panel 08/29/2023 8/29/2018    TETANUS VACCINE 12/06/2026 12/6/2016        Orders Placed This Encounter   Procedures    Ambulatory Referral to Neurology    Ambulatory Referral to Optometry     The following information is provided to all patients.  This information is to help you find resources for any of the problems found today that may be affecting your health:                Living healthy guide: www.[facility].[place].gov      Understanding Diabetes: www.diabetes.org      Eating healthy: www.cdc.gov/healthyweight      [facility] home safety checklist: www.cdc.gov/steadi/patient.html      Agency on Aging: www.goea.[place].gov      Alcoholics anonymous (AA): www.aa.org      Physical Activity: www.mimi.nih.gov/mw2mzjb      Tobacco use: www.quitwithusla.org

## 2019-06-06 NOTE — Clinical Note
Primary Care Providers:Hema Kowalski MD, MD (General)Your patient was seen today for a HRA visit. Gap(s) in care (HEDIS gaps) have been identified during this visit that require additional testing and possible follow up.Orders Placed This Encounter    Ambulatory Referral to Neurology        Referral Priority:Routine        Referral Type:Consultation        Referral Reason:Specialty Services Required        Referred to Provider:Anjum Arellano MD    Ambulatory Referral to Optometry        Referral Priority:Routine        Referral Type:Vision (Optometry)        Referral Reason:Specialty Services RequiredThese orders were placed using [facility] approved protocol and any results will be forwarded to your office for appropriate follow up. I have included a copy of my visit note; please review the note and feel free to contact me with any questions. Thank you for allowing me to participate in the care of your patients.Sonia Nieves NP

## 2019-06-06 NOTE — PROGRESS NOTES
Heri Muhammad presented for a  Medicare AWV and comprehensive Health Risk Assessment today. Patient's wife (Ana) present during HRA visit. The following components were reviewed and updated:    · Medical history  · Family History  · Social history  · Allergies and Current Medications  · Health Risk Assessment  · Health Maintenance  · Care Team     ** See Completed Assessments for Annual Wellness Visit within the encounter summary.**       The following assessments were completed:  · Living Situation  · CAGE  · Depression Screening  · Timed Get Up and Go  · Whisper Test  · Cognitive Function Screening  · Nutrition Screening  · ADL Screening  · PAQ Screening    Vitals:    06/06/19 1006   BP: 121/66   Pulse: 75   Resp: 18   SpO2: 96%   Weight: 103.9 kg (229 lb)   Height: 6' (1.829 m)     Body mass index is 31.06 kg/m².  Physical Exam   Constitutional: He is oriented to person, place, and time. No distress.   Obese   HENT:   Head: Normocephalic and atraumatic.   Eyes: Pupils are equal, round, and reactive to light. EOM are normal.   Neck: Normal range of motion.   Cardiovascular: Normal rate, regular rhythm and normal heart sounds.   Pulmonary/Chest: Effort normal and breath sounds normal. No respiratory distress.       Abdominal: Soft. There is no tenderness.   Musculoskeletal: Normal range of motion.   Neurological: He is alert and oriented to person, place, and time. Coordination normal.   Skin: Skin is warm and dry.   Psychiatric: He has a normal mood and affect. His behavior is normal. Judgment and thought content normal.   Nursing note and vitals reviewed.        Diagnoses and health risks identified today and associated recommendations/orders:    1. Encounter for preventive health examination    2. Chronic obstructive pulmonary disease, unspecified COPD type  Chronic; stable. Continue current treatment plan as previously prescribed by PCP.     3. Pulmonary granuloma  Chronic; stable. Patient followed by PCP.      4. Late onset Alzheimer's disease without behavioral disturbance  Chronic; stable. Continue current treatment plan as previously prescribed by Neurology (Dr. Naik). Patient and his wife requested a new Neurology to decrease their commute time.   - Ambulatory Referral to Neurology (Dr. Anjum Arellano).     5. Seizure disorder  Chronic; stable. Continue current treatment plan as previously prescribed by Neurology (Dr. Naik). Patient and his wife requested a new Neurology to decrease their commute time.  - Ambulatory Referral to Neurology (Dr. Anjum Arellano).    6. Acute on chronic diastolic (congestive) heart failure  Chronic; stable. Continue current treatment plan as previously prescribed by PCP.     7. Cardiomyopathy, nonischemic  Chronic; stable. Continue current treatment plan as previously prescribed by Cardiology (Dr. Whipple).    8. Persistent atrial fibrillation  Chronic; stable. Continue current treatment plan as previously prescribed by Cardiology. Patient also followed by Electrophysiology (Dr. Esparza).     9. Typical atrial flutter  Chronic; stable. Continue current treatment plan as previously prescribed by Cardiology. Patient also followed by Electrophysiology.    10. Sinus node dysfunction  Chronic; stable. Continue current treatment plan as previously prescribed by Electrophysiology.    11. Aortic atherosclerosis  Noted on CTA chest abdomen dated 1/23/2018. Patient followed by Cardiology.    12. Facet arthritis of lumbar region  Noted on xray lumbar spine dated 3/12/2019. Patient followed by PCP and Pain Medicine (Dr. Moffett).     13. Lumbar spondylosis  Chronic; stable. Patient followed by PCP and Pain Medicine.    14. Osteoarthritis of lumbar spine, unspecified spinal osteoarthritis complication status  Chronic; stable. Patient followed by PCP and Pain Medicine.    15. Pain, neuropathic  Chronic; stable. Patient followed by PCP and Pain Medicine.    16. Essential tremor  Chronic; stable. Continue  current treatment plan as previously prescribed by Neurology (Dr. Naik). Patient and his wife requested a new Neurology to decrease their commute time.   - Ambulatory Referral to Neurology (Dr. Anjum Arellano).     17. Coronary artery disease involving native coronary artery of native heart without angina pectoris  Chronic; stable. Continue current treatment plan as previously prescribed by PCP.     18. Mixed hyperlipidemia  Chronic; stable. Continue current treatment plan as previously prescribed by PCP.    19. Essential hypertension  Chronic; stable. Continue current treatment plan as previously prescribed by PCP.    20. Pacemaker  Chronic; stable. Continue current treatment plan as previously prescribed by Electrophysiology.    21. Routine eye exam  - Ambulatory Referral to Optometry    22. Obesity (BMI 30.0-34.9)  Current BMI 31.06. Lifestyle modifications discussed with patient.       Provided Heri with a 5-10 year written screening schedule and personal prevention plan. Recommendations were developed using the USPSTF age appropriate recommendations. Education, counseling, and referrals were provided as needed. After Visit Summary printed and given to patient which includes a list of additional screenings\tests needed.    Follow up for HRA visit in 1 year.    Sonia Nieves NP

## 2019-06-10 PROBLEM — R05.9 COUGH: Status: ACTIVE | Noted: 2019-06-10

## 2019-06-11 PROBLEM — I50.32 CHRONIC DIASTOLIC (CONGESTIVE) HEART FAILURE: Status: ACTIVE | Noted: 2018-01-19

## 2019-06-11 PROBLEM — J20.9 ACUTE BRONCHITIS: Status: ACTIVE | Noted: 2019-06-11

## 2019-06-11 PROBLEM — R40.4 TRANSIENT ALTERATION OF AWARENESS: Status: RESOLVED | Noted: 2018-01-28 | Resolved: 2019-06-11

## 2019-06-11 PROBLEM — J21.9 ACUTE BRONCHIOLITIS: Status: ACTIVE | Noted: 2019-06-11

## 2019-06-11 PROBLEM — M25.532 WRIST PAIN, LEFT: Status: RESOLVED | Noted: 2017-11-29 | Resolved: 2019-06-11

## 2019-06-11 PROBLEM — E87.1 HYPONATREMIA: Status: ACTIVE | Noted: 2019-06-11

## 2019-06-12 ENCOUNTER — TELEPHONE (OUTPATIENT)
Dept: FAMILY MEDICINE | Facility: CLINIC | Age: 79
End: 2019-06-12

## 2019-06-12 PROBLEM — M47.816 FACET ARTHRITIS OF LUMBAR REGION: Status: RESOLVED | Noted: 2019-06-06 | Resolved: 2019-06-12

## 2019-06-12 PROBLEM — G56.02 CARPAL TUNNEL SYNDROME OF LEFT WRIST: Status: RESOLVED | Noted: 2017-08-08 | Resolved: 2019-06-12

## 2019-06-12 PROBLEM — L90.5 SCARRING ADHERENT: Status: RESOLVED | Noted: 2017-11-29 | Resolved: 2019-06-12

## 2019-06-12 PROBLEM — R29.898 DECREASED GRIP STRENGTH OF LEFT HAND: Status: RESOLVED | Noted: 2017-11-29 | Resolved: 2019-06-12

## 2019-06-12 PROBLEM — I51.89 DIASTOLIC DYSFUNCTION: Status: RESOLVED | Noted: 2018-01-20 | Resolved: 2019-06-12

## 2019-06-12 PROBLEM — E03.8 SUBCLINICAL HYPOTHYROIDISM: Status: ACTIVE | Noted: 2018-01-20

## 2019-06-12 PROBLEM — M25.639 DECREASED RANGE OF MOTION OF WRIST: Status: RESOLVED | Noted: 2017-11-29 | Resolved: 2019-06-12
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
## 2019-06-12 PROBLEM — G89.29 CHRONIC BILATERAL LOW BACK PAIN: Status: RESOLVED | Noted: 2019-03-14 | Resolved: 2019-06-12

## 2019-06-12 PROBLEM — R29.898 DECREASED PINCH STRENGTH: Status: RESOLVED | Noted: 2017-11-29 | Resolved: 2019-06-12

## 2019-06-12 PROBLEM — M54.50 CHRONIC BILATERAL LOW BACK PAIN: Status: RESOLVED | Noted: 2019-03-14 | Resolved: 2019-06-12

## 2019-06-12 PROBLEM — M79.10 MYALGIA: Status: RESOLVED | Noted: 2019-02-11 | Resolved: 2019-06-12

## 2019-06-12 NOTE — TELEPHONE ENCOUNTER
----- Message from [address]enny [address] sent at 6/12/2019  9:28 AM CDT -----  Contact: [phone] Ana Hickey's wife is requesting to be seen sooner than the next available appt for cough.Please advise

## 2019-06-12 NOTE — TELEPHONE ENCOUNTER
Spoke with wife and she was inquiring about Dr. Murillo calling in medication for her . She states that she knows her  has not officially seen Dr. Murillo but he was discharged from the hospital on yesterday and the doctor wouldn't give him cough syrup with codeine and was wondering if Dr. Murillo would call it in for him. I explained to wife that due to not being seen that Dr. Murillo would not call in any medication at this time. Wife stated she understood and was just trying because that's the only thing that helps him with the cough. I advised to try Robitussin DM, hot tea with honey, and/or a steamed shower. Wife stated she would try. No further questions or concerns at this time.

## 2019-06-12 NOTE — PROGRESS NOTES
"HOSPITAL FOLLOW-UP/TCM    Patient Active Problem List   Diagnosis    Mixed hyperlipidemia    CAD (coronary artery disease)    Osteoarthritis of lumbar spine    Lumbar spondylosis    Persistent atrial fibrillation    Essential tremor    Current use of long term anticoagulation    Seizure disorder    Chronic right-sided thoracic back pain    Essential hypertension    Aortic atherosclerosis    Chronic diastolic heart failure    Subclinical hypothyroidism    Late onset Alzheimer's disease without behavioral disturbance    Cardiomyopathy, nonischemic    Biventricular cardiac pacemaker in situ    Chronic obstructive pulmonary disease    Chronic bilateral low back pain without sciatica    Obesity (BMI 30.0-34.9)    Acute bronchitis    Hyponatremia    History of skin cancer    Acute pain of right shoulder       CC:   Chief Complaint   Patient presents with    [facility]    Hospital Follow Up     Was in hospital the 9th of this month thru the 11th     Cough     Not sleeping     Shoulder Pain     right        HPI: Heri Muhammad   is a 78 y.o. male new patient with obesity, seizure disorder, hyperlipidemia, CAD (non-obstructive and no history of stents), DJD lumbar spine, Afib/AFlutter (on Eliquis, h/o ablation and pacemaker in place), hypertension, aortic atherosclerosis, HFpEF (8/2018 EF 55%), subclinical hypothyroidism, restrictive cardiomyopathy, and COPD is here to establish care following recent admission [facility] from 6/10/19 to 6/11/19 for acute bronchitis. Last PCP.      Hospital Course:    "This is a 78 M with COPD, HTN, A fib on Eliquis, chronic diastolic HF, here with few days of dry cough, fever. Was in E.R 3 days ago,had normal CXR and so sent home on no antibiotics as thought this was sec to viral illness. But cough got worse last night and so came back. Had high grade fever, so admitted for acute bronchitis.    Patient admitted and started on oral steroids and " "levaquin. Continues to cough but improving. Afebrile for last 24 hours. Will start guaifenesin for cough as well. Continue steroids and levaquin for 7 days. Patient clinically improved and medically stable for discharge home with instructions to follow-up with PCP in 1-2 weeks."    Today he is here with wife Cheryl Muhammad. He reports that he has continued to cough since hospitalization and notes increased weakness and using a walker regularly when previously did not require walker at all times. Cough keeping him from sleeping so feeling tired.    He also reports persistent right shoulder pain since he fell on it about 2 weeks ago. Reports h/o bilateral shoulder replacements. ROM left shoulder limited since surgery and did not recover. Good ROM right shoulder but now with pain    1. Cough     Onset: about 6/5/19  Duration: intermittent  Character: hacking and reports that he feelings that he needs to cough up something but cannot. Bouts of heavy coughing worst at night or if he attempts a deep breaths.  Aggravating factors: deep breathing, lying down  Relieving factors: nothing  - taking his Anoro without improvement  - on his last day of Levaquin and Prednisone 50 mg daily  - reports that had a history of COPD but that he had never had a pulmonary function test  - history of remote smoking in his 20's social and quit 1950's  Associated symptoms: fatigue, weakness, shortness of breath  Negative symptoms: denies headache, chest pain, denies recurrent fever, nausea, vomiting, diarrhea     3. Right shoulder pain  Onset: about 2 weeks ago  Location: right lateral and anterior shoulder  Duration: intermittent  Character: achy 6/10 at worst and currently 0/10  Aggravating factors: lying on that side, coughing  Relieving factors: nothing  Associated symptoms: denies  Negative symptoms: denies decreased in ROM, swelling, redness, weakness, numbness, tingling    Family and/or Caretaker present at visit?  Yes.  TCC completed: " none     ROS: Review of Systems   Constitutional: Positive for activity change and fatigue. Negative for appetite change, chills, fever and unexpected weight change.   HENT: Negative for congestion, mouth sores, nosebleeds, postnasal drip, rhinorrhea, sinus pressure, sinus pain, sore throat, trouble swallowing and voice change.    Eyes: Negative for visual disturbance.   Respiratory: Positive for cough and wheezing. Negative for choking, chest tightness and shortness of breath.    Cardiovascular: Negative for chest pain, palpitations and leg swelling.   Gastrointestinal: Negative for abdominal distention, abdominal pain, anal bleeding, blood in stool, constipation, diarrhea, nausea, rectal pain and vomiting.   Endocrine: Negative for polydipsia, polyphagia and polyuria.   Genitourinary: Negative for difficulty urinating, flank pain, frequency, hematuria and urgency.   Musculoskeletal: Positive for arthralgias (right shoulder). Negative for back pain, myalgias, neck pain and neck stiffness.   Skin: Negative for rash and wound.        Skin lesions and reports history of skin cancer and would like to see dermatology   Allergic/Immunologic: Negative.    Neurological: Negative for dizziness, light-headedness and headaches.   Hematological: Negative.    Psychiatric/Behavioral: Positive for sleep disturbance. Negative for dysphoric mood. The patient is not nervous/anxious.        PMHX:   Past Medical History:   Diagnosis Date    A-fib     Anticoagulant long-term use     eliquis    Atrial flutter     Benign essential tremor     Cancer     skin cancer on back    Cardiac pacemaker in situ     Carpal tunnel syndrome of left wrist 8/8/2017    CHF (congestive heart failure)     Coronary artery disease     Cough     Dizziness     intermittant    DJD (degenerative joint disease) of knee     ED (erectile dysfunction)     Hard of hearing 11/2016    History of cataract     HTN (hypertension)     Hyperlipidemia      Hyperlipidemia     Memory loss     Seizure disorder     Seizures     last episode 1995    Sinus node dysfunction 08/2016    Subclinical hypothyroidism        PSHX:   Past Surgical History:   Procedure Laterality Date    ABLATION N/A 3/23/2017    [facility] LAB    ABLATION N/A 1/29/2016    [facility]    ANKLE SURGERY Right     pins and screws    ATRIAL ABLATION SURGERY      Block-nerve-medial branch-lumbar L2-L5 Bilateral 1/17/2019    [facility] at [facility] OR    CARDIAC PACEMAKER PLACEMENT  2014    CARDIAC PACEMAKER PLACEMENT      CARDIAC PACEMAKER PLACEMENT  2014    CARDIOVERSION      CARDIOVERSION N/A 2/1/2017    [facility]    CARDIOVERSION N/A 11/22/2016    [facility]    CARDIOVERSION N/A 8/5/2016    [facility]    CARDIOVERSION N/A 11/23/2015    [facility] CATH LAB    CARDIOVERSION N/A 10/15/2015    [facility] CATH LAB    CATARACT EXTRACTION      OU    COLONOSCOPY      due in 2014    EYE SURGERY Bilateral     cataracts extraction    FRACTURE SURGERY Right     ankle with hardware    HEART CATH-LEFT N/A 6/10/2016    [facility] CATH    HERNIA REPAIR      abdominal    [id number] # [id number] B N/A 1/26/2018    Performed by Sadi Otto [facility] at [facility] CATH    INSERTION-PACEMAKER-DUAL N/A 6/30/2014    [facility]formed [facility] at [facility] CATH LAB    JOINT REPLACEMENT Bilateral     shoulders    Left heart cath Left 1/22/2018    [facility] CATH    Radiofrequency Ablation, Nerve, Spinal, Lumbar, Medial Branch, L2-L5 Bilateral 1/23/2019    Per[facility] by [facility] at [facility] OR    RELEASE-CARPAL TUNNEL Left 10/4/2017    [facility] OR    TRANSESOPHAGEAL ECHOCARDIOGRAM (GALILEO) N/A 3/23/2017    [facility] at [facility] CATH LAB       FAMHX:   Family History   Problem Relation Age of Onset    Heart disease Mother     Heart failure Mother     Dementia Mother     Cancer Father         colon    Blindness Father         accident    Cataracts Father     Hypertension Father     Tremor Father     Tremor Brother     Tremor Paternal Grandfather     Cancer Daughter     No Known Problems Son     Obesity Daughter     Melanoma Neg Hx     Amblyopia Neg Hx     Diabetes Neg Hx     Glaucoma Neg Hx     Macular degeneration Neg Hx     Retinal detachment Neg Hx     Strabismus Neg Hx     Stroke Neg Hx     Thyroid disease Neg Hx        SOCHX:   Social History     Social History Narrative    . 4 adult children.      Social History     Tobacco Use    Smoking status: Former Smoker     Packs/day: 1.00     Years: 5.00     Pack years: 5.00     Types: Cigarettes     Last attempt to quit: 1959     Years since quittin.4    Smokeless tobacco: Never Used   Substance Use Topics    Alcohol use: No    Drug use: No         ALLERGIES:   Review of patient's allergies indicates:   Allergen Reactions    Bactroban [mupirocin calcium] Rash     Other reaction(s): Rash       MEDS:   Current Outpatient Medications:     acetaminophen (TYLENOL) 325 MG tablet, Take 325 mg by mouth every 6 (six) hours as needed for Pain., Disp: , Rfl:     atorvastatin (LIPITOR) 10 MG tablet, TAKE 1 TABLET EVERY EVENING, Disp: 90 tablet, Rfl: 4    cyanocobalamin (VITAMIN B-12) 1000 MCG tablet, Take 100 mcg by mouth once daily., Disp: , Rfl:     cyclobenzaprine (FLEXERIL) 5 MG tablet, Take 1 tablet (5 mg total) by mouth 2 (two) times daily as needed for Muscle spasms., Disp: 40 tablet, Rfl: 1    ELIQUIS 5 mg Tab, TAKE 1 TABLET TWICE DAILY, Disp: 180 tablet, Rfl: 4    lamoTRIgine (LAMICTAL) 100 MG tablet, TAKE 1 TABLET TWICE DAILY, Disp: 180  "tablet, Rfl: 3    levoFLOXacin (LEVAQUIN) 750 MG tablet, Take 1 tablet (750 mg total) by mouth once daily. for 7 days, Disp: 7 tablet, Rfl: 0    memantine (NAMENDA) 10 MG Tab, Take 1 tablet (10 mg total) by mouth 2 (two) times daily., Disp: 180 tablet, Rfl: 3    metoprolol succinate (TOPROL-XL) 50 MG 24 hr tablet, TAKE 1 TABLET (50 MG TOTAL) BY MOUTH ONCE DAILY., Disp: 90 tablet, Rfl: 4    primidone (MYSOLINE) 250 MG Tab, TAKE 2 TABLETS TWICE DAILY, Disp: 360 tablet, Rfl: 3    torsemide (DEMADEX) 10 MG Tab, TAKE 1 TABLET (10 MG TOTAL) BY MOUTH EVERY MORNING., Disp: 90 tablet, Rfl: 4    umeclidinium-vilanterol (ANORO ELLIPTA) 62.5-25 mcg/actuation DsDv, Inhale 1 puff into the lungs Daily. Controller, Disp: 60 each, Rfl: 11    inhalation spacing device (BREATHERITE MDI SPACER), Use as directed for inhalation., Disp: 1 each, Rfl: 0    ipratropium-albuterol (COMBIVENT)  mcg/actuation inhaler, Inhale 1 puff into the lungs every 6 (six) hours as needed for Wheezing or Shortness of Breath. Rescue, Disp: 4 g, Rfl: 0    predniSONE (DELTASONE) 10 MG tablet, Take 4 tablets (40 mg total) by mouth once daily for 3 days, THEN 3 tablets (30 mg total) once daily for 3 days, THEN 2 tablets (20 mg total) once daily for 3 days, THEN 1 tablet (10 mg total) once daily for 3 days, THEN 0.5 tablets (5 mg total) once daily for 3 days., Disp: 32 tablet, Rfl: 0    Current Facility-Administered Medications:     albuterol-ipratropium 2.5 mg-0.5 mg/3 mL nebulizer solution 3 mL, 3 mL, Nebulization, Once, Ora Murillo DO    OBJECTIVE:   Vitals:    06/17/19 1402   BP: 108/62   BP Location: Left arm   Patient Position: Sitting   BP Method: X-Large (Manual)   Pulse: 78   Resp: 17   Temp: 97.5 °F (36.4 °C)   TempSrc: Oral   SpO2: 97%   Weight: 99.1 kg (218 lb 8 oz)   Height: 6' 1" (1.854 m)     Body mass index is 28.83 kg/m².    Physical Exam   Constitutional: No distress.   Neck: Neck supple.   Cardiovascular: Normal rate, regular " rhythm, normal heart sounds and intact distal pulses. Exam reveals no gallop and no friction rub.   No murmur heard.  Pulmonary/Chest: Effort normal. He has no decreased breath sounds. He has wheezes in the right lower field and the left lower field. He has rhonchi in the right middle field, the right lower field and the left lower field. He has no rales.   Musculoskeletal: He exhibits no edema.        Right shoulder: He exhibits tenderness. He exhibits normal range of motion, no bony tenderness, no swelling, no effusion, no crepitus, no deformity and normal strength.        Left shoulder: He exhibits decreased range of motion. He exhibits no tenderness.   Neurological: He is alert.   Skin: Skin is warm.         PERTINANT RESULTS:   6/10/2019   XR CHEST AP PORTABLE    CLINICAL HISTORY:  cough;    TECHNIQUE:  Single frontal view of the chest was performed.    COMPARISON:  Radiograph 06/07/2019.    FINDINGS:  Left chest wall biventricular pacemaker with cardiac leads projecting over the right atrial appendage, right ventricle and left ventricle.  Mediastinal structures are midline.  There is slight haziness of the hilar contours.  Cardiac silhouette remains enlarged.  Lung volumes are symmetric.  Mild linear/septal opacities noted within the lower lung zones, possibly reflecting interstitial pulmonary edema.  No consolidation.  No pneumothorax or pleural effusions.  No free air beneath the diaphragm.  No acute osseous abnormalities.  Postoperative changes of the shoulders noted.      Impression       1. Stable enlargement of the cardiac silhouette with possible mild basilar pulmonary edema.         Recent Results (from the past 168 hour(s))   CBC auto differential    Collection Time: 06/11/19  6:32 AM   Result Value Ref Range    WBC 3.95 3.90 - 12.70 K/uL    RBC 4.46 (L) 4.60 - 6.20 M/uL    Hemoglobin 13.9 (L) 14.0 - 18.0 g/dL    Hematocrit 40.9 40.0 - 54.0 %    Mean Corpuscular Volume 92 82 - 98 fL    Mean  Corpuscular Hemoglobin 31.2 (H) 27.0 - 31.0 pg    Mean Corpuscular Hemoglobin Conc 34.0 32.0 - 36.0 g/dL    RDW 14.2 11.5 - 14.5 %    Platelets 130 (L) 150 - 350 K/uL    MPV 9.5 9.2 - 12.9 fL    Gran # (ANC) 2.8 1.8 - 7.7 K/uL    Lymph # 0.6 (L) 1.0 - 4.8 K/uL    Mono # 0.5 0.3 - 1.0 K/uL    Eos # 0.0 0.0 - 0.5 K/uL    Baso # 0.02 0.00 - 0.20 K/uL    Gran% 71.2 38.0 - 73.0 %    Lymph% 14.9 (L) 18.0 - 48.0 %    Mono% 13.4 4.0 - 15.0 %    Eosinophil% 0.0 0.0 - 8.0 %    Basophil% 0.5 0.0 - 1.9 %    Differential Method Automated    Comprehensive metabolic panel    Collection Time: 06/11/19  6:32 AM   Result Value Ref Range    Sodium 135 (L) 136 - 145 mmol/L    Potassium 4.1 3.5 - 5.1 mmol/L    Chloride 97 95 - 110 mmol/L    CO2 27 23 - 29 mmol/L    Glucose 106 70 - 110 mg/dL    BUN, Bld 12 2 - 20 mg/dL    Creatinine 0.58 0.50 - 1.40 mg/dL    Calcium 8.1 (L) 8.7 - 10.5 mg/dL    Total Protein 7.1 6.0 - 8.4 g/dL    Albumin 3.7 3.5 - 5.2 g/dL    Total Bilirubin 0.4 0.1 - 1.0 mg/dL    Alkaline Phosphatase 78 38 - 126 U/L    AST 40 15 - 46 U/L    ALT 25 10 - 44 U/L    Anion Gap 11 8 - 16 mmol/L    eGFR if African American >60.0 >60 mL/min/1.73 m^2    eGFR if non African American >60.0 >60 mL/min/1.73 m^2   POCT glucose    Collection Time: 06/12/19  8:00 AM   Result Value Ref Range    POCT Glucose 188 (H) 70 - 110 mg/dL   POCT glucose    Collection Time: 06/12/19 11:31 AM   Result Value Ref Range    POCT Glucose 308 (H) 70 - 110 mg/dL     ASSESSMENT:  Problem List Items Addressed This Visit        Neuro    Essential tremor    Overview     - evaluated by Neurology 2017  - stable         Seizure disorder    Overview     - followed by Neurology Dr. Naik  - stable and no recent seizures since 1990's         Late onset Alzheimer's disease without behavioral disturbance    Current Assessment & Plan     - stable   - continue Namenda  - wife mPOA and caregiver            Pulmonary    Chronic obstructive pulmonary disease    Current  Assessment & Plan     - s/p bout of bronchitis  - on Anoro daily  - rec PFT         Relevant Medications    albuterol-ipratropium 2.5 mg-0.5 mg/3 mL nebulizer solution 3 mL    predniSONE (DELTASONE) 10 MG tablet    ipratropium-albuterol (COMBIVENT)  mcg/actuation inhaler    inhalation spacing device (BREATHERITE MDI SPACER)    Other Relevant Orders    Complete PFT with bronchodilator    PULSE OXIMETRY WITH REST - PULM    Acute bronchitis - Primary    Current Assessment & Plan     - continue wheezing and cough  - improve breath sounds with in office duoneb  - rec combivent QID with spacer  - rec taper steroids         Relevant Medications    albuterol-ipratropium 2.5 mg-0.5 mg/3 mL nebulizer solution 3 mL    predniSONE (DELTASONE) 10 MG tablet    ipratropium-albuterol (COMBIVENT)  mcg/actuation inhaler       Cardiac/Vascular    CAD (coronary artery disease) (Chronic)    Overview     - 9/2013 Left heart Cath: LAD- 60% prox (neg FFR), nl, ramus and RCA 20%  - LDL goal <70  - BP goal <130/80         Mixed hyperlipidemia    Current Assessment & Plan     Lab Results   Component Value Date    LDLCALC 117.0 08/29/2018     - Cardiology following and repeat lipids scheduled for 10/2019         Persistent atrial fibrillation    Overview     - followed by Dr. Esparza  - history of cardioversion  - on Eliquis OAC 5 mg BID         Essential hypertension    Current Assessment & Plan     - fatigues and BP on the lower side of normal  - wife notes that losartan a new medication from hospital stay  - okay to hold losartan for now  - continue diuretics and BB         Aortic atherosclerosis    Overview     - LDL goal <70  - BP goal <130/80         Chronic diastolic heart failure    Current Assessment & Plan     - grade III  - no signed of acute exacerbation         Cardiomyopathy, nonischemic    Overview     - 8/29/18 Echo EF 55%. Grade III diastolic dysfunction with restrictive physiology  - followed by Cardiology          Biventricular cardiac pacemaker in situ    Current Assessment & Plan     - no acute issues            Renal/    Hyponatremia    Current Assessment & Plan     - mild and asymptomatic  - on torsemide for HFpEF  - monitor            Hematology    Current use of long term anticoagulation    Overview     - Eliquis for Afib         Current Assessment & Plan     - no signs of acute bleeding            Oncology    History of skin cancer    Relevant Orders    Ambulatory referral to Dermatology       Endocrine    Subclinical hypothyroidism    Current Assessment & Plan     Lab Results   Component Value Date    TSH 4.050 (H) 01/19/2018     - monitor   - asymptomatic            Orthopedic    Acute pain of right shoulder    Current Assessment & Plan     - with history of bilateral shoulder replacement and worsening s/p fall  - recommend Xray         Relevant Orders    X-ray Shoulder 2 or More Views Right (Completed)          PLAN:   Orders Placed This Encounter    X-ray Shoulder 2 or More Views Right    Ambulatory referral to Dermatology    Complete PFT with bronchodilator    PULSE OXIMETRY WITH REST - PULM    albuterol-ipratropium 2.5 mg-0.5 mg/3 mL nebulizer solution 3 mL    predniSONE (DELTASONE) 10 MG tablet    ipratropium-albuterol (COMBIVENT)  mcg/actuation inhaler    inhalation spacing device (BREATHERITE MDI SPACER)       Follow-up in 2 weeks    Dr. Ora Murillo D.O.   Family Medicine

## 2019-06-14 PROBLEM — I50.42 CHRONIC COMBINED SYSTOLIC AND DIASTOLIC HEART FAILURE: Status: ACTIVE | Noted: 2018-01-19

## 2019-06-14 NOTE — ASSESSMENT & PLAN NOTE
Lab Results   Component Value Date    LDLCALC 117.0 08/29/2018     - Cardiology following and repeat lipids scheduled for 10/2019

## 2019-06-17 ENCOUNTER — OFFICE VISIT (OUTPATIENT)
Dept: FAMILY MEDICINE | Facility: CLINIC | Age: 79
End: 2019-06-17
Payer: MEDICARE

## 2019-06-17 VITALS
HEART RATE: 78 BPM | BODY MASS INDEX: 28.96 KG/M2 | RESPIRATION RATE: 17 BRPM | SYSTOLIC BLOOD PRESSURE: 108 MMHG | WEIGHT: 218.5 LBS | OXYGEN SATURATION: 97 % | TEMPERATURE: 98 F | HEIGHT: 73 IN | DIASTOLIC BLOOD PRESSURE: 62 MMHG

## 2019-06-17 DIAGNOSIS — M25.511 ACUTE PAIN OF RIGHT SHOULDER: ICD-10-CM

## 2019-06-17 DIAGNOSIS — J20.9 ACUTE BRONCHITIS, UNSPECIFIED ORGANISM: Primary | ICD-10-CM

## 2019-06-17 DIAGNOSIS — Z95.0 BIVENTRICULAR CARDIAC PACEMAKER IN SITU: ICD-10-CM

## 2019-06-17 DIAGNOSIS — E87.1 HYPONATREMIA: ICD-10-CM

## 2019-06-17 DIAGNOSIS — G40.909 SEIZURE DISORDER: ICD-10-CM

## 2019-06-17 DIAGNOSIS — F02.80 LATE ONSET ALZHEIMER'S DISEASE WITHOUT BEHAVIORAL DISTURBANCE: ICD-10-CM

## 2019-06-17 DIAGNOSIS — G30.1 LATE ONSET ALZHEIMER'S DISEASE WITHOUT BEHAVIORAL DISTURBANCE: ICD-10-CM

## 2019-06-17 DIAGNOSIS — Z85.828 HISTORY OF SKIN CANCER: ICD-10-CM

## 2019-06-17 DIAGNOSIS — I70.0 AORTIC ATHEROSCLEROSIS: ICD-10-CM

## 2019-06-17 DIAGNOSIS — E03.8 SUBCLINICAL HYPOTHYROIDISM: ICD-10-CM

## 2019-06-17 DIAGNOSIS — I50.32 CHRONIC DIASTOLIC HEART FAILURE: ICD-10-CM

## 2019-06-17 DIAGNOSIS — I48.19 PERSISTENT ATRIAL FIBRILLATION: ICD-10-CM

## 2019-06-17 DIAGNOSIS — I10 ESSENTIAL HYPERTENSION: ICD-10-CM

## 2019-06-17 DIAGNOSIS — I25.10 CORONARY ARTERY DISEASE INVOLVING NATIVE CORONARY ARTERY OF NATIVE HEART WITHOUT ANGINA PECTORIS: Chronic | ICD-10-CM

## 2019-06-17 DIAGNOSIS — I42.8 CARDIOMYOPATHY, NONISCHEMIC: ICD-10-CM

## 2019-06-17 DIAGNOSIS — G25.0 ESSENTIAL TREMOR: ICD-10-CM

## 2019-06-17 DIAGNOSIS — E78.2 MIXED HYPERLIPIDEMIA: ICD-10-CM

## 2019-06-17 DIAGNOSIS — J44.9 CHRONIC OBSTRUCTIVE PULMONARY DISEASE, UNSPECIFIED COPD TYPE: ICD-10-CM

## 2019-06-17 DIAGNOSIS — Z79.01 CURRENT USE OF LONG TERM ANTICOAGULATION: ICD-10-CM

## 2019-06-17 PROCEDURE — 99215 OFFICE O/P EST HI 40 MIN: CPT | Mod: S$GLB,,, | Performed by: FAMILY MEDICINE

## 2019-06-17 PROCEDURE — 3078F PR MOST RECENT DIASTOLIC BLOOD PRESSURE < 80 MM HG: ICD-10-PCS | Mod: CPTII,S$GLB,, | Performed by: FAMILY MEDICINE

## 2019-06-17 PROCEDURE — 99999 PR PBB SHADOW E&M-EST. PATIENT-LVL V: CPT | Mod: PBBFAC,,, | Performed by: FAMILY MEDICINE

## 2019-06-17 PROCEDURE — 1101F PT FALLS ASSESS-DOCD LE1/YR: CPT | Mod: CPTII,S$GLB,, | Performed by: FAMILY MEDICINE

## 2019-06-17 PROCEDURE — 99999 PR PBB SHADOW E&M-EST. PATIENT-LVL V: ICD-10-PCS | Mod: PBBFAC,,, | Performed by: FAMILY MEDICINE

## 2019-06-17 PROCEDURE — 3074F PR MOST RECENT SYSTOLIC BLOOD PRESSURE < 130 MM HG: ICD-10-PCS | Mod: CPTII,S$GLB,, | Performed by: FAMILY MEDICINE

## 2019-06-17 PROCEDURE — 99499 UNLISTED E&M SERVICE: CPT | Mod: S$GLB,,, | Performed by: FAMILY MEDICINE

## 2019-06-17 PROCEDURE — 3078F DIAST BP <80 MM HG: CPT | Mod: CPTII,S$GLB,, | Performed by: FAMILY MEDICINE

## 2019-06-17 PROCEDURE — 1101F PR PT FALLS ASSESS DOC 0-1 FALLS W/OUT INJ PAST YR: ICD-10-PCS | Mod: CPTII,S$GLB,, | Performed by: FAMILY MEDICINE

## 2019-06-17 PROCEDURE — 99215 PR OFFICE/OUTPT VISIT, EST, LEVL V, 40-54 MIN: ICD-10-PCS | Mod: S$GLB,,, | Performed by: FAMILY MEDICINE

## 2019-06-17 PROCEDURE — 3074F SYST BP LT 130 MM HG: CPT | Mod: CPTII,S$GLB,, | Performed by: FAMILY MEDICINE

## 2019-06-17 PROCEDURE — 99499 RISK ADDL DX/OHS AUDIT: ICD-10-PCS | Mod: S$GLB,,, | Performed by: FAMILY MEDICINE

## 2019-06-17 RX ORDER — PREDNISONE 10 MG/1
TABLET ORAL
Qty: 32 TABLET | Refills: 0 | Status: SHIPPED | OUTPATIENT
Start: 2019-06-17 | End: 2019-07-03

## 2019-06-17 RX ORDER — IPRATROPIUM BROMIDE AND ALBUTEROL SULFATE 2.5; .5 MG/3ML; MG/3ML
3 SOLUTION RESPIRATORY (INHALATION) ONCE
Status: DISCONTINUED | OUTPATIENT
Start: 2019-06-17 | End: 2020-11-04

## 2019-06-17 NOTE — PROGRESS NOTES
Patient, Heri Muhammad (MRN #[id number]), presented with a recent Platelet count less than 150 K/uL consistent with the definition of thrombocytopenia (ICD10 - D69.6).    Platelets   Date Value Ref Range Status   06/11/2019 130 (L) 150 - 350 K/uL Final     The patient's thrombocytopenia was monitored, evaluated, addressed and/or treated. This addendum to the medical record is made on 06/17/2019.

## 2019-06-17 NOTE — PATIENT INSTRUCTIONS
1. Taper steroids  Day 1-3 Prednisone 40 mg daily  Day 4-6 Prednisone 30 mg daily  Day 7-9 Prednisone 20 mg daily  Day 10-12 Prednisone 10 mg daily  Day 13-15 Prednisone 5 mg daily  Day 16 stop  2. Stop Losartan 25 mg daily

## 2019-06-17 NOTE — ASSESSMENT & PLAN NOTE
- fatigues and BP on the lower side of normal  - wife notes that losartan a new medication from hospital stay  - okay to hold losartan for now  - continue diuretics and BB

## 2019-06-17 NOTE — ASSESSMENT & PLAN NOTE
- continue wheezing and cough  - improve breath sounds with in office duoneb  - rec combivent QID with spacer  - rec taper steroids

## 2019-06-18 ENCOUNTER — TELEPHONE (OUTPATIENT)
Dept: FAMILY MEDICINE | Facility: CLINIC | Age: 79
End: 2019-06-18

## 2019-06-18 ENCOUNTER — PATIENT MESSAGE (OUTPATIENT)
Dept: FAMILY MEDICINE | Facility: CLINIC | Age: 79
End: 2019-06-18

## 2019-06-18 ENCOUNTER — TELEPHONE (OUTPATIENT)
Dept: INTERNAL MEDICINE | Facility: CLINIC | Age: 79
End: 2019-06-18

## 2019-06-18 NOTE — TELEPHONE ENCOUNTER
Spoke to [facility] to see if they have spacers for inhalers. They do and a new order was given verbally. Notified pts wife that she can get spacer at [facility].

## 2019-06-18 NOTE — TELEPHONE ENCOUNTER
----- Message from Deep Valencia sent at 6/18/2019  8:49 AM CDT -----  Contact: [phone]  Patient wife requested to speak with the nurse because no pharmacy have the breathe rite spacer. Please call.

## 2019-06-24 ENCOUNTER — CLINICAL SUPPORT (OUTPATIENT)
Dept: CARDIOLOGY | Facility: CLINIC | Age: 79
End: 2019-06-24
Attending: INTERNAL MEDICINE
Payer: MEDICARE

## 2019-06-24 DIAGNOSIS — Z95.0 CARDIAC PACEMAKER IN SITU: ICD-10-CM

## 2019-06-24 DIAGNOSIS — I42.8 CARDIOMYOPATHY, NONISCHEMIC: ICD-10-CM

## 2019-07-02 ENCOUNTER — OFFICE VISIT (OUTPATIENT)
Dept: NEUROLOGY | Facility: CLINIC | Age: 79
End: 2019-07-02
Payer: MEDICARE

## 2019-07-02 VITALS — BODY MASS INDEX: 28.49 KG/M2 | HEIGHT: 73 IN | WEIGHT: 215 LBS

## 2019-07-02 DIAGNOSIS — G40.909 SEIZURE DISORDER: ICD-10-CM

## 2019-07-02 DIAGNOSIS — G40.909 NONINTRACTABLE EPILEPSY WITHOUT STATUS EPILEPTICUS, UNSPECIFIED EPILEPSY TYPE: ICD-10-CM

## 2019-07-02 DIAGNOSIS — Z51.81 THERAPEUTIC DRUG MONITORING: Primary | ICD-10-CM

## 2019-07-02 DIAGNOSIS — F02.80 LATE ONSET ALZHEIMER'S DISEASE WITHOUT BEHAVIORAL DISTURBANCE: ICD-10-CM

## 2019-07-02 DIAGNOSIS — G25.0 ESSENTIAL TREMOR: ICD-10-CM

## 2019-07-02 DIAGNOSIS — G44.219 EPISODIC TENSION-TYPE HEADACHE, NOT INTRACTABLE: ICD-10-CM

## 2019-07-02 DIAGNOSIS — G30.1 LATE ONSET ALZHEIMER'S DISEASE WITHOUT BEHAVIORAL DISTURBANCE: ICD-10-CM

## 2019-07-02 PROBLEM — G44.209 TENSION TYPE HEADACHE: Status: ACTIVE | Noted: 2019-07-02

## 2019-07-02 PROBLEM — J20.9 ACUTE BRONCHITIS: Status: RESOLVED | Noted: 2019-06-11 | Resolved: 2019-07-02

## 2019-07-02 PROCEDURE — 3288F PR FALLS RISK ASSESSMENT DOCUMENTED: ICD-10-PCS | Mod: CPTII,S$GLB,, | Performed by: PSYCHIATRY & NEUROLOGY

## 2019-07-02 PROCEDURE — 99999 PR PBB SHADOW E&M-EST. PATIENT-LVL III: ICD-10-PCS | Mod: PBBFAC,,, | Performed by: PSYCHIATRY & NEUROLOGY

## 2019-07-02 PROCEDURE — 1100F PTFALLS ASSESS-DOCD GE2>/YR: CPT | Mod: CPTII,S$GLB,, | Performed by: PSYCHIATRY & NEUROLOGY

## 2019-07-02 PROCEDURE — 99214 OFFICE O/P EST MOD 30 MIN: CPT | Mod: S$GLB,,, | Performed by: PSYCHIATRY & NEUROLOGY

## 2019-07-02 PROCEDURE — 99214 PR OFFICE/OUTPT VISIT, EST, LEVL IV, 30-39 MIN: ICD-10-PCS | Mod: S$GLB,,, | Performed by: PSYCHIATRY & NEUROLOGY

## 2019-07-02 PROCEDURE — 99999 PR PBB SHADOW E&M-EST. PATIENT-LVL III: CPT | Mod: PBBFAC,,, | Performed by: PSYCHIATRY & NEUROLOGY

## 2019-07-02 PROCEDURE — 3288F FALL RISK ASSESSMENT DOCD: CPT | Mod: CPTII,S$GLB,, | Performed by: PSYCHIATRY & NEUROLOGY

## 2019-07-02 PROCEDURE — 1100F PR PT FALLS ASSESS DOC 2+ FALLS/FALL W/INJURY/YR: ICD-10-PCS | Mod: CPTII,S$GLB,, | Performed by: PSYCHIATRY & NEUROLOGY

## 2019-07-02 RX ORDER — LAMOTRIGINE 100 MG/1
100 TABLET ORAL 2 TIMES DAILY
Qty: 180 TABLET | Refills: 3 | Status: SHIPPED | OUTPATIENT
Start: 2019-07-02 | End: 2020-04-21

## 2019-07-02 RX ORDER — MEMANTINE HYDROCHLORIDE 10 MG/1
10 TABLET ORAL 2 TIMES DAILY
Qty: 180 TABLET | Refills: 3 | Status: SHIPPED | OUTPATIENT
Start: 2019-07-02 | End: 2020-06-08

## 2019-07-02 RX ORDER — PRIMIDONE 250 MG/1
500 TABLET ORAL 2 TIMES DAILY
Qty: 360 TABLET | Refills: 3 | Status: SHIPPED | OUTPATIENT
Start: 2019-07-02 | End: 2020-06-08

## 2019-07-02 NOTE — PROGRESS NOTES
"FAMILY MEDICINE    Patient Active Problem List   Diagnosis    Mixed hyperlipidemia    CAD (coronary artery disease)    Osteoarthritis of lumbar spine    Lumbar spondylosis    Persistent atrial fibrillation    Essential tremor    Current use of long term anticoagulation    Seizure disorder    Chronic right-sided thoracic back pain    Essential hypertension    Aortic atherosclerosis    Chronic diastolic heart failure    Subclinical hypothyroidism    Late onset Alzheimer's disease without behavioral disturbance    Cardiomyopathy, nonischemic    Biventricular cardiac pacemaker in situ    Chronic obstructive pulmonary disease    Chronic bilateral low back pain without sciatica    Obesity (BMI 30.0-34.9)    History of skin cancer    Tension type headache       CC:   Chief Complaint   Patient presents with    Follow-up       SUBJECTIVE:  Heri Muhammad   is a 78 y.o. male  - with obesity, seizure disorder, hyperlipidemia, CAD (non-obstructive and no history of stents), DJD lumbar spine, Afib/AFlutter (on Eliquis, h/o ablation and pacemaker in place), hypertension, aortic atherosclerosis, HFpEF (8/2018 EF 55%), subclinical hypothyroidism, restrictive cardiomyopathy, and COPD presents for follow-up from recent COPD exacerbation and reports that feeling "much better." Wife present at visit. Completed steroids. Reports right shoulder pain resolved.     1. COPD    Prior hospitalizations: yes (last 6/10/19)  History of intubation: denies    Current regimen:   inhalation spacing device (BREATHERITE MDI SPACER), Use as directed for inhalation., Disp: 1 each, Rfl: 0  ipratropium-albuterol (COMBIVENT)  mcg/actuation inhaler, Inhale 1 puff into the lungs every 6 (six) hours as needed for Wheezing or Shortness of Breath. Rescue, Disp: 4 g, Rfl: 0  umeclidinium-vilanterol (ANORO ELLIPTA) 62.5-25 mcg/actuation DsDv, Inhale 1 puff into the lungs Daily. Controller, Disp: 60 each, Rfl: 11    Current symptoms: " denies  Recent exacerbations: 6/10/19  How often using rescue inhaler? About once every 2 days    Last PFT: none    Vaccines:   Influenza: 9/7/18 due every fall  Prevnar 13: 10/24/16  Pneumovax 23: 10/27/17         ROS: Review of Systems   Constitutional: Negative for activity change, appetite change, fatigue and unexpected weight change.   HENT: Negative.  Negative for congestion, nosebleeds, postnasal drip, rhinorrhea, sinus pressure, sinus pain, trouble swallowing and voice change.    Eyes: Negative for visual disturbance.   Respiratory: Negative for apnea, cough, choking, chest tightness, shortness of breath and wheezing.    Cardiovascular: Negative for chest pain, palpitations and leg swelling.   Gastrointestinal: Negative for abdominal distention, abdominal pain, anal bleeding, blood in stool, constipation, diarrhea, nausea and vomiting.   Endocrine: Negative for cold intolerance, heat intolerance, polydipsia, polyphagia and polyuria.   Genitourinary: Negative for difficulty urinating, frequency, hematuria and urgency.   Musculoskeletal: Negative for arthralgias, myalgias, neck pain and neck stiffness.   Skin: Negative for rash.   Allergic/Immunologic: Negative.    Neurological: Negative for dizziness, weakness, light-headedness and headaches.   Hematological: Negative.    Psychiatric/Behavioral: Negative for sleep disturbance.       Past Medical History:   Diagnosis Date    A-fib     Anticoagulant long-term use     eliquis    Atrial flutter     Benign essential tremor     Cancer     skin cancer on back    Cardiac pacemaker in situ     Carpal tunnel syndrome of left wrist 8/8/2017    CHF (congestive heart failure)     Coronary artery disease     Cough     Dizziness     intermittant    DJD (degenerative joint disease) of knee     ED (erectile dysfunction)     Hard of hearing 11/2016    History of cataract     HTN (hypertension)     Hyperlipidemia     Hyperlipidemia     Hyponatremia 6/11/2019     Memory loss     Seizure disorder     Seizures     last episode 1995    Sinus node dysfunction 08/2016    Subclinical hypothyroidism        Past Surgical History:   Procedure Laterality Date    ABLATION N/A 3/23/2017    [facility] C[facility]    ABLATION N/A 1/29/2016    [facility] C[facility]    ANKLE SURGERY Right     pins and screws    ATRIAL ABLATION SURGERY      Block-nerve-medial branch-lumbar L2-L5 Bilateral 1/17/2019    [facility] OR    CARDIAC PACEMAKER PLACEMENT  2014    CARDIAC PACEMAKER PLACEMENT      CARDIAC PACEMAKER PLACEMENT  2014    CARDIOVERSION      CARDIOVERSION N/A 2/1/2017    Performed by [facility] at [facility]    CARDIOVERSION N/A 11/22/2016    [facility]    CARDIOVERSION N/A 8/5/2016    [facility]    CARDIOVERSION N/A 11/23/2015    [facility] CATH LAB    CARDIOVERSION N/A 10/15/2015    [facility] CATH LAB    CATARACT EXTRACTION      OU    COLONOSCOPY      due in 2014    EYE SURGERY Bilateral     cataracts extraction    FRACTURE SURGERY Right     ankle with hardware    HEART CATH-LEFT N/A 6/10/2016    Per[facility] by [facility] at [facility] CATH    HERNIA REPAIR      abdominal    [id number] # [id number] B N/A 1/26/2018    Performed by Sadi Otto MD at [facility] CATH    INSERTION-PACEMAKER-DUAL N/A 6/30/2014    [facility] CATH LAB    JOINT REPLACEMENT Bilateral     shoulders    Left heart cath Left 1/22/2018    [facility] at [facility] CATH    Radiofrequency Ablation, Nerve, Spinal, Lumbar, Medial Branch, L2-L5 Bilateral 1/23/2019    [facility] OR    RELEASE-CARPAL TUNNEL Left 10/4/2017    [facility]     TRANSESOPHAGEAL ECHOCARDIOGRAM (GALILEO) N/A 3/23/2017    [facility] CATH LAB       Family History   Problem Relation Age of Onset    Heart disease Mother     Heart failure Mother     Dementia Mother     Cancer Father         colon    Blindness Father         accident    Cataracts Father     Hypertension Father     Tremor Father     Tremor Brother     Tremor Paternal Grandfather     Cancer Daughter     No Known Problems Son     Obesity Daughter     Melanoma Neg Hx     Amblyopia Neg Hx     Diabetes Neg Hx     Glaucoma Neg Hx     Macular degeneration Neg Hx     Retinal detachment Neg Hx     Strabismus Neg Hx     Stroke Neg Hx     Thyroid disease Neg Hx        Social History     Tobacco Use    Smoking status: Former Smoker     Packs/day: 1.00     Years: 5.00     Pack years: 5.00     Types: Cigarettes     Last attempt to quit: 1959     Years since quittin.5    Smokeless tobacco: Never Used   Substance Use Topics    Alcohol use: No    Drug use: No       Social History     Social History Narrative    . 4 adult children.        ALLERGIES:   Review of patient's allergies indicates:   Allergen Reactions    Bactroban [mupirocin calcium] Rash     Other reaction(s): Rash       MEDS:   Current Outpatient Medications:     acetaminophen (TYLENOL) 325 MG tablet, Take 325 mg by mouth every 6 (six) hours as needed for Pain., Disp: , Rfl:     atorvastatin (LIPITOR) 10 MG tablet, TAKE 1 TABLET EVERY EVENING, Disp: 90 tablet, Rfl: 4    cyanocobalamin (VITAMIN B-12) 1000 MCG tablet, Take 100 mcg by mouth once daily., Disp: , Rfl:     cyclobenzaprine (FLEXERIL) 5 MG tablet, Take 1 tablet (5 mg total) by mouth 2 (two) times daily as needed for Muscle spasms., Disp: 40 tablet, Rfl: 1    ELIQUIS 5 mg Tab, TAKE 1 TABLET TWICE DAILY, Disp: 180 tablet, Rfl: 4    inhalation spacing device (BREATHERITE MDI SPACER), Use as directed for inhalation., Disp: 1 each, Rfl: 0     "ipratropium-albuterol (COMBIVENT)  mcg/actuation inhaler, Inhale 1 puff into the lungs every 6 (six) hours as needed for Wheezing or Shortness of Breath. Rescue, Disp: 4 g, Rfl: 0    lamoTRIgine (LAMICTAL) 100 MG tablet, Take 1 tablet (100 mg total) by mouth 2 (two) times daily., Disp: 180 tablet, Rfl: 3    memantine (NAMENDA) 10 MG Tab, Take 1 tablet (10 mg total) by mouth 2 (two) times daily., Disp: 180 tablet, Rfl: 3    metoprolol succinate (TOPROL-XL) 50 MG 24 hr tablet, TAKE 1 TABLET (50 MG TOTAL) BY MOUTH ONCE DAILY., Disp: 90 tablet, Rfl: 4    primidone (MYSOLINE) 250 MG Tab, Take 2 tablets (500 mg total) by mouth 2 (two) times daily., Disp: 360 tablet, Rfl: 3    torsemide (DEMADEX) 10 MG Tab, TAKE 1 TABLET (10 MG TOTAL) BY MOUTH EVERY MORNING., Disp: 90 tablet, Rfl: 4    umeclidinium-vilanterol (ANORO ELLIPTA) 62.5-25 mcg/actuation DsDv, Inhale 1 puff into the lungs Daily. Controller, Disp: 60 each, Rfl: 11    Current Facility-Administered Medications:     albuterol-ipratropium 2.5 mg-0.5 mg/3 mL nebulizer solution 3 mL, 3 mL, Nebulization, Once, Ora Murillo DO    OBJECTIVE:   Vitals:    07/03/19 1334   BP: 100/64   BP Location: Left arm   Patient Position: Sitting   BP Method: X-Large (Manual)   Pulse: 70   Resp: 18   Temp: 97.5 °F (36.4 °C)   TempSrc: Oral   SpO2: 96%   Weight: 100.5 kg (221 lb 9.6 oz)   Height: 6' 1" (1.854 m)     Body mass index is 29.24 kg/m².    Physical Exam   Constitutional: No distress.   Neck: Neck supple.   Cardiovascular: Normal rate, normal heart sounds and intact distal pulses. An irregularly irregular rhythm present. Exam reveals no gallop and no friction rub.   No murmur heard.  Pulmonary/Chest: Effort normal and breath sounds normal. He has no decreased breath sounds. He has no wheezes. He has no rhonchi. He has no rales.   Abdominal: Soft. Bowel sounds are normal.   Musculoskeletal: He exhibits no edema.   Neurological: He is alert.   Skin: Skin is warm.     "     PERTINENT RESULTS:   BMP  Lab Results   Component Value Date     07/02/2019    K 4.5 07/02/2019     07/02/2019    CO2 27 07/02/2019    BUN 13 07/02/2019    CREATININE 0.8 07/02/2019    CALCIUM 8.8 07/02/2019    ANIONGAP 10 07/02/2019    ESTGFRAFRICA >60.0 07/02/2019    EGFRNONAA >60.0 07/02/2019     Lab Results   Component Value Date    TSH 4.050 (H) 01/19/2018    FREET4 1.18 01/19/2018       ASSESSMENT:  Problem List Items Addressed This Visit        Neuro    Late onset Alzheimer's disease without behavioral disturbance    Current Assessment & Plan     - stable   - good support   - followed by Dr. Anjum Arellano  - continue same meds            Pulmonary    Chronic obstructive pulmonary disease - Primary    Current Assessment & Plan     - exacerbation resolved  - PFT scheduled  - well controlled  - continue current meds            Cardiac/Vascular    Persistent atrial fibrillation    Overview     - followed by Dr. Esparza  - history of cardioversion  - on Eliquis OAC 5 mg BID         Essential hypertension    Current Assessment & Plan     - well controlled  - continue current meds            Hematology    Current use of long term anticoagulation    Overview     - Eliquis for Afib         Current Assessment & Plan     - no signs of acute bleeding            Endocrine    Subclinical hypothyroidism    Current Assessment & Plan     Lab Results   Component Value Date    TSH 4.050 (H) 01/19/2018     - asymptomatic  - monitor         Relevant Orders    T4, free    TSH          PLAN:   Orders Placed This Encounter    T4, free    TSH     Follow-up in 4 months    Dr. Ora Murillo D.O.   [facility]

## 2019-07-02 NOTE — PATIENT INSTRUCTIONS
First Aid: Seizures  A seizure results from a sudden rush of abnormal electrical signals in the brain. Symptoms may range from a minor daze to uncontrollable muscle spasms (convulsions). In many cases, the victim will lose consciousness. A seizure can be caused by a high fever, head injury, drug reaction, or condition such as epilepsy.  1. Protect the head  · Help the victim to the floor if he or she begins losing muscle control. Turn the person on his or her side to prevent choking.  · Protect the victim's head from injury by placing something soft, such as folded clothes, beneath it, and by moving objects away from the victim.  · Don't cause injury by restraining the person or by placing anything in his or her mouth.  · Remove eyeglasses.  2.  Preserve dignity  · Clear away bystanders.  · Reassure the victim, who may be confused, drowsy, or hostile when coming out of the seizure.  · Cover the person or provide dry clothes if muscle spasms have caused a loss of bladder control.  3. Check for injury  · Make sure the victim's mental state has returned to normal. One way to do this is to ask the person his or her name, the year, and your location.  · Injuries can occur to the head, mouth, tongue, or body.   4. Call 911  · If the seizure lasts longer than five minutes (Note: Timing the seizure and recovery time is helpful in many cases.)  · If a second seizure occurs  · If the victim doesnt regain consciousness  · If the victim is pregnant  · If the victim has no history of seizures  · If the person has sustained an injury during the seizure. (Note: If the injury is not severe or life threatening, it may be more appropriate to seek treatment with the primary care provider.)  Date Last Reviewed: 10/19/2015  © 7552-1651 [employer]. [address] [place] [address], [place] [address]. All rights reserved. This information is not intended as a substitute for professional medical care. Always follow your healthcare  professional's instructions.

## 2019-07-02 NOTE — PROGRESS NOTES
[facility] - NEUROLOGY EPILEPSY  [facility] [place]    Date: 7/2/19  Patient Name: Heri Muhammad   MRN: [id number]   PCP: Ora Murillo  Referring Provider: Sonia Nieves NP    Assessment:   Heri Muhammad is a 78 y.o. male presenting to \[facility]\"" care for multiple neurologic issues as listed below.  Personally reviewed CT head as discussed below and EEG.  Will obtain updated Lamictal level with routine monitoring labs.  Will make no adjustments to his current home medications.  He may continue to use Tylenol p.r.n. for breakthrough tension headache.  All questions were answered.    Plan:     Problem List Items Addressed This Visit        Neuro    Essential tremor    Current Assessment & Plan     -- continue current primidone         Seizure disorder    Overview     Stable and no recent seizures since 1990's  CT Head 2019 with diffuse atrophy  EEG 1/2019 mild diffuse slowing         Current Assessment & Plan     -- continue current lamictal  -- level and routine lab check today         Relevant Orders    Lamotrigine level    CBC Without Differential    COMPREHENSIVE METABOLIC PANEL    Late onset Alzheimer's disease without behavioral disturbance    Current Assessment & Plan     -- continue current namenda         Relevant Medications    memantine (NAMENDA) 10 MG Tab    Tension type headache    Current Assessment & Plan     -- tylenol PRN           Other Visit Diagnoses     Therapeutic drug monitoring    -  Primary    Relevant Orders    Lamotrigine level    CBC Without Differential    COMPREHENSIVE METABOLIC PANEL    Nonintractable epilepsy without status epilepticus, unspecified epilepsy type        Relevant Medications    lamoTRIgine (LAMICTAL) 100 MG tablet          Anjum Arellano MD  [facility]   Department of Neurology    Patient note was created using MModal Dictation.  Any errors in syntax or even information may not have been identified and edited on initial  review prior to signing this note.  Subjective:   Patient seen in consultation at the request of Sonia Nieves NP for the evaluation of multiple neurologic issues. A copy of this note will be sent to the referring physician.        HPI:   Mr. Heri Muhammad is a 78 y.o. male who presents to establish care for multiple neurologic issues.  The patient was previously followed by Dr. Naik but has recently moved to the [place] and would like to reestablish care for a closer physician.  The patient presents today with his wife who contributes to the history.  The patient reports a history of a longstanding seizure disorder but states that he has remained seizure free controlled on Lamictal since the 1990s.  He also endorses a history of a lifelong benign essential tremor that he confirms is present in multiple family members.  He states he is currently happy with the level of control of his tremor that he gets from taking primidone.  He does not wish to adjust this today.  He does have a known history of Alzheimer's disease and he and his wife recently moved to be closer to family.  She has cysts him with managing money and medications.  He is able to do many other activities of daily living at present without assistance.  His wife denies any mood or behavioral issues.  They deny any safety concerns.  The patient does have a history of occasional intermittent mild tension headache that rapidly response to Tylenol.  His wife states that at worst he takes the Tylenol once per week.  He is happy with this level of control.    PAST MEDICAL HISTORY:  Past Medical History:   Diagnosis Date    A-fib     Anticoagulant long-term use     eliquis    Atrial flutter     Benign essential tremor     Cancer     skin cancer on back    Cardiac pacemaker in situ     Carpal tunnel syndrome of left wrist 8/8/2017    CHF (congestive heart failure)     Coronary artery disease     Cough     Dizziness     intermittant     DJD (degenerative joint disease) of knee     ED (erectile dysfunction)     Hard of hearing 11/2016    History of cataract     HTN (hypertension)     Hyperlipidemia     Hyperlipidemia     Memory loss     Seizure disorder     Seizures     last episode 1995    Sinus node dysfunction 08/2016    Subclinical hypothyroidism        PAST SURGICAL HISTORY:  Past Surgical History:   Procedure Laterality Date    ABLATION N/A 3/23/2017    [facility] CATH LAB    ABLATION N/A 1/29/2016    [facility] CATH LAB    ANKLE SURGERY Right     pins and screws    ATRIAL ABLATION SURGERY      Block-nerve-medial branch-lumbar L2-L5 Bilateral 1/17/2019    [facility]    CARDIAC PACEMAKER PLACEMENT  2014    CARDIAC PACEMAKER PLACEMENT      CARDIAC PACEMAKER PLACEMENT  2014    CARDIOVERSION      CARDIOVERSION N/A 2/1/2017    [facility]    CARDIOVERSION N/A 11/22/2016    [facility]    CARDIOVERSION N/A 8/5/2016    [facility]    CARDIOVERSION N/A 11/23/2015    [facility] CATH LAB    CARDIOVERSION N/A 10/15/2015    [facility] CATH LAB    CATARACT EXTRACTION      OU    COLONOSCOPY      due in 2014    EYE SURGERY Bilateral     cataracts extraction    FRACTURE SURGERY Right     ankle with hardware    HEART CATH-LEFT N/A 6/10/2016    [facility]    HERNIA REPAIR      abdominal    [id number] # [id number] B N/A 1/26/2018    [facility] CATH    INSERTION-PACEMAKER-DUAL N/A 6/30/2014    [facility] CATH LAB    JOINT REPLACEMENT Bilateral     shoulders    Left heart cath Left 1/22/2018    [facility] at [facility]    Radiofrequency Ablation,  Nerve, Spinal, Lumbar, Medial Branch, L2-L5 Bilateral 1/23/2019    [facility] OR    RELEASE-CARPAL TUNNEL Left 10/4/2017    [facility] OR    TRANSESOPHAGEAL ECHOCARDIOGRAM (GALILEO) N/A 3/23/2017    Performed by [facility] at [facility] CATH LAB       CURRENT MEDS:  Current Outpatient Medications   Medication Sig Dispense Refill    acetaminophen (TYLENOL) 325 MG tablet Take 325 mg by mouth every 6 (six) hours as needed for Pain.      atorvastatin (LIPITOR) 10 MG tablet TAKE 1 TABLET EVERY EVENING 90 tablet 4    cyanocobalamin (VITAMIN B-12) 1000 MCG tablet Take 100 mcg by mouth once daily.      cyclobenzaprine (FLEXERIL) 5 MG tablet Take 1 tablet (5 mg total) by mouth 2 (two) times daily as needed for Muscle spasms. 40 tablet 1    ELIQUIS 5 mg Tab TAKE 1 TABLET TWICE DAILY 180 tablet 4    inhalation spacing device (BREATHERITE MDI SPACER) Use as directed for inhalation. 1 each 0    ipratropium-albuterol (COMBIVENT)  mcg/actuation inhaler Inhale 1 puff into the lungs every 6 (six) hours as needed for Wheezing or Shortness of Breath. Rescue 4 g 0    lamoTRIgine (LAMICTAL) 100 MG tablet Take 1 tablet (100 mg total) by mouth 2 (two) times daily. 180 tablet 3    memantine (NAMENDA) 10 MG Tab Take 1 tablet (10 mg total) by mouth 2 (two) times daily. 180 tablet 3    metoprolol succinate (TOPROL-XL) 50 MG 24 hr tablet TAKE 1 TABLET (50 MG TOTAL) BY MOUTH ONCE DAILY. 90 tablet 4    predniSONE (DELTASONE) 10 MG tablet Take 4 tablets (40 mg total) by mouth once daily for 3 days, THEN 3 tablets (30 mg total) once daily for 3 days, THEN 2 tablets (20 mg total) once daily for 3 days, THEN 1 tablet (10 mg total) once daily for 3 days, THEN 0.5 tablets (5 mg total) once daily for 3 days. 32 tablet 0    primidone (MYSOLINE) 250 MG Tab Take 2 tablets (500 mg total) by mouth 2 (two) times daily. 360 tablet 3    torsemide (DEMADEX) 10 MG Tab TAKE 1 TABLET  "(10 MG TOTAL) BY MOUTH EVERY MORNING. 90 tablet 4    umeclidinium-vilanterol (ANORO ELLIPTA) 62.5-25 mcg/actuation DsDv Inhale 1 puff into the lungs Daily. Controller 60 each 11     Current Facility-Administered Medications   Medication Dose Route Frequency Provider Last Rate Last Dose    albuterol-ipratropium 2.5 mg-0.5 mg/3 mL nebulizer solution 3 mL  3 mL Nebulization Once Ora Murillo DO           ALLERGIES:  Review of patient's allergies indicates:   Allergen Reactions    Bactroban [mupirocin calcium] Rash     Other reaction(s): Rash       FAMILY HISTORY:  Family History   Problem Relation Age of Onset    Heart disease Mother     Heart failure Mother     Dementia Mother     Cancer Father         colon    Blindness Father         accident    Cataracts Father     Hypertension Father     Tremor Father     Tremor Brother     Tremor Paternal Grandfather     Cancer Daughter     No Known Problems Son     Obesity Daughter     Melanoma Neg Hx     Amblyopia Neg Hx     Diabetes Neg Hx     Glaucoma Neg Hx     Macular degeneration Neg Hx     Retinal detachment Neg Hx     Strabismus Neg Hx     Stroke Neg Hx     Thyroid disease Neg Hx        SOCIAL HISTORY:  Social History     Tobacco Use    Smoking status: Former Smoker     Packs/day: 1.00     Years: 5.00     Pack years: 5.00     Types: Cigarettes     Last attempt to quit:      Years since quittin.5    Smokeless tobacco: Never Used   Substance Use Topics    Alcohol use: No    Drug use: No       Review of Systems:  12 system review of systems is negative except for the symptoms mentioned in HPI.      Objective:     Vitals:    19 1307   Weight: 97.5 kg (215 lb)   Height: 6' 1" (1.854 m)     General: NAD, well nourished   Eyes: no tearing, discharge, no erythema   ENT: moist mucous membranes of the oral cavity, nares patent    Neck: Supple, full range of motion  Cardiovascular: Warm and well perfused, pulses equal and " symmetrical  Lungs: Normal work of breathing, normal chest wall excursions  Skin: No rash, lesions, or breakdown on exposed skin  Psychiatry: Mood and affect are appropriate   Abdomen: soft, non tender, non distended  Extremeties: No cyanosis, clubbing or edema.    Neurological   MENTAL STATUS: Alert and oriented to person, place, but not time. Attention and concentration within normal limits. Speech without dysarthria, able to name and repeat without difficulty. Recent and remote memory fair  CRANIAL NERVES: Visual fields intact. PERRL. EOMI. Facial sensation intact. Face symmetrical. Hearing grossly intact. Full shoulder shrug bilaterally. Tongue protrudes midline   SENSORY: Sensation is intact to light touch throughout.    MOTOR: Normal bulk and tone.5/5 deltoid, biceps, triceps, interosseous, hand  bilaterally. 5/5 iliopsoas, knee extension/flexion, foot dorsi/plantarflexion bilaterally.    REFLEXES: Symmetric and 1 throughout.   CEREBELLAR/COORDINATION/GAIT: Gait steady with normal arm swing and stride length. Finger to nose intact. Normal rapid alternating movements.

## 2019-07-02 NOTE — LETTER
July 2, 2019      [place]  [address]  [address]  [place] [address]           [facility] - Neurology Epilepsy  [address]  [place] [address]  Phone: [phone]  Fax: [phone]          Patient: Heri Muhammad   MR Number: [id number]   YOB: 1940   Date of Visit: 7/2/2019       Dear Sonia Nieves:    Thank you for referring Heri Muhammad to me for evaluation. Attached you will find relevant portions of my assessment and plan of care.    If you have questions, please do not hesitate to call me. I look forward to following Heri Muhammad along with you.    Sincerely,    Anjum Arellano MD    Enclosure  CC:  No Recipients    If you would like to receive this communication electronically, please contact [email or web address] or [phone] to request more information on SmartMenuCard Link access.    For providers and/or their staff who would like to refer a patient to [facility], please contact us through our one-stop-shop provider referral line, [facility] , at [phone].    If you feel you have received this communication in error or would no longer like to receive these types of communications, please e-mail [email or web address]

## 2019-07-03 ENCOUNTER — OFFICE VISIT (OUTPATIENT)
Dept: FAMILY MEDICINE | Facility: CLINIC | Age: 79
End: 2019-07-03
Payer: MEDICARE

## 2019-07-03 VITALS
WEIGHT: 221.63 LBS | SYSTOLIC BLOOD PRESSURE: 100 MMHG | DIASTOLIC BLOOD PRESSURE: 64 MMHG | HEIGHT: 73 IN | HEART RATE: 70 BPM | TEMPERATURE: 98 F | BODY MASS INDEX: 29.37 KG/M2 | RESPIRATION RATE: 18 BRPM | OXYGEN SATURATION: 96 %

## 2019-07-03 DIAGNOSIS — I10 ESSENTIAL HYPERTENSION: ICD-10-CM

## 2019-07-03 DIAGNOSIS — J44.9 CHRONIC OBSTRUCTIVE PULMONARY DISEASE, UNSPECIFIED COPD TYPE: Primary | ICD-10-CM

## 2019-07-03 DIAGNOSIS — E03.8 SUBCLINICAL HYPOTHYROIDISM: ICD-10-CM

## 2019-07-03 DIAGNOSIS — F02.80 LATE ONSET ALZHEIMER'S DISEASE WITHOUT BEHAVIORAL DISTURBANCE: ICD-10-CM

## 2019-07-03 DIAGNOSIS — Z79.01 CURRENT USE OF LONG TERM ANTICOAGULATION: ICD-10-CM

## 2019-07-03 DIAGNOSIS — G30.1 LATE ONSET ALZHEIMER'S DISEASE WITHOUT BEHAVIORAL DISTURBANCE: ICD-10-CM

## 2019-07-03 DIAGNOSIS — I48.19 PERSISTENT ATRIAL FIBRILLATION: ICD-10-CM

## 2019-07-03 PROBLEM — M25.511 ACUTE PAIN OF RIGHT SHOULDER: Status: RESOLVED | Noted: 2019-06-17 | Resolved: 2019-07-03

## 2019-07-03 PROBLEM — E87.1 HYPONATREMIA: Status: RESOLVED | Noted: 2019-06-11 | Resolved: 2019-07-03

## 2019-07-03 PROCEDURE — 3288F FALL RISK ASSESSMENT DOCD: CPT | Mod: CPTII,S$GLB,, | Performed by: FAMILY MEDICINE

## 2019-07-03 PROCEDURE — 3288F PR FALLS RISK ASSESSMENT DOCUMENTED: ICD-10-PCS | Mod: CPTII,S$GLB,, | Performed by: FAMILY MEDICINE

## 2019-07-03 PROCEDURE — 99214 PR OFFICE/OUTPT VISIT, EST, LEVL IV, 30-39 MIN: ICD-10-PCS | Mod: S$GLB,,, | Performed by: FAMILY MEDICINE

## 2019-07-03 PROCEDURE — 99214 OFFICE O/P EST MOD 30 MIN: CPT | Mod: S$GLB,,, | Performed by: FAMILY MEDICINE

## 2019-07-03 PROCEDURE — 3074F PR MOST RECENT SYSTOLIC BLOOD PRESSURE < 130 MM HG: ICD-10-PCS | Mod: CPTII,S$GLB,, | Performed by: FAMILY MEDICINE

## 2019-07-03 PROCEDURE — 1100F PTFALLS ASSESS-DOCD GE2>/YR: CPT | Mod: CPTII,S$GLB,, | Performed by: FAMILY MEDICINE

## 2019-07-03 PROCEDURE — 3078F PR MOST RECENT DIASTOLIC BLOOD PRESSURE < 80 MM HG: ICD-10-PCS | Mod: CPTII,S$GLB,, | Performed by: FAMILY MEDICINE

## 2019-07-03 PROCEDURE — 99999 PR PBB SHADOW E&M-EST. PATIENT-LVL IV: ICD-10-PCS | Mod: PBBFAC,,, | Performed by: FAMILY MEDICINE

## 2019-07-03 PROCEDURE — 99999 PR PBB SHADOW E&M-EST. PATIENT-LVL IV: CPT | Mod: PBBFAC,,, | Performed by: FAMILY MEDICINE

## 2019-07-03 PROCEDURE — 1100F PR PT FALLS ASSESS DOC 2+ FALLS/FALL W/INJURY/YR: ICD-10-PCS | Mod: CPTII,S$GLB,, | Performed by: FAMILY MEDICINE

## 2019-07-03 PROCEDURE — 3078F DIAST BP <80 MM HG: CPT | Mod: CPTII,S$GLB,, | Performed by: FAMILY MEDICINE

## 2019-07-03 PROCEDURE — 3074F SYST BP LT 130 MM HG: CPT | Mod: CPTII,S$GLB,, | Performed by: FAMILY MEDICINE

## 2019-07-16 ENCOUNTER — INITIAL CONSULT (OUTPATIENT)
Dept: OPTOMETRY | Facility: CLINIC | Age: 79
End: 2019-07-16
Payer: MEDICARE

## 2019-07-16 DIAGNOSIS — Z96.1 PSEUDOPHAKIA OF BOTH EYES: ICD-10-CM

## 2019-07-16 DIAGNOSIS — I10 ESSENTIAL HYPERTENSION: Primary | ICD-10-CM

## 2019-07-16 PROCEDURE — 99999 PR PBB SHADOW E&M-EST. PATIENT-LVL II: CPT | Mod: PBBFAC,,, | Performed by: OPTOMETRIST

## 2019-07-16 PROCEDURE — 92004 COMPRE OPH EXAM NEW PT 1/>: CPT | Mod: S$GLB,,, | Performed by: OPTOMETRIST

## 2019-07-16 PROCEDURE — 92004 PR EYE EXAM, NEW PATIENT,COMPREHESV: ICD-10-PCS | Mod: S$GLB,,, | Performed by: OPTOMETRIST

## 2019-07-16 PROCEDURE — 99999 PR PBB SHADOW E&M-EST. PATIENT-LVL II: ICD-10-PCS | Mod: PBBFAC,,, | Performed by: OPTOMETRIST

## 2019-07-16 NOTE — PROGRESS NOTES
HPI     Last eye exam was 4/9/15 with Dr. Rincon.  Patient states no vision changes since last exam. Will get an updated   glasses rx if needed. Occasionally sees floaters OU.   Patient denies diplopia, headaches, flashes, and pain.          Last edited by Kirstie Diez on 7/16/2019  2:19 PM. (History)            Assessment /Plan     For exam results, see Encounter Report.    Essential hypertension    Pseudophakia of both eyes          1.  No retinopathy--monitor yearly. BP control.  Eye health normal OU.  2.  Continue w/ current rx

## 2019-07-16 NOTE — LETTER
July 16, 2019      [place]  [address] [address]  [address]  [place] [address]           [facility] - Optometry  [address]  [facility] [address]  Phone: [phone]  Fax: [phone]          Patient: Heri Muhammad   MR Number: [id number]   YOB: 1940   Date of Visit: 7/16/2019       Dear Sonia Nieves:    Thank you for referring Heri Muhammad to me for evaluation. Attached you will find relevant portions of my assessment and plan of care.    If you have questions, please do not hesitate to call me. I look forward to following Heri Muhammad along with you.    Sincerely,    Naav Cheek, OD    Enclosure  CC:  No Recipients    If you would like to receive this communication electronically, please contact [email or web address] or [phone] to request more information on StackBlaze Link access.    For providers and/or their staff who would like to refer a patient to [facility], please contact us through our one-stop-shop provider referral line, [facility] , at [phone].    If you feel you have received this communication in error or would no longer like to receive these types of communications, please e-mail [email or web address]

## 2019-07-31 ENCOUNTER — TELEPHONE (OUTPATIENT)
Dept: INTERNAL MEDICINE | Facility: CLINIC | Age: 79
End: 2019-07-31

## 2019-07-31 NOTE — TELEPHONE ENCOUNTER
----- Message from Ora Murillo DO sent at 7/31/2019  1:50 PM CDT -----  Please call wife Mulu. Pulmonary function test reviewed and looks good. No obstruction. Continue Anoro inhaler at this time

## 2019-08-19 ENCOUNTER — OFFICE VISIT (OUTPATIENT)
Dept: DERMATOLOGY | Facility: CLINIC | Age: 79
End: 2019-08-19
Payer: MEDICARE

## 2019-08-19 DIAGNOSIS — L90.5 SCAR: ICD-10-CM

## 2019-08-19 DIAGNOSIS — B35.6 TINEA CRURIS: ICD-10-CM

## 2019-08-19 DIAGNOSIS — D18.00 ANGIOMA: ICD-10-CM

## 2019-08-19 DIAGNOSIS — L28.0 LSC (LICHEN SIMPLEX CHRONICUS): Primary | ICD-10-CM

## 2019-08-19 DIAGNOSIS — L82.1 SK (SEBORRHEIC KERATOSIS): ICD-10-CM

## 2019-08-19 DIAGNOSIS — D22.9 NEVUS: ICD-10-CM

## 2019-08-19 DIAGNOSIS — L91.8 SKIN TAG: ICD-10-CM

## 2019-08-19 DIAGNOSIS — Z85.828 PERSONAL HISTORY OF SKIN CANCER: ICD-10-CM

## 2019-08-19 DIAGNOSIS — I48.0 PAF (PAROXYSMAL ATRIAL FIBRILLATION): Primary | ICD-10-CM

## 2019-08-19 PROCEDURE — 99999 PR PBB SHADOW E&M-EST. PATIENT-LVL III: ICD-10-PCS | Mod: PBBFAC,,, | Performed by: DERMATOLOGY

## 2019-08-19 PROCEDURE — 1101F PT FALLS ASSESS-DOCD LE1/YR: CPT | Mod: CPTII,S$GLB,, | Performed by: DERMATOLOGY

## 2019-08-19 PROCEDURE — 99214 OFFICE O/P EST MOD 30 MIN: CPT | Mod: S$GLB,,, | Performed by: DERMATOLOGY

## 2019-08-19 PROCEDURE — 99214 PR OFFICE/OUTPT VISIT, EST, LEVL IV, 30-39 MIN: ICD-10-PCS | Mod: S$GLB,,, | Performed by: DERMATOLOGY

## 2019-08-19 PROCEDURE — 99999 PR PBB SHADOW E&M-EST. PATIENT-LVL III: CPT | Mod: PBBFAC,,, | Performed by: DERMATOLOGY

## 2019-08-19 PROCEDURE — 1101F PR PT FALLS ASSESS DOC 0-1 FALLS W/OUT INJ PAST YR: ICD-10-PCS | Mod: CPTII,S$GLB,, | Performed by: DERMATOLOGY

## 2019-08-19 RX ORDER — APIXABAN 5 MG/1
TABLET, FILM COATED ORAL
Qty: 180 TABLET | Refills: 4 | Status: SHIPPED | OUTPATIENT
Start: 2019-08-19 | End: 2019-08-28 | Stop reason: SDUPTHER

## 2019-08-19 RX ORDER — KETOCONAZOLE 20 MG/G
CREAM TOPICAL
Qty: 60 G | Refills: 3 | Status: SHIPPED | OUTPATIENT
Start: 2019-08-19 | End: 2020-11-02

## 2019-08-19 NOTE — PROGRESS NOTES
Subjective:       Patient ID:  Heri Muhammad is a 78 y.o. male who presents for   Chief Complaint   Patient presents with    Skin Check    Lesion     Patient is here today for a TBSE.  Pt has a history of  normal sun exposure in the past.   Pt recalls several blistering sunburns in the past- no  Pt has history of tanning bed use- no  Pt has  had moles removed in the past- yes, benign per pt  Pt has history of melanoma in first degree relatives-  no    Pt c/o dark lesion on back x a few months. No bleeding, pain or prev tx.  Pt c/o tender lesion on right keg x a few months. No prev tx.  Pt c/o rash  on buttock and in groin area x a few months. No prev tx.        Review of Systems   Skin: Positive for itching, rash and wears hat. Negative for daily sunscreen use, activity-related sunscreen use, tendency to form keloidal scars and recent sunburn.   Hematologic/Lymphatic: Does not bruise/bleed easily.        Objective:    Physical Exam   Constitutional: He appears well-developed and well-nourished. No distress.   Neurological: He is alert and oriented to person, place, and time. He is not disoriented.   Psychiatric: He has a normal mood and affect. He has a flat affect.   Skin:   Areas Examined (abnormalities noted in diagram):   Scalp / Hair Palpated and Inspected  Head / Face Inspection Performed  Neck Inspection Performed  Chest / Axilla Inspection Performed  Abdomen Inspection Performed  Genitals / Buttocks / Groin Inspection Performed  Back Inspection Performed  RUE Inspected  LUE Inspection Performed  RLE Inspected  LLE Inspection Performed  Nails and Digits Inspection Performed                       Diagram Legend     Erythematous scaling macule/papule c/w actinic keratosis       Vascular papule c/w angioma      Pigmented verrucoid papule/plaque c/w seborrheic keratosis      Yellow umbilicated papule c/w sebaceous hyperplasia      Irregularly shaped tan macule c/w lentigo     1-2 mm smooth white papules  consistent with Milia      Movable subcutaneous cyst with punctum c/w epidermal inclusion cyst      Subcutaneous movable cyst c/w pilar cyst      Firm pink to brown papule c/w dermatofibroma      Pedunculated fleshy papule(s) c/w skin tag(s)      Evenly pigmented macule c/w junctional nevus     Mildly variegated pigmented, slightly irregular-bordered macule c/w mildly atypical nevus      Flesh colored to evenly pigmented papule c/w intradermal nevus       Pink pearly papule/plaque c/w basal cell carcinoma      Erythematous hyperkeratotic cursted plaque c/w SCC      Surgical scar with no sign of skin cancer recurrence      Open and closed comedones      Inflammatory papules and pustules      Verrucoid papule consistent consistent with wart     Erythematous eczematous patches and plaques     Dystrophic onycholytic nail with subungual debris c/w onychomycosis     Umbilicated papule    Erythematous-base heme-crusted tan verrucoid plaque consistent with inflamed seborrheic keratosis     Erythematous Silvery Scaling Plaque c/w Psoriasis     See annotation      Assessment / Plan:        LSC (lichen simplex chronicus)  Air cushion to redistribute weight     Tinea cruris  -     ketoconazole (NIZORAL) 2 % cream; AAA bid to groin and feet x 3 weeks  Dispense: 60 g; Refill: 3    SK (seborrheic keratosis)  These are benign inherited growths without a malignant potential. Reassurance given to patient. No treatment is necessary.     Angioma  ./dermang  These are benign vascular lesions that are inherited.  Treatment is not necessary.    Nevus  Discussed ABCDE's of nevi.  Monitor for new mole or moles that are becoming bigger, darker, irritated, or developing irregular borders. Brochure provided.    Skin tag  Reassurance given to patient. No treatment is necessary.   Treatment of benign, asymptomatic lesions may be considered cosmetic.    Personal history of skin cancer  Scar  Pt with history of non melanoma skin cancer  Total body  skin examination performed today including at least 12 points as noted in physical examination. No suspicious lesions noted.             Follow up in about 1 year (around 8/19/2020).

## 2019-08-21 DIAGNOSIS — J44.9 CHRONIC OBSTRUCTIVE PULMONARY DISEASE, UNSPECIFIED COPD TYPE: ICD-10-CM

## 2019-08-22 RX ORDER — UMECLIDINIUM BROMIDE AND VILANTEROL TRIFENATATE 62.5; 25 UG/1; UG/1
POWDER RESPIRATORY (INHALATION)
Qty: 90 EACH | Refills: 3 | Status: SHIPPED | OUTPATIENT
Start: 2019-08-22 | End: 2020-07-10 | Stop reason: SDUPTHER

## 2019-08-28 DIAGNOSIS — I48.0 PAF (PAROXYSMAL ATRIAL FIBRILLATION): Primary | ICD-10-CM

## 2019-09-30 ENCOUNTER — IMMUNIZATION (OUTPATIENT)
Dept: INTERNAL MEDICINE | Facility: CLINIC | Age: 79
End: 2019-09-30
Payer: MEDICARE

## 2019-09-30 DIAGNOSIS — Z23 INFLUENZA VACCINE NEEDED: Primary | ICD-10-CM

## 2019-09-30 PROCEDURE — 90662 FLU VACCINE - HIGH DOSE (65+) PRESERVATIVE FREE IM: ICD-10-PCS | Mod: S$GLB,,, | Performed by: INTERNAL MEDICINE

## 2019-09-30 PROCEDURE — 90662 IIV NO PRSV INCREASED AG IM: CPT | Mod: S$GLB,,, | Performed by: INTERNAL MEDICINE

## 2019-09-30 PROCEDURE — G0008 ADMIN INFLUENZA VIRUS VAC: HCPCS | Mod: S$GLB,,, | Performed by: INTERNAL MEDICINE

## 2019-09-30 PROCEDURE — G0008 FLU VACCINE - HIGH DOSE (65+) PRESERVATIVE FREE IM: ICD-10-PCS | Mod: S$GLB,,, | Performed by: INTERNAL MEDICINE

## 2019-10-21 ENCOUNTER — TELEPHONE (OUTPATIENT)
Dept: CARDIOLOGY | Facility: CLINIC | Age: 79
End: 2019-10-21

## 2019-10-21 NOTE — TELEPHONE ENCOUNTER
Spoke to pt. Wife, informed her next available for later echo time is 1-1-19 at 1 p.m. Pt. Wife stated they will keep appointment.

## 2019-10-21 NOTE — TELEPHONE ENCOUNTER
----- Message from Ayala Hintno sent at 10/21/2019 11:14 AM CDT -----  Type:  Sooner Apoointment Request    Caller is requesting a sooner appointment.  Caller declined first available appointment listed below.  Caller will not accept being placed on the waitlist and is requesting a message be sent to doctor.    Name of Caller:  Wife- Melania Muhammad  When is the first available appointment?   Symptoms:    Best Call Back Number:  [phone]  Additional Information:  Patient's wife requesting to reschedule echo test at later time for tomorrow. Patient is traveling from the [facility].

## 2019-10-22 ENCOUNTER — CLINICAL SUPPORT (OUTPATIENT)
Dept: CARDIOLOGY | Facility: CLINIC | Age: 79
End: 2019-10-22
Attending: INTERNAL MEDICINE
Payer: MEDICARE

## 2019-10-22 VITALS
HEIGHT: 73 IN | BODY MASS INDEX: 29.29 KG/M2 | DIASTOLIC BLOOD PRESSURE: 65 MMHG | SYSTOLIC BLOOD PRESSURE: 100 MMHG | HEART RATE: 60 BPM | WEIGHT: 221 LBS

## 2019-10-22 DIAGNOSIS — E78.2 MIXED HYPERLIPIDEMIA: ICD-10-CM

## 2019-10-22 DIAGNOSIS — Z95.0 BIVENTRICULAR CARDIAC PACEMAKER IN SITU: ICD-10-CM

## 2019-10-22 DIAGNOSIS — I25.10 CORONARY ARTERY DISEASE INVOLVING NATIVE CORONARY ARTERY OF NATIVE HEART WITHOUT ANGINA PECTORIS: ICD-10-CM

## 2019-10-22 DIAGNOSIS — I48.19 PERSISTENT ATRIAL FIBRILLATION: ICD-10-CM

## 2019-10-22 PROCEDURE — 93306 TTE W/DOPPLER COMPLETE: CPT | Mod: S$GLB,,, | Performed by: INTERNAL MEDICINE

## 2019-10-22 PROCEDURE — 99999 PR PBB SHADOW E&M-EST. PATIENT-LVL II: ICD-10-PCS | Mod: PBBFAC,,,

## 2019-10-22 PROCEDURE — 93306 TRANSTHORACIC ECHO (TTE) COMPLETE: ICD-10-PCS | Mod: S$GLB,,, | Performed by: INTERNAL MEDICINE

## 2019-10-22 PROCEDURE — 99999 PR PBB SHADOW E&M-EST. PATIENT-LVL II: CPT | Mod: PBBFAC,,,

## 2019-10-23 LAB
ASCENDING AORTA: 3.77 CM
AV INDEX (PROSTH): 0.88
AV MEAN GRADIENT: 4 MMHG
AV PEAK GRADIENT: 7 MMHG
AV VALVE AREA: 3.01 CM2
AV VELOCITY RATIO: 0.96
BSA FOR ECHO PROCEDURE: 2.27 M2
CV ECHO LV RWT: 0.45 CM
DOP CALC AO PEAK VEL: 1.31 M/S
DOP CALC AO VTI: 24.21 CM
DOP CALC LVOT AREA: 3.4 CM2
DOP CALC LVOT DIAMETER: 2.09 CM
DOP CALC LVOT PEAK VEL: 1.26 M/S
DOP CALC LVOT STROKE VOLUME: 72.8 CM3
DOP CALCLVOT PEAK VEL VTI: 21.23 CM
E WAVE DECELERATION TIME: 169.3 MSEC
E/A RATIO: 2.94
E/E' RATIO: 10.84 M/S
ECHO LV POSTERIOR WALL: 1.19 CM (ref 0.6–1.1)
FRACTIONAL SHORTENING: 29 % (ref 28–44)
INTERVENTRICULAR SEPTUM: 1.11 CM (ref 0.6–1.1)
LA MAJOR: 6.2 CM
LA MINOR: 5.68 CM
LA WIDTH: 4.44 CM
LEFT ATRIUM SIZE: 4.57 CM
LEFT ATRIUM VOLUME INDEX: 45.5 ML/M2
LEFT ATRIUM VOLUME: 102.25 CM3
LEFT INTERNAL DIMENSION IN SYSTOLE: 3.75 CM (ref 2.1–4)
LEFT VENTRICLE DIASTOLIC VOLUME INDEX: 59.43 ML/M2
LEFT VENTRICLE DIASTOLIC VOLUME: 133.43 ML
LEFT VENTRICLE MASS INDEX: 107 G/M2
LEFT VENTRICLE SYSTOLIC VOLUME INDEX: 26.7 ML/M2
LEFT VENTRICLE SYSTOLIC VOLUME: 60.05 ML
LEFT VENTRICULAR INTERNAL DIMENSION IN DIASTOLE: 5.27 CM (ref 3.5–6)
LEFT VENTRICULAR MASS: 239.75 G
LV LATERAL E/E' RATIO: 9.36 M/S
LV SEPTAL E/E' RATIO: 12.88 M/S
MV PEAK A VEL: 0.35 M/S
MV PEAK E VEL: 1.03 M/S
PISA TR MAX VEL: 2.79 M/S
PULM VEIN S/D RATIO: 0.35
PV PEAK D VEL: 0.81 M/S
PV PEAK S VEL: 0.28 M/S
RA MAJOR: 5.02 CM
RA WIDTH: 3.52 CM
RIGHT VENTRICULAR END-DIASTOLIC DIMENSION: 3.58 CM
RV TISSUE DOPPLER FREE WALL SYSTOLIC VELOCITY 1 (APICAL 4 CHAMBER VIEW): 15.28 CM/S
SINUS: 3.39 CM
STJ: 3.37 CM
TDI LATERAL: 0.11 M/S
TDI SEPTAL: 0.08 M/S
TDI: 0.1 M/S
TR MAX PG: 31 MMHG
TRICUSPID ANNULAR PLANE SYSTOLIC EXCURSION: 3.01 CM

## 2019-10-25 RX ORDER — ATORVASTATIN CALCIUM 10 MG/1
TABLET, FILM COATED ORAL
Qty: 90 TABLET | Refills: 4 | Status: SHIPPED | OUTPATIENT
Start: 2019-10-25 | End: 2020-10-16

## 2019-10-29 ENCOUNTER — OFFICE VISIT (OUTPATIENT)
Dept: CARDIOLOGY | Facility: CLINIC | Age: 79
End: 2019-10-29
Payer: MEDICARE

## 2019-10-29 VITALS
WEIGHT: 230.19 LBS | HEIGHT: 73 IN | HEART RATE: 70 BPM | BODY MASS INDEX: 30.51 KG/M2 | DIASTOLIC BLOOD PRESSURE: 67 MMHG | SYSTOLIC BLOOD PRESSURE: 119 MMHG

## 2019-10-29 DIAGNOSIS — I70.0 AORTIC ATHEROSCLEROSIS: ICD-10-CM

## 2019-10-29 DIAGNOSIS — I25.10 CORONARY ARTERY DISEASE INVOLVING NATIVE CORONARY ARTERY OF NATIVE HEART WITHOUT ANGINA PECTORIS: Chronic | ICD-10-CM

## 2019-10-29 DIAGNOSIS — I42.8 CARDIOMYOPATHY, NONISCHEMIC: ICD-10-CM

## 2019-10-29 DIAGNOSIS — I48.19 PERSISTENT ATRIAL FIBRILLATION: Primary | ICD-10-CM

## 2019-10-29 DIAGNOSIS — E78.2 MIXED HYPERLIPIDEMIA: ICD-10-CM

## 2019-10-29 DIAGNOSIS — Z79.01 CURRENT USE OF LONG TERM ANTICOAGULATION: ICD-10-CM

## 2019-10-29 DIAGNOSIS — I50.32 CHRONIC DIASTOLIC HEART FAILURE: ICD-10-CM

## 2019-10-29 DIAGNOSIS — I10 ESSENTIAL HYPERTENSION: ICD-10-CM

## 2019-10-29 DIAGNOSIS — Z95.0 BIVENTRICULAR CARDIAC PACEMAKER IN SITU: ICD-10-CM

## 2019-10-29 PROCEDURE — 99999 PR PBB SHADOW E&M-EST. PATIENT-LVL III: ICD-10-PCS | Mod: PBBFAC,,, | Performed by: INTERNAL MEDICINE

## 2019-10-29 PROCEDURE — 1101F PT FALLS ASSESS-DOCD LE1/YR: CPT | Mod: CPTII,S$GLB,, | Performed by: INTERNAL MEDICINE

## 2019-10-29 PROCEDURE — 3078F DIAST BP <80 MM HG: CPT | Mod: CPTII,S$GLB,, | Performed by: INTERNAL MEDICINE

## 2019-10-29 PROCEDURE — 1101F PR PT FALLS ASSESS DOC 0-1 FALLS W/OUT INJ PAST YR: ICD-10-PCS | Mod: CPTII,S$GLB,, | Performed by: INTERNAL MEDICINE

## 2019-10-29 PROCEDURE — 3074F PR MOST RECENT SYSTOLIC BLOOD PRESSURE < 130 MM HG: ICD-10-PCS | Mod: CPTII,S$GLB,, | Performed by: INTERNAL MEDICINE

## 2019-10-29 PROCEDURE — 99214 OFFICE O/P EST MOD 30 MIN: CPT | Mod: S$GLB,,, | Performed by: INTERNAL MEDICINE

## 2019-10-29 PROCEDURE — 3074F SYST BP LT 130 MM HG: CPT | Mod: CPTII,S$GLB,, | Performed by: INTERNAL MEDICINE

## 2019-10-29 PROCEDURE — 99214 PR OFFICE/OUTPT VISIT, EST, LEVL IV, 30-39 MIN: ICD-10-PCS | Mod: S$GLB,,, | Performed by: INTERNAL MEDICINE

## 2019-10-29 PROCEDURE — 3078F PR MOST RECENT DIASTOLIC BLOOD PRESSURE < 80 MM HG: ICD-10-PCS | Mod: CPTII,S$GLB,, | Performed by: INTERNAL MEDICINE

## 2019-10-29 PROCEDURE — 99999 PR PBB SHADOW E&M-EST. PATIENT-LVL III: CPT | Mod: PBBFAC,,, | Performed by: INTERNAL MEDICINE

## 2019-10-29 NOTE — PROGRESS NOTES
Subjective:    Patient ID:  Heri Muhammad is a 78 y.o. male who presents for follow-up of No chief complaint on file.      HPI  Here for f/u PAF/CAD/ Bi-v PPM implant (due to decreasing EF w/o sig CAD). No angina.  Remains active.     Review of Systems   Constitution: Negative for malaise/fatigue.   Eyes: Negative for blurred vision.   Cardiovascular: Negative for chest pain, claudication, cyanosis, dyspnea on exertion, irregular heartbeat, leg swelling, near-syncope, orthopnea, palpitations, paroxysmal nocturnal dyspnea and syncope.   Respiratory: Negative for cough and shortness of breath.    Hematologic/Lymphatic: Does not bruise/bleed easily.   Musculoskeletal: Negative for back pain, falls, joint pain, muscle cramps, muscle weakness and myalgias.   Gastrointestinal: Negative for abdominal pain, change in bowel habit, nausea and vomiting.   Genitourinary: Negative for urgency.   Neurological: Negative for dizziness, focal weakness and light-headedness.       Past Medical History:   Diagnosis Date    A-fib     Anticoagulant long-term use     eliquis    Aortic atherosclerosis 9/18/2017    - LDL goal <70 - BP goal <130/80    Atrial flutter     Benign essential tremor     Biventricular cardiac pacemaker in situ 9/24/2018    CAD (coronary artery disease) 12/2/2013    - 9/2013 Left heart Cath: LAD- 60% prox (neg FFR), nl, ramus and RCA 20% - LDL goal <70 - BP goal <130/80    Cancer     skin cancer on back    Cardiac pacemaker in situ     Cardiomyopathy, nonischemic 2/12/2018    - 8/29/18 Echo EF 55%. Grade III diastolic dysfunction with restrictive physiology - followed by Cardiology    Carpal tunnel syndrome of left wrist 8/8/2017    CHF (congestive heart failure)     Chronic diastolic heart failure 1/19/2018    Chronic obstructive pulmonary disease 10/3/2018    Chronic right-sided thoracic back pain 4/27/2017    Coronary artery disease     Cough     Current use of long term anticoagulation  6/2/2016    - Eliquis for Afib    Dizziness     intermittant    DJD (degenerative joint disease) of knee     ED (erectile dysfunction)     Hard of hearing 11/2016    History of cataract     HTN (hypertension)     Hyperlipidemia     Hyperlipidemia     Hyponatremia 6/11/2019    Memory loss     Mixed hyperlipidemia 7/28/2012    Persistent atrial fibrillation 7/1/2015    - followed by Dr. Esparza - history of cardioversion - on Eliquis OAC 5 mg BID    Seizure disorder     Seizures     last episode 1995    Sinus node dysfunction 08/2016    Subclinical hypothyroidism           Objective:     Vitals:    10/29/19 1126   BP: 119/67   Pulse: 70        Physical Exam   Constitutional: He is oriented to person, place, and time. He appears well-developed and well-nourished. No distress.   /65 (BP Location: Left arm, Patient Position: Sitting, BP Method: Large (Automatic))   Pulse 78   Ht 6' (1.829 m)   Wt 103.9 kg (229 lb 0.9 oz)   BMI 31.07 kg/m²      HENT:   Head: Normocephalic and atraumatic.   Eyes: Pupils are equal, round, and reactive to light. Conjunctivae and lids are normal. Right eye exhibits no discharge. No scleral icterus.   Neck: Normal range of motion. Neck supple. No JVD present. No tracheal deviation present. No thyromegaly present.   Cardiovascular: Normal rate, regular rhythm, S1 normal, S2 normal, normal heart sounds and intact distal pulses. Exam reveals no gallop and no friction rub.   No murmur heard.  Pulses:       Carotid pulses are 2+ on the right side, and 2+ on the left side.       Radial pulses are 2+ on the right side, and 2+ on the left side.        Femoral pulses are 2+ on the right side, and 2+ on the left side.       Popliteal pulses are 2+ on the right side, and 2+ on the left side.        Dorsalis pedis pulses are 2+ on the right side, and 2+ on the left side.        Posterior tibial pulses are 2+ on the right side, and 2+ on the left side.   The pacemaker site is doing  well and without drainage.       Pulmonary/Chest: Effort normal and breath sounds normal. No respiratory distress. He has no wheezes. He has no rales. He exhibits no tenderness.   Pacer left chest   Abdominal: Soft. Bowel sounds are normal. He exhibits no distension and no mass. There is no hepatosplenomegaly or hepatomegaly. There is no tenderness. There is no rebound and no guarding.   Musculoskeletal: Normal range of motion. He exhibits no edema or tenderness.   Lymphadenopathy:     He has no cervical adenopathy.   Neurological: He is alert and oriented to person, place, and time. He has normal reflexes. No cranial nerve deficit. Coordination normal.   Skin: Skin is warm and dry. No rash noted. He is not diaphoretic. No erythema. No pallor.   Psychiatric: He has a normal mood and affect. His speech is normal and behavior is normal. Judgment and thought content normal. Cognition and memory are normal.   Nursing note and vitals reviewed.            ..    Chemistry        Component Value Date/Time     10/22/2019 0810    K 4.6 10/22/2019 0810     10/22/2019 0810    CO2 30 (H) 10/22/2019 0810    BUN 10 10/22/2019 0810    CREATININE 0.8 10/22/2019 0810     10/22/2019 0810        Component Value Date/Time    CALCIUM 8.5 (L) 10/22/2019 0810    ALKPHOS 92 10/22/2019 0810    AST 26 10/22/2019 0810    AST 35 11/27/2015 1703    ALT 16 10/22/2019 0810    BILITOT 0.4 10/22/2019 0810    ESTGFRAFRICA >60.0 10/22/2019 0810    EGFRNONAA >60.0 10/22/2019 0810            ..  Lab Results   Component Value Date    CHOL 172 10/22/2019    CHOL 173 08/29/2018    CHOL 128 01/20/2018     Lab Results   Component Value Date    HDL 37 (L) 10/22/2019    HDL 36 (L) 08/29/2018    HDL 27 (L) 01/20/2018     Lab Results   Component Value Date    LDLCALC 108.6 10/22/2019    LDLCALC 117.0 08/29/2018    LDLCALC 81.6 01/20/2018     Lab Results   Component Value Date    TRIG 132 10/22/2019    TRIG 100 08/29/2018    TRIG 97 01/20/2018      Lab Results   Component Value Date    CHOLHDL 21.5 10/22/2019    CHOLHDL 20.8 08/29/2018    CHOLHDL 21.1 01/20/2018     ..  Lab Results   Component Value Date    WBC 6.40 10/22/2019    HGB 14.6 10/22/2019    HCT 45.6 10/22/2019    MCV 97 10/22/2019     10/22/2019       Test(s) Reviewed  I have reviewed the following in detail:  [] Stress test   [] Angiography   [x] Echocardiogram   [x] Labs   [x] Other:       Assessment:         ICD-10-CM ICD-9-CM   1. Persistent atrial fibrillation I48.19 427.31   2. Mixed hyperlipidemia E78.2 272.2   3. Essential hypertension I10 401.9   4. Cardiomyopathy, nonischemic I42.8 425.4   5. Chronic diastolic heart failure I50.32 428.32   6. Coronary artery disease involving native coronary artery of native heart without angina pectoris I25.10 414.01   7. Aortic atherosclerosis I70.0 440.0   8. Biventricular cardiac pacemaker in situ Z95.0 V45.01   9. Current use of long term anticoagulation Z79.01 V58.61     Problem List Items Addressed This Visit     Persistent atrial fibrillation - Primary    Overview     - followed by Dr. Esparza  - history of cardioversion  - on Eliquis OAC 5 mg BID         Mixed hyperlipidemia    Essential hypertension    Current use of long term anticoagulation    Overview     - Eliquis for Afib         Chronic diastolic heart failure    Cardiomyopathy, nonischemic    Overview     - 8/29/18 Echo EF 55%. Grade III diastolic dysfunction with restrictive physiology  - followed by Cardiology         CAD (coronary artery disease) (Chronic)    Overview     - 9/2013 Left heart Cath: LAD- 60% prox (neg FFR), nl, ramus and RCA 20%  - LDL goal <70  - BP goal <130/80         Biventricular cardiac pacemaker in situ    Aortic atherosclerosis    Overview     - LDL goal <70  - BP goal <130/80                Plan:           Return to clinic 1 year   Low level/low impact aerobic exercise 5x's/wk. Heart healthy diet and risk factor modification.    See labs and med  orders.      Portions of this note may have been created with voice recognition software.  Grammatical, syntax and spelling errors may be inevitable.

## 2019-11-01 NOTE — PROGRESS NOTES
"FAMILY MEDICINE    Patient Active Problem List   Diagnosis    Mixed hyperlipidemia    CAD (coronary artery disease)    Lumbar spondylosis    Persistent atrial fibrillation    Essential tremor    Current use of long term anticoagulation    Seizure disorder    Essential hypertension    Aortic atherosclerosis    Chronic diastolic heart failure    Subclinical hypothyroidism    Late onset Alzheimer's disease without behavioral disturbance    Cardiomyopathy, nonischemic    Biventricular cardiac pacemaker in situ    Chronic bronchitis    Chronic bilateral low back pain without sciatica    Obesity (BMI 30.0-34.9)    History of skin cancer       CC:   Chief Complaint   Patient presents with    Follow-up     labs       SUBJECTIVE:  Heri Muhammad   is a 78 y.o. male  - with obesity, seizure disorder, hyperlipidemia, CAD (non-obstructive and no history of stents), DJD lumbar spine, Afib/AFlutter (on Eliquis, h/o ablation and pacemaker in place), hypertension, aortic atherosclerosis, HFpEF (8/2018 EF 55%), subclinical hypothyroidism, restrictive cardiomyopathy, chronic low back pain, chronic bilateral shoulder pain s/p bilateral shoulder replacements and COPD presents for follow-up thyroid labs    He has chronic low back pain and has seen Pain Management in the past and would like to be re-evaluated by Pain Management. Reports that he has had GAGAN and nerve ablation in the past that was helpful. He has been to PT and reports that not helpful. Admits to very little activity and wife reports "he just sits on the couch all day." He uses a cane to ambulation. Denies any radiation of his back pain down his legs.     1. Subclinical Hypothyroidism:     Age diagnosed: 1/19/18  Prior evaluation by Endocrinologist: none  Prior thyroid US: none    Current medication:   None     Symptoms from thyroid issue: denies  Concerns: denies    Lab Results       Component                Value               Date                      "  TSH                      4.250 (H)           10/30/2019                 FREET4                   0.85                10/30/2019                       2. COPD    Prior hospitalizations: yes (last 6/10/19)  History of intubation: denies    Current regimen:   inhalation spacing device (BREATHERITE MDI SPACER), Use as directed for inhalation., Disp: 1 each, Rfl: 0  ipratropium-albuterol (COMBIVENT)  mcg/actuation inhaler, Inhale 1 puff into the lungs every 6 (six) hours as needed for Wheezing or Shortness of Breath. Rescue, Disp: 4 g, Rfl: 0  umeclidinium-vilanterol (ANORO ELLIPTA) 62.5-25 mcg/actuation DsDv, Inhale 1 puff into the lungs Daily. Controller, Disp: 60 each, Rfl: 11    Current symptoms: denies  Recent exacerbations: 6/10/19  How often using rescue inhaler? About once every 2 days    Last PFT: none    Vaccines:   Influenza: 9/7/18 due every fall  Prevnar 13: 10/24/16  Pneumovax 23: 10/27/17    3. Hypertension  - with Afib and CHF    Current medication treatment:   metoprolol succinate (TOPROL-XL) 50 MG 24 hr tablet, TAKE 1 TABLET (50 MG TOTAL) BY MOUTH ONCE DAILY., Disp: 90 tablet, Rfl: 4  torsemide (DEMADEX) 10 MG Tab, TAKE 1 TABLET (10 MG TOTAL) BY MOUTH EVERY MORNING., Disp: 90 tablet, Rfl: 4    Medication side effects: denies  Exercise regimen: none  Dietary treatment:  Poor compliance           ROS: Review of Systems   Constitutional: Negative.    HENT: Negative.    Eyes: Negative.    Respiratory: Negative.    Cardiovascular: Negative.    Gastrointestinal: Negative.    Endocrine: Negative.    Genitourinary: Negative.    Musculoskeletal: Positive for arthralgias, back pain and gait problem.        Otherwise negative   Skin: Negative.    Allergic/Immunologic: Negative.    Hematological: Negative.    Psychiatric/Behavioral: Negative.        Past Medical History:   Diagnosis Date    A-fib     Anticoagulant long-term use     eliquis    Aortic atherosclerosis 9/18/2017    - LDL goal <70 - BP goal  <130/80    Atrial flutter     Benign essential tremor     Biventricular cardiac pacemaker in situ 9/24/2018    CAD (coronary artery disease) 12/2/2013    - 9/2013 Left heart Cath: LAD- 60% prox (neg FFR), nl, ramus and RCA 20% - LDL goal <70 - BP goal <130/80    Cancer     skin cancer on back    Cardiac pacemaker in situ     Cardiomyopathy, nonischemic 2/12/2018    - 8/29/18 Echo EF 55%. Grade III diastolic dysfunction with restrictive physiology - followed by Cardiology    Carpal tunnel syndrome of left wrist 8/8/2017    CHF (congestive heart failure)     Chronic diastolic heart failure 1/19/2018    Chronic obstructive pulmonary disease 10/3/2018    Chronic right-sided thoracic back pain 4/27/2017    Coronary artery disease     Cough     Current use of long term anticoagulation 6/2/2016    - Eliquis for Afib    Dizziness     intermittant    DJD (degenerative joint disease) of knee     ED (erectile dysfunction)     Hard of hearing 11/2016    History of cataract     HTN (hypertension)     Hyperlipidemia     Hyperlipidemia     Hyponatremia 6/11/2019    Memory loss     Mixed hyperlipidemia 7/28/2012    Persistent atrial fibrillation 7/1/2015    - followed by Dr. Esparza - history of cardioversion - on Eliquis OAC 5 mg BID    Seizure disorder     Seizures     last episode 1995    Sinus node dysfunction 08/2016    Subclinical hypothyroidism     Tension type headache 7/2/2019       Past Surgical History:   Procedure Laterality Date    ANKLE SURGERY Right     pins and screws    ATRIAL ABLATION SURGERY      CARDIAC PACEMAKER PLACEMENT  2014    CARDIAC PACEMAKER PLACEMENT      CARDIAC PACEMAKER PLACEMENT  2014    CARDIOVERSION      CATARACT EXTRACTION      OU    COLONOSCOPY      due in 2014    EYE SURGERY Bilateral     cataracts extraction    FRACTURE SURGERY Right     ankle with hardware    HERNIA REPAIR      abdominal    INJECTION OF ANESTHETIC AGENT AROUND MEDIAL BRANCH NERVES  INNERVATING LUMBAR FACET JOINT Bilateral 2019    Procedure: Block-nerve-medial branch-lumbar L2-L5;  Surgeon: Anthony Moffett MD;  Location: [facility] OR;  Service: Pain Management;  Laterality: Bilateral;    JOINT REPLACEMENT Bilateral     shoulders    RADIOFREQUENCY ABLATION OF LUMBAR MEDIAL BRANCH NERVE AT SINGLE LEVEL Bilateral 2019    Procedure: Radiofrequency Ablation, Nerve, Spinal, Lumbar, Medial Branch, L2-L5;  Surgeon: Anthony Moffett MD;  Location: [facility] OR;  Service: Pain Management;  Laterality: Bilateral;       Family History   Problem Relation Age of Onset    Heart disease Mother     Heart failure Mother     Dementia Mother     Cancer Father         colon    Blindness Father         accident    Cataracts Father     Hypertension Father     Tremor Father     Tremor Brother     Tremor Paternal Grandfather     Cancer Daughter     No Known Problems Son     Obesity Daughter     Melanoma Neg Hx     Amblyopia Neg Hx     Diabetes Neg Hx     Glaucoma Neg Hx     Macular degeneration Neg Hx     Retinal detachment Neg Hx     Strabismus Neg Hx     Stroke Neg Hx     Thyroid disease Neg Hx        Social History     Tobacco Use    Smoking status: Former Smoker     Packs/day: 1.00     Years: 5.00     Pack years: 5.00     Types: Cigarettes     Last attempt to quit:      Years since quittin.8    Smokeless tobacco: Never Used   Substance Use Topics    Alcohol use: No    Drug use: No       Social History     Social History Narrative    . 4 adult children.        ALLERGIES:   Review of patient's allergies indicates:   Allergen Reactions    Bactroban [mupirocin calcium] Rash     Other reaction(s): Rash       MEDS:   Current Outpatient Medications:     acetaminophen (TYLENOL) 325 MG tablet, Take 325 mg by mouth every 6 (six) hours as needed for Pain., Disp: , Rfl:     ANORO ELLIPTA 62.5-25 mcg/actuation DsDv, INHALE 1 PUFF DAILY (CONTROLLER), Disp: 90 each, Rfl: 3     "apixaban (ELIQUIS) 5 mg Tab, Take 1 tablet (5 mg total) by mouth 2 (two) times daily., Disp: 180 tablet, Rfl: 4    aspirin (ECOTRIN) 81 MG EC tablet, Take 81 mg by mouth once daily., Disp: , Rfl:     atorvastatin (LIPITOR) 10 MG tablet, TAKE 1 TABLET EVERY EVENING, Disp: 90 tablet, Rfl: 4    cyanocobalamin (VITAMIN B-12) 1000 MCG tablet, Take 100 mcg by mouth once daily., Disp: , Rfl:     cyclobenzaprine (FLEXERIL) 5 MG tablet, Take 1 tablet (5 mg total) by mouth 2 (two) times daily as needed for Muscle spasms., Disp: 40 tablet, Rfl: 1    ketoconazole (NIZORAL) 2 % cream, AAA bid to groin and feet x 3 weeks, Disp: 60 g, Rfl: 3    lamoTRIgine (LAMICTAL) 100 MG tablet, Take 1 tablet (100 mg total) by mouth 2 (two) times daily., Disp: 180 tablet, Rfl: 3    memantine (NAMENDA) 10 MG Tab, Take 1 tablet (10 mg total) by mouth 2 (two) times daily., Disp: 180 tablet, Rfl: 3    metoprolol succinate (TOPROL-XL) 50 MG 24 hr tablet, TAKE 1 TABLET (50 MG TOTAL) BY MOUTH ONCE DAILY., Disp: 90 tablet, Rfl: 4    primidone (MYSOLINE) 250 MG Tab, Take 2 tablets (500 mg total) by mouth 2 (two) times daily., Disp: 360 tablet, Rfl: 3    torsemide (DEMADEX) 10 MG Tab, TAKE 1 TABLET (10 MG TOTAL) BY MOUTH EVERY MORNING., Disp: 90 tablet, Rfl: 4    Current Facility-Administered Medications:     albuterol-ipratropium 2.5 mg-0.5 mg/3 mL nebulizer solution 3 mL, 3 mL, Nebulization, Once, Ora Murillo DO    OBJECTIVE:   Vitals:    11/04/19 1328   BP: 118/60   BP Location: Left arm   Patient Position: Sitting   BP Method: X-Large (Manual)   Pulse: 70   Resp: 18   Temp: 97.5 °F (36.4 °C)   TempSrc: Oral   SpO2: 97%   Weight: 102.5 kg (225 lb 14.4 oz)   Height: 6' 1" (1.854 m)     Body mass index is 29.8 kg/m².    Physical Exam   Constitutional: No distress.   Neck: Neck supple.   Cardiovascular: Normal rate, regular rhythm, normal heart sounds and intact distal pulses. Exam reveals no gallop and no friction rub.   No murmur " heard.  Pulmonary/Chest: Effort normal and breath sounds normal. He has no decreased breath sounds. He has no wheezes. He has no rhonchi. He has no rales.   Musculoskeletal: He exhibits no edema.   Lumbar:  AROM:  Right side bending: middle finger to lateral mid thigh  Left side bending: middle finger to lateral mid thigh  Right rotation: normal  Left rotation: normal  Forward flexion:middle finger to knees  Extension: normal    Straight leg raise seated: negative bilaterally  Straight leg raise supine: negative bilaterally     Neurological: He is alert.   Reflex Scores:       Patellar reflexes are 2+ on the right side and 2+ on the left side.       Achilles reflexes are 2+ on the right side and 2+ on the left side.  Skin: Skin is warm.         PERTINENT RESULTS:   Notes recorded by rOa Murillo DO on 7/31/2019 at 1:50 PM CDT       Ref Range & Rrlra2wh agoInterpretation     PFT     There is no obstruction on spirometry.   There is mild restrictive lung disease on lung volume testing.   THe DLCO is normal.     Debra Birch MD   Pulmonary/CCM       Pre FVC3.07 - 5.45 L  3.62  Pre FEV12.16 - 4.08 L  2.87  Pre FEV1 FVC59.33 - 88.86 %  79.32  Pre FEF 25 750.80 - 3.48 L/s  3.07  Pre PEF5.38 - 10.39 L/s  6.19  Pre FET 100sec  7.24  Pre DLCO20.67 - 34.53 ml/(min*mmHg)  27.16  DLCO ADJ PRE20.67 - 34.53 ml/(min*mmHg)  27.16  DLCOVA PRE2.53 - 4.63 ml/(min*mmHg*L)  4.57  DLVAAdj PRE2.53 - 4.63 ml/(min*mmHg*L)  4.57  VA PRE7.55 - 7.55 L  5.94  IVC PRE3.07 - 5.45 L  3.84  TLCN2 PRE6.55 - 8.85 L  5.49  VCMAXN2 PRE3.07 - 5.45 L  3.71  Pre FRC N2L  3.35  ERVN2 PRE1.01 - 1.01 L  0.67  RVN2 PRE2.28 - 3.63 L  1.77  FDV6HZPH9 PRE36.18 - 54.14 %  32.34  FVC Ref  4.26  Resulting Agency[facility]      3/12/2019       Narrative     EXAMINATION:  XR LUMBAR SPINE AP AND LATERAL    CLINICAL HISTORY:  Low back pain, >6wks conservative tx, persistent-progressive sx, surgical candidate;Low back pain    TECHNIQUE:  AP, lateral and spot images were  performed of the lumbar spine.    COMPARISON:  Plain films of the lumbar spine dated 12/11/2018    FINDINGS:  There is no definite change in the appearance of the lumbar spine compared to the prior study.  There is no fracture.  The vertebral bodies maintain normal height.  There may be trace anterolisthesis of L5 on S1.  There is disc space narrowing at the L4-5 level which is unchanged.  There is facet joint arthropathy at the lower 2 lumbar levels.  There is degenerative change at the thoracolumbar junction with disc space narrowing and osteophyte formation, unchanged.      Impression       As above.      Electronically signed by: Gregorio Morgan MD  Date: 03/12/2019  Time: 14:47       BMP  Lab Results   Component Value Date     10/22/2019    K 4.6 10/22/2019     10/22/2019    CO2 30 (H) 10/22/2019    BUN 10 10/22/2019    CREATININE 0.8 10/22/2019    CALCIUM 8.5 (L) 10/22/2019    ANIONGAP 12 10/22/2019    ESTGFRAFRICA >60.0 10/22/2019    EGFRNONAA >60.0 10/22/2019     Lab Results   Component Value Date    TSH 4.250 (H) 10/30/2019    FREET4 0.85 10/30/2019       ASSESSMENT:  Problem List Items Addressed This Visit        Neuro    Lumbar spondylosis    Current Assessment & Plan     - no signs of neuro-claudication  - recommend PT and counseling on importance of activity, core stabilization and weight loss to assist with pain control  - pt declined PT  - okay to see Pain Management         Relevant Orders    Ambulatory referral to Pain Clinic       Pulmonary    Chronic bronchitis - Primary    Overview     - 7/30/19 PFT: no obstruction         Current Assessment & Plan     - well controlled  - continue current medications            Cardiac/Vascular    CAD (coronary artery disease) (Chronic)    Overview     - 9/2013 Left heart Cath: LAD- 60% prox (neg FFR), nl, ramus and RCA 20%  - LDL goal <70  - BP goal <130/80  - ASA 81 mg daily         Mixed hyperlipidemia    Current Assessment & Plan     Lab  Results   Component Value Date    LDLCALC 108.6 10/22/2019     - LDL goal <70  - Cardiology following and continuing Atorvastatin 10  Mg daily           Persistent atrial fibrillation    Overview     - followed by Dr. Esparza  - history of cardioversion  - on Eliquis OAC 5 mg BID         Essential hypertension    Current Assessment & Plan     - well controlled  - continue current medications         Aortic atherosclerosis    Overview     - LDL goal <70  - BP goal <130/80         Chronic diastolic heart failure    Current Assessment & Plan     - grade III  - no signed of acute exacerbation            Hematology    Current use of long term anticoagulation    Overview     - Eliquis for Afib         Current Assessment & Plan     - no signs of acute bleeding            Endocrine    Subclinical hypothyroidism    Current Assessment & Plan     - asymptomatic  - monitor yearly or sooner if symptoms arise            Orthopedic    Chronic bilateral low back pain without sciatica    Current Assessment & Plan     - okay to re-establish with Pain Management  - however discussed that if continues to be sedentary, he will get little relief  - stressed importance of weight loss, physical activity, PT and core stablization         Relevant Orders    Ambulatory referral to Pain Clinic          PLAN:   Orders Placed This Encounter    Ambulatory referral to Pain Clinic     Follow-up in 6-12 months    Dr. Ora Murillo D.O.   Family Medicine

## 2019-11-04 ENCOUNTER — OFFICE VISIT (OUTPATIENT)
Dept: FAMILY MEDICINE | Facility: CLINIC | Age: 79
End: 2019-11-04
Payer: MEDICARE

## 2019-11-04 VITALS
DIASTOLIC BLOOD PRESSURE: 60 MMHG | TEMPERATURE: 98 F | SYSTOLIC BLOOD PRESSURE: 118 MMHG | HEART RATE: 70 BPM | OXYGEN SATURATION: 97 % | WEIGHT: 225.88 LBS | HEIGHT: 73 IN | BODY MASS INDEX: 29.94 KG/M2 | RESPIRATION RATE: 18 BRPM

## 2019-11-04 DIAGNOSIS — I48.19 PERSISTENT ATRIAL FIBRILLATION: ICD-10-CM

## 2019-11-04 DIAGNOSIS — E78.2 MIXED HYPERLIPIDEMIA: ICD-10-CM

## 2019-11-04 DIAGNOSIS — G89.29 CHRONIC BILATERAL LOW BACK PAIN WITHOUT SCIATICA: ICD-10-CM

## 2019-11-04 DIAGNOSIS — E03.8 SUBCLINICAL HYPOTHYROIDISM: ICD-10-CM

## 2019-11-04 DIAGNOSIS — J41.0 SIMPLE CHRONIC BRONCHITIS: Primary | ICD-10-CM

## 2019-11-04 DIAGNOSIS — Z79.01 CURRENT USE OF LONG TERM ANTICOAGULATION: ICD-10-CM

## 2019-11-04 DIAGNOSIS — I10 ESSENTIAL HYPERTENSION: ICD-10-CM

## 2019-11-04 DIAGNOSIS — M47.816 LUMBAR SPONDYLOSIS: ICD-10-CM

## 2019-11-04 DIAGNOSIS — M54.50 CHRONIC BILATERAL LOW BACK PAIN WITHOUT SCIATICA: ICD-10-CM

## 2019-11-04 DIAGNOSIS — I50.32 CHRONIC DIASTOLIC HEART FAILURE: ICD-10-CM

## 2019-11-04 DIAGNOSIS — I25.10 CORONARY ARTERY DISEASE INVOLVING NATIVE CORONARY ARTERY OF NATIVE HEART WITHOUT ANGINA PECTORIS: Chronic | ICD-10-CM

## 2019-11-04 DIAGNOSIS — I70.0 AORTIC ATHEROSCLEROSIS: ICD-10-CM

## 2019-11-04 PROBLEM — G44.209 TENSION TYPE HEADACHE: Status: RESOLVED | Noted: 2019-07-02 | Resolved: 2019-11-04

## 2019-11-04 PROBLEM — J42 CHRONIC BRONCHITIS: Status: ACTIVE | Noted: 2018-10-03

## 2019-11-04 PROCEDURE — 99214 PR OFFICE/OUTPT VISIT, EST, LEVL IV, 30-39 MIN: ICD-10-PCS | Mod: S$GLB,,, | Performed by: FAMILY MEDICINE

## 2019-11-04 PROCEDURE — 99999 PR PBB SHADOW E&M-EST. PATIENT-LVL V: CPT | Mod: PBBFAC,,, | Performed by: FAMILY MEDICINE

## 2019-11-04 PROCEDURE — 3074F PR MOST RECENT SYSTOLIC BLOOD PRESSURE < 130 MM HG: ICD-10-PCS | Mod: CPTII,S$GLB,, | Performed by: FAMILY MEDICINE

## 2019-11-04 PROCEDURE — 3078F PR MOST RECENT DIASTOLIC BLOOD PRESSURE < 80 MM HG: ICD-10-PCS | Mod: CPTII,S$GLB,, | Performed by: FAMILY MEDICINE

## 2019-11-04 PROCEDURE — 99999 PR PBB SHADOW E&M-EST. PATIENT-LVL V: ICD-10-PCS | Mod: PBBFAC,,, | Performed by: FAMILY MEDICINE

## 2019-11-04 PROCEDURE — 99214 OFFICE O/P EST MOD 30 MIN: CPT | Mod: S$GLB,,, | Performed by: FAMILY MEDICINE

## 2019-11-04 PROCEDURE — 3078F DIAST BP <80 MM HG: CPT | Mod: CPTII,S$GLB,, | Performed by: FAMILY MEDICINE

## 2019-11-04 PROCEDURE — 3074F SYST BP LT 130 MM HG: CPT | Mod: CPTII,S$GLB,, | Performed by: FAMILY MEDICINE

## 2019-11-04 PROCEDURE — 1101F PR PT FALLS ASSESS DOC 0-1 FALLS W/OUT INJ PAST YR: ICD-10-PCS | Mod: CPTII,S$GLB,, | Performed by: FAMILY MEDICINE

## 2019-11-04 PROCEDURE — 1101F PT FALLS ASSESS-DOCD LE1/YR: CPT | Mod: CPTII,S$GLB,, | Performed by: FAMILY MEDICINE

## 2019-11-04 RX ORDER — ASPIRIN 81 MG/1
81 TABLET ORAL DAILY
COMMUNITY
End: 2020-01-14

## 2019-11-04 NOTE — ASSESSMENT & PLAN NOTE
Lab Results   Component Value Date    LDLCALC 108.6 10/22/2019     - LDL goal <70  - Cardiology following and continuing Atorvastatin 10  Mg daily

## 2019-11-04 NOTE — ASSESSMENT & PLAN NOTE
- no signs of neuro-claudication  - recommend PT and counseling on importance of activity, core stabilization and weight loss to assist with pain control  - pt declined PT  - okay to see Pain Management

## 2019-11-04 NOTE — ASSESSMENT & PLAN NOTE
- okay to re-establish with Pain Management  - however discussed that if continues to be sedentary, he will get little relief  - stressed importance of weight loss, physical activity, PT and core stablization

## 2019-11-20 ENCOUNTER — TELEPHONE (OUTPATIENT)
Dept: FAMILY MEDICINE | Facility: CLINIC | Age: 79
End: 2019-11-20

## 2019-11-20 ENCOUNTER — OFFICE VISIT (OUTPATIENT)
Dept: PAIN MEDICINE | Facility: CLINIC | Age: 79
End: 2019-11-20
Payer: MEDICARE

## 2019-11-20 VITALS
HEART RATE: 89 BPM | SYSTOLIC BLOOD PRESSURE: 130 MMHG | DIASTOLIC BLOOD PRESSURE: 78 MMHG | WEIGHT: 225 LBS | BODY MASS INDEX: 29.69 KG/M2

## 2019-11-20 DIAGNOSIS — G89.29 CHRONIC PAIN OF RIGHT ANKLE: ICD-10-CM

## 2019-11-20 DIAGNOSIS — M25.511 BILATERAL SHOULDER PAIN, UNSPECIFIED CHRONICITY: ICD-10-CM

## 2019-11-20 DIAGNOSIS — M25.571 CHRONIC PAIN OF RIGHT ANKLE: ICD-10-CM

## 2019-11-20 DIAGNOSIS — M79.18 MYOFASCIAL PAIN: ICD-10-CM

## 2019-11-20 DIAGNOSIS — M54.50 CHRONIC BILATERAL LOW BACK PAIN WITHOUT SCIATICA: Primary | ICD-10-CM

## 2019-11-20 DIAGNOSIS — M25.512 BILATERAL SHOULDER PAIN, UNSPECIFIED CHRONICITY: ICD-10-CM

## 2019-11-20 DIAGNOSIS — G89.29 CHRONIC BILATERAL LOW BACK PAIN WITHOUT SCIATICA: Primary | ICD-10-CM

## 2019-11-20 DIAGNOSIS — G89.29 CHRONIC FOOT PAIN, RIGHT: ICD-10-CM

## 2019-11-20 DIAGNOSIS — M79.671 CHRONIC FOOT PAIN, RIGHT: ICD-10-CM

## 2019-11-20 PROCEDURE — 3075F SYST BP GE 130 - 139MM HG: CPT | Mod: CPTII,S$GLB,, | Performed by: PAIN MEDICINE

## 2019-11-20 PROCEDURE — 1159F MED LIST DOCD IN RCRD: CPT | Mod: S$GLB,,, | Performed by: PAIN MEDICINE

## 2019-11-20 PROCEDURE — 1101F PR PT FALLS ASSESS DOC 0-1 FALLS W/OUT INJ PAST YR: ICD-10-PCS | Mod: CPTII,S$GLB,, | Performed by: PAIN MEDICINE

## 2019-11-20 PROCEDURE — 99999 PR PBB SHADOW E&M-EST. PATIENT-LVL III: CPT | Mod: PBBFAC,,, | Performed by: PAIN MEDICINE

## 2019-11-20 PROCEDURE — 1101F PT FALLS ASSESS-DOCD LE1/YR: CPT | Mod: CPTII,S$GLB,, | Performed by: PAIN MEDICINE

## 2019-11-20 PROCEDURE — 99214 OFFICE O/P EST MOD 30 MIN: CPT | Mod: S$GLB,,, | Performed by: PAIN MEDICINE

## 2019-11-20 PROCEDURE — 1125F PR PAIN SEVERITY QUANTIFIED, PAIN PRESENT: ICD-10-PCS | Mod: S$GLB,,, | Performed by: PAIN MEDICINE

## 2019-11-20 PROCEDURE — 3075F PR MOST RECENT SYSTOLIC BLOOD PRESS GE 130-139MM HG: ICD-10-PCS | Mod: CPTII,S$GLB,, | Performed by: PAIN MEDICINE

## 2019-11-20 PROCEDURE — 99999 PR PBB SHADOW E&M-EST. PATIENT-LVL III: ICD-10-PCS | Mod: PBBFAC,,, | Performed by: PAIN MEDICINE

## 2019-11-20 PROCEDURE — 1125F AMNT PAIN NOTED PAIN PRSNT: CPT | Mod: S$GLB,,, | Performed by: PAIN MEDICINE

## 2019-11-20 PROCEDURE — 3078F PR MOST RECENT DIASTOLIC BLOOD PRESSURE < 80 MM HG: ICD-10-PCS | Mod: CPTII,S$GLB,, | Performed by: PAIN MEDICINE

## 2019-11-20 PROCEDURE — 99214 PR OFFICE/OUTPT VISIT, EST, LEVL IV, 30-39 MIN: ICD-10-PCS | Mod: S$GLB,,, | Performed by: PAIN MEDICINE

## 2019-11-20 PROCEDURE — 1159F PR MEDICATION LIST DOCUMENTED IN MEDICAL RECORD: ICD-10-PCS | Mod: S$GLB,,, | Performed by: PAIN MEDICINE

## 2019-11-20 PROCEDURE — 3078F DIAST BP <80 MM HG: CPT | Mod: CPTII,S$GLB,, | Performed by: PAIN MEDICINE

## 2019-11-20 NOTE — LETTER
November 20, 2019      Ora Murillo, DO  [address] [facility]  [address]   [facility] [address]           [place] [facility]  [address] [place] [address] [address]  [facility] [address]  Phone: [phone]          Patient: Heri Muhammad   MR Number: [id number]   YOB: 1940   Date of Visit: 11/20/2019       Dear Dr. Ora Murillo:    Thank you for referring Heri Muhammad to me for evaluation. Attached you will find relevant portions of my assessment and plan of care.    If you have questions, please do not hesitate to call me. I look forward to following Heri Muhammad along with you.    Sincerely,    Cynthia Oh Jr., MD    Enclosure  CC:  No Recipients    If you would like to receive this communication electronically, please contact [email or web address] or [phone] to request more information on [employer] Link access.    For providers and/or their staff who would like to refer a patient to [facility], please contact us through our one-stop-shop provider referral line, [facility], at [phone].    If you feel you have received this communication in error or would no longer like to receive these types of communications, please e-mail [email or web address]

## 2019-11-20 NOTE — TELEPHONE ENCOUNTER
----- Message from Summer uG sent at 11/20/2019  2:09 PM CST -----  Contact: wife [phone]  Type:  Sooner Appointment Request     Caller is requesting a sooner appointment.  Caller declined first available appointment listed below.  Caller will not accept being placed on the waitlist and is requesting a message be sent to doctor.  Name of Caller: pt  When is the first available appointment? 1/8/2020  Symptoms or Reason: pain in lower abdomen  Would the patient rather a call back or a response via [facility]? Call back  Best Call Back Number: [phone]  Additional Information:

## 2019-11-20 NOTE — PROGRESS NOTES
"[facility] Established Patient Evaluation    Chief Complaint  No chief complaint on file.      Last 3 PDI Scores 1/15/2019 10/2/2014   Pain Disability Index (PDI) 43 0       Interval Update  Heri Muhammad is transferring care to my clinic from Dr. Moffett.  He moved tot [facility].  He states intermittent low back/flank pain and intermittent shoulder pains that last 1-2 days at a time and occur 4-5 times per month.  Between episodes he feels pain from "arthritis" but he states "I can handle that."  He underwent bilateral medial branch block with Dr. Moffett at the beginning of 2019 which provided him unilateral relief of his symptoms.  When last evaluated, he was diagnosed primarily with myofascial pain and given strong recommendation for physical therapy.  He never started physical therapy because it was recommended at the same time that he and his wife removing to the [employer] [place].    Location: right shoulder   Onset: yrs  Current Pain Score: 6/10  Daily Pain of Range: 2-6/10  Quality: Aching and Sharp  Radiation: no  Worsened by: standing and walking for more than 1 minutes  Improved by: rest and sitting    Background from Chart Review  Interval HPI 3/12/19: Mr. Muhammad returns to the office for follow up. Today with left greater than right sided back pain, 7/10, intermittent, sharp, relieved with back flexion.  TPI in the office on 2/11/19 with mild relief.  No radicular pain or radiculopathy.  Continues flexeril, lidocaine patch, and tylenol prn     Interval HPI 2/11/19: Mr. Muhammad returns for post injection visit.  He is s/p bilateral L2-L5 RFA on 1/23/19 with 100% relief of the right side and 0% of the left side.  Today his pain is on the left side, 8/10, sharp, no radicular pain or radiculopathy.  Worse with walking and back flexion.  No numbness, tingling.  Continues flexeril, tylenol, ibuprofen prn with good relief      Treatment History  PT/OT: Referred but never started  HEP: Lives a VERY " sedentary lifestyle  TENS:  Injections: Bilat L2-5 MBB with 100% relief of right and none on left  Surgery: Bilateral shoulder replacement in approx yr 2000  Medications:   - NSAIDS:   - MSK Relaxants:   - TCAs:   - SNRIs:   - Topicals:   - Opioids:   - Anticonvulsants:     Current Pain Medications  1. Tylenol     Full Medication List    Current Outpatient Medications:     acetaminophen (TYLENOL) 325 MG tablet, Take 325 mg by mouth every 6 (six) hours as needed for Pain., Disp: , Rfl:     ANORO ELLIPTA 62.5-25 mcg/actuation DsDv, INHALE 1 PUFF DAILY (CONTROLLER), Disp: 90 each, Rfl: 3    apixaban (ELIQUIS) 5 mg Tab, Take 1 tablet (5 mg total) by mouth 2 (two) times daily., Disp: 180 tablet, Rfl: 4    aspirin (ECOTRIN) 81 MG EC tablet, Take 81 mg by mouth once daily., Disp: , Rfl:     atorvastatin (LIPITOR) 10 MG tablet, TAKE 1 TABLET EVERY EVENING, Disp: 90 tablet, Rfl: 4    cyanocobalamin (VITAMIN B-12) 1000 MCG tablet, Take 100 mcg by mouth once daily., Disp: , Rfl:     cyclobenzaprine (FLEXERIL) 5 MG tablet, Take 1 tablet (5 mg total) by mouth 2 (two) times daily as needed for Muscle spasms., Disp: 40 tablet, Rfl: 1    ketoconazole (NIZORAL) 2 % cream, AAA bid to groin and feet x 3 weeks, Disp: 60 g, Rfl: 3    lamoTRIgine (LAMICTAL) 100 MG tablet, Take 1 tablet (100 mg total) by mouth 2 (two) times daily., Disp: 180 tablet, Rfl: 3    memantine (NAMENDA) 10 MG Tab, Take 1 tablet (10 mg total) by mouth 2 (two) times daily., Disp: 180 tablet, Rfl: 3    metoprolol succinate (TOPROL-XL) 50 MG 24 hr tablet, TAKE 1 TABLET (50 MG TOTAL) BY MOUTH ONCE DAILY., Disp: 90 tablet, Rfl: 4    primidone (MYSOLINE) 250 MG Tab, Take 2 tablets (500 mg total) by mouth 2 (two) times daily., Disp: 360 tablet, Rfl: 3    torsemide (DEMADEX) 10 MG Tab, TAKE 1 TABLET (10 MG TOTAL) BY MOUTH EVERY MORNING., Disp: 90 tablet, Rfl: 4    Current Facility-Administered Medications:     albuterol-ipratropium 2.5 mg-0.5 mg/3 mL  nebulizer solution 3 mL, 3 mL, Nebulization, Once, Ora Murillo DO     Review of Systems  Review of Systems   Constitutional: Negative for chills and fever.   HENT: Negative for nosebleeds.    Eyes: Negative for blurred vision.   Respiratory: Negative for hemoptysis.    Cardiovascular: Negative for chest pain and palpitations.   Gastrointestinal: Negative for heartburn and vomiting.   Genitourinary: Negative for hematuria.   Musculoskeletal: Positive for back pain, joint pain and myalgias. Negative for falls.   Skin: Negative for rash.   Neurological: Negative for seizures and loss of consciousness.   Endo/Heme/Allergies: Does not bruise/bleed easily.   Psychiatric/Behavioral: Positive for memory loss. Negative for hallucinations. The patient has insomnia.        Medical History  Past Medical History:   Diagnosis Date    A-fib     Anticoagulant long-term use     eliquis    Aortic atherosclerosis 9/18/2017    - LDL goal <70 - BP goal <130/80    Atrial flutter     Benign essential tremor     Biventricular cardiac pacemaker in situ 9/24/2018    CAD (coronary artery disease) 12/2/2013    - 9/2013 Left heart Cath: LAD- 60% prox (neg FFR), nl, ramus and RCA 20% - LDL goal <70 - BP goal <130/80    Cancer     skin cancer on back    Cardiac pacemaker in situ     Cardiomyopathy, nonischemic 2/12/2018    - 8/29/18 Echo EF 55%. Grade III diastolic dysfunction with restrictive physiology - followed by Cardiology    Carpal tunnel syndrome of left wrist 8/8/2017    CHF (congestive heart failure)     Chronic diastolic heart failure 1/19/2018    Chronic obstructive pulmonary disease 10/3/2018    Chronic right-sided thoracic back pain 4/27/2017    Coronary artery disease     Cough     Current use of long term anticoagulation 6/2/2016    - Eliquis for Afib    Dizziness     intermittant    DJD (degenerative joint disease) of knee     ED (erectile dysfunction)     Hard of hearing 11/2016    History of cataract      HTN (hypertension)     Hyperlipidemia     Hyperlipidemia     Hyponatremia 6/11/2019    Memory loss     Mixed hyperlipidemia 7/28/2012    Persistent atrial fibrillation 7/1/2015    - followed by Dr. Esparza - history of cardioversion - on Eliquis OAC 5 mg BID    Seizure disorder     Seizures     last episode 1995    Sinus node dysfunction 08/2016    Subclinical hypothyroidism     Tension type headache 7/2/2019        Surgical History  Past Surgical History:   Procedure Laterality Date    ANKLE SURGERY Right     pins and screws    ATRIAL ABLATION SURGERY      CARDIAC PACEMAKER PLACEMENT  2014    CARDIAC PACEMAKER PLACEMENT      CARDIAC PACEMAKER PLACEMENT  2014    CARDIOVERSION      CATARACT EXTRACTION      OU    COLONOSCOPY      due in 2014    EYE SURGERY Bilateral     cataracts extraction    FRACTURE SURGERY Right     ankle with hardware    HERNIA REPAIR      abdominal    INJECTION OF ANESTHETIC AGENT AROUND MEDIAL BRANCH NERVES INNERVATING LUMBAR FACET JOINT Bilateral 1/17/2019    Procedure: Block-nerve-medial branch-lumbar L2-L5;  Surgeon: Anthony Moffett MD;  Location: [facility] OR;  Service: Pain Management;  Laterality: Bilateral;    JOINT REPLACEMENT Bilateral     shoulders    RADIOFREQUENCY ABLATION OF LUMBAR MEDIAL BRANCH NERVE AT SINGLE LEVEL Bilateral 1/23/2019    Procedure: Radiofrequency Ablation, Nerve, Spinal, Lumbar, Medial Branch, L2-L5;  Surgeon: Anthony Moffett MD;  Location: [facility] OR;  Service: Pain Management;  Laterality: Bilateral;        Physical Exam  There were no vitals filed for this visit.  General    Nursing note and vitals reviewed.  Constitutional: He is oriented to person, place, and time. He appears well-developed and well-nourished. No distress.   HENT:   Head: Normocephalic and atraumatic.   Nose: Nose normal.   Eyes: Conjunctivae and EOM are normal. Pupils are equal, round, and reactive to light. Right eye exhibits no discharge. Left eye exhibits no  discharge. No scleral icterus.   Neck: No JVD present.   Cardiovascular: Intact distal pulses.    Pulmonary/Chest: Effort normal. No respiratory distress.   Abdominal: He exhibits no distension.   Neurological: He is alert and oriented to person, place, and time. Coordination normal.   Psychiatric: He has a normal mood and affect. His behavior is normal. Judgment and thought content normal.         Back (L-Spine & T-Spine) / Neck (C-Spine) Exam     Tenderness Posterior midline tenderness location: nonoe. Right paramedian tenderness of the Sacrum and Lower L-Spine. Left paramedian tenderness of the Lower L-Spine and Sacrum.     Back (L-Spine & T-Spine) Range of Motion   Extension: normal   Flexion: normal     Other   Spinal Kyphosis:  Absent  Spinal Lordosis:  Absent        Imaging  Xray lumbar spine 12/11/18  FINDINGS:  There is no definite change in the appearance of the lumbar spine compared to the prior study.  There is no fracture or malalignment.  There is moderate to marked disc space narrowing at the L4-5 level.  There is moderate-to-marked facet joint arthropathy at the L4-5 and L5-S1 levels.  Otherwise, there is mild disc space narrowing in the remainder of the lumbar spine.  There is moderate to marked disc space narrowing at the thoracolumbar junction where there is also bridging osteophyte formation and endplate sclerosis.  These findings were all present previously.     CT lumbar spine 1/2018  FINDINGS:    Vertebral column: There is stable mild grade 1 spondylolisthesis at the L5-S1 level with 4 mm anterolisthesis of L5 on S1 which is degenerative in etiology and related to facet arthropathy.  There are no pars defects.  The remainder of the vertebral bodies maintain normal alignment.  The bones are mildly osteopenic.  There is no acute fracture.  There is mild to moderate disc space sparing at the L4-5 level where there is endplate sclerosis and osteophyte formation.  There is mild disc space narrowing  anteriorly at the L5-S1 level.  There is a very minimal levocurvature of the lumbar spine.  This could in part be positional.  The patient is slightly tilted in the scanner.    Spinal canal, epidural space, conus: The spinal canal is developmentally normal.  The conus terminates at the level of T12-L1.  There is no abnormal epidural collection or mass.      Findings by level:  T11-12: There is no spinal canal or foraminal stenosis.  There is mild left facet joint arthropathy.  T12-L1: There is no spinal canal or foraminal stenosis.  There is mild left facet joint arthropathy.  L1-L2: There is minimal bulging of the annulus and mild facet joint arthropathy without spinal canal or foraminal stenosis.  There is no significant change.  L2-L3: There is a mild diffuse disc bulge and mild facet arthropathy.  There is no spinal canal or foraminal stenosis.  There is little change.  L3-L4: There is a mild diffuse disc bulge with mild facet arthropathy.  There is no spinal canal or foraminal stenosis and there is no significant change.  L4-L5: There is mild to moderate facet arthropathy.  There is a diffuse disc bulge with osteophytic ridging.  There is no spinal stenosis.  There is moderate left and severe right foraminal stenosis without significant change.  L5-S1: There is 4 mm anterolisthesis of L5 on S1.  There is moderate right and mild bilateral facet joint arthropathy with ligamentum flavum thickening and ossification.  There is unroofing of a broad disc protrusion.  This approaches both S1 roots and may contact the roots without obvious compression or displacement.  There is no significant spinal stenosis.  There is moderate left and severe right foraminal stenosis without significant change.    Soft tissues/other: The prevertebral soft tissues are normal.  There is mild atherosclerosis.  The aorta is of normal     Labs:  BMP  Lab Results   Component Value Date     10/22/2019    K 4.6 10/22/2019      10/22/2019    CO2 30 (H) 10/22/2019    BUN 10 10/22/2019    CREATININE 0.8 10/22/2019    CALCIUM 8.5 (L) 10/22/2019    ANIONGAP 12 10/22/2019    ESTGFRAFRICA >60.0 10/22/2019    EGFRNONAA >60.0 10/22/2019     Lab Results   Component Value Date    ALT 16 10/22/2019    AST 26 10/22/2019    ALKPHOS 92 10/22/2019    BILITOT 0.4 10/22/2019       Assessment:  Problem List Items Addressed This Visit     Chronic bilateral low back pain without sciatica - Primary    Relevant Orders    Ambulatory Referral to Physical/Occupational Therapy    Bilateral shoulder pain    Relevant Orders    Ambulatory Referral to Physical/Occupational Therapy    Chronic pain of right ankle    Relevant Orders    Ambulatory consult to Podiatry    Myofascial pain    Relevant Orders    Ambulatory Referral to Physical/Occupational Therapy    Chronic foot pain, right    Relevant Orders    Ambulatory consult to Podiatry          11/20/19 - Heri Muhammad is a 79 y.o. male with primarily myofascial pain, as previously assessed by his former pain management physician.  I support previous recommendations that physical therapy should be the primary treatment modality at this time. Patient is experiencing intermittent pain in the shoulders and low back consistent with muscle spasms.  Physical examination does not reveal pain with facet loading, therefore I do not believe his primary pain generator is facet arthritis.  Additionally, the injection from his previous pain management provider yielded an ambiguous result.  This pain is in mild time encouraging him to participate in physical therapy and to establish home exercise program.  His wife states that Heri lives a very sedentary lifestyle.  As evidence of this, his muscle tone is very poor in the shoulders, low back, and legs.  Additionally, x-ray of the shoulder shows mild subluxation, which is also a feature of diminished muscle strength in the rotator cuff.  Patient voiced concerned about the financial  cost of attending physical therapy.  I responded by telling him that physical therapy is probably the best investment of time and money for his current condition. I also informed him that physical therapy would be a complete waste of his time and money if he does not establish a home exercise program.  He understands.      Patient also reports chronic right foot and ankle pain. He was evaluated by Podiatry for years ago but has not followed up.  I will place a new referral for him today.    Plan & Recommendations  Referrals: to podiatry for chronic right foot/ankle pain  Medications: No medications changes recommended at this time.  Imaging: No additional imaging required at this time.  PT/OT: Referral placed today  HEP: I encouraged the patient to maintain a home exercise regimen that includes daily, moderate cardiovascular exercise lasting at least 30 minutes.  This may include yoga, chasity chi, walking, swimming, aqua aerobics, or other exercises that maintain a heart rate of 50-70% of the calculated maximum heart rate.  I also encouraged light, daily stretching focused on the target area.  Follow Up: RTC in 8-12 weeks    Cynthia Oh Jr, MD  Interventional Pain Medicine / Anesthesiology    Disclaimer: This note was partly generated using dictation software which may occasionally result in transcription errors.

## 2019-11-21 ENCOUNTER — HOSPITAL ENCOUNTER (OUTPATIENT)
Dept: RADIOLOGY | Facility: HOSPITAL | Age: 79
Discharge: HOME | End: 2019-11-21
Attending: PODIATRIST
Payer: MEDICARE

## 2019-11-21 ENCOUNTER — OFFICE VISIT (OUTPATIENT)
Dept: PODIATRY | Facility: CLINIC | Age: 79
End: 2019-11-21
Payer: MEDICARE

## 2019-11-21 VITALS
HEIGHT: 73 IN | HEART RATE: 114 BPM | DIASTOLIC BLOOD PRESSURE: 79 MMHG | WEIGHT: 225 LBS | BODY MASS INDEX: 29.82 KG/M2 | SYSTOLIC BLOOD PRESSURE: 125 MMHG

## 2019-11-21 DIAGNOSIS — M21.6X1 PRONATION DEFORMITY OF ANKLE, ACQUIRED, RIGHT: Primary | ICD-10-CM

## 2019-11-21 DIAGNOSIS — R26.9 ABNORMAL GAIT: ICD-10-CM

## 2019-11-21 DIAGNOSIS — M21.6X1 PRONATION DEFORMITY OF ANKLE, ACQUIRED, RIGHT: ICD-10-CM

## 2019-11-21 DIAGNOSIS — Z87.81 HISTORY OF FRACTURE OF RIGHT ANKLE: ICD-10-CM

## 2019-11-21 DIAGNOSIS — M25.371 ANKLE INSTABILITY, RIGHT: ICD-10-CM

## 2019-11-21 PROCEDURE — 3074F SYST BP LT 130 MM HG: CPT | Mod: CPTII,S$GLB,, | Performed by: PODIATRIST

## 2019-11-21 PROCEDURE — 1101F PR PT FALLS ASSESS DOC 0-1 FALLS W/OUT INJ PAST YR: ICD-10-PCS | Mod: CPTII,S$GLB,, | Performed by: PODIATRIST

## 2019-11-21 PROCEDURE — 99999 PR PBB SHADOW E&M-EST. PATIENT-LVL IV: ICD-10-PCS | Mod: PBBFAC,,, | Performed by: PODIATRIST

## 2019-11-21 PROCEDURE — 1125F PR PAIN SEVERITY QUANTIFIED, PAIN PRESENT: ICD-10-PCS | Mod: S$GLB,,, | Performed by: PODIATRIST

## 2019-11-21 PROCEDURE — 1159F PR MEDICATION LIST DOCUMENTED IN MEDICAL RECORD: ICD-10-PCS | Mod: S$GLB,,, | Performed by: PODIATRIST

## 2019-11-21 PROCEDURE — 1125F AMNT PAIN NOTED PAIN PRSNT: CPT | Mod: S$GLB,,, | Performed by: PODIATRIST

## 2019-11-21 PROCEDURE — 99203 OFFICE O/P NEW LOW 30 MIN: CPT | Mod: S$GLB,,, | Performed by: PODIATRIST

## 2019-11-21 PROCEDURE — 73610 X-RAY EXAM OF ANKLE: CPT | Mod: 26,RT,, | Performed by: RADIOLOGY

## 2019-11-21 PROCEDURE — 99203 PR OFFICE/OUTPT VISIT, NEW, LEVL III, 30-44 MIN: ICD-10-PCS | Mod: S$GLB,,, | Performed by: PODIATRIST

## 2019-11-21 PROCEDURE — 3078F DIAST BP <80 MM HG: CPT | Mod: CPTII,S$GLB,, | Performed by: PODIATRIST

## 2019-11-21 PROCEDURE — 99999 PR PBB SHADOW E&M-EST. PATIENT-LVL IV: CPT | Mod: PBBFAC,,, | Performed by: PODIATRIST

## 2019-11-21 PROCEDURE — 73630 XR FOOT COMPLETE 3 VIEW RIGHT: ICD-10-PCS | Mod: 26,RT,, | Performed by: RADIOLOGY

## 2019-11-21 PROCEDURE — 73610 XR ANKLE COMPLETE 3 VIEW RIGHT: ICD-10-PCS | Mod: 26,RT,, | Performed by: RADIOLOGY

## 2019-11-21 PROCEDURE — 1159F MED LIST DOCD IN RCRD: CPT | Mod: S$GLB,,, | Performed by: PODIATRIST

## 2019-11-21 PROCEDURE — 1101F PT FALLS ASSESS-DOCD LE1/YR: CPT | Mod: CPTII,S$GLB,, | Performed by: PODIATRIST

## 2019-11-21 PROCEDURE — 73630 X-RAY EXAM OF FOOT: CPT | Mod: 26,RT,, | Performed by: RADIOLOGY

## 2019-11-21 PROCEDURE — 3074F PR MOST RECENT SYSTOLIC BLOOD PRESSURE < 130 MM HG: ICD-10-PCS | Mod: CPTII,S$GLB,, | Performed by: PODIATRIST

## 2019-11-21 PROCEDURE — 73610 X-RAY EXAM OF ANKLE: CPT | Mod: TC,FY,RT

## 2019-11-21 PROCEDURE — 3078F PR MOST RECENT DIASTOLIC BLOOD PRESSURE < 80 MM HG: ICD-10-PCS | Mod: CPTII,S$GLB,, | Performed by: PODIATRIST

## 2019-11-21 PROCEDURE — 73630 X-RAY EXAM OF FOOT: CPT | Mod: TC,FY,RT

## 2019-11-24 NOTE — PROGRESS NOTES
"Subjective:      Patient ID: Heri Muhammad is a 79 y.o. male.    Chief Complaint: Ankle Pain (Right)    Complains of progressive pain to his medial right ankle noting that the right foot turns out a great deal when he walks.  He has a history of prior right ankle fracture many years ago. Denies any recent trauma.  He has tried to changes shoes around.  Has a history of wearing prior orthotics which helped to some degree however feels that is no longer helping at this time.  Accompanied by his wife.  Pain is described as aching and dull in nature and is aggravated by extended periods of standing and walking.  Pain is alleviated somewhat by resting.    Vitals:    11/21/19 1631   BP: 125/79   Pulse: (!) 114   Weight: 102.1 kg (225 lb)   Height: 6' 1" (1.854 m)   PainSc:   6   PainLoc: Ankle      Past Medical History:   Diagnosis Date    A-fib     Anticoagulant long-term use     eliquis    Aortic atherosclerosis 9/18/2017    - LDL goal <70 - BP goal <130/80    Atrial flutter     Benign essential tremor     Biventricular cardiac pacemaker in situ 9/24/2018    CAD (coronary artery disease) 12/2/2013    - 9/2013 Left heart Cath: LAD- 60% prox (neg FFR), nl, ramus and RCA 20% - LDL goal <70 - BP goal <130/80    Cancer     skin cancer on back    Cardiac pacemaker in situ     Cardiomyopathy, nonischemic 2/12/2018    - 8/29/18 Echo EF 55%. Grade III diastolic dysfunction with restrictive physiology - followed by Cardiology    Carpal tunnel syndrome of left wrist 8/8/2017    CHF (congestive heart failure)     Chronic diastolic heart failure 1/19/2018    Chronic obstructive pulmonary disease 10/3/2018    Chronic right-sided thoracic back pain 4/27/2017    Coronary artery disease     Cough     Current use of long term anticoagulation 6/2/2016    - Eliquis for Afib    Dizziness     intermittant    DJD (degenerative joint disease) of knee     ED (erectile dysfunction)     Hard of hearing 11/2016    History " of cataract     HTN (hypertension)     Hyperlipidemia     Hyperlipidemia     Hyponatremia 6/11/2019    Memory loss     Mixed hyperlipidemia 7/28/2012    Persistent atrial fibrillation 7/1/2015    - followed by Dr. Esparza - history of cardioversion - on Eliquis OAC 5 mg BID    Seizure disorder     Seizures     last episode 1995    Sinus node dysfunction 08/2016    Subclinical hypothyroidism     Tension type headache 7/2/2019       Past Surgical History:   Procedure Laterality Date    ANKLE SURGERY Right     pins and screws    ATRIAL ABLATION SURGERY      CARDIAC PACEMAKER PLACEMENT  2014    CARDIAC PACEMAKER PLACEMENT      CARDIAC PACEMAKER PLACEMENT  2014    CARDIOVERSION      CATARACT EXTRACTION      OU    COLONOSCOPY      due in 2014    EYE SURGERY Bilateral     cataracts extraction    FRACTURE SURGERY Right     ankle with hardware    HERNIA REPAIR      abdominal    INJECTION OF ANESTHETIC AGENT AROUND MEDIAL BRANCH NERVES INNERVATING LUMBAR FACET JOINT Bilateral 1/17/2019    Procedure: Block-nerve-medial branch-lumbar L2-L5;  Surgeon: Anthony Moffett MD;  Location: [facility] OR;  Service: Pain Management;  Laterality: Bilateral;    JOINT REPLACEMENT Bilateral     shoulders    RADIOFREQUENCY ABLATION OF LUMBAR MEDIAL BRANCH NERVE AT SINGLE LEVEL Bilateral 1/23/2019    Procedure: Radiofrequency Ablation, Nerve, Spinal, Lumbar, Medial Branch, L2-L5;  Surgeon: Anthony Moffett MD;  Location: [facility] OR;  Service: Pain Management;  Laterality: Bilateral;       Family History   Problem Relation Age of Onset    Heart disease Mother     Heart failure Mother     Dementia Mother     Cancer Father         colon    Blindness Father         accident    Cataracts Father     Hypertension Father     Tremor Father     Tremor Brother     Tremor Paternal Grandfather     Cancer Daughter     No Known Problems Son     Obesity Daughter     Melanoma Neg Hx     Amblyopia Neg Hx     Diabetes Neg Hx      Glaucoma Neg Hx     Macular degeneration Neg Hx     Retinal detachment Neg Hx     Strabismus Neg Hx     Stroke Neg Hx     Thyroid disease Neg Hx        Social History     Socioeconomic History    Marital status:      Spouse name: Not on file    Number of children: Not on file    Years of education: Not on file    Highest education level: Not on file   Occupational History    Occupation: retired   Social Needs    Financial resource strain: Not on file    Food insecurity:     Worry: Not on file     Inability: Not on file    Transportation needs:     Medical: Not on file     Non-medical: Not on file   Tobacco Use    Smoking status: Former Smoker     Packs/day: 1.00     Years: 5.00     Pack years: 5.00     Types: Cigarettes     Last attempt to quit:      Years since quittin.9    Smokeless tobacco: Never Used   Substance and Sexual Activity    Alcohol use: No    Drug use: No    Sexual activity: Not Currently     Partners: Female   Lifestyle    Physical activity:     Days per week: Not on file     Minutes per session: Not on file    Stress: Not on file   Relationships    Social connections:     Talks on phone: Not on file     Gets together: Not on file     Attends Christian service: Not on file     Active member of club or organization: Not on file     Attends meetings of clubs or organizations: Not on file     Relationship status: Not on file   Other Topics Concern    Not on file   Social History Narrative    . 4 adult children.        Current Outpatient Medications   Medication Sig Dispense Refill    acetaminophen (TYLENOL) 325 MG tablet Take 325 mg by mouth every 6 (six) hours as needed for Pain.      ANORO ELLIPTA 62.5-25 mcg/actuation DsDv INHALE 1 PUFF DAILY (CONTROLLER) 90 each 3    apixaban (ELIQUIS) 5 mg Tab Take 1 tablet (5 mg total) by mouth 2 (two) times daily. 180 tablet 4    aspirin (ECOTRIN) 81 MG EC tablet Take 81 mg by mouth once daily.      atorvastatin  (LIPITOR) 10 MG tablet TAKE 1 TABLET EVERY EVENING 90 tablet 4    cyanocobalamin (VITAMIN B-12) 1000 MCG tablet Take 100 mcg by mouth once daily.      cyclobenzaprine (FLEXERIL) 5 MG tablet Take 1 tablet (5 mg total) by mouth 2 (two) times daily as needed for Muscle spasms. 40 tablet 1    ketoconazole (NIZORAL) 2 % cream AAA bid to groin and feet x 3 weeks 60 g 3    lamoTRIgine (LAMICTAL) 100 MG tablet Take 1 tablet (100 mg total) by mouth 2 (two) times daily. 180 tablet 3    memantine (NAMENDA) 10 MG Tab Take 1 tablet (10 mg total) by mouth 2 (two) times daily. 180 tablet 3    metoprolol succinate (TOPROL-XL) 50 MG 24 hr tablet TAKE 1 TABLET (50 MG TOTAL) BY MOUTH ONCE DAILY. 90 tablet 4    primidone (MYSOLINE) 250 MG Tab Take 2 tablets (500 mg total) by mouth 2 (two) times daily. 360 tablet 3    torsemide (DEMADEX) 10 MG Tab TAKE 1 TABLET (10 MG TOTAL) BY MOUTH EVERY MORNING. 90 tablet 4     Current Facility-Administered Medications   Medication Dose Route Frequency Provider Last Rate Last Dose    albuterol-ipratropium 2.5 mg-0.5 mg/3 mL nebulizer solution 3 mL  3 mL Nebulization Once Ora Murillo DO           Review of patient's allergies indicates:   Allergen Reactions    Bactroban [mupirocin calcium] Rash     Other reaction(s): Rash         Review of Systems   Constitution: Negative for chills, fever and malaise/fatigue.   HENT: Negative for hearing loss.    Cardiovascular: Negative for chest pain, claudication and leg swelling.   Respiratory: Negative for cough and shortness of breath.    Skin: Negative for color change, poor wound healing and rash.   Musculoskeletal: Positive for back pain and joint pain. Negative for muscle cramps and muscle weakness.   Gastrointestinal: Negative for nausea and vomiting.   Neurological: Negative for numbness, paresthesias and weakness.   Psychiatric/Behavioral: Negative for altered mental status.           Objective:      Physical Exam   Constitutional: He is  oriented to person, place, and time. He appears well-developed. No distress.   Cardiovascular: Intact distal pulses.   Pulses:       Dorsalis pedis pulses are 2+ on the right side, and 2+ on the left side.        Posterior tibial pulses are 2+ on the right side, and 2+ on the left side.   Musculoskeletal:   Localized pain on palpation along the posterior tibial tendon anterior inferior to the medial malleolus right ankle pain was stressed eversion of the right ankle.  Mild pain with active resisted inversion of the right foot.    Weight-bearing note pes [place] valgus right foot. Left foot with mild collapse of the medial longitudinal arch during standing.    Questionable anterior drawer sign right ankle in comparison to the left.    Positive equinus right ankle.   Neurological: He is alert and oriented to person, place, and time. He has normal strength. No sensory deficit.   Skin: Skin is warm, dry and intact. Capillary refill takes less than 2 seconds. No ecchymosis, no lesion, no petechiae and no rash noted. He is not diaphoretic. No cyanosis or erythema. No pallor. Nails show no clubbing.             Assessment:       Encounter Diagnoses   Name Primary?    Pronation deformity of ankle, acquired, right Yes    History of fracture of right ankle     Abnormal gait     Ankle instability, right          Plan:       Heri was seen today for ankle pain.    Diagnoses and all orders for this visit:    Pronation deformity of ankle, acquired, right  -     X-Ray Ankle Complete Right; Future  -     X-Ray Foot Complete Right; Future    History of fracture of right ankle    Abnormal gait    Ankle instability, right  -     X-Ray Ankle Complete Right; Future  -     X-Ray Foot Complete Right; Future      I counseled the patient on his conditions, their implications and medical management.    From a clinical standpoint suspect posterior tibial tendon dysfunction and insufficiency of the deltoid ligament right ankle.    Dispensed,  fitted and gait trained with ankle brace. Instructed to wear with supportive shoe at all times when placing weight on the foot.    Discussed avoiding flat shoes and barefoot walking due to the equinus    RTC 3-4 weeks or p.r.n. as discussed.    .

## 2019-12-04 PROBLEM — M25.511 CHRONIC RIGHT SHOULDER PAIN: Status: ACTIVE | Noted: 2019-12-04

## 2019-12-04 PROBLEM — R29.898 SHOULDER WEAKNESS: Status: ACTIVE | Noted: 2019-12-04

## 2019-12-04 PROBLEM — G89.29 CHRONIC RIGHT SHOULDER PAIN: Status: ACTIVE | Noted: 2019-12-04

## 2019-12-30 ENCOUNTER — TELEPHONE (OUTPATIENT)
Dept: FAMILY MEDICINE | Facility: CLINIC | Age: 79
End: 2019-12-30

## 2019-12-30 NOTE — TELEPHONE ENCOUNTER
----- Message from Kari Valentin sent at 12/30/2019 12:31 PM CST -----  Contact: [phone]/self  Patient would like to be seen sooner than the next available appointment. Please advise.

## 2019-12-30 NOTE — TELEPHONE ENCOUNTER
----- Message from Kari Valentin sent at 12/30/2019 12:31 PM CST -----  Contact: [phone]/self  Patient would like to be seen sooner than the next available appointment. Please advise.

## 2020-01-02 ENCOUNTER — TELEPHONE (OUTPATIENT)
Dept: FAMILY MEDICINE | Facility: CLINIC | Age: 80
End: 2020-01-02

## 2020-01-02 ENCOUNTER — OFFICE VISIT (OUTPATIENT)
Dept: FAMILY MEDICINE | Facility: CLINIC | Age: 80
End: 2020-01-02
Payer: MEDICARE

## 2020-01-02 VITALS
DIASTOLIC BLOOD PRESSURE: 62 MMHG | WEIGHT: 231.69 LBS | BODY MASS INDEX: 30.71 KG/M2 | SYSTOLIC BLOOD PRESSURE: 120 MMHG | TEMPERATURE: 98 F | HEIGHT: 73 IN | HEART RATE: 74 BPM | OXYGEN SATURATION: 96 % | RESPIRATION RATE: 18 BRPM

## 2020-01-02 DIAGNOSIS — B02.8 HERPES ZOSTER WITH COMPLICATION: ICD-10-CM

## 2020-01-02 DIAGNOSIS — B02.8 HERPES ZOSTER WITH COMPLICATION: Primary | ICD-10-CM

## 2020-01-02 PROCEDURE — 1125F AMNT PAIN NOTED PAIN PRSNT: CPT | Mod: S$GLB,,, | Performed by: INTERNAL MEDICINE

## 2020-01-02 PROCEDURE — 3078F PR MOST RECENT DIASTOLIC BLOOD PRESSURE < 80 MM HG: ICD-10-PCS | Mod: CPTII,S$GLB,, | Performed by: INTERNAL MEDICINE

## 2020-01-02 PROCEDURE — 1125F PR PAIN SEVERITY QUANTIFIED, PAIN PRESENT: ICD-10-PCS | Mod: S$GLB,,, | Performed by: INTERNAL MEDICINE

## 2020-01-02 PROCEDURE — 99999 PR PBB SHADOW E&M-EST. PATIENT-LVL IV: CPT | Mod: PBBFAC,,, | Performed by: INTERNAL MEDICINE

## 2020-01-02 PROCEDURE — 3074F PR MOST RECENT SYSTOLIC BLOOD PRESSURE < 130 MM HG: ICD-10-PCS | Mod: CPTII,S$GLB,, | Performed by: INTERNAL MEDICINE

## 2020-01-02 PROCEDURE — 3078F DIAST BP <80 MM HG: CPT | Mod: CPTII,S$GLB,, | Performed by: INTERNAL MEDICINE

## 2020-01-02 PROCEDURE — 99999 PR PBB SHADOW E&M-EST. PATIENT-LVL IV: ICD-10-PCS | Mod: PBBFAC,,, | Performed by: INTERNAL MEDICINE

## 2020-01-02 PROCEDURE — 1101F PR PT FALLS ASSESS DOC 0-1 FALLS W/OUT INJ PAST YR: ICD-10-PCS | Mod: CPTII,S$GLB,, | Performed by: INTERNAL MEDICINE

## 2020-01-02 PROCEDURE — 99213 OFFICE O/P EST LOW 20 MIN: CPT | Mod: S$GLB,,, | Performed by: INTERNAL MEDICINE

## 2020-01-02 PROCEDURE — 1159F PR MEDICATION LIST DOCUMENTED IN MEDICAL RECORD: ICD-10-PCS | Mod: S$GLB,,, | Performed by: INTERNAL MEDICINE

## 2020-01-02 PROCEDURE — 1101F PT FALLS ASSESS-DOCD LE1/YR: CPT | Mod: CPTII,S$GLB,, | Performed by: INTERNAL MEDICINE

## 2020-01-02 PROCEDURE — 99213 PR OFFICE/OUTPT VISIT, EST, LEVL III, 20-29 MIN: ICD-10-PCS | Mod: S$GLB,,, | Performed by: INTERNAL MEDICINE

## 2020-01-02 PROCEDURE — 3074F SYST BP LT 130 MM HG: CPT | Mod: CPTII,S$GLB,, | Performed by: INTERNAL MEDICINE

## 2020-01-02 PROCEDURE — 1159F MED LIST DOCD IN RCRD: CPT | Mod: S$GLB,,, | Performed by: INTERNAL MEDICINE

## 2020-01-02 RX ORDER — FAMCICLOVIR 500 MG/1
500 TABLET ORAL 3 TIMES DAILY
Qty: 21 TABLET | Refills: 0 | Status: SHIPPED | OUTPATIENT
Start: 2020-01-02 | End: 2020-01-02 | Stop reason: SDUPTHER

## 2020-01-02 RX ORDER — FAMCICLOVIR 500 MG/1
500 TABLET ORAL 3 TIMES DAILY
Qty: 21 TABLET | Refills: 0 | Status: SHIPPED | OUTPATIENT
Start: 2020-01-02 | End: 2020-01-09

## 2020-01-02 RX ORDER — GABAPENTIN 300 MG/1
300 CAPSULE ORAL NIGHTLY
Qty: 30 CAPSULE | Refills: 1 | Status: SHIPPED | OUTPATIENT
Start: 2020-01-02 | End: 2020-01-14 | Stop reason: SDUPTHER

## 2020-01-02 RX ORDER — GABAPENTIN 300 MG/1
300 CAPSULE ORAL NIGHTLY
Qty: 30 CAPSULE | Refills: 1 | Status: SHIPPED | OUTPATIENT
Start: 2020-01-02 | End: 2020-01-02 | Stop reason: SDUPTHER

## 2020-01-02 NOTE — PROGRESS NOTES
[facility] Note    Chief Complaint      Chief Complaint   Patient presents with    Follow-up     c/o pain under left arm        History of Present Illness      Heri Muhammad is a 79 y.o. male who presents today for   Chief Complaint   Patient presents with    Follow-up     c/o pain under left arm    .  Patient comes to appointment here for acute visit related to pain in left axilla . He denies it being related to stress on muscle . He denies any new medications . He had been in pt for right shoulder and he states that while at therapy he does do exercises with left shoulder as well but he does not recall feeling as if he pulled a muscle. He describes it as wrapping around  From back to front all on the left side . It is not associated with activity is painful when pressed down upon . His wife states that he told her he had a dark rash that he thinks he saw but is difficult for him to see as he has limited mobility in the left shoulder .     HPI    No problem-specific Assessment & Plan notes found for this encounter.       Problem List Items Addressed This Visit        ID    Herpes zoster with complication - Primary    Overview     famvir 500 mg po tid disp 21  Gabapentin start at 300 mg po qhs                 Past Medical History:  Past Medical History:   Diagnosis Date    A-fib     Anticoagulant long-term use     eliquis    Aortic atherosclerosis 9/18/2017    - LDL goal <70 - BP goal <130/80    Atrial flutter     Benign essential tremor     Biventricular cardiac pacemaker in situ 9/24/2018    CAD (coronary artery disease) 12/2/2013    - 9/2013 Left heart Cath: LAD- 60% prox (neg FFR), nl, ramus and RCA 20% - LDL goal <70 - BP goal <130/80    Cancer     skin cancer on back    Cardiac pacemaker in situ     Cardiomyopathy, nonischemic 2/12/2018    - 8/29/18 Echo EF 55%. Grade III diastolic dysfunction with restrictive physiology - followed by Cardiology    Carpal tunnel  syndrome of left wrist 8/8/2017    CHF (congestive heart failure)     Chronic diastolic heart failure 1/19/2018    Chronic obstructive pulmonary disease 10/3/2018    Chronic right-sided thoracic back pain 4/27/2017    Coronary artery disease     Cough     Current use of long term anticoagulation 6/2/2016    - Eliquis for Afib    Dizziness     intermittant    DJD (degenerative joint disease) of knee     ED (erectile dysfunction)     Hard of hearing 11/2016    History of cataract     HTN (hypertension)     Hyperlipidemia     Hyperlipidemia     Hyponatremia 6/11/2019    Memory loss     Mixed hyperlipidemia 7/28/2012    Persistent atrial fibrillation 7/1/2015    - followed by Dr. Esparza - history of cardioversion - on Eliquis OAC 5 mg BID    Seizure disorder     Seizures     last episode 1995    Sinus node dysfunction 08/2016    Subclinical hypothyroidism     Tension type headache 7/2/2019       Past Surgical History:  Past Surgical History:   Procedure Laterality Date    ANKLE SURGERY Right     pins and screws    ATRIAL ABLATION SURGERY      CARDIAC PACEMAKER PLACEMENT  2014    CARDIAC PACEMAKER PLACEMENT      CARDIAC PACEMAKER PLACEMENT  2014    CARDIOVERSION      CATARACT EXTRACTION      OU    COLONOSCOPY      due in 2014    EYE SURGERY Bilateral     cataracts extraction    FRACTURE SURGERY Right     ankle with hardware    HERNIA REPAIR      abdominal    INJECTION OF ANESTHETIC AGENT AROUND MEDIAL BRANCH NERVES INNERVATING LUMBAR FACET JOINT Bilateral 1/17/2019    Procedure: Block-nerve-medial branch-lumbar L2-L5;  Surgeon: Anthony Moffett MD;  Location: [facility] OR;  Service: Pain Management;  Laterality: Bilateral;    JOINT REPLACEMENT Bilateral     shoulders    RADIOFREQUENCY ABLATION OF LUMBAR MEDIAL BRANCH NERVE AT SINGLE LEVEL Bilateral 1/23/2019    Procedure: Radiofrequency Ablation, Nerve, Spinal, Lumbar, Medial Branch, L2-L5;  Surgeon: Anthony Moffett MD;  Location: [facility] OR;   Service: Pain Management;  Laterality: Bilateral;       Family History:  family history includes Blindness in his father; Cancer in his daughter and father; Cataracts in his father; Dementia in his mother; Heart disease in his mother; Heart failure in his mother; Hypertension in his father; No Known Problems in his son; Obesity in his daughter; Tremor in his brother, father, and paternal grandfather.     Social History:  Social History     Socioeconomic History    Marital status:      Spouse name: Not on file    Number of children: Not on file    Years of education: Not on file    Highest education level: Not on file   Occupational History    Occupation: retired   Social Needs    Financial resource strain: Not on file    Food insecurity:     Worry: Not on file     Inability: Not on file    Transportation needs:     Medical: Not on file     Non-medical: Not on file   Tobacco Use    Smoking status: Former Smoker     Packs/day: 1.00     Years: 5.00     Pack years: 5.00     Types: Cigarettes     Last attempt to quit:      Years since quittin.0    Smokeless tobacco: Never Used   Substance and Sexual Activity    Alcohol use: No    Drug use: No    Sexual activity: Not Currently     Partners: Female   Lifestyle    Physical activity:     Days per week: Not on file     Minutes per session: Not on file    Stress: Not on file   Relationships    Social connections:     Talks on phone: Not on file     Gets together: Not on file     Attends Confucianism service: Not on file     Active member of club or organization: Not on file     Attends meetings of clubs or organizations: Not on file     Relationship status: Not on file   Other Topics Concern    Not on file   Social History Narrative    . 4 adult children.        Review of Systems:   Review of Systems   Constitutional: Negative for chills and fever.   Respiratory: Positive for cough.    Cardiovascular: Positive for chest pain.    Gastrointestinal: Negative for heartburn, nausea and vomiting.   Musculoskeletal: Negative for myalgias.   Neurological: Positive for tingling.        Medications:  Outpatient Encounter Medications as of 1/2/2020   Medication Sig Dispense Refill    acetaminophen (TYLENOL) 325 MG tablet Take 325 mg by mouth every 6 (six) hours as needed for Pain.      ANORO ELLIPTA 62.5-25 mcg/actuation DsDv INHALE 1 PUFF DAILY (CONTROLLER) 90 each 3    apixaban (ELIQUIS) 5 mg Tab Take 1 tablet (5 mg total) by mouth 2 (two) times daily. 180 tablet 4    aspirin (ECOTRIN) 81 MG EC tablet Take 81 mg by mouth once daily.      atorvastatin (LIPITOR) 10 MG tablet TAKE 1 TABLET EVERY EVENING 90 tablet 4    cyanocobalamin (VITAMIN B-12) 1000 MCG tablet Take 100 mcg by mouth once daily.      cyclobenzaprine (FLEXERIL) 5 MG tablet Take 1 tablet (5 mg total) by mouth 2 (two) times daily as needed for Muscle spasms. 40 tablet 1    ketoconazole (NIZORAL) 2 % cream AAA bid to groin and feet x 3 weeks 60 g 3    lamoTRIgine (LAMICTAL) 100 MG tablet Take 1 tablet (100 mg total) by mouth 2 (two) times daily. 180 tablet 3    memantine (NAMENDA) 10 MG Tab Take 1 tablet (10 mg total) by mouth 2 (two) times daily. 180 tablet 3    metoprolol succinate (TOPROL-XL) 50 MG 24 hr tablet TAKE 1 TABLET (50 MG TOTAL) BY MOUTH ONCE DAILY. 90 tablet 4    primidone (MYSOLINE) 250 MG Tab Take 2 tablets (500 mg total) by mouth 2 (two) times daily. 360 tablet 3    torsemide (DEMADEX) 10 MG Tab TAKE 1 TABLET (10 MG TOTAL) BY MOUTH EVERY MORNING. 90 tablet 4    famciclovir (FAMVIR) 500 MG tablet Take 1 tablet (500 mg total) by mouth 3 (three) times daily. for 7 days 21 tablet 0    gabapentin (NEURONTIN) 300 MG capsule Take 1 capsule (300 mg total) by mouth every evening. 30 capsule 1     Facility-Administered Encounter Medications as of 1/2/2020   Medication Dose Route Frequency Provider Last Rate Last Dose    albuterol-ipratropium 2.5 mg-0.5 mg/3 mL  "nebulizer solution 3 mL  3 mL Nebulization Once Ora Murillo, DO           Allergies:  Review of patient's allergies indicates:   Allergen Reactions    Bactroban [mupirocin calcium] Rash     Other reaction(s): Rash         Physical Exam      Vitals:    01/02/20 1059   BP: 120/62   Pulse: 74   Resp: 18   Temp: 97.6 °F (36.4 °C)        Vital Signs  Temp: 97.6 °F (36.4 °C)  Temp src: Oral  Pulse: 74  Resp: 18  SpO2: 96 %  BP: 120/62  BP Location: Right arm  Patient Position: Sitting  Pain Score:   2  Pain Loc: Arm  Height and Weight  Height: 6' 1" (185.4 cm)  Weight: 105.1 kg (231 lb 11.3 oz)  BSA (Calculated - sq m): 2.33 sq meters  BMI (Calculated): 30.6  Weight in (lb) to have BMI = 25: 189.1]     Body mass index is 30.57 kg/m².    Physical Exam   Cardiovascular: Regular rhythm.   Pulmonary/Chest: Effort normal and breath sounds normal.   Abdominal: Soft.   Musculoskeletal:   Tenderness in left thoracic , axillary and ant chest distribution .    Skin: No rash noted.        Laboratory:  CBC:  Recent Labs   Lab Result Units 10/22/19  0810   WBC K/uL 6.40   RBC M/uL 4.69   Hemoglobin g/dL 14.6   Hematocrit % 45.6   Platelets K/uL 211   Mean Corpuscular Volume fL 97   Mean Corpuscular Hemoglobin pg 31.1*   Mean Corpuscular Hemoglobin Conc g/dL 32.0     CMP:  Recent Labs   Lab Result Units 10/22/19  0810   Glucose mg/dL 102   Calcium mg/dL 8.5*   Albumin g/dL 3.7   Total Protein g/dL 7.3   Sodium mmol/L 142   Potassium mmol/L 4.6   CO2 mmol/L 30*   Chloride mmol/L 100   BUN, Bld mg/dL 10   Alkaline Phosphatase U/L 92   ALT U/L 16   AST U/L 26   Total Bilirubin mg/dL 0.4     URINALYSIS:  No results for input(s): COLORU, CLARITYU, SPECGRAV, PHUR, PROTEINUA, GLUCOSEU, BILIRUBINCON, BLOODU, WBCU, RBCU, BACTERIA, MUCUS, NITRITE, LEUKOCYTESUR, UROBILINOGEN, HYALINECASTS in the last 2160 hours.   LIPIDS:  Recent Labs   Lab Result Units 10/22/19  0810 10/30/19  1004   TSH uIU/mL  --  4.250*   HDL mg/dL 37*  --    Cholesterol " mg/dL 172  --    Triglycerides mg/dL 132  --    LDL Cholesterol mg/dL 108.6  --    Hdl/Cholesterol Ratio % 21.5  --    Non-HDL Cholesterol mg/dL 135  --    Total Cholesterol/HDL Ratio  4.6  --      TSH:  Recent Labs   Lab Result Units 10/30/19  1004   TSH uIU/mL 4.250*     A1C:  No results for input(s): HGBA1C in the last 2160 hours.    Radiology:        Assessment:     Heri Muhammad is a 79 y.o.male with:    Herpes zoster with complication  -     famciclovir (FAMVIR) 500 MG tablet; Take 1 tablet (500 mg total) by mouth 3 (three) times daily. for 7 days  Dispense: 21 tablet; Refill: 0  -     gabapentin (NEURONTIN) 300 MG capsule; Take 1 capsule (300 mg total) by mouth every evening.  Dispense: 30 capsule; Refill: 1                Plan:     Problem List Items Addressed This Visit        ID    Herpes zoster with complication - Primary    Overview     famvir 500 mg po tid disp 21  Gabapentin start at 300 mg po qhs                As above, continue current medications and maintain follow up with specialists.  Return to clinic in prn months.    Frederick W Dantagnan [facility]

## 2020-01-02 NOTE — TELEPHONE ENCOUNTER
----- Message from Pravin Ashraf sent at 1/2/2020  2:02 PM CST -----  Contact: [facility]  [phone]  Pharmacist calling to speak with you about changing Rx famciclovir (FAMVIR) 500 MG tablet to 250 MG tablets due to the 500 MG being on backorder  Please call back to assist at [phone]

## 2020-01-02 NOTE — TELEPHONE ENCOUNTER
Medication was sent to [facility] pharmacy. Patient wants medication sent to [facility] in [place].

## 2020-01-02 NOTE — TELEPHONE ENCOUNTER
----- Message from Bita Pearl sent at 1/2/2020 12:51 PM CST -----  Pt states Medication was sent to the wrong Pharmacy, Please sent to [facility]   famciclovir (FAMVIR) 500 MG tablet  gabapentin (NEURONTIN) 300 MG capsule  Please call and advise when done  [facility] in [facility] on [address]  [phone]      Thank you

## 2020-01-06 ENCOUNTER — OFFICE VISIT (OUTPATIENT)
Dept: PODIATRY | Facility: CLINIC | Age: 80
End: 2020-01-06
Payer: MEDICARE

## 2020-01-06 VITALS
WEIGHT: 231 LBS | HEIGHT: 73 IN | BODY MASS INDEX: 30.62 KG/M2 | SYSTOLIC BLOOD PRESSURE: 121 MMHG | DIASTOLIC BLOOD PRESSURE: 68 MMHG | HEART RATE: 89 BPM

## 2020-01-06 DIAGNOSIS — M25.371 ANKLE INSTABILITY, RIGHT: ICD-10-CM

## 2020-01-06 DIAGNOSIS — Z87.81 HISTORY OF FRACTURE OF RIGHT ANKLE: ICD-10-CM

## 2020-01-06 DIAGNOSIS — M21.6X1 PRONATION DEFORMITY OF ANKLE, ACQUIRED, RIGHT: Primary | ICD-10-CM

## 2020-01-06 DIAGNOSIS — M19.171 POST-TRAUMATIC ARTHRITIS OF ANKLE, RIGHT: ICD-10-CM

## 2020-01-06 PROCEDURE — 99213 OFFICE O/P EST LOW 20 MIN: CPT | Mod: S$GLB,,, | Performed by: PODIATRIST

## 2020-01-06 PROCEDURE — 99999 PR PBB SHADOW E&M-EST. PATIENT-LVL IV: ICD-10-PCS | Mod: PBBFAC,,, | Performed by: PODIATRIST

## 2020-01-06 PROCEDURE — 1101F PT FALLS ASSESS-DOCD LE1/YR: CPT | Mod: CPTII,S$GLB,, | Performed by: PODIATRIST

## 2020-01-06 PROCEDURE — 1101F PR PT FALLS ASSESS DOC 0-1 FALLS W/OUT INJ PAST YR: ICD-10-PCS | Mod: CPTII,S$GLB,, | Performed by: PODIATRIST

## 2020-01-06 PROCEDURE — 99999 PR PBB SHADOW E&M-EST. PATIENT-LVL IV: CPT | Mod: PBBFAC,,, | Performed by: PODIATRIST

## 2020-01-06 PROCEDURE — 1159F PR MEDICATION LIST DOCUMENTED IN MEDICAL RECORD: ICD-10-PCS | Mod: S$GLB,,, | Performed by: PODIATRIST

## 2020-01-06 PROCEDURE — 99213 PR OFFICE/OUTPT VISIT, EST, LEVL III, 20-29 MIN: ICD-10-PCS | Mod: S$GLB,,, | Performed by: PODIATRIST

## 2020-01-06 PROCEDURE — 3078F DIAST BP <80 MM HG: CPT | Mod: CPTII,S$GLB,, | Performed by: PODIATRIST

## 2020-01-06 PROCEDURE — 1125F PR PAIN SEVERITY QUANTIFIED, PAIN PRESENT: ICD-10-PCS | Mod: S$GLB,,, | Performed by: PODIATRIST

## 2020-01-06 PROCEDURE — 1159F MED LIST DOCD IN RCRD: CPT | Mod: S$GLB,,, | Performed by: PODIATRIST

## 2020-01-06 PROCEDURE — 3074F PR MOST RECENT SYSTOLIC BLOOD PRESSURE < 130 MM HG: ICD-10-PCS | Mod: CPTII,S$GLB,, | Performed by: PODIATRIST

## 2020-01-06 PROCEDURE — 3074F SYST BP LT 130 MM HG: CPT | Mod: CPTII,S$GLB,, | Performed by: PODIATRIST

## 2020-01-06 PROCEDURE — 1125F AMNT PAIN NOTED PAIN PRSNT: CPT | Mod: S$GLB,,, | Performed by: PODIATRIST

## 2020-01-06 PROCEDURE — 3078F PR MOST RECENT DIASTOLIC BLOOD PRESSURE < 80 MM HG: ICD-10-PCS | Mod: CPTII,S$GLB,, | Performed by: PODIATRIST

## 2020-01-06 RX ORDER — FAMCICLOVIR 250 MG/1
TABLET ORAL
COMMUNITY
Start: 2020-01-02 | End: 2020-01-14 | Stop reason: ALTCHOICE

## 2020-01-07 NOTE — PROGRESS NOTES
"Subjective:      Patient ID: Heri Muhammad is a 79 y.o. male.    Chief Complaint: Follow-up (right ankle )    Complains of progressive pain to his medial right ankle noting that the right foot turns out a great deal when he walks.  He has a history of prior right ankle fracture many years ago. Denies any recent trauma.  He has tried to changes shoes around.  Has a history of wearing prior orthotics which helped to some degree however feels that is no longer helping at this time.  Accompanied by his wife.  Pain is described as aching and dull in nature and is aggravated by extended periods of standing and walking.  Pain is alleviated somewhat by resting.    01/06/2020:  Presents 6 weeks follow-up from right ankle brace.  Relates some improvement to symptoms wearing the ankle brace.  No new complaints.      Vitals:    01/06/20 1030   BP: 121/68   Pulse: 89   Weight: 104.8 kg (231 lb)   Height: 6' 1" (1.854 m)   PainSc:   3      Past Medical History:   Diagnosis Date    A-fib     Anticoagulant long-term use     eliquis    Aortic atherosclerosis 9/18/2017    - LDL goal <70 - BP goal <130/80    Atrial flutter     Benign essential tremor     Biventricular cardiac pacemaker in situ 9/24/2018    CAD (coronary artery disease) 12/2/2013    - 9/2013 Left heart Cath: LAD- 60% prox (neg FFR), nl, ramus and RCA 20% - LDL goal <70 - BP goal <130/80    Cancer     skin cancer on back    Cardiac pacemaker in situ     Cardiomyopathy, nonischemic 2/12/2018    - 8/29/18 Echo EF 55%. Grade III diastolic dysfunction with restrictive physiology - followed by Cardiology    Carpal tunnel syndrome of left wrist 8/8/2017    CHF (congestive heart failure)     Chronic diastolic heart failure 1/19/2018    Chronic obstructive pulmonary disease 10/3/2018    Chronic right-sided thoracic back pain 4/27/2017    Coronary artery disease     Cough     Current use of long term anticoagulation 6/2/2016    - Eliquis for Afib    Dizziness "     intermittant    DJD (degenerative joint disease) of knee     ED (erectile dysfunction)     Hard of hearing 11/2016    History of cataract     HTN (hypertension)     Hyperlipidemia     Hyperlipidemia     Hyponatremia 6/11/2019    Memory loss     Mixed hyperlipidemia 7/28/2012    Persistent atrial fibrillation 7/1/2015    - followed by Dr. Esparza - history of cardioversion - on Eliquis OAC 5 mg BID    Seizure disorder     Seizures     last episode 1995    Sinus node dysfunction 08/2016    Subclinical hypothyroidism     Tension type headache 7/2/2019       Past Surgical History:   Procedure Laterality Date    ANKLE SURGERY Right     pins and screws    ATRIAL ABLATION SURGERY      CARDIAC PACEMAKER PLACEMENT  2014    CARDIAC PACEMAKER PLACEMENT      CARDIAC PACEMAKER PLACEMENT  2014    CARDIOVERSION      CATARACT EXTRACTION      OU    COLONOSCOPY      due in 2014    EYE SURGERY Bilateral     cataracts extraction    FRACTURE SURGERY Right     ankle with hardware    HERNIA REPAIR      abdominal    INJECTION OF ANESTHETIC AGENT AROUND MEDIAL BRANCH NERVES INNERVATING LUMBAR FACET JOINT Bilateral 1/17/2019    Procedure: Block-nerve-medial branch-lumbar L2-L5;  Surgeon: Anthony Moffett MD;  Location: [facility] OR;  Service: Pain Management;  Laterality: Bilateral;    JOINT REPLACEMENT Bilateral     shoulders    RADIOFREQUENCY ABLATION OF LUMBAR MEDIAL BRANCH NERVE AT SINGLE LEVEL Bilateral 1/23/2019    Procedure: Radiofrequency Ablation, Nerve, Spinal, Lumbar, Medial Branch, L2-L5;  Surgeon: Anthony Moffett MD;  Location: [facility] OR;  Service: Pain Management;  Laterality: Bilateral;       Family History   Problem Relation Age of Onset    Heart disease Mother     Heart failure Mother     Dementia Mother     Cancer Father         colon    Blindness Father         accident    Cataracts Father     Hypertension Father     Tremor Father     Tremor Brother     Tremor Paternal Grandfather      Cancer Daughter     No Known Problems Son     Obesity Daughter     Melanoma Neg Hx     Amblyopia Neg Hx     Diabetes Neg Hx     Glaucoma Neg Hx     Macular degeneration Neg Hx     Retinal detachment Neg Hx     Strabismus Neg Hx     Stroke Neg Hx     Thyroid disease Neg Hx        Social History     Socioeconomic History    Marital status:      Spouse name: Not on file    Number of children: Not on file    Years of education: Not on file    Highest education level: Not on file   Occupational History    Occupation: retired   Social Needs    Financial resource strain: Not on file    Food insecurity:     Worry: Not on file     Inability: Not on file    Transportation needs:     Medical: Not on file     Non-medical: Not on file   Tobacco Use    Smoking status: Former Smoker     Packs/day: 1.00     Years: 5.00     Pack years: 5.00     Types: Cigarettes     Last attempt to quit:      Years since quittin.0    Smokeless tobacco: Never Used   Substance and Sexual Activity    Alcohol use: No    Drug use: No    Sexual activity: Not Currently     Partners: Female   Lifestyle    Physical activity:     Days per week: Not on file     Minutes per session: Not on file    Stress: Not on file   Relationships    Social connections:     Talks on phone: Not on file     Gets together: Not on file     Attends Worship service: Not on file     Active member of club or organization: Not on file     Attends meetings of clubs or organizations: Not on file     Relationship status: Not on file   Other Topics Concern    Not on file   Social History Narrative    . 4 adult children.        Current Outpatient Medications   Medication Sig Dispense Refill    acetaminophen (TYLENOL) 325 MG tablet Take 325 mg by mouth every 6 (six) hours as needed for Pain.      ANORO ELLIPTA 62.5-25 mcg/actuation DsDv INHALE 1 PUFF DAILY (CONTROLLER) 90 each 3    apixaban (ELIQUIS) 5 mg Tab Take 1 tablet (5 mg total)  by mouth 2 (two) times daily. 180 tablet 4    aspirin (ECOTRIN) 81 MG EC tablet Take 81 mg by mouth once daily.      atorvastatin (LIPITOR) 10 MG tablet TAKE 1 TABLET EVERY EVENING 90 tablet 4    cyanocobalamin (VITAMIN B-12) 1000 MCG tablet Take 100 mcg by mouth once daily.      cyclobenzaprine (FLEXERIL) 5 MG tablet Take 1 tablet (5 mg total) by mouth 2 (two) times daily as needed for Muscle spasms. 40 tablet 1    famciclovir (FAMVIR) 250 MG Tab       famciclovir (FAMVIR) 500 MG tablet Take 1 tablet (500 mg total) by mouth 3 (three) times daily. for 7 days 21 tablet 0    gabapentin (NEURONTIN) 300 MG capsule Take 1 capsule (300 mg total) by mouth every evening. 30 capsule 1    ketoconazole (NIZORAL) 2 % cream AAA bid to groin and feet x 3 weeks 60 g 3    lamoTRIgine (LAMICTAL) 100 MG tablet Take 1 tablet (100 mg total) by mouth 2 (two) times daily. 180 tablet 3    memantine (NAMENDA) 10 MG Tab Take 1 tablet (10 mg total) by mouth 2 (two) times daily. 180 tablet 3    metoprolol succinate (TOPROL-XL) 50 MG 24 hr tablet TAKE 1 TABLET (50 MG TOTAL) BY MOUTH ONCE DAILY. 90 tablet 4    primidone (MYSOLINE) 250 MG Tab Take 2 tablets (500 mg total) by mouth 2 (two) times daily. 360 tablet 3    torsemide (DEMADEX) 10 MG Tab TAKE 1 TABLET (10 MG TOTAL) BY MOUTH EVERY MORNING. 90 tablet 4     Current Facility-Administered Medications   Medication Dose Route Frequency Provider Last Rate Last Dose    albuterol-ipratropium 2.5 mg-0.5 mg/3 mL nebulizer solution 3 mL  3 mL Nebulization Once Ora Murillo DO           Review of patient's allergies indicates:   Allergen Reactions    Bactroban [mupirocin calcium] Rash     Other reaction(s): Rash         Review of Systems   Constitution: Negative for chills, fever and malaise/fatigue.   HENT: Negative for hearing loss.    Cardiovascular: Negative for chest pain, claudication and leg swelling.   Respiratory: Negative for cough and shortness of breath.    Skin: Negative  for color change, poor wound healing and rash.   Musculoskeletal: Positive for back pain and joint pain. Negative for muscle cramps and muscle weakness.   Gastrointestinal: Negative for nausea and vomiting.   Neurological: Negative for numbness, paresthesias and weakness.   Psychiatric/Behavioral: Negative for altered mental status.           Objective:      Physical Exam   Constitutional: He is oriented to person, place, and time. He appears well-developed. No distress.   Cardiovascular: Intact distal pulses.   Pulses:       Dorsalis pedis pulses are 2+ on the right side, and 2+ on the left side.        Posterior tibial pulses are 2+ on the right side, and 2+ on the left side.   Musculoskeletal:   Localized pain on palpation along the posterior tibial tendon anterior inferior to the medial malleolus right ankle pain that was aggravated by stressed eversion of the right ankle.  Mild pain with active resisted inversion of the right foot.    Weight-bearing note pes Skippers valgus right foot. Left foot with mild collapse of the medial longitudinal arch during standing.    Questionable anterior drawer sign right ankle in comparison to the left.    Positive equinus right ankle.   Neurological: He is alert and oriented to person, place, and time. He has normal strength. No sensory deficit.   Skin: Skin is warm, dry and intact. Capillary refill takes less than 2 seconds. No ecchymosis, no lesion, no petechiae and no rash noted. He is not diaphoretic. No cyanosis or erythema. No pallor. Nails show no clubbing.             Assessment:       Encounter Diagnoses   Name Primary?    Pronation deformity of ankle, acquired, right Yes    History of fracture of right ankle     Ankle instability, right     Post-traumatic arthritis of ankle, right          Plan:       Heri was seen today for follow-up.    Diagnoses and all orders for this visit:    Pronation deformity of ankle, acquired, right  -     ANKLE FOOT ORTHOSIS FOR HOME  USE    History of fracture of right ankle  -     ANKLE FOOT ORTHOSIS FOR HOME USE    Ankle instability, right  -     ANKLE FOOT ORTHOSIS FOR HOME USE    Post-traumatic arthritis of ankle, right      I counseled the patient on his conditions, their implications and medical management.    Reviewed right ankle x-ray noting moderate arthrosis with significant reduction in the joint space of the tibiotalar joint and moderate osteophytic lipping specially along the anterior distal tibia.  Intact hardware from previous ankle ORIF.  No significant collapse of the medial longitudinal arch on lateral projection.    Clinically speaking most likely has insufficiency the deltoid ligament with significant advanced post traumatic arthritis of the right ankle.  Discussed continued conservative care such as custom AFO consisting of [place] bracing versus surgical intervention consisting of ankle arthrodesis in detail.  Patient elected to proceed with conservative care at this time.    RTC p.r.n. as discussed    A portion of this note was generated by voice recognition software and may contain topical graphical errors.      .

## 2020-01-09 ENCOUNTER — DOCUMENTATION ONLY (OUTPATIENT)
Dept: CARDIOLOGY | Facility: HOSPITAL | Age: 80
End: 2020-01-09

## 2020-01-09 ENCOUNTER — TELEPHONE (OUTPATIENT)
Dept: ELECTROPHYSIOLOGY | Facility: CLINIC | Age: 80
End: 2020-01-09

## 2020-01-09 ENCOUNTER — TELEPHONE (OUTPATIENT)
Dept: FAMILY MEDICINE | Facility: CLINIC | Age: 80
End: 2020-01-09

## 2020-01-09 NOTE — TELEPHONE ENCOUNTER
----- Message from Rut Velasco sent at 1/9/2020  9:12 AM CST -----  Contact: Self  Pt still hasn't heard from office regarding Pt's pain in his left side & would like to be seen soon. Thanks

## 2020-01-09 NOTE — TELEPHONE ENCOUNTER
"Spoke with patient's wife. Advised that I do not have a message in his chart and apologized for any miscommunication. Also advised that chest pain needs to be assessed by Dr Graham, his general cardiologist. She verbalized understanding and then explained that she called in November to schedule his routine appointment with Dr Esparza and was told someone would call her back and no one ever did. She is now upset that he doesn't have an appt available until May. She feels that no one is monitoring his device. It was followed by Dr Graham and she said that she filled out "paper work" to transfer device care here. Spoke with Megan in device team, she is looking into it. I redirected her to his complaint of chest pain. She admits that PCP is treating him for shingles and he is having severe pain that is keeping him from sleeping. She wanted to make sure it wasn't cardiac. Advised her to reach out to Dr Graham for evaluation/recommendations. Will have someone call to make routine appt with Dr Esparza.   "

## 2020-01-09 NOTE — TELEPHONE ENCOUNTER
----- Message from Jen Vasquez sent at 1/9/2020  9:08 AM CST -----  Contact: self  Patient is requesting a call back concerning the pain medication he was given for Shingles, pt states it is not working and would like something different to help. Please call

## 2020-01-09 NOTE — TELEPHONE ENCOUNTER
----- Message from Lashay Chapa sent at 1/9/2020 10:37 AM CST -----  Contact: pt wife  Pt returning a missed call    Please call and advise    Phone [phone]

## 2020-01-09 NOTE — PROGRESS NOTES
Pt enrolled in [facility] remote monitoring site for [place].  Last transmission date 12/23/19, next due 3/23/2020

## 2020-01-09 NOTE — TELEPHONE ENCOUNTER
Spoke to patient wife and states that the gabapentin is not helping with the pain and was wondering if he can get something else for the pain. Patient also only has one pill left of the Famciclovir and wanted to know if he needs another refill since he is still having the pain.

## 2020-01-13 ENCOUNTER — TELEPHONE (OUTPATIENT)
Dept: FAMILY MEDICINE | Facility: CLINIC | Age: 80
End: 2020-01-13

## 2020-01-13 DIAGNOSIS — B02.9 HERPES ZOSTER WITHOUT COMPLICATION: Primary | ICD-10-CM

## 2020-01-13 DIAGNOSIS — B02.8 HERPES ZOSTER WITH COMPLICATION: ICD-10-CM

## 2020-01-13 RX ORDER — GABAPENTIN 300 MG/1
300 CAPSULE ORAL NIGHTLY
Qty: 30 CAPSULE | Refills: 1 | Status: CANCELLED | OUTPATIENT
Start: 2020-01-13 | End: 2021-01-12

## 2020-01-13 NOTE — TELEPHONE ENCOUNTER
He can take the Gabapentin 300 mg three times a day and if needed he can take an extra dose. But he will need to be seen to see if he needs steroids.   Please offer appt for me tomorrow okay to overbook 7, 7:30 or 9:30 AM or 11 AM  Dr. Ora Murillo D.O.   [facility]

## 2020-01-13 NOTE — TELEPHONE ENCOUNTER
Patient was only seen by Dr. Fletcher as his PCP was not available to see him, sending message to Dr. Murillo

## 2020-01-13 NOTE — TELEPHONE ENCOUNTER
----- Message from Maurice River sent at 1/13/2020  1:10 PM CST -----  Type:  Needs Medical Advice    Who Called: wife/Melania  Reason: Shingles and he is in severe pain  How long has patient had these symptoms:  About  3 weeks   Pharmacy name and phone #:  [facility] [phone]  Would the patient rather a call back or a response via [facility]? call  Best Call Back Number: [phone]  Additional Information: The gabapentin (NEURONTIN) 300 MG capsule is not relieving the pain he needs something stronger.

## 2020-01-13 NOTE — TELEPHONE ENCOUNTER
Spoke with pt wife, wife stated pt has shingles and pt is in a lot of pain. Pt was wondering what else he could do to help the pain. Pt is taking gabapentin TID.

## 2020-01-13 NOTE — PROGRESS NOTES
FAMILY MEDICINE    Patient Active Problem List   Diagnosis    Mixed hyperlipidemia    CAD (coronary artery disease)    Lumbar spondylosis    Persistent atrial fibrillation    Essential tremor    Current use of long term anticoagulation    Seizure disorder    Essential hypertension    Aortic atherosclerosis    Chronic diastolic heart failure    Subclinical hypothyroidism    Late onset Alzheimer's disease without behavioral disturbance    Cardiomyopathy, nonischemic    Biventricular cardiac pacemaker in situ    Chronic bilateral low back pain without sciatica    Obesity (BMI 30.0-34.9)    History of skin cancer    Bilateral shoulder pain    Chronic pain of right ankle    Myofascial pain    Chronic foot pain, right    Shoulder weakness    Chronic right shoulder pain    Herpes zoster with complication    Post-traumatic arthritis of ankle, right    Neuralgia       CC:   Chief Complaint   Patient presents with    Herpes Zoster     in alot of pain        SUBJECTIVE:  Heri Muhammad   is a 79 y.o. male  - with obesity, dementia, seizure disorder, hyperlipidemia, CAD (non-obstructive and no history of stents), DJD lumbar spine, Afib/AFlutter (on Eliquis, h/o ablation and pacemaker in place), hypertension, aortic atherosclerosis, HFpEF (8/2018 EF 55%), subclinical hypothyroidism, restrictive cardiomyopathy, chronic low back pain, chronic bilateral shoulder pain s/p bilateral shoulder replacements and COPD presents for continued pain with Shingles    He was initially evaluated by Dr. Fletcher on 1/2/202 for pain under left axillary area that wrapped around his left shoulder. No rash was noted but pain was tingling and followed a dermatomal pattern. He was treated with Gabapentin 300 mg and up titrated to TID and famciclovir 500 mg TID x 7 days.    Pt and wife report that pain persists and continues to worsen and was severe yesterday though today greatly decreased to 4/10 currently    1. Left lateral  chest pain and underarm    Onset: 2- 3weeks ago and thought that he had pulled a muscle since he has chronic shoulder pain with limited ROM due to prior surgeries  Location: left lateral chest with radiation to left mid back and under his armpit  Duration: intermittent several seconds to minutes to hours  Character: achy to sharp shooting pain with burning at time  Aggravating factors: movement  Relieving factors: sometimes Gabapentin and has increased Gabapentin 300 mg to TID after discussing with Dr. Jackson  Timing of day: anytime  Associated symptoms: see above, fatigue  Negative symptoms: denies rash, swelling, fever, chills, myalgias       ROS: Review of Systems   Constitutional: Negative.    HENT: Negative.    Eyes: Negative.    Respiratory: Negative.    Cardiovascular: Negative.    Gastrointestinal: Negative.    Endocrine: Negative.    Genitourinary: Negative.    Musculoskeletal: Positive for arthralgias and back pain.        Otherwise negative   Skin: Negative.    Allergic/Immunologic: Negative.    Neurological: Negative.    Hematological: Negative.    Psychiatric/Behavioral: Positive for sleep disturbance.        Otherwise negative       Past Medical History:   Diagnosis Date    A-fib     Anticoagulant long-term use     eliquis    Aortic atherosclerosis 9/18/2017    - LDL goal <70 - BP goal <130/80    Atrial flutter     Benign essential tremor     Biventricular cardiac pacemaker in situ 9/24/2018    CAD (coronary artery disease) 12/2/2013    - 9/2013 Left heart Cath: LAD- 60% prox (neg FFR), nl, ramus and RCA 20% - LDL goal <70 - BP goal <130/80    Cancer     skin cancer on back    Cardiac pacemaker in situ     Cardiomyopathy, nonischemic 2/12/2018    - 8/29/18 Echo EF 55%. Grade III diastolic dysfunction with restrictive physiology - followed by Cardiology    Carpal tunnel syndrome of left wrist 8/8/2017    CHF (congestive heart failure)     Chronic diastolic heart failure 1/19/2018     Chronic obstructive pulmonary disease 10/3/2018    Chronic right-sided thoracic back pain 4/27/2017    Coronary artery disease     Cough     Current use of long term anticoagulation 6/2/2016    - Eliquis for Afib    Dizziness     intermittant    DJD (degenerative joint disease) of knee     ED (erectile dysfunction)     Hard of hearing 11/2016    History of cataract     HTN (hypertension)     Hyperlipidemia     Hyperlipidemia     Hyponatremia 6/11/2019    Memory loss     Mixed hyperlipidemia 7/28/2012    Persistent atrial fibrillation 7/1/2015    - followed by Dr. Esparza - history of cardioversion - on Eliquis OAC 5 mg BID    Seizure disorder     Seizures     last episode 1995    Sinus node dysfunction 08/2016    Subclinical hypothyroidism     Tension type headache 7/2/2019       Past Surgical History:   Procedure Laterality Date    ANKLE SURGERY Right     pins and screws    ATRIAL ABLATION SURGERY      CARDIAC PACEMAKER PLACEMENT  2014    CARDIAC PACEMAKER PLACEMENT      CARDIAC PACEMAKER PLACEMENT  2014    CARDIOVERSION      CATARACT EXTRACTION      OU    COLONOSCOPY      due in 2014    EYE SURGERY Bilateral     cataracts extraction    FRACTURE SURGERY Right     ankle with hardware    HERNIA REPAIR      abdominal    INJECTION OF ANESTHETIC AGENT AROUND MEDIAL BRANCH NERVES INNERVATING LUMBAR FACET JOINT Bilateral 1/17/2019    Procedure: Block-nerve-medial branch-lumbar L2-L5;  Surgeon: Anthony Moffett MD;  Location: [facility] OR;  Service: Pain Management;  Laterality: Bilateral;    JOINT REPLACEMENT Bilateral     shoulders    RADIOFREQUENCY ABLATION OF LUMBAR MEDIAL BRANCH NERVE AT SINGLE LEVEL Bilateral 1/23/2019    Procedure: Radiofrequency Ablation, Nerve, Spinal, Lumbar, Medial Branch, L2-L5;  Surgeon: Anthony Moffett MD;  Location: [facility] OR;  Service: Pain Management;  Laterality: Bilateral;       Family History   Problem Relation Age of Onset    Heart disease Mother     Heart  failure Mother     Dementia Mother     Cancer Father         colon    Blindness Father         accident    Cataracts Father     Hypertension Father     Tremor Father     Tremor Brother     Tremor Paternal Grandfather     Cancer Daughter     No Known Problems Son     Obesity Daughter     Melanoma Neg Hx     Amblyopia Neg Hx     Diabetes Neg Hx     Glaucoma Neg Hx     Macular degeneration Neg Hx     Retinal detachment Neg Hx     Strabismus Neg Hx     Stroke Neg Hx     Thyroid disease Neg Hx        Social History     Tobacco Use    Smoking status: Former Smoker     Packs/day: 1.00     Years: 5.00     Pack years: 5.00     Types: Cigarettes     Last attempt to quit: 195     Years since quittin.0    Smokeless tobacco: Never Used   Substance Use Topics    Alcohol use: No    Drug use: No       Social History     Social History Narrative    . 4 adult children.        ALLERGIES:   Review of patient's allergies indicates:   Allergen Reactions    Bactroban [mupirocin calcium] Rash     Other reaction(s): Rash       MEDS:   Current Outpatient Medications:     acetaminophen (TYLENOL) 325 MG tablet, Take 325 mg by mouth every 6 (six) hours as needed for Pain., Disp: , Rfl:     ANORO ELLIPTA 62.5-25 mcg/actuation DsDv, INHALE 1 PUFF DAILY (CONTROLLER), Disp: 90 each, Rfl: 3    apixaban (ELIQUIS) 5 mg Tab, Take 1 tablet (5 mg total) by mouth 2 (two) times daily., Disp: 180 tablet, Rfl: 4    atorvastatin (LIPITOR) 10 MG tablet, TAKE 1 TABLET EVERY EVENING, Disp: 90 tablet, Rfl: 4    cyanocobalamin (VITAMIN B-12) 1000 MCG tablet, Take 100 mcg by mouth once daily., Disp: , Rfl:     cyclobenzaprine (FLEXERIL) 5 MG tablet, Take 1 tablet (5 mg total) by mouth 2 (two) times daily as needed for Muscle spasms., Disp: 40 tablet, Rfl: 1    gabapentin (NEURONTIN) 300 MG capsule, Take 1 capsule (300 mg total) by mouth 3 (three) times daily., Disp: 90 capsule, Rfl: 0    lamoTRIgine (LAMICTAL) 100 MG  "tablet, Take 1 tablet (100 mg total) by mouth 2 (two) times daily., Disp: 180 tablet, Rfl: 3    memantine (NAMENDA) 10 MG Tab, Take 1 tablet (10 mg total) by mouth 2 (two) times daily., Disp: 180 tablet, Rfl: 3    metoprolol succinate (TOPROL-XL) 50 MG 24 hr tablet, TAKE 1 TABLET (50 MG TOTAL) BY MOUTH ONCE DAILY., Disp: 90 tablet, Rfl: 4    primidone (MYSOLINE) 250 MG Tab, Take 2 tablets (500 mg total) by mouth 2 (two) times daily., Disp: 360 tablet, Rfl: 3    torsemide (DEMADEX) 10 MG Tab, TAKE 1 TABLET (10 MG TOTAL) BY MOUTH EVERY MORNING., Disp: 90 tablet, Rfl: 4    ketoconazole (NIZORAL) 2 % cream, AAA bid to groin and feet x 3 weeks (Patient not taking: Reported on 1/14/2020), Disp: 60 g, Rfl: 3    Current Facility-Administered Medications:     albuterol-ipratropium 2.5 mg-0.5 mg/3 mL nebulizer solution 3 mL, 3 mL, Nebulization, Once, Ora Murillo DO    OBJECTIVE:   Vitals:    01/14/20 1112   BP: 100/60   BP Location: Left arm   Patient Position: Sitting   BP Method: X-Large (Manual)   Pulse: 70   Resp: 18   Temp: 98.4 °F (36.9 °C)   TempSrc: Oral   SpO2: 98%   Weight: 106.2 kg (234 lb 3.2 oz)   Height: 6' 1" (1.854 m)     Body mass index is 30.9 kg/m².    Physical Exam   Constitutional: No distress.   Neck: Neck supple.   Cardiovascular: Normal rate, regular rhythm, normal heart sounds and intact distal pulses. Exam reveals no gallop and no friction rub.   No murmur heard.  Pulmonary/Chest: Effort normal and breath sounds normal. He has no decreased breath sounds. He has no wheezes. He has no rhonchi. He has no rales.   Musculoskeletal: He exhibits no edema.   Neurological: He is alert.   Skin: Skin is warm. No rash noted.         PERTINENT RESULTS:   BMP  Lab Results   Component Value Date     10/22/2019    K 4.6 10/22/2019     10/22/2019    CO2 30 (H) 10/22/2019    BUN 10 10/22/2019    CREATININE 0.8 10/22/2019    CALCIUM 8.5 (L) 10/22/2019    ANIONGAP 12 10/22/2019    ESTGFRAFRICA >60.0 " 10/22/2019    EGFRNONAA >60.0 10/22/2019       ASSESSMENT/PLAN:  Problem List Items Addressed This Visit        Neuro    Seizure disorder    Overview     - stable and no recent seizures since 1990's  - CT Head 2019 with diffuse atrophy  - EEG 1/2019 mild diffuse slowing         Current Assessment & Plan     - followed by Neurology Dr. Anjum Arellano         Neuralgia    Current Assessment & Plan     - secondary to shingles vs OA vs other secondary etiology  - recommend start OTC lidocaine patches            Cardiac/Vascular    Persistent atrial fibrillation    Overview     - followed by Dr. Esparza  - history of cardioversion  - on Eliquis OAC 5 mg BID         Essential hypertension - Primary    Current Assessment & Plan     - well controlled  - continue current medications         Aortic atherosclerosis    Overview     - LDL goal <70  - BP goal <130/80         Chronic diastolic heart failure    Current Assessment & Plan     - grade III  - no signed of acute exacerbation            ID    Herpes zoster with complication    Current Assessment & Plan     - complete Famciclovir   - no rash and unsure if Zoster vs neuralgia  - okay to start OTC lidocaine patches  - okay to continue Gabapentin 300 mg TID prn  - monitor closely         Relevant Medications    gabapentin (NEURONTIN) 300 MG capsule          ORDERS:   Orders Placed This Encounter    gabapentin (NEURONTIN) 300 MG capsule     Follow-up in 2-4 weeks.     Dr. Ora Murillo D.O.   Family Medicine

## 2020-01-14 ENCOUNTER — OFFICE VISIT (OUTPATIENT)
Dept: FAMILY MEDICINE | Facility: CLINIC | Age: 80
End: 2020-01-14
Payer: MEDICARE

## 2020-01-14 ENCOUNTER — TELEPHONE (OUTPATIENT)
Dept: ELECTROPHYSIOLOGY | Facility: CLINIC | Age: 80
End: 2020-01-14

## 2020-01-14 VITALS
OXYGEN SATURATION: 98 % | HEART RATE: 70 BPM | WEIGHT: 234.19 LBS | RESPIRATION RATE: 18 BRPM | SYSTOLIC BLOOD PRESSURE: 100 MMHG | TEMPERATURE: 98 F | DIASTOLIC BLOOD PRESSURE: 60 MMHG | HEIGHT: 73 IN | BODY MASS INDEX: 31.04 KG/M2

## 2020-01-14 DIAGNOSIS — I10 ESSENTIAL HYPERTENSION: Primary | ICD-10-CM

## 2020-01-14 DIAGNOSIS — I70.0 AORTIC ATHEROSCLEROSIS: ICD-10-CM

## 2020-01-14 DIAGNOSIS — M79.2 NEURALGIA: ICD-10-CM

## 2020-01-14 DIAGNOSIS — B02.8 HERPES ZOSTER WITH COMPLICATION: ICD-10-CM

## 2020-01-14 DIAGNOSIS — G40.909 SEIZURE DISORDER: ICD-10-CM

## 2020-01-14 DIAGNOSIS — I50.32 CHRONIC DIASTOLIC HEART FAILURE: ICD-10-CM

## 2020-01-14 DIAGNOSIS — I48.19 PERSISTENT ATRIAL FIBRILLATION: ICD-10-CM

## 2020-01-14 PROBLEM — J42 CHRONIC BRONCHITIS: Status: RESOLVED | Noted: 2018-10-03 | Resolved: 2020-01-14

## 2020-01-14 PROCEDURE — 3074F PR MOST RECENT SYSTOLIC BLOOD PRESSURE < 130 MM HG: ICD-10-PCS | Mod: CPTII,S$GLB,, | Performed by: FAMILY MEDICINE

## 2020-01-14 PROCEDURE — 3078F DIAST BP <80 MM HG: CPT | Mod: CPTII,S$GLB,, | Performed by: FAMILY MEDICINE

## 2020-01-14 PROCEDURE — 1101F PT FALLS ASSESS-DOCD LE1/YR: CPT | Mod: CPTII,S$GLB,, | Performed by: FAMILY MEDICINE

## 2020-01-14 PROCEDURE — 3074F SYST BP LT 130 MM HG: CPT | Mod: CPTII,S$GLB,, | Performed by: FAMILY MEDICINE

## 2020-01-14 PROCEDURE — 99214 PR OFFICE/OUTPT VISIT, EST, LEVL IV, 30-39 MIN: ICD-10-PCS | Mod: S$GLB,,, | Performed by: FAMILY MEDICINE

## 2020-01-14 PROCEDURE — 99214 OFFICE O/P EST MOD 30 MIN: CPT | Mod: S$GLB,,, | Performed by: FAMILY MEDICINE

## 2020-01-14 PROCEDURE — 1159F MED LIST DOCD IN RCRD: CPT | Mod: S$GLB,,, | Performed by: FAMILY MEDICINE

## 2020-01-14 PROCEDURE — 99999 PR PBB SHADOW E&M-EST. PATIENT-LVL IV: ICD-10-PCS | Mod: PBBFAC,,, | Performed by: FAMILY MEDICINE

## 2020-01-14 PROCEDURE — 1125F PR PAIN SEVERITY QUANTIFIED, PAIN PRESENT: ICD-10-PCS | Mod: S$GLB,,, | Performed by: FAMILY MEDICINE

## 2020-01-14 PROCEDURE — 1159F PR MEDICATION LIST DOCUMENTED IN MEDICAL RECORD: ICD-10-PCS | Mod: S$GLB,,, | Performed by: FAMILY MEDICINE

## 2020-01-14 PROCEDURE — 3078F PR MOST RECENT DIASTOLIC BLOOD PRESSURE < 80 MM HG: ICD-10-PCS | Mod: CPTII,S$GLB,, | Performed by: FAMILY MEDICINE

## 2020-01-14 PROCEDURE — 1125F AMNT PAIN NOTED PAIN PRSNT: CPT | Mod: S$GLB,,, | Performed by: FAMILY MEDICINE

## 2020-01-14 PROCEDURE — 99999 PR PBB SHADOW E&M-EST. PATIENT-LVL IV: CPT | Mod: PBBFAC,,, | Performed by: FAMILY MEDICINE

## 2020-01-14 PROCEDURE — 1101F PR PT FALLS ASSESS DOC 0-1 FALLS W/OUT INJ PAST YR: ICD-10-PCS | Mod: CPTII,S$GLB,, | Performed by: FAMILY MEDICINE

## 2020-01-14 RX ORDER — GABAPENTIN 300 MG/1
300 CAPSULE ORAL 3 TIMES DAILY
Qty: 90 CAPSULE | Refills: 0 | Status: SHIPPED | OUTPATIENT
Start: 2020-01-14 | End: 2020-11-02

## 2020-01-14 NOTE — ASSESSMENT & PLAN NOTE
- complete Famciclovir   - no rash and unsure if Zoster vs neuralgia  - okay to start OTC lidocaine patches  - okay to continue Gabapentin 300 mg TID prn  - monitor closely

## 2020-01-14 NOTE — ASSESSMENT & PLAN NOTE
- secondary to shingles vs OA vs other secondary etiology  - recommend start OTC lidocaine patches

## 2020-01-27 ENCOUNTER — OFFICE VISIT (OUTPATIENT)
Dept: FAMILY MEDICINE | Facility: CLINIC | Age: 80
End: 2020-01-27
Payer: MEDICARE

## 2020-01-27 VITALS
DIASTOLIC BLOOD PRESSURE: 60 MMHG | OXYGEN SATURATION: 97 % | HEART RATE: 97 BPM | RESPIRATION RATE: 18 BRPM | WEIGHT: 235.88 LBS | HEIGHT: 73 IN | TEMPERATURE: 98 F | BODY MASS INDEX: 31.26 KG/M2 | SYSTOLIC BLOOD PRESSURE: 102 MMHG

## 2020-01-27 DIAGNOSIS — E03.8 SUBCLINICAL HYPOTHYROIDISM: ICD-10-CM

## 2020-01-27 DIAGNOSIS — B02.29 POSTHERPETIC NEURALGIA: ICD-10-CM

## 2020-01-27 DIAGNOSIS — I10 ESSENTIAL HYPERTENSION: Primary | ICD-10-CM

## 2020-01-27 PROCEDURE — 1101F PT FALLS ASSESS-DOCD LE1/YR: CPT | Mod: CPTII,S$GLB,, | Performed by: FAMILY MEDICINE

## 2020-01-27 PROCEDURE — 99999 PR PBB SHADOW E&M-EST. PATIENT-LVL V: CPT | Mod: PBBFAC,,, | Performed by: FAMILY MEDICINE

## 2020-01-27 PROCEDURE — 3074F SYST BP LT 130 MM HG: CPT | Mod: CPTII,S$GLB,, | Performed by: FAMILY MEDICINE

## 2020-01-27 PROCEDURE — 99214 PR OFFICE/OUTPT VISIT, EST, LEVL IV, 30-39 MIN: ICD-10-PCS | Mod: S$GLB,,, | Performed by: FAMILY MEDICINE

## 2020-01-27 PROCEDURE — 1159F MED LIST DOCD IN RCRD: CPT | Mod: S$GLB,,, | Performed by: FAMILY MEDICINE

## 2020-01-27 PROCEDURE — 3078F DIAST BP <80 MM HG: CPT | Mod: CPTII,S$GLB,, | Performed by: FAMILY MEDICINE

## 2020-01-27 PROCEDURE — 1159F PR MEDICATION LIST DOCUMENTED IN MEDICAL RECORD: ICD-10-PCS | Mod: S$GLB,,, | Performed by: FAMILY MEDICINE

## 2020-01-27 PROCEDURE — 1101F PR PT FALLS ASSESS DOC 0-1 FALLS W/OUT INJ PAST YR: ICD-10-PCS | Mod: CPTII,S$GLB,, | Performed by: FAMILY MEDICINE

## 2020-01-27 PROCEDURE — 99999 PR PBB SHADOW E&M-EST. PATIENT-LVL V: ICD-10-PCS | Mod: PBBFAC,,, | Performed by: FAMILY MEDICINE

## 2020-01-27 PROCEDURE — 1125F AMNT PAIN NOTED PAIN PRSNT: CPT | Mod: S$GLB,,, | Performed by: FAMILY MEDICINE

## 2020-01-27 PROCEDURE — 3074F PR MOST RECENT SYSTOLIC BLOOD PRESSURE < 130 MM HG: ICD-10-PCS | Mod: CPTII,S$GLB,, | Performed by: FAMILY MEDICINE

## 2020-01-27 PROCEDURE — 1125F PR PAIN SEVERITY QUANTIFIED, PAIN PRESENT: ICD-10-PCS | Mod: S$GLB,,, | Performed by: FAMILY MEDICINE

## 2020-01-27 PROCEDURE — 99214 OFFICE O/P EST MOD 30 MIN: CPT | Mod: S$GLB,,, | Performed by: FAMILY MEDICINE

## 2020-01-27 PROCEDURE — 3078F PR MOST RECENT DIASTOLIC BLOOD PRESSURE < 80 MM HG: ICD-10-PCS | Mod: CPTII,S$GLB,, | Performed by: FAMILY MEDICINE

## 2020-01-27 NOTE — PROGRESS NOTES
FAMILY MEDICINE    Patient Active Problem List   Diagnosis    Mixed hyperlipidemia    CAD (coronary artery disease)    Lumbar spondylosis    Persistent atrial fibrillation    Essential tremor    Current use of long term anticoagulation    Seizure disorder    Essential hypertension    Aortic atherosclerosis    Chronic diastolic heart failure    Subclinical hypothyroidism    Late onset Alzheimer's disease without behavioral disturbance    Cardiomyopathy, nonischemic    Biventricular cardiac pacemaker in situ    Chronic bilateral low back pain without sciatica    Obesity (BMI 30.0-34.9)    History of skin cancer    Bilateral shoulder pain    Chronic pain of right ankle    Myofascial pain    Chronic foot pain, right    Shoulder weakness    Chronic right shoulder pain    Postherpetic neuralgia    Post-traumatic arthritis of ankle, right    Neuralgia       CC:   Chief Complaint   Patient presents with    Follow-up     shingles       SUBJECTIVE:  Heri Muhammad   is a 79 y.o. male  - with obesity, dementia, seizure disorder, hyperlipidemia, CAD (non-obstructive and no history of stents), DJD lumbar spine, Afib/AFlutter (on Eliquis, h/o ablation and pacemaker in place), hypertension, aortic atherosclerosis, HFpEF (8/2018 EF 55%), subclinical hypothyroidism, restrictive cardiomyopathy, chronic low back pain, chronic bilateral shoulder pain s/p bilateral shoulder replacements and COPD presents for continued pain since diagnosed with Shingles 1/2/2020    He was initially evaluated by Dr. Fletcher on 1/2/2020 for pain under left axillary area that wrapped around his left shoulder. No rash was noted but pain was tingling and followed a dermatomal pattern. He was treated with Gabapentin 300 mg and up titrated to TID and famciclovir 500 mg TID x 7 days.    Pt and wife reports that improved with Gabapentin but that topical OTC lidocaine seems to work best.He is also noting increase swelling in his legs  with the Gabapentin and would like to start weaning off of medication. Never developed at rash. Pain still present left lateral chest. No fevers. Pain is burning and tingling 8/10 at worst and with lidocaine 3/10      ROS: Review of Systems   Constitutional: Negative.    HENT: Negative.    Eyes: Negative.    Respiratory: Negative.    Cardiovascular: Negative.    Gastrointestinal: Negative.    Endocrine: Negative.    Genitourinary: Negative.    Musculoskeletal: Positive for arthralgias and back pain.        Otherwise negative   Skin: Negative.    Allergic/Immunologic: Negative.    Neurological: Negative.    Hematological: Negative.    Psychiatric/Behavioral: Positive for sleep disturbance.        Otherwise negative       Past Medical History:   Diagnosis Date    A-fib     Anticoagulant long-term use     eliquis    Aortic atherosclerosis 9/18/2017    - LDL goal <70 - BP goal <130/80    Atrial flutter     Benign essential tremor     Biventricular cardiac pacemaker in situ 9/24/2018    CAD (coronary artery disease) 12/2/2013    - 9/2013 Left heart Cath: LAD- 60% prox (neg FFR), nl, ramus and RCA 20% - LDL goal <70 - BP goal <130/80    Cancer     skin cancer on back    Cardiac pacemaker in situ     Cardiomyopathy, nonischemic 2/12/2018    - 8/29/18 Echo EF 55%. Grade III diastolic dysfunction with restrictive physiology - followed by Cardiology    Carpal tunnel syndrome of left wrist 8/8/2017    CHF (congestive heart failure)     Chronic diastolic heart failure 1/19/2018    Chronic obstructive pulmonary disease 10/3/2018    Chronic right-sided thoracic back pain 4/27/2017    Coronary artery disease     Cough     Current use of long term anticoagulation 6/2/2016    - Eliquis for Afib    Dizziness     intermittant    DJD (degenerative joint disease) of knee     ED (erectile dysfunction)     Hard of hearing 11/2016    History of cataract     HTN (hypertension)     Hyperlipidemia     Hyperlipidemia      Hyponatremia 6/11/2019    Memory loss     Mixed hyperlipidemia 7/28/2012    Persistent atrial fibrillation 7/1/2015    - followed by Dr. Esparza - history of cardioversion - on Eliquis OAC 5 mg BID    Seizure disorder     Seizures     last episode 1995    Sinus node dysfunction 08/2016    Subclinical hypothyroidism     Tension type headache 7/2/2019       Past Surgical History:   Procedure Laterality Date    ANKLE SURGERY Right     pins and screws    ATRIAL ABLATION SURGERY      CARDIAC PACEMAKER PLACEMENT  2014    CARDIAC PACEMAKER PLACEMENT      CARDIAC PACEMAKER PLACEMENT  2014    CARDIOVERSION      CATARACT EXTRACTION      OU    COLONOSCOPY      due in 2014    EYE SURGERY Bilateral     cataracts extraction    FRACTURE SURGERY Right     ankle with hardware    HERNIA REPAIR      abdominal    INJECTION OF ANESTHETIC AGENT AROUND MEDIAL BRANCH NERVES INNERVATING LUMBAR FACET JOINT Bilateral 1/17/2019    Procedure: Block-nerve-medial branch-lumbar L2-L5;  Surgeon: Anthony Moffett MD;  Location: [facility] OR;  Service: Pain Management;  Laterality: Bilateral;    JOINT REPLACEMENT Bilateral     shoulders    RADIOFREQUENCY ABLATION OF LUMBAR MEDIAL BRANCH NERVE AT SINGLE LEVEL Bilateral 1/23/2019    Procedure: Radiofrequency Ablation, Nerve, Spinal, Lumbar, Medial Branch, L2-L5;  Surgeon: Anthony Moffett MD;  Location: [facility] OR;  Service: Pain Management;  Laterality: Bilateral;       Family History   Problem Relation Age of Onset    Heart disease Mother     Heart failure Mother     Dementia Mother     Cancer Father         colon    Blindness Father         accident    Cataracts Father     Hypertension Father     Tremor Father     Tremor Brother     Tremor Paternal Grandfather     Cancer Daughter     No Known Problems Son     Obesity Daughter     Melanoma Neg Hx     Amblyopia Neg Hx     Diabetes Neg Hx     Glaucoma Neg Hx     Macular degeneration Neg Hx     Retinal detachment Neg Hx      Strabismus Neg Hx     Stroke Neg Hx     Thyroid disease Neg Hx        Social History     Tobacco Use    Smoking status: Former Smoker     Packs/day: 1.00     Years: 5.00     Pack years: 5.00     Types: Cigarettes     Last attempt to quit: 195     Years since quittin.1    Smokeless tobacco: Never Used   Substance Use Topics    Alcohol use: No    Drug use: No       Social History     Social History Narrative    . 4 adult children.        ALLERGIES:   Review of patient's allergies indicates:   Allergen Reactions    Bactroban [mupirocin calcium] Rash     Other reaction(s): Rash       MEDS:   Current Outpatient Medications:     acetaminophen (TYLENOL) 325 MG tablet, Take 325 mg by mouth every 6 (six) hours as needed for Pain., Disp: , Rfl:     ANORO ELLIPTA 62.5-25 mcg/actuation DsDv, INHALE 1 PUFF DAILY (CONTROLLER), Disp: 90 each, Rfl: 3    apixaban (ELIQUIS) 5 mg Tab, Take 1 tablet (5 mg total) by mouth 2 (two) times daily., Disp: 180 tablet, Rfl: 4    atorvastatin (LIPITOR) 10 MG tablet, TAKE 1 TABLET EVERY EVENING, Disp: 90 tablet, Rfl: 4    cyanocobalamin (VITAMIN B-12) 1000 MCG tablet, Take 100 mcg by mouth once daily., Disp: , Rfl:     cyclobenzaprine (FLEXERIL) 5 MG tablet, Take 1 tablet (5 mg total) by mouth 2 (two) times daily as needed for Muscle spasms., Disp: 40 tablet, Rfl: 1    gabapentin (NEURONTIN) 300 MG capsule, Take 1 capsule (300 mg total) by mouth 3 (three) times daily., Disp: 90 capsule, Rfl: 0    ketoconazole (NIZORAL) 2 % cream, AAA bid to groin and feet x 3 weeks, Disp: 60 g, Rfl: 3    lamoTRIgine (LAMICTAL) 100 MG tablet, Take 1 tablet (100 mg total) by mouth 2 (two) times daily., Disp: 180 tablet, Rfl: 3    memantine (NAMENDA) 10 MG Tab, Take 1 tablet (10 mg total) by mouth 2 (two) times daily., Disp: 180 tablet, Rfl: 3    metoprolol succinate (TOPROL-XL) 50 MG 24 hr tablet, TAKE 1 TABLET (50 MG TOTAL) BY MOUTH ONCE DAILY., Disp: 90 tablet, Rfl: 4     "primidone (MYSOLINE) 250 MG Tab, Take 2 tablets (500 mg total) by mouth 2 (two) times daily., Disp: 360 tablet, Rfl: 3    torsemide (DEMADEX) 10 MG Tab, TAKE 1 TABLET (10 MG TOTAL) BY MOUTH EVERY MORNING., Disp: 90 tablet, Rfl: 4    Current Facility-Administered Medications:     albuterol-ipratropium 2.5 mg-0.5 mg/3 mL nebulizer solution 3 mL, 3 mL, Nebulization, Once, Ora Murillo DO    OBJECTIVE:   Vitals:    01/27/20 1100   BP: 102/60   BP Location: Left arm   Patient Position: Sitting   BP Method: X-Large (Manual)   Pulse: 97   Resp: 18   Temp: 97.6 °F (36.4 °C)   TempSrc: Oral   SpO2: 97%   Weight: 107 kg (235 lb 14.4 oz)   Height: 6' 1" (1.854 m)     Body mass index is 31.12 kg/m².    Physical Exam   Constitutional: No distress.   Neck: Neck supple.   Cardiovascular: Normal rate, regular rhythm, normal heart sounds and intact distal pulses. Exam reveals no gallop and no friction rub.   No murmur heard.  Pulmonary/Chest: Effort normal and breath sounds normal. He has no decreased breath sounds. He has no wheezes. He has no rhonchi. He has no rales.   Musculoskeletal: He exhibits edema (trace bilateral LE pitting edema).   Neurological: He is alert.   Skin: Skin is warm. No rash noted.              PERTINENT RESULTS:   BMP  Lab Results   Component Value Date     10/22/2019    K 4.6 10/22/2019     10/22/2019    CO2 30 (H) 10/22/2019    BUN 10 10/22/2019    CREATININE 0.8 10/22/2019    CALCIUM 8.5 (L) 10/22/2019    ANIONGAP 12 10/22/2019    ESTGFRAFRICA >60.0 10/22/2019    EGFRNONAA >60.0 10/22/2019       ASSESSMENT/PLAN:  Problem List Items Addressed This Visit        Neuro    Postherpetic neuralgia    Current Assessment & Plan     - continue with Lidocaine topical  - okay to trial weaning off of Gabapentin  - counseling on postherpetic neuralgia  - supportive care  - recommend Shingrix            Cardiac/Vascular    Essential hypertension - Primary    Current Assessment & Plan     - well " controlled  - continue current medications            Endocrine    Subclinical hypothyroidism    Current Assessment & Plan     Lab Results   Component Value Date    TSH 4.250 (H) 10/30/2019    FREET4 0.85 10/30/2019     - asymptomatic  - repeat labs prior to next visit         Relevant Orders    TSH    T4, free    T3, free    Thyroid peroxidase antibody          ORDERS:   Orders Placed This Encounter    TSH    T4, free    T3, free    Thyroid peroxidase antibody     Follow-up as needed if no improvement    Dr. Ora Murillo D.O.   [facility]

## 2020-01-27 NOTE — ASSESSMENT & PLAN NOTE
Lab Results   Component Value Date    TSH 4.250 (H) 10/30/2019    FREET4 0.85 10/30/2019     - asymptomatic  - repeat labs prior to next visit

## 2020-01-27 NOTE — ASSESSMENT & PLAN NOTE
- continue with Lidocaine topical  - okay to trial weaning off of Gabapentin  - counseling on postherpetic neuralgia  - supportive care  - recommend Shingrix

## 2020-02-11 ENCOUNTER — OFFICE VISIT (OUTPATIENT)
Dept: URGENT CARE | Facility: CLINIC | Age: 80
End: 2020-02-11
Payer: MEDICARE

## 2020-02-11 ENCOUNTER — TELEPHONE (OUTPATIENT)
Dept: PAIN MEDICINE | Facility: CLINIC | Age: 80
End: 2020-02-11

## 2020-02-11 VITALS
WEIGHT: 230 LBS | SYSTOLIC BLOOD PRESSURE: 120 MMHG | HEART RATE: 81 BPM | OXYGEN SATURATION: 99 % | RESPIRATION RATE: 18 BRPM | BODY MASS INDEX: 30.48 KG/M2 | TEMPERATURE: 98 F | DIASTOLIC BLOOD PRESSURE: 69 MMHG | HEIGHT: 73 IN

## 2020-02-11 DIAGNOSIS — R05.9 COUGH: ICD-10-CM

## 2020-02-11 DIAGNOSIS — R09.82 POST-NASAL DRIP: ICD-10-CM

## 2020-02-11 DIAGNOSIS — R50.9 FEVER, UNSPECIFIED: ICD-10-CM

## 2020-02-11 DIAGNOSIS — R52 BODY ACHES: ICD-10-CM

## 2020-02-11 DIAGNOSIS — R09.81 NASAL CONGESTION: ICD-10-CM

## 2020-02-11 DIAGNOSIS — H10.13 ACUTE ALLERGIC CONJUNCTIVITIS, BILATERAL: ICD-10-CM

## 2020-02-11 DIAGNOSIS — B34.9 VIRAL SYNDROME: Primary | ICD-10-CM

## 2020-02-11 DIAGNOSIS — J02.9 SORE THROAT: ICD-10-CM

## 2020-02-11 LAB
CTP QC/QA: YES
FLUAV AG NPH QL: NEGATIVE
FLUBV AG NPH QL: NEGATIVE

## 2020-02-11 PROCEDURE — 87804 POCT INFLUENZA A/B: ICD-10-PCS | Mod: QW,S$GLB,, | Performed by: NURSE PRACTITIONER

## 2020-02-11 PROCEDURE — 87804 INFLUENZA ASSAY W/OPTIC: CPT | Mod: QW,S$GLB,, | Performed by: NURSE PRACTITIONER

## 2020-02-11 PROCEDURE — 99214 OFFICE O/P EST MOD 30 MIN: CPT | Mod: 25,S$GLB,, | Performed by: NURSE PRACTITIONER

## 2020-02-11 PROCEDURE — 99214 PR OFFICE/OUTPT VISIT, EST, LEVL IV, 30-39 MIN: ICD-10-PCS | Mod: 25,S$GLB,, | Performed by: NURSE PRACTITIONER

## 2020-02-11 RX ORDER — BENZONATATE 100 MG/1
100 CAPSULE ORAL 3 TIMES DAILY PRN
Qty: 20 CAPSULE | Refills: 0 | Status: SHIPPED | OUTPATIENT
Start: 2020-02-11 | End: 2020-02-18

## 2020-02-11 RX ORDER — PROMETHAZINE HYDROCHLORIDE AND DEXTROMETHORPHAN HYDROBROMIDE 6.25; 15 MG/5ML; MG/5ML
2.5 SYRUP ORAL EVERY 6 HOURS PRN
Qty: 100 ML | Refills: 0 | Status: SHIPPED | OUTPATIENT
Start: 2020-02-11 | End: 2020-02-18

## 2020-02-11 RX ORDER — AZELASTINE 1 MG/ML
1 SPRAY, METERED NASAL 2 TIMES DAILY
Qty: 30 ML | Refills: 0 | Status: SHIPPED | OUTPATIENT
Start: 2020-02-11 | End: 2020-11-02

## 2020-02-11 NOTE — PROGRESS NOTES
"Subjective:       Patient ID: Heri Muhammad is a 79 y.o. male.    Vitals:  height is 6' 1" (1.854 m) and weight is 104.3 kg (230 lb). His oral temperature is 97.7 °F (36.5 °C). His blood pressure is 120/69 and his pulse is 81. His respiration is 18 and oxygen saturation is 99%.     Chief Complaint: Cough    Patient presents with cough, sore throat , body aches , and congestion . Patient says he had fever yesterday ,but not sure about today . Onset 4 days ago of symptoms .     Cough   This is a new problem. The current episode started in the past 7 days (4 days ago ). The problem has been unchanged. The problem occurs every few minutes. The cough is non-productive. Associated symptoms include a fever, myalgias, nasal congestion, postnasal drip and a sore throat. Pertinent negatives include no chest pain, chills, headaches, rash or shortness of breath. Nothing aggravates the symptoms. Treatments tried: mucinex ,tylenol  The treatment provided mild relief. There is no history of asthma, bronchiectasis or emphysema.       Constitution: Positive for fatigue and fever. Negative for chills.   HENT: Positive for congestion, postnasal drip and sore throat. Negative for sinus pain and sinus pressure.    Neck: Negative for painful lymph nodes.   Cardiovascular: Negative for chest pain and leg swelling.   Eyes: Negative for double vision and blurred vision.   Respiratory: Positive for cough. Negative for shortness of breath.    Gastrointestinal: Negative for nausea, vomiting and diarrhea.   Genitourinary: Negative for dysuria, frequency and urgency.   Musculoskeletal: Positive for muscle ache. Negative for joint pain, joint swelling and muscle cramps.   Skin: Negative for color change, pale and rash.   Allergic/Immunologic: Negative for seasonal allergies.   Neurological: Negative for dizziness, history of vertigo, light-headedness, passing out and headaches.   Hematologic/Lymphatic: Negative for swollen lymph nodes, easy " bruising/bleeding and history of blood clots. Does not bruise/bleed easily.   Psychiatric/Behavioral: Negative for nervous/anxious, sleep disturbance and depression. The patient is not nervous/anxious.        Objective:      Physical Exam   Constitutional: He is oriented to person, place, and time. He appears well-developed and well-nourished. He is cooperative.  Non-toxic appearance. He does not appear ill. No distress.   HENT:   Head: Normocephalic and atraumatic.   Right Ear: Hearing, tympanic membrane, external ear and ear canal normal.   Left Ear: Hearing, tympanic membrane, external ear and ear canal normal.   Nose: Nose normal. No mucosal edema, rhinorrhea or nasal deformity. No epistaxis. Right sinus exhibits no maxillary sinus tenderness and no frontal sinus tenderness. Left sinus exhibits no maxillary sinus tenderness and no frontal sinus tenderness.   Mouth/Throat: Uvula is midline, oropharynx is clear and moist and mucous membranes are normal. No trismus in the jaw. Normal dentition. No uvula swelling. No posterior oropharyngeal erythema.   Eyes: Lids are normal. Right eye exhibits no discharge. Left eye exhibits no discharge. Right conjunctiva is injected (minimal). Left conjunctiva is injected (minimal). No scleral icterus.   Neck: Trachea normal, normal range of motion, full passive range of motion without pain and phonation normal. Neck supple.   Cardiovascular: Normal rate, regular rhythm, normal heart sounds, intact distal pulses and normal pulses.   Pulmonary/Chest: Effort normal and breath sounds normal. No respiratory distress.   Abdominal: Soft. Normal appearance and bowel sounds are normal. He exhibits no distension, no pulsatile midline mass and no mass. There is no tenderness.   Musculoskeletal: Normal range of motion. He exhibits no edema or deformity.   Neurological: He is alert and oriented to person, place, and time. He exhibits normal muscle tone. Coordination normal.   Skin: Skin is  warm, dry, intact, not diaphoretic and not pale.   Psychiatric: He has a normal mood and affect. His speech is normal and behavior is normal. Judgment and thought content normal. Cognition and memory are normal.   Nursing note and vitals reviewed.        Assessment:       1. Viral syndrome    2. Cough    3. Fever, unspecified    4. Body aches    5. Nasal congestion    6. Post-nasal drip    7. Sore throat    8. Acute allergic conjunctivitis, bilateral        Plan:         Viral syndrome  -     azelastine (ASTELIN) 137 mcg (0.1 %) nasal spray; 1 spray (137 mcg total) by Nasal route 2 (two) times daily. for 14 days  Dispense: 30 mL; Refill: 0    Cough  -     POCT Influenza A/B  -     benzonatate (TESSALON) 100 MG capsule; Take 1 capsule (100 mg total) by mouth 3 (three) times daily as needed for Cough.  Dispense: 20 capsule; Refill: 0  -     promethazine-dextromethorphan (PROMETHAZINE-DM) 6.25-15 mg/5 mL Syrp; Take 2.5 mLs by mouth every 6 (six) hours as needed (May take every 4 hours, PRN. DoNOT take more than 30 mL in 24 hours.).  Dispense: 100 mL; Refill: 0    Fever, unspecified    Body aches    Nasal congestion    Post-nasal drip    Sore throat    Acute allergic conjunctivitis, bilateral         Results for orders placed or performed in visit on 02/11/20   POCT Influenza A/B   Result Value Ref Range    Rapid Influenza A Ag Negative Negative    Rapid Influenza B Ag Negative Negative     Acceptable Yes      Patient Instructions     Always follow your healthcare professional's instructions.    You have received urgent care diagnosis and treatment and you may be released before all of your medical problems are known or treated. Unless you have been given a referral, you (the patient), will arrange for follow-up care as instructed.     If you have been given a referral,  will contact you (their phone number is [phone]). If they do NOT call you by the end of the business day, please call them  "the following day.    TREATMENT: YOUR TREATMENT IS AIMED AT SYMPTOM MANAGEMENT.     You have been diagnosed with Viral Syndrome   A viral illness may cause a number of symptoms. The symptoms depend on the part of the body that the virus affects. If it settles in your nose, throat, and lungs, it may cause cough, sore throat, congestion, and sometimes headache. If it settles in your stomach and intestinal tract, it may cause vomiting and diarrhea. Sometimes it causes vague symptoms like "aching all over," feeling tired, loss of appetite, or fever.  A viral illness usually lasts 1 to 2 weeks, but sometimes it lasts longer. In some cases, a more serious infection can look like a viral syndrome in the first few days of the illness. You may need another exam and additional tests to know the difference.     Home care  Follow these guidelines for taking care of yourself at home:  · If symptoms are severe, rest at home for the first 2 to 3 days.  · Stay away from cigarette smoke - both your smoke and the smoke from others.  · You may use over-the-counter acetaminophen or ibuprofen for fever, muscle aching, and headache, unless another medicine was prescribed for this. If you have chronic liver or kidney disease or ever had a stomach ulcer or GI bleeding, talk with your doctor before using these medicines. No one who is younger than 18 and ill with a fever should take aspirin. It may cause severe disease or death.  · Your appetite may be poor, so a light diet is fine. Avoid dehydration by drinking 8 to 12 8-ounce glasses of fluids each day. This may include water; orange juice; lemonade; apple, grape, and cranberry juice; clear fruit drinks; electrolyte replacement and sports drinks; and decaffeinated teas and coffee. If you have been diagnosed with a kidney disease, ask your doctor how much and what types of fluids you should drink to prevent dehydration. If you have kidney disease, drinking too much fluid can cause it build " "up in the your body and be dangerous to your health.  · Over-the-counter remedies won't shorten the length of the illness but may be helpful for cough, sore throat; and nasal and sinus congestion. Don't use decongestants if you have high blood pressure.    Follow-up care  Follow up with your healthcare provider if your symptoms continue for more than 7 days.    Fever Cough Body Aches Nausea and Vomiting Diarrhea Congestion or Runny Nose    OTC Tylenol   OTC NSAIDs  Tessalon Pearls   Phenergan DM   OTC cough medications  Mobic   OTC Tylenol   OTC NSAIDs  Zofran   Phenergan   OTC Emetrol  DO NOT take ant-diarrheal medications  OTC Claritin, Zyrtec, Allegra, OR Xyzal   OTC Flonase   OTC Benadryl      Cough Allergy Symptoms Asthma Headache or Body Aches   Tessalon Perles are a non-narcotic cough medicine. It works by numbing the throat and lungs, making the cough reflex less active, it is used to relieve coughing during the day. If you have been given Phenergan DM cough syrup, you do NOT need to take both medications at the same time.    Phenergan DM is a combination medication that is used to treat cough. Phenergan DM works like an antihistamine and cough suppressant. This medication will make you sleepy like Benadryl, have a drying effect, and act on a part of the brain (cough center) to reduce the need to cough. DO NOT take Benadryl and Phenergan together. You may take over-the-counter claritin, zyrtec, allegra, or xyzal as directed, these are antihistamines that will work to dry up secretions/mucus. Antihistamines work to block the effects of a certain natural substance (histamine), which causes allergy symptoms.    OR    Use over-the-counter Flonase (a nasal steroid) or prescribed Azelastine (a nasal antihistamine) to treat runny nose, sneezing, itchy nose, nasal congestion, and postnasal drip.    Proper administration is to "look down at your toes and aim for your nose". The goal is to aim for your " "nasolabial folds, the creases in your nose. If you feel the medication drip down your throat, you have NOT administered it correctly. If you can "smell the roses" (floral scent), then you have administered it correctly. If you have a history of asthma, expressed a concern about wheezing or have been told you were wheezing during your exam today, you are being given Albuterol. Albuterol is a bronchodilator that relaxes muscles in the airways and increases air flow to the lungs; it is used to treat or prevent bronchospasm, or narrowing of the airways in the lungs.     If you have a history of asthma, expressed a concern about wheezing or have been told you were wheezing during your exam today and are NOT being prescribed Albuterol that is because you have insured me that you have an adequate supply of the drug at home.    Mobic is a nonsteroidal anti-inflammatory drug (NSAID), it is used to treat inflammation. It reduces pain, swelling, and stiffness of the joints. Please do NOT take any other NSAID medications while on this medication, you can take Tylenol if you feel the need. If you were not prescribed an anti-inflammatory medication, and if you do not have any history of stomach/intestinal ulcers, or kidney disease, or are not taking a blood thinner such as Coumadin, Plavix, Pradaxa, Eloquis, or Xaralta for example, it is OK to take over the counter Ibuprofen or Advil or Motrin or Aleve as directed.  Do not take any of these medications on an empty stomach.         Dehydration (Adult)  Dehydration occurs when your body loses too much fluid. This may be the result of prolonged vomiting or diarrhea, excessive sweating, or a high fever. It may also happen if you don't drink enough fluid when you're sick or out in the heat. Misuse of diuretics (water pills) can also be a cause.  Symptoms include thirst and decreased urine output. You may also feel dizzy, weak, fatigued, or very drowsy. The diet described below is " usually enough to treat dehydration. In some cases, you may need medicine.    Home care  · Drink at least 12 8-ounce glasses of fluid every day to resolve the dehydration. Fluid may include water; orange juice; lemonade; apple, grape, or cranberry juice; clear fruit drinks; electrolyte replacement and sports drinks; and teas and coffee without caffeine. If you have been diagnosed with a kidney disease, ask your doctor how much and what types of fluids you should drink to prevent dehydration. If you have kidney disease, fluid can build up in the body. This can be dangerous to your health.  · If you have a fever, muscle aches, or a headache as a result of a cold or flu, you may take acetaminophen or ibuprofen, unless another medicine was prescribed. If you have chronic liver or kidney disease, or have ever had a stomach ulcer or gastrointestinal bleeding, talk with your health care provider before using these medicines. Don't take aspirin if you are younger than 18 and have a fever. Aspirin raises the chance for severe liver injury.    When to seek medical advice  DEHYDRATION:  · Continued vomiting  · Frequent diarrhea (more than 5 times a day); blood (red or black color) or mucus in diarrhea  · Blood in vomit or stool  · Swollen abdomen or increasing abdominal pain  · Weakness, dizziness, or fainting  · Unusual drowsiness or confusion  · Reduced urine output or extreme thirst    FEVER:  Call your healthcare provider right away if any of these occur:  · Your child is 3 months old or younger and has a fever of 100.4°F (38°C) or higher. Get medical care right away. Fever in a young baby can be a sign of a dangerous infection.  · Your child is of any age and has repeated fevers above 104°F (40°C).  · Your child is younger than 2 years of age and a fever of 100.4°F (38°C) continues for more than 1 day.  · A fever of 100.4°F (38°C) for more than 3 days in anyone older than 2 years of age.       Why didn't I get a steroid  today?    The benefits are small and, unlike topical glucocorticoids, systemic glucocorticoids possess a significant side effect profile. Major side effects include:    · Effects immunity predisposing you to getting a more severe infection and increases your white blood cell count. You have the flu, you do not need anything to weaken your immune system right now.  · Young children -- Growth impairment   · Skin consequences: Skin thinning and ecchymoses, Cushingoid appearance (rounded face), acne, weight gain, mild hirsutism, facial erythema, and striae.  · Eye consequences: Cataracts, increased intraocular pressure, exophthalmos.   · Cardiovascular consequences: Fluid retention, premature atherosclerotic disease, and arrhythmias.   · GI consequences: Increased risk for adverse gastrointestinal effects, such as gastritis, ulcer formation, and gastrointestinal bleeding  · Bone and muscle consequences: Osteoporosis, osteonecrosis, and myopathy.  · Neuropsychiatric effects: Mood disorders, psychosis, memory impairment, and   · Metabolic and Endocrine consequences: Suppress the hypothalamic-pituitary-adrenal (HPA) axis and increase blood sugar.     Can I just have a shot instead of pills?  No. If you are sick, you need a course of treatment (not just a one time dose).     Why didn't I get an antibiotic today?  You have been diagnosed with a virus an antibiotic will not help you. Antibiotics work by destroying the cell walls of bacteria, viruses do not have cell walls.     When to seek medical advice:    DEHYDRATION:  · Continued vomiting  · Frequent diarrhea (more than 5 times a day); blood (red or black color) or mucus in diarrhea  · Blood in vomit or stool  · Swollen abdomen or increasing abdominal pain  · Reduced urine output or extreme thirst  · Repeated vomiting after the first 4 hours on fluids  · Occasional vomiting for more than 48 hours  · Frequent diarrhea (more than 5 times a day), blood in diarrhea (red or  "black color), or mucus in diarrhea  · Blood in vomit or stool  · Swollen abdomen or signs of abdominal pain  · No urine for 8 hours, no tears when crying, "sunken" eyes, or dry mouth  · Unusual behavior changes, fussiness, drowsiness, confusion, or seizure  · Fever (see Fever and children, below)  FEVER:  Call your healthcare provider right away if any of these occur:  · Your child is 3 months old or younger and has a fever of 100.4°F (38°C) or higher. Get medical care right away. Fever in a young baby can be a sign of a dangerous infection.  · Your child is of any age and has repeated fevers above 104°F (40°C).  · Your child is younger than 2 years of age and a fever of 100.4°F (38°C) continues for more than 1 day.  · A fever of 100.4°F (38°C) for more than 3 days in anyone older than 2 years of age.  Call 911  Call 911 or your local emergency services number if the child shows any of these symptoms or signs:  · Trouble breathing  · Confusion  · Is very drowsy or difficult to awaken  · Fainting or loss of consciousness  · Rapid heart rate  · Seizure  · Stiff neck     There are three types of CONJUNCTIVITIS. Today you are being diagnosed with BACTERIAL CONJUNCTIVITIS.    Always follow your healthcare professional's instructions.    You have received urgent care diagnosis and treatment and you may be released before all of your medical problems are known or treated. Unless you have been given a referral, you (the patient), will arrange for follow-up care as instructed.     If you have been given a referral, [facility] will contact you (their phone number is [phone]). If they do NOT call you by the end of the business day, please call them the following day.    Conjunctivitis, Allergic    Conjunctivitis is an irritation of a thin membrane in the eye. This membrane is called the conjunctiva. It covers the white of the eye and the inside of the eyelid. The condition is often known as pink eye or red eye because " the eye looks pink or red. The eye can also be swollen. A thick fluid may leak from the eyelid. The eye may itch and burn, and feel gritty or scratchy.  Allergic conjunctivitis is caused by an allergen. Allergens are substances that cause the body to react with certain symptoms. Allergens that cause eye irritation include things such as house dust or pollen in the air. This can occur seasonally, most often in the spring.    Home care  · Over-the-counter decongestant eye drops may be used.  · Apply a cool compress (towel soaked in cool water) to the affected eye 3 to 4 times a day to reduce swelling and itching.  · It is common to have mucus drainage during the night, causing the eyelids to become crusted by morning. Use a warm, wet cloth to wipe this away. You may also use saline irrigating solution or artificial tears to rinse away mucus in the eye. Do not patch the eye.  · You may use acetaminophen or ibuprofen to control pain, unless another medicine was prescribed. (Note: If you have chronic liver or kidney disease, or if you have ever had a stomach ulcer or gastrointestinal bleeding, talk with your healthcare provider before using these medicines.)  · Anti-histamines such as Claritin, Zyrtec, Allegra, OR Xyzal AND Benadryl (at night time).    Follow-up care  Follow-up with your healthcare provider, or as advised. If your condition worsens or fails to improve we recommend that you receive another evaluation at the ER immediately or contact your PCP or Opthalmologist to discuss your concerns. The most complete evaluation of the eye is accomplished using a slit lamp, I was not able to complete this evaluation in clinic today.    When to seek medical advice  Call your healthcare provider right away if any of these occur:  · Worsening vision  · Increasing pain in the eye  · Increasing swelling or redness of the eyelid  · Redness spreading around the eye    Throw away any cosmetics that may have come in contact with  your eye in the last 3 days prior to symptoms.    Do not wear your contact lens (if you use them) for at least 5 days after you stop having symptoms and are rechecked by your doctor. Throw away the contacts, contact solution and carrying case you were using and start with new material.    Your provider discussed your plan of care with you during your physical exam. It was reviewed once more by the provider when giving you an after visit summary. If the patient is a minor, the discharge instructions were discussed with an adult and that adult acknowledge their understanding of the provider's teaching.      Your provider discussed your plan of care with you during your physical exam. It was reviewed once more by the provider when giving you an after visit summary. If the patient is a minor, the discharge instructions were discussed with an adult and that adult acknowledge their understanding of the provider's teaching.

## 2020-02-11 NOTE — PATIENT INSTRUCTIONS
"Always follow your healthcare professional's instructions.    You have received urgent care diagnosis and treatment and you may be released before all of your medical problems are known or treated. Unless you have been given a referral, you (the patient), will arrange for follow-up care as instructed.     If you have been given a referral, [facility] will contact you (their phone number is [phone]). If they do NOT call you by the end of the business day, please call them the following day.    TREATMENT: YOUR TREATMENT IS AIMED AT SYMPTOM MANAGEMENT.     You have been diagnosed with Viral Syndrome   A viral illness may cause a number of symptoms. The symptoms depend on the part of the body that the virus affects. If it settles in your nose, throat, and lungs, it may cause cough, sore throat, congestion, and sometimes headache. If it settles in your stomach and intestinal tract, it may cause vomiting and diarrhea. Sometimes it causes vague symptoms like "aching all over," feeling tired, loss of appetite, or fever.  A viral illness usually lasts 1 to 2 weeks, but sometimes it lasts longer. In some cases, a more serious infection can look like a viral syndrome in the first few days of the illness. You may need another exam and additional tests to know the difference.     Home care  Follow these guidelines for taking care of yourself at home:  · If symptoms are severe, rest at home for the first 2 to 3 days.  · Stay away from cigarette smoke - both your smoke and the smoke from others.  · You may use over-the-counter acetaminophen or ibuprofen for fever, muscle aching, and headache, unless another medicine was prescribed for this. If you have chronic liver or kidney disease or ever had a stomach ulcer or GI bleeding, talk with your doctor before using these medicines. No one who is younger than 18 and ill with a fever should take aspirin. It may cause severe disease or death.  · Your appetite may be poor, so a light " diet is fine. Avoid dehydration by drinking 8 to 12 8-ounce glasses of fluids each day. This may include water; orange juice; lemonade; apple, grape, and cranberry juice; clear fruit drinks; electrolyte replacement and sports drinks; and decaffeinated teas and coffee. If you have been diagnosed with a kidney disease, ask your doctor how much and what types of fluids you should drink to prevent dehydration. If you have kidney disease, drinking too much fluid can cause it build up in the your body and be dangerous to your health.  · Over-the-counter remedies won't shorten the length of the illness but may be helpful for cough, sore throat; and nasal and sinus congestion. Don't use decongestants if you have high blood pressure.    Follow-up care  Follow up with your healthcare provider if your symptoms continue for more than 7 days.    Fever Cough Body Aches Nausea and Vomiting Diarrhea Congestion or Runny Nose    OTC Tylenol   OTC NSAIDs  Tessalon Pearls   Phenergan DM   OTC cough medications  Mobic   OTC Tylenol   OTC NSAIDs  Zofran   Phenergan   OTC Emetrol  DO NOT take ant-diarrheal medications  OTC Claritin, Zyrtec, Allegra, OR Xyzal   OTC Flonase   OTC Benadryl      Cough Allergy Symptoms Asthma Headache or Body Aches   Tessalon Perles are a non-narcotic cough medicine. It works by numbing the throat and lungs, making the cough reflex less active, it is used to relieve coughing during the day. If you have been given Phenergan DM cough syrup, you do NOT need to take both medications at the same time.    Phenergan DM is a combination medication that is used to treat cough. Phenergan DM works like an antihistamine and cough suppressant. This medication will make you sleepy like Benadryl, have a drying effect, and act on a part of the brain (cough center) to reduce the need to cough. DO NOT take Benadryl and Phenergan together. You may take over-the-counter claritin, zyrtec, allegra, or xyzal as  "directed, these are antihistamines that will work to dry up secretions/mucus. Antihistamines work to block the effects of a certain natural substance (histamine), which causes allergy symptoms.    OR    Use over-the-counter Flonase (a nasal steroid) or prescribed Azelastine (a nasal antihistamine) to treat runny nose, sneezing, itchy nose, nasal congestion, and postnasal drip.    Proper administration is to "look down at your toes and aim for your nose". The goal is to aim for your nasolabial folds, the creases in your nose. If you feel the medication drip down your throat, you have NOT administered it correctly. If you can "smell the roses" (floral scent), then you have administered it correctly. If you have a history of asthma, expressed a concern about wheezing or have been told you were wheezing during your exam today, you are being given Albuterol. Albuterol is a bronchodilator that relaxes muscles in the airways and increases air flow to the lungs; it is used to treat or prevent bronchospasm, or narrowing of the airways in the lungs.     If you have a history of asthma, expressed a concern about wheezing or have been told you were wheezing during your exam today and are NOT being prescribed Albuterol that is because you have insured me that you have an adequate supply of the drug at home.    Mobic is a nonsteroidal anti-inflammatory drug (NSAID), it is used to treat inflammation. It reduces pain, swelling, and stiffness of the joints. Please do NOT take any other NSAID medications while on this medication, you can take Tylenol if you feel the need. If you were not prescribed an anti-inflammatory medication, and if you do not have any history of stomach/intestinal ulcers, or kidney disease, or are not taking a blood thinner such as Coumadin, Plavix, Pradaxa, Eloquis, or Xaralta for example, it is OK to take over the counter Ibuprofen or Advil or Motrin or Aleve as directed.  Do not take any of these medications " on an empty stomach.         Dehydration (Adult)  Dehydration occurs when your body loses too much fluid. This may be the result of prolonged vomiting or diarrhea, excessive sweating, or a high fever. It may also happen if you don't drink enough fluid when you're sick or out in the heat. Misuse of diuretics (water pills) can also be a cause.  Symptoms include thirst and decreased urine output. You may also feel dizzy, weak, fatigued, or very drowsy. The diet described below is usually enough to treat dehydration. In some cases, you may need medicine.    Home care  · Drink at least 12 8-ounce glasses of fluid every day to resolve the dehydration. Fluid may include water; orange juice; lemonade; apple, grape, or cranberry juice; clear fruit drinks; electrolyte replacement and sports drinks; and teas and coffee without caffeine. If you have been diagnosed with a kidney disease, ask your doctor how much and what types of fluids you should drink to prevent dehydration. If you have kidney disease, fluid can build up in the body. This can be dangerous to your health.  · If you have a fever, muscle aches, or a headache as a result of a cold or flu, you may take acetaminophen or ibuprofen, unless another medicine was prescribed. If you have chronic liver or kidney disease, or have ever had a stomach ulcer or gastrointestinal bleeding, talk with your health care provider before using these medicines. Don't take aspirin if you are younger than 18 and have a fever. Aspirin raises the chance for severe liver injury.    When to seek medical advice  DEHYDRATION:  · Continued vomiting  · Frequent diarrhea (more than 5 times a day); blood (red or black color) or mucus in diarrhea  · Blood in vomit or stool  · Swollen abdomen or increasing abdominal pain  · Weakness, dizziness, or fainting  · Unusual drowsiness or confusion  · Reduced urine output or extreme thirst    FEVER:  Call your healthcare provider right away if any of these  occur:  · Your child is 3 months old or younger and has a fever of 100.4°F (38°C) or higher. Get medical care right away. Fever in a young baby can be a sign of a dangerous infection.  · Your child is of any age and has repeated fevers above 104°F (40°C).  · Your child is younger than 2 years of age and a fever of 100.4°F (38°C) continues for more than 1 day.  · A fever of 100.4°F (38°C) for more than 3 days in anyone older than 2 years of age.       Why didn't I get a steroid today?    The benefits are small and, unlike topical glucocorticoids, systemic glucocorticoids possess a significant side effect profile. Major side effects include:    · Effects immunity predisposing you to getting a more severe infection and increases your white blood cell count. You have the flu, you do not need anything to weaken your immune system right now.  · Young children -- Growth impairment   · Skin consequences: Skin thinning and ecchymoses, Cushingoid appearance (rounded face), acne, weight gain, mild hirsutism, facial erythema, and striae.  · Eye consequences: Cataracts, increased intraocular pressure, exophthalmos.   · Cardiovascular consequences: Fluid retention, premature atherosclerotic disease, and arrhythmias.   · GI consequences: Increased risk for adverse gastrointestinal effects, such as gastritis, ulcer formation, and gastrointestinal bleeding  · Bone and muscle consequences: Osteoporosis, osteonecrosis, and myopathy.  · Neuropsychiatric effects: Mood disorders, psychosis, memory impairment, and   · Metabolic and Endocrine consequences: Suppress the hypothalamic-pituitary-adrenal (HPA) axis and increase blood sugar.     Can I just have a shot instead of pills?  No. If you are sick, you need a course of treatment (not just a one time dose).     Why didn't I get an antibiotic today?  You have been diagnosed with a virus an antibiotic will not help you. Antibiotics work by destroying the cell walls of bacteria, viruses do  "not have cell walls.     When to seek medical advice:    DEHYDRATION:  · Continued vomiting  · Frequent diarrhea (more than 5 times a day); blood (red or black color) or mucus in diarrhea  · Blood in vomit or stool  · Swollen abdomen or increasing abdominal pain  · Reduced urine output or extreme thirst  · Repeated vomiting after the first 4 hours on fluids  · Occasional vomiting for more than 48 hours  · Frequent diarrhea (more than 5 times a day), blood in diarrhea (red or black color), or mucus in diarrhea  · Blood in vomit or stool  · Swollen abdomen or signs of abdominal pain  · No urine for 8 hours, no tears when crying, "sunken" eyes, or dry mouth  · Unusual behavior changes, fussiness, drowsiness, confusion, or seizure  · Fever (see Fever and children, below)  FEVER:  Call your healthcare provider right away if any of these occur:  · Your child is 3 months old or younger and has a fever of 100.4°F (38°C) or higher. Get medical care right away. Fever in a young baby can be a sign of a dangerous infection.  · Your child is of any age and has repeated fevers above 104°F (40°C).  · Your child is younger than 2 years of age and a fever of 100.4°F (38°C) continues for more than 1 day.  · A fever of 100.4°F (38°C) for more than 3 days in anyone older than 2 years of age.  Call 911  Call 911 or your local emergency services number if the child shows any of these symptoms or signs:  · Trouble breathing  · Confusion  · Is very drowsy or difficult to awaken  · Fainting or loss of consciousness  · Rapid heart rate  · Seizure  · Stiff neck     There are three types of CONJUNCTIVITIS. Today you are being diagnosed with BACTERIAL CONJUNCTIVITIS.    Always follow your healthcare professional's instructions.    You have received urgent care diagnosis and treatment and you may be released before all of your medical problems are known or treated. Unless you have been given a referral, you (the patient), will arrange for " follow-up care as instructed.     If you have been given a referral, [facility] will contact you (their phone number is [phone]). If they do NOT call you by the end of the business day, please call them the following day.    Conjunctivitis, Allergic    Conjunctivitis is an irritation of a thin membrane in the eye. This membrane is called the conjunctiva. It covers the white of the eye and the inside of the eyelid. The condition is often known as pink eye or red eye because the eye looks pink or red. The eye can also be swollen. A thick fluid may leak from the eyelid. The eye may itch and burn, and feel gritty or scratchy.  Allergic conjunctivitis is caused by an allergen. Allergens are substances that cause the body to react with certain symptoms. Allergens that cause eye irritation include things such as house dust or pollen in the air. This can occur seasonally, most often in the spring.    Home care  · Over-the-counter decongestant eye drops may be used.  · Apply a cool compress (towel soaked in cool water) to the affected eye 3 to 4 times a day to reduce swelling and itching.  · It is common to have mucus drainage during the night, causing the eyelids to become crusted by morning. Use a warm, wet cloth to wipe this away. You may also use saline irrigating solution or artificial tears to rinse away mucus in the eye. Do not patch the eye.  · You may use acetaminophen or ibuprofen to control pain, unless another medicine was prescribed. (Note: If you have chronic liver or kidney disease, or if you have ever had a stomach ulcer or gastrointestinal bleeding, talk with your healthcare provider before using these medicines.)  · Anti-histamines such as Claritin, Zyrtec, Allegra, OR Xyzal AND Benadryl (at night time).    Follow-up care  Follow-up with your healthcare provider, or as advised. If your condition worsens or fails to improve we recommend that you receive another evaluation at the ER immediately or  contact your PCP or Opthalmologist to discuss your concerns. The most complete evaluation of the eye is accomplished using a slit lamp, I was not able to complete this evaluation in clinic today.    When to seek medical advice  Call your healthcare provider right away if any of these occur:  · Worsening vision  · Increasing pain in the eye  · Increasing swelling or redness of the eyelid  · Redness spreading around the eye    Throw away any cosmetics that may have come in contact with your eye in the last 3 days prior to symptoms.    Do not wear your contact lens (if you use them) for at least 5 days after you stop having symptoms and are rechecked by your doctor. Throw away the contacts, contact solution and carrying case you were using and start with new material.    Your provider discussed your plan of care with you during your physical exam. It was reviewed once more by the provider when giving you an after visit summary. If the patient is a minor, the discharge instructions were discussed with an adult and that adult acknowledge their understanding of the provider's teaching.      Your provider discussed your plan of care with you during your physical exam. It was reviewed once more by the provider when giving you an after visit summary. If the patient is a minor, the discharge instructions were discussed with an adult and that adult acknowledge their understanding of the provider's teaching.

## 2020-02-14 ENCOUNTER — TELEPHONE (OUTPATIENT)
Dept: URGENT CARE | Facility: CLINIC | Age: 80
End: 2020-02-14

## 2020-02-14 NOTE — TELEPHONE ENCOUNTER
Call back DOS 02/11/20 - Spoke with patient's wife, she stated that he is coughing worse now. She said if he is not feeling better after being sick for 7 days she will bring him back to follow up with primary doctor or back to urgent care.

## 2020-02-20 ENCOUNTER — TELEPHONE (OUTPATIENT)
Dept: ELECTROPHYSIOLOGY | Facility: CLINIC | Age: 80
End: 2020-02-20

## 2020-02-20 NOTE — TELEPHONE ENCOUNTER
Spoke with wife about bookout.  Wife asked to see NP at a sooner time that the pt's original appt with Dr. Esparza. I rescheduled the appts for 3/6/2020 with Ana and will send appt reminders.

## 2020-02-24 ENCOUNTER — OFFICE VISIT (OUTPATIENT)
Dept: FAMILY MEDICINE | Facility: CLINIC | Age: 80
End: 2020-02-24
Payer: MEDICARE

## 2020-02-24 VITALS
DIASTOLIC BLOOD PRESSURE: 62 MMHG | HEIGHT: 73 IN | SYSTOLIC BLOOD PRESSURE: 116 MMHG | WEIGHT: 232.13 LBS | TEMPERATURE: 98 F | BODY MASS INDEX: 30.76 KG/M2 | OXYGEN SATURATION: 94 % | HEART RATE: 80 BPM

## 2020-02-24 DIAGNOSIS — E03.8 SUBCLINICAL HYPOTHYROIDISM: ICD-10-CM

## 2020-02-24 DIAGNOSIS — F02.80 LATE ONSET ALZHEIMER'S DISEASE WITHOUT BEHAVIORAL DISTURBANCE: ICD-10-CM

## 2020-02-24 DIAGNOSIS — I48.19 PERSISTENT ATRIAL FIBRILLATION: ICD-10-CM

## 2020-02-24 DIAGNOSIS — I10 ESSENTIAL HYPERTENSION: ICD-10-CM

## 2020-02-24 DIAGNOSIS — I50.32 CHRONIC DIASTOLIC HEART FAILURE: ICD-10-CM

## 2020-02-24 DIAGNOSIS — I42.8 CARDIOMYOPATHY, NONISCHEMIC: ICD-10-CM

## 2020-02-24 DIAGNOSIS — G30.1 LATE ONSET ALZHEIMER'S DISEASE WITHOUT BEHAVIORAL DISTURBANCE: ICD-10-CM

## 2020-02-24 DIAGNOSIS — G40.909 SEIZURE DISORDER: ICD-10-CM

## 2020-02-24 DIAGNOSIS — E66.9 OBESITY (BMI 30.0-34.9): ICD-10-CM

## 2020-02-24 DIAGNOSIS — I25.10 CORONARY ARTERY DISEASE INVOLVING NATIVE CORONARY ARTERY OF NATIVE HEART WITHOUT ANGINA PECTORIS: Chronic | ICD-10-CM

## 2020-02-24 DIAGNOSIS — I70.0 AORTIC ATHEROSCLEROSIS: ICD-10-CM

## 2020-02-24 DIAGNOSIS — Z00.00 ENCOUNTER FOR PREVENTIVE HEALTH EXAMINATION: Primary | ICD-10-CM

## 2020-02-24 DIAGNOSIS — J84.10 LUNG GRANULOMA: ICD-10-CM

## 2020-02-24 DIAGNOSIS — E78.2 MIXED HYPERLIPIDEMIA: ICD-10-CM

## 2020-02-24 PROCEDURE — 99499 UNLISTED E&M SERVICE: CPT | Mod: HCNC,S$GLB,, | Performed by: PHYSICIAN ASSISTANT

## 2020-02-24 PROCEDURE — G0439 PPPS, SUBSEQ VISIT: HCPCS | Mod: HCNC,S$GLB,, | Performed by: PHYSICIAN ASSISTANT

## 2020-02-24 PROCEDURE — 3078F PR MOST RECENT DIASTOLIC BLOOD PRESSURE < 80 MM HG: ICD-10-PCS | Mod: HCNC,CPTII,S$GLB, | Performed by: PHYSICIAN ASSISTANT

## 2020-02-24 PROCEDURE — G0439 PR MEDICARE ANNUAL WELLNESS SUBSEQUENT VISIT: ICD-10-PCS | Mod: HCNC,S$GLB,, | Performed by: PHYSICIAN ASSISTANT

## 2020-02-24 PROCEDURE — 3074F PR MOST RECENT SYSTOLIC BLOOD PRESSURE < 130 MM HG: ICD-10-PCS | Mod: HCNC,CPTII,S$GLB, | Performed by: PHYSICIAN ASSISTANT

## 2020-02-24 PROCEDURE — 99999 PR PBB SHADOW E&M-EST. PATIENT-LVL IV: CPT | Mod: PBBFAC,HCNC,, | Performed by: PHYSICIAN ASSISTANT

## 2020-02-24 PROCEDURE — 3078F DIAST BP <80 MM HG: CPT | Mod: HCNC,CPTII,S$GLB, | Performed by: PHYSICIAN ASSISTANT

## 2020-02-24 PROCEDURE — 3074F SYST BP LT 130 MM HG: CPT | Mod: HCNC,CPTII,S$GLB, | Performed by: PHYSICIAN ASSISTANT

## 2020-02-24 PROCEDURE — 99499 RISK ADDL DX/OHS AUDIT: ICD-10-PCS | Mod: HCNC,S$GLB,, | Performed by: PHYSICIAN ASSISTANT

## 2020-02-24 PROCEDURE — 99999 PR PBB SHADOW E&M-EST. PATIENT-LVL IV: ICD-10-PCS | Mod: PBBFAC,HCNC,, | Performed by: PHYSICIAN ASSISTANT

## 2020-02-24 RX ORDER — CETIRIZINE HYDROCHLORIDE 10 MG/1
10 TABLET ORAL NIGHTLY
COMMUNITY

## 2020-02-24 NOTE — PROGRESS NOTES
"Heri Muhammad presented for a Medicare AWV and comprehensive Health Risk Assessment today. He was accompanied by his wife who is his primary caregiver at home. He denied any acute complaints. The following components were reviewed and updated:    · Medical history  · Family History  · Social history  · Allergies and Current Medications  · Health Risk Assessment  · Health Maintenance  · Care Team     ** See Completed Assessments for Annual Wellness Visit within the encounter summary.**       The following assessments were completed:  · Living Situation  · CAGE  · Depression Screening  · Timed Get Up and Go  · Whisper Test  · Cognitive Function Screening  · Nutrition Screening  · ADL Screening  · PAQ Screening    Vitals:    02/24/20 1317   BP: 116/62   Pulse: 80   Temp: 98.1 °F (36.7 °C)   SpO2: (!) 94%   Weight: 105.3 kg (232 lb 2.3 oz)   Height: 6' 1" (1.854 m)     Body mass index is 30.63 kg/m².     Physical Exam   Constitutional: He is oriented to person, place, and time. He appears well-developed. No distress.   Obese   HENT:   Head: Normocephalic.   Right Ear: External ear normal.   Left Ear: External ear normal.   Nose: Nose normal.   Mouth/Throat: Oropharynx is clear and moist. No oropharyngeal exudate.   Eyes: Pupils are equal, round, and reactive to light. Conjunctivae are normal. Right eye exhibits no discharge. Left eye exhibits no discharge.   Neck: Normal range of motion. Neck supple. No JVD present. No tracheal deviation present.   Cardiovascular: Normal rate, regular rhythm, normal heart sounds and intact distal pulses.   No murmur heard.  Pacemaker L chest wall   Pulmonary/Chest: Effort normal and breath sounds normal. He has no wheezes. He has no rales.   Abdominal: Soft. Bowel sounds are normal. There is no tenderness.   Musculoskeletal: Normal range of motion. He exhibits no edema.   Neurological: He is alert and oriented to person, place, and time.   Skin: Skin is warm. Capillary refill takes less " than 2 seconds. No erythema.   Psychiatric: He has a normal mood and affect. His behavior is normal. Judgment and thought content normal.   Vitals reviewed.            Diagnoses and health risks identified today and associated recommendations/orders:    1. Encounter for preventive health examination  Chart reviewed. Discussed current medical problems and addressed any outstanding health maintenance. Continue management with PCP.    2. Seizure disorder  Chronic; stable on medication.  Followed by PCP and Neurology.    3. Aortic atherosclerosis  Noted on CTA chest abdomen 01/2018. Stable on medication. Followed by Cardiology.    4. Subclinical hypothyroidism  Chronic; stable on medication.  Followed by PCP.    5. Obesity (BMI 30.0-34.9)  Chronic, stable. +5 lbs in the past year. Patient reports he is mostly sedentary. Therapeutic lifestyle changes discussed. Followed by PCP.    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 Followed by PCP.    7. Cardiomyopathy, nonischemic  Chronic; stable on medication. S/p biventricular pacemaker placement. Followed by Cardiology.    8. Chronic diastolic heart failure  Chronic; stable on medication. S/p biventricular pacemaker placement. Followed by Cardiology.    9. Persistent atrial fibrillation  Chronic; stable on medication. S/p biventricular pacemaker placement. Followed by Cardiology.    10. Essential hypertension  Chronic; well controlled on medication. Followed by PCP.    11. Mixed hyperlipidemia  Chronic; stable on medication. Followed by PCP.    12. Coronary artery disease involving native coronary artery of native heart without angina pectoris  Chronic; stable on medication. Followed by Cardiology.    13. Late onset Alzheimer's disease without behavioral disturbance  Chronic; stable on medication. Mini-cog exam WNL. Wife is primary caregiver at home. Patient does not drive. No behavioral disturbances reported. Followed by Neurology.     Jorge Liriano with a 5-10 year  written screening schedule and personal prevention plan. Recommendations were developed using the USPSTF age appropriate recommendations. Education, counseling, and referrals were provided as needed. After Visit Summary printed and given to patient which includes a list of additional screenings\tests needed.\    I offered to discuss end of life issues, including information on how to make advance directives that the patient could use to name someone who would make medical decisions on their behalf if they became too ill to make themselves.    ___Patient declined  _X_Patient is interested, I provided paper work and offered to discuss.    Follow up in about 8 months (around 11/4/2020) for Follow-up with PCP, Annual Wellness Visit in 1 year.    Shruti Martinez PA-C

## 2020-02-24 NOTE — PATIENT INSTRUCTIONS
Controlling High Blood Pressure  High blood pressure (hypertension) is often called the silent killer. This is because many people who have it dont know it. High blood pressure is defined as 140/90 mm Hg or higher. Know your blood pressure and remember to check it regularly. Doing so can save your life. Here are some things you can do to help control your blood pressure.    Choose heart-healthy foods  · Select low-salt, low-fat foods. Limit sodium intake to 2,400 mg per day or the amount suggested by your healthcare provider.  · Limit canned, dried, cured, packaged, and fast foods. These can contain a lot of salt.  · Eat 8 to 10 servings of fruits and vegetables every day.  · Choose lean meats, fish, or chicken.  · Eat whole-grain pasta, brown rice, and beans.  · Eat 2 to 3 servings of low-fat or fat-free dairy products.  · Ask your doctor about the DASH eating plan. This plan helps reduce blood pressure.  · When you go to a restaurant, ask that your meal be prepared with no added salt.  Maintain a healthy weight  · Ask your healthcare provider how many calories to eat a day. Then stick to that number.  · Ask your healthcare provider what weight range is healthiest for you. If you are overweight, a weight loss of only 3% to 5% of your body weight can help lower blood pressure. Generally, a good weight loss goal is to lose 10% of your body weight in a year.  · Limit snacks and sweets.  · Get regular exercise.  Get up and get active  · Choose activities you enjoy. Find ones you can do with friends or family. This includes bicycling, dancing, walking, and jogging.  · Park farther away from building entrances.  · Use stairs instead of the elevator.  · When you can, walk or bike instead of driving.  · [place] leaves, garden, or do household repairs.  · Be active at a moderate to vigorous level of physical activity for at least 40 minutes for a minimum of 3 to 4 days a week.   Manage stress  · Make time to relax and  enjoy life. Find time to laugh.  · Communicate your concerns with your loved ones and your healthcare provider.  · Visit with family and friends, and keep up with hobbies.  Limit alcohol and quit smoking  · Men should have no more than 2 drinks per day.  · Women should have no more than 1 drink per day.  · Talk with your healthcare provider about quitting smoking. Smoking significantly increases your risk for heart disease and stroke. Ask your healthcare provider about community smoking cessation programs and other options.  Medicines  If lifestyle changes arent enough, your healthcare provider may prescribe high blood pressure medicine. Take all medicines as prescribed. If you have any questions about your medicines, ask your healthcare provider before stopping or changing them.   Date Last Reviewed: 4/27/2016  © 8101-1956 [employer]. [address] [place] [address], [place] [address]. All rights reserved. This information is not intended as a substitute for professional medical care. Always follow your healthcare professional's instructions.        Counseling and Referral of Other Preventative  (Italic type indicates deductible and co-insurance are waived)    Patient Name: Heri Muhammad  Today's Date: 2/24/2020    Health Maintenance       Date Due Completion Date    Shingles Vaccine (2 of 3) 11/04/2020 (Originally 9/3/2007) 7/9/2007    Lipid Panel 10/22/2024 10/22/2019    TETANUS VACCINE 12/06/2026 12/6/2016        No orders of the defined types were placed in this encounter.    The following information is provided to all patients.  This information is to help you find resources for any of the problems found today that may be affecting your health:                Living healthy guide: www.[facility].[place].gov      Understanding Diabetes: www.diabetes.org      Eating healthy: www.cdc.gov/healthyweight      CDC home safety checklist: www.cdc.gov/steadi/patient.html      Agency on Aging: www.goea.[place].gov       Alcoholics anonymous (AA): www.aa.org      Physical Activity: www.mimi.nih.gov/pq3fqhd      Tobacco use: www.quitwithusla.org

## 2020-02-26 DIAGNOSIS — I49.8 OTHER SPECIFIED CARDIAC ARRHYTHMIAS: Primary | ICD-10-CM

## 2020-03-04 ENCOUNTER — TELEPHONE (OUTPATIENT)
Dept: ELECTROPHYSIOLOGY | Facility: CLINIC | Age: 80
End: 2020-03-04

## 2020-03-04 ENCOUNTER — TELEPHONE (OUTPATIENT)
Dept: CARDIOLOGY | Facility: HOSPITAL | Age: 80
End: 2020-03-04

## 2020-03-04 NOTE — TELEPHONE ENCOUNTER
I called pt in regards to sending a remote transmission.   His wife answered & I guided her with sending transmission.

## 2020-03-06 ENCOUNTER — OFFICE VISIT (OUTPATIENT)
Dept: ELECTROPHYSIOLOGY | Facility: CLINIC | Age: 80
End: 2020-03-06
Payer: MEDICARE

## 2020-03-06 ENCOUNTER — HOSPITAL ENCOUNTER (OUTPATIENT)
Dept: CARDIOLOGY | Facility: CLINIC | Age: 80
Discharge: HOME | End: 2020-03-06
Payer: MEDICARE

## 2020-03-06 VITALS
BODY MASS INDEX: 30.85 KG/M2 | WEIGHT: 232.81 LBS | HEIGHT: 73 IN | SYSTOLIC BLOOD PRESSURE: 111 MMHG | DIASTOLIC BLOOD PRESSURE: 59 MMHG | HEART RATE: 70 BPM

## 2020-03-06 DIAGNOSIS — I48.19 PERSISTENT ATRIAL FIBRILLATION: ICD-10-CM

## 2020-03-06 DIAGNOSIS — I42.8 CARDIOMYOPATHY, NONISCHEMIC: ICD-10-CM

## 2020-03-06 DIAGNOSIS — E66.9 OBESITY (BMI 30.0-34.9): Primary | ICD-10-CM

## 2020-03-06 DIAGNOSIS — Z79.01 CURRENT USE OF LONG TERM ANTICOAGULATION: ICD-10-CM

## 2020-03-06 PROCEDURE — 3074F SYST BP LT 130 MM HG: CPT | Mod: HCNC,CPTII,S$GLB, | Performed by: NURSE PRACTITIONER

## 2020-03-06 PROCEDURE — 1159F MED LIST DOCD IN RCRD: CPT | Mod: HCNC,S$GLB,, | Performed by: NURSE PRACTITIONER

## 2020-03-06 PROCEDURE — 3288F PR FALLS RISK ASSESSMENT DOCUMENTED: ICD-10-PCS | Mod: HCNC,CPTII,S$GLB, | Performed by: NURSE PRACTITIONER

## 2020-03-06 PROCEDURE — 93010 RHYTHM STRIP: ICD-10-PCS | Mod: HCNC,S$GLB,, | Performed by: INTERNAL MEDICINE

## 2020-03-06 PROCEDURE — 93005 RHYTHM STRIP: ICD-10-PCS | Mod: HCNC,S$GLB,, | Performed by: INTERNAL MEDICINE

## 2020-03-06 PROCEDURE — 93010 ELECTROCARDIOGRAM REPORT: CPT | Mod: HCNC,S$GLB,, | Performed by: INTERNAL MEDICINE

## 2020-03-06 PROCEDURE — 3078F PR MOST RECENT DIASTOLIC BLOOD PRESSURE < 80 MM HG: ICD-10-PCS | Mod: HCNC,CPTII,S$GLB, | Performed by: NURSE PRACTITIONER

## 2020-03-06 PROCEDURE — 1100F PR PT FALLS ASSESS DOC 2+ FALLS/FALL W/INJURY/YR: ICD-10-PCS | Mod: HCNC,CPTII,S$GLB, | Performed by: NURSE PRACTITIONER

## 2020-03-06 PROCEDURE — 1100F PTFALLS ASSESS-DOCD GE2>/YR: CPT | Mod: HCNC,CPTII,S$GLB, | Performed by: NURSE PRACTITIONER

## 2020-03-06 PROCEDURE — 3074F PR MOST RECENT SYSTOLIC BLOOD PRESSURE < 130 MM HG: ICD-10-PCS | Mod: HCNC,CPTII,S$GLB, | Performed by: NURSE PRACTITIONER

## 2020-03-06 PROCEDURE — 99214 PR OFFICE/OUTPT VISIT, EST, LEVL IV, 30-39 MIN: ICD-10-PCS | Mod: HCNC,S$GLB,, | Performed by: NURSE PRACTITIONER

## 2020-03-06 PROCEDURE — 99999 PR PBB SHADOW E&M-EST. PATIENT-LVL III: ICD-10-PCS | Mod: PBBFAC,HCNC,, | Performed by: NURSE PRACTITIONER

## 2020-03-06 PROCEDURE — 1126F PR PAIN SEVERITY QUANTIFIED, NO PAIN PRESENT: ICD-10-PCS | Mod: HCNC,S$GLB,, | Performed by: NURSE PRACTITIONER

## 2020-03-06 PROCEDURE — 1126F AMNT PAIN NOTED NONE PRSNT: CPT | Mod: HCNC,S$GLB,, | Performed by: NURSE PRACTITIONER

## 2020-03-06 PROCEDURE — 99214 OFFICE O/P EST MOD 30 MIN: CPT | Mod: HCNC,S$GLB,, | Performed by: NURSE PRACTITIONER

## 2020-03-06 PROCEDURE — 99999 PR PBB SHADOW E&M-EST. PATIENT-LVL III: CPT | Mod: PBBFAC,HCNC,, | Performed by: NURSE PRACTITIONER

## 2020-03-06 PROCEDURE — 93005 ELECTROCARDIOGRAM TRACING: CPT | Mod: HCNC,S$GLB,, | Performed by: INTERNAL MEDICINE

## 2020-03-06 PROCEDURE — 3288F FALL RISK ASSESSMENT DOCD: CPT | Mod: HCNC,CPTII,S$GLB, | Performed by: NURSE PRACTITIONER

## 2020-03-06 PROCEDURE — 3078F DIAST BP <80 MM HG: CPT | Mod: HCNC,CPTII,S$GLB, | Performed by: NURSE PRACTITIONER

## 2020-03-06 PROCEDURE — 1159F PR MEDICATION LIST DOCUMENTED IN MEDICAL RECORD: ICD-10-PCS | Mod: HCNC,S$GLB,, | Performed by: NURSE PRACTITIONER

## 2020-03-06 NOTE — PROGRESS NOTES
Mr. Muhammad is a patient of Dr. Esparza and was last seen in clinic 5/29/2019.      Subjective:   Patient ID:  Heri Muhammad is a 79 y.o. male who presents for follow-up of Pacemaker Check and Atrial Fibrillation  .     HPI:    Mr. Muhammad is an 79 y.o. male with CAD, atrial fibrillation, atrial flutter, sinus node dysfunction, and CRT-P here for follow up.    Background:  Dual chamber PPM implanted (06/30/14).   Presented with fatigue, found to be in atrial fibrillation. Placed on amiodarone, underwent DCCV.  Symptoms with AF have historically been fatigue and lightheadedness.  DCCV (10/15/15) >> recurrence. DCCV (11/23/15), amiodarone increased to 400 mg daily. Developed atrial flutter.  (01/29/16) PVI + RFA for atrial flutter. Amiodarone discontinued.  Felt poorly with Multaq.  Did well for initially in terms of atrial fibrillation, but continued to note dyspnea.  Underwent LHC/RHC 6/10/16, no obstructive CAD, normal rt sided filling pressures.  Developed persistent recurrence >> DCCV 11/22/16. Recurrence.  RFA 3/23/17 Repeat isolation of LSPV, RSPV, RIPV (LIPV remained isolated).  Atrial flutter c/w mitral annular flutter >> anterior mitral annular line created >> flutter converted to what appeared to be a roof flutter >> roof line created. Tachycardia converted to another tachycardia, ultimately mapped to BERNADETTE >> ablation deferred.  Developed recurrence of flutter during blanking period. Flecainide added > converted without need for DCCV.     Echo1/20/18 EF 30-35%.  Was RV pacing 70% of the time.  LHC 1/22/18 non-obstructive CAD  1/26/18 Upgraded to CRT-P by Dr. Otto  Echo 8/29/18 EF 55%  4/22/19 device report: AT/AF burden of 31% with A-pacing 52% and BiV pacing 88%  Clinic visit 5/2019:  AF burden <1% and 94% biv pacing    Update (03/06/2020):    Today he says he has been feeling well except he has shingles and associated neuropathic pain. No cardiac complaints Mr. Muhammad denies chest pain with exertion or  at rest, palpitations, SOB, FOWLER, dizziness, or syncope.    He is currently taking eliquis 5mg BID for stroke prophylaxis and denies significant bleeding episodes. He is currently being treated with metoprolol succinate 50mg daily for HR control.  Kidney function is stable, with a creatinine of 0.8 on 10/22/2019.    Device Interrogation (3/4/2020 - remote) reveals an intrinsic APVP with stable lead and device function. AMS burden <1%. Max AF episode 30 min. He paces 75% in the RA and >99% in the RV. Estimated battery longevity 6.3-7.1 years.     I have personally reviewed the patient's EKG today, which shows APVP at 70bpm. QRS is 152. QTc is 462.    Recent Cardiac Tests:    2D Echo (10/22/2019):  · Mild concentric left ventricular hypertrophy.  · Normal left ventricular systolic function. The estimated ejection fraction is 60%  · Normal LV diastolic function.  · Normal right ventricular systolic function.  · Moderate left atrial enlargement.  · Mild right atrial enlargement.  · Mild tricuspid regurgitation.  · Mild mitral regurgitation.  · Mild aortic regurgitation.    Current Outpatient Medications   Medication Sig    acetaminophen (TYLENOL) 325 MG tablet Take 325 mg by mouth every 6 (six) hours as needed for Pain.    ANORO ELLIPTA 62.5-25 mcg/actuation DsDv INHALE 1 PUFF DAILY (CONTROLLER)    apixaban (ELIQUIS) 5 mg Tab Take 1 tablet (5 mg total) by mouth 2 (two) times daily.    atorvastatin (LIPITOR) 10 MG tablet TAKE 1 TABLET EVERY EVENING    azelastine (ASTELIN) 137 mcg (0.1 %) nasal spray 1 spray (137 mcg total) by Nasal route 2 (two) times daily. for 14 days (Patient not taking: Reported on 2/24/2020)    cetirizine (ZYRTEC) 10 MG tablet Take 10 mg by mouth every evening.    cyanocobalamin (VITAMIN B-12) 1000 MCG tablet Take 100 mcg by mouth once daily.    cyclobenzaprine (FLEXERIL) 5 MG tablet Take 1 tablet (5 mg total) by mouth 2 (two) times daily as needed for Muscle spasms.    gabapentin  "(NEURONTIN) 300 MG capsule Take 1 capsule (300 mg total) by mouth 3 (three) times daily. (Patient not taking: Reported on 2/24/2020)    ketoconazole (NIZORAL) 2 % cream AAA bid to groin and feet x 3 weeks (Patient not taking: Reported on 2/24/2020)    lamoTRIgine (LAMICTAL) 100 MG tablet Take 1 tablet (100 mg total) by mouth 2 (two) times daily.    memantine (NAMENDA) 10 MG Tab Take 1 tablet (10 mg total) by mouth 2 (two) times daily.    metoprolol succinate (TOPROL-XL) 50 MG 24 hr tablet TAKE 1 TABLET (50 MG TOTAL) BY MOUTH ONCE DAILY.    primidone (MYSOLINE) 250 MG Tab Take 2 tablets (500 mg total) by mouth 2 (two) times daily.    torsemide (DEMADEX) 10 MG Tab TAKE 1 TABLET (10 MG TOTAL) BY MOUTH EVERY MORNING.     Current Facility-Administered Medications   Medication    albuterol-ipratropium 2.5 mg-0.5 mg/3 mL nebulizer solution 3 mL       Review of Systems   Constitution: Negative for malaise/fatigue.   Cardiovascular: Negative for chest pain, dyspnea on exertion, irregular heartbeat, leg swelling and palpitations.   Respiratory: Negative for shortness of breath.    Hematologic/Lymphatic: Negative for bleeding problem.   Skin: Negative for rash.   Musculoskeletal: Positive for muscle cramps. Negative for myalgias.   Gastrointestinal: Negative for hematemesis, hematochezia and nausea.   Genitourinary: Negative for hematuria.   Neurological: Negative for light-headedness.   Psychiatric/Behavioral: Negative for altered mental status.   Allergic/Immunologic: Negative for persistent infections.     Objective:        BP (!) 111/59   Pulse 70   Ht 6' 1" (1.854 m)   Wt 105.6 kg (232 lb 12.9 oz)   BMI 30.71 kg/m²     Physical Exam   Constitutional: He is oriented to person, place, and time. He appears well-developed and well-nourished.   HENT:   Head: Normocephalic.   Nose: Nose normal.   Eyes: Pupils are equal, round, and reactive to light.   Cardiovascular: Normal rate, regular rhythm, S1 normal and S2 " normal.   No murmur heard.  Pulses:       Radial pulses are 2+ on the right side, and 2+ on the left side.   Pulmonary/Chest: Breath sounds normal. No respiratory distress.   Device to LUCW. Pocket and incision in good repair.   Abdominal: Normal appearance.   Musculoskeletal: Normal range of motion. He exhibits no edema.   Neurological: He is alert and oriented to person, place, and time.   Skin: Skin is warm and dry. No erythema.   Psychiatric: He has a normal mood and affect. His speech is normal and behavior is normal.   Nursing note and vitals reviewed.    Lab Results   Component Value Date     10/22/2019    K 4.6 10/22/2019    MG 2.0 06/07/2019    BUN 10 10/22/2019    CREATININE 0.8 10/22/2019    ALT 16 10/22/2019    AST 26 10/22/2019    AST 35 11/27/2015    HGB 14.6 10/22/2019    HCT 45.6 10/22/2019    TSH 4.250 (H) 10/30/2019    LDLCALC 108.6 10/22/2019       Recent Labs   Lab 03/15/17  1500 01/19/18  1512 02/17/18  1543   INR 0.9 1.2 1.1       Assessment:     1. Obesity (BMI 30.0-34.9)    2. Current use of long term anticoagulation    3. Persistent atrial fibrillation    4. Cardiomyopathy, nonischemic      Plan:     In summary, Mr. Muhammad is an 79 y.o. male with CAD, atrial fibrillation, atrial flutter, sinus node dysfunction, and CRT-P here for follow up.  Mr. Muhammad is doing well from a device perspective with stable lead and device function. Low AF burden. Patient asymptomatic. 98% biventricular pacing with narrow QRS and no CHF symptoms. Echo 10/2019 shows normal EF.    Continue current medication regimen and device settings.   Follow up in device clinic as scheduled.   Follow up in EP clinic in 6 mo for annual check, sooner as needed.     *A copy of this note has been sent to Dr. Esparza*    Follow up in about 6 months (around 9/6/2020).    ------------------------------------------------------------------    JANINA Vargas, NP-C  Cardiac Electrophysiology

## 2020-03-18 RX ORDER — METOPROLOL SUCCINATE 50 MG/1
50 TABLET, EXTENDED RELEASE ORAL DAILY
Qty: 90 TABLET | Refills: 4 | Status: SHIPPED | OUTPATIENT
Start: 2020-03-18 | End: 2021-02-25

## 2020-04-08 ENCOUNTER — CLINICAL SUPPORT (OUTPATIENT)
Dept: CARDIOLOGY | Facility: HOSPITAL | Age: 80
End: 2020-04-08
Payer: MEDICARE

## 2020-04-08 DIAGNOSIS — Z95.0 PRESENCE OF CARDIAC PACEMAKER: ICD-10-CM

## 2020-04-08 PROCEDURE — 93294 REM INTERROG EVL PM/LDLS PM: CPT | Mod: HCNC,,, | Performed by: INTERNAL MEDICINE

## 2020-04-08 PROCEDURE — 93294 CARDIAC DEVICE CHECK - REMOTE: ICD-10-PCS | Mod: HCNC,,, | Performed by: INTERNAL MEDICINE

## 2020-04-08 PROCEDURE — 93296 REM INTERROG EVL PM/IDS: CPT | Mod: HCNC | Performed by: INTERNAL MEDICINE

## 2020-04-20 DIAGNOSIS — G40.909 NONINTRACTABLE EPILEPSY WITHOUT STATUS EPILEPTICUS, UNSPECIFIED EPILEPSY TYPE: ICD-10-CM

## 2020-04-21 RX ORDER — TORSEMIDE 10 MG/1
10 TABLET ORAL EVERY MORNING
Qty: 90 TABLET | Refills: 4 | Status: SHIPPED | OUTPATIENT
Start: 2020-04-21 | End: 2020-11-04

## 2020-04-21 RX ORDER — LAMOTRIGINE 100 MG/1
TABLET ORAL
Qty: 180 TABLET | Refills: 3 | Status: SHIPPED | OUTPATIENT
Start: 2020-04-21 | End: 2020-07-31 | Stop reason: SDUPTHER

## 2020-04-23 ENCOUNTER — TELEPHONE (OUTPATIENT)
Dept: FAMILY MEDICINE | Facility: CLINIC | Age: 80
End: 2020-04-23

## 2020-04-23 ENCOUNTER — OFFICE VISIT (OUTPATIENT)
Dept: FAMILY MEDICINE | Facility: CLINIC | Age: 80
End: 2020-04-23
Payer: MEDICARE

## 2020-04-23 DIAGNOSIS — H10.33 ACUTE BACTERIAL CONJUNCTIVITIS OF BOTH EYES: Primary | ICD-10-CM

## 2020-04-23 PROBLEM — M79.2 NEURALGIA: Status: RESOLVED | Noted: 2020-01-14 | Resolved: 2020-04-23

## 2020-04-23 PROCEDURE — 99214 PR OFFICE/OUTPT VISIT, EST, LEVL IV, 30-39 MIN: ICD-10-PCS | Mod: HCNC,95,, | Performed by: FAMILY MEDICINE

## 2020-04-23 PROCEDURE — 1159F PR MEDICATION LIST DOCUMENTED IN MEDICAL RECORD: ICD-10-PCS | Mod: HCNC,95,, | Performed by: FAMILY MEDICINE

## 2020-04-23 PROCEDURE — 1100F PR PT FALLS ASSESS DOC 2+ FALLS/FALL W/INJURY/YR: ICD-10-PCS | Mod: HCNC,CPTII,95, | Performed by: FAMILY MEDICINE

## 2020-04-23 PROCEDURE — 3288F FALL RISK ASSESSMENT DOCD: CPT | Mod: HCNC,CPTII,95, | Performed by: FAMILY MEDICINE

## 2020-04-23 PROCEDURE — 1159F MED LIST DOCD IN RCRD: CPT | Mod: HCNC,95,, | Performed by: FAMILY MEDICINE

## 2020-04-23 PROCEDURE — 1100F PTFALLS ASSESS-DOCD GE2>/YR: CPT | Mod: HCNC,CPTII,95, | Performed by: FAMILY MEDICINE

## 2020-04-23 PROCEDURE — 99214 OFFICE O/P EST MOD 30 MIN: CPT | Mod: HCNC,95,, | Performed by: FAMILY MEDICINE

## 2020-04-23 PROCEDURE — 3288F PR FALLS RISK ASSESSMENT DOCUMENTED: ICD-10-PCS | Mod: HCNC,CPTII,95, | Performed by: FAMILY MEDICINE

## 2020-04-23 RX ORDER — OLOPATADINE HYDROCHLORIDE 2 MG/ML
1 SOLUTION/ DROPS OPHTHALMIC DAILY
Qty: 2.5 ML | Refills: 0 | Status: SHIPPED | OUTPATIENT
Start: 2020-04-23 | End: 2020-11-04

## 2020-04-23 RX ORDER — ERYTHROMYCIN 5 MG/G
OINTMENT OPHTHALMIC NIGHTLY
Qty: 3.5 G | Refills: 0 | Status: SHIPPED | OUTPATIENT
Start: 2020-04-23 | End: 2020-11-02

## 2020-04-23 NOTE — TELEPHONE ENCOUNTER
----- Message from Kari Valentin sent at 4/23/2020  8:56 AM CDT -----  Contact: [phone]/pt's wife   Type:  Same Day Appointment Request    Caller is requesting a same day appointment.    Name of Caller:Pt's wife   When is the first available appointment?5/04/2020  Symptoms:Pink Eye   Best Call Back Number:[phone]  Additional Information:Patient's wife is requesting a callback this morning.

## 2020-04-23 NOTE — PROGRESS NOTES
VIRTUAL/TELEMEDICINE VISIT   FAMILY MEDICINE   [facility]     The patient location is: [facility]  The chief complaint leading to consultation is: concerned for PINK eye  Visit type: Virtual visit with synchronous audio and video  Total time spent with patient: 25 mins  Each patient to whom he or she provides medical services by telemedicine is:  (1) informed of the relationship between the physician and patient and the respective role of any other health care provider with respect to management of the patient; and (2) notified that he or she may decline to receive medical services by telemedicine and may withdraw from such care at any time.    FAMILY MEDICINE    Patient Active Problem List   Diagnosis    Mixed hyperlipidemia    CAD (coronary artery disease)    Lumbar spondylosis    Persistent atrial fibrillation    Essential tremor    Current use of long term anticoagulation    Seizure disorder    Essential hypertension    Aortic atherosclerosis    Chronic diastolic heart failure    Subclinical hypothyroidism    Late onset Alzheimer's disease without behavioral disturbance    Cardiomyopathy, nonischemic    Biventricular cardiac pacemaker in situ    Chronic bilateral low back pain without sciatica    Obesity (BMI 30.0-34.9)    History of skin cancer    Bilateral shoulder pain    Chronic pain of right ankle    Myofascial pain    Chronic foot pain, right    Shoulder weakness    Chronic right shoulder pain    Postherpetic neuralgia    Post-traumatic arthritis of ankle, right    Lung granuloma    Acute bacterial conjunctivitis of both eyes       CC:   Chief Complaint   Patient presents with    Conjunctivitis       SUBJECTIVE:  Heri Muhammad   is a 79 y.o. male  - with obesity, dementia, seizure disorder, hyperlipidemia, CAD (non-obstructive and no history of stents), DJD lumbar spine, Afib/AFlutter (on Eliquis, h/o ablation and pacemaker in place), hypertension, aortic  atherosclerosis, HFpEF (8/2018 EF 55%), subclinical hypothyroidism, restrictive cardiomyopathy, chronic low back pain, chronic bilateral shoulder pain s/p bilateral shoulder replacements and COPD presents with redness and drainage bilateral eyes that started yesterday    1. Bilateral eye redness and drainage    Onset: yesterday  Location: conjunctiva bilateral eyes  Duration: 2 days constant  Character: redness with purulent drainage and itching; cloudy vision that resolves when he removes draiange  Aggravating factors: nothing  Relieving factors: warm compress  Timing of day: all days  Associated symptoms: see above  Negative symptoms: denies eye or lid swelling, fever, chills, nausea, vomiting, abdominal pain, headaches      ROS: Review of Systems   Constitutional: Negative.    HENT: Negative.    Eyes: Positive for discharge, redness and itching. Negative for photophobia and pain.   Respiratory: Negative.    Cardiovascular: Negative.    Gastrointestinal: Negative.    Endocrine: Negative.    Genitourinary: Negative.    Musculoskeletal: Negative.    Skin: Negative.    Allergic/Immunologic: Negative.    Neurological: Negative.    Hematological: Negative.    Psychiatric/Behavioral: Negative.        Past Medical History:   Diagnosis Date    A-fib     Anticoagulant long-term use     eliquis    Aortic atherosclerosis 9/18/2017    - LDL goal <70 - BP goal <130/80    Atrial flutter     Benign essential tremor     Biventricular cardiac pacemaker in situ 9/24/2018    CAD (coronary artery disease) 12/2/2013    - 9/2013 Left heart Cath: LAD- 60% prox (neg FFR), nl, ramus and RCA 20% - LDL goal <70 - BP goal <130/80    Cancer     skin cancer on back    Cardiac pacemaker in situ     Cardiomyopathy, nonischemic 2/12/2018    - 8/29/18 Echo EF 55%. Grade III diastolic dysfunction with restrictive physiology - followed by Cardiology    Carpal tunnel syndrome of left wrist 8/8/2017    CHF (congestive heart failure)      Chronic diastolic heart failure 1/19/2018    Chronic obstructive pulmonary disease 10/3/2018    Chronic right-sided thoracic back pain 4/27/2017    Coronary artery disease     Cough     Current use of long term anticoagulation 6/2/2016    - Eliquis for Afib    Dizziness     intermittant    DJD (degenerative joint disease) of knee     ED (erectile dysfunction)     Hard of hearing 11/2016    History of cataract     HTN (hypertension)     Hyperlipidemia     Hyperlipidemia     Hyponatremia 6/11/2019    Memory loss     Mixed hyperlipidemia 7/28/2012    Persistent atrial fibrillation 7/1/2015    - followed by Dr. Esparza - history of cardioversion - on Eliquis OAC 5 mg BID    Seizure disorder     Seizures     last episode 1995    Sinus node dysfunction 08/2016    Subclinical hypothyroidism     Tension type headache 7/2/2019       Past Surgical History:   Procedure Laterality Date    ANKLE SURGERY Right     pins and screws    ATRIAL ABLATION SURGERY      CARDIAC PACEMAKER PLACEMENT  2014    CARDIAC PACEMAKER PLACEMENT      CARDIAC PACEMAKER PLACEMENT  2014    CARDIOVERSION      CATARACT EXTRACTION      OU    COLONOSCOPY      due in 2014    EYE SURGERY Bilateral     cataracts extraction    FRACTURE SURGERY Right     ankle with hardware    HERNIA REPAIR      abdominal    INJECTION OF ANESTHETIC AGENT AROUND MEDIAL BRANCH NERVES INNERVATING LUMBAR FACET JOINT Bilateral 1/17/2019    Procedure: Block-nerve-medial branch-lumbar L2-L5;  Surgeon: Anthony Moffett MD;  Location: [facility] OR;  Service: Pain Management;  Laterality: Bilateral;    JOINT REPLACEMENT Bilateral     shoulders    RADIOFREQUENCY ABLATION OF LUMBAR MEDIAL BRANCH NERVE AT SINGLE LEVEL Bilateral 1/23/2019    Procedure: Radiofrequency Ablation, Nerve, Spinal, Lumbar, Medial Branch, L2-L5;  Surgeon: Anthony Moffett MD;  Location: [facility] OR;  Service: Pain Management;  Laterality: Bilateral;       Family History   Problem Relation Age  of Onset    Heart disease Mother     Heart failure Mother     Dementia Mother     Cancer Father         colon    Blindness Father         accident    Cataracts Father     Hypertension Father     Tremor Father     Tremor Brother     Tremor Paternal Grandfather     Cancer Daughter     No Known Problems Son     Obesity Daughter     Melanoma Neg Hx     Amblyopia Neg Hx     Diabetes Neg Hx     Glaucoma Neg Hx     Macular degeneration Neg Hx     Retinal detachment Neg Hx     Strabismus Neg Hx     Stroke Neg Hx     Thyroid disease Neg Hx        Social History     Tobacco Use    Smoking status: Former Smoker     Packs/day: 1.00     Years: 5.00     Pack years: 5.00     Types: Cigarettes     Last attempt to quit:      Years since quittin.3    Smokeless tobacco: Never Used   Substance Use Topics    Alcohol use: No    Drug use: No       Social History     Social History Narrative    . 4 adult children.        ALLERGIES:   Review of patient's allergies indicates:   Allergen Reactions    Bactroban [mupirocin calcium] Rash     Other reaction(s): Rash       MEDS:   Current Outpatient Medications:     acetaminophen (TYLENOL) 325 MG tablet, Take 325 mg by mouth every 6 (six) hours as needed for Pain., Disp: , Rfl:     ANORO ELLIPTA 62.5-25 mcg/actuation DsDv, INHALE 1 PUFF DAILY (CONTROLLER), Disp: 90 each, Rfl: 3    apixaban (ELIQUIS) 5 mg Tab, Take 1 tablet (5 mg total) by mouth 2 (two) times daily., Disp: 180 tablet, Rfl: 4    atorvastatin (LIPITOR) 10 MG tablet, TAKE 1 TABLET EVERY EVENING, Disp: 90 tablet, Rfl: 4    azelastine (ASTELIN) 137 mcg (0.1 %) nasal spray, 1 spray (137 mcg total) by Nasal route 2 (two) times daily. for 14 days, Disp: 30 mL, Rfl: 0    cetirizine (ZYRTEC) 10 MG tablet, Take 10 mg by mouth every evening., Disp: , Rfl:     cyanocobalamin (VITAMIN B-12) 1000 MCG tablet, Take 100 mcg by mouth once daily., Disp: , Rfl:     cyclobenzaprine (FLEXERIL) 5 MG  tablet, Take 1 tablet (5 mg total) by mouth 2 (two) times daily as needed for Muscle spasms., Disp: 40 tablet, Rfl: 1    gabapentin (NEURONTIN) 300 MG capsule, Take 1 capsule (300 mg total) by mouth 3 (three) times daily., Disp: 90 capsule, Rfl: 0    ketoconazole (NIZORAL) 2 % cream, AAA bid to groin and feet x 3 weeks, Disp: 60 g, Rfl: 3    lamoTRIgine (LAMICTAL) 100 MG tablet, TAKE 1 TABLET TWICE DAILY, Disp: 180 tablet, Rfl: 3    memantine (NAMENDA) 10 MG Tab, Take 1 tablet (10 mg total) by mouth 2 (two) times daily., Disp: 180 tablet, Rfl: 3    metoprolol succinate (TOPROL-XL) 50 MG 24 hr tablet, TAKE 1 TABLET (50 MG TOTAL) BY MOUTH ONCE DAILY., Disp: 90 tablet, Rfl: 4    primidone (MYSOLINE) 250 MG Tab, Take 2 tablets (500 mg total) by mouth 2 (two) times daily., Disp: 360 tablet, Rfl: 3    torsemide (DEMADEX) 10 MG Tab, TAKE 1 TABLET (10 MG TOTAL) BY MOUTH EVERY MORNING., Disp: 90 tablet, Rfl: 4    erythromycin (ROMYCIN) ophthalmic ointment, Place into both eyes every evening., Disp: 3.5 g, Rfl: 0    olopatadine (PATADAY) 0.2 % Drop, Place 1 drop into both eyes once daily., Disp: 2.5 mL, Rfl: 0    Current Facility-Administered Medications:     albuterol-ipratropium 2.5 mg-0.5 mg/3 mL nebulizer solution 3 mL, 3 mL, Nebulization, Once, Ora Murillo DO    OBJECTIVE:   There were no vitals filed for this visit.  There is no height or weight on file to calculate BMI.    Physical Exam   Constitutional: No distress.   Eyes: Pupils are equal, round, and reactive to light. EOM are normal. Right eye exhibits discharge and exudate. Right eye exhibits no chemosis. Left eye exhibits discharge and exudate. Left eye exhibits no chemosis. Right conjunctiva is injected. Left conjunctiva is injected.   Pulmonary/Chest: Effort normal. No accessory muscle usage. No respiratory distress.   Neurological: He is alert.         ASSESSMENT/PLAN:  Problem List Items Addressed This Visit        Ophtho    Acute bacterial  conjunctivitis of both eyes - Primary    Current Assessment & Plan     - warm compress  - avoid touch or rubbing eyes  - wash hands frequently  - recommend Pataday and Erythromycin ophthalmic ointment         Relevant Medications    erythromycin (ROMYCIN) ophthalmic ointment    olopatadine (PATADAY) 0.2 % Drop          ORDERS:   Orders Placed This Encounter    erythromycin (ROMYCIN) ophthalmic ointment    olopatadine (PATADAY) 0.2 % Drop     Follow-up as needed if does not improve.    Dr. Ora Murillo D.O.   [facility]

## 2020-04-23 NOTE — ASSESSMENT & PLAN NOTE
- warm compress  - avoid touch or rubbing eyes  - wash hands frequently  - recommend Pataday and Erythromycin ophthalmic ointment

## 2020-05-12 ENCOUNTER — PATIENT MESSAGE (OUTPATIENT)
Dept: DERMATOLOGY | Facility: CLINIC | Age: 80
End: 2020-05-12

## 2020-05-20 ENCOUNTER — OFFICE VISIT (OUTPATIENT)
Dept: DERMATOLOGY | Facility: CLINIC | Age: 80
End: 2020-05-20
Payer: MEDICARE

## 2020-05-20 DIAGNOSIS — B02.29 POST ZOSTER NEURALGIA: ICD-10-CM

## 2020-05-20 DIAGNOSIS — L97.911 ULCER OF RIGHT LOWER EXTREMITY, LIMITED TO BREAKDOWN OF SKIN: Primary | ICD-10-CM

## 2020-05-20 PROCEDURE — 99999 PR PBB SHADOW E&M-EST. PATIENT-LVL III: ICD-10-PCS | Mod: PBBFAC,HCNC,, | Performed by: DERMATOLOGY

## 2020-05-20 PROCEDURE — 1101F PR PT FALLS ASSESS DOC 0-1 FALLS W/OUT INJ PAST YR: ICD-10-PCS | Mod: HCNC,CPTII,S$GLB, | Performed by: DERMATOLOGY

## 2020-05-20 PROCEDURE — 1159F MED LIST DOCD IN RCRD: CPT | Mod: HCNC,S$GLB,, | Performed by: DERMATOLOGY

## 2020-05-20 PROCEDURE — 1159F PR MEDICATION LIST DOCUMENTED IN MEDICAL RECORD: ICD-10-PCS | Mod: HCNC,S$GLB,, | Performed by: DERMATOLOGY

## 2020-05-20 PROCEDURE — 99213 OFFICE O/P EST LOW 20 MIN: CPT | Mod: HCNC,S$GLB,, | Performed by: DERMATOLOGY

## 2020-05-20 PROCEDURE — 1125F PR PAIN SEVERITY QUANTIFIED, PAIN PRESENT: ICD-10-PCS | Mod: HCNC,S$GLB,, | Performed by: DERMATOLOGY

## 2020-05-20 PROCEDURE — 99499 UNLISTED E&M SERVICE: CPT | Mod: HCNC,S$GLB,, | Performed by: DERMATOLOGY

## 2020-05-20 PROCEDURE — 99999 PR PBB SHADOW E&M-EST. PATIENT-LVL III: CPT | Mod: PBBFAC,HCNC,, | Performed by: DERMATOLOGY

## 2020-05-20 PROCEDURE — 1125F AMNT PAIN NOTED PAIN PRSNT: CPT | Mod: HCNC,S$GLB,, | Performed by: DERMATOLOGY

## 2020-05-20 PROCEDURE — 99213 PR OFFICE/OUTPT VISIT, EST, LEVL III, 20-29 MIN: ICD-10-PCS | Mod: HCNC,S$GLB,, | Performed by: DERMATOLOGY

## 2020-05-20 PROCEDURE — 1101F PT FALLS ASSESS-DOCD LE1/YR: CPT | Mod: HCNC,CPTII,S$GLB, | Performed by: DERMATOLOGY

## 2020-05-20 PROCEDURE — 99499 RISK ADDL DX/OHS AUDIT: ICD-10-PCS | Mod: HCNC,S$GLB,, | Performed by: DERMATOLOGY

## 2020-05-20 NOTE — PROGRESS NOTES
Subjective:       Patient ID:  Heri Muhammad is a 79 y.o. male who presents for   Chief Complaint   Patient presents with    Lesion     Pt c/o lesion to right hip x 2years. Burns and painful. No bleeding or itching. tx with ketoconazole cream no relief  Pt also c/o possible shingles on left flank x6 months. Painful. No bleeding or itching.    Lesion         Review of Systems   Skin: Positive for wears hat. Negative for daily sunscreen use and tendency to form keloidal scars.   Hematologic/Lymphatic: Does not bruise/bleed easily.        Objective:    Physical Exam       Diagram Legend     Erythematous scaling macule/papule c/w actinic keratosis       Vascular papule c/w angioma      Pigmented verrucoid papule/plaque c/w seborrheic keratosis      Yellow umbilicated papule c/w sebaceous hyperplasia      Irregularly shaped tan macule c/w lentigo     1-2 mm smooth white papules consistent with Milia      Movable subcutaneous cyst with punctum c/w epidermal inclusion cyst      Subcutaneous movable cyst c/w pilar cyst      Firm pink to brown papule c/w dermatofibroma      Pedunculated fleshy papule(s) c/w skin tag(s)      Evenly pigmented macule c/w junctional nevus     Mildly variegated pigmented, slightly irregular-bordered macule c/w mildly atypical nevus      Flesh colored to evenly pigmented papule c/w intradermal nevus       Pink pearly papule/plaque c/w basal cell carcinoma      Erythematous hyperkeratotic cursted plaque c/w SCC      Surgical scar with no sign of skin cancer recurrence      Open and closed comedones      Inflammatory papules and pustules      Verrucoid papule consistent consistent with wart     Erythematous eczematous patches and plaques     Dystrophic onycholytic nail with subungual debris c/w onychomycosis     Umbilicated papule    Erythematous-base heme-crusted tan verrucoid plaque consistent with inflamed seborrheic keratosis     Erythematous Silvery Scaling Plaque c/w Psoriasis     See  annotation      Assessment / Plan:        There are no diagnoses linked to this encounter.         No follow-ups on file.

## 2020-05-20 NOTE — PROGRESS NOTES
Subjective:       Patient ID:  Heri Muhammad is a 79 y.o. male who presents for   Chief Complaint   Patient presents with    Lesion     Pt c/o lesion to right hip x 2years. Burns and painful. No bleeding or itching. tx with ketoconazole cream no relief  Pt also c/o possible shingles on left flank x6 months. Painful. No bleeding or itching.      Review of Systems   Constitutional: Negative for fever and chills.   Skin: Positive for itching. Negative for rash.        Pos burning sensation on left flank/chest        Objective:    Physical Exam   Constitutional: He appears well-developed and well-nourished. No distress.   Neurological: He is alert and oriented to person, place, and time. He is not disoriented.   Psychiatric: He has a normal mood and affect.   Skin:   Areas Examined (abnormalities noted in diagram):   Chest / Axilla Inspection Performed  Genitals / Buttocks / Groin Inspection Performed  RLE Inspected              Diagram Legend     Erythematous scaling macule/papule c/w actinic keratosis       Vascular papule c/w angioma      Pigmented verrucoid papule/plaque c/w seborrheic keratosis      Yellow umbilicated papule c/w sebaceous hyperplasia      Irregularly shaped tan macule c/w lentigo     1-2 mm smooth white papules consistent with Milia      Movable subcutaneous cyst with punctum c/w epidermal inclusion cyst      Subcutaneous movable cyst c/w pilar cyst      Firm pink to brown papule c/w dermatofibroma      Pedunculated fleshy papule(s) c/w skin tag(s)      Evenly pigmented macule c/w junctional nevus     Mildly variegated pigmented, slightly irregular-bordered macule c/w mildly atypical nevus      Flesh colored to evenly pigmented papule c/w intradermal nevus       Pink pearly papule/plaque c/w basal cell carcinoma      Erythematous hyperkeratotic cursted plaque c/w SCC      Surgical scar with no sign of skin cancer recurrence      Open and closed comedones      Inflammatory papules and pustules       Verrucoid papule consistent consistent with wart     Erythematous eczematous patches and plaques     Dystrophic onycholytic nail with subungual debris c/w onychomycosis     Umbilicated papule    Erythematous-base heme-crusted tan verrucoid plaque consistent with inflamed seborrheic keratosis     Erythematous Silvery Scaling Plaque c/w Psoriasis     See annotation      Assessment / Plan:        Ulcer of right lower extremity, limited to breakdown of skin  Donut cushion for pt to use when sleeping to avoid pressure in this area  medihoney and gauze bid     Post zoster neuralgia  Can discuss with PCP pain management.  No rash appreciated or scars from zoster in the past            Follow up if symptoms worsen or fail to improve.   Perilesional Excision Additional Text (Leave Blank If You Do Not Want): The margin was drawn around the clinically apparent lesion. Incisions were then made along these lines to the appropriate tissue plane and the lesion was extirpated.

## 2020-07-02 ENCOUNTER — TELEPHONE (OUTPATIENT)
Dept: ORTHOPEDICS | Facility: CLINIC | Age: 80
End: 2020-07-02

## 2020-07-06 ENCOUNTER — OFFICE VISIT (OUTPATIENT)
Dept: ORTHOPEDICS | Facility: CLINIC | Age: 80
End: 2020-07-06
Payer: MEDICARE

## 2020-07-06 ENCOUNTER — HOSPITAL ENCOUNTER (OUTPATIENT)
Dept: RADIOLOGY | Facility: HOSPITAL | Age: 80
Discharge: HOME | End: 2020-07-06
Attending: ORTHOPAEDIC SURGERY
Payer: MEDICARE

## 2020-07-06 VITALS — WEIGHT: 232 LBS | HEIGHT: 73 IN | BODY MASS INDEX: 30.75 KG/M2

## 2020-07-06 DIAGNOSIS — M25.512 CHRONIC PAIN OF BOTH SHOULDERS: ICD-10-CM

## 2020-07-06 DIAGNOSIS — R52 SHOOTING PAIN: Primary | ICD-10-CM

## 2020-07-06 DIAGNOSIS — R52 SHOOTING PAIN: ICD-10-CM

## 2020-07-06 DIAGNOSIS — G89.29 CHRONIC PAIN OF BOTH SHOULDERS: ICD-10-CM

## 2020-07-06 DIAGNOSIS — M25.511 CHRONIC PAIN OF BOTH SHOULDERS: ICD-10-CM

## 2020-07-06 PROCEDURE — 1125F PR PAIN SEVERITY QUANTIFIED, PAIN PRESENT: ICD-10-PCS | Mod: HCNC,S$GLB,, | Performed by: ORTHOPAEDIC SURGERY

## 2020-07-06 PROCEDURE — 99999 PR PBB SHADOW E&M-EST. PATIENT-LVL III: CPT | Mod: PBBFAC,HCNC,, | Performed by: ORTHOPAEDIC SURGERY

## 2020-07-06 PROCEDURE — 1159F MED LIST DOCD IN RCRD: CPT | Mod: HCNC,S$GLB,, | Performed by: ORTHOPAEDIC SURGERY

## 2020-07-06 PROCEDURE — 1101F PT FALLS ASSESS-DOCD LE1/YR: CPT | Mod: HCNC,CPTII,S$GLB, | Performed by: ORTHOPAEDIC SURGERY

## 2020-07-06 PROCEDURE — 73030 X-RAY EXAM OF SHOULDER: CPT | Mod: 26,HCNC,LT, | Performed by: RADIOLOGY

## 2020-07-06 PROCEDURE — 1101F PR PT FALLS ASSESS DOC 0-1 FALLS W/OUT INJ PAST YR: ICD-10-PCS | Mod: HCNC,CPTII,S$GLB, | Performed by: ORTHOPAEDIC SURGERY

## 2020-07-06 PROCEDURE — 20610 PR DRAIN/INJECT LARGE JOINT/BURSA: ICD-10-PCS | Mod: HCNC,LT,S$GLB, | Performed by: ORTHOPAEDIC SURGERY

## 2020-07-06 PROCEDURE — 1125F AMNT PAIN NOTED PAIN PRSNT: CPT | Mod: HCNC,S$GLB,, | Performed by: ORTHOPAEDIC SURGERY

## 2020-07-06 PROCEDURE — 20610 DRAIN/INJ JOINT/BURSA W/O US: CPT | Mod: HCNC,LT,S$GLB, | Performed by: ORTHOPAEDIC SURGERY

## 2020-07-06 PROCEDURE — 99999 PR PBB SHADOW E&M-EST. PATIENT-LVL III: ICD-10-PCS | Mod: PBBFAC,HCNC,, | Performed by: ORTHOPAEDIC SURGERY

## 2020-07-06 PROCEDURE — 1159F PR MEDICATION LIST DOCUMENTED IN MEDICAL RECORD: ICD-10-PCS | Mod: HCNC,S$GLB,, | Performed by: ORTHOPAEDIC SURGERY

## 2020-07-06 PROCEDURE — 99214 OFFICE O/P EST MOD 30 MIN: CPT | Mod: HCNC,25,S$GLB, | Performed by: ORTHOPAEDIC SURGERY

## 2020-07-06 PROCEDURE — 99214 PR OFFICE/OUTPT VISIT, EST, LEVL IV, 30-39 MIN: ICD-10-PCS | Mod: HCNC,25,S$GLB, | Performed by: ORTHOPAEDIC SURGERY

## 2020-07-06 PROCEDURE — 99499 RISK ADDL DX/OHS AUDIT: ICD-10-PCS | Mod: S$GLB,,, | Performed by: ORTHOPAEDIC SURGERY

## 2020-07-06 PROCEDURE — 73030 XR SHOULDER COMPLETE 2 OR MORE VIEWS LEFT: ICD-10-PCS | Mod: 26,HCNC,LT, | Performed by: RADIOLOGY

## 2020-07-06 PROCEDURE — 99499 UNLISTED E&M SERVICE: CPT | Mod: S$GLB,,, | Performed by: ORTHOPAEDIC SURGERY

## 2020-07-06 PROCEDURE — 73030 X-RAY EXAM OF SHOULDER: CPT | Mod: TC,HCNC,PN,LT

## 2020-07-06 RX ORDER — TRIAMCINOLONE ACETONIDE 40 MG/ML
40 INJECTION, SUSPENSION INTRA-ARTICULAR; INTRAMUSCULAR
Status: COMPLETED | OUTPATIENT
Start: 2020-07-06 | End: 2020-07-06

## 2020-07-06 RX ADMIN — TRIAMCINOLONE ACETONIDE 40 MG: 40 INJECTION, SUSPENSION INTRA-ARTICULAR; INTRAMUSCULAR at 12:07

## 2020-07-06 NOTE — PROGRESS NOTES
Subjective:      Patient ID: Heri Muhammad is a 79 y.o. male.    Chief Complaint: Pain and Numbness of the Left Arm      HPI  Heri Muhammad is a  79 y.o. male presenting today for left shoulder and arm pain.  There was not a history of trauma.  Onset of symptoms began more than a year ago  He has been treated by the pain clinic  He did have a left shoulder replacement about 20 years ago  Symptoms have gotten worse over the past several years  .      Review of patient's allergies indicates:   Allergen Reactions    Bactroban [mupirocin calcium] Rash     Other reaction(s): Rash         Current Outpatient Medications   Medication Sig Dispense Refill    ANORO ELLIPTA 62.5-25 mcg/actuation DsDv INHALE 1 PUFF DAILY (CONTROLLER) 90 each 3    apixaban (ELIQUIS) 5 mg Tab Take 1 tablet (5 mg total) by mouth 2 (two) times daily. 180 tablet 4    atorvastatin (LIPITOR) 10 MG tablet TAKE 1 TABLET EVERY EVENING 90 tablet 4    cetirizine (ZYRTEC) 10 MG tablet Take 10 mg by mouth every evening.      cyclobenzaprine (FLEXERIL) 5 MG tablet Take 1 tablet (5 mg total) by mouth 2 (two) times daily as needed for Muscle spasms. 40 tablet 1    lamoTRIgine (LAMICTAL) 100 MG tablet TAKE 1 TABLET TWICE DAILY 180 tablet 3    memantine (NAMENDA) 10 MG Tab Take 1 tablet (10 mg total) by mouth 2 (two) times daily. NO FURTHER REFILL WITHOUT APPT 180 tablet 0    metoprolol succinate (TOPROL-XL) 50 MG 24 hr tablet TAKE 1 TABLET (50 MG TOTAL) BY MOUTH ONCE DAILY. 90 tablet 4    olopatadine (PATADAY) 0.2 % Drop Place 1 drop into both eyes once daily. 2.5 mL 0    primidone (MYSOLINE) 250 MG Tab Take 2 tablets (500 mg total) by mouth 2 (two) times daily. NEEDS APT FOR FURTHER REFILL 360 tablet 0    acetaminophen (TYLENOL) 325 MG tablet Take 325 mg by mouth every 6 (six) hours as needed for Pain.      azelastine (ASTELIN) 137 mcg (0.1 %) nasal spray 1 spray (137 mcg total) by Nasal route 2 (two) times daily. for 14 days 30 mL 0     cyanocobalamin (VITAMIN B-12) 1000 MCG tablet Take 100 mcg by mouth once daily.      erythromycin (ROMYCIN) ophthalmic ointment Place into both eyes every evening. (Patient not taking: Reported on 5/20/2020) 3.5 g 0    gabapentin (NEURONTIN) 300 MG capsule Take 1 capsule (300 mg total) by mouth 3 (three) times daily. 90 capsule 0    ketoconazole (NIZORAL) 2 % cream AAA bid to groin and feet x 3 weeks (Patient not taking: Reported on 5/20/2020) 60 g 3    torsemide (DEMADEX) 10 MG Tab TAKE 1 TABLET (10 MG TOTAL) BY MOUTH EVERY MORNING. 90 tablet 4     Current Facility-Administered Medications   Medication Dose Route Frequency Provider Last Rate Last Dose    albuterol-ipratropium 2.5 mg-0.5 mg/3 mL nebulizer solution 3 mL  3 mL Nebulization Once Ora Murlilo DO           Past Medical History:   Diagnosis Date    A-fib     Anticoagulant long-term use     eliquis    Aortic atherosclerosis 9/18/2017    - LDL goal <70 - BP goal <130/80    Atrial flutter     Benign essential tremor     Biventricular cardiac pacemaker in situ 9/24/2018    CAD (coronary artery disease) 12/2/2013    - 9/2013 Left heart Cath: LAD- 60% prox (neg FFR), nl, ramus and RCA 20% - LDL goal <70 - BP goal <130/80    Cancer     skin cancer on back    Cardiac pacemaker in situ     Cardiomyopathy, nonischemic 2/12/2018    - 8/29/18 Echo EF 55%. Grade III diastolic dysfunction with restrictive physiology - followed by Cardiology    Carpal tunnel syndrome of left wrist 8/8/2017    CHF (congestive heart failure)     Chronic diastolic heart failure 1/19/2018    Chronic obstructive pulmonary disease 10/3/2018    Chronic right-sided thoracic back pain 4/27/2017    Coronary artery disease     Cough     Current use of long term anticoagulation 6/2/2016    - Eliquis for Afib    Dizziness     intermittant    DJD (degenerative joint disease) of knee     ED (erectile dysfunction)     Hard of hearing 11/2016    History of cataract     HTN  "(hypertension)     Hyperlipidemia     Hyperlipidemia     Hyponatremia 6/11/2019    Memory loss     Mixed hyperlipidemia 7/28/2012    Persistent atrial fibrillation 7/1/2015    - followed by Dr. Esparza - history of cardioversion - on Eliquis OAC 5 mg BID    Seizure disorder     Seizures     last episode 1995    Sinus node dysfunction 08/2016    Subclinical hypothyroidism     Tension type headache 7/2/2019       Past Surgical History:   Procedure Laterality Date    ANKLE SURGERY Right     pins and screws    ATRIAL ABLATION SURGERY      CARDIAC PACEMAKER PLACEMENT  2014    CARDIAC PACEMAKER PLACEMENT      CARDIAC PACEMAKER PLACEMENT  2014    CARDIOVERSION      CATARACT EXTRACTION      OU    COLONOSCOPY      due in 2014    EYE SURGERY Bilateral     cataracts extraction    FRACTURE SURGERY Right     ankle with hardware    HERNIA REPAIR      abdominal    INJECTION OF ANESTHETIC AGENT AROUND MEDIAL BRANCH NERVES INNERVATING LUMBAR FACET JOINT Bilateral 1/17/2019    Procedure: Block-nerve-medial branch-lumbar L2-L5;  Surgeon: Anthony Moffett MD;  Location: [facility] OR;  Service: Pain Management;  Laterality: Bilateral;    JOINT REPLACEMENT Bilateral     shoulders    RADIOFREQUENCY ABLATION OF LUMBAR MEDIAL BRANCH NERVE AT SINGLE LEVEL Bilateral 1/23/2019    Procedure: Radiofrequency Ablation, Nerve, Spinal, Lumbar, Medial Branch, L2-L5;  Surgeon: Anthony Moffett MD;  Location: [facility] OR;  Service: Pain Management;  Laterality: Bilateral;       Review of Systems:  ROS    OBJECTIVE:     PHYSICAL EXAM:  Height: 6' 1" (185.4 cm) Weight: 105.2 kg (232 lb)  Vitals:    07/06/20 1103   Weight: 105.2 kg (232 lb)   Height: 6' 1" (1.854 m)   PainSc:   8     Well developed, well nourished male in no acute distress  Alert and oriented x 3  HEENT- Normal exam  Lungs- Clear to auscultation  Heart- Regular rate and rhythm  Abdomen- Soft nontender  Extremity exam- examination left shoulder well-healed anterior incision is " noted there is no redness there is no swelling  No specific areas of tenderness  Range of motion limited secondary to pain  There is weakness of the rotator cuff very limited active elevation      RADIOGRAPHS:  AP lateral x-rays left shoulder demonstrates elevation of the humeral head consistent with failure of the rotator cuff Um articulation of the humeral prosthesis underneath the acromion is noted it is hard to tell if there is any loosening of the glenoid  Comments: I have personally reviewed the imaging and I agree with the above radiologist's report.    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    PLAN:  I explained the nature of the problem to the patient  Recommended the consider revision surgery with change over to a reverse total shoulder replacement for the shoulder  He is really not interested in surgery  He would like try an injection today  After pause for time-out identified the left shoulder injected with Kenalog 40 mg 2 cc xylocaine sterile technique  He tolerated the procedure well without complication  I would like him to keep an eye on symptoms follow up in 2-3 months and consider surgery in the future for revision       - We talked at length about the anatomy and pathophysiology of   Encounter Diagnoses   Name Primary?    Shooting pain Yes    Chronic pain of both shoulders            Disclaimer: This note has been generated using voice-recognition software. There may be typographical errors that have been missed during proof-reading.

## 2020-07-07 ENCOUNTER — CLINICAL SUPPORT (OUTPATIENT)
Dept: CARDIOLOGY | Facility: HOSPITAL | Age: 80
End: 2020-07-07
Payer: MEDICARE

## 2020-07-07 DIAGNOSIS — Z95.0 PRESENCE OF CARDIAC PACEMAKER: ICD-10-CM

## 2020-07-07 PROCEDURE — 93294 REM INTERROG EVL PM/LDLS PM: CPT | Mod: HCNC,,, | Performed by: INTERNAL MEDICINE

## 2020-07-07 PROCEDURE — 93294 CARDIAC DEVICE CHECK - REMOTE: ICD-10-PCS | Mod: HCNC,,, | Performed by: INTERNAL MEDICINE

## 2020-07-07 PROCEDURE — 93296 REM INTERROG EVL PM/IDS: CPT | Mod: HCNC | Performed by: INTERNAL MEDICINE

## 2020-07-31 ENCOUNTER — OFFICE VISIT (OUTPATIENT)
Dept: NEUROLOGY | Facility: CLINIC | Age: 80
End: 2020-07-31
Payer: MEDICARE

## 2020-07-31 VITALS
RESPIRATION RATE: 18 BRPM | WEIGHT: 233.25 LBS | TEMPERATURE: 97 F | DIASTOLIC BLOOD PRESSURE: 71 MMHG | BODY MASS INDEX: 31.59 KG/M2 | SYSTOLIC BLOOD PRESSURE: 118 MMHG | HEIGHT: 72 IN | HEART RATE: 71 BPM

## 2020-07-31 DIAGNOSIS — G40.909 NONINTRACTABLE EPILEPSY WITHOUT STATUS EPILEPTICUS, UNSPECIFIED EPILEPSY TYPE: ICD-10-CM

## 2020-07-31 DIAGNOSIS — G30.1 LATE ONSET ALZHEIMER'S DISEASE WITHOUT BEHAVIORAL DISTURBANCE: ICD-10-CM

## 2020-07-31 DIAGNOSIS — F02.80 LATE ONSET ALZHEIMER'S DISEASE WITHOUT BEHAVIORAL DISTURBANCE: ICD-10-CM

## 2020-07-31 DIAGNOSIS — G40.909 SEIZURE DISORDER: ICD-10-CM

## 2020-07-31 DIAGNOSIS — G47.00 INSOMNIA, UNSPECIFIED TYPE: ICD-10-CM

## 2020-07-31 DIAGNOSIS — G25.0 ESSENTIAL TREMOR: ICD-10-CM

## 2020-07-31 PROCEDURE — 1126F AMNT PAIN NOTED NONE PRSNT: CPT | Mod: HCNC,S$GLB,, | Performed by: PSYCHIATRY & NEUROLOGY

## 2020-07-31 PROCEDURE — 1159F PR MEDICATION LIST DOCUMENTED IN MEDICAL RECORD: ICD-10-PCS | Mod: HCNC,S$GLB,, | Performed by: PSYCHIATRY & NEUROLOGY

## 2020-07-31 PROCEDURE — 1101F PR PT FALLS ASSESS DOC 0-1 FALLS W/OUT INJ PAST YR: ICD-10-PCS | Mod: HCNC,CPTII,S$GLB, | Performed by: PSYCHIATRY & NEUROLOGY

## 2020-07-31 PROCEDURE — 3078F DIAST BP <80 MM HG: CPT | Mod: HCNC,CPTII,S$GLB, | Performed by: PSYCHIATRY & NEUROLOGY

## 2020-07-31 PROCEDURE — 99999 PR PBB SHADOW E&M-EST. PATIENT-LVL IV: ICD-10-PCS | Mod: PBBFAC,HCNC,, | Performed by: PSYCHIATRY & NEUROLOGY

## 2020-07-31 PROCEDURE — 1126F PR PAIN SEVERITY QUANTIFIED, NO PAIN PRESENT: ICD-10-PCS | Mod: HCNC,S$GLB,, | Performed by: PSYCHIATRY & NEUROLOGY

## 2020-07-31 PROCEDURE — 3078F PR MOST RECENT DIASTOLIC BLOOD PRESSURE < 80 MM HG: ICD-10-PCS | Mod: HCNC,CPTII,S$GLB, | Performed by: PSYCHIATRY & NEUROLOGY

## 2020-07-31 PROCEDURE — 1101F PT FALLS ASSESS-DOCD LE1/YR: CPT | Mod: HCNC,CPTII,S$GLB, | Performed by: PSYCHIATRY & NEUROLOGY

## 2020-07-31 PROCEDURE — 99214 PR OFFICE/OUTPT VISIT, EST, LEVL IV, 30-39 MIN: ICD-10-PCS | Mod: HCNC,S$GLB,, | Performed by: PSYCHIATRY & NEUROLOGY

## 2020-07-31 PROCEDURE — 3074F SYST BP LT 130 MM HG: CPT | Mod: HCNC,CPTII,S$GLB, | Performed by: PSYCHIATRY & NEUROLOGY

## 2020-07-31 PROCEDURE — 3074F PR MOST RECENT SYSTOLIC BLOOD PRESSURE < 130 MM HG: ICD-10-PCS | Mod: HCNC,CPTII,S$GLB, | Performed by: PSYCHIATRY & NEUROLOGY

## 2020-07-31 PROCEDURE — 99999 PR PBB SHADOW E&M-EST. PATIENT-LVL IV: CPT | Mod: PBBFAC,HCNC,, | Performed by: PSYCHIATRY & NEUROLOGY

## 2020-07-31 PROCEDURE — 99214 OFFICE O/P EST MOD 30 MIN: CPT | Mod: HCNC,S$GLB,, | Performed by: PSYCHIATRY & NEUROLOGY

## 2020-07-31 PROCEDURE — 1159F MED LIST DOCD IN RCRD: CPT | Mod: HCNC,S$GLB,, | Performed by: PSYCHIATRY & NEUROLOGY

## 2020-07-31 RX ORDER — MELATONIN 5 MG
5 CAPSULE ORAL NIGHTLY
Qty: 90 EACH | Refills: 3 | Status: SHIPPED | OUTPATIENT
Start: 2020-07-31 | End: 2020-11-04

## 2020-07-31 RX ORDER — LAMOTRIGINE 100 MG/1
100 TABLET ORAL 2 TIMES DAILY
Qty: 180 TABLET | Refills: 3 | Status: SHIPPED | OUTPATIENT
Start: 2020-07-31 | End: 2021-01-13 | Stop reason: SDUPTHER

## 2020-07-31 RX ORDER — PRIMIDONE 250 MG/1
500 TABLET ORAL 2 TIMES DAILY
Qty: 360 TABLET | Refills: 3 | Status: SHIPPED | OUTPATIENT
Start: 2020-07-31 | End: 2021-01-13 | Stop reason: SDUPTHER

## 2020-07-31 RX ORDER — MEMANTINE HYDROCHLORIDE 10 MG/1
10 TABLET ORAL 2 TIMES DAILY
Qty: 180 TABLET | Refills: 3 | Status: SHIPPED | OUTPATIENT
Start: 2020-07-31 | End: 2021-01-13 | Stop reason: SDUPTHER

## 2020-07-31 NOTE — PROGRESS NOTES
[facility] - NEUROLOGY EPILEPSY  [facility] [place]    Date: 7/31/20  Patient Name: Heri Muhammad   MRN: [id number]   PCP: Ora Murillo  Referring Provider: No ref. provider found    Assessment:   Heri Muhammad is a 79 y.o. male presenting in follow up for multiple neurologic issues as listed below. Will continue current regimen with addition of melatonin. Reviewed sleep hygiene at length.   Plan:     Problem List Items Addressed This Visit        Neuro    Essential tremor    Current Assessment & Plan     -- continue current primidone         Seizure disorder    Overview     - stable and no recent seizures since 1990's  - CT Head 2019 with diffuse atrophy  - EEG 1/2019 mild diffuse slowing         Current Assessment & Plan     -- continue current lamictal         Late onset Alzheimer's disease without behavioral disturbance    Current Assessment & Plan     -- continue current namenda         Relevant Medications    memantine (NAMENDA) 10 MG Tab       Other    Insomnia    Current Assessment & Plan     -- start melatonin 5mg daily  -- reviewed sleep hygiene           Other Visit Diagnoses     Nonintractable epilepsy without status epilepticus, unspecified epilepsy type        Relevant Medications    lamoTRIgine (LAMICTAL) 100 MG tablet          Anjum Arellano MD  [facility]   Department of Neurology    Patient note was created using MModal Dictation.  Any errors in syntax or even information may not have been identified and edited on initial review prior to signing this note.  Subjective:   HPI:   Mr. Heri Muhammad is a 79 y.o. male Presenting in follow-up for management of multiple neurologic issues.  He presents today with his wife who contributes to the history.  Together they report the patient is doing well.  They feel that his memory loss has ultimately stabilized.  He had no increased difficulty completing his ADLs.  They deny any breakthrough seizures.  The  patient does continue to experience mild breakthrough tremor but states that is manageable in minimally disruptive.  He does complain of insomnia stating that he is able to fall asleep but often wakes an hour to after going to bed and then is unable to fall asleep again.  He admits to using electronic devices immediately before bed as well as taking naps throughout the day.    PAST MEDICAL HISTORY:  Past Medical History:   Diagnosis Date    A-fib     Anticoagulant long-term use     eliquis    Aortic atherosclerosis 9/18/2017    - LDL goal <70 - BP goal <130/80    Atrial flutter     Benign essential tremor     Biventricular cardiac pacemaker in situ 9/24/2018    CAD (coronary artery disease) 12/2/2013    - 9/2013 Left heart Cath: LAD- 60% prox (neg FFR), nl, ramus and RCA 20% - LDL goal <70 - BP goal <130/80    Cancer     skin cancer on back    Cardiac pacemaker in situ     Cardiomyopathy, nonischemic 2/12/2018    - 8/29/18 Echo EF 55%. Grade III diastolic dysfunction with restrictive physiology - followed by Cardiology    Carpal tunnel syndrome of left wrist 8/8/2017    CHF (congestive heart failure)     Chronic diastolic heart failure 1/19/2018    Chronic obstructive pulmonary disease 10/3/2018    Chronic right-sided thoracic back pain 4/27/2017    Coronary artery disease     Cough     Current use of long term anticoagulation 6/2/2016    - Eliquis for Afib    Dizziness     intermittant    DJD (degenerative joint disease) of knee     ED (erectile dysfunction)     Hard of hearing 11/2016    History of cataract     HTN (hypertension)     Hyperlipidemia     Hyperlipidemia     Hyponatremia 6/11/2019    Memory loss     Mixed hyperlipidemia 7/28/2012    Persistent atrial fibrillation 7/1/2015    - followed by Dr. Esparza - history of cardioversion - on Eliquis OAC 5 mg BID    Seizure disorder     Seizures     last episode 1995    Sinus node dysfunction 08/2016    Subclinical hypothyroidism      Tension type headache 7/2/2019       PAST SURGICAL HISTORY:  Past Surgical History:   Procedure Laterality Date    ANKLE SURGERY Right     pins and screws    ATRIAL ABLATION SURGERY      CARDIAC PACEMAKER PLACEMENT  2014    CARDIAC PACEMAKER PLACEMENT      CARDIAC PACEMAKER PLACEMENT  2014    CARDIOVERSION      CATARACT EXTRACTION      OU    COLONOSCOPY      due in 2014    EYE SURGERY Bilateral     cataracts extraction    FRACTURE SURGERY Right     ankle with hardware    HERNIA REPAIR      abdominal    INJECTION OF ANESTHETIC AGENT AROUND MEDIAL BRANCH NERVES INNERVATING LUMBAR FACET JOINT Bilateral 1/17/2019    Procedure: Block-nerve-medial branch-lumbar L2-L5;  Surgeon: Anthony Moffett MD;  Location: [facility] OR;  Service: Pain Management;  Laterality: Bilateral;    JOINT REPLACEMENT Bilateral     shoulders    RADIOFREQUENCY ABLATION OF LUMBAR MEDIAL BRANCH NERVE AT SINGLE LEVEL Bilateral 1/23/2019    Procedure: Radiofrequency Ablation, Nerve, Spinal, Lumbar, Medial Branch, L2-L5;  Surgeon: Anthony Moffett MD;  Location: [facility] OR;  Service: Pain Management;  Laterality: Bilateral;       CURRENT MEDS:  Current Outpatient Medications   Medication Sig Dispense Refill    ANORO ELLIPTA 62.5-25 mcg/actuation DsDv INHALE 1 PUFF DAILY (CONTROLLER) 3 each 3    apixaban (ELIQUIS) 5 mg Tab Take 1 tablet (5 mg total) by mouth 2 (two) times daily. 180 tablet 4    atorvastatin (LIPITOR) 10 MG tablet TAKE 1 TABLET EVERY EVENING 90 tablet 4    cetirizine (ZYRTEC) 10 MG tablet Take 10 mg by mouth every evening.      cyclobenzaprine (FLEXERIL) 5 MG tablet Take 1 tablet (5 mg total) by mouth 2 (two) times daily as needed for Muscle spasms. 40 tablet 1    lamoTRIgine (LAMICTAL) 100 MG tablet Take 1 tablet (100 mg total) by mouth 2 (two) times daily. 180 tablet 3    memantine (NAMENDA) 10 MG Tab Take 1 tablet (10 mg total) by mouth 2 (two) times daily. 180 tablet 3    metoprolol succinate (TOPROL-XL) 50 MG 24 hr  tablet TAKE 1 TABLET (50 MG TOTAL) BY MOUTH ONCE DAILY. 90 tablet 4    olopatadine (PATADAY) 0.2 % Drop Place 1 drop into both eyes once daily. 2.5 mL 0    primidone (MYSOLINE) 250 MG Tab Take 2 tablets (500 mg total) by mouth 2 (two) times daily. 360 tablet 3    acetaminophen (TYLENOL) 325 MG tablet Take 325 mg by mouth every 6 (six) hours as needed for Pain.      azelastine (ASTELIN) 137 mcg (0.1 %) nasal spray 1 spray (137 mcg total) by Nasal route 2 (two) times daily. for 14 days 30 mL 0    cyanocobalamin (VITAMIN B-12) 1000 MCG tablet Take 100 mcg by mouth once daily.      erythromycin (ROMYCIN) ophthalmic ointment Place into both eyes every evening. (Patient not taking: Reported on 5/20/2020) 3.5 g 0    gabapentin (NEURONTIN) 300 MG capsule Take 1 capsule (300 mg total) by mouth 3 (three) times daily. 90 capsule 0    ketoconazole (NIZORAL) 2 % cream AAA bid to groin and feet x 3 weeks (Patient not taking: Reported on 5/20/2020) 60 g 3    melatonin 5 mg Cap Take 5 mg by mouth every evening. 90 each 3    torsemide (DEMADEX) 10 MG Tab TAKE 1 TABLET (10 MG TOTAL) BY MOUTH EVERY MORNING. 90 tablet 4     Current Facility-Administered Medications   Medication Dose Route Frequency Provider Last Rate Last Dose    albuterol-ipratropium 2.5 mg-0.5 mg/3 mL nebulizer solution 3 mL  3 mL Nebulization Once Ora Murillo DO           ALLERGIES:  Review of patient's allergies indicates:   Allergen Reactions    Bactroban [mupirocin calcium] Rash     Other reaction(s): Rash       FAMILY HISTORY:  Family History   Problem Relation Age of Onset    Heart disease Mother     Heart failure Mother     Dementia Mother     Cancer Father         colon    Blindness Father         accident    Cataracts Father     Hypertension Father     Tremor Father     Tremor Brother     Tremor Paternal Grandfather     Cancer Daughter     No Known Problems Son     Obesity Daughter     Melanoma Neg Hx     Amblyopia Neg Hx      Diabetes Neg Hx     Glaucoma Neg Hx     Macular degeneration Neg Hx     Retinal detachment Neg Hx     Strabismus Neg Hx     Stroke Neg Hx     Thyroid disease Neg Hx        SOCIAL HISTORY:  Social History     Tobacco Use    Smoking status: Former Smoker     Packs/day: 1.00     Years: 5.00     Pack years: 5.00     Types: Cigarettes     Quit date:      Years since quittin.6    Smokeless tobacco: Never Used   Substance Use Topics    Alcohol use: No    Drug use: No       Review of Systems:  12 system review of systems is negative except for the symptoms mentioned in HPI.      Objective:     Vitals:    20 0928   BP: 118/71   Pulse: 71   Resp: 18   Temp: 97.1 °F (36.2 °C)   TempSrc: Temporal   Weight: 105.8 kg (233 lb 4 oz)   Height: 6' (1.829 m)     General: NAD, well nourished   Eyes: no tearing, discharge, no erythema   ENT: moist mucous membranes of the oral cavity, nares patent    Neck: Supple, full range of motion  Cardiovascular: Warm and well perfused, pulses equal and symmetrical  Lungs: Normal work of breathing, normal chest wall excursions  Skin: No rash, lesions, or breakdown on exposed skin  Psychiatry: Mood and affect are appropriate   Abdomen: soft, non tender, non distended  Extremeties: No cyanosis, clubbing or edema.    Neurological   MENTAL STATUS: Alert and oriented to person, place, but not time. Attention and concentration within normal limits. Speech without dysarthria, able to name and repeat without difficulty. Recent and remote memory fair  CRANIAL NERVES: Visual fields intact. PERRL. EOMI. Facial sensation intact. Face symmetrical. Hearing grossly intact. Full shoulder shrug bilaterally. Tongue protrudes midline   SENSORY: Sensation is intact to light touch throughout.    MOTOR: Normal bulk and tone. Tremor not seen today. 5/5 deltoid, biceps, triceps, interosseous, hand  bilaterally. 5/5 iliopsoas, knee extension/flexion, foot dorsi/plantarflexion bilaterally.     REFLEXES: Symmetric and 1 throughout.   CEREBELLAR/COORDINATION/GAIT: Gait steady. Finger to nose intact. Normal rapid alternating movements.

## 2020-08-20 ENCOUNTER — OFFICE VISIT (OUTPATIENT)
Dept: ORTHOPEDICS | Facility: CLINIC | Age: 80
End: 2020-08-20
Payer: MEDICARE

## 2020-08-20 VITALS — WEIGHT: 233 LBS | BODY MASS INDEX: 31.56 KG/M2 | HEIGHT: 72 IN

## 2020-08-20 DIAGNOSIS — M54.6 CHRONIC LEFT-SIDED THORACIC BACK PAIN: Primary | ICD-10-CM

## 2020-08-20 DIAGNOSIS — M79.18 MYOFASCIAL PAIN: ICD-10-CM

## 2020-08-20 DIAGNOSIS — G89.29 CHRONIC LEFT-SIDED THORACIC BACK PAIN: Primary | ICD-10-CM

## 2020-08-20 PROCEDURE — 1101F PT FALLS ASSESS-DOCD LE1/YR: CPT | Mod: HCNC,CPTII,S$GLB, | Performed by: ORTHOPAEDIC SURGERY

## 2020-08-20 PROCEDURE — 1101F PR PT FALLS ASSESS DOC 0-1 FALLS W/OUT INJ PAST YR: ICD-10-PCS | Mod: HCNC,CPTII,S$GLB, | Performed by: ORTHOPAEDIC SURGERY

## 2020-08-20 PROCEDURE — 99999 PR PBB SHADOW E&M-EST. PATIENT-LVL IV: ICD-10-PCS | Mod: PBBFAC,HCNC,, | Performed by: ORTHOPAEDIC SURGERY

## 2020-08-20 PROCEDURE — 1125F PR PAIN SEVERITY QUANTIFIED, PAIN PRESENT: ICD-10-PCS | Mod: HCNC,S$GLB,, | Performed by: ORTHOPAEDIC SURGERY

## 2020-08-20 PROCEDURE — 99999 PR PBB SHADOW E&M-EST. PATIENT-LVL IV: CPT | Mod: PBBFAC,HCNC,, | Performed by: ORTHOPAEDIC SURGERY

## 2020-08-20 PROCEDURE — 99213 OFFICE O/P EST LOW 20 MIN: CPT | Mod: HCNC,S$GLB,, | Performed by: ORTHOPAEDIC SURGERY

## 2020-08-20 PROCEDURE — 99213 PR OFFICE/OUTPT VISIT, EST, LEVL III, 20-29 MIN: ICD-10-PCS | Mod: HCNC,S$GLB,, | Performed by: ORTHOPAEDIC SURGERY

## 2020-08-20 PROCEDURE — 1125F AMNT PAIN NOTED PAIN PRSNT: CPT | Mod: HCNC,S$GLB,, | Performed by: ORTHOPAEDIC SURGERY

## 2020-08-20 PROCEDURE — 1159F MED LIST DOCD IN RCRD: CPT | Mod: HCNC,S$GLB,, | Performed by: ORTHOPAEDIC SURGERY

## 2020-08-20 PROCEDURE — 1159F PR MEDICATION LIST DOCUMENTED IN MEDICAL RECORD: ICD-10-PCS | Mod: HCNC,S$GLB,, | Performed by: ORTHOPAEDIC SURGERY

## 2020-08-20 NOTE — PROGRESS NOTES
Subjective:      Patient ID: Heri Muhammad is a 79 y.o. male.  Chief Complaint: Follow-up of the Left Arm      HPI  Heri Muhammad is a visit  St side of his rib cage which seems to radiate from his neck around  Does have a history of neck and back problems no numbness or tingling reported ill having some 79 y.o. 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  He reports that he is improved in his left shoulder since the injection last.    Review of patient's allergies indicates:   Allergen Reactions    Bactroban [mupirocin calcium] Rash     Other reaction(s): Rash         Current Outpatient Medications   Medication Sig Dispense Refill    ANORO ELLIPTA 62.5-25 mcg/actuation DsDv INHALE 1 PUFF DAILY (CONTROLLER) 3 each 3    apixaban (ELIQUIS) 5 mg Tab Take 1 tablet (5 mg total) by mouth 2 (two) times daily. 180 tablet 4    atorvastatin (LIPITOR) 10 MG tablet TAKE 1 TABLET EVERY EVENING 90 tablet 4    cetirizine (ZYRTEC) 10 MG tablet Take 10 mg by mouth every evening.      cyclobenzaprine (FLEXERIL) 5 MG tablet Take 1 tablet (5 mg total) by mouth 2 (two) times daily as needed for Muscle spasms. 40 tablet 1    lamoTRIgine (LAMICTAL) 100 MG tablet Take 1 tablet (100 mg total) by mouth 2 (two) times daily. 180 tablet 3    melatonin 5 mg Cap Take 5 mg by mouth every evening. 90 each 3    memantine (NAMENDA) 10 MG Tab Take 1 tablet (10 mg total) by mouth 2 (two) times daily. 180 tablet 3    metoprolol succinate (TOPROL-XL) 50 MG 24 hr tablet TAKE 1 TABLET (50 MG TOTAL) BY MOUTH ONCE DAILY. 90 tablet 4    olopatadine (PATADAY) 0.2 % Drop Place 1 drop into both eyes once daily. 2.5 mL 0    primidone (MYSOLINE) 250 MG Tab Take 2 tablets (500 mg total) by mouth 2 (two) times daily. 360 tablet 3    acetaminophen (TYLENOL) 325 MG tablet Take 325 mg by mouth every 6 (six) hours as needed for Pain.      azelastine (ASTELIN) 137 mcg (0.1 %) nasal spray 1 spray (137 mcg total)  by Nasal route 2 (two) times daily. for 14 days 30 mL 0    cyanocobalamin (VITAMIN B-12) 1000 MCG tablet Take 100 mcg by mouth once daily.      erythromycin (ROMYCIN) ophthalmic ointment Place into both eyes every evening. (Patient not taking: Reported on 5/20/2020) 3.5 g 0    gabapentin (NEURONTIN) 300 MG capsule Take 1 capsule (300 mg total) by mouth 3 (three) times daily. 90 capsule 0    ketoconazole (NIZORAL) 2 % cream AAA bid to groin and feet x 3 weeks (Patient not taking: Reported on 5/20/2020) 60 g 3    torsemide (DEMADEX) 10 MG Tab TAKE 1 TABLET (10 MG TOTAL) BY MOUTH EVERY MORNING. 90 tablet 4     Current Facility-Administered Medications   Medication Dose Route Frequency Provider Last Rate Last Dose    albuterol-ipratropium 2.5 mg-0.5 mg/3 mL nebulizer solution 3 mL  3 mL Nebulization Once Ora Murillo DO           Past Medical History:   Diagnosis Date    A-fib     Anticoagulant long-term use     eliquis    Aortic atherosclerosis 9/18/2017    - LDL goal <70 - BP goal <130/80    Atrial flutter     Benign essential tremor     Biventricular cardiac pacemaker in situ 9/24/2018    CAD (coronary artery disease) 12/2/2013    - 9/2013 Left heart Cath: LAD- 60% prox (neg FFR), nl, ramus and RCA 20% - LDL goal <70 - BP goal <130/80    Cancer     skin cancer on back    Cardiac pacemaker in situ     Cardiomyopathy, nonischemic 2/12/2018    - 8/29/18 Echo EF 55%. Grade III diastolic dysfunction with restrictive physiology - followed by Cardiology    Carpal tunnel syndrome of left wrist 8/8/2017    CHF (congestive heart failure)     Chronic diastolic heart failure 1/19/2018    Chronic obstructive pulmonary disease 10/3/2018    Chronic right-sided thoracic back pain 4/27/2017    Coronary artery disease     Cough     Current use of long term anticoagulation 6/2/2016    - Eliquis for Afib    Dizziness     intermittant    DJD (degenerative joint disease) of knee     ED (erectile dysfunction)      Hard of hearing 11/2016    History of cataract     HTN (hypertension)     Hyperlipidemia     Hyperlipidemia     Hyponatremia 6/11/2019    Memory loss     Mixed hyperlipidemia 7/28/2012    Persistent atrial fibrillation 7/1/2015    - followed by Dr. Esparza - history of cardioversion - on Eliquis OAC 5 mg BID    Seizure disorder     Seizures     last episode 1995    Sinus node dysfunction 08/2016    Subclinical hypothyroidism     Tension type headache 7/2/2019       Past Surgical History:   Procedure Laterality Date    ANKLE SURGERY Right     pins and screws    ATRIAL ABLATION SURGERY      CARDIAC PACEMAKER PLACEMENT  2014    CARDIAC PACEMAKER PLACEMENT      CARDIAC PACEMAKER PLACEMENT  2014    CARDIOVERSION      CATARACT EXTRACTION      OU    COLONOSCOPY      due in 2014    EYE SURGERY Bilateral     cataracts extraction    FRACTURE SURGERY Right     ankle with hardware    HERNIA REPAIR      abdominal    INJECTION OF ANESTHETIC AGENT AROUND MEDIAL BRANCH NERVES INNERVATING LUMBAR FACET JOINT Bilateral 1/17/2019    Procedure: Block-nerve-medial branch-lumbar L2-L5;  Surgeon: Anthony Moffett MD;  Location: [facility] OR;  Service: Pain Management;  Laterality: Bilateral;    JOINT REPLACEMENT Bilateral     shoulders    RADIOFREQUENCY ABLATION OF LUMBAR MEDIAL BRANCH NERVE AT SINGLE LEVEL Bilateral 1/23/2019    Procedure: Radiofrequency Ablation, Nerve, Spinal, Lumbar, Medial Branch, L2-L5;  Surgeon: Anthony Moffett MD;  Location: [facility] OR;  Service: Pain Management;  Laterality: Bilateral;       OBJECTIVE:   PHYSICAL EXAM:  Height: 6' (182.9 cm) Weight: 105.7 kg (233 lb)  Vitals:    08/20/20 1056   Weight: 105.7 kg (233 lb)   Height: 6' (1.829 m)   PainSc:   3     Ortho/SPM Exam  Examination left shoulder no tenderness no swelling  Range of motion is limited but he does not have much pain with movement  Elevation is weak rotator cuff strength weak  He does have tenderness along the left thoracic  and lateral thoracic ribcage    RADIOGRAPHS:  None  Comments: I have personally reviewed the imaging and I agree with the above radiologist's report.    ASSESSMENT/PLAN:     IMPRESSION:  1.  Failed left total shoulder improved.  2.  Left thoracic rib cage pain    PLAN:  There was some concern that he may have shingles and this would account for some of his pain but it seems to be lasting more than a few months  I made referral to the Pain Clinic where he has been seen before for his back there is the possibility that the symptoms may be coming from his thoracic spine    FOLLOW UP:  As needed    Disclaimer: This note has been generated using voice-recognition software. There may be typographical errors that have been missed during proof-reading.

## 2020-09-15 ENCOUNTER — PATIENT OUTREACH (OUTPATIENT)
Dept: ADMINISTRATIVE | Facility: OTHER | Age: 80
End: 2020-09-15

## 2020-09-18 ENCOUNTER — TELEPHONE (OUTPATIENT)
Dept: FAMILY MEDICINE | Facility: CLINIC | Age: 80
End: 2020-09-18

## 2020-09-18 NOTE — TELEPHONE ENCOUNTER
----- Message from Kathryn Baker sent at 9/18/2020  3:05 PM CDT -----  Regarding: my chart request  Appointment Request From: Heri Muhammad    With Provider: Ora Murillo DO [[place] [facility]]    Preferred Date Range: Any    Preferred Times: Any Time    Reason for visit: flu shot    Comments:  flu shot

## 2020-09-21 ENCOUNTER — TELEPHONE (OUTPATIENT)
Dept: FAMILY MEDICINE | Facility: CLINIC | Age: 80
End: 2020-09-21

## 2020-09-21 ENCOUNTER — OFFICE VISIT (OUTPATIENT)
Dept: PAIN MEDICINE | Facility: CLINIC | Age: 80
End: 2020-09-21
Payer: MEDICARE

## 2020-09-21 ENCOUNTER — CLINICAL SUPPORT (OUTPATIENT)
Dept: FAMILY MEDICINE | Facility: CLINIC | Age: 80
End: 2020-09-21
Payer: MEDICARE

## 2020-09-21 VITALS
WEIGHT: 233 LBS | BODY MASS INDEX: 31.6 KG/M2 | DIASTOLIC BLOOD PRESSURE: 68 MMHG | SYSTOLIC BLOOD PRESSURE: 119 MMHG | HEART RATE: 90 BPM

## 2020-09-21 DIAGNOSIS — G89.29 CHRONIC LEFT-SIDED THORACIC BACK PAIN: ICD-10-CM

## 2020-09-21 DIAGNOSIS — B02.29 POST HERPETIC NEURALGIA: Primary | ICD-10-CM

## 2020-09-21 DIAGNOSIS — M79.2 NEUROPATHIC PAIN: ICD-10-CM

## 2020-09-21 DIAGNOSIS — B35.6 TINEA CRURIS: Primary | ICD-10-CM

## 2020-09-21 DIAGNOSIS — M54.6 CHRONIC LEFT-SIDED THORACIC BACK PAIN: ICD-10-CM

## 2020-09-21 PROCEDURE — 99999 PR PBB SHADOW E&M-EST. PATIENT-LVL III: CPT | Mod: PBBFAC,HCNC,,

## 2020-09-21 PROCEDURE — 1159F PR MEDICATION LIST DOCUMENTED IN MEDICAL RECORD: ICD-10-PCS | Mod: HCNC,S$GLB,, | Performed by: PAIN MEDICINE

## 2020-09-21 PROCEDURE — G0008 PR ADMIN INFLUENZA VIRUS VAC: ICD-10-PCS | Mod: HCNC,S$GLB,, | Performed by: FAMILY MEDICINE

## 2020-09-21 PROCEDURE — 3078F DIAST BP <80 MM HG: CPT | Mod: HCNC,CPTII,S$GLB, | Performed by: PAIN MEDICINE

## 2020-09-21 PROCEDURE — 90694 VACC AIIV4 NO PRSRV 0.5ML IM: CPT | Mod: HCNC,S$GLB,, | Performed by: FAMILY MEDICINE

## 2020-09-21 PROCEDURE — 3078F PR MOST RECENT DIASTOLIC BLOOD PRESSURE < 80 MM HG: ICD-10-PCS | Mod: HCNC,CPTII,S$GLB, | Performed by: PAIN MEDICINE

## 2020-09-21 PROCEDURE — 99999 PR PBB SHADOW E&M-EST. PATIENT-LVL III: ICD-10-PCS | Mod: PBBFAC,HCNC,,

## 2020-09-21 PROCEDURE — 1159F MED LIST DOCD IN RCRD: CPT | Mod: HCNC,S$GLB,, | Performed by: PAIN MEDICINE

## 2020-09-21 PROCEDURE — 1125F AMNT PAIN NOTED PAIN PRSNT: CPT | Mod: HCNC,S$GLB,, | Performed by: PAIN MEDICINE

## 2020-09-21 PROCEDURE — 99214 PR OFFICE/OUTPT VISIT, EST, LEVL IV, 30-39 MIN: ICD-10-PCS | Mod: HCNC,S$GLB,, | Performed by: PAIN MEDICINE

## 2020-09-21 PROCEDURE — 99214 OFFICE O/P EST MOD 30 MIN: CPT | Mod: HCNC,S$GLB,, | Performed by: PAIN MEDICINE

## 2020-09-21 PROCEDURE — 3074F SYST BP LT 130 MM HG: CPT | Mod: HCNC,CPTII,S$GLB, | Performed by: PAIN MEDICINE

## 2020-09-21 PROCEDURE — 1101F PR PT FALLS ASSESS DOC 0-1 FALLS W/OUT INJ PAST YR: ICD-10-PCS | Mod: HCNC,CPTII,S$GLB, | Performed by: PAIN MEDICINE

## 2020-09-21 PROCEDURE — 99999 PR PBB SHADOW E&M-EST. PATIENT-LVL IV: ICD-10-PCS | Mod: PBBFAC,HCNC,, | Performed by: PAIN MEDICINE

## 2020-09-21 PROCEDURE — 1101F PT FALLS ASSESS-DOCD LE1/YR: CPT | Mod: HCNC,CPTII,S$GLB, | Performed by: PAIN MEDICINE

## 2020-09-21 PROCEDURE — 1125F PR PAIN SEVERITY QUANTIFIED, PAIN PRESENT: ICD-10-PCS | Mod: HCNC,S$GLB,, | Performed by: PAIN MEDICINE

## 2020-09-21 PROCEDURE — 99999 PR PBB SHADOW E&M-EST. PATIENT-LVL IV: CPT | Mod: PBBFAC,HCNC,, | Performed by: PAIN MEDICINE

## 2020-09-21 PROCEDURE — 3074F PR MOST RECENT SYSTOLIC BLOOD PRESSURE < 130 MM HG: ICD-10-PCS | Mod: HCNC,CPTII,S$GLB, | Performed by: PAIN MEDICINE

## 2020-09-21 PROCEDURE — G0008 ADMIN INFLUENZA VIRUS VAC: HCPCS | Mod: HCNC,S$GLB,, | Performed by: FAMILY MEDICINE

## 2020-09-21 PROCEDURE — 90694 FLU VACCINE - QUADRIVALENT - ADJUVANTED: ICD-10-PCS | Mod: HCNC,S$GLB,, | Performed by: FAMILY MEDICINE

## 2020-09-21 RX ORDER — CLOTRIMAZOLE AND BETAMETHASONE DIPROPIONATE 10; .64 MG/G; MG/G
CREAM TOPICAL 2 TIMES DAILY
Qty: 45 G | Refills: 0 | Status: SHIPPED | OUTPATIENT
Start: 2020-09-21 | End: 2020-11-04

## 2020-09-21 RX ORDER — PREGABALIN 25 MG/1
25 CAPSULE ORAL 2 TIMES DAILY
Qty: 60 CAPSULE | Refills: 1 | Status: SHIPPED | OUTPATIENT
Start: 2020-09-21 | End: 2020-10-22

## 2020-09-21 NOTE — PROGRESS NOTES
"[facility] Established Patient Evaluation    Chief Complaint  Chief Complaint   Patient presents with    Back Pain       Last 3 PDI Scores 11/20/2019 1/15/2019 10/2/2014   Pain Disability Index (PDI) 42 43 0       Interval Update  9/21/2020 - the patient returns today complaining of abnormal sensations along the left flank extending toward the left nipple.  Approximately 6 months ago he began feeling these odd sensations wrapping around the upper portion of the flank near the shoulder blade extending through the axilla and toward the nipple.  Patient states that light touch of the skin across this area produces sensations of pins and needles and tingling which is very uncomfortable and adversely impacts his ability to sleep.  He reports being evaluated for post herpetic neuralgia and was started on gabapentin in the past with slight improvement of symptoms then gabapentin was stopped and his pain returned to baseline (4/10).  He states that he has never had a rash or any herpetic lesions in the past 6 mos int he affected area.      Heri Muhammad is transferring care to my clinic from Dr. Moffett.  He moved [facility].  He states intermittent low back/flank pain and intermittent shoulder pains that last 1-2 days at a time and occur 4-5 times per month.  Between episodes he feels pain from "arthritis" but he states "I can handle that."  He underwent bilateral medial branch block with Dr. Moffett at the beginning of 2019 which provided him unilateral relief of his symptoms.  When last evaluated, he was diagnosed primarily with myofascial pain and given strong recommendation for physical therapy.  He never started physical therapy because it was recommended at the same time that he and his wife removing to the [place].    Location: right shoulder   Onset: yrs  Current Pain Score: 6/10  Daily Pain of Range: 2-6/10  Quality: Aching and Sharp  Radiation: no  Worsened by: standing and walking for more than " 1 minutes  Improved by: rest and sitting    Background from Chart Review  Interval HPI 3/12/19: Mr. Muhammad returns to the office for follow up. Today with left greater than right sided back pain, 7/10, intermittent, sharp, relieved with back flexion.  TPI in the office on 2/11/19 with mild relief.  No radicular pain or radiculopathy.  Continues flexeril, lidocaine patch, and tylenol prn     Interval HPI 2/11/19: Mr. Muhammad returns for post injection visit.  He is s/p bilateral L2-L5 RFA on 1/23/19 with 100% relief of the right side and 0% of the left side.  Today his pain is on the left side, 8/10, sharp, no radicular pain or radiculopathy.  Worse with walking and back flexion.  No numbness, tingling.  Continues flexeril, tylenol, ibuprofen prn with good relief      Treatment History  PT/OT: Referred but never started  HEP: Lives a VERY sedentary lifestyle  TENS:  Injections: Bilat L2-5 MBB with 100% relief of right and none on left  Surgery: Bilateral shoulder replacement in approx yr 2000  Medications:   - NSAIDS:   - MSK Relaxants:   - TCAs:   - SNRIs:   - Topicals:   - Opioids:   - Anticonvulsants:     Current Pain Medications  1. Tylenol     Full Medication List    Current Outpatient Medications:     acetaminophen (TYLENOL) 325 MG tablet, Take 325 mg by mouth every 6 (six) hours as needed for Pain., Disp: , Rfl:     ANORO ELLIPTA 62.5-25 mcg/actuation DsDv, INHALE 1 PUFF DAILY (CONTROLLER), Disp: 3 each, Rfl: 3    apixaban (ELIQUIS) 5 mg Tab, Take 1 tablet (5 mg total) by mouth 2 (two) times daily., Disp: 180 tablet, Rfl: 4    atorvastatin (LIPITOR) 10 MG tablet, TAKE 1 TABLET EVERY EVENING, Disp: 90 tablet, Rfl: 4    azelastine (ASTELIN) 137 mcg (0.1 %) nasal spray, 1 spray (137 mcg total) by Nasal route 2 (two) times daily. for 14 days, Disp: 30 mL, Rfl: 0    cetirizine (ZYRTEC) 10 MG tablet, Take 10 mg by mouth every evening., Disp: , Rfl:     cyanocobalamin (VITAMIN B-12) 1000 MCG tablet, Take 100  mcg by mouth once daily., Disp: , Rfl:     cyclobenzaprine (FLEXERIL) 5 MG tablet, Take 1 tablet (5 mg total) by mouth 2 (two) times daily as needed for Muscle spasms., Disp: 40 tablet, Rfl: 1    erythromycin (ROMYCIN) ophthalmic ointment, Place into both eyes every evening. (Patient not taking: Reported on 5/20/2020), Disp: 3.5 g, Rfl: 0    gabapentin (NEURONTIN) 300 MG capsule, Take 1 capsule (300 mg total) by mouth 3 (three) times daily., Disp: 90 capsule, Rfl: 0    ketoconazole (NIZORAL) 2 % cream, AAA bid to groin and feet x 3 weeks (Patient not taking: Reported on 5/20/2020), Disp: 60 g, Rfl: 3    lamoTRIgine (LAMICTAL) 100 MG tablet, Take 1 tablet (100 mg total) by mouth 2 (two) times daily., Disp: 180 tablet, Rfl: 3    melatonin 5 mg Cap, Take 5 mg by mouth every evening., Disp: 90 each, Rfl: 3    memantine (NAMENDA) 10 MG Tab, Take 1 tablet (10 mg total) by mouth 2 (two) times daily., Disp: 180 tablet, Rfl: 3    metoprolol succinate (TOPROL-XL) 50 MG 24 hr tablet, TAKE 1 TABLET (50 MG TOTAL) BY MOUTH ONCE DAILY., Disp: 90 tablet, Rfl: 4    olopatadine (PATADAY) 0.2 % Drop, Place 1 drop into both eyes once daily., Disp: 2.5 mL, Rfl: 0    primidone (MYSOLINE) 250 MG Tab, Take 2 tablets (500 mg total) by mouth 2 (two) times daily., Disp: 360 tablet, Rfl: 3    torsemide (DEMADEX) 10 MG Tab, TAKE 1 TABLET (10 MG TOTAL) BY MOUTH EVERY MORNING., Disp: 90 tablet, Rfl: 4    Current Facility-Administered Medications:     albuterol-ipratropium 2.5 mg-0.5 mg/3 mL nebulizer solution 3 mL, 3 mL, Nebulization, Once, Ora Murillo, DO     Review of Systems  Review of Systems   Constitutional: Negative for chills and fever.   HENT: Negative for nosebleeds.    Eyes: Negative for blurred vision.   Respiratory: Negative for hemoptysis.    Cardiovascular: Negative for chest pain and palpitations.   Gastrointestinal: Negative for heartburn and vomiting.   Genitourinary: Negative for hematuria.   Musculoskeletal:  Positive for back pain, joint pain and myalgias. Negative for falls.   Skin: Negative for rash.   Neurological: Negative for seizures and loss of consciousness.   Endo/Heme/Allergies: Does not bruise/bleed easily.   Psychiatric/Behavioral: Positive for memory loss. Negative for hallucinations. The patient has insomnia.        Medical History  Past Medical History:   Diagnosis Date    A-fib     Anticoagulant long-term use     eliquis    Aortic atherosclerosis 9/18/2017    - LDL goal <70 - BP goal <130/80    Atrial flutter     Benign essential tremor     Biventricular cardiac pacemaker in situ 9/24/2018    CAD (coronary artery disease) 12/2/2013    - 9/2013 Left heart Cath: LAD- 60% prox (neg FFR), nl, ramus and RCA 20% - LDL goal <70 - BP goal <130/80    Cancer     skin cancer on back    Cardiac pacemaker in situ     Cardiomyopathy, nonischemic 2/12/2018    - 8/29/18 Echo EF 55%. Grade III diastolic dysfunction with restrictive physiology - followed by Cardiology    Carpal tunnel syndrome of left wrist 8/8/2017    CHF (congestive heart failure)     Chronic diastolic heart failure 1/19/2018    Chronic obstructive pulmonary disease 10/3/2018    Chronic right-sided thoracic back pain 4/27/2017    Coronary artery disease     Cough     Current use of long term anticoagulation 6/2/2016    - Eliquis for Afib    Dizziness     intermittant    DJD (degenerative joint disease) of knee     ED (erectile dysfunction)     Hard of hearing 11/2016    History of cataract     HTN (hypertension)     Hyperlipidemia     Hyperlipidemia     Hyponatremia 6/11/2019    Memory loss     Mixed hyperlipidemia 7/28/2012    Persistent atrial fibrillation 7/1/2015    - followed by Dr. Esparza - history of cardioversion - on Eliquis OAC 5 mg BID    Seizure disorder     Seizures     last episode 1995    Sinus node dysfunction 08/2016    Subclinical hypothyroidism     Tension type headache 7/2/2019        Surgical  History  Past Surgical History:   Procedure Laterality Date    ANKLE SURGERY Right     pins and screws    ATRIAL ABLATION SURGERY      CARDIAC PACEMAKER PLACEMENT  2014    CARDIAC PACEMAKER PLACEMENT      CARDIAC PACEMAKER PLACEMENT  2014    CARDIOVERSION      CATARACT EXTRACTION      OU    COLONOSCOPY      due in 2014    EYE SURGERY Bilateral     cataracts extraction    FRACTURE SURGERY Right     ankle with hardware    HERNIA REPAIR      abdominal    INJECTION OF ANESTHETIC AGENT AROUND MEDIAL BRANCH NERVES INNERVATING LUMBAR FACET JOINT Bilateral 1/17/2019    Procedure: Block-nerve-medial branch-lumbar L2-L5;  Surgeon: Anthony Mofeftt MD;  Location: [facility] OR;  Service: Pain Management;  Laterality: Bilateral;    JOINT REPLACEMENT Bilateral     shoulders    RADIOFREQUENCY ABLATION OF LUMBAR MEDIAL BRANCH NERVE AT SINGLE LEVEL Bilateral 1/23/2019    Procedure: Radiofrequency Ablation, Nerve, Spinal, Lumbar, Medial Branch, L2-L5;  Surgeon: Anthony Moffett MD;  Location: [facility] OR;  Service: Pain Management;  Laterality: Bilateral;        Physical Exam  There were no vitals filed for this visit.  General    Nursing note and vitals reviewed.  Constitutional: He is oriented to person, place, and time. He appears well-developed and well-nourished. No distress.   HENT:   Head: Normocephalic and atraumatic.   Nose: Nose normal.   Eyes: Conjunctivae and EOM are normal. Pupils are equal, round, and reactive to light. Right eye exhibits no discharge. Left eye exhibits no discharge. No scleral icterus.   Neck: No JVD present.   Cardiovascular: Intact distal pulses.    Pulmonary/Chest: Effort normal. No respiratory distress.   There is a slightly raised, erythematous patch approximately the size of a small palm directly beneath the axilla.  It appears to be in the T4 dermatome.   Abdominal: He exhibits no distension.   Neurological: He is alert and oriented to person, place, and time. Coordination normal.    Psychiatric: He has a normal mood and affect. His behavior is normal. Judgment and thought content normal.         Back (L-Spine & T-Spine) / Neck (C-Spine) Exam     Tenderness Posterior midline tenderness location: nonoe. Right paramedian tenderness of the Sacrum and Lower L-Spine. Left paramedian tenderness of the Lower L-Spine and Sacrum.     Back (L-Spine & T-Spine) Range of Motion   Extension: normal   Flexion: normal     Other   Spinal Kyphosis:  Absent  Spinal Lordosis:  Absent        Imaging  Xray lumbar spine 12/11/18  FINDINGS:  There is no definite change in the appearance of the lumbar spine compared to the prior study.  There is no fracture or malalignment.  There is moderate to marked disc space narrowing at the L4-5 level.  There is moderate-to-marked facet joint arthropathy at the L4-5 and L5-S1 levels.  Otherwise, there is mild disc space narrowing in the remainder of the lumbar spine.  There is moderate to marked disc space narrowing at the thoracolumbar junction where there is also bridging osteophyte formation and endplate sclerosis.  These findings were all present previously.     CT lumbar spine 1/2018  FINDINGS:    Vertebral column: There is stable mild grade 1 spondylolisthesis at the L5-S1 level with 4 mm anterolisthesis of L5 on S1 which is degenerative in etiology and related to facet arthropathy.  There are no pars defects.  The remainder of the vertebral bodies maintain normal alignment.  The bones are mildly osteopenic.  There is no acute fracture.  There is mild to moderate disc space sparing at the L4-5 level where there is endplate sclerosis and osteophyte formation.  There is mild disc space narrowing anteriorly at the L5-S1 level.  There is a very minimal levocurvature of the lumbar spine.  This could in part be positional.  The patient is slightly tilted in the scanner.    Spinal canal, epidural space, conus: The spinal canal is developmentally normal.  The conus terminates at  the level of T12-L1.  There is no abnormal epidural collection or mass.      Findings by level:  T11-12: There is no spinal canal or foraminal stenosis.  There is mild left facet joint arthropathy.  T12-L1: There is no spinal canal or foraminal stenosis.  There is mild left facet joint arthropathy.  L1-L2: There is minimal bulging of the annulus and mild facet joint arthropathy without spinal canal or foraminal stenosis.  There is no significant change.  L2-L3: There is a mild diffuse disc bulge and mild facet arthropathy.  There is no spinal canal or foraminal stenosis.  There is little change.  L3-L4: There is a mild diffuse disc bulge with mild facet arthropathy.  There is no spinal canal or foraminal stenosis and there is no significant change.  L4-L5: There is mild to moderate facet arthropathy.  There is a diffuse disc bulge with osteophytic ridging.  There is no spinal stenosis.  There is moderate left and severe right foraminal stenosis without significant change.  L5-S1: There is 4 mm anterolisthesis of L5 on S1.  There is moderate right and mild bilateral facet joint arthropathy with ligamentum flavum thickening and ossification.  There is unroofing of a broad disc protrusion.  This approaches both S1 roots and may contact the roots without obvious compression or displacement.  There is no significant spinal stenosis.  There is moderate left and severe right foraminal stenosis without significant change.    Soft tissues/other: The prevertebral soft tissues are normal.  There is mild atherosclerosis.  The aorta is of normal     Labs:  BMP  Lab Results   Component Value Date     10/22/2019    K 4.6 10/22/2019     10/22/2019    CO2 30 (H) 10/22/2019    BUN 10 10/22/2019    CREATININE 0.8 10/22/2019    CALCIUM 8.5 (L) 10/22/2019    ANIONGAP 12 10/22/2019    ESTGFRAFRICA >60.0 10/22/2019    EGFRNONAA >60.0 10/22/2019     Lab Results   Component Value Date    ALT 16 10/22/2019    AST 26 10/22/2019     ALKPHOS 92 10/22/2019    BILITOT 0.4 10/22/2019       Assessment:  Problem List Items Addressed This Visit     None      Visit Diagnoses     Chronic left-sided thoracic back pain              11/20/19 - Heri Muhammad is a 79 y.o. male with primarily myofascial pain, as previously assessed by his former pain management physician.  I support previous recommendations that physical therapy should be the primary treatment modality at this time. Patient is experiencing intermittent pain in the shoulders and low back consistent with muscle spasms.  Physical examination does not reveal pain with facet loading, therefore I do not believe his primary pain generator is facet arthritis.  Additionally, the injection from his previous pain management provider yielded an ambiguous result.  This pain is in mild time encouraging him to participate in physical therapy and to establish home exercise program.  His wife states that Heri lives a very sedentary lifestyle.  As evidence of this, his muscle tone is very poor in the shoulders, low back, and legs.  Additionally, x-ray of the shoulder shows mild subluxation, which is also a feature of diminished muscle strength in the rotator cuff.  Patient voiced concerned about the financial cost of attending physical therapy.  I responded by telling him that physical therapy is probably the best investment of time and money for his current condition. I also informed him that physical therapy would be a complete waste of his time and money if he does not establish a home exercise program.  He understands.      Patient also reports chronic right foot and ankle pain. He was evaluated by Podiatry for years ago but has not followed up.  I will place a new referral for him today.    9/21/20 - Mr. Liriano  is an 79-year-old male with neuropathic pain of the left flank and a new skin lesion in that same dermatome.  His physical examination today was positive for allodynia to light touch and movement  of air confirming a neuropathic condition; however, the skin lesion near the axilla, in the T4 dermatome, appears to be fungal.  This may be coincidental and I will involve his primary care team for treatment.  I will also reach out to Dr. Murillo for discussion of a trial of Lyrica 25 mg for his neuropathic pain.  TCAs are a mainstay treatment for PHN, but I think this would be a poor choice given his age.    Plan & Recommendations  Medications: Try lyrica 25 mg BID  Imaging: No additional imaging required at this time.  PT/OT: Referral placed today  HEP: I encouraged the patient to maintain a home exercise regimen that includes daily, moderate cardiovascular exercise lasting at least 30 minutes.  This may include yoga, chasity chi, walking, swimming, aqua aerobics, or other exercises that maintain a heart rate of 50-70% of the calculated maximum heart rate.  I also encouraged light, daily stretching focused on the target area.  Follow Up: RTC in 4 weeks    Cynthia Oh Jr, MD  Interventional Pain Medicine / Anesthesiology    Disclaimer: This note was partly generated using dictation software which may occasionally result in transcription errors.

## 2020-09-21 NOTE — TELEPHONE ENCOUNTER
Called and spoke with patient and wife. Discussed that Dr. Negron reached out after visit today to confirm okay to trial Lyrica and also to discuss rash under his armpit (see image). Okay with Lyrica. Start cream for rash.     Problem List Items Addressed This Visit     None      Visit Diagnoses     Tinea cruris    -  Primary    Relevant Medications    clotrimazole-betamethasone 1-0.05% (LOTRISONE) cream        - questions elicited and answered  - encouraged to patient to notify me of any questions or concerns  Dr. Ora Murillo D.O.   Family Medicine

## 2020-09-21 NOTE — LETTER
September 21, 2020      Bennett Jaquez Jr., MD  [address]  [place] [address]           [facility]  [address]  [place] [address]  Phone: [phone]          Patient: Heri Muhammad   MR Number: [id number]   YOB: 1940   Date of Visit: 9/21/2020       Dear Dr. Bennett Jaquez Jr.:    Thank you for referring Heri Muhammad to me for evaluation. Attached you will find relevant portions of my assessment and plan of care.    If you have questions, please do not hesitate to call me. I look forward to following Heri Muhammad along with you.    Sincerely,    Cynthia Oh Jr., MD    Enclosure  CC:  No Recipients    If you would like to receive this communication electronically, please contact [email or web address] or [phone] to request more information on [employer] access.    For providers and/or their staff who would like to refer a patient to [facility], please contact us through our one-stop-shop provider referral line, [facility], at [phone].    If you feel you have received this communication in error or would no longer like to receive these types of communications, please e-mail [email or web address]

## 2020-10-05 ENCOUNTER — CLINICAL SUPPORT (OUTPATIENT)
Dept: CARDIOLOGY | Facility: HOSPITAL | Age: 80
End: 2020-10-05
Payer: MEDICARE

## 2020-10-05 DIAGNOSIS — Z95.0 PRESENCE OF CARDIAC PACEMAKER: ICD-10-CM

## 2020-10-05 PROCEDURE — 93296 REM INTERROG EVL PM/IDS: CPT | Mod: HCNC | Performed by: INTERNAL MEDICINE

## 2020-10-05 PROCEDURE — 93294 CARDIAC DEVICE CHECK - REMOTE: ICD-10-PCS | Mod: HCNC,,, | Performed by: INTERNAL MEDICINE

## 2020-10-05 PROCEDURE — 93294 REM INTERROG EVL PM/LDLS PM: CPT | Mod: HCNC,,, | Performed by: INTERNAL MEDICINE

## 2020-10-09 NOTE — TELEPHONE ENCOUNTER
----- Message from Amanda Aguilar sent at 2/20/2018  2:49 PM CST -----  Contact: pt wife   Please call pt at [phone]. Patient heart rate is still high and also need to discuss Rx renewal. Dr Esparza pt  2nd call request    Thank you   no

## 2020-10-16 RX ORDER — ATORVASTATIN CALCIUM 10 MG/1
TABLET, FILM COATED ORAL
Qty: 90 TABLET | Refills: 4 | Status: SHIPPED | OUTPATIENT
Start: 2020-10-16 | End: 2020-11-02 | Stop reason: SDUPTHER

## 2020-10-19 ENCOUNTER — PATIENT MESSAGE (OUTPATIENT)
Dept: PAIN MEDICINE | Facility: CLINIC | Age: 80
End: 2020-10-19

## 2020-10-20 NOTE — PROGRESS NOTES
[facility] Established Patient Evaluation    Chief Complaint  Chief Complaint   Patient presents with    Flank Pain    Back Pain       Last 3 PDI Scores 9/21/2020 11/20/2019 1/15/2019   Pain Disability Index (PDI) 28 [phone] 43       Interval Updates  10/21/2020 - Mr. Muhammad returns to clinic for follow up visit reporting stable back/flank. Pain intensity is currently 7/10.  The rash near his left axilla has resolved with use of an antifungal cream from Dr. Murillo.  Despite resolution of the rash, he continues to have pain in that area which is unchanged in intensity or character from the previous visit.  In addition to pain under the left axilla, he continues to have bilateral shoulder, low back, and bilateral knee pain all related to degenerative joint disease.  Patient states that it is pain I just have to live with because previous injections do not last more than 6 months.    9/21/2020 - the patient returns today complaining of abnormal sensations along the left flank extending toward the left nipple.  Approximately 6 months ago he began feeling these odd sensations wrapping around the upper portion of the flank near the shoulder blade extending through the axilla and toward the nipple.  Patient states that light touch of the skin across this area produces sensations of pins and needles and tingling which is very uncomfortable and adversely impacts his ability to sleep.  He reports being evaluated for post herpetic neuralgia and was started on gabapentin in the past with slight improvement of symptoms then gabapentin was stopped and his pain returned to baseline (4/10).  He states that he has never had a rash or any herpetic lesions in the past 6 mos int he affected area.      Heri Muhammad is transferring care to my clinic from Dr. Moffett.  He moved [facility].  He states intermittent low back/flank pain and intermittent shoulder pains that last 1-2 days at a time and occur 4-5 times per month.   "Between episodes he feels pain from "arthritis" but he states "I can handle that."  He underwent bilateral medial branch block with Dr. Moffett at the beginning of 2019 which provided him unilateral relief of his symptoms.  When last evaluated, he was diagnosed primarily with myofascial pain and given strong recommendation for physical therapy.  He never started physical therapy because it was recommended at the same time that he and his wife removing to the [place].    Location: right shoulder   Onset: yrs  Current Pain Score: 6/10  Daily Pain of Range: 2-6/10  Quality: Aching and Sharp  Radiation: no  Worsened by: standing and walking for more than 1 minutes  Improved by: rest and sitting    Background from Chart Review  Interval HPI 3/12/19: Mr. Muhammad returns to the office for follow up. Today with left greater than right sided back pain, 7/10, intermittent, sharp, relieved with back flexion.  TPI in the office on 2/11/19 with mild relief.  No radicular pain or radiculopathy.  Continues flexeril, lidocaine patch, and tylenol prn     Interval HPI 2/11/19: Mr. Muhammad returns for post injection visit.  He is s/p bilateral L2-L5 RFA on 1/23/19 with 100% relief of the right side and 0% of the left side.  Today his pain is on the left side, 8/10, sharp, no radicular pain or radiculopathy.  Worse with walking and back flexion.  No numbness, tingling.  Continues flexeril, tylenol, ibuprofen prn with good relief      Treatment History  PT/OT: Referred but never started  HEP: Lives a VERY sedentary lifestyle  TENS:  Injections: Bilat L2-5 MBB with 100% relief of right and none on left  Surgery: Bilateral shoulder replacement in approx yr 2000  Medications:   - NSAIDS:   - MSK Relaxants:   - TCAs:   - SNRIs:   - Topicals:   - Opioids:   - Anticonvulsants:     Current Pain Medications  1. Tylenol     Full Medication List    Current Outpatient Medications:     acetaminophen (TYLENOL) 325 MG tablet, Take 325 mg by mouth " every 6 (six) hours as needed for Pain., Disp: , Rfl:     ANORO ELLIPTA 62.5-25 mcg/actuation DsDv, INHALE 1 PUFF DAILY (CONTROLLER), Disp: 3 each, Rfl: 3    atorvastatin (LIPITOR) 10 MG tablet, TAKE 1 TABLET EVERY EVENING, Disp: 90 tablet, Rfl: 4    azelastine (ASTELIN) 137 mcg (0.1 %) nasal spray, 1 spray (137 mcg total) by Nasal route 2 (two) times daily. for 14 days, Disp: 30 mL, Rfl: 0    cetirizine (ZYRTEC) 10 MG tablet, Take 10 mg by mouth every evening., Disp: , Rfl:     clotrimazole-betamethasone 1-0.05% (LOTRISONE) cream, Apply topically 2 (two) times daily., Disp: 45 g, Rfl: 0    cyanocobalamin (VITAMIN B-12) 1000 MCG tablet, Take 100 mcg by mouth once daily., Disp: , Rfl:     cyclobenzaprine (FLEXERIL) 5 MG tablet, Take 1 tablet (5 mg total) by mouth 2 (two) times daily as needed for Muscle spasms., Disp: 40 tablet, Rfl: 1    ELIQUIS 5 mg Tab, TAKE 1 TABLET TWICE DAILY, Disp: 180 tablet, Rfl: 4    erythromycin (ROMYCIN) ophthalmic ointment, Place into both eyes every evening. (Patient not taking: Reported on 5/20/2020), Disp: 3.5 g, Rfl: 0    gabapentin (NEURONTIN) 300 MG capsule, Take 1 capsule (300 mg total) by mouth 3 (three) times daily., Disp: 90 capsule, Rfl: 0    ketoconazole (NIZORAL) 2 % cream, AAA bid to groin and feet x 3 weeks (Patient not taking: Reported on 5/20/2020), Disp: 60 g, Rfl: 3    lamoTRIgine (LAMICTAL) 100 MG tablet, Take 1 tablet (100 mg total) by mouth 2 (two) times daily., Disp: 180 tablet, Rfl: 3    melatonin 5 mg Cap, Take 5 mg by mouth every evening., Disp: 90 each, Rfl: 3    memantine (NAMENDA) 10 MG Tab, Take 1 tablet (10 mg total) by mouth 2 (two) times daily., Disp: 180 tablet, Rfl: 3    metoprolol succinate (TOPROL-XL) 50 MG 24 hr tablet, TAKE 1 TABLET (50 MG TOTAL) BY MOUTH ONCE DAILY., Disp: 90 tablet, Rfl: 4    olopatadine (PATADAY) 0.2 % Drop, Place 1 drop into both eyes once daily., Disp: 2.5 mL, Rfl: 0    pregabalin (LYRICA) 25 MG capsule, Take 1  capsule (25 mg total) by mouth 2 (two) times daily. Start by taking QHS for one week, then BID thereafter., Disp: 60 capsule, Rfl: 1    primidone (MYSOLINE) 250 MG Tab, Take 2 tablets (500 mg total) by mouth 2 (two) times daily., Disp: 360 tablet, Rfl: 3    torsemide (DEMADEX) 10 MG Tab, TAKE 1 TABLET (10 MG TOTAL) BY MOUTH EVERY MORNING., Disp: 90 tablet, Rfl: 4    Current Facility-Administered Medications:     albuterol-ipratropium 2.5 mg-0.5 mg/3 mL nebulizer solution 3 mL, 3 mL, Nebulization, Once, Ora Murillo DO     Review of Systems  Review of Systems   Constitutional: Negative for chills and fever.   HENT: Negative for nosebleeds.    Eyes: Negative for blurred vision.   Respiratory: Negative for hemoptysis.    Cardiovascular: Negative for chest pain and palpitations.   Gastrointestinal: Negative for heartburn and vomiting.   Genitourinary: Negative for hematuria.   Musculoskeletal: Positive for back pain, joint pain and myalgias. Negative for falls.   Skin: Negative for rash.   Neurological: Negative for seizures and loss of consciousness.   Endo/Heme/Allergies: Does not bruise/bleed easily.   Psychiatric/Behavioral: Positive for memory loss. Negative for hallucinations. The patient has insomnia.        Medical History  Past Medical History:   Diagnosis Date    A-fib     Anticoagulant long-term use     eliquis    Aortic atherosclerosis 9/18/2017    - LDL goal <70 - BP goal <130/80    Atrial flutter     Benign essential tremor     Biventricular cardiac pacemaker in situ 9/24/2018    CAD (coronary artery disease) 12/2/2013    - 9/2013 Left heart Cath: LAD- 60% prox (neg FFR), nl, ramus and RCA 20% - LDL goal <70 - BP goal <130/80    Cancer     skin cancer on back    Cardiac pacemaker in situ     Cardiomyopathy, nonischemic 2/12/2018    - 8/29/18 Echo EF 55%. Grade III diastolic dysfunction with restrictive physiology - followed by Cardiology    Carpal tunnel syndrome of left wrist 8/8/2017     CHF (congestive heart failure)     Chronic diastolic heart failure 1/19/2018    Chronic obstructive pulmonary disease 10/3/2018    Chronic right-sided thoracic back pain 4/27/2017    Coronary artery disease     Cough     Current use of long term anticoagulation 6/2/2016    - Eliquis for Afib    Dizziness     intermittant    DJD (degenerative joint disease) of knee     ED (erectile dysfunction)     Hard of hearing 11/2016    History of cataract     HTN (hypertension)     Hyperlipidemia     Hyperlipidemia     Hyponatremia 6/11/2019    Memory loss     Mixed hyperlipidemia 7/28/2012    Persistent atrial fibrillation 7/1/2015    - followed by Dr. Esparza - history of cardioversion - on Eliquis OAC 5 mg BID    Seizure disorder     Seizures     last episode 1995    Sinus node dysfunction 08/2016    Subclinical hypothyroidism     Tension type headache 7/2/2019        Surgical History  Past Surgical History:   Procedure Laterality Date    ANKLE SURGERY Right     pins and screws    ATRIAL ABLATION SURGERY      CARDIAC PACEMAKER PLACEMENT  2014    CARDIAC PACEMAKER PLACEMENT      CARDIAC PACEMAKER PLACEMENT  2014    CARDIOVERSION      CATARACT EXTRACTION      OU    COLONOSCOPY      due in 2014    EYE SURGERY Bilateral     cataracts extraction    FRACTURE SURGERY Right     ankle with hardware    HERNIA REPAIR      abdominal    INJECTION OF ANESTHETIC AGENT AROUND MEDIAL BRANCH NERVES INNERVATING LUMBAR FACET JOINT Bilateral 1/17/2019    Procedure: Block-nerve-medial branch-lumbar L2-L5;  Surgeon: Anthony Moffett MD;  Location: [facility] OR;  Service: Pain Management;  Laterality: Bilateral;    JOINT REPLACEMENT Bilateral     shoulders    RADIOFREQUENCY ABLATION OF LUMBAR MEDIAL BRANCH NERVE AT SINGLE LEVEL Bilateral 1/23/2019    Procedure: Radiofrequency Ablation, Nerve, Spinal, Lumbar, Medial Branch, L2-L5;  Surgeon: Anthony Moffett MD;  Location: [facility] OR;  Service: Pain Management;  Laterality:  Bilateral;        Physical Exam  There were no vitals filed for this visit.  General    Nursing note and vitals reviewed.  Constitutional: He is oriented to person, place, and time. He appears well-developed and well-nourished. No distress.   HENT:   Head: Normocephalic and atraumatic.   Nose: Nose normal.   Eyes: Conjunctivae and EOM are normal. Pupils are equal, round, and reactive to light. Right eye exhibits no discharge. Left eye exhibits no discharge. No scleral icterus.   Neck: No JVD present.   Cardiovascular: Intact distal pulses.    Pulmonary/Chest: Effort normal. No respiratory distress.   There is a slightly raised, erythematous patch approximately the size of a small palm directly beneath the axilla.  It appears to be in the T4 dermatome.   Abdominal: He exhibits no distension.   Neurological: He is alert and oriented to person, place, and time. Coordination normal.   Psychiatric: He has a normal mood and affect. His behavior is normal. Judgment and thought content normal.         Back (L-Spine & T-Spine) / Neck (C-Spine) Exam     Tenderness Posterior midline tenderness location: nonoe. Right paramedian tenderness of the Sacrum and Lower L-Spine. Left paramedian tenderness of the Lower L-Spine and Sacrum.     Back (L-Spine & T-Spine) Range of Motion   Extension: normal   Flexion: normal     Other   Spinal Kyphosis:  Absent  Spinal Lordosis:  Absent        Imaging  Xray lumbar spine 12/11/18  FINDINGS:  There is no definite change in the appearance of the lumbar spine compared to the prior study.  There is no fracture or malalignment.  There is moderate to marked disc space narrowing at the L4-5 level.  There is moderate-to-marked facet joint arthropathy at the L4-5 and L5-S1 levels.  Otherwise, there is mild disc space narrowing in the remainder of the lumbar spine.  There is moderate to marked disc space narrowing at the thoracolumbar junction where there is also bridging osteophyte formation and  endplate sclerosis.  These findings were all present previously.     CT lumbar spine 1/2018  FINDINGS:    Vertebral column: There is stable mild grade 1 spondylolisthesis at the L5-S1 level with 4 mm anterolisthesis of L5 on S1 which is degenerative in etiology and related to facet arthropathy.  There are no pars defects.  The remainder of the vertebral bodies maintain normal alignment.  The bones are mildly osteopenic.  There is no acute fracture.  There is mild to moderate disc space sparing at the L4-5 level where there is endplate sclerosis and osteophyte formation.  There is mild disc space narrowing anteriorly at the L5-S1 level.  There is a very minimal levocurvature of the lumbar spine.  This could in part be positional.  The patient is slightly tilted in the scanner.    Spinal canal, epidural space, conus: The spinal canal is developmentally normal.  The conus terminates at the level of T12-L1.  There is no abnormal epidural collection or mass.      Findings by level:  T11-12: There is no spinal canal or foraminal stenosis.  There is mild left facet joint arthropathy.  T12-L1: There is no spinal canal or foraminal stenosis.  There is mild left facet joint arthropathy.  L1-L2: There is minimal bulging of the annulus and mild facet joint arthropathy without spinal canal or foraminal stenosis.  There is no significant change.  L2-L3: There is a mild diffuse disc bulge and mild facet arthropathy.  There is no spinal canal or foraminal stenosis.  There is little change.  L3-L4: There is a mild diffuse disc bulge with mild facet arthropathy.  There is no spinal canal or foraminal stenosis and there is no significant change.  L4-L5: There is mild to moderate facet arthropathy.  There is a diffuse disc bulge with osteophytic ridging.  There is no spinal stenosis.  There is moderate left and severe right foraminal stenosis without significant change.  L5-S1: There is 4 mm anterolisthesis of L5 on S1.  There is  moderate right and mild bilateral facet joint arthropathy with ligamentum flavum thickening and ossification.  There is unroofing of a broad disc protrusion.  This approaches both S1 roots and may contact the roots without obvious compression or displacement.  There is no significant spinal stenosis.  There is moderate left and severe right foraminal stenosis without significant change.    Soft tissues/other: The prevertebral soft tissues are normal.  There is mild atherosclerosis.  The aorta is of normal     Labs:  BMP  Lab Results   Component Value Date     10/22/2019    K 4.6 10/22/2019     10/22/2019    CO2 30 (H) 10/22/2019    BUN 10 10/22/2019    CREATININE 0.8 10/22/2019    CALCIUM 8.5 (L) 10/22/2019    ANIONGAP 12 10/22/2019    ESTGFRAFRICA >60.0 10/22/2019    EGFRNONAA >60.0 10/22/2019     Lab Results   Component Value Date    ALT 16 10/22/2019    AST 26 10/22/2019    ALKPHOS 92 10/22/2019    BILITOT 0.4 10/22/2019       Assessment:  Problem List Items Addressed This Visit     None          11/20/19 - Heri Muhammad is a 79 y.o. male with primarily myofascial pain, as previously assessed by his former pain management physician.  I support previous recommendations that physical therapy should be the primary treatment modality at this time. Patient is experiencing intermittent pain in the shoulders and low back consistent with muscle spasms.  Physical examination does not reveal pain with facet loading, therefore I do not believe his primary pain generator is facet arthritis.  Additionally, the injection from his previous pain management provider yielded an ambiguous result.  This pain is in mild time encouraging him to participate in physical therapy and to establish home exercise program.  His wife states that Heri lives a very sedentary lifestyle.  As evidence of this, his muscle tone is very poor in the shoulders, low back, and legs.  Additionally, x-ray of the shoulder shows mild  subluxation, which is also a feature of diminished muscle strength in the rotator cuff.  Patient voiced concerned about the financial cost of attending physical therapy.  I responded by telling him that physical therapy is probably the best investment of time and money for his current condition. I also informed him that physical therapy would be a complete waste of his time and money if he does not establish a home exercise program.  He understands.      Patient also reports chronic right foot and ankle pain. He was evaluated by Podiatry for years ago but has not followed up.  I will place a new referral for him today.    9/21/20 - Mr. Liriano  is an 79-year-old male with neuropathic pain of the left flank and a new skin lesion in that same dermatome.  His physical examination today was positive for allodynia to light touch and movement of air confirming a neuropathic condition; however, the skin lesion near the axilla, in the T4 dermatome, appears to be fungal.  This may be coincidental and I will involve his primary care team for treatment.  I will also reach out to Dr. Murillo for discussion of a trial of Lyrica 25 mg for his neuropathic pain.  TCAs are a mainstay treatment for PHN, but I think this would be a poor choice given his age.    10/22/20 - Patient tolerated initial trial of Lyrica well but reported no relief.  I will increase the dose today toward a target of 50 mg TID and have the patient follow up in 2 weeks to ensure that we are on the right track.  At the end of the visit he brought up chronic bilateral knee and shoulder pain related to DJD and stated that he would just have to live with it.  Patient ad advised that we have excellent interventions for treating chronic arthritic pain of the aforementioned joints, but would like to think more about it.    Plan & Recommendations  Medications: Cont and increase lyrica to 50 mg BID x5 days then TID thereafter.  Imaging: No additional imaging required at this  time.  PT/OT: Referral placed today  HEP: I encouraged the patient to maintain a home exercise regimen that includes daily, moderate cardiovascular exercise lasting at least 30 minutes.  This may include yoga, chasity chi, walking, swimming, aqua aerobics, or other exercises that maintain a heart rate of 50-70% of the calculated maximum heart rate.  I also encouraged light, daily stretching focused on the target area.  Follow Up: Telemed visit in 2 weeks    Cynthia Oh Jr, MD  Interventional Pain Medicine / Anesthesiology    Disclaimer: This note was partly generated using dictation software which may occasionally result in transcription errors.

## 2020-10-21 ENCOUNTER — PATIENT MESSAGE (OUTPATIENT)
Dept: PAIN MEDICINE | Facility: CLINIC | Age: 80
End: 2020-10-21

## 2020-10-21 ENCOUNTER — OFFICE VISIT (OUTPATIENT)
Dept: PAIN MEDICINE | Facility: CLINIC | Age: 80
End: 2020-10-21
Payer: MEDICARE

## 2020-10-21 VITALS
SYSTOLIC BLOOD PRESSURE: 114 MMHG | WEIGHT: 233 LBS | DIASTOLIC BLOOD PRESSURE: 70 MMHG | HEART RATE: 70 BPM | BODY MASS INDEX: 31.6 KG/M2

## 2020-10-21 DIAGNOSIS — B02.29 POST HERPETIC NEURALGIA: Primary | ICD-10-CM

## 2020-10-21 DIAGNOSIS — G89.29 CHRONIC LEFT-SIDED THORACIC BACK PAIN: ICD-10-CM

## 2020-10-21 DIAGNOSIS — M54.6 CHRONIC LEFT-SIDED THORACIC BACK PAIN: ICD-10-CM

## 2020-10-21 PROCEDURE — 99999 PR PBB SHADOW E&M-EST. PATIENT-LVL IV: ICD-10-PCS | Mod: PBBFAC,HCNC,, | Performed by: PAIN MEDICINE

## 2020-10-21 PROCEDURE — 99214 OFFICE O/P EST MOD 30 MIN: CPT | Mod: HCNC,S$GLB,, | Performed by: PAIN MEDICINE

## 2020-10-21 PROCEDURE — 99999 PR PBB SHADOW E&M-EST. PATIENT-LVL IV: CPT | Mod: PBBFAC,HCNC,, | Performed by: PAIN MEDICINE

## 2020-10-21 PROCEDURE — 3078F DIAST BP <80 MM HG: CPT | Mod: HCNC,CPTII,S$GLB, | Performed by: PAIN MEDICINE

## 2020-10-21 PROCEDURE — 1101F PR PT FALLS ASSESS DOC 0-1 FALLS W/OUT INJ PAST YR: ICD-10-PCS | Mod: HCNC,CPTII,S$GLB, | Performed by: PAIN MEDICINE

## 2020-10-21 PROCEDURE — 1125F PR PAIN SEVERITY QUANTIFIED, PAIN PRESENT: ICD-10-PCS | Mod: HCNC,S$GLB,, | Performed by: PAIN MEDICINE

## 2020-10-21 PROCEDURE — 1101F PT FALLS ASSESS-DOCD LE1/YR: CPT | Mod: HCNC,CPTII,S$GLB, | Performed by: PAIN MEDICINE

## 2020-10-21 PROCEDURE — 3074F SYST BP LT 130 MM HG: CPT | Mod: HCNC,CPTII,S$GLB, | Performed by: PAIN MEDICINE

## 2020-10-21 PROCEDURE — 1159F MED LIST DOCD IN RCRD: CPT | Mod: HCNC,S$GLB,, | Performed by: PAIN MEDICINE

## 2020-10-21 PROCEDURE — 3074F PR MOST RECENT SYSTOLIC BLOOD PRESSURE < 130 MM HG: ICD-10-PCS | Mod: HCNC,CPTII,S$GLB, | Performed by: PAIN MEDICINE

## 2020-10-21 PROCEDURE — 1125F AMNT PAIN NOTED PAIN PRSNT: CPT | Mod: HCNC,S$GLB,, | Performed by: PAIN MEDICINE

## 2020-10-21 PROCEDURE — 1159F PR MEDICATION LIST DOCUMENTED IN MEDICAL RECORD: ICD-10-PCS | Mod: HCNC,S$GLB,, | Performed by: PAIN MEDICINE

## 2020-10-21 PROCEDURE — 99214 PR OFFICE/OUTPT VISIT, EST, LEVL IV, 30-39 MIN: ICD-10-PCS | Mod: HCNC,S$GLB,, | Performed by: PAIN MEDICINE

## 2020-10-21 PROCEDURE — 3078F PR MOST RECENT DIASTOLIC BLOOD PRESSURE < 80 MM HG: ICD-10-PCS | Mod: HCNC,CPTII,S$GLB, | Performed by: PAIN MEDICINE

## 2020-10-22 ENCOUNTER — PATIENT MESSAGE (OUTPATIENT)
Dept: PAIN MEDICINE | Facility: CLINIC | Age: 80
End: 2020-10-22

## 2020-10-22 PROBLEM — G89.29 CHRONIC LEFT-SIDED THORACIC BACK PAIN: Status: ACTIVE | Noted: 2020-10-22

## 2020-10-22 PROBLEM — B02.29 POST HERPETIC NEURALGIA: Status: ACTIVE | Noted: 2020-10-22

## 2020-10-22 PROBLEM — M54.6 CHRONIC LEFT-SIDED THORACIC BACK PAIN: Status: ACTIVE | Noted: 2020-10-22

## 2020-10-22 RX ORDER — PREGABALIN 50 MG/1
50 CAPSULE ORAL 3 TIMES DAILY
Qty: 90 CAPSULE | Refills: 1 | Status: SHIPPED | OUTPATIENT
Start: 2020-10-22 | End: 2020-11-18

## 2020-10-26 ENCOUNTER — PATIENT MESSAGE (OUTPATIENT)
Dept: DERMATOLOGY | Facility: CLINIC | Age: 80
End: 2020-10-26

## 2020-10-26 ENCOUNTER — LAB VISIT (OUTPATIENT)
Dept: LAB | Facility: HOSPITAL | Age: 80
End: 2020-10-26
Attending: INTERNAL MEDICINE
Payer: MEDICARE

## 2020-10-26 DIAGNOSIS — E03.8 SUBCLINICAL HYPOTHYROIDISM: ICD-10-CM

## 2020-10-26 DIAGNOSIS — I42.8 CARDIOMYOPATHY, NONISCHEMIC: ICD-10-CM

## 2020-10-26 DIAGNOSIS — I10 ESSENTIAL HYPERTENSION: ICD-10-CM

## 2020-10-26 DIAGNOSIS — E78.2 MIXED HYPERLIPIDEMIA: ICD-10-CM

## 2020-10-26 DIAGNOSIS — I50.32 CHRONIC DIASTOLIC HEART FAILURE: ICD-10-CM

## 2020-10-26 DIAGNOSIS — I48.19 PERSISTENT ATRIAL FIBRILLATION: ICD-10-CM

## 2020-10-26 LAB — THYROPEROXIDASE IGG SERPL-ACNC: <6 IU/ML

## 2020-10-26 PROCEDURE — 80061 LIPID PANEL: CPT | Mod: HCNC

## 2020-10-26 PROCEDURE — 85025 COMPLETE CBC W/AUTO DIFF WBC: CPT | Mod: HCNC

## 2020-10-26 PROCEDURE — 80053 COMPREHEN METABOLIC PANEL: CPT | Mod: HCNC

## 2020-10-26 PROCEDURE — 84439 ASSAY OF FREE THYROXINE: CPT | Mod: HCNC

## 2020-10-26 PROCEDURE — 84481 FREE ASSAY (FT-3): CPT | Mod: HCNC

## 2020-10-26 PROCEDURE — 84443 ASSAY THYROID STIM HORMONE: CPT | Mod: HCNC

## 2020-10-26 PROCEDURE — 86376 MICROSOMAL ANTIBODY EACH: CPT | Mod: HCNC

## 2020-10-26 PROCEDURE — 36415 COLL VENOUS BLD VENIPUNCTURE: CPT | Mod: HCNC,PO

## 2020-10-27 ENCOUNTER — PATIENT MESSAGE (OUTPATIENT)
Dept: DERMATOLOGY | Facility: CLINIC | Age: 80
End: 2020-10-27

## 2020-10-27 LAB
ALBUMIN SERPL BCP-MCNC: 3.7 G/DL (ref 3.5–5.2)
ALP SERPL-CCNC: 91 U/L (ref 55–135)
ALT SERPL W/O P-5'-P-CCNC: 16 U/L (ref 10–44)
ANION GAP SERPL CALC-SCNC: 12 MMOL/L (ref 8–16)
AST SERPL-CCNC: 19 U/L (ref 10–40)
BASOPHILS # BLD AUTO: 0.05 K/UL (ref 0–0.2)
BASOPHILS NFR BLD: 0.8 % (ref 0–1.9)
BILIRUB SERPL-MCNC: 0.4 MG/DL (ref 0.1–1)
BUN SERPL-MCNC: 11 MG/DL (ref 8–23)
CALCIUM SERPL-MCNC: 8.5 MG/DL (ref 8.7–10.5)
CHLORIDE SERPL-SCNC: 100 MMOL/L (ref 95–110)
CHOLEST SERPL-MCNC: 187 MG/DL (ref 120–199)
CHOLEST/HDLC SERPL: 4.6 {RATIO} (ref 2–5)
CO2 SERPL-SCNC: 26 MMOL/L (ref 23–29)
CREAT SERPL-MCNC: 0.8 MG/DL (ref 0.5–1.4)
DIFFERENTIAL METHOD: ABNORMAL
EOSINOPHIL # BLD AUTO: 0.2 K/UL (ref 0–0.5)
EOSINOPHIL NFR BLD: 3 % (ref 0–8)
ERYTHROCYTE [DISTWIDTH] IN BLOOD BY AUTOMATED COUNT: 14.3 % (ref 11.5–14.5)
EST. GFR  (AFRICAN AMERICAN): >60 ML/MIN/1.73 M^2
EST. GFR  (NON AFRICAN AMERICAN): >60 ML/MIN/1.73 M^2
GLUCOSE SERPL-MCNC: 102 MG/DL (ref 70–110)
HCT VFR BLD AUTO: 45.6 % (ref 40–54)
HDLC SERPL-MCNC: 41 MG/DL (ref 40–75)
HDLC SERPL: 21.9 % (ref 20–50)
HGB BLD-MCNC: 14.5 G/DL (ref 14–18)
IMM GRANULOCYTES # BLD AUTO: 0.02 K/UL (ref 0–0.04)
IMM GRANULOCYTES NFR BLD AUTO: 0.3 % (ref 0–0.5)
LDLC SERPL CALC-MCNC: 124.6 MG/DL (ref 63–159)
LYMPHOCYTES # BLD AUTO: 2 K/UL (ref 1–4.8)
LYMPHOCYTES NFR BLD: 33.5 % (ref 18–48)
MCH RBC QN AUTO: 30.7 PG (ref 27–31)
MCHC RBC AUTO-ENTMCNC: 31.8 G/DL (ref 32–36)
MCV RBC AUTO: 97 FL (ref 82–98)
MONOCYTES # BLD AUTO: 0.5 K/UL (ref 0.3–1)
MONOCYTES NFR BLD: 8.6 % (ref 4–15)
NEUTROPHILS # BLD AUTO: 3.2 K/UL (ref 1.8–7.7)
NEUTROPHILS NFR BLD: 53.8 % (ref 38–73)
NONHDLC SERPL-MCNC: 146 MG/DL
NRBC BLD-RTO: 0 /100 WBC
PLATELET # BLD AUTO: 210 K/UL (ref 150–350)
PMV BLD AUTO: 10.4 FL (ref 9.2–12.9)
POTASSIUM SERPL-SCNC: 4.4 MMOL/L (ref 3.5–5.1)
PROT SERPL-MCNC: 7.2 G/DL (ref 6–8.4)
RBC # BLD AUTO: 4.72 M/UL (ref 4.6–6.2)
SODIUM SERPL-SCNC: 138 MMOL/L (ref 136–145)
T3FREE SERPL-MCNC: 2.7 PG/ML (ref 2.3–4.2)
T4 FREE SERPL-MCNC: 0.85 NG/DL (ref 0.71–1.51)
TRIGL SERPL-MCNC: 107 MG/DL (ref 30–150)
TSH SERPL DL<=0.005 MIU/L-ACNC: 3.25 UIU/ML (ref 0.4–4)
WBC # BLD AUTO: 6.03 K/UL (ref 3.9–12.7)

## 2020-10-28 ENCOUNTER — PATIENT MESSAGE (OUTPATIENT)
Dept: DERMATOLOGY | Facility: CLINIC | Age: 80
End: 2020-10-28

## 2020-11-02 ENCOUNTER — OFFICE VISIT (OUTPATIENT)
Dept: CARDIOLOGY | Facility: CLINIC | Age: 80
End: 2020-11-02
Payer: MEDICARE

## 2020-11-02 VITALS
DIASTOLIC BLOOD PRESSURE: 68 MMHG | BODY MASS INDEX: 32.52 KG/M2 | HEIGHT: 72 IN | SYSTOLIC BLOOD PRESSURE: 127 MMHG | HEART RATE: 71 BPM | WEIGHT: 240.06 LBS

## 2020-11-02 DIAGNOSIS — I70.0 AORTIC ATHEROSCLEROSIS: ICD-10-CM

## 2020-11-02 DIAGNOSIS — E78.2 MIXED HYPERLIPIDEMIA: ICD-10-CM

## 2020-11-02 DIAGNOSIS — G40.909 SEIZURE DISORDER: ICD-10-CM

## 2020-11-02 DIAGNOSIS — I10 ESSENTIAL HYPERTENSION: ICD-10-CM

## 2020-11-02 DIAGNOSIS — G30.1 LATE ONSET ALZHEIMER'S DISEASE WITHOUT BEHAVIORAL DISTURBANCE: ICD-10-CM

## 2020-11-02 DIAGNOSIS — I48.19 PERSISTENT ATRIAL FIBRILLATION: ICD-10-CM

## 2020-11-02 DIAGNOSIS — I25.10 CORONARY ARTERY DISEASE INVOLVING NATIVE CORONARY ARTERY OF NATIVE HEART WITHOUT ANGINA PECTORIS: Primary | Chronic | ICD-10-CM

## 2020-11-02 DIAGNOSIS — Z79.01 CURRENT USE OF LONG TERM ANTICOAGULATION: ICD-10-CM

## 2020-11-02 DIAGNOSIS — F02.80 LATE ONSET ALZHEIMER'S DISEASE WITHOUT BEHAVIORAL DISTURBANCE: ICD-10-CM

## 2020-11-02 DIAGNOSIS — I50.32 CHRONIC DIASTOLIC HEART FAILURE: ICD-10-CM

## 2020-11-02 PROBLEM — I42.8 CARDIOMYOPATHY, NONISCHEMIC: Status: RESOLVED | Noted: 2018-02-12 | Resolved: 2020-11-02

## 2020-11-02 PROCEDURE — 3078F DIAST BP <80 MM HG: CPT | Mod: HCNC,CPTII,S$GLB, | Performed by: INTERNAL MEDICINE

## 2020-11-02 PROCEDURE — 1126F AMNT PAIN NOTED NONE PRSNT: CPT | Mod: HCNC,S$GLB,, | Performed by: INTERNAL MEDICINE

## 2020-11-02 PROCEDURE — 1126F PR PAIN SEVERITY QUANTIFIED, NO PAIN PRESENT: ICD-10-PCS | Mod: HCNC,S$GLB,, | Performed by: INTERNAL MEDICINE

## 2020-11-02 PROCEDURE — 3074F SYST BP LT 130 MM HG: CPT | Mod: HCNC,CPTII,S$GLB, | Performed by: INTERNAL MEDICINE

## 2020-11-02 PROCEDURE — 99999 PR PBB SHADOW E&M-EST. PATIENT-LVL IV: CPT | Mod: PBBFAC,HCNC,, | Performed by: INTERNAL MEDICINE

## 2020-11-02 PROCEDURE — 3078F PR MOST RECENT DIASTOLIC BLOOD PRESSURE < 80 MM HG: ICD-10-PCS | Mod: HCNC,CPTII,S$GLB, | Performed by: INTERNAL MEDICINE

## 2020-11-02 PROCEDURE — 1159F PR MEDICATION LIST DOCUMENTED IN MEDICAL RECORD: ICD-10-PCS | Mod: HCNC,S$GLB,, | Performed by: INTERNAL MEDICINE

## 2020-11-02 PROCEDURE — 99999 PR PBB SHADOW E&M-EST. PATIENT-LVL IV: ICD-10-PCS | Mod: PBBFAC,HCNC,, | Performed by: INTERNAL MEDICINE

## 2020-11-02 PROCEDURE — 1101F PR PT FALLS ASSESS DOC 0-1 FALLS W/OUT INJ PAST YR: ICD-10-PCS | Mod: HCNC,CPTII,S$GLB, | Performed by: INTERNAL MEDICINE

## 2020-11-02 PROCEDURE — 99214 OFFICE O/P EST MOD 30 MIN: CPT | Mod: HCNC,S$GLB,, | Performed by: INTERNAL MEDICINE

## 2020-11-02 PROCEDURE — 1159F MED LIST DOCD IN RCRD: CPT | Mod: HCNC,S$GLB,, | Performed by: INTERNAL MEDICINE

## 2020-11-02 PROCEDURE — 3074F PR MOST RECENT SYSTOLIC BLOOD PRESSURE < 130 MM HG: ICD-10-PCS | Mod: HCNC,CPTII,S$GLB, | Performed by: INTERNAL MEDICINE

## 2020-11-02 PROCEDURE — 99214 PR OFFICE/OUTPT VISIT, EST, LEVL IV, 30-39 MIN: ICD-10-PCS | Mod: HCNC,S$GLB,, | Performed by: INTERNAL MEDICINE

## 2020-11-02 PROCEDURE — 1101F PT FALLS ASSESS-DOCD LE1/YR: CPT | Mod: HCNC,CPTII,S$GLB, | Performed by: INTERNAL MEDICINE

## 2020-11-02 RX ORDER — ATORVASTATIN CALCIUM 20 MG/1
20 TABLET, FILM COATED ORAL NIGHTLY
Qty: 90 TABLET | Refills: 5 | Status: SHIPPED | OUTPATIENT
Start: 2020-11-02 | End: 2020-11-03 | Stop reason: SDUPTHER

## 2020-11-02 NOTE — PROGRESS NOTES
Subjective:    Patient ID:  Heri Muhammad is a 79 y.o. male who presents for follow-up of No chief complaint on file.      HPI  Here for f/u PAF/CAD/ Bi-v PPM implant (due to decreasing EF w/o sig CAD)/ resolved CM. No angina.  Remains active.     Review of Systems   Constitution: Negative for malaise/fatigue.   Eyes: Negative for blurred vision.   Cardiovascular: Negative for chest pain, claudication, cyanosis, dyspnea on exertion, irregular heartbeat, leg swelling, near-syncope, orthopnea, palpitations, paroxysmal nocturnal dyspnea and syncope.   Respiratory: Negative for cough and shortness of breath.    Hematologic/Lymphatic: Does not bruise/bleed easily.   Musculoskeletal: Negative for back pain, falls, joint pain, muscle cramps, muscle weakness and myalgias.   Gastrointestinal: Negative for abdominal pain, change in bowel habit, nausea and vomiting.   Genitourinary: Negative for urgency.   Neurological: Negative for dizziness, focal weakness and light-headedness.       Past Medical History:   Diagnosis Date    A-fib     Anticoagulant long-term use     eliquis    Aortic atherosclerosis 9/18/2017    - LDL goal <70 - BP goal <130/80    Atrial flutter     Benign essential tremor     Biventricular cardiac pacemaker in situ 9/24/2018    CAD (coronary artery disease) 12/2/2013    - 9/2013 Left heart Cath: LAD- 60% prox (neg FFR), nl, ramus and RCA 20% - LDL goal <70 - BP goal <130/80    Cancer     skin cancer on back    Cardiac pacemaker in situ     Cardiomyopathy, nonischemic 2/12/2018    - 8/29/18 Echo EF 55%. Grade III diastolic dysfunction with restrictive physiology - followed by Cardiology    Carpal tunnel syndrome of left wrist 8/8/2017    CHF (congestive heart failure)     Chronic diastolic heart failure 1/19/2018    Chronic obstructive pulmonary disease 10/3/2018    Chronic right-sided thoracic back pain 4/27/2017    Coronary artery disease     Cough     Current use of long term  anticoagulation 6/2/2016    - Eliquis for Afib    Dizziness     intermittant    DJD (degenerative joint disease) of knee     ED (erectile dysfunction)     Hard of hearing 11/2016    History of cataract     HTN (hypertension)     Hyperlipidemia     Hyperlipidemia     Hyponatremia 6/11/2019    Memory loss     Mixed hyperlipidemia 7/28/2012    Persistent atrial fibrillation 7/1/2015    - followed by Dr. Esparza - history of cardioversion - on Eliquis OAC 5 mg BID    Seizure disorder     Seizures     last episode 1995    Sinus node dysfunction 08/2016    Subclinical hypothyroidism     Tension type headache 7/2/2019          Objective:     Vitals:    11/02/20 0947   BP: 127/68   Pulse: 71        Physical Exam   Constitutional: He is oriented to person, place, and time. He appears well-developed and well-nourished. No distress.   /65 (BP Location: Left arm, Patient Position: Sitting, BP Method: Large (Automatic))   Pulse 78   Ht 6' (1.829 m)   Wt 103.9 kg (229 lb 0.9 oz)   BMI 31.07 kg/m²      HENT:   Head: Normocephalic and atraumatic.   Eyes: Pupils are equal, round, and reactive to light. Conjunctivae and lids are normal. Right eye exhibits no discharge. No scleral icterus.   Neck: Normal range of motion. Neck supple. No JVD present. No tracheal deviation present. No thyromegaly present.   Cardiovascular: Normal rate, regular rhythm, S1 normal, S2 normal, normal heart sounds and intact distal pulses. Exam reveals no gallop and no friction rub.   No murmur heard.  Pulses:       Carotid pulses are 2+ on the right side and 2+ on the left side.       Radial pulses are 2+ on the right side and 2+ on the left side.        Femoral pulses are 2+ on the right side and 2+ on the left side.       Popliteal pulses are 2+ on the right side and 2+ on the left side.        Dorsalis pedis pulses are 2+ on the right side and 2+ on the left side.        Posterior tibial pulses are 2+ on the right side and 2+ on  the left side.   ICD site clean and dry, well healed.       Pulmonary/Chest: Effort normal and breath sounds normal. No respiratory distress. He has no wheezes. He has no rales. He exhibits no tenderness.   Pacer left chest   Abdominal: Soft. Bowel sounds are normal. He exhibits no distension and no mass. There is no hepatosplenomegaly or hepatomegaly. There is no abdominal tenderness. There is no rebound and no guarding.   Musculoskeletal: Normal range of motion.         General: No tenderness or edema.   Lymphadenopathy:     He has no cervical adenopathy.   Neurological: He is alert and oriented to person, place, and time. He has normal reflexes. No cranial nerve deficit. Coordination normal.   Skin: Skin is warm and dry. No rash noted. He is not diaphoretic. No erythema. No pallor.   Psychiatric: He has a normal mood and affect. His speech is normal and behavior is normal. Judgment and thought content normal. Cognition and memory are normal.   Nursing note and vitals reviewed.            ..    Chemistry        Component Value Date/Time     10/26/2020 1058    K 4.4 10/26/2020 1058     10/26/2020 1058    CO2 26 10/26/2020 1058    BUN 11 10/26/2020 1058    CREATININE 0.8 10/26/2020 1058     10/26/2020 1058        Component Value Date/Time    CALCIUM 8.5 (L) 10/26/2020 1058    ALKPHOS 91 10/26/2020 1058    AST 19 10/26/2020 1058    AST 35 11/27/2015 1703    ALT 16 10/26/2020 1058    BILITOT 0.4 10/26/2020 1058    ESTGFRAFRICA >60.0 10/26/2020 1058    EGFRNONAA >60.0 10/26/2020 1058            ..  Lab Results   Component Value Date    CHOL 187 10/26/2020    CHOL 172 10/22/2019    CHOL 173 08/29/2018     Lab Results   Component Value Date    HDL 41 10/26/2020    HDL 37 (L) 10/22/2019    HDL 36 (L) 08/29/2018     Lab Results   Component Value Date    LDLCALC 124.6 10/26/2020    LDLCALC 108.6 10/22/2019    LDLCALC 117.0 08/29/2018     Lab Results   Component Value Date    TRIG 107 10/26/2020    TRIG  132 10/22/2019    TRIG 100 08/29/2018     Lab Results   Component Value Date    CHOLHDL 21.9 10/26/2020    CHOLHDL 21.5 10/22/2019    CHOLHDL 20.8 08/29/2018     ..  Lab Results   Component Value Date    WBC 6.03 10/26/2020    HGB 14.5 10/26/2020    HCT 45.6 10/26/2020    MCV 97 10/26/2020     10/26/2020       Test(s) Reviewed  I have reviewed the following in detail:  [] Stress test   [] Angiography   [x] Echocardiogram   [x] Labs   [x] Other:       Assessment:         ICD-10-CM ICD-9-CM   1. Coronary artery disease involving native coronary artery of native heart without angina pectoris  I25.10 414.01   2. Mixed hyperlipidemia  E78.2 272.2   3. Persistent atrial fibrillation  I48.19 427.31   4. Essential hypertension  I10 401.9   5. Aortic atherosclerosis  I70.0 440.0   6. Chronic diastolic heart failure  I50.32 428.32   7. Current use of long term anticoagulation  Z79.01 V58.61   8. Seizure disorder  G40.909 345.90   9. Late onset Alzheimer's disease without behavioral disturbance  G30.1 331.0    F02.80 294.10     Problem List Items Addressed This Visit     CAD (coronary artery disease) - Primary (Chronic)    Overview     - 9/2013 Left heart Cath: LAD- 60% prox (neg FFR), nl, ramus and RCA 20%  - LDL goal <70  - BP goal <130/80  - ASA 81 mg daily         Mixed hyperlipidemia    Persistent atrial fibrillation    Overview     - followed by Dr. Esparza  - history of cardioversion  - on Eliquis OAC 5 mg BID         Current use of long term anticoagulation    Overview     - Eliquis for Afib         Seizure disorder    Overview     - stable and no recent seizures since 1990's  - CT Head 2019 with diffuse atrophy  - EEG 1/2019 mild diffuse slowing         Essential hypertension    Aortic atherosclerosis    Overview     - LDL goal <70  - BP goal <130/80         Chronic diastolic heart failure    Late onset Alzheimer's disease without behavioral disturbance           Plan:           Return to clinic 1 year   Low  level/low impact aerobic exercise 5x's/wk. Heart healthy diet and risk factor modification.    See labs and med orders.  Has moved to [facility] will transfer care  Increase lipitor, decrease fat intake    Portions of this note may have been created with voice recognition software.  Grammatical, syntax and spelling errors may be inevitable.

## 2020-11-03 ENCOUNTER — PATIENT MESSAGE (OUTPATIENT)
Dept: CARDIOLOGY | Facility: CLINIC | Age: 80
End: 2020-11-03

## 2020-11-03 RX ORDER — ATORVASTATIN CALCIUM 20 MG/1
20 TABLET, FILM COATED ORAL NIGHTLY
Qty: 90 TABLET | Refills: 4 | Status: SHIPPED | OUTPATIENT
Start: 2020-11-03 | End: 2021-09-08

## 2020-11-04 ENCOUNTER — OFFICE VISIT (OUTPATIENT)
Dept: PAIN MEDICINE | Facility: CLINIC | Age: 80
End: 2020-11-04
Payer: MEDICARE

## 2020-11-04 ENCOUNTER — OFFICE VISIT (OUTPATIENT)
Dept: FAMILY MEDICINE | Facility: CLINIC | Age: 80
End: 2020-11-04
Payer: MEDICARE

## 2020-11-04 VITALS
BODY MASS INDEX: 31.92 KG/M2 | DIASTOLIC BLOOD PRESSURE: 68 MMHG | SYSTOLIC BLOOD PRESSURE: 110 MMHG | OXYGEN SATURATION: 98 % | HEART RATE: 70 BPM | RESPIRATION RATE: 18 BRPM | HEIGHT: 72 IN | WEIGHT: 235.69 LBS | TEMPERATURE: 98 F

## 2020-11-04 DIAGNOSIS — I25.10 CORONARY ARTERY DISEASE INVOLVING NATIVE CORONARY ARTERY OF NATIVE HEART WITHOUT ANGINA PECTORIS: Chronic | ICD-10-CM

## 2020-11-04 DIAGNOSIS — M79.18 MYOFASCIAL PAIN: ICD-10-CM

## 2020-11-04 DIAGNOSIS — E03.8 SUBCLINICAL HYPOTHYROIDISM: ICD-10-CM

## 2020-11-04 DIAGNOSIS — G89.29 CHRONIC BILATERAL LOW BACK PAIN WITHOUT SCIATICA: ICD-10-CM

## 2020-11-04 DIAGNOSIS — M79.2 NEUROPATHIC PAIN: ICD-10-CM

## 2020-11-04 DIAGNOSIS — E78.2 MIXED HYPERLIPIDEMIA: ICD-10-CM

## 2020-11-04 DIAGNOSIS — G89.29 CHRONIC LEFT-SIDED THORACIC BACK PAIN: ICD-10-CM

## 2020-11-04 DIAGNOSIS — M47.816 LUMBAR SPONDYLOSIS: ICD-10-CM

## 2020-11-04 DIAGNOSIS — M54.50 CHRONIC BILATERAL LOW BACK PAIN WITHOUT SCIATICA: ICD-10-CM

## 2020-11-04 DIAGNOSIS — I70.0 AORTIC ATHEROSCLEROSIS: Primary | ICD-10-CM

## 2020-11-04 DIAGNOSIS — I50.32 CHRONIC DIASTOLIC HEART FAILURE: ICD-10-CM

## 2020-11-04 DIAGNOSIS — M25.512 BILATERAL SHOULDER PAIN, UNSPECIFIED CHRONICITY: ICD-10-CM

## 2020-11-04 DIAGNOSIS — I10 ESSENTIAL HYPERTENSION: ICD-10-CM

## 2020-11-04 DIAGNOSIS — B02.29 POST HERPETIC NEURALGIA: Primary | ICD-10-CM

## 2020-11-04 DIAGNOSIS — M25.511 BILATERAL SHOULDER PAIN, UNSPECIFIED CHRONICITY: ICD-10-CM

## 2020-11-04 DIAGNOSIS — J44.9 CHRONIC OBSTRUCTIVE PULMONARY DISEASE, UNSPECIFIED COPD TYPE: ICD-10-CM

## 2020-11-04 DIAGNOSIS — M54.6 CHRONIC LEFT-SIDED THORACIC BACK PAIN: ICD-10-CM

## 2020-11-04 DIAGNOSIS — I48.19 PERSISTENT ATRIAL FIBRILLATION: ICD-10-CM

## 2020-11-04 PROBLEM — H10.33 ACUTE BACTERIAL CONJUNCTIVITIS OF BOTH EYES: Status: RESOLVED | Noted: 2020-04-23 | Resolved: 2020-11-04

## 2020-11-04 PROCEDURE — 3074F SYST BP LT 130 MM HG: CPT | Mod: HCNC,CPTII,S$GLB, | Performed by: FAMILY MEDICINE

## 2020-11-04 PROCEDURE — 99999 PR PBB SHADOW E&M-EST. PATIENT-LVL V: ICD-10-PCS | Mod: PBBFAC,HCNC,, | Performed by: FAMILY MEDICINE

## 2020-11-04 PROCEDURE — 1159F MED LIST DOCD IN RCRD: CPT | Mod: HCNC,S$GLB,, | Performed by: FAMILY MEDICINE

## 2020-11-04 PROCEDURE — 99999 PR PBB SHADOW E&M-EST. PATIENT-LVL V: CPT | Mod: PBBFAC,HCNC,, | Performed by: FAMILY MEDICINE

## 2020-11-04 PROCEDURE — 1101F PR PT FALLS ASSESS DOC 0-1 FALLS W/OUT INJ PAST YR: ICD-10-PCS | Mod: HCNC,CPTII,95, | Performed by: NURSE PRACTITIONER

## 2020-11-04 PROCEDURE — 1101F PT FALLS ASSESS-DOCD LE1/YR: CPT | Mod: HCNC,CPTII,S$GLB, | Performed by: FAMILY MEDICINE

## 2020-11-04 PROCEDURE — 1101F PT FALLS ASSESS-DOCD LE1/YR: CPT | Mod: HCNC,CPTII,95, | Performed by: NURSE PRACTITIONER

## 2020-11-04 PROCEDURE — 1159F MED LIST DOCD IN RCRD: CPT | Mod: HCNC,95,, | Performed by: NURSE PRACTITIONER

## 2020-11-04 PROCEDURE — 99499 UNLISTED E&M SERVICE: CPT | Mod: S$GLB,,, | Performed by: FAMILY MEDICINE

## 2020-11-04 PROCEDURE — 1125F AMNT PAIN NOTED PAIN PRSNT: CPT | Mod: HCNC,S$GLB,, | Performed by: FAMILY MEDICINE

## 2020-11-04 PROCEDURE — 1101F PR PT FALLS ASSESS DOC 0-1 FALLS W/OUT INJ PAST YR: ICD-10-PCS | Mod: HCNC,CPTII,S$GLB, | Performed by: FAMILY MEDICINE

## 2020-11-04 PROCEDURE — 1159F PR MEDICATION LIST DOCUMENTED IN MEDICAL RECORD: ICD-10-PCS | Mod: HCNC,95,, | Performed by: NURSE PRACTITIONER

## 2020-11-04 PROCEDURE — 99214 PR OFFICE/OUTPT VISIT, EST, LEVL IV, 30-39 MIN: ICD-10-PCS | Mod: HCNC,S$GLB,, | Performed by: FAMILY MEDICINE

## 2020-11-04 PROCEDURE — 99214 PR OFFICE/OUTPT VISIT, EST, LEVL IV, 30-39 MIN: ICD-10-PCS | Mod: HCNC,95,, | Performed by: NURSE PRACTITIONER

## 2020-11-04 PROCEDURE — 3078F PR MOST RECENT DIASTOLIC BLOOD PRESSURE < 80 MM HG: ICD-10-PCS | Mod: HCNC,CPTII,S$GLB, | Performed by: FAMILY MEDICINE

## 2020-11-04 PROCEDURE — 99499 RISK ADDL DX/OHS AUDIT: ICD-10-PCS | Mod: S$GLB,,, | Performed by: FAMILY MEDICINE

## 2020-11-04 PROCEDURE — 3078F DIAST BP <80 MM HG: CPT | Mod: HCNC,CPTII,S$GLB, | Performed by: FAMILY MEDICINE

## 2020-11-04 PROCEDURE — 99214 OFFICE O/P EST MOD 30 MIN: CPT | Mod: HCNC,S$GLB,, | Performed by: FAMILY MEDICINE

## 2020-11-04 PROCEDURE — 99214 OFFICE O/P EST MOD 30 MIN: CPT | Mod: HCNC,95,, | Performed by: NURSE PRACTITIONER

## 2020-11-04 PROCEDURE — 1125F PR PAIN SEVERITY QUANTIFIED, PAIN PRESENT: ICD-10-PCS | Mod: HCNC,S$GLB,, | Performed by: FAMILY MEDICINE

## 2020-11-04 PROCEDURE — 3074F PR MOST RECENT SYSTOLIC BLOOD PRESSURE < 130 MM HG: ICD-10-PCS | Mod: HCNC,CPTII,S$GLB, | Performed by: FAMILY MEDICINE

## 2020-11-04 PROCEDURE — 1159F PR MEDICATION LIST DOCUMENTED IN MEDICAL RECORD: ICD-10-PCS | Mod: HCNC,S$GLB,, | Performed by: FAMILY MEDICINE

## 2020-11-04 RX ORDER — PREGABALIN 150 MG/1
150 CAPSULE ORAL 2 TIMES DAILY
Qty: 60 CAPSULE | Refills: 0 | Status: SHIPPED | OUTPATIENT
Start: 2020-11-04 | End: 2020-11-18

## 2020-11-04 NOTE — PROGRESS NOTES
FAMILY MEDICINE   [facility]     CC:   Chief Complaint   Patient presents with    Follow-up       SUBJECTIVE:  Heri Muhammad   is a 79 y.o. male  - with obesity, dementia, seizure disorder, hyperlipidemia, CAD (non-obstructive and no history of stents), DJD lumbar spine, Afib/AFlutter (on Eliquis, h/o ablation and pacemaker in place), hypertension, aortic atherosclerosis, HFpEF (8/2018 EF 55%), subclinical hypothyroidism, restrictive cardiomyopathy, chronic low back pain, chronic bilateral shoulder pain s/p bilateral shoulder replacements and COPD presents follow-up labs    Cardiology Dr. Gladys Negron  Neurology Dr. Anjum Arellano    1. Subclinical Hypothyroidism:   - improved  - asymptomatic     Age diagnosed: 1/19/18  Prior evaluation by Endocrinologist: none  Prior thyroid US: none     Current medication:   None      Symptoms from thyroid issue: denies  Concerns: denies     Lab Results       Component                Value               Date                       TSH                      3.247               10/26/2020              2. COPD     Prior hospitalizations: yes (last 6/10/19)  History of intubation: denies     Current regimen:   ANORO ELLIPTA 62.5-25 mcg/actuation DsDv, INHALE 1 PUFF DAILY (CONTROLLER), Disp: 3 each, Rfl: 3    Current symptoms: denies  Recent exacerbations: denies recent issues  How often using rescue inhaler? weekly     Last PFT: none     Vaccines:   Influenza: 9/21/2020   Prevnar 13: 10/24/16  Pneumovax 23: 10/27/17     3. Hypertension  - with Afib and CHF     Current medication treatment:   metoprolol succinate (TOPROL-XL) 50 MG 24 hr tablet, TAKE 1 TABLET (50 MG TOTAL) BY MOUTH ONCE DAILY., Disp: 90 tablet, Rfl: 4    Medication side effects: denies  Exercise regimen: none         ROS: Review of Systems   Constitutional: Negative.    HENT: Negative.    Eyes: Negative.    Respiratory: Negative.    Cardiovascular: Negative.    Gastrointestinal: Negative.    Endocrine:  Negative.    Genitourinary: Negative.    Musculoskeletal: Positive for arthralgias and back pain.        Otherwise negative   Skin: Negative.    Allergic/Immunologic: Negative.    Neurological: Negative.    Hematological: Negative.    Psychiatric/Behavioral: Negative.        Past Medical History:   Diagnosis Date    A-fib     Anticoagulant long-term use     eliquis    Aortic atherosclerosis 9/18/2017    - LDL goal <70 - BP goal <130/80    Atrial flutter     Benign essential tremor     Biventricular cardiac pacemaker in situ 9/24/2018    CAD (coronary artery disease) 12/2/2013    - 9/2013 Left heart Cath: LAD- 60% prox (neg FFR), nl, ramus and RCA 20% - LDL goal <70 - BP goal <130/80    Cancer     skin cancer on back    Cardiac pacemaker in situ     Cardiomyopathy, nonischemic 2/12/2018    - 8/29/18 Echo EF 55%. Grade III diastolic dysfunction with restrictive physiology - followed by Cardiology    Carpal tunnel syndrome of left wrist 8/8/2017    CHF (congestive heart failure)     Chronic diastolic heart failure 1/19/2018    Chronic obstructive pulmonary disease 10/3/2018    Chronic right-sided thoracic back pain 4/27/2017    Coronary artery disease     Cough     Current use of long term anticoagulation 6/2/2016    - Eliquis for Afib    Dizziness     intermittant    DJD (degenerative joint disease) of knee     ED (erectile dysfunction)     Hard of hearing 11/2016    History of cataract     HTN (hypertension)     Hyperlipidemia     Hyperlipidemia     Hyponatremia 6/11/2019    Memory loss     Mixed hyperlipidemia 7/28/2012    Persistent atrial fibrillation 7/1/2015    - followed by Dr. Esparza - history of cardioversion - on Eliquis OAC 5 mg BID    Seizure disorder     Seizures     last episode 1995    Sinus node dysfunction 08/2016    Subclinical hypothyroidism     Tension type headache 7/2/2019       Past Surgical History:   Procedure Laterality Date    ANKLE SURGERY Right     pins  and screws    ATRIAL ABLATION SURGERY      CARDIAC PACEMAKER PLACEMENT      CARDIAC PACEMAKER PLACEMENT      CARDIAC PACEMAKER PLACEMENT  2014    CARDIOVERSION      CATARACT EXTRACTION      OU    COLONOSCOPY      due in     EYE SURGERY Bilateral     cataracts extraction    FRACTURE SURGERY Right     ankle with hardware    HERNIA REPAIR      abdominal    INJECTION OF ANESTHETIC AGENT AROUND MEDIAL BRANCH NERVES INNERVATING LUMBAR FACET JOINT Bilateral 2019    Procedure: Block-nerve-medial branch-lumbar L2-L5;  Surgeon: Anthony Moffett MD;  Location: [facility] OR;  Service: Pain Management;  Laterality: Bilateral;    JOINT REPLACEMENT Bilateral     shoulders    RADIOFREQUENCY ABLATION OF LUMBAR MEDIAL BRANCH NERVE AT SINGLE LEVEL Bilateral 2019    Procedure: Radiofrequency Ablation, Nerve, Spinal, Lumbar, Medial Branch, L2-L5;  Surgeon: Anthony Moffett MD;  Location: [facility] OR;  Service: Pain Management;  Laterality: Bilateral;       Family History   Problem Relation Age of Onset    Heart disease Mother     Heart failure Mother     Dementia Mother     Cancer Father         colon    Blindness Father         accident    Cataracts Father     Hypertension Father     Tremor Father     Tremor Brother     Tremor Paternal Grandfather     Cancer Daughter     No Known Problems Son     Obesity Daughter     Melanoma Neg Hx     Amblyopia Neg Hx     Diabetes Neg Hx     Glaucoma Neg Hx     Macular degeneration Neg Hx     Retinal detachment Neg Hx     Strabismus Neg Hx     Stroke Neg Hx     Thyroid disease Neg Hx        Social History     Tobacco Use    Smoking status: Former Smoker     Packs/day: 1.00     Years: 5.00     Pack years: 5.00     Types: Cigarettes     Quit date:      Years since quittin.8    Smokeless tobacco: Never Used   Substance Use Topics    Alcohol use: No    Drug use: No       Social History     Social History Narrative    . 4 adult children.         ALLERGIES:   Review of patient's allergies indicates:   Allergen Reactions    Bactroban [mupirocin calcium] Rash     Other reaction(s): Rash       MEDS:   Current Outpatient Medications:     ANORO ELLIPTA 62.5-25 mcg/actuation DsDv, INHALE 1 PUFF DAILY (CONTROLLER), Disp: 3 each, Rfl: 3    atorvastatin (LIPITOR) 20 MG tablet, Take 1 tablet (20 mg total) by mouth every evening., Disp: 90 tablet, Rfl: 4    cetirizine (ZYRTEC) 10 MG tablet, Take 10 mg by mouth every evening., Disp: , Rfl:     cyclobenzaprine (FLEXERIL) 5 MG tablet, Take 1 tablet (5 mg total) by mouth 2 (two) times daily as needed for Muscle spasms., Disp: 40 tablet, Rfl: 1    ELIQUIS 5 mg Tab, TAKE 1 TABLET TWICE DAILY, Disp: 180 tablet, Rfl: 4    lamoTRIgine (LAMICTAL) 100 MG tablet, Take 1 tablet (100 mg total) by mouth 2 (two) times daily., Disp: 180 tablet, Rfl: 3    memantine (NAMENDA) 10 MG Tab, Take 1 tablet (10 mg total) by mouth 2 (two) times daily., Disp: 180 tablet, Rfl: 3    metoprolol succinate (TOPROL-XL) 50 MG 24 hr tablet, TAKE 1 TABLET (50 MG TOTAL) BY MOUTH ONCE DAILY., Disp: 90 tablet, Rfl: 4    pregabalin (LYRICA) 50 MG capsule, Take 1 capsule (50 mg total) by mouth 3 (three) times daily., Disp: 90 capsule, Rfl: 1    primidone (MYSOLINE) 250 MG Tab, Take 2 tablets (500 mg total) by mouth 2 (two) times daily., Disp: 360 tablet, Rfl: 3    pregabalin (LYRICA) 150 MG capsule, Take 1 capsule (150 mg total) by mouth 2 (two) times daily., Disp: 60 capsule, Rfl: 0  No current facility-administered medications for this visit.     OBJECTIVE:   Vitals:    11/04/20 1327   BP: 110/68   BP Location: Left arm   Patient Position: Sitting   BP Method: X-Large (Manual)   Pulse: 70   Resp: 18   Temp: 98.2 °F (36.8 °C)   SpO2: 98%   Weight: 106.9 kg (235 lb 11.2 oz)   Height: 6' (1.829 m)     Body mass index is 31.97 kg/m².    Physical Exam  Constitutional:       General: He is not in acute distress.  Neck:      Musculoskeletal: Neck  supple.   Cardiovascular:      Rate and Rhythm: Normal rate and regular rhythm.      Pulses: Normal pulses.      Heart sounds: Normal heart sounds. No murmur. No friction rub. No gallop.    Pulmonary:      Effort: Pulmonary effort is normal.      Breath sounds: Normal breath sounds. No wheezing, rhonchi or rales.   Musculoskeletal:      Right lower leg: No edema.      Left lower leg: No edema.   Skin:     General: Skin is warm.   Neurological:      Mental Status: He is alert.         RESULTS:    No visits with results within 1 Week(s) from this visit.   Latest known visit with results is:   Lab Visit on 10/26/2020   Component Date Value Ref Range Status    Cholesterol 10/26/2020 187  120 - 199 mg/dL Final    Comment: The National Cholesterol Education Program (NCEP) has set the  following guidelines (reference ranges) for Cholesterol:  Optimal.....................<200 mg/dL  Borderline High.............200-239 mg/dL  High........................> or = 240 mg/dL      Triglycerides 10/26/2020 107  30 - 150 mg/dL Final    Comment: The National Cholesterol Education Program (NCEP) has set the  following guidelines (reference values) for triglycerides:  Normal......................<150 mg/dL  Borderline High.............150-199 mg/dL  High........................200-499 mg/dL      HDL 10/26/2020 41  40 - 75 mg/dL Final    Comment: The National Cholesterol Education Program (NCEP) has set the  following guidelines (reference values) for HDL Cholesterol:  Low...............<40 mg/dL  Optimal...........>60 mg/dL      LDL Cholesterol 10/26/2020 124.6  63.0 - 159.0 mg/dL Final    Comment: The National Cholesterol Education Program (NCEP) has set the  following guidelines (reference values) for LDL Cholesterol:  Optimal.......................<130 mg/dL  Borderline High...............130-159 mg/dL  High..........................160-189 mg/dL  Very High.....................>190 mg/dL      HDL/Cholesterol Ratio 10/26/2020  21.9  20.0 - 50.0 % Final    Total Cholesterol/HDL Ratio 10/26/2020 4.6  2.0 - 5.0 Final    Non-HDL Cholesterol 10/26/2020 146  mg/dL Final    Comment: Risk category and Non-HDL cholesterol goals:  Coronary heart disease (CHD)or equivalent (10-year risk of CHD >20%):  Non-HDL cholesterol goal     <130 mg/dL  Two or more CHD risk factors and 10-year risk of CHD <= 20%:  Non-HDL cholesterol goal     <160 mg/dL  0 to 1 CHD risk factor:  Non-HDL cholesterol goal     <190 mg/dL      Sodium 10/26/2020 138  136 - 145 mmol/L Final    Potassium 10/26/2020 4.4  3.5 - 5.1 mmol/L Final    Chloride 10/26/2020 100  95 - 110 mmol/L Final    CO2 10/26/2020 26  23 - 29 mmol/L Final    Glucose 10/26/2020 102  70 - 110 mg/dL Final    BUN 10/26/2020 11  8 - 23 mg/dL Final    Creatinine 10/26/2020 0.8  0.5 - 1.4 mg/dL Final    Calcium 10/26/2020 8.5* 8.7 - 10.5 mg/dL Final    Total Protein 10/26/2020 7.2  6.0 - 8.4 g/dL Final    Albumin 10/26/2020 3.7  3.5 - 5.2 g/dL Final    Total Bilirubin 10/26/2020 0.4  0.1 - 1.0 mg/dL Final    Comment: For infants and newborns, interpretation of results should be based  on gestational age, weight and in agreement with clinical  observations.  Premature Infant recommended reference ranges:  Up to 24 hours.............<8.0 mg/dL  Up to 48 hours............<12.0 mg/dL  3-5 days..................<15.0 mg/dL  6-29 days.................<15.0 mg/dL      Alkaline Phosphatase 10/26/2020 91  55 - 135 U/L Final    AST 10/26/2020 19  10 - 40 U/L Final    ALT 10/26/2020 16  10 - 44 U/L Final    Anion Gap 10/26/2020 12  8 - 16 mmol/L Final    eGFR if African American 10/26/2020 >60.0  >60 mL/min/1.73 m^2 Final    eGFR if non African American 10/26/2020 >60.0  >60 mL/min/1.73 m^2 Final    Comment: Calculation used to obtain the estimated glomerular filtration  rate (eGFR) is the CKD-EPI equation.       WBC 10/26/2020 6.03  3.90 - 12.70 K/uL Final    RBC 10/26/2020 4.72  4.60 - 6.20 M/uL Final     Hemoglobin 10/26/2020 14.5  14.0 - 18.0 g/dL Final    Hematocrit 10/26/2020 45.6  40.0 - 54.0 % Final    MCV 10/26/2020 97  82 - 98 fL Final    MCH 10/26/2020 30.7  27.0 - 31.0 pg Final    MCHC 10/26/2020 31.8* 32.0 - 36.0 g/dL Final    RDW 10/26/2020 14.3  11.5 - 14.5 % Final    Platelets 10/26/2020 210  150 - 350 K/uL Final    MPV 10/26/2020 10.4  9.2 - 12.9 fL Final    Immature Granulocytes 10/26/2020 0.3  0.0 - 0.5 % Final    Gran # (ANC) 10/26/2020 3.2  1.8 - 7.7 K/uL Final    Immature Grans (Abs) 10/26/2020 0.02  0.00 - 0.04 K/uL Final    Comment: Mild elevation in immature granulocytes is non specific and   can be seen in a variety of conditions including stress response,   acute inflammation, trauma and pregnancy. Correlation with other   laboratory and clinical findings is essential.      Lymph # 10/26/2020 2.0  1.0 - 4.8 K/uL Final    Mono # 10/26/2020 0.5  0.3 - 1.0 K/uL Final    Eos # 10/26/2020 0.2  0.0 - 0.5 K/uL Final    Baso # 10/26/2020 0.05  0.00 - 0.20 K/uL Final    nRBC 10/26/2020 0  0 /100 WBC Final    Gran % 10/26/2020 53.8  38.0 - 73.0 % Final    Lymph % 10/26/2020 33.5  18.0 - 48.0 % Final    Mono % 10/26/2020 8.6  4.0 - 15.0 % Final    Eosinophil % 10/26/2020 3.0  0.0 - 8.0 % Final    Basophil % 10/26/2020 0.8  0.0 - 1.9 % Final    Differential Method 10/26/2020 Automated   Final    TSH 10/26/2020 3.247  0.400 - 4.000 uIU/mL Final    Free T4 10/26/2020 0.85  0.71 - 1.51 ng/dL Final    T3, Free 10/26/2020 2.7  2.3 - 4.2 pg/mL Final    Thyroperoxidase Antibodies 10/26/2020 <6.0  <6.0 IU/mL Final       ASSESSMENT/PLAN:  Problem List Items Addressed This Visit        Pulmonary    Chronic obstructive pulmonary disease    Overview     - well controlled  - continue current medications            Cardiac/Vascular    Aortic atherosclerosis - Primary    Overview     - LDL goal <70  - BP goal <130/80         CAD (coronary artery disease) (Chronic)    Overview     - 9/2013  Left heart Cath: LAD- 60% prox (neg FFR), nl, ramus and RCA 20%  - LDL goal <70  - BP goal <130/80  - ASA 81 mg daily         Chronic diastolic heart failure    Overview     - no signs of acute decompensation         Essential hypertension    Overview     - well controlled  - continue current medication         Mixed hyperlipidemia    Overview     Lab Results   Component Value Date    LDLCALC 124.6 10/26/2020     - on Atorvastatin 10 mg daily and up titrated to 20 mg daily   - followed by Dr. Graham         Persistent atrial fibrillation    Overview     - followed by Dr. Esparza  - history of cardioversion  - on Eliquis OAC 5 mg BID            Endocrine    Subclinical hypothyroidism    Overview     Lab Results   Component Value Date    TSH 3.247 10/26/2020   - 1026/2020 TPO normal  - resolved             Follow-up 6 months or sooner if any concerns    Dr. Ora Murillo D.O.   Family Medicine

## 2020-11-04 NOTE — TELEPHONE ENCOUNTER
Spoke with pt's wife Ana in regards to scheduling a f/u visit. Pt is scheduled for a virtual visit on 11/18/20 at 11:40 am. Ana verbalized understanding and confirmed appt date and time.

## 2020-11-04 NOTE — PROGRESS NOTES
[facility] Established Patient Evaluation  telemedicine Encounter    Telemedicine Bundle:  This visit was completed during the Coronavirus Crisis to enhance patient safety.  The patient location is: patient's home  The chief complaint leading to consultation is: Abdominal Pain  Visit type: Virtual visit with synchronous audio and video  Total time spent with patient: 20 minutes   Each patient to whom he or she provides medical services by telemedicine is:    (1) informed of the relationship between the physician and patient and the respective role of any other health care provider with respect to management of the patient  (2) notified that he or she may decline to receive medical services by telemedicine and may withdraw from such care at any time.      Chief Complaint  Chief Complaint   Patient presents with    Abdominal Pain       Last 3 PDI Scores 10/21/2020 9/21/2020 11/20/2019   Pain Disability Index (PDI) 25 28 42       Interval Updates    11/4/2020-  78 y/o male presents virtually for f/u visit to discuss effectiveness of current medications for left flank pain secondary to post herpetic neuralgia.  He is currently taking Lyrica 50 mg TID, he reports currently regimen is not effective, neuropathic pain is unchanged at this point. He also c/o bilateral shoulder  And low back pain but his  Primary source of pain is left flank.     10/21/2020 - Mr. Muhammad returns to clinic for follow up visit reporting stable back/flank. Pain intensity is currently 7/10.  The rash near his left axilla has resolved with use of an antifungal cream from Dr. Murillo.  Despite resolution of the rash, he continues to have pain in that area which is unchanged in intensity or character from the previous visit.  In addition to pain under the left axilla, he continues to have bilateral shoulder, low back, and bilateral knee pain all related to degenerative joint disease.  Patient states that it is pain I just have to live with  "because previous injections do not last more than 6 months.    9/21/2020 - the patient returns today complaining of abnormal sensations along the left flank extending toward the left nipple.  Approximately 6 months ago he began feeling these odd sensations wrapping around the upper portion of the flank near the shoulder blade extending through the axilla and toward the nipple.  Patient states that light touch of the skin across this area produces sensations of pins and needles and tingling which is very uncomfortable and adversely impacts his ability to sleep.  He reports being evaluated for post herpetic neuralgia and was started on gabapentin in the past with slight improvement of symptoms then gabapentin was stopped and his pain returned to baseline (4/10).  He states that he has never had a rash or any herpetic lesions in the past 6 mos int he affected area.      Heri Muhammad is transferring care to my clinic from Dr. Moffett.  He moved [facility].  He states intermittent low back/flank pain and intermittent shoulder pains that last 1-2 days at a time and occur 4-5 times per month.  Between episodes he feels pain from "arthritis" but he states "I can handle that."  He underwent bilateral medial branch block with Dr. Moffett at the beginning of 2019 which provided him unilateral relief of his symptoms.  When last evaluated, he was diagnosed primarily with myofascial pain and given strong recommendation for physical therapy.  He never started physical therapy because it was recommended at the same time that he and his wife removing to the [place].    Location: right shoulder   Onset: yrs  Current Pain Score: 6/10  Daily Pain of Range: 2-6/10  Quality: Aching and Sharp  Radiation: no  Worsened by: standing and walking for more than 1 minutes  Improved by: rest and sitting    Background from Chart Review  Interval HPI 3/12/19: Mr. Muhammad returns to the office for follow up. Today with left greater than right " sided back pain, 7/10, intermittent, sharp, relieved with back flexion.  TPI in the office on 2/11/19 with mild relief.  No radicular pain or radiculopathy.  Continues flexeril, lidocaine patch, and tylenol prn     Interval HPI 2/11/19: Mr. Muhammad returns for post injection visit.  He is s/p bilateral L2-L5 RFA on 1/23/19 with 100% relief of the right side and 0% of the left side.  Today his pain is on the left side, 8/10, sharp, no radicular pain or radiculopathy.  Worse with walking and back flexion.  No numbness, tingling.  Continues flexeril, tylenol, ibuprofen prn with good relief      Treatment History  PT/OT: Referred but never started  HEP: Lives a VERY sedentary lifestyle  TENS:  Injections: Bilat L2-5 MBB with 100% relief of right and none on left  Surgery: Bilateral shoulder replacement in approx yr 2000  Medications:   - NSAIDS:   - MSK Relaxants:   - TCAs:   - SNRIs:   - Topicals:   - Opioids:   - Anticonvulsants:     Current Pain Medications  1. Tylenol     Full Medication List    Current Outpatient Medications:     ANORO ELLIPTA 62.5-25 mcg/actuation DsDv, INHALE 1 PUFF DAILY (CONTROLLER), Disp: 3 each, Rfl: 3    atorvastatin (LIPITOR) 20 MG tablet, Take 1 tablet (20 mg total) by mouth every evening., Disp: 90 tablet, Rfl: 4    cetirizine (ZYRTEC) 10 MG tablet, Take 10 mg by mouth every evening., Disp: , Rfl:     clotrimazole-betamethasone 1-0.05% (LOTRISONE) cream, Apply topically 2 (two) times daily., Disp: 45 g, Rfl: 0    cyclobenzaprine (FLEXERIL) 5 MG tablet, Take 1 tablet (5 mg total) by mouth 2 (two) times daily as needed for Muscle spasms., Disp: 40 tablet, Rfl: 1    ELIQUIS 5 mg Tab, TAKE 1 TABLET TWICE DAILY, Disp: 180 tablet, Rfl: 4    lamoTRIgine (LAMICTAL) 100 MG tablet, Take 1 tablet (100 mg total) by mouth 2 (two) times daily., Disp: 180 tablet, Rfl: 3    melatonin 5 mg Cap, Take 5 mg by mouth every evening., Disp: 90 each, Rfl: 3    memantine (NAMENDA) 10 MG Tab, Take 1 tablet  (10 mg total) by mouth 2 (two) times daily., Disp: 180 tablet, Rfl: 3    metoprolol succinate (TOPROL-XL) 50 MG 24 hr tablet, TAKE 1 TABLET (50 MG TOTAL) BY MOUTH ONCE DAILY., Disp: 90 tablet, Rfl: 4    olopatadine (PATADAY) 0.2 % Drop, Place 1 drop into both eyes once daily., Disp: 2.5 mL, Rfl: 0    pregabalin (LYRICA) 50 MG capsule, Take 1 capsule (50 mg total) by mouth 3 (three) times daily., Disp: 90 capsule, Rfl: 1    primidone (MYSOLINE) 250 MG Tab, Take 2 tablets (500 mg total) by mouth 2 (two) times daily., Disp: 360 tablet, Rfl: 3    Current Facility-Administered Medications:     albuterol-ipratropium 2.5 mg-0.5 mg/3 mL nebulizer solution 3 mL, 3 mL, Nebulization, Once, Ora Murillo DO     Review of Systems  Review of Systems   Constitutional: Negative for chills and fever.   HENT: Negative for nosebleeds.    Eyes: Negative for blurred vision.   Respiratory: Negative for hemoptysis.    Cardiovascular: Negative for chest pain and palpitations.   Gastrointestinal: Negative for heartburn and vomiting.   Genitourinary: Negative for hematuria.   Musculoskeletal: Positive for back pain, joint pain and myalgias. Negative for falls.   Skin: Negative for rash.   Neurological: Negative for seizures and loss of consciousness.   Endo/Heme/Allergies: Does not bruise/bleed easily.   Psychiatric/Behavioral: Positive for memory loss. Negative for hallucinations. The patient has insomnia.        Medical History  Past Medical History:   Diagnosis Date    A-fib     Anticoagulant long-term use     eliquis    Aortic atherosclerosis 9/18/2017    - LDL goal <70 - BP goal <130/80    Atrial flutter     Benign essential tremor     Biventricular cardiac pacemaker in situ 9/24/2018    CAD (coronary artery disease) 12/2/2013    - 9/2013 Left heart Cath: LAD- 60% prox (neg FFR), nl, ramus and RCA 20% - LDL goal <70 - BP goal <130/80    Cancer     skin cancer on back    Cardiac pacemaker in situ     Cardiomyopathy,  nonischemic 2/12/2018    - 8/29/18 Echo EF 55%. Grade III diastolic dysfunction with restrictive physiology - followed by Cardiology    Carpal tunnel syndrome of left wrist 8/8/2017    CHF (congestive heart failure)     Chronic diastolic heart failure 1/19/2018    Chronic obstructive pulmonary disease 10/3/2018    Chronic right-sided thoracic back pain 4/27/2017    Coronary artery disease     Cough     Current use of long term anticoagulation 6/2/2016    - Eliquis for Afib    Dizziness     intermittant    DJD (degenerative joint disease) of knee     ED (erectile dysfunction)     Hard of hearing 11/2016    History of cataract     HTN (hypertension)     Hyperlipidemia     Hyperlipidemia     Hyponatremia 6/11/2019    Memory loss     Mixed hyperlipidemia 7/28/2012    Persistent atrial fibrillation 7/1/2015    - followed by Dr. Esparza - history of cardioversion - on Eliquis OAC 5 mg BID    Seizure disorder     Seizures     last episode 1995    Sinus node dysfunction 08/2016    Subclinical hypothyroidism     Tension type headache 7/2/2019        Surgical History  Past Surgical History:   Procedure Laterality Date    ANKLE SURGERY Right     pins and screws    ATRIAL ABLATION SURGERY      CARDIAC PACEMAKER PLACEMENT  2014    CARDIAC PACEMAKER PLACEMENT      CARDIAC PACEMAKER PLACEMENT  2014    CARDIOVERSION      CATARACT EXTRACTION      OU    COLONOSCOPY      due in 2014    EYE SURGERY Bilateral     cataracts extraction    FRACTURE SURGERY Right     ankle with hardware    HERNIA REPAIR      abdominal    INJECTION OF ANESTHETIC AGENT AROUND MEDIAL BRANCH NERVES INNERVATING LUMBAR FACET JOINT Bilateral 1/17/2019    Procedure: Block-nerve-medial branch-lumbar L2-L5;  Surgeon: Anthony Moffett MD;  [facility] OR;  Service: Pain Management;  Laterality: Bilateral;    JOINT REPLACEMENT Bilateral     shoulders    RADIOFREQUENCY ABLATION OF LUMBAR MEDIAL BRANCH NERVE AT SINGLE LEVEL  Bilateral 1/23/2019    Procedure: Radiofrequency Ablation, Nerve, Spinal, Lumbar, Medial Branch, L2-L5;  Surgeon: Anthony Moffett MD;  Location: [facility];  Service: Pain Management;  Laterality: Bilateral;        Physical Exam  There were no vitals filed for this visit.  There was no PE done for this visit.   Imaging  Xray lumbar spine 12/11/18  FINDINGS:  There is no definite change in the appearance of the lumbar spine compared to the prior study.  There is no fracture or malalignment.  There is moderate to marked disc space narrowing at the L4-5 level.  There is moderate-to-marked facet joint arthropathy at the L4-5 and L5-S1 levels.  Otherwise, there is mild disc space narrowing in the remainder of the lumbar spine.  There is moderate to marked disc space narrowing at the thoracolumbar junction where there is also bridging osteophyte formation and endplate sclerosis.  These findings were all present previously.     CT lumbar spine 1/2018  FINDINGS:    Vertebral column: There is stable mild grade 1 spondylolisthesis at the L5-S1 level with 4 mm anterolisthesis of L5 on S1 which is degenerative in etiology and related to facet arthropathy.  There are no pars defects.  The remainder of the vertebral bodies maintain normal alignment.  The bones are mildly osteopenic.  There is no acute fracture.  There is mild to moderate disc space sparing at the L4-5 level where there is endplate sclerosis and osteophyte formation.  There is mild disc space narrowing anteriorly at the L5-S1 level.  There is a very minimal levocurvature of the lumbar spine.  This could in part be positional.  The patient is slightly tilted in the scanner.    Spinal canal, epidural space, conus: The spinal canal is developmentally normal.  The conus terminates at the level of T12-L1.  There is no abnormal epidural collection or mass.      Findings by level:  T11-12: There is no spinal canal or foraminal stenosis.  There is mild left facet joint  arthropathy.  T12-L1: There is no spinal canal or foraminal stenosis.  There is mild left facet joint arthropathy.  L1-L2: There is minimal bulging of the annulus and mild facet joint arthropathy without spinal canal or foraminal stenosis.  There is no significant change.  L2-L3: There is a mild diffuse disc bulge and mild facet arthropathy.  There is no spinal canal or foraminal stenosis.  There is little change.  L3-L4: There is a mild diffuse disc bulge with mild facet arthropathy.  There is no spinal canal or foraminal stenosis and there is no significant change.  L4-L5: There is mild to moderate facet arthropathy.  There is a diffuse disc bulge with osteophytic ridging.  There is no spinal stenosis.  There is moderate left and severe right foraminal stenosis without significant change.  L5-S1: There is 4 mm anterolisthesis of L5 on S1.  There is moderate right and mild bilateral facet joint arthropathy with ligamentum flavum thickening and ossification.  There is unroofing of a broad disc protrusion.  This approaches both S1 roots and may contact the roots without obvious compression or displacement.  There is no significant spinal stenosis.  There is moderate left and severe right foraminal stenosis without significant change.    Soft tissues/other: The prevertebral soft tissues are normal.  There is mild atherosclerosis.  The aorta is of normal     Labs:  BMP  Lab Results   Component Value Date     10/26/2020    K 4.4 10/26/2020     10/26/2020    CO2 26 10/26/2020    BUN 11 10/26/2020    CREATININE 0.8 10/26/2020    CALCIUM 8.5 (L) 10/26/2020    ANIONGAP 12 10/26/2020    ESTGFRAFRICA >60.0 10/26/2020    EGFRNONAA >60.0 10/26/2020     Lab Results   Component Value Date    ALT 16 10/26/2020    AST 19 10/26/2020    ALKPHOS 91 10/26/2020    BILITOT 0.4 10/26/2020       Assessment:  Problem List Items Addressed This Visit        Neuro    Lumbar spondylosis    Post herpetic neuralgia - Primary        Orthopedic    Chronic bilateral low back pain without sciatica    Bilateral shoulder pain    Myofascial pain    Chronic left-sided thoracic back pain      Other Visit Diagnoses     Neuropathic pain              11/20/19 - Heri Muhammad is a 79 y.o. male with primarily myofascial pain, as previously assessed by his former pain management physician.  I support previous recommendations that physical therapy should be the primary treatment modality at this time. Patient is experiencing intermittent pain in the shoulders and low back consistent with muscle spasms.  Physical examination does not reveal pain with facet loading, therefore I do not believe his primary pain generator is facet arthritis.  Additionally, the injection from his previous pain management provider yielded an ambiguous result.  This pain is in mild time encouraging him to participate in physical therapy and to establish home exercise program.  His wife states that Heri lives a very sedentary lifestyle.  As evidence of this, his muscle tone is very poor in the shoulders, low back, and legs.  Additionally, x-ray of the shoulder shows mild subluxation, which is also a feature of diminished muscle strength in the rotator cuff.  Patient voiced concerned about the financial cost of attending physical therapy.  I responded by telling him that physical therapy is probably the best investment of time and money for his current condition. I also informed him that physical therapy would be a complete waste of his time and money if he does not establish a home exercise program.  He understands.      Patient also reports chronic right foot and ankle pain. He was evaluated by Podiatry for years ago but has not followed up.  I will place a new referral for him today.    9/21/20 - Mr. Liriano  is an 79-year-old male with neuropathic pain of the left flank and a new skin lesion in that same dermatome.  His physical examination today was positive for allodynia to light  touch and movement of air confirming a neuropathic condition; however, the skin lesion near the axilla, in the T4 dermatome, appears to be fungal.  This may be coincidental and I will involve his primary care team for treatment.  I will also reach out to Dr. Murillo for discussion of a trial of Lyrica 25 mg for his neuropathic pain.  TCAs are a mainstay treatment for PHN, but I think this would be a poor choice given his age.    10/22/20 - Patient tolerated initial trial of Lyrica well but reported no relief.  I will increase the dose today toward a target of 50 mg TID and have the patient follow up in 2 weeks to ensure that we are on the right track.  At the end of the visit he brought up chronic bilateral knee and shoulder pain related to DJD and stated that he would just have to live with it.  Patient ad advised that we have excellent interventions for treating chronic arthritic pain of the aforementioned joints, but would like to think more about it.    11/4/2020- 79-year-old male presents with continued left flank pain secondary to post herpetic neuralgia patient was recently started on Lyrica and titrated to 50 mg t.i.d. which he states has been ineffective, discussed with patient that we will optimize his Lyrica and increase  to 300 mg daily.  Patient also has complaints of bilateral shoulder pain as he has had rotator cuff surgery on both shoulders discussed that we can address his other pain generators once his flank pain has been resolved.       Plan & Recommendations  Medications: Cont and increase lyrica to 50 mg TID to 300 mg Daily( 150 BID, pt to start today new Rx will be sent) Med to be pended to Dr Oh for e-sig.  Imaging: No additional imaging required at this time.  PT/OT: Referral placed today  HEP: I encouraged the patient to maintain a home exercise regimen that includes daily, moderate cardiovascular exercise lasting at least 30 minutes.  This may include yoga, chasity chi, walking, swimming,  aqua aerobics, or other exercises that maintain a heart rate of 50-70% of the calculated maximum heart rate.  I also encouraged light, daily stretching focused on the target area.  Follow Up: Telemed visit in 2 weeks to discuss effectiveness of Lyrica increase.     Freeman Chung, NP-C  [facility]    Disclaimer: This note was partly generated using dictation software which may occasionally result in transcription errors.

## 2020-11-04 NOTE — TELEPHONE ENCOUNTER
----- Message from DARON Hoang sent at 11/4/2020 11:54 AM CST -----  Regarding: med refill  Can you get this dilma a f/u with me virtually in 2 weeks to discuss increase in Lyrica. And can you pend a new Rx of Lyrica 150 mg BID to Dr. Oh. Not sure if Lyrica comes in a 150 mg tab if not prescriptions will have to read 75 mg QID    Thks

## 2020-11-06 DIAGNOSIS — I49.8 OTHER SPECIFIED CARDIAC ARRHYTHMIAS: Primary | ICD-10-CM

## 2020-11-18 ENCOUNTER — OFFICE VISIT (OUTPATIENT)
Dept: PAIN MEDICINE | Facility: CLINIC | Age: 80
End: 2020-11-18
Payer: MEDICARE

## 2020-11-18 DIAGNOSIS — M79.18 MYOFASCIAL PAIN: ICD-10-CM

## 2020-11-18 DIAGNOSIS — G89.29 CHRONIC LEFT-SIDED THORACIC BACK PAIN: ICD-10-CM

## 2020-11-18 DIAGNOSIS — M54.6 CHRONIC LEFT-SIDED THORACIC BACK PAIN: ICD-10-CM

## 2020-11-18 DIAGNOSIS — M54.50 CHRONIC BILATERAL LOW BACK PAIN WITHOUT SCIATICA: ICD-10-CM

## 2020-11-18 DIAGNOSIS — M79.2 NEUROPATHIC PAIN: ICD-10-CM

## 2020-11-18 DIAGNOSIS — M47.816 LUMBAR SPONDYLOSIS: ICD-10-CM

## 2020-11-18 DIAGNOSIS — G89.29 CHRONIC BILATERAL LOW BACK PAIN WITHOUT SCIATICA: ICD-10-CM

## 2020-11-18 DIAGNOSIS — B02.29 POST HERPETIC NEURALGIA: Primary | ICD-10-CM

## 2020-11-18 PROCEDURE — 1159F PR MEDICATION LIST DOCUMENTED IN MEDICAL RECORD: ICD-10-PCS | Mod: HCNC,95,, | Performed by: NURSE PRACTITIONER

## 2020-11-18 PROCEDURE — 1159F MED LIST DOCD IN RCRD: CPT | Mod: HCNC,95,, | Performed by: NURSE PRACTITIONER

## 2020-11-18 PROCEDURE — 99214 PR OFFICE/OUTPT VISIT, EST, LEVL IV, 30-39 MIN: ICD-10-PCS | Mod: HCNC,95,, | Performed by: NURSE PRACTITIONER

## 2020-11-18 PROCEDURE — 99214 OFFICE O/P EST MOD 30 MIN: CPT | Mod: HCNC,95,, | Performed by: NURSE PRACTITIONER

## 2020-11-18 NOTE — PROGRESS NOTES
[facility] Established Patient Evaluation  telemedicine Encounter    Telemedicine Bundle:  This visit was completed during the Coronavirus Crisis to enhance patient safety.  The patient location is: patient's home  The chief complaint leading to consultation is: Low-back Pain (left flank area)  Visit type: Virtual visit with synchronous audio and video  Total time spent with patient: 15 minutes   Each patient to whom he or she provides medical services by telemedicine is:    (1) informed of the relationship between the physician and patient and the respective role of any other health care provider with respect to management of the patient  (2) notified that he or she may decline to receive medical services by telemedicine and may withdraw from such care at any time.      Chief Complaint  Chief Complaint   Patient presents with    Low-back Pain     left flank area       Last 3 PDI Scores 10/21/2020 9/21/2020 11/20/2019   Pain Disability Index (PDI) 25 28 42       Interval Updates:     11/18/2020-   81 y/o male presents virtually with his wife for continued Left flank pain secondary to post herpetic neuralgia, we have been optimizing his Lyrica he is currently at 150 mg BID that was started approximately 2 weeks ago patient reports to me today that the Lyrica is causing mental disturbances and is not effective for his current pain.  He states 11-17 -20 was his last dose of Lyrica.    11/4/2020-  81 y/o male presents virtually for f/u visit to discuss effectiveness of current medications for left flank pain secondary to post herpetic neuralgia.  He is currently taking Lyrica 50 mg TID, he reports currently regimen is not effective, neuropathic pain is unchanged at this point. He also c/o bilateral shoulder and low back pain but his  Primary source of pain is left flank.     10/21/2020 - Mr. Muhammad returns to clinic for follow up visit reporting stable back/flank. Pain intensity is currently 7/10.  The rash  "near his left axilla has resolved with use of an antifungal cream from Dr. Murillo.  Despite resolution of the rash, he continues to have pain in that area which is unchanged in intensity or character from the previous visit.  In addition to pain under the left axilla, he continues to have bilateral shoulder, low back, and bilateral knee pain all related to degenerative joint disease.  Patient states that it is pain I just have to live with because previous injections do not last more than 6 months.    9/21/2020 - the patient returns today complaining of abnormal sensations along the left flank extending toward the left nipple.  Approximately 6 months ago he began feeling these odd sensations wrapping around the upper portion of the flank near the shoulder blade extending through the axilla and toward the nipple.  Patient states that light touch of the skin across this area produces sensations of pins and needles and tingling which is very uncomfortable and adversely impacts his ability to sleep.  He reports being evaluated for post herpetic neuralgia and was started on gabapentin in the past with slight improvement of symptoms then gabapentin was stopped and his pain returned to baseline (4/10).  He states that he has never had a rash or any herpetic lesions in the past 6 mos int he affected area.      Heri Muhammad is transferring care to my clinic from Dr. Moffett.  He moved [facility].  He states intermittent low back/flank pain and intermittent shoulder pains that last 1-2 days at a time and occur 4-5 times per month.  Between episodes he feels pain from "arthritis" but he states "I can handle that."  He underwent bilateral medial branch block with Dr. Moffett at the beginning of 2019 which provided him unilateral relief of his symptoms.  When last evaluated, he was diagnosed primarily with myofascial pain and given strong recommendation for physical therapy.  He never started physical therapy because it was " recommended at the same time that he and his wife removing to the [place].    Location: right shoulder   Onset: yrs  Current Pain Score: 6/10  Daily Pain of Range: 2-6/10  Quality: Aching and Sharp  Radiation: no  Worsened by: standing and walking for more than 1 minutes  Improved by: rest and sitting    Background from Chart Review  Interval HPI 3/12/19: Mr. Muhammad returns to the office for follow up. Today with left greater than right sided back pain, 7/10, intermittent, sharp, relieved with back flexion.  TPI in the office on 2/11/19 with mild relief.  No radicular pain or radiculopathy.  Continues flexeril, lidocaine patch, and tylenol prn     Interval HPI 2/11/19: Mr. Muhammad returns for post injection visit.  He is s/p bilateral L2-L5 RFA on 1/23/19 with 100% relief of the right side and 0% of the left side.  Today his pain is on the left side, 8/10, sharp, no radicular pain or radiculopathy.  Worse with walking and back flexion.  No numbness, tingling.  Continues flexeril, tylenol, ibuprofen prn with good relief      Treatment History  PT/OT: Referred but never started  HEP: Lives a VERY sedentary lifestyle  TENS:  Injections: Bilat L2-5 MBB with 100% relief of right and none on left  Surgery: Bilateral shoulder replacement in approx yr 2000  Medications:   - NSAIDS:   - MSK Relaxants:   - TCAs:   - SNRIs:   - Topicals:   - Opioids:   - Anticonvulsants:     Current Pain Medications  1. Tylenol     Full Medication List    Current Outpatient Medications:     ANORO ELLIPTA 62.5-25 mcg/actuation DsDv, INHALE 1 PUFF DAILY (CONTROLLER), Disp: 3 each, Rfl: 3    atorvastatin (LIPITOR) 20 MG tablet, Take 1 tablet (20 mg total) by mouth every evening., Disp: 90 tablet, Rfl: 4    cetirizine (ZYRTEC) 10 MG tablet, Take 10 mg by mouth every evening., Disp: , Rfl:     cyclobenzaprine (FLEXERIL) 5 MG tablet, Take 1 tablet (5 mg total) by mouth 2 (two) times daily as needed for Muscle spasms., Disp: 40 tablet, Rfl:  1    ELIQUIS 5 mg Tab, TAKE 1 TABLET TWICE DAILY, Disp: 180 tablet, Rfl: 4    lamoTRIgine (LAMICTAL) 100 MG tablet, Take 1 tablet (100 mg total) by mouth 2 (two) times daily., Disp: 180 tablet, Rfl: 3    memantine (NAMENDA) 10 MG Tab, Take 1 tablet (10 mg total) by mouth 2 (two) times daily., Disp: 180 tablet, Rfl: 3    metoprolol succinate (TOPROL-XL) 50 MG 24 hr tablet, TAKE 1 TABLET (50 MG TOTAL) BY MOUTH ONCE DAILY., Disp: 90 tablet, Rfl: 4    pregabalin (LYRICA) 150 MG capsule, Take 1 capsule (150 mg total) by mouth 2 (two) times daily., Disp: 60 capsule, Rfl: 0    pregabalin (LYRICA) 50 MG capsule, Take 1 capsule (50 mg total) by mouth 3 (three) times daily., Disp: 90 capsule, Rfl: 1    primidone (MYSOLINE) 250 MG Tab, Take 2 tablets (500 mg total) by mouth 2 (two) times daily., Disp: 360 tablet, Rfl: 3     Review of Systems  Review of Systems   Constitutional: Negative for chills and fever.   HENT: Negative for nosebleeds.    Eyes: Negative for blurred vision.   Respiratory: Negative for hemoptysis.    Cardiovascular: Negative for chest pain and palpitations.   Gastrointestinal: Negative for heartburn and vomiting.   Genitourinary: Negative for hematuria.   Musculoskeletal: Positive for back pain, joint pain and myalgias. Negative for falls.   Skin: Negative for rash.   Neurological: Negative for seizures and loss of consciousness.   Endo/Heme/Allergies: Does not bruise/bleed easily.   Psychiatric/Behavioral: Positive for memory loss. Negative for hallucinations. The patient has insomnia.        Medical History  Past Medical History:   Diagnosis Date    A-fib     Anticoagulant long-term use     eliquis    Aortic atherosclerosis 9/18/2017    - LDL goal <70 - BP goal <130/80    Atrial flutter     Benign essential tremor     Biventricular cardiac pacemaker in situ 9/24/2018    CAD (coronary artery disease) 12/2/2013    - 9/2013 Left heart Cath: LAD- 60% prox (neg FFR), nl, ramus and RCA 20% - LDL  goal <70 - BP goal <130/80    Cancer     skin cancer on back    Cardiac pacemaker in situ     Cardiomyopathy, nonischemic 2/12/2018    - 8/29/18 Echo EF 55%. Grade III diastolic dysfunction with restrictive physiology - followed by Cardiology    Carpal tunnel syndrome of left wrist 8/8/2017    CHF (congestive heart failure)     Chronic diastolic heart failure 1/19/2018    Chronic obstructive pulmonary disease 10/3/2018    Chronic right-sided thoracic back pain 4/27/2017    Coronary artery disease     Cough     Current use of long term anticoagulation 6/2/2016    - Eliquis for Afib    Dizziness     intermittant    DJD (degenerative joint disease) of knee     ED (erectile dysfunction)     Hard of hearing 11/2016    History of cataract     HTN (hypertension)     Hyperlipidemia     Hyperlipidemia     Hyponatremia 6/11/2019    Memory loss     Mixed hyperlipidemia 7/28/2012    Persistent atrial fibrillation 7/1/2015    - followed by Dr. Esparza - history of cardioversion - on Eliquis OAC 5 mg BID    Seizure disorder     Seizures     last episode 1995    Sinus node dysfunction 08/2016    Subclinical hypothyroidism     Tension type headache 7/2/2019        Surgical History  Past Surgical History:   Procedure Laterality Date    ANKLE SURGERY Right     pins and screws    ATRIAL ABLATION SURGERY      CARDIAC PACEMAKER PLACEMENT  2014    CARDIAC PACEMAKER PLACEMENT      CARDIAC PACEMAKER PLACEMENT  2014    CARDIOVERSION      CATARACT EXTRACTION      OU    COLONOSCOPY      due in 2014    EYE SURGERY Bilateral     cataracts extraction    FRACTURE SURGERY Right     ankle with hardware    HERNIA REPAIR      abdominal    INJECTION OF ANESTHETIC AGENT AROUND MEDIAL BRANCH NERVES INNERVATING LUMBAR FACET JOINT Bilateral 1/17/2019    Procedure: Block-nerve-medial branch-lumbar L2-L5;  Surgeon: Anthony Moffett MD;  Location: [facility] OR;  Service: Pain Management;  Laterality: Bilateral;    JOINT  REPLACEMENT Bilateral     shoulders    RADIOFREQUENCY ABLATION OF LUMBAR MEDIAL BRANCH NERVE AT SINGLE LEVEL Bilateral 1/23/2019    Procedure: Radiofrequency Ablation, Nerve, Spinal, Lumbar, Medial Branch, L2-L5;  Surgeon: Anthony Moffett MD;  Location: [facility];  Service: Pain Management;  Laterality: Bilateral;        Physical Exam  There were no vitals filed for this visit.  There was no PE done for this visit.   Imaging  Xray lumbar spine 12/11/18  FINDINGS:  There is no definite change in the appearance of the lumbar spine compared to the prior study.  There is no fracture or malalignment.  There is moderate to marked disc space narrowing at the L4-5 level.  There is moderate-to-marked facet joint arthropathy at the L4-5 and L5-S1 levels.  Otherwise, there is mild disc space narrowing in the remainder of the lumbar spine.  There is moderate to marked disc space narrowing at the thoracolumbar junction where there is also bridging osteophyte formation and endplate sclerosis.  These findings were all present previously.     CT lumbar spine 1/2018  FINDINGS:    Vertebral column: There is stable mild grade 1 spondylolisthesis at the L5-S1 level with 4 mm anterolisthesis of L5 on S1 which is degenerative in etiology and related to facet arthropathy.  There are no pars defects.  The remainder of the vertebral bodies maintain normal alignment.  The bones are mildly osteopenic.  There is no acute fracture.  There is mild to moderate disc space sparing at the L4-5 level where there is endplate sclerosis and osteophyte formation.  There is mild disc space narrowing anteriorly at the L5-S1 level.  There is a very minimal levocurvature of the lumbar spine.  This could in part be positional.  The patient is slightly tilted in the scanner.    Spinal canal, epidural space, conus: The spinal canal is developmentally normal.  The conus terminates at the level of T12-L1.  There is no abnormal epidural collection or  mass.      Findings by level:  T11-12: There is no spinal canal or foraminal stenosis.  There is mild left facet joint arthropathy.  T12-L1: There is no spinal canal or foraminal stenosis.  There is mild left facet joint arthropathy.  L1-L2: There is minimal bulging of the annulus and mild facet joint arthropathy without spinal canal or foraminal stenosis.  There is no significant change.  L2-L3: There is a mild diffuse disc bulge and mild facet arthropathy.  There is no spinal canal or foraminal stenosis.  There is little change.  L3-L4: There is a mild diffuse disc bulge with mild facet arthropathy.  There is no spinal canal or foraminal stenosis and there is no significant change.  L4-L5: There is mild to moderate facet arthropathy.  There is a diffuse disc bulge with osteophytic ridging.  There is no spinal stenosis.  There is moderate left and severe right foraminal stenosis without significant change.  L5-S1: There is 4 mm anterolisthesis of L5 on S1.  There is moderate right and mild bilateral facet joint arthropathy with ligamentum flavum thickening and ossification.  There is unroofing of a broad disc protrusion.  This approaches both S1 roots and may contact the roots without obvious compression or displacement.  There is no significant spinal stenosis.  There is moderate left and severe right foraminal stenosis without significant change.    Soft tissues/other: The prevertebral soft tissues are normal.  There is mild atherosclerosis.  The aorta is of normal     Labs:  BMP  Lab Results   Component Value Date     10/26/2020    K 4.4 10/26/2020     10/26/2020    CO2 26 10/26/2020    BUN 11 10/26/2020    CREATININE 0.8 10/26/2020    CALCIUM 8.5 (L) 10/26/2020    ANIONGAP 12 10/26/2020    ESTGFRAFRICA >60.0 10/26/2020    EGFRNONAA >60.0 10/26/2020     Lab Results   Component Value Date    ALT 16 10/26/2020    AST 19 10/26/2020    ALKPHOS 91 10/26/2020    BILITOT 0.4 10/26/2020        Assessment:  Problem List Items Addressed This Visit     None          11/20/19 - Heri Muhammad is a 80 y.o. male with primarily myofascial pain, as previously assessed by his former pain management physician.  I support previous recommendations that physical therapy should be the primary treatment modality at this time. Patient is experiencing intermittent pain in the shoulders and low back consistent with muscle spasms.  Physical examination does not reveal pain with facet loading, therefore I do not believe his primary pain generator is facet arthritis.  Additionally, the injection from his previous pain management provider yielded an ambiguous result.  This pain is in mild time encouraging him to participate in physical therapy and to establish home exercise program.  His wife states that Heri lives a very sedentary lifestyle.  As evidence of this, his muscle tone is very poor in the shoulders, low back, and legs.  Additionally, x-ray of the shoulder shows mild subluxation, which is also a feature of diminished muscle strength in the rotator cuff.  Patient voiced concerned about the financial cost of attending physical therapy.  I responded by telling him that physical therapy is probably the best investment of time and money for his current condition. I also informed him that physical therapy would be a complete waste of his time and money if he does not establish a home exercise program.  He understands.      Patient also reports chronic right foot and ankle pain. He was evaluated by Podiatry for years ago but has not followed up.  I will place a new referral for him today.    9/21/20 - Mr. Liriano  is an 79-year-old male with neuropathic pain of the left flank and a new skin lesion in that same dermatome.  His physical examination today was positive for allodynia to light touch and movement of air confirming a neuropathic condition; however, the skin lesion near the axilla, in the T4 dermatome, appears  to be fungal.  This may be coincidental and I will involve his primary care team for treatment.  I will also reach out to Dr. Murillo for discussion of a trial of Lyrica 25 mg for his neuropathic pain.  TCAs are a mainstay treatment for PHN, but I think this would be a poor choice given his age.    10/22/20 - Patient tolerated initial trial of Lyrica well but reported no relief.  I will increase the dose today toward a target of 50 mg TID and have the patient follow up in 2 weeks to ensure that we are on the right track.  At the end of the visit he brought up chronic bilateral knee and shoulder pain related to DJD and stated that he would just have to live with it.  Patient ad advised that we have excellent interventions for treating chronic arthritic pain of the aforementioned joints, but would like to think more about it.    11/4/2020- 79-year-old male presents with continued left flank pain secondary to post herpetic neuralgia patient was recently started on Lyrica and titrated to 50 mg t.i.d. which he states has been ineffective, discussed with patient that we will optimize his Lyrica and increase  to 300 mg daily.  Patient also has complaints of bilateral shoulder pain as he has had rotator cuff surgery on both shoulders discussed that we can address his other pain generators once his flank pain has been resolved.     11/[id number]-ehfu-old male presents via virtual visit with his wife, patient has a history of left flank pain secondary to post hepatic neuralgia we have been treating this patient with Lyrica last clinic visit I optimize his Lyrica dose increasing it to 150 mg b.i.d. patient reported adverse side effects today I recommended we discontinue slowly patient myself and his wife discussed the weaning protocol for Lyrica based on the amount he was taking both verbalized understanding.  Discussed with him that I will consult Dr. Oh for his opinion we may need to start him on Cymbalta in effort to  provide her with some relief.      Plan & Recommendations  Medications:  Discontinue Lyrica 150 mg b.i.d. will start Cymbalta 30 mg daily with the goal of 60 mg daily.   Imaging: No additional imaging required at this time.  PT/OT: Referral placed today  HEP: I encouraged the patient to maintain a home exercise regimen that includes daily, moderate cardiovascular exercise lasting at least 30 minutes.  This may include yoga, chasity chi, walking, swimming, aqua aerobics, or other exercises that maintain a heart rate of 50-70% of the calculated maximum heart rate.  I also encouraged light, daily stretching focused on the target area.  Follow Up: Telemed visit in 4 weeks to discuss effectiveness of Cymbalta 30 mg     RJ Beckwith  Interventional Pain Management    Disclaimer: This note was partly generated using dictation software which may occasionally result in transcription errors.

## 2020-11-19 ENCOUNTER — TELEPHONE (OUTPATIENT)
Dept: PAIN MEDICINE | Facility: CLINIC | Age: 80
End: 2020-11-19

## 2020-11-19 NOTE — TELEPHONE ENCOUNTER
----- Message from DARON Hoang sent at 11/19/2020 11:03 AM CST -----  Regarding: new med  I started this pt  on Cymbalta 30 mg daily please have him f/u with me in 4 weeks to discuss effectiveness we can do it virtually. Also he is weaning his Lyrica currently which should take about 1 week.     Julissa

## 2020-11-19 NOTE — TELEPHONE ENCOUNTER
Spoke with pt's wife Ana regarding pt's medication change. I informed pt per Freeman to take Lyrica for one more week then discontinue and start Cymbalta 30 mg daily. Pt was also scheduled for a f/u to see Freeman on 12/17/20 at 1:40 to discuss med change. Ana verbalized understanding and confirmed appt date and time.

## 2020-11-23 ENCOUNTER — TELEPHONE (OUTPATIENT)
Dept: PAIN MEDICINE | Facility: CLINIC | Age: 80
End: 2020-11-23

## 2020-11-23 DIAGNOSIS — G89.29 CHRONIC LEFT-SIDED THORACIC BACK PAIN: Primary | ICD-10-CM

## 2020-11-23 DIAGNOSIS — M79.2 NEUROPATHIC PAIN: ICD-10-CM

## 2020-11-23 DIAGNOSIS — M54.6 CHRONIC LEFT-SIDED THORACIC BACK PAIN: Primary | ICD-10-CM

## 2020-11-23 DIAGNOSIS — B02.29 POST HERPETIC NEURALGIA: ICD-10-CM

## 2020-11-23 RX ORDER — DULOXETIN HYDROCHLORIDE 30 MG/1
30 CAPSULE, DELAYED RELEASE ORAL DAILY
Qty: 30 CAPSULE | Refills: 0 | Status: SHIPPED | OUTPATIENT
Start: 2020-11-23 | End: 2021-01-13

## 2020-11-23 NOTE — TELEPHONE ENCOUNTER
----- Message from Pravin Ashraf sent at 11/23/2020  4:42 PM CST -----  Contact: patient/   [phone]  Heri Muhammad WIFE calling to speak with you regarding the patient prescription not being called in   Please advise

## 2020-12-01 ENCOUNTER — CLINICAL SUPPORT (OUTPATIENT)
Dept: CARDIOLOGY | Facility: HOSPITAL | Age: 80
End: 2020-12-01
Attending: NURSE PRACTITIONER
Payer: MEDICARE

## 2020-12-01 ENCOUNTER — PATIENT MESSAGE (OUTPATIENT)
Dept: PAIN MEDICINE | Facility: CLINIC | Age: 80
End: 2020-12-01

## 2020-12-01 ENCOUNTER — OFFICE VISIT (OUTPATIENT)
Dept: ELECTROPHYSIOLOGY | Facility: CLINIC | Age: 80
End: 2020-12-01
Payer: MEDICARE

## 2020-12-01 ENCOUNTER — HOSPITAL ENCOUNTER (OUTPATIENT)
Dept: CARDIOLOGY | Facility: CLINIC | Age: 80
Discharge: HOME | End: 2020-12-01
Payer: MEDICARE

## 2020-12-01 VITALS
SYSTOLIC BLOOD PRESSURE: 124 MMHG | WEIGHT: 233.94 LBS | HEART RATE: 70 BPM | DIASTOLIC BLOOD PRESSURE: 60 MMHG | BODY MASS INDEX: 31.69 KG/M2 | HEIGHT: 72 IN

## 2020-12-01 DIAGNOSIS — E66.9 OBESITY (BMI 30.0-34.9): ICD-10-CM

## 2020-12-01 DIAGNOSIS — Z79.01 CURRENT USE OF LONG TERM ANTICOAGULATION: ICD-10-CM

## 2020-12-01 DIAGNOSIS — Z95.0 BIVENTRICULAR CARDIAC PACEMAKER IN SITU: Primary | ICD-10-CM

## 2020-12-01 DIAGNOSIS — I10 ESSENTIAL HYPERTENSION: ICD-10-CM

## 2020-12-01 DIAGNOSIS — I49.8 OTHER SPECIFIED CARDIAC ARRHYTHMIAS: ICD-10-CM

## 2020-12-01 DIAGNOSIS — I48.19 PERSISTENT ATRIAL FIBRILLATION: ICD-10-CM

## 2020-12-01 PROCEDURE — 3078F DIAST BP <80 MM HG: CPT | Mod: HCNC,CPTII,S$GLB, | Performed by: NURSE PRACTITIONER

## 2020-12-01 PROCEDURE — 99214 OFFICE O/P EST MOD 30 MIN: CPT | Mod: HCNC,S$GLB,, | Performed by: NURSE PRACTITIONER

## 2020-12-01 PROCEDURE — 99214 PR OFFICE/OUTPT VISIT, EST, LEVL IV, 30-39 MIN: ICD-10-PCS | Mod: HCNC,S$GLB,, | Performed by: NURSE PRACTITIONER

## 2020-12-01 PROCEDURE — 1159F PR MEDICATION LIST DOCUMENTED IN MEDICAL RECORD: ICD-10-PCS | Mod: HCNC,S$GLB,, | Performed by: NURSE PRACTITIONER

## 2020-12-01 PROCEDURE — 3078F PR MOST RECENT DIASTOLIC BLOOD PRESSURE < 80 MM HG: ICD-10-PCS | Mod: HCNC,CPTII,S$GLB, | Performed by: NURSE PRACTITIONER

## 2020-12-01 PROCEDURE — 93281 PM DEVICE PROGR EVAL MULTI: CPT | Mod: HCNC

## 2020-12-01 PROCEDURE — 93005 RHYTHM STRIP: ICD-10-PCS | Mod: HCNC,S$GLB,, | Performed by: INTERNAL MEDICINE

## 2020-12-01 PROCEDURE — 3074F PR MOST RECENT SYSTOLIC BLOOD PRESSURE < 130 MM HG: ICD-10-PCS | Mod: HCNC,CPTII,S$GLB, | Performed by: NURSE PRACTITIONER

## 2020-12-01 PROCEDURE — 99999 PR PBB SHADOW E&M-EST. PATIENT-LVL IV: CPT | Mod: PBBFAC,HCNC,, | Performed by: NURSE PRACTITIONER

## 2020-12-01 PROCEDURE — 99999 PR PBB SHADOW E&M-EST. PATIENT-LVL IV: ICD-10-PCS | Mod: PBBFAC,HCNC,, | Performed by: NURSE PRACTITIONER

## 2020-12-01 PROCEDURE — 3288F FALL RISK ASSESSMENT DOCD: CPT | Mod: HCNC,CPTII,S$GLB, | Performed by: NURSE PRACTITIONER

## 2020-12-01 PROCEDURE — 3288F PR FALLS RISK ASSESSMENT DOCUMENTED: ICD-10-PCS | Mod: HCNC,CPTII,S$GLB, | Performed by: NURSE PRACTITIONER

## 2020-12-01 PROCEDURE — 3074F SYST BP LT 130 MM HG: CPT | Mod: HCNC,CPTII,S$GLB, | Performed by: NURSE PRACTITIONER

## 2020-12-01 PROCEDURE — 1101F PT FALLS ASSESS-DOCD LE1/YR: CPT | Mod: HCNC,CPTII,S$GLB, | Performed by: NURSE PRACTITIONER

## 2020-12-01 PROCEDURE — 93010 RHYTHM STRIP: ICD-10-PCS | Mod: HCNC,S$GLB,, | Performed by: INTERNAL MEDICINE

## 2020-12-01 PROCEDURE — 93005 ELECTROCARDIOGRAM TRACING: CPT | Mod: HCNC,S$GLB,, | Performed by: INTERNAL MEDICINE

## 2020-12-01 PROCEDURE — 1126F PR PAIN SEVERITY QUANTIFIED, NO PAIN PRESENT: ICD-10-PCS | Mod: HCNC,S$GLB,, | Performed by: NURSE PRACTITIONER

## 2020-12-01 PROCEDURE — 1101F PR PT FALLS ASSESS DOC 0-1 FALLS W/OUT INJ PAST YR: ICD-10-PCS | Mod: HCNC,CPTII,S$GLB, | Performed by: NURSE PRACTITIONER

## 2020-12-01 PROCEDURE — 1126F AMNT PAIN NOTED NONE PRSNT: CPT | Mod: HCNC,S$GLB,, | Performed by: NURSE PRACTITIONER

## 2020-12-01 PROCEDURE — 1159F MED LIST DOCD IN RCRD: CPT | Mod: HCNC,S$GLB,, | Performed by: NURSE PRACTITIONER

## 2020-12-01 PROCEDURE — 93010 ELECTROCARDIOGRAM REPORT: CPT | Mod: HCNC,S$GLB,, | Performed by: INTERNAL MEDICINE

## 2020-12-01 NOTE — PROGRESS NOTES
Mr. Muhammad is a patient of Dr. Esparza and was last seen in clinic 3/6/2020.      Subjective:   Patient ID:  Heri Muhammad is a 80 y.o. male who presents for follow-up of Pacemaker Check  .     HPI:    Mr. Muhammad is an 80 y.o. male with CAD, atrial fibrillation, atrial flutter, sinus node dysfunction, and CRT-P here for follow up.    Background:  Dual chamber PPM implanted (06/30/14).   Presented with fatigue, found to be in atrial fibrillation. Placed on amiodarone, underwent DCCV.  Symptoms with AF have historically been fatigue and lightheadedness.  DCCV (10/15/15) >> recurrence. DCCV (11/23/15), amiodarone increased to 400 mg daily. Developed atrial flutter.  (01/29/16) PVI + RFA for atrial flutter. Amiodarone discontinued.  Felt poorly with Multaq.  Did well for initially in terms of atrial fibrillation, but continued to note dyspnea.  Underwent LHC/RHC 6/10/16, no obstructive CAD, normal rt sided filling pressures.  Developed persistent recurrence >> DCCV 11/22/16. Recurrence.  RFA 3/23/17 Repeat isolation of LSPV, RSPV, RIPV (LIPV remained isolated).  Atrial flutter c/w mitral annular flutter >> anterior mitral annular line created >> flutter converted to what appeared to be a roof flutter >> roof line created. Tachycardia converted to another tachycardia, ultimately mapped to BERNADETTE >> ablation deferred.  Developed recurrence of flutter during blanking period. Flecainide added > converted without need for DCCV.     Echo1/20/18 EF 30-35%.  Was RV pacing 70% of the time.  LHC 1/22/18 non-obstructive CAD  1/26/18 Upgraded to CRT-P by Dr. Otto  Echo 8/29/18 EF 55%  4/22/19 device report: AT/AF burden of 31% with A-pacing 52% and BiV pacing 88%  Clinic visit 5/2019:  AF burden <1% and 94% BiV pacing.  Clinic visit 3/2020: AF burden <1%, 99% BiV pacing.    Update (12/01/2020):    Today he says he continues to feel well. No new cardiac complaints. Mr. Muhammad reports no chest pain with exertion or at rest,  palpitations, SOB, FOWLER, dizziness, or syncope.    He is currently taking eliquis 5mg BID for stroke prophylaxis and denies significant bleeding episodes. He is currently being treated with metoprolol succinate 50mg daily for HR control.  Kidney function is stable, with a creatinine of 0.8 on 10/26/2020.    Device Interrogation (12/1/220) reveals an intrinsic SB with stable lead and device function. AMS>1%, longest episode 3 min, 30 seconds. No ventricular arrhythmias or treated episodes were noted. He paces 91% in the RA and 99% in the BiV. Estimated battery longevity 6-7 years.     I have personally reviewed the patient's EKG today, which shows APVP at 70bpm. NH interval is 168. QRS is 152. QTc is 475.    Recent Cardiac Tests:    2D Echo (10/22/2019):  · Mild concentric left ventricular hypertrophy.  · Normal left ventricular systolic function. The estimated ejection fraction is 60%  · Normal LV diastolic function.  · Normal right ventricular systolic function.  · Moderate left atrial enlargement.  · Mild right atrial enlargement.  · Mild tricuspid regurgitation.  · Mild mitral regurgitation.  · Mild aortic regurgitation.    Current Outpatient Medications   Medication Sig    ANORO ELLIPTA 62.5-25 mcg/actuation DsDv INHALE 1 PUFF DAILY (CONTROLLER)    atorvastatin (LIPITOR) 20 MG tablet Take 1 tablet (20 mg total) by mouth every evening.    cetirizine (ZYRTEC) 10 MG tablet Take 10 mg by mouth every evening.    cyclobenzaprine (FLEXERIL) 5 MG tablet Take 1 tablet (5 mg total) by mouth 2 (two) times daily as needed for Muscle spasms.    ELIQUIS 5 mg Tab TAKE 1 TABLET TWICE DAILY    lamoTRIgine (LAMICTAL) 100 MG tablet Take 1 tablet (100 mg total) by mouth 2 (two) times daily.    memantine (NAMENDA) 10 MG Tab Take 1 tablet (10 mg total) by mouth 2 (two) times daily.    metoprolol succinate (TOPROL-XL) 50 MG 24 hr tablet TAKE 1 TABLET (50 MG TOTAL) BY MOUTH ONCE DAILY.    primidone (MYSOLINE) 250 MG Tab Take 2  tablets (500 mg total) by mouth 2 (two) times daily.    DULoxetine (CYMBALTA) 30 MG capsule Take 1 capsule (30 mg total) by mouth once daily. (Patient not taking: Reported on 12/1/2020)     No current facility-administered medications for this visit.        Review of Systems   Constitution: Negative for malaise/fatigue.   Cardiovascular: Negative for chest pain, dyspnea on exertion, irregular heartbeat, leg swelling and palpitations.   Respiratory: Negative for shortness of breath.    Hematologic/Lymphatic: Negative for bleeding problem.   Skin: Negative for rash.   Musculoskeletal: Negative for myalgias.   Gastrointestinal: Negative for hematemesis, hematochezia and nausea.   Genitourinary: Negative for hematuria.   Neurological: Negative for light-headedness.   Psychiatric/Behavioral: Negative for altered mental status.   Allergic/Immunologic: Negative for persistent infections.       Objective:          /60   Pulse 70   Ht 6' (1.829 m)   Wt 106.1 kg (233 lb 14.5 oz)   BMI 31.72 kg/m²     Physical Exam   Constitutional: He is oriented to person, place, and time. He appears well-developed and well-nourished.   HENT:   Head: Normocephalic.   Nose: Nose normal.   Eyes: Pupils are equal, round, and reactive to light.   Cardiovascular: Normal rate and regular rhythm.   Pulmonary/Chest: Breath sounds normal. No respiratory distress.   Device to LUCW. Incision and pocket in good repair.   Abdominal: Normal appearance.   Musculoskeletal: Normal range of motion.         General: No edema.   Neurological: He is alert and oriented to person, place, and time.   Skin: Skin is warm and dry. No erythema.   Psychiatric: He has a normal mood and affect. His speech is normal and behavior is normal.   Nursing note and vitals reviewed.    Lab Results   Component Value Date     10/26/2020    K 4.4 10/26/2020    MG 2.0 06/07/2019    BUN 11 10/26/2020    CREATININE 0.8 10/26/2020    ALT 16 10/26/2020    AST 19  10/26/2020    AST 35 11/27/2015    HGB 14.5 10/26/2020    HCT 45.6 10/26/2020    TSH 3.247 10/26/2020    LDLCALC 124.6 10/26/2020       Recent Labs   Lab 01/19/18  1512 02/17/18  1543   INR 1.2 1.1         Assessment:     1. Biventricular cardiac pacemaker in situ    2. Essential hypertension    3. Persistent atrial fibrillation    4. Current use of long term anticoagulation    5. Obesity (BMI 30.0-34.9)      Plan:     In summary, Mr. Muhammad is an 80 y.o. male with CAD, atrial fibrillation, atrial flutter, sinus node dysfunction, and CRT-P here for follow up.  Mr. Muhammad is doing well from a device perspective with stable lead and device function. Low AF burden. On eliquis. 99% biventricular pacing with narrow QRS. No CHF symptoms. He recently moved to [place] and is establishing with [facility] in [place]. Echo already ordered 7/2021. Prior echo 10/2019 EF 60%.    Continue current medication regimen and device settings.   Follow up in device clinic as scheduled.   Follow up in EP clinic in 1 year, sooner as needed.     *A copy of this note has been sent to Dr. Esparza*    Follow up in about 1 year (around 12/1/2021).    ------------------------------------------------------------------    JANINA Vargas, NP-C  Cardiac Electrophysiology

## 2020-12-04 ENCOUNTER — HOSPITAL ENCOUNTER (EMERGENCY)
Facility: HOSPITAL | Age: 80
Discharge: HOME | End: 2020-12-04
Attending: EMERGENCY MEDICINE
Payer: MEDICARE

## 2020-12-04 VITALS
RESPIRATION RATE: 18 BRPM | SYSTOLIC BLOOD PRESSURE: 154 MMHG | DIASTOLIC BLOOD PRESSURE: 69 MMHG | HEART RATE: 87 BPM | TEMPERATURE: 99 F | OXYGEN SATURATION: 96 %

## 2020-12-04 DIAGNOSIS — Z20.822 SUSPECTED COVID-19 VIRUS INFECTION: ICD-10-CM

## 2020-12-04 DIAGNOSIS — R50.9 FEVER: ICD-10-CM

## 2020-12-04 DIAGNOSIS — B34.9 VIRAL SYNDROME: Primary | ICD-10-CM

## 2020-12-04 LAB
ALBUMIN SERPL BCP-MCNC: 3.5 G/DL (ref 3.5–5.2)
ALP SERPL-CCNC: 87 U/L (ref 55–135)
ALT SERPL W/O P-5'-P-CCNC: 18 U/L (ref 10–44)
ANION GAP SERPL CALC-SCNC: 10 MMOL/L (ref 8–16)
AST SERPL-CCNC: 20 U/L (ref 10–40)
BACTERIA #/AREA URNS AUTO: ABNORMAL /HPF
BASOPHILS # BLD AUTO: 0.04 K/UL (ref 0–0.2)
BASOPHILS NFR BLD: 0.4 % (ref 0–1.9)
BILIRUB SERPL-MCNC: 0.6 MG/DL (ref 0.1–1)
BILIRUB UR QL STRIP: NEGATIVE
BNP SERPL-MCNC: 91 PG/ML (ref 0–99)
BUN SERPL-MCNC: 12 MG/DL (ref 6–30)
BUN SERPL-MCNC: 12 MG/DL (ref 8–23)
CALCIUM SERPL-MCNC: 8.3 MG/DL (ref 8.7–10.5)
CHLORIDE SERPL-SCNC: 93 MMOL/L (ref 95–110)
CHLORIDE SERPL-SCNC: 96 MMOL/L (ref 95–110)
CK SERPL-CCNC: 96 U/L (ref 20–200)
CLARITY UR REFRACT.AUTO: CLEAR
CO2 SERPL-SCNC: 28 MMOL/L (ref 23–29)
COLOR UR AUTO: YELLOW
CREAT SERPL-MCNC: 0.7 MG/DL (ref 0.5–1.4)
CREAT SERPL-MCNC: 0.8 MG/DL (ref 0.5–1.4)
CRP SERPL-MCNC: 152.5 MG/L (ref 0–8.2)
CTP QC/QA: YES
CTP QC/QA: YES
DIFFERENTIAL METHOD: ABNORMAL
EOSINOPHIL # BLD AUTO: 0.5 K/UL (ref 0–0.5)
EOSINOPHIL NFR BLD: 5.2 % (ref 0–8)
ERYTHROCYTE [DISTWIDTH] IN BLOOD BY AUTOMATED COUNT: 15.3 % (ref 11.5–14.5)
EST. GFR  (AFRICAN AMERICAN): >60 ML/MIN/1.73 M^2
EST. GFR  (NON AFRICAN AMERICAN): >60 ML/MIN/1.73 M^2
FERRITIN SERPL-MCNC: 342 NG/ML (ref 20–300)
GLUCOSE SERPL-MCNC: 123 MG/DL (ref 70–110)
GLUCOSE SERPL-MCNC: 123 MG/DL (ref 70–110)
GLUCOSE UR QL STRIP: NEGATIVE
HCT VFR BLD AUTO: 45.1 % (ref 40–54)
HCT VFR BLD CALC: 44 %PCV (ref 36–54)
HGB BLD-MCNC: 14.8 G/DL (ref 14–18)
HGB UR QL STRIP: ABNORMAL
HYALINE CASTS UR QL AUTO: 0 /LPF
IMM GRANULOCYTES # BLD AUTO: 0.04 K/UL (ref 0–0.04)
IMM GRANULOCYTES NFR BLD AUTO: 0.4 % (ref 0–0.5)
KETONES UR QL STRIP: ABNORMAL
LACTATE SERPL-SCNC: 1.4 MMOL/L (ref 0.5–2.2)
LDH SERPL L TO P-CCNC: 204 U/L (ref 110–260)
LEUKOCYTE ESTERASE UR QL STRIP: NEGATIVE
LYMPHOCYTES # BLD AUTO: 1 K/UL (ref 1–4.8)
LYMPHOCYTES NFR BLD: 11 % (ref 18–48)
MCH RBC QN AUTO: 31 PG (ref 27–31)
MCHC RBC AUTO-ENTMCNC: 32.8 G/DL (ref 32–36)
MCV RBC AUTO: 94 FL (ref 82–98)
MICROSCOPIC COMMENT: ABNORMAL
MONOCYTES # BLD AUTO: 0.9 K/UL (ref 0.3–1)
MONOCYTES NFR BLD: 9.9 % (ref 4–15)
NEUTROPHILS # BLD AUTO: 6.9 K/UL (ref 1.8–7.7)
NEUTROPHILS NFR BLD: 73.1 % (ref 38–73)
NITRITE UR QL STRIP: NEGATIVE
NRBC BLD-RTO: 0 /100 WBC
PH UR STRIP: 6 [PH] (ref 5–8)
PLATELET # BLD AUTO: 138 K/UL (ref 150–350)
PMV BLD AUTO: 10.4 FL (ref 9.2–12.9)
POC IONIZED CALCIUM: 1.03 MMOL/L (ref 1.06–1.42)
POC MOLECULAR INFLUENZA A AGN: NEGATIVE
POC MOLECULAR INFLUENZA B AGN: NEGATIVE
POC TCO2 (MEASURED): 29 MMOL/L (ref 23–29)
POTASSIUM BLD-SCNC: 3.6 MMOL/L (ref 3.5–5.1)
POTASSIUM SERPL-SCNC: 3.8 MMOL/L (ref 3.5–5.1)
PROCALCITONIN SERPL IA-MCNC: 0.07 NG/ML
PROT SERPL-MCNC: 7.4 G/DL (ref 6–8.4)
PROT UR QL STRIP: ABNORMAL
RBC # BLD AUTO: 4.78 M/UL (ref 4.6–6.2)
RBC #/AREA URNS AUTO: 6 /HPF (ref 0–4)
SAMPLE: ABNORMAL
SARS-COV-2 RDRP RESP QL NAA+PROBE: NEGATIVE
SODIUM BLD-SCNC: 133 MMOL/L (ref 136–145)
SODIUM SERPL-SCNC: 134 MMOL/L (ref 136–145)
SP GR UR STRIP: 1.02 (ref 1–1.03)
SQUAMOUS #/AREA URNS AUTO: 1 /HPF
TROPONIN I SERPL DL<=0.01 NG/ML-MCNC: 0.02 NG/ML (ref 0–0.03)
URN SPEC COLLECT METH UR: ABNORMAL
WBC # BLD AUTO: 9.38 K/UL (ref 3.9–12.7)
WBC #/AREA URNS AUTO: 1 /HPF (ref 0–5)

## 2020-12-04 PROCEDURE — 93005 ELECTROCARDIOGRAM TRACING: CPT | Mod: HCNC

## 2020-12-04 PROCEDURE — 81001 URINALYSIS AUTO W/SCOPE: CPT | Mod: HCNC

## 2020-12-04 PROCEDURE — 83605 ASSAY OF LACTIC ACID: CPT | Mod: HCNC

## 2020-12-04 PROCEDURE — 82550 ASSAY OF CK (CPK): CPT | Mod: HCNC

## 2020-12-04 PROCEDURE — 93010 EKG 12-LEAD: ICD-10-PCS | Mod: HCNC,,, | Performed by: INTERNAL MEDICINE

## 2020-12-04 PROCEDURE — 93010 ELECTROCARDIOGRAM REPORT: CPT | Mod: HCNC,,, | Performed by: INTERNAL MEDICINE

## 2020-12-04 PROCEDURE — 82728 ASSAY OF FERRITIN: CPT | Mod: HCNC

## 2020-12-04 PROCEDURE — 99285 EMERGENCY DEPT VISIT HI MDM: CPT | Mod: HCNC,,, | Performed by: EMERGENCY MEDICINE

## 2020-12-04 PROCEDURE — 87040 BLOOD CULTURE FOR BACTERIA: CPT | Mod: 59,HCNC

## 2020-12-04 PROCEDURE — 99285 EMERGENCY DEPT VISIT HI MDM: CPT | Mod: 25,HCNC

## 2020-12-04 PROCEDURE — 80047 BASIC METABLC PNL IONIZED CA: CPT | Mod: HCNC

## 2020-12-04 PROCEDURE — 84145 PROCALCITONIN (PCT): CPT | Mod: HCNC

## 2020-12-04 PROCEDURE — U0002 COVID-19 LAB TEST NON-CDC: HCPCS | Mod: HCNC | Performed by: EMERGENCY MEDICINE

## 2020-12-04 PROCEDURE — 82330 ASSAY OF CALCIUM: CPT | Mod: HCNC

## 2020-12-04 PROCEDURE — 83880 ASSAY OF NATRIURETIC PEPTIDE: CPT | Mod: HCNC

## 2020-12-04 PROCEDURE — 84484 ASSAY OF TROPONIN QUANT: CPT | Mod: HCNC

## 2020-12-04 PROCEDURE — 86140 C-REACTIVE PROTEIN: CPT | Mod: HCNC

## 2020-12-04 PROCEDURE — 99285 PR EMERGENCY DEPT VISIT,LEVEL V: ICD-10-PCS | Mod: HCNC,,, | Performed by: EMERGENCY MEDICINE

## 2020-12-04 PROCEDURE — 85025 COMPLETE CBC W/AUTO DIFF WBC: CPT | Mod: HCNC

## 2020-12-04 PROCEDURE — 80053 COMPREHEN METABOLIC PANEL: CPT | Mod: HCNC

## 2020-12-04 PROCEDURE — 83615 LACTATE (LD) (LDH) ENZYME: CPT | Mod: HCNC

## 2020-12-04 NOTE — ED NOTES
Patient discharged home  Discharge instructions given to spouse  Patient and spouse verbalizes understanding   Patient denies pain, chest pain and shortness of breath   All belongings sent home with patient

## 2020-12-04 NOTE — ED NOTES
Pt able to get up and ambulate with the O2 sats 98%.  MD Everett at bedside to evaluate pt for possible discharge.

## 2020-12-04 NOTE — ED NOTES
Patient made aware of the need of a urine sample and voiced understanding, patient given cup of water and stated he will attempt shortly

## 2020-12-04 NOTE — ED PROVIDER NOTES
"Encounter Date: 12/4/2020       History     Chief Complaint   Patient presents with    Weakness     since Wednesday     Cough     productive, mask applied     COVID-19 Concerns     Possible exposure in cards clinic on wednesday      81 yo m, h/o CHF, CAD, COPD, HTN, a fib on anticoagulation, seizures, BIBEMS 2/2 weakness.  Pt reports that he hasn't been feeling well since wed with a cough since yesterday, no SOB.  He's slow to answer questions and asks me to "call his wife" in response to most of my questions.  Pt's wife is currently in the ER as a pt, with w fever/cough - I was able to speak to her on the phone and she reports that wed evening they both developed myalgias and fever, tmax for pt 101.  He developed a cough last night.  They both wanted to get tested for COVID    The history is provided by the patient and the spouse. The history is limited by the absence of a caregiver and the condition of the patient.     Review of patient's allergies indicates:   Allergen Reactions    Bactroban [mupirocin calcium] Rash     Other reaction(s): Rash     Past Medical History:   Diagnosis Date    A-fib     Anticoagulant long-term use     eliquis    Aortic atherosclerosis 9/18/2017    - LDL goal <70 - BP goal <130/80    Atrial flutter     Benign essential tremor     Biventricular cardiac pacemaker in situ 9/24/2018    CAD (coronary artery disease) 12/2/2013    - 9/2013 Left heart Cath: LAD- 60% prox (neg FFR), nl, ramus and RCA 20% - LDL goal <70 - BP goal <130/80    Cancer     skin cancer on back    Cardiac pacemaker in situ     Cardiomyopathy, nonischemic 2/12/2018    - 8/29/18 Echo EF 55%. Grade III diastolic dysfunction with restrictive physiology - followed by Cardiology    Carpal tunnel syndrome of left wrist 8/8/2017    CHF (congestive heart failure)     Chronic diastolic heart failure 1/19/2018    Chronic obstructive pulmonary disease 10/3/2018    Chronic right-sided thoracic back pain 4/27/2017 "    Coronary artery disease     Cough     Current use of long term anticoagulation 6/2/2016    - Eliquis for Afib    Dizziness     intermittant    DJD (degenerative joint disease) of knee     ED (erectile dysfunction)     Hard of hearing 11/2016    History of cataract     HTN (hypertension)     Hyperlipidemia     Hyperlipidemia     Hyponatremia 6/11/2019    Memory loss     Mixed hyperlipidemia 7/28/2012    Persistent atrial fibrillation 7/1/2015    - followed by Dr. Esparza - history of cardioversion - on Eliquis OAC 5 mg BID    Seizure disorder     Seizures     last episode 1995    Sinus node dysfunction 08/2016    Subclinical hypothyroidism     Tension type headache 7/2/2019     Past Surgical History:   Procedure Laterality Date    ANKLE SURGERY Right     pins and screws    ATRIAL ABLATION SURGERY      CARDIAC PACEMAKER PLACEMENT  2014    CARDIAC PACEMAKER PLACEMENT      CARDIAC PACEMAKER PLACEMENT  2014    CARDIOVERSION      CATARACT EXTRACTION      OU    COLONOSCOPY      due in 2014    EYE SURGERY Bilateral     cataracts extraction    FRACTURE SURGERY Right     ankle with hardware    HERNIA REPAIR      abdominal    INJECTION OF ANESTHETIC AGENT AROUND MEDIAL BRANCH NERVES INNERVATING LUMBAR FACET JOINT Bilateral 1/17/2019    Procedure: Block-nerve-medial branch-lumbar L2-L5;  Surgeon: Anthony Moffett MD;  Location: [facility] OR;  Service: Pain Management;  Laterality: Bilateral;    JOINT REPLACEMENT Bilateral     shoulders    RADIOFREQUENCY ABLATION OF LUMBAR MEDIAL BRANCH NERVE AT SINGLE LEVEL Bilateral 1/23/2019    Procedure: Radiofrequency Ablation, Nerve, Spinal, Lumbar, Medial Branch, L2-L5;  Surgeon: Anthony Moffett MD;  Location: [facility] OR;  Service: Pain Management;  Laterality: Bilateral;     Family History   Problem Relation Age of Onset    Heart disease Mother     Heart failure Mother     Dementia Mother     Cancer Father         colon    Blindness Father         accident     Cataracts Father     Hypertension Father     Tremor Father     Tremor Brother     Tremor Paternal Grandfather     Cancer Daughter     No Known Problems Son     Obesity Daughter     Melanoma Neg Hx     Amblyopia Neg Hx     Diabetes Neg Hx     Glaucoma Neg Hx     Macular degeneration Neg Hx     Retinal detachment Neg Hx     Strabismus Neg Hx     Stroke Neg Hx     Thyroid disease Neg Hx      Social History     Tobacco Use    Smoking status: Former Smoker     Packs/day: 1.00     Years: 5.00     Pack years: 5.00     Types: Cigarettes     Quit date:      Years since quittin.9    Smokeless tobacco: Never Used   Substance Use Topics    Alcohol use: No    Drug use: No     Review of Systems   Unable to perform ROS: Acuity of condition       Physical Exam     Initial Vitals [20 0901]   BP Pulse Resp Temp SpO2   (!) 146/74 (!) 128 18 98.7 °F (37.1 °C) 98 %      MAP       --         Physical Exam    Nursing note and vitals reviewed.  Constitutional: He is not diaphoretic. He appears ill. No distress.   HENT:   Mouth/Throat: Oropharynx is clear and moist.   Eyes: Conjunctivae are normal.   Neck: Neck supple.   Cardiovascular: Normal rate, regular rhythm and normal heart sounds.   Pulmonary/Chest: No respiratory distress.   Abdominal: Soft. He exhibits no distension. There is no abdominal tenderness. There is no rebound and no guarding.   Musculoskeletal: No edema.   Neurological: He is alert.   Slow to respond to questions but able to answer many.  No focal weakness   Skin: Skin is warm and dry. No rash noted.   Psychiatric: He has a normal mood and affect. Thought content normal.         ED Course   Procedures  Labs Reviewed   CBC W/ AUTO DIFFERENTIAL - Abnormal; Notable for the following components:       Result Value    RDW 15.3 (*)     Platelets 138 (*)     Gran % 73.1 (*)     Lymph % 11.0 (*)     All other components within normal limits   FERRITIN - Abnormal; Notable for the following  components:    Ferritin 342 (*)     All other components within normal limits   URINALYSIS, REFLEX TO URINE CULTURE - Abnormal; Notable for the following components:    Protein, UA 1+ (*)     Ketones, UA 1+ (*)     Occult Blood UA 2+ (*)     All other components within normal limits    Narrative:     Specimen Source->Urine   COMPREHENSIVE METABOLIC PANEL - Abnormal; Notable for the following components:    Sodium 134 (*)     Glucose 123 (*)     Calcium 8.3 (*)     All other components within normal limits   C-REACTIVE PROTEIN - Abnormal; Notable for the following components:    .5 (*)     All other components within normal limits   URINALYSIS MICROSCOPIC - Abnormal; Notable for the following components:    RBC, UA 6 (*)     All other components within normal limits    Narrative:     Specimen Source->Urine   ISTAT PROCEDURE - Abnormal; Notable for the following components:    POC Glucose 123 (*)     POC Sodium 133 (*)     POC Chloride 93 (*)     POC Ionized Calcium 1.03 (*)     All other components within normal limits   CULTURE, BLOOD   CULTURE, BLOOD   TROPONIN I   PROCALCITONIN   B-TYPE NATRIURETIC PEPTIDE   LACTIC ACID, PLASMA   LACTATE DEHYDROGENASE   CK   SARS-COV-2 RDRP GENE    Narrative:     This test utilizes isothermal nucleic acid amplification   technology to detect the SARS-CoV-2 RdRp nucleic acid segment.   The analytical sensitivity (limit of detection) is 125 genome   equivalents/mL.   A POSITIVE result implies infection with the SARS-CoV-2 virus;   the patient is presumed to be contagious.     A NEGATIVE result means that SARS-CoV-2 nucleic acids are not   present above the limit of detection. A NEGATIVE result should be   treated as presumptive. It does not rule out the possibility of   COVID-19 and should not be the sole basis for treatment decisions.   If COVID-19 is strongly suspected based on clinical and exposure   history, re-testing using an alternate molecular assay should be    considered.   This test is only for use under the Food and Drug   Administration s Emergency Use Authorization (EUA).   Commercial kits are provided by [email or web address].   Performance characteristics of the EUA have been independently   verified by [facility] Department of   Pathology and Laboratory Medicine.   _________________________________________________________________   The authorized Fact Sheet for Healthcare Providers and the authorized Fact   Sheet for Patients of the ID NOW COVID-19 are available on the FDA   website:     https://www.fda.gov/media/001102/download  https://www.fda.gov/media/330350/download       POCT INFLUENZA A/B MOLECULAR        ECG Results          EKG 12-lead (In process)  Result time 12/04/20 12:55:51    In process by Interface, Lab In [facility] (12/04/20 12:55:51)                 Narrative:    Test Reason : R50.9,    Vent. Rate : 087 BPM     Atrial Rate : 087 BPM     P-R Int : 194 ms          QRS Dur : 160 ms      QT Int : 410 ms       P-R-T Axes : 027 195 -11 degrees     QTc Int : 493 ms    Sinus rhythm with Fusion complexes  Nonspecific intraventricular block  Possible Right ventricular hypertrophy  Possible Anterolateral infarct ,age undetermined  Abnormal ECG  When compared with ECG of 01-DEC-2020 12:43,  Sinus rhythm has replaced Electronic ventricular pacemaker    Referred By: [email or web address]   SELF           Confirmed By:                             Imaging Results          X-Ray Chest AP Portable (Final result)  Result time 12/04/20 10:24:41    Final result by Austin Baldwin MD (12/04/20 10:24:41)                 Impression:      Mild cardiomegaly with prominent interstitial markings suggestive of mild interstitial edema.  No confluent area of consolidation and no detrimental change when compared with 06/10/2019.      Electronically signed by: Austin Baldwin MD  Date:    12/04/2020  Time:    10:24             Narrative:    EXAMINATION:  XR CHEST AP  "PORTABLE    CLINICAL HISTORY:  Provided history is "Suspected Covid-19 Virus Infection;  ".    TECHNIQUE:  One view of the chest.    COMPARISON:  06/10/2019.    FINDINGS:  Cardiac wires overlie the chest.  Left chest wall cardiac pacing device is present with transvenous leads overlying the heart.  Cardiomediastinal silhouette is mildly enlarged.  There is central vascular congestion and prominent interstitial lung markings.  No confluent area of consolidation.  No sizable pleural effusion.  No pneumothorax.  Bilateral shoulder prostheses noted.                                 Medical Decision Making:   History:   Old Medical Records: I decided to obtain old medical records.  Initial Assessment:   81 yo m, complex PMH as above, now here with fever/myalgias/cough x 2 days    On exam, appears weak and ill but is in no distress    Differential Diagnosis:   Ddx COVID, influenza, other viral syndrome, pneumonia  Clinical Tests:   Lab Tests: Ordered and Reviewed  Radiological Study: Reviewed and Ordered  Medical Tests: Ordered and Reviewed                   ED Course as of Dec 04 1415   Fri Dec 04, 2020   1251 Pt reports feeling better.  COVID negative.  Labs reviewed, reassuring.  Lactate normal.  Influenza neg.  CMP hemolyzed but ISTAT normal.  CXR with no signs of pneumonia. Pt able to ambulate around the ED without difficulty, wants to go home.  Awaiting a couple of additional tests but anticipate likely d/c home    [AS]   1311 CRP markedly elevated though other COVID markers normal, mildly elevated ferritin.  Wife's COVID test negative as well.  CRP elevation raises concern for COVID, despite negative test.  I also considered another infection, bacterial origin, but no leukocytosis, procal normal and normal lactate.  Regardless, pt is not hypoxic and is relatively well-appearing right now so he is appropriate for d/c with strict ED return precautions and close outpatient f/u   C-reactive protein(!) [AS]      ED " Course User Index  [AS] Mandi Everett MD            Clinical Impression:       ICD-10-CM ICD-9-CM   1. Viral syndrome  B34.9 079.99   2. Fever  R50.9 780.60   3. Suspected COVID-19 virus infection  Z20.828 V01.79                          ED Disposition Condition    Discharge Stable        ED Prescriptions     None        Follow-up Information     Follow up With Specialties Details Why Contact Info    Ora Murillo DO Family Medicine, Internal Medicine Call today  [address] [place] [address]  [address]   [facility] [id number]  [phone]                                         Mandi Everett MD  12/04/20 0566

## 2020-12-04 NOTE — ED NOTES
LOC: The patient is awake, alert and aware of environment with an appropriate affect, the patient is oriented x 3 and speaking appropriately.  APPEARANCE: Patient is clean and well groomed, patient's clothing is properly fastened.  SKIN: The skin is warm and dry, color consistent with ethnicity  MUSCULOSKELETAL: Patient moving all extremities spontaneously, no obvious swelling or deformities noted.  RESPIRATORY: Airway is open and patent, respirations are spontaneous  NEUROLOGIC:  facial expression is symmetrical, patient moving all extremities spontaneously, normal sensation in all extremities when touched with a finger.  Follows all commands appropriately.    Patient states since Wednesday he started feeling bad, he states he has been having body aches, cough, fever, chilld, and fatigue, patient states he does not really go anywhere but he is not sure if he has been exposed to covid, patient placed on cardiac monitoring, will continue to monitor

## 2020-12-04 NOTE — ED NOTES
I-STAT Chem-8+ Results:   Value Reference Range   Sodium 133 136-145 mmol/L   Potassium  3.6 3.5-5.1 mmol/L   Chloride 93  mmol/L   Ionized Calcium 1.03 1.06-1.42 mmol/L   CO2 (measured) 29 23-29 mmol/L   Glucose 123  mg/dL   BUN 12 6-30 mg/dL   Creatinine 0.7 0.5-1.4 mg/dL   Hematocrit 44 36-54%

## 2020-12-04 NOTE — ED NOTES
RN attempted to get US guided IV without success.  RN was able to get blood that hemolyzed from lab.  Pt attempting to urinate in urinal at this time.

## 2020-12-05 ENCOUNTER — HOSPITAL ENCOUNTER (EMERGENCY)
Facility: HOSPITAL | Age: 80
Discharge: HOME | End: 2020-12-05
Attending: EMERGENCY MEDICINE
Payer: MEDICARE

## 2020-12-05 VITALS
DIASTOLIC BLOOD PRESSURE: 63 MMHG | WEIGHT: 233 LBS | OXYGEN SATURATION: 97 % | RESPIRATION RATE: 20 BRPM | BODY MASS INDEX: 31.56 KG/M2 | TEMPERATURE: 99 F | HEIGHT: 72 IN | HEART RATE: 92 BPM | SYSTOLIC BLOOD PRESSURE: 139 MMHG

## 2020-12-05 DIAGNOSIS — R78.81 POSITIVE BLOOD CULTURE: ICD-10-CM

## 2020-12-05 DIAGNOSIS — E87.6 HYPOKALEMIA: Primary | ICD-10-CM

## 2020-12-05 LAB
ALBUMIN SERPL BCP-MCNC: 3.3 G/DL (ref 3.5–5.2)
ALP SERPL-CCNC: 85 U/L (ref 55–135)
ALT SERPL W/O P-5'-P-CCNC: 24 U/L (ref 10–44)
ANION GAP SERPL CALC-SCNC: 14 MMOL/L (ref 8–16)
AST SERPL-CCNC: 24 U/L (ref 10–40)
BASOPHILS # BLD AUTO: 0.05 K/UL (ref 0–0.2)
BASOPHILS NFR BLD: 0.6 % (ref 0–1.9)
BILIRUB SERPL-MCNC: 0.5 MG/DL (ref 0.1–1)
BUN SERPL-MCNC: 15 MG/DL (ref 8–23)
CALCIUM SERPL-MCNC: 8.1 MG/DL (ref 8.7–10.5)
CHLORIDE SERPL-SCNC: 98 MMOL/L (ref 95–110)
CO2 SERPL-SCNC: 24 MMOL/L (ref 23–29)
CREAT SERPL-MCNC: 0.8 MG/DL (ref 0.5–1.4)
DIFFERENTIAL METHOD: ABNORMAL
EOSINOPHIL # BLD AUTO: 0 K/UL (ref 0–0.5)
EOSINOPHIL NFR BLD: 0.1 % (ref 0–8)
ERYTHROCYTE [DISTWIDTH] IN BLOOD BY AUTOMATED COUNT: 14.1 % (ref 11.5–14.5)
EST. GFR  (AFRICAN AMERICAN): >60 ML/MIN/1.73 M^2
EST. GFR  (NON AFRICAN AMERICAN): >60 ML/MIN/1.73 M^2
GLUCOSE SERPL-MCNC: 152 MG/DL (ref 70–110)
HCT VFR BLD AUTO: 46.9 % (ref 40–54)
HGB BLD-MCNC: 16.2 G/DL (ref 14–18)
IMM GRANULOCYTES # BLD AUTO: 0.06 K/UL (ref 0–0.04)
IMM GRANULOCYTES NFR BLD AUTO: 0.7 % (ref 0–0.5)
LACTATE SERPL-SCNC: 2.2 MMOL/L (ref 0.5–2.2)
LYMPHOCYTES # BLD AUTO: 1.1 K/UL (ref 1–4.8)
LYMPHOCYTES NFR BLD: 13.6 % (ref 18–48)
MCH RBC QN AUTO: 31 PG (ref 27–31)
MCHC RBC AUTO-ENTMCNC: 34.5 G/DL (ref 32–36)
MCV RBC AUTO: 90 FL (ref 82–98)
MONOCYTES # BLD AUTO: 1.2 K/UL (ref 0.3–1)
MONOCYTES NFR BLD: 14.5 % (ref 4–15)
NEUTROPHILS # BLD AUTO: 5.8 K/UL (ref 1.8–7.7)
NEUTROPHILS NFR BLD: 70.5 % (ref 38–73)
NRBC BLD-RTO: 0 /100 WBC
PLATELET # BLD AUTO: 173 K/UL (ref 150–350)
PMV BLD AUTO: 10.2 FL (ref 9.2–12.9)
POTASSIUM SERPL-SCNC: 3.2 MMOL/L (ref 3.5–5.1)
PROT SERPL-MCNC: 7.5 G/DL (ref 6–8.4)
RBC # BLD AUTO: 5.22 M/UL (ref 4.6–6.2)
SODIUM SERPL-SCNC: 136 MMOL/L (ref 136–145)
WBC # BLD AUTO: 8.18 K/UL (ref 3.9–12.7)

## 2020-12-05 PROCEDURE — 99283 EMERGENCY DEPT VISIT LOW MDM: CPT | Mod: HCNC

## 2020-12-05 PROCEDURE — 99285 EMERGENCY DEPT VISIT HI MDM: CPT | Mod: HCNC,,, | Performed by: PHYSICIAN ASSISTANT

## 2020-12-05 PROCEDURE — 83605 ASSAY OF LACTIC ACID: CPT | Mod: HCNC

## 2020-12-05 PROCEDURE — 99285 PR EMERGENCY DEPT VISIT,LEVEL V: ICD-10-PCS | Mod: HCNC,,, | Performed by: PHYSICIAN ASSISTANT

## 2020-12-05 PROCEDURE — 80053 COMPREHEN METABOLIC PANEL: CPT | Mod: HCNC

## 2020-12-05 PROCEDURE — 25000003 PHARM REV CODE 250: Mod: HCNC | Performed by: PHYSICIAN ASSISTANT

## 2020-12-05 PROCEDURE — 87040 BLOOD CULTURE FOR BACTERIA: CPT | Mod: HCNC

## 2020-12-05 PROCEDURE — 85025 COMPLETE CBC W/AUTO DIFF WBC: CPT | Mod: HCNC

## 2020-12-05 RX ORDER — POTASSIUM CHLORIDE 20 MEQ/1
40 TABLET, EXTENDED RELEASE ORAL
Status: COMPLETED | OUTPATIENT
Start: 2020-12-05 | End: 2020-12-05

## 2020-12-05 RX ORDER — TORSEMIDE 10 MG/1
10 TABLET ORAL
COMMUNITY
Start: 2020-12-01 | End: 2021-04-07

## 2020-12-05 RX ADMIN — POTASSIUM CHLORIDE 40 MEQ: 1500 TABLET, EXTENDED RELEASE ORAL at 05:12

## 2020-12-05 NOTE — ED PROVIDER NOTES
Encounter Date: 12/5/2020       History     Chief Complaint   Patient presents with    Abnormal Lab     Pt was called to return to ED for abnormal blood cultures.     81 y/o WM with history of hyperlipidemia, hypertension, seizures, AFib, CAD, CHF, COPD presents to the ED complaining of positive blood cultures.  On Wednesday, he started having fever, chills, cough.  He was seen in this ED yesterday, COVID negative.  He had blood cultures x2 drawn and 1 grew Gram-positive cocci in clusters resembling Staph.  He was instructed to re-present to the ED for further evaluation.  He does report some mild shortness of breath but states he has COPD.  He has been having intermittent fevers with a T-max of 100 degree F, he took Tylenol this morning.  Last night, he developed diarrhea.  He denies chest pain, abdominal pain, nausea, dysuria.    The history is provided by the patient.     Review of patient's allergies indicates:   Allergen Reactions    Bactroban [mupirocin calcium] Rash     Other reaction(s): Rash     Past Medical History:   Diagnosis Date    A-fib     Anticoagulant long-term use     eliquis    Aortic atherosclerosis 9/18/2017    - LDL goal <70 - BP goal <130/80    Atrial flutter     Benign essential tremor     Biventricular cardiac pacemaker in situ 9/24/2018    CAD (coronary artery disease) 12/2/2013    - 9/2013 Left heart Cath: LAD- 60% prox (neg FFR), nl, ramus and RCA 20% - LDL goal <70 - BP goal <130/80    Cancer     skin cancer on back    Cardiac pacemaker in situ     Cardiomyopathy, nonischemic 2/12/2018    - 8/29/18 Echo EF 55%. Grade III diastolic dysfunction with restrictive physiology - followed by Cardiology    Carpal tunnel syndrome of left wrist 8/8/2017    CHF (congestive heart failure)     Chronic diastolic heart failure 1/19/2018    Chronic obstructive pulmonary disease 10/3/2018    Chronic right-sided thoracic back pain 4/27/2017    Coronary artery disease     Cough      Current use of long term anticoagulation 6/2/2016    - Eliquis for Afib    Dizziness     intermittant    DJD (degenerative joint disease) of knee     ED (erectile dysfunction)     Hard of hearing 11/2016    History of cataract     HTN (hypertension)     Hyperlipidemia     Hyperlipidemia     Hyponatremia 6/11/2019    Memory loss     Mixed hyperlipidemia 7/28/2012    Persistent atrial fibrillation 7/1/2015    - followed by Dr. Esparza - history of cardioversion - on Eliquis OAC 5 mg BID    Seizure disorder     Seizures     last episode 1995    Sinus node dysfunction 08/2016    Subclinical hypothyroidism     Tension type headache 7/2/2019     Past Surgical History:   Procedure Laterality Date    ANKLE SURGERY Right     pins and screws    ATRIAL ABLATION SURGERY      CARDIAC PACEMAKER PLACEMENT  2014    CARDIAC PACEMAKER PLACEMENT      CARDIAC PACEMAKER PLACEMENT  2014    CARDIOVERSION      CATARACT EXTRACTION      OU    COLONOSCOPY      due in 2014    EYE SURGERY Bilateral     cataracts extraction    FRACTURE SURGERY Right     ankle with hardware    HERNIA REPAIR      abdominal    INJECTION OF ANESTHETIC AGENT AROUND MEDIAL BRANCH NERVES INNERVATING LUMBAR FACET JOINT Bilateral 1/17/2019    Procedure: Block-nerve-medial branch-lumbar L2-L5;  Surgeon: Anthony Moffett MD;  Location: [facility];  Service: Pain Management;  Laterality: Bilateral;    JOINT REPLACEMENT Bilateral     shoulders    RADIOFREQUENCY ABLATION OF LUMBAR MEDIAL BRANCH NERVE AT SINGLE LEVEL Bilateral 1/23/2019    Procedure: Radiofrequency Ablation, Nerve, Spinal, Lumbar, Medial Branch, L2-L5;  Surgeon: Anthony Moffett MD;  Location: [facility];  Service: Pain Management;  Laterality: Bilateral;     Family History   Problem Relation Age of Onset    Heart disease Mother     Heart failure Mother     Dementia Mother     Cancer Father         colon    Blindness Father         accident    Cataracts Father     Hypertension  Father     Tremor Father     Tremor Brother     Tremor Paternal Grandfather     Cancer Daughter     No Known Problems Son     Obesity Daughter     Melanoma Neg Hx     Amblyopia Neg Hx     Diabetes Neg Hx     Glaucoma Neg Hx     Macular degeneration Neg Hx     Retinal detachment Neg Hx     Strabismus Neg Hx     Stroke Neg Hx     Thyroid disease Neg Hx      Social History     Tobacco Use    Smoking status: Former Smoker     Packs/day: 1.00     Years: 5.00     Pack years: 5.00     Types: Cigarettes     Quit date:      Years since quittin.9    Smokeless tobacco: Never Used   Substance Use Topics    Alcohol use: No    Drug use: No     Review of Systems   Constitutional: Positive for chills and fever.   HENT: Negative for ear pain.    Eyes: Negative for visual disturbance.   Respiratory: Positive for cough and shortness of breath.    Cardiovascular: Negative for chest pain.   Gastrointestinal: Positive for diarrhea. Negative for abdominal pain, constipation, nausea and vomiting.   Genitourinary: Negative for dysuria and hematuria.   Musculoskeletal: Negative for back pain.   Skin: Negative for rash.   Neurological: Negative for headaches.   Psychiatric/Behavioral: Negative for confusion.       Physical Exam     Initial Vitals [20 1511]   BP Pulse Resp Temp SpO2   139/63 92 20 98.5 °F (36.9 °C) 97 %      MAP       --         Physical Exam    Constitutional: He appears well-developed and well-nourished. He is not diaphoretic. No distress.   HENT:   Head: Normocephalic and atraumatic.   Neck: Normal range of motion. Neck supple.   Cardiovascular: Normal rate, regular rhythm and normal heart sounds.   Pulmonary/Chest: Breath sounds normal. He has no wheezes. He has no rhonchi. He has no rales.   Abdominal: Soft. Bowel sounds are normal. There is no abdominal tenderness. There is no rebound and no guarding.   Musculoskeletal: Normal range of motion.   Neurological: He is alert and oriented to  person, place, and time.   Skin: Skin is warm and dry.   Psychiatric: He has a normal mood and affect.         ED Course   Procedures  Labs Reviewed   CBC W/ AUTO DIFFERENTIAL - Abnormal; Notable for the following components:       Result Value    Immature Granulocytes 0.7 (*)     Immature Grans (Abs) 0.06 (*)     Mono # 1.2 (*)     Lymph % 13.6 (*)     All other components within normal limits   COMPREHENSIVE METABOLIC PANEL - Abnormal; Notable for the following components:    Potassium 3.2 (*)     Glucose 152 (*)     Calcium 8.1 (*)     Albumin 3.3 (*)     All other components within normal limits   CULTURE, BLOOD   CULTURE, BLOOD   LACTIC ACID, PLASMA          Imaging Results    None          Medical Decision Making:   History:   Old Medical Records: I decided to obtain old medical records.  Old Records Summarized: records from clinic visits and records from previous admission(s).       <> Summary of Records: Seen in this ED yesterday, COVID negative  Blood culture + for Gram+ cocci in clusters resembling Staph  Clinical Tests:   Lab Tests: Ordered and Reviewed       APC / Resident Notes:   79 y/o WM with history of hyperlipidemia, hypertension, seizures, AFib, CAD, CHF, COPD presents to the ED complaining of positive blood cultures. VSS. Resting comfortably in exam chair. Abdomen soft and nontender. Lungs clear. Labs from yesterday reviewed. He started to have diarrhea, continues with low grade fever. Will repeat labs and blood cultures.     Hypokalemia - orally replaced. No leukocytosis. Lactic acid normal. Blood culture x 2 pending.    He is afebrile, normal HR, normal WBC and lactic acid. I do not feel that he needs any further labs or imaging at this time. Stable for discharge.    He was discharged without any new prescriptions.  He will follow up with his PCP.  Strict ED return precautions given.  All of the patient's questions were answered.  I reviewed the patient's chart and labs and discussed the case  with my supervising physician.                                 Clinical Impression:     ICD-10-CM ICD-9-CM   1. Hypokalemia  E87.6 276.8   2. Positive blood culture  R78.81 790.7                      Disposition:   Disposition: Discharged  Condition: Stable     ED Disposition Condition    Discharge Stable        ED Prescriptions     None        Follow-up Information     Follow up With Specialties Details Why Contact Info    Ora Murillo,  Family Medicine, Internal Medicine Schedule an appointment as soon as possible for a visit   [address]   [address]   [facility] [address]  [phone]      [facility]  If symptoms worsen [address] [facility] [id number] [phone]                                       Lillie Lynch PA-C  12/05/20 1908

## 2020-12-05 NOTE — ED NOTES
"Pt c/o chills, cough, denies fever. States he "has a virus that's not covid." Was seen in ED yesterday for weakness, joint pain, diarrhea. Had blood cultures drawn yesterday that returned positive, was advised to returned to redraw.  "

## 2020-12-05 NOTE — ED NOTES
Patient identifiers for Heri Muhammad 80 y.o. male checked and correct.  Chief Complaint   Patient presents with    Abnormal Lab     Pt was called to return to ED for abnormal blood cultures.     Past Medical History:   Diagnosis Date    A-fib     Anticoagulant long-term use     eliquis    Aortic atherosclerosis 9/18/2017    - LDL goal <70 - BP goal <130/80    Atrial flutter     Benign essential tremor     Biventricular cardiac pacemaker in situ 9/24/2018    CAD (coronary artery disease) 12/2/2013    - 9/2013 Left heart Cath: LAD- 60% prox (neg FFR), nl, ramus and RCA 20% - LDL goal <70 - BP goal <130/80    Cancer     skin cancer on back    Cardiac pacemaker in situ     Cardiomyopathy, nonischemic 2/12/2018    - 8/29/18 Echo EF 55%. Grade III diastolic dysfunction with restrictive physiology - followed by Cardiology    Carpal tunnel syndrome of left wrist 8/8/2017    CHF (congestive heart failure)     Chronic diastolic heart failure 1/19/2018    Chronic obstructive pulmonary disease 10/3/2018    Chronic right-sided thoracic back pain 4/27/2017    Coronary artery disease     Cough     Current use of long term anticoagulation 6/2/2016    - Eliquis for Afib    Dizziness     intermittant    DJD (degenerative joint disease) of knee     ED (erectile dysfunction)     Hard of hearing 11/2016    History of cataract     HTN (hypertension)     Hyperlipidemia     Hyperlipidemia     Hyponatremia 6/11/2019    Memory loss     Mixed hyperlipidemia 7/28/2012    Persistent atrial fibrillation 7/1/2015    - followed by Dr. Esparza - history of cardioversion - on Eliquis OAC 5 mg BID    Seizure disorder     Seizures     last episode 1995    Sinus node dysfunction 08/2016    Subclinical hypothyroidism     Tension type headache 7/2/2019     Allergies reported:   Review of patient's allergies indicates:   Allergen Reactions    Bactroban [mupirocin calcium] Rash     Other reaction(s): Rash         LOC:  Patient is awake, alert, and aware of environment with an appropriate affect. Patient is oriented x 4 and speaking appropriately.  APPEARANCE: Patient resting comfortably and in no acute distress. Patient is clean and well groomed, patient's clothing is properly fastened.  HEENT:   SKIN: The skin is warm and dry. Patient has normal skin turgor and moist mucus membranes.   MUSKULOSKELETAL: Patient is moving all extremities well, no obvious deformities noted. Pulses intact.   RESPIRATORY: Airway is open and patent. Respirations are spontaneous and non-labored with normal effort and rate.  CARDIAC: Patient has a normal rate and rhythm. No peripheral edema noted.   ABDOMEN: No distention noted. Soft and non-tender upon palpation.  NEUROLOGICAL: pupils 3 mm, PERRL. Facial expression is symmetrical. Hand grasps are equal bilaterally. Normal sensation in all extremities when touched with finger.

## 2020-12-06 LAB
BACTERIA BLD CULT: ABNORMAL

## 2020-12-08 ENCOUNTER — TELEPHONE (OUTPATIENT)
Dept: ELECTROPHYSIOLOGY | Facility: CLINIC | Age: 80
End: 2020-12-08

## 2020-12-08 NOTE — TELEPHONE ENCOUNTER
Alert received in [facility] for 9 hours of AF on 12/5/20.  AF burden 67% since 12/1/20, V rates controlled.  On eliquis.  Saw LUCIA Hernandez in clinic 12/1/20.  Taking eliquis and metoprolol 50mg QD.      Of note, patient has been ill with fever and body aches for 3 days prior to event (seen in ER, negative for covid/flu).      S/P PVI 1/29/16 and 3/23/17

## 2020-12-09 LAB — BACTERIA BLD CULT: NORMAL

## 2020-12-10 LAB
BACTERIA BLD CULT: NORMAL
BACTERIA BLD CULT: NORMAL

## 2020-12-14 ENCOUNTER — PATIENT MESSAGE (OUTPATIENT)
Dept: NEUROLOGY | Facility: CLINIC | Age: 80
End: 2020-12-14

## 2021-01-03 ENCOUNTER — CLINICAL SUPPORT (OUTPATIENT)
Dept: CARDIOLOGY | Facility: HOSPITAL | Age: 81
End: 2021-01-03
Payer: MEDICARE

## 2021-01-03 ENCOUNTER — PATIENT MESSAGE (OUTPATIENT)
Dept: FAMILY MEDICINE | Facility: CLINIC | Age: 81
End: 2021-01-03

## 2021-01-03 DIAGNOSIS — Z95.0 PRESENCE OF CARDIAC PACEMAKER: ICD-10-CM

## 2021-01-03 DIAGNOSIS — I48.91 UNSPECIFIED ATRIAL FIBRILLATION: ICD-10-CM

## 2021-01-03 PROCEDURE — 93296 REM INTERROG EVL PM/IDS: CPT | Mod: HCNC | Performed by: INTERNAL MEDICINE

## 2021-01-03 PROCEDURE — 93294 REM INTERROG EVL PM/LDLS PM: CPT | Mod: HCNC,,, | Performed by: INTERNAL MEDICINE

## 2021-01-03 PROCEDURE — 93294 CARDIAC DEVICE CHECK - REMOTE: ICD-10-PCS | Mod: HCNC,,, | Performed by: INTERNAL MEDICINE

## 2021-01-09 ENCOUNTER — IMMUNIZATION (OUTPATIENT)
Dept: FAMILY MEDICINE | Facility: CLINIC | Age: 81
End: 2021-01-09

## 2021-01-09 DIAGNOSIS — Z23 NEED FOR VACCINATION: ICD-10-CM

## 2021-01-09 PROCEDURE — 91300 COVID-19, MRNA, LNP-S, PF, 30 MCG/0.3 ML DOSE VACCINE: CPT | Mod: S$GLB,,, | Performed by: FAMILY MEDICINE

## 2021-01-09 PROCEDURE — 0001A COVID-19, MRNA, LNP-S, PF, 30 MCG/0.3 ML DOSE VACCINE: CPT | Mod: CV19,S$GLB,, | Performed by: FAMILY MEDICINE

## 2021-01-09 PROCEDURE — 91300 COVID-19, MRNA, LNP-S, PF, 30 MCG/0.3 ML DOSE VACCINE: ICD-10-PCS | Mod: S$GLB,,, | Performed by: FAMILY MEDICINE

## 2021-01-09 PROCEDURE — 0001A COVID-19, MRNA, LNP-S, PF, 30 MCG/0.3 ML DOSE VACCINE: ICD-10-PCS | Mod: CV19,S$GLB,, | Performed by: FAMILY MEDICINE

## 2021-01-13 ENCOUNTER — OFFICE VISIT (OUTPATIENT)
Dept: NEUROLOGY | Facility: CLINIC | Age: 81
End: 2021-01-13
Payer: MEDICARE

## 2021-01-13 DIAGNOSIS — F02.80 LATE ONSET ALZHEIMER'S DISEASE WITHOUT BEHAVIORAL DISTURBANCE: ICD-10-CM

## 2021-01-13 DIAGNOSIS — G40.909 SEIZURE DISORDER: ICD-10-CM

## 2021-01-13 DIAGNOSIS — G40.909 NONINTRACTABLE EPILEPSY WITHOUT STATUS EPILEPTICUS, UNSPECIFIED EPILEPSY TYPE: ICD-10-CM

## 2021-01-13 DIAGNOSIS — G25.0 ESSENTIAL TREMOR: ICD-10-CM

## 2021-01-13 DIAGNOSIS — G30.1 LATE ONSET ALZHEIMER'S DISEASE WITHOUT BEHAVIORAL DISTURBANCE: ICD-10-CM

## 2021-01-13 PROCEDURE — 1159F PR MEDICATION LIST DOCUMENTED IN MEDICAL RECORD: ICD-10-PCS | Mod: HCNC,95,, | Performed by: PSYCHIATRY & NEUROLOGY

## 2021-01-13 PROCEDURE — 99499 RISK ADDL DX/OHS AUDIT: ICD-10-PCS | Mod: HCNC,95,, | Performed by: PSYCHIATRY & NEUROLOGY

## 2021-01-13 PROCEDURE — 99499 UNLISTED E&M SERVICE: CPT | Mod: HCNC,95,, | Performed by: PSYCHIATRY & NEUROLOGY

## 2021-01-13 PROCEDURE — 1159F MED LIST DOCD IN RCRD: CPT | Mod: HCNC,95,, | Performed by: PSYCHIATRY & NEUROLOGY

## 2021-01-13 PROCEDURE — 99214 PR OFFICE/OUTPT VISIT, EST, LEVL IV, 30-39 MIN: ICD-10-PCS | Mod: HCNC,95,, | Performed by: PSYCHIATRY & NEUROLOGY

## 2021-01-13 PROCEDURE — 99214 OFFICE O/P EST MOD 30 MIN: CPT | Mod: HCNC,95,, | Performed by: PSYCHIATRY & NEUROLOGY

## 2021-01-13 RX ORDER — MEMANTINE HYDROCHLORIDE 10 MG/1
10 TABLET ORAL 2 TIMES DAILY
Qty: 180 TABLET | Refills: 3 | Status: SHIPPED | OUTPATIENT
Start: 2021-01-13 | End: 2021-10-06

## 2021-01-13 RX ORDER — PRIMIDONE 250 MG/1
500 TABLET ORAL 2 TIMES DAILY
Qty: 360 TABLET | Refills: 3 | Status: SHIPPED | OUTPATIENT
Start: 2021-01-13 | End: 2021-06-21 | Stop reason: SDUPTHER

## 2021-01-13 RX ORDER — LAMOTRIGINE 100 MG/1
100 TABLET ORAL 2 TIMES DAILY
Qty: 180 TABLET | Refills: 3 | Status: SHIPPED | OUTPATIENT
Start: 2021-01-13 | End: 2021-09-08

## 2021-01-30 ENCOUNTER — IMMUNIZATION (OUTPATIENT)
Dept: FAMILY MEDICINE | Facility: CLINIC | Age: 81
End: 2021-01-30
Payer: MEDICARE

## 2021-01-30 DIAGNOSIS — Z23 NEED FOR VACCINATION: Primary | ICD-10-CM

## 2021-01-30 PROCEDURE — 0002A COVID-19, MRNA, LNP-S, PF, 30 MCG/0.3 ML DOSE VACCINE: CPT | Mod: HCNC,PBBFAC | Performed by: FAMILY MEDICINE

## 2021-01-30 PROCEDURE — 91300 COVID-19, MRNA, LNP-S, PF, 30 MCG/0.3 ML DOSE VACCINE: CPT | Mod: HCNC,PBBFAC | Performed by: FAMILY MEDICINE

## 2021-02-05 ENCOUNTER — PES CALL (OUTPATIENT)
Dept: ADMINISTRATIVE | Facility: CLINIC | Age: 81
End: 2021-02-05

## 2021-02-25 RX ORDER — METOPROLOL SUCCINATE 50 MG/1
TABLET, EXTENDED RELEASE ORAL
Qty: 90 TABLET | Refills: 4 | Status: SHIPPED | OUTPATIENT
Start: 2021-02-25 | End: 2021-09-30 | Stop reason: SDUPTHER

## 2021-03-19 ENCOUNTER — PES CALL (OUTPATIENT)
Dept: ADMINISTRATIVE | Facility: CLINIC | Age: 81
End: 2021-03-19

## 2021-04-03 ENCOUNTER — CLINICAL SUPPORT (OUTPATIENT)
Dept: CARDIOLOGY | Facility: HOSPITAL | Age: 81
End: 2021-04-03
Payer: MEDICARE

## 2021-04-03 DIAGNOSIS — Z95.0 PRESENCE OF CARDIAC PACEMAKER: ICD-10-CM

## 2021-04-03 DIAGNOSIS — R00.1 BRADYCARDIA, UNSPECIFIED: ICD-10-CM

## 2021-04-03 DIAGNOSIS — I48.91 UNSPECIFIED ATRIAL FIBRILLATION: ICD-10-CM

## 2021-04-03 PROCEDURE — 93296 REM INTERROG EVL PM/IDS: CPT | Performed by: INTERNAL MEDICINE

## 2021-04-03 PROCEDURE — 93294 CARDIAC DEVICE CHECK - REMOTE: ICD-10-PCS | Mod: ,,, | Performed by: INTERNAL MEDICINE

## 2021-04-03 PROCEDURE — 93294 REM INTERROG EVL PM/LDLS PM: CPT | Mod: ,,, | Performed by: INTERNAL MEDICINE

## 2021-04-05 ENCOUNTER — PATIENT MESSAGE (OUTPATIENT)
Dept: FAMILY MEDICINE | Facility: CLINIC | Age: 81
End: 2021-04-05

## 2021-04-05 DIAGNOSIS — J44.9 CHRONIC OBSTRUCTIVE PULMONARY DISEASE, UNSPECIFIED COPD TYPE: ICD-10-CM

## 2021-04-05 RX ORDER — UMECLIDINIUM BROMIDE AND VILANTEROL TRIFENATATE 62.5; 25 UG/1; UG/1
1 POWDER RESPIRATORY (INHALATION) DAILY
Qty: 180 EACH | Refills: 3 | Status: SHIPPED | OUTPATIENT
Start: 2021-04-05 | End: 2021-04-19

## 2021-04-07 RX ORDER — TORSEMIDE 10 MG/1
TABLET ORAL
Qty: 90 TABLET | Refills: 4 | Status: SHIPPED | OUTPATIENT
Start: 2021-04-07 | End: 2021-09-30 | Stop reason: SDUPTHER

## 2021-04-19 ENCOUNTER — TELEPHONE (OUTPATIENT)
Dept: PHARMACY | Facility: CLINIC | Age: 81
End: 2021-04-19

## 2021-04-19 DIAGNOSIS — J44.9 CHRONIC OBSTRUCTIVE PULMONARY DISEASE, UNSPECIFIED COPD TYPE: Primary | ICD-10-CM

## 2021-04-19 RX ORDER — TIOTROPIUM BROMIDE AND OLODATEROL 3.124; 2.736 UG/1; UG/1
2 SPRAY, METERED RESPIRATORY (INHALATION) DAILY
Qty: 12 G | Refills: 3 | Status: SHIPPED | OUTPATIENT
Start: 2021-04-19 | End: 2021-07-08

## 2021-04-23 ENCOUNTER — PATIENT MESSAGE (OUTPATIENT)
Dept: FAMILY MEDICINE | Facility: CLINIC | Age: 81
End: 2021-04-23

## 2021-05-03 PROBLEM — E03.8 SUBCLINICAL HYPOTHYROIDISM: Status: RESOLVED | Noted: 2018-01-20 | Resolved: 2021-05-03

## 2021-05-03 PROBLEM — B02.29 POST HERPETIC NEURALGIA: Status: RESOLVED | Noted: 2020-10-22 | Resolved: 2021-05-03

## 2021-05-04 ENCOUNTER — OFFICE VISIT (OUTPATIENT)
Dept: FAMILY MEDICINE | Facility: CLINIC | Age: 81
End: 2021-05-04
Payer: MEDICARE

## 2021-05-04 VITALS
BODY MASS INDEX: 31.99 KG/M2 | HEIGHT: 72 IN | WEIGHT: 236.19 LBS | DIASTOLIC BLOOD PRESSURE: 70 MMHG | HEART RATE: 91 BPM | OXYGEN SATURATION: 96 % | SYSTOLIC BLOOD PRESSURE: 140 MMHG

## 2021-05-04 DIAGNOSIS — F02.80 LATE ONSET ALZHEIMER'S DISEASE WITHOUT BEHAVIORAL DISTURBANCE: ICD-10-CM

## 2021-05-04 DIAGNOSIS — I70.0 AORTIC ATHEROSCLEROSIS: ICD-10-CM

## 2021-05-04 DIAGNOSIS — Z79.01 CURRENT USE OF LONG TERM ANTICOAGULATION: ICD-10-CM

## 2021-05-04 DIAGNOSIS — I48.19 PERSISTENT ATRIAL FIBRILLATION: ICD-10-CM

## 2021-05-04 DIAGNOSIS — J44.9 CHRONIC OBSTRUCTIVE PULMONARY DISEASE, UNSPECIFIED COPD TYPE: Primary | ICD-10-CM

## 2021-05-04 DIAGNOSIS — I25.10 CORONARY ARTERY DISEASE INVOLVING NATIVE CORONARY ARTERY OF NATIVE HEART WITHOUT ANGINA PECTORIS: Chronic | ICD-10-CM

## 2021-05-04 DIAGNOSIS — I10 ESSENTIAL HYPERTENSION: ICD-10-CM

## 2021-05-04 DIAGNOSIS — I50.32 CHRONIC DIASTOLIC HEART FAILURE: ICD-10-CM

## 2021-05-04 DIAGNOSIS — G89.29 CHRONIC BILATERAL LOW BACK PAIN WITHOUT SCIATICA: ICD-10-CM

## 2021-05-04 DIAGNOSIS — M54.50 CHRONIC BILATERAL LOW BACK PAIN WITHOUT SCIATICA: ICD-10-CM

## 2021-05-04 DIAGNOSIS — G30.1 LATE ONSET ALZHEIMER'S DISEASE WITHOUT BEHAVIORAL DISTURBANCE: ICD-10-CM

## 2021-05-04 DIAGNOSIS — E78.2 MIXED HYPERLIPIDEMIA: ICD-10-CM

## 2021-05-04 PROCEDURE — 1125F PR PAIN SEVERITY QUANTIFIED, PAIN PRESENT: ICD-10-PCS | Mod: HCNC,S$GLB,, | Performed by: FAMILY MEDICINE

## 2021-05-04 PROCEDURE — 96372 PR INJECTION,THERAP/PROPH/DIAG2ST, IM OR SUBCUT: ICD-10-PCS | Mod: HCNC,S$GLB,, | Performed by: FAMILY MEDICINE

## 2021-05-04 PROCEDURE — 3288F FALL RISK ASSESSMENT DOCD: CPT | Mod: HCNC,CPTII,S$GLB, | Performed by: FAMILY MEDICINE

## 2021-05-04 PROCEDURE — 1159F MED LIST DOCD IN RCRD: CPT | Mod: HCNC,S$GLB,, | Performed by: FAMILY MEDICINE

## 2021-05-04 PROCEDURE — 99214 OFFICE O/P EST MOD 30 MIN: CPT | Mod: HCNC,25,S$GLB, | Performed by: FAMILY MEDICINE

## 2021-05-04 PROCEDURE — 96372 THER/PROPH/DIAG INJ SC/IM: CPT | Mod: HCNC,S$GLB,, | Performed by: FAMILY MEDICINE

## 2021-05-04 PROCEDURE — 99214 PR OFFICE/OUTPT VISIT, EST, LEVL IV, 30-39 MIN: ICD-10-PCS | Mod: HCNC,25,S$GLB, | Performed by: FAMILY MEDICINE

## 2021-05-04 PROCEDURE — 1159F PR MEDICATION LIST DOCUMENTED IN MEDICAL RECORD: ICD-10-PCS | Mod: HCNC,S$GLB,, | Performed by: FAMILY MEDICINE

## 2021-05-04 PROCEDURE — 99499 UNLISTED E&M SERVICE: CPT | Mod: HCNC,S$GLB,, | Performed by: FAMILY MEDICINE

## 2021-05-04 PROCEDURE — 99499 RISK ADDL DX/OHS AUDIT: ICD-10-PCS | Mod: HCNC,S$GLB,, | Performed by: FAMILY MEDICINE

## 2021-05-04 PROCEDURE — 1101F PT FALLS ASSESS-DOCD LE1/YR: CPT | Mod: HCNC,CPTII,S$GLB, | Performed by: FAMILY MEDICINE

## 2021-05-04 PROCEDURE — 1101F PR PT FALLS ASSESS DOC 0-1 FALLS W/OUT INJ PAST YR: ICD-10-PCS | Mod: HCNC,CPTII,S$GLB, | Performed by: FAMILY MEDICINE

## 2021-05-04 PROCEDURE — 1125F AMNT PAIN NOTED PAIN PRSNT: CPT | Mod: HCNC,S$GLB,, | Performed by: FAMILY MEDICINE

## 2021-05-04 PROCEDURE — 99999 PR PBB SHADOW E&M-EST. PATIENT-LVL III: CPT | Mod: PBBFAC,HCNC,, | Performed by: FAMILY MEDICINE

## 2021-05-04 PROCEDURE — 3288F PR FALLS RISK ASSESSMENT DOCUMENTED: ICD-10-PCS | Mod: HCNC,CPTII,S$GLB, | Performed by: FAMILY MEDICINE

## 2021-05-04 PROCEDURE — 99999 PR PBB SHADOW E&M-EST. PATIENT-LVL III: ICD-10-PCS | Mod: PBBFAC,HCNC,, | Performed by: FAMILY MEDICINE

## 2021-05-04 RX ORDER — CYCLOBENZAPRINE HCL 5 MG
5 TABLET ORAL 2 TIMES DAILY PRN
Qty: 30 TABLET | Refills: 1 | Status: SHIPPED | OUTPATIENT
Start: 2021-05-04 | End: 2021-05-19

## 2021-05-04 RX ORDER — MELOXICAM 15 MG/1
15 TABLET ORAL DAILY PRN
Qty: 30 TABLET | Refills: 0 | Status: SHIPPED | OUTPATIENT
Start: 2021-05-04 | End: 2021-07-12 | Stop reason: SDUPTHER

## 2021-05-04 RX ORDER — KETOROLAC TROMETHAMINE 30 MG/ML
15 INJECTION, SOLUTION INTRAMUSCULAR; INTRAVENOUS
Status: COMPLETED | OUTPATIENT
Start: 2021-05-04 | End: 2021-05-04

## 2021-05-04 RX ADMIN — KETOROLAC TROMETHAMINE 15 MG: 30 INJECTION, SOLUTION INTRAMUSCULAR; INTRAVENOUS at 02:05

## 2021-05-06 ENCOUNTER — TELEPHONE (OUTPATIENT)
Dept: FAMILY MEDICINE | Facility: CLINIC | Age: 81
End: 2021-05-06

## 2021-05-06 DIAGNOSIS — M54.50 CHRONIC BILATERAL LOW BACK PAIN WITHOUT SCIATICA: Primary | ICD-10-CM

## 2021-05-06 DIAGNOSIS — G89.29 CHRONIC BILATERAL LOW BACK PAIN WITHOUT SCIATICA: Primary | ICD-10-CM

## 2021-05-13 ENCOUNTER — TELEPHONE (OUTPATIENT)
Dept: ORTHOPEDICS | Facility: CLINIC | Age: 81
End: 2021-05-13

## 2021-05-13 DIAGNOSIS — M51.36 DDD (DEGENERATIVE DISC DISEASE), LUMBAR: Primary | ICD-10-CM

## 2021-05-17 ENCOUNTER — OFFICE VISIT (OUTPATIENT)
Dept: ORTHOPEDICS | Facility: CLINIC | Age: 81
End: 2021-05-17
Payer: MEDICARE

## 2021-05-17 ENCOUNTER — HOSPITAL ENCOUNTER (OUTPATIENT)
Dept: RADIOLOGY | Facility: HOSPITAL | Age: 81
Discharge: HOME | End: 2021-05-17
Attending: ORTHOPAEDIC SURGERY
Payer: MEDICARE

## 2021-05-17 VITALS — WEIGHT: 234.81 LBS | HEIGHT: 73 IN | BODY MASS INDEX: 31.12 KG/M2

## 2021-05-17 DIAGNOSIS — G89.29 CHRONIC RIGHT-SIDED LOW BACK PAIN WITHOUT SCIATICA: Primary | ICD-10-CM

## 2021-05-17 DIAGNOSIS — M54.50 CHRONIC RIGHT-SIDED LOW BACK PAIN WITHOUT SCIATICA: Primary | ICD-10-CM

## 2021-05-17 DIAGNOSIS — M54.50 CHRONIC BILATERAL LOW BACK PAIN WITHOUT SCIATICA: ICD-10-CM

## 2021-05-17 DIAGNOSIS — M51.36 DDD (DEGENERATIVE DISC DISEASE), LUMBAR: ICD-10-CM

## 2021-05-17 DIAGNOSIS — G89.29 CHRONIC BILATERAL LOW BACK PAIN WITHOUT SCIATICA: ICD-10-CM

## 2021-05-17 PROCEDURE — 99204 PR OFFICE/OUTPT VISIT, NEW, LEVL IV, 45-59 MIN: ICD-10-PCS | Mod: HCNC,S$GLB,, | Performed by: ORTHOPAEDIC SURGERY

## 2021-05-17 PROCEDURE — 72110 X-RAY EXAM L-2 SPINE 4/>VWS: CPT | Mod: 26,HCNC,, | Performed by: RADIOLOGY

## 2021-05-17 PROCEDURE — 99999 PR PBB SHADOW E&M-EST. PATIENT-LVL IV: CPT | Mod: PBBFAC,HCNC,, | Performed by: ORTHOPAEDIC SURGERY

## 2021-05-17 PROCEDURE — 72110 X-RAY EXAM L-2 SPINE 4/>VWS: CPT | Mod: TC,HCNC

## 2021-05-17 PROCEDURE — 3288F PR FALLS RISK ASSESSMENT DOCUMENTED: ICD-10-PCS | Mod: HCNC,CPTII,S$GLB, | Performed by: ORTHOPAEDIC SURGERY

## 2021-05-17 PROCEDURE — 1125F AMNT PAIN NOTED PAIN PRSNT: CPT | Mod: HCNC,S$GLB,, | Performed by: ORTHOPAEDIC SURGERY

## 2021-05-17 PROCEDURE — 99999 PR PBB SHADOW E&M-EST. PATIENT-LVL IV: ICD-10-PCS | Mod: PBBFAC,HCNC,, | Performed by: ORTHOPAEDIC SURGERY

## 2021-05-17 PROCEDURE — 1100F PR PT FALLS ASSESS DOC 2+ FALLS/FALL W/INJURY/YR: ICD-10-PCS | Mod: HCNC,CPTII,S$GLB, | Performed by: ORTHOPAEDIC SURGERY

## 2021-05-17 PROCEDURE — 99204 OFFICE O/P NEW MOD 45 MIN: CPT | Mod: HCNC,S$GLB,, | Performed by: ORTHOPAEDIC SURGERY

## 2021-05-17 PROCEDURE — 1159F MED LIST DOCD IN RCRD: CPT | Mod: HCNC,S$GLB,, | Performed by: ORTHOPAEDIC SURGERY

## 2021-05-17 PROCEDURE — 1125F PR PAIN SEVERITY QUANTIFIED, PAIN PRESENT: ICD-10-PCS | Mod: HCNC,S$GLB,, | Performed by: ORTHOPAEDIC SURGERY

## 2021-05-17 PROCEDURE — 1100F PTFALLS ASSESS-DOCD GE2>/YR: CPT | Mod: HCNC,CPTII,S$GLB, | Performed by: ORTHOPAEDIC SURGERY

## 2021-05-17 PROCEDURE — 1159F PR MEDICATION LIST DOCUMENTED IN MEDICAL RECORD: ICD-10-PCS | Mod: HCNC,S$GLB,, | Performed by: ORTHOPAEDIC SURGERY

## 2021-05-17 PROCEDURE — 72110 XR LUMBAR SPINE AP AND LAT WITH FLEX/EXT: ICD-10-PCS | Mod: 26,HCNC,, | Performed by: RADIOLOGY

## 2021-05-17 PROCEDURE — 3288F FALL RISK ASSESSMENT DOCD: CPT | Mod: HCNC,CPTII,S$GLB, | Performed by: ORTHOPAEDIC SURGERY

## 2021-05-24 ENCOUNTER — PES CALL (OUTPATIENT)
Dept: ADMINISTRATIVE | Facility: CLINIC | Age: 81
End: 2021-05-24

## 2021-05-31 ENCOUNTER — PATIENT MESSAGE (OUTPATIENT)
Dept: ORTHOPEDICS | Facility: CLINIC | Age: 81
End: 2021-05-31

## 2021-06-01 DIAGNOSIS — G89.29 CHRONIC RIGHT-SIDED LOW BACK PAIN WITHOUT SCIATICA: Primary | ICD-10-CM

## 2021-06-01 DIAGNOSIS — M54.50 CHRONIC RIGHT-SIDED LOW BACK PAIN WITHOUT SCIATICA: Primary | ICD-10-CM

## 2021-06-02 ENCOUNTER — TELEPHONE (OUTPATIENT)
Dept: PAIN MEDICINE | Facility: OTHER | Age: 81
End: 2021-06-02

## 2021-06-02 PROBLEM — R53.1 DECREASED STRENGTH, ENDURANCE, AND MOBILITY: Status: ACTIVE | Noted: 2021-06-02

## 2021-06-02 PROBLEM — Z74.09 DECREASED STRENGTH, ENDURANCE, AND MOBILITY: Status: ACTIVE | Noted: 2021-06-02

## 2021-06-02 PROBLEM — R68.89 DECREASED STRENGTH, ENDURANCE, AND MOBILITY: Status: ACTIVE | Noted: 2021-06-02

## 2021-06-02 PROBLEM — R26.9 ABNORMALITY OF GAIT AND MOBILITY: Status: ACTIVE | Noted: 2021-06-02

## 2021-06-02 RX ORDER — TIZANIDINE 2 MG/1
4 TABLET ORAL NIGHTLY PRN
Qty: 30 TABLET | Refills: 0 | Status: SHIPPED | OUTPATIENT
Start: 2021-06-02 | End: 2021-07-02

## 2021-06-09 ENCOUNTER — OFFICE VISIT (OUTPATIENT)
Dept: FAMILY MEDICINE | Facility: CLINIC | Age: 81
End: 2021-06-09
Payer: MEDICARE

## 2021-06-09 VITALS
RESPIRATION RATE: 18 BRPM | OXYGEN SATURATION: 96 % | DIASTOLIC BLOOD PRESSURE: 70 MMHG | SYSTOLIC BLOOD PRESSURE: 120 MMHG | WEIGHT: 233 LBS | HEIGHT: 73 IN | BODY MASS INDEX: 30.88 KG/M2 | HEART RATE: 70 BPM

## 2021-06-09 DIAGNOSIS — Z00.00 ENCOUNTER FOR PREVENTIVE HEALTH EXAMINATION: Primary | ICD-10-CM

## 2021-06-09 DIAGNOSIS — I10 ESSENTIAL HYPERTENSION: ICD-10-CM

## 2021-06-09 DIAGNOSIS — J84.10 LUNG GRANULOMA: ICD-10-CM

## 2021-06-09 DIAGNOSIS — R26.9 ABNORMALITY OF GAIT AND MOBILITY: ICD-10-CM

## 2021-06-09 DIAGNOSIS — G25.0 ESSENTIAL TREMOR: ICD-10-CM

## 2021-06-09 DIAGNOSIS — I50.32 CHRONIC DIASTOLIC HEART FAILURE: ICD-10-CM

## 2021-06-09 DIAGNOSIS — I48.19 PERSISTENT ATRIAL FIBRILLATION: ICD-10-CM

## 2021-06-09 DIAGNOSIS — G40.909 SEIZURE DISORDER: ICD-10-CM

## 2021-06-09 DIAGNOSIS — I25.10 CORONARY ARTERY DISEASE INVOLVING NATIVE CORONARY ARTERY OF NATIVE HEART WITHOUT ANGINA PECTORIS: Chronic | ICD-10-CM

## 2021-06-09 DIAGNOSIS — F02.80 LATE ONSET ALZHEIMER'S DISEASE WITHOUT BEHAVIORAL DISTURBANCE: ICD-10-CM

## 2021-06-09 DIAGNOSIS — E78.2 MIXED HYPERLIPIDEMIA: ICD-10-CM

## 2021-06-09 DIAGNOSIS — Z95.0 BIVENTRICULAR CARDIAC PACEMAKER IN SITU: ICD-10-CM

## 2021-06-09 DIAGNOSIS — J44.9 CHRONIC OBSTRUCTIVE PULMONARY DISEASE, UNSPECIFIED COPD TYPE: ICD-10-CM

## 2021-06-09 DIAGNOSIS — G89.29 CHRONIC BILATERAL LOW BACK PAIN WITHOUT SCIATICA: ICD-10-CM

## 2021-06-09 DIAGNOSIS — G30.1 LATE ONSET ALZHEIMER'S DISEASE WITHOUT BEHAVIORAL DISTURBANCE: ICD-10-CM

## 2021-06-09 DIAGNOSIS — M54.50 CHRONIC BILATERAL LOW BACK PAIN WITHOUT SCIATICA: ICD-10-CM

## 2021-06-09 DIAGNOSIS — Z79.01 CURRENT USE OF LONG TERM ANTICOAGULATION: ICD-10-CM

## 2021-06-09 DIAGNOSIS — I70.0 AORTIC ATHEROSCLEROSIS: ICD-10-CM

## 2021-06-09 PROBLEM — R29.898 SHOULDER WEAKNESS: Status: RESOLVED | Noted: 2019-12-04 | Resolved: 2021-06-09

## 2021-06-09 PROBLEM — B02.29 POSTHERPETIC NEURALGIA: Status: RESOLVED | Noted: 2020-01-02 | Resolved: 2021-06-09

## 2021-06-09 PROBLEM — M54.6 CHRONIC LEFT-SIDED THORACIC BACK PAIN: Status: RESOLVED | Noted: 2020-10-22 | Resolved: 2021-06-09

## 2021-06-09 PROCEDURE — 3288F PR FALLS RISK ASSESSMENT DOCUMENTED: ICD-10-PCS | Mod: HCNC,CPTII,S$GLB, | Performed by: NURSE PRACTITIONER

## 2021-06-09 PROCEDURE — 1124F PR ADV CARE PLAN DISCUSSED, UNABLE/UNWILL DOC PLAN OR SURROGATE: ICD-10-PCS | Mod: HCNC,S$GLB,, | Performed by: NURSE PRACTITIONER

## 2021-06-09 PROCEDURE — 99499 UNLISTED E&M SERVICE: CPT | Mod: HCNC,S$GLB,, | Performed by: NURSE PRACTITIONER

## 2021-06-09 PROCEDURE — 1101F PR PT FALLS ASSESS DOC 0-1 FALLS W/OUT INJ PAST YR: ICD-10-PCS | Mod: HCNC,CPTII,S$GLB, | Performed by: NURSE PRACTITIONER

## 2021-06-09 PROCEDURE — 1126F AMNT PAIN NOTED NONE PRSNT: CPT | Mod: HCNC,S$GLB,, | Performed by: NURSE PRACTITIONER

## 2021-06-09 PROCEDURE — 99999 PR PBB SHADOW E&M-EST. PATIENT-LVL IV: CPT | Mod: PBBFAC,HCNC,, | Performed by: NURSE PRACTITIONER

## 2021-06-09 PROCEDURE — 3288F FALL RISK ASSESSMENT DOCD: CPT | Mod: HCNC,CPTII,S$GLB, | Performed by: NURSE PRACTITIONER

## 2021-06-09 PROCEDURE — G0439 PR MEDICARE ANNUAL WELLNESS SUBSEQUENT VISIT: ICD-10-PCS | Mod: HCNC,S$GLB,, | Performed by: NURSE PRACTITIONER

## 2021-06-09 PROCEDURE — 99999 PR PBB SHADOW E&M-EST. PATIENT-LVL IV: ICD-10-PCS | Mod: PBBFAC,HCNC,, | Performed by: NURSE PRACTITIONER

## 2021-06-09 PROCEDURE — 1124F ACP DISCUSS-NO DSCNMKR DOCD: CPT | Mod: HCNC,S$GLB,, | Performed by: NURSE PRACTITIONER

## 2021-06-09 PROCEDURE — 1101F PT FALLS ASSESS-DOCD LE1/YR: CPT | Mod: HCNC,CPTII,S$GLB, | Performed by: NURSE PRACTITIONER

## 2021-06-09 PROCEDURE — 99499 RISK ADDL DX/OHS AUDIT: ICD-10-PCS | Mod: HCNC,S$GLB,, | Performed by: NURSE PRACTITIONER

## 2021-06-09 PROCEDURE — 1126F PR PAIN SEVERITY QUANTIFIED, NO PAIN PRESENT: ICD-10-PCS | Mod: HCNC,S$GLB,, | Performed by: NURSE PRACTITIONER

## 2021-06-09 PROCEDURE — G0439 PPPS, SUBSEQ VISIT: HCPCS | Mod: HCNC,S$GLB,, | Performed by: NURSE PRACTITIONER

## 2021-06-15 ENCOUNTER — PATIENT MESSAGE (OUTPATIENT)
Dept: FAMILY MEDICINE | Facility: CLINIC | Age: 81
End: 2021-06-15

## 2021-06-15 DIAGNOSIS — M15.9 PRIMARY OSTEOARTHRITIS INVOLVING MULTIPLE JOINTS: ICD-10-CM

## 2021-06-15 DIAGNOSIS — G89.4 CHRONIC PAIN SYNDROME: Primary | ICD-10-CM

## 2021-06-16 ENCOUNTER — TELEPHONE (OUTPATIENT)
Dept: FAMILY MEDICINE | Facility: CLINIC | Age: 81
End: 2021-06-16

## 2021-06-16 RX ORDER — DICLOFENAC SODIUM 75 MG/1
75 TABLET, DELAYED RELEASE ORAL 2 TIMES DAILY
Qty: 60 TABLET | Refills: 0 | Status: SHIPPED | OUTPATIENT
Start: 2021-06-16 | End: 2021-07-12 | Stop reason: SDUPTHER

## 2021-06-17 ENCOUNTER — OFFICE VISIT (OUTPATIENT)
Dept: PAIN MEDICINE | Facility: CLINIC | Age: 81
End: 2021-06-17
Attending: ANESTHESIOLOGY
Payer: MEDICARE

## 2021-06-17 ENCOUNTER — TELEPHONE (OUTPATIENT)
Dept: PAIN MEDICINE | Facility: CLINIC | Age: 81
End: 2021-06-17

## 2021-06-17 ENCOUNTER — TELEPHONE (OUTPATIENT)
Dept: CARDIOLOGY | Facility: CLINIC | Age: 81
End: 2021-06-17

## 2021-06-17 VITALS
DIASTOLIC BLOOD PRESSURE: 77 MMHG | BODY MASS INDEX: 31.19 KG/M2 | HEART RATE: 85 BPM | SYSTOLIC BLOOD PRESSURE: 155 MMHG | WEIGHT: 229.94 LBS

## 2021-06-17 DIAGNOSIS — M15.9 PRIMARY OSTEOARTHRITIS INVOLVING MULTIPLE JOINTS: ICD-10-CM

## 2021-06-17 DIAGNOSIS — M43.06 LUMBAR SPONDYLOLYSIS: Primary | ICD-10-CM

## 2021-06-17 DIAGNOSIS — G89.4 CHRONIC PAIN SYNDROME: ICD-10-CM

## 2021-06-17 PROCEDURE — 99213 PR OFFICE/OUTPT VISIT, EST, LEVL III, 20-29 MIN: ICD-10-PCS | Mod: HCNC,S$GLB,, | Performed by: NURSE PRACTITIONER

## 2021-06-17 PROCEDURE — 1159F PR MEDICATION LIST DOCUMENTED IN MEDICAL RECORD: ICD-10-PCS | Mod: HCNC,S$GLB,, | Performed by: NURSE PRACTITIONER

## 2021-06-17 PROCEDURE — 1125F PR PAIN SEVERITY QUANTIFIED, PAIN PRESENT: ICD-10-PCS | Mod: HCNC,S$GLB,, | Performed by: NURSE PRACTITIONER

## 2021-06-17 PROCEDURE — 99999 PR PBB SHADOW E&M-EST. PATIENT-LVL III: ICD-10-PCS | Mod: PBBFAC,HCNC,, | Performed by: NURSE PRACTITIONER

## 2021-06-17 PROCEDURE — 1125F AMNT PAIN NOTED PAIN PRSNT: CPT | Mod: HCNC,S$GLB,, | Performed by: NURSE PRACTITIONER

## 2021-06-17 PROCEDURE — 99999 PR PBB SHADOW E&M-EST. PATIENT-LVL III: CPT | Mod: PBBFAC,HCNC,, | Performed by: NURSE PRACTITIONER

## 2021-06-17 PROCEDURE — 99213 OFFICE O/P EST LOW 20 MIN: CPT | Mod: HCNC,S$GLB,, | Performed by: NURSE PRACTITIONER

## 2021-06-17 PROCEDURE — 1159F MED LIST DOCD IN RCRD: CPT | Mod: HCNC,S$GLB,, | Performed by: NURSE PRACTITIONER

## 2021-06-18 ENCOUNTER — TELEPHONE (OUTPATIENT)
Dept: PAIN MEDICINE | Facility: CLINIC | Age: 81
End: 2021-06-18

## 2021-06-18 ENCOUNTER — PATIENT MESSAGE (OUTPATIENT)
Dept: PAIN MEDICINE | Facility: CLINIC | Age: 81
End: 2021-06-18

## 2021-06-21 ENCOUNTER — OFFICE VISIT (OUTPATIENT)
Dept: FAMILY MEDICINE | Facility: CLINIC | Age: 81
End: 2021-06-21
Payer: MEDICARE

## 2021-06-21 ENCOUNTER — PATIENT MESSAGE (OUTPATIENT)
Dept: FAMILY MEDICINE | Facility: CLINIC | Age: 81
End: 2021-06-21

## 2021-06-21 ENCOUNTER — PATIENT MESSAGE (OUTPATIENT)
Dept: NEUROLOGY | Facility: CLINIC | Age: 81
End: 2021-06-21

## 2021-06-21 ENCOUNTER — PATIENT MESSAGE (OUTPATIENT)
Dept: PAIN MEDICINE | Facility: CLINIC | Age: 81
End: 2021-06-21

## 2021-06-21 VITALS
HEART RATE: 80 BPM | DIASTOLIC BLOOD PRESSURE: 62 MMHG | RESPIRATION RATE: 18 BRPM | TEMPERATURE: 98 F | BODY MASS INDEX: 31.91 KG/M2 | HEIGHT: 72 IN | OXYGEN SATURATION: 98 % | SYSTOLIC BLOOD PRESSURE: 130 MMHG | WEIGHT: 235.63 LBS

## 2021-06-21 DIAGNOSIS — J44.9 CHRONIC OBSTRUCTIVE PULMONARY DISEASE, UNSPECIFIED COPD TYPE: ICD-10-CM

## 2021-06-21 DIAGNOSIS — I10 ESSENTIAL HYPERTENSION: Primary | ICD-10-CM

## 2021-06-21 DIAGNOSIS — R06.02 SOB (SHORTNESS OF BREATH): ICD-10-CM

## 2021-06-21 DIAGNOSIS — I50.32 CHRONIC DIASTOLIC HEART FAILURE: ICD-10-CM

## 2021-06-21 PROCEDURE — 1101F PR PT FALLS ASSESS DOC 0-1 FALLS W/OUT INJ PAST YR: ICD-10-PCS | Mod: HCNC,CPTII,S$GLB, | Performed by: FAMILY MEDICINE

## 2021-06-21 PROCEDURE — 93005 EKG 12-LEAD: ICD-10-PCS | Mod: HCNC,S$GLB,, | Performed by: FAMILY MEDICINE

## 2021-06-21 PROCEDURE — 3288F PR FALLS RISK ASSESSMENT DOCUMENTED: ICD-10-PCS | Mod: HCNC,CPTII,S$GLB, | Performed by: FAMILY MEDICINE

## 2021-06-21 PROCEDURE — 93005 ELECTROCARDIOGRAM TRACING: CPT | Mod: HCNC,S$GLB,, | Performed by: FAMILY MEDICINE

## 2021-06-21 PROCEDURE — 3075F PR MOST RECENT SYSTOLIC BLOOD PRESS GE 130-139MM HG: ICD-10-PCS | Mod: HCNC,CPTII,S$GLB, | Performed by: FAMILY MEDICINE

## 2021-06-21 PROCEDURE — 93010 ELECTROCARDIOGRAM REPORT: CPT | Mod: HCNC,S$GLB,, | Performed by: INTERNAL MEDICINE

## 2021-06-21 PROCEDURE — 93010 EKG 12-LEAD: ICD-10-PCS | Mod: HCNC,S$GLB,, | Performed by: INTERNAL MEDICINE

## 2021-06-21 PROCEDURE — 1159F MED LIST DOCD IN RCRD: CPT | Mod: HCNC,S$GLB,, | Performed by: FAMILY MEDICINE

## 2021-06-21 PROCEDURE — 99999 PR PBB SHADOW E&M-EST. PATIENT-LVL III: CPT | Mod: PBBFAC,HCNC,, | Performed by: FAMILY MEDICINE

## 2021-06-21 PROCEDURE — 99214 PR OFFICE/OUTPT VISIT, EST, LEVL IV, 30-39 MIN: ICD-10-PCS | Mod: HCNC,S$GLB,, | Performed by: FAMILY MEDICINE

## 2021-06-21 PROCEDURE — 1125F PR PAIN SEVERITY QUANTIFIED, PAIN PRESENT: ICD-10-PCS | Mod: HCNC,S$GLB,, | Performed by: FAMILY MEDICINE

## 2021-06-21 PROCEDURE — 99999 PR PBB SHADOW E&M-EST. PATIENT-LVL III: ICD-10-PCS | Mod: PBBFAC,HCNC,, | Performed by: FAMILY MEDICINE

## 2021-06-21 PROCEDURE — 3078F PR MOST RECENT DIASTOLIC BLOOD PRESSURE < 80 MM HG: ICD-10-PCS | Mod: HCNC,CPTII,S$GLB, | Performed by: FAMILY MEDICINE

## 2021-06-21 PROCEDURE — 3075F SYST BP GE 130 - 139MM HG: CPT | Mod: HCNC,CPTII,S$GLB, | Performed by: FAMILY MEDICINE

## 2021-06-21 PROCEDURE — 3078F DIAST BP <80 MM HG: CPT | Mod: HCNC,CPTII,S$GLB, | Performed by: FAMILY MEDICINE

## 2021-06-21 PROCEDURE — 1101F PT FALLS ASSESS-DOCD LE1/YR: CPT | Mod: HCNC,CPTII,S$GLB, | Performed by: FAMILY MEDICINE

## 2021-06-21 PROCEDURE — 1125F AMNT PAIN NOTED PAIN PRSNT: CPT | Mod: HCNC,S$GLB,, | Performed by: FAMILY MEDICINE

## 2021-06-21 PROCEDURE — 3288F FALL RISK ASSESSMENT DOCD: CPT | Mod: HCNC,CPTII,S$GLB, | Performed by: FAMILY MEDICINE

## 2021-06-21 PROCEDURE — 1159F PR MEDICATION LIST DOCUMENTED IN MEDICAL RECORD: ICD-10-PCS | Mod: HCNC,S$GLB,, | Performed by: FAMILY MEDICINE

## 2021-06-21 PROCEDURE — 99214 OFFICE O/P EST MOD 30 MIN: CPT | Mod: HCNC,S$GLB,, | Performed by: FAMILY MEDICINE

## 2021-06-21 RX ORDER — PRIMIDONE 250 MG/1
500 TABLET ORAL 2 TIMES DAILY
Qty: 360 TABLET | Refills: 3 | Status: SHIPPED | OUTPATIENT
Start: 2021-06-21 | End: 2022-01-26 | Stop reason: SDUPTHER

## 2021-06-21 RX ORDER — UMECLIDINIUM BROMIDE AND VILANTEROL TRIFENATATE 62.5; 25 UG/1; UG/1
1 POWDER RESPIRATORY (INHALATION) DAILY
Qty: 60 EACH | Refills: 11 | Status: SHIPPED | OUTPATIENT
Start: 2021-06-21 | End: 2021-07-08

## 2021-06-23 ENCOUNTER — TELEPHONE (OUTPATIENT)
Dept: PHARMACY | Facility: CLINIC | Age: 81
End: 2021-06-23

## 2021-06-23 ENCOUNTER — TELEPHONE (OUTPATIENT)
Dept: PAIN MEDICINE | Facility: CLINIC | Age: 81
End: 2021-06-23

## 2021-07-02 ENCOUNTER — CLINICAL SUPPORT (OUTPATIENT)
Dept: CARDIOLOGY | Facility: HOSPITAL | Age: 81
End: 2021-07-02
Payer: MEDICARE

## 2021-07-02 DIAGNOSIS — Z95.0 PRESENCE OF CARDIAC PACEMAKER: ICD-10-CM

## 2021-07-02 PROCEDURE — 93294 CARDIAC DEVICE CHECK - REMOTE: ICD-10-PCS | Mod: HCNC,,, | Performed by: INTERNAL MEDICINE

## 2021-07-02 PROCEDURE — 93296 REM INTERROG EVL PM/IDS: CPT | Mod: HCNC | Performed by: INTERNAL MEDICINE

## 2021-07-02 PROCEDURE — 93294 REM INTERROG EVL PM/LDLS PM: CPT | Mod: HCNC,,, | Performed by: INTERNAL MEDICINE

## 2021-07-07 ENCOUNTER — PATIENT MESSAGE (OUTPATIENT)
Dept: FAMILY MEDICINE | Facility: CLINIC | Age: 81
End: 2021-07-07

## 2021-07-07 DIAGNOSIS — J44.9 CHRONIC OBSTRUCTIVE PULMONARY DISEASE, UNSPECIFIED COPD TYPE: Primary | ICD-10-CM

## 2021-07-12 ENCOUNTER — PATIENT MESSAGE (OUTPATIENT)
Dept: ORTHOPEDICS | Facility: CLINIC | Age: 81
End: 2021-07-12

## 2021-07-12 ENCOUNTER — PATIENT MESSAGE (OUTPATIENT)
Dept: FAMILY MEDICINE | Facility: CLINIC | Age: 81
End: 2021-07-12

## 2021-07-12 DIAGNOSIS — G89.29 CHRONIC BILATERAL LOW BACK PAIN WITHOUT SCIATICA: ICD-10-CM

## 2021-07-12 DIAGNOSIS — M54.50 CHRONIC BILATERAL LOW BACK PAIN WITHOUT SCIATICA: ICD-10-CM

## 2021-07-12 RX ORDER — MELOXICAM 15 MG/1
15 TABLET ORAL DAILY PRN
Qty: 30 TABLET | Refills: 0 | Status: SHIPPED | OUTPATIENT
Start: 2021-07-12 | End: 2022-02-14

## 2021-07-14 ENCOUNTER — PATIENT MESSAGE (OUTPATIENT)
Dept: FAMILY MEDICINE | Facility: CLINIC | Age: 81
End: 2021-07-14

## 2021-07-15 ENCOUNTER — PATIENT MESSAGE (OUTPATIENT)
Dept: FAMILY MEDICINE | Facility: CLINIC | Age: 81
End: 2021-07-15

## 2021-07-16 RX ORDER — CYCLOBENZAPRINE HCL 5 MG
5 TABLET ORAL 3 TIMES DAILY PRN
Qty: 15 TABLET | Refills: 0 | Status: SHIPPED | OUTPATIENT
Start: 2021-07-16 | End: 2021-07-21

## 2021-07-19 ENCOUNTER — HOSPITAL ENCOUNTER (OUTPATIENT)
Dept: CARDIOLOGY | Facility: HOSPITAL | Age: 81
Discharge: HOME | End: 2021-07-19
Attending: INTERNAL MEDICINE
Payer: MEDICARE

## 2021-07-19 VITALS
SYSTOLIC BLOOD PRESSURE: 138 MMHG | WEIGHT: 235 LBS | DIASTOLIC BLOOD PRESSURE: 70 MMHG | HEIGHT: 72 IN | HEART RATE: 72 BPM | BODY MASS INDEX: 31.83 KG/M2

## 2021-07-19 DIAGNOSIS — I10 ESSENTIAL HYPERTENSION: ICD-10-CM

## 2021-07-19 DIAGNOSIS — I50.32 CHRONIC DIASTOLIC HEART FAILURE: ICD-10-CM

## 2021-07-19 DIAGNOSIS — G30.1 LATE ONSET ALZHEIMER'S DISEASE WITHOUT BEHAVIORAL DISTURBANCE: ICD-10-CM

## 2021-07-19 DIAGNOSIS — I25.10 CORONARY ARTERY DISEASE INVOLVING NATIVE CORONARY ARTERY OF NATIVE HEART WITHOUT ANGINA PECTORIS: ICD-10-CM

## 2021-07-19 DIAGNOSIS — I48.19 PERSISTENT ATRIAL FIBRILLATION: ICD-10-CM

## 2021-07-19 DIAGNOSIS — Z79.01 CURRENT USE OF LONG TERM ANTICOAGULATION: ICD-10-CM

## 2021-07-19 DIAGNOSIS — F02.80 LATE ONSET ALZHEIMER'S DISEASE WITHOUT BEHAVIORAL DISTURBANCE: ICD-10-CM

## 2021-07-19 LAB
ASCENDING AORTA: 3.41 CM
AV INDEX (PROSTH): 0.78
AV MEAN GRADIENT: 5 MMHG
AV PEAK GRADIENT: 10 MMHG
AV VALVE AREA: 3.44 CM2
AV VELOCITY RATIO: 0.76
BSA FOR ECHO PROCEDURE: 2.33 M2
CV ECHO LV RWT: 0.32 CM
DOP CALC AO PEAK VEL: 1.61 M/S
DOP CALC AO VTI: 27.7 CM
DOP CALC LVOT AREA: 4.4 CM2
DOP CALC LVOT DIAMETER: 2.37 CM
DOP CALC LVOT PEAK VEL: 1.23 M/S
DOP CALC LVOT STROKE VOLUME: 95.42 CM3
DOP CALCLVOT PEAK VEL VTI: 21.64 CM
E WAVE DECELERATION TIME: 231.07 MSEC
E/A RATIO: 2.77
E/E' RATIO: 10.29 M/S
ECHO LV POSTERIOR WALL: 0.87 CM (ref 0.6–1.1)
EJECTION FRACTION: 60 %
FRACTIONAL SHORTENING: 26 % (ref 28–44)
INTERVENTRICULAR SEPTUM: 0.76 CM (ref 0.6–1.1)
IVRT: 99.9 MSEC
LA MAJOR: 6.9 CM
LA MINOR: 6.85 CM
LA WIDTH: 4.61 CM
LEFT ATRIUM SIZE: 4.9 CM
LEFT ATRIUM VOLUME INDEX MOD: 35.7 ML/M2
LEFT ATRIUM VOLUME INDEX: 57.9 ML/M2
LEFT ATRIUM VOLUME MOD: 81.38 CM3
LEFT ATRIUM VOLUME: 132 CM3
LEFT INTERNAL DIMENSION IN SYSTOLE: 4.09 CM (ref 2.1–4)
LEFT VENTRICLE DIASTOLIC VOLUME INDEX: 65.2 ML/M2
LEFT VENTRICLE DIASTOLIC VOLUME: 148.66 ML
LEFT VENTRICLE MASS INDEX: 72 G/M2
LEFT VENTRICLE SYSTOLIC VOLUME INDEX: 32.4 ML/M2
LEFT VENTRICLE SYSTOLIC VOLUME: 73.93 ML
LEFT VENTRICULAR INTERNAL DIMENSION IN DIASTOLE: 5.52 CM (ref 3.5–6)
LEFT VENTRICULAR MASS: 164.78 G
LV LATERAL E/E' RATIO: 10.29 M/S
LV SEPTAL E/E' RATIO: 10.29 M/S
MV A" WAVE DURATION": 11.42 MSEC
MV PEAK A VEL: 0.26 M/S
MV PEAK E VEL: 0.72 M/S
MV STENOSIS PRESSURE HALF TIME: 67.01 MS
MV VALVE AREA P 1/2 METHOD: 3.28 CM2
PISA TR MAX VEL: 3.02 M/S
PULM VEIN S/D RATIO: 0.38
PV PEAK D VEL: 0.64 M/S
PV PEAK S VEL: 0.24 M/S
RA MAJOR: 5.17 CM
RA PRESSURE: 3 MMHG
RA WIDTH: 3.61 CM
RIGHT VENTRICULAR END-DIASTOLIC DIMENSION: 2.81 CM
RV TISSUE DOPPLER FREE WALL SYSTOLIC VELOCITY 1 (APICAL 4 CHAMBER VIEW): 14.71 CM/S
SINUS: 3.27 CM
STJ: 2.88 CM
TDI LATERAL: 0.07 M/S
TDI SEPTAL: 0.07 M/S
TDI: 0.07 M/S
TR MAX PG: 36 MMHG
TRICUSPID ANNULAR PLANE SYSTOLIC EXCURSION: 2.04 CM
TV REST PULMONARY ARTERY PRESSURE: 39 MMHG

## 2021-07-19 PROCEDURE — 25500020 PHARM REV CODE 255: Mod: HCNC | Performed by: INTERNAL MEDICINE

## 2021-07-19 PROCEDURE — 93306 TTE W/DOPPLER COMPLETE: CPT | Mod: 26,HCNC,, | Performed by: INTERNAL MEDICINE

## 2021-07-19 PROCEDURE — 93306 ECHO (CUPID ONLY): ICD-10-PCS | Mod: 26,HCNC,, | Performed by: INTERNAL MEDICINE

## 2021-07-19 PROCEDURE — C8929 TTE W OR WO FOL WCON,DOPPLER: HCPCS | Mod: HCNC

## 2021-07-19 RX ADMIN — HUMAN ALBUMIN MICROSPHERES AND PERFLUTREN 0.66 MG: 10; .22 INJECTION, SOLUTION INTRAVENOUS at 10:07

## 2021-07-20 ENCOUNTER — PATIENT MESSAGE (OUTPATIENT)
Dept: FAMILY MEDICINE | Facility: CLINIC | Age: 81
End: 2021-07-20

## 2021-07-26 ENCOUNTER — TELEPHONE (OUTPATIENT)
Dept: FAMILY MEDICINE | Facility: CLINIC | Age: 81
End: 2021-07-26

## 2021-07-26 ENCOUNTER — TELEPHONE (OUTPATIENT)
Dept: PHARMACY | Facility: CLINIC | Age: 81
End: 2021-07-26

## 2021-07-26 DIAGNOSIS — G89.29 CHRONIC BILATERAL LOW BACK PAIN WITHOUT SCIATICA: Primary | ICD-10-CM

## 2021-07-26 DIAGNOSIS — M79.18 MYOFASCIAL PAIN: ICD-10-CM

## 2021-07-26 DIAGNOSIS — J44.9 CHRONIC OBSTRUCTIVE PULMONARY DISEASE, UNSPECIFIED COPD TYPE: ICD-10-CM

## 2021-07-26 DIAGNOSIS — M54.50 CHRONIC BILATERAL LOW BACK PAIN WITHOUT SCIATICA: Primary | ICD-10-CM

## 2021-07-26 RX ORDER — METHOCARBAMOL 500 MG/1
500 TABLET, FILM COATED ORAL NIGHTLY PRN
Qty: 30 TABLET | Refills: 2 | Status: SHIPPED | OUTPATIENT
Start: 2021-07-26 | End: 2022-05-12

## 2021-07-26 RX ORDER — CYCLOBENZAPRINE HCL 5 MG
5 TABLET ORAL NIGHTLY PRN
Qty: 30 TABLET | Refills: 2 | Status: SHIPPED | OUTPATIENT
Start: 2021-07-26 | End: 2021-07-26

## 2021-07-27 ENCOUNTER — TELEPHONE (OUTPATIENT)
Dept: CARDIOLOGY | Facility: CLINIC | Age: 81
End: 2021-07-27

## 2021-07-29 ENCOUNTER — OFFICE VISIT (OUTPATIENT)
Dept: URGENT CARE | Facility: CLINIC | Age: 81
End: 2021-07-29
Payer: MEDICARE

## 2021-07-29 ENCOUNTER — PATIENT MESSAGE (OUTPATIENT)
Dept: DERMATOLOGY | Facility: CLINIC | Age: 81
End: 2021-07-29

## 2021-07-29 VITALS
HEART RATE: 73 BPM | SYSTOLIC BLOOD PRESSURE: 145 MMHG | BODY MASS INDEX: 31.15 KG/M2 | TEMPERATURE: 98 F | DIASTOLIC BLOOD PRESSURE: 69 MMHG | RESPIRATION RATE: 15 BRPM | OXYGEN SATURATION: 96 % | WEIGHT: 230 LBS | HEIGHT: 72 IN

## 2021-07-29 DIAGNOSIS — H10.9 CONJUNCTIVITIS OF BOTH EYES, UNSPECIFIED CONJUNCTIVITIS TYPE: ICD-10-CM

## 2021-07-29 DIAGNOSIS — L21.9 SEBORRHEIC DERMATITIS OF SCALP: Primary | ICD-10-CM

## 2021-07-29 PROCEDURE — 1159F PR MEDICATION LIST DOCUMENTED IN MEDICAL RECORD: ICD-10-PCS | Mod: CPTII,S$GLB,, | Performed by: PHYSICIAN ASSISTANT

## 2021-07-29 PROCEDURE — 1126F PR PAIN SEVERITY QUANTIFIED, NO PAIN PRESENT: ICD-10-PCS | Mod: CPTII,S$GLB,, | Performed by: PHYSICIAN ASSISTANT

## 2021-07-29 PROCEDURE — 99215 OFFICE O/P EST HI 40 MIN: CPT | Mod: S$GLB,,, | Performed by: PHYSICIAN ASSISTANT

## 2021-07-29 PROCEDURE — 3077F PR MOST RECENT SYSTOLIC BLOOD PRESSURE >= 140 MM HG: ICD-10-PCS | Mod: CPTII,S$GLB,, | Performed by: PHYSICIAN ASSISTANT

## 2021-07-29 PROCEDURE — 1126F AMNT PAIN NOTED NONE PRSNT: CPT | Mod: CPTII,S$GLB,, | Performed by: PHYSICIAN ASSISTANT

## 2021-07-29 PROCEDURE — 3077F SYST BP >= 140 MM HG: CPT | Mod: CPTII,S$GLB,, | Performed by: PHYSICIAN ASSISTANT

## 2021-07-29 PROCEDURE — 99215 PR OFFICE/OUTPT VISIT, EST, LEVL V, 40-54 MIN: ICD-10-PCS | Mod: S$GLB,,, | Performed by: PHYSICIAN ASSISTANT

## 2021-07-29 PROCEDURE — 1160F PR REVIEW ALL MEDS BY PRESCRIBER/CLIN PHARMACIST DOCUMENTED: ICD-10-PCS | Mod: CPTII,S$GLB,, | Performed by: PHYSICIAN ASSISTANT

## 2021-07-29 PROCEDURE — 1159F MED LIST DOCD IN RCRD: CPT | Mod: CPTII,S$GLB,, | Performed by: PHYSICIAN ASSISTANT

## 2021-07-29 PROCEDURE — 1160F RVW MEDS BY RX/DR IN RCRD: CPT | Mod: CPTII,S$GLB,, | Performed by: PHYSICIAN ASSISTANT

## 2021-07-29 PROCEDURE — 3078F PR MOST RECENT DIASTOLIC BLOOD PRESSURE < 80 MM HG: ICD-10-PCS | Mod: CPTII,S$GLB,, | Performed by: PHYSICIAN ASSISTANT

## 2021-07-29 PROCEDURE — 3078F DIAST BP <80 MM HG: CPT | Mod: CPTII,S$GLB,, | Performed by: PHYSICIAN ASSISTANT

## 2021-07-29 RX ORDER — KETOCONAZOLE 20 MG/ML
SHAMPOO, SUSPENSION TOPICAL
Qty: 120 ML | Refills: 0 | Status: SHIPPED | OUTPATIENT
Start: 2021-07-29 | End: 2021-11-01 | Stop reason: SDUPTHER

## 2021-07-29 RX ORDER — CIPROFLOXACIN HYDROCHLORIDE 3 MG/ML
1 SOLUTION/ DROPS OPHTHALMIC
Qty: 5 ML | Refills: 0 | Status: SHIPPED | OUTPATIENT
Start: 2021-07-29 | End: 2021-08-03

## 2021-08-03 ENCOUNTER — OFFICE VISIT (OUTPATIENT)
Dept: DERMATOLOGY | Facility: CLINIC | Age: 81
End: 2021-08-03
Payer: MEDICARE

## 2021-08-03 DIAGNOSIS — L40.8 SEBOPSORIASIS: ICD-10-CM

## 2021-08-03 DIAGNOSIS — R21 RASH AND NONSPECIFIC SKIN ERUPTION: Primary | ICD-10-CM

## 2021-08-03 PROCEDURE — 3288F PR FALLS RISK ASSESSMENT DOCUMENTED: ICD-10-PCS | Mod: HCNC,CPTII,S$GLB, | Performed by: PATHOLOGY

## 2021-08-03 PROCEDURE — 88312 SPECIAL STAINS GROUP 1: CPT | Mod: 26,HCNC,, | Performed by: PATHOLOGY

## 2021-08-03 PROCEDURE — 11104 PR PUNCH BIOPSY, SKIN, SINGLE LESION: ICD-10-PCS | Mod: HCNC,S$GLB,, | Performed by: PATHOLOGY

## 2021-08-03 PROCEDURE — 1101F PT FALLS ASSESS-DOCD LE1/YR: CPT | Mod: HCNC,CPTII,S$GLB, | Performed by: PATHOLOGY

## 2021-08-03 PROCEDURE — 99999 PR PBB SHADOW E&M-EST. PATIENT-LVL III: CPT | Mod: PBBFAC,HCNC,, | Performed by: PATHOLOGY

## 2021-08-03 PROCEDURE — 99204 OFFICE O/P NEW MOD 45 MIN: CPT | Mod: 25,HCNC,S$GLB, | Performed by: PATHOLOGY

## 2021-08-03 PROCEDURE — 88305 TISSUE EXAM BY PATHOLOGIST: CPT | Mod: HCNC | Performed by: PATHOLOGY

## 2021-08-03 PROCEDURE — 88305 TISSUE EXAM BY PATHOLOGIST: ICD-10-PCS | Mod: 26,HCNC,, | Performed by: PATHOLOGY

## 2021-08-03 PROCEDURE — 99999 PR PBB SHADOW E&M-EST. PATIENT-LVL III: ICD-10-PCS | Mod: PBBFAC,HCNC,, | Performed by: PATHOLOGY

## 2021-08-03 PROCEDURE — 11104 PUNCH BX SKIN SINGLE LESION: CPT | Mod: HCNC,S$GLB,, | Performed by: PATHOLOGY

## 2021-08-03 PROCEDURE — 1101F PR PT FALLS ASSESS DOC 0-1 FALLS W/OUT INJ PAST YR: ICD-10-PCS | Mod: HCNC,CPTII,S$GLB, | Performed by: PATHOLOGY

## 2021-08-03 PROCEDURE — 88305 TISSUE EXAM BY PATHOLOGIST: CPT | Mod: 26,HCNC,, | Performed by: PATHOLOGY

## 2021-08-03 PROCEDURE — 1126F PR PAIN SEVERITY QUANTIFIED, NO PAIN PRESENT: ICD-10-PCS | Mod: HCNC,CPTII,S$GLB, | Performed by: PATHOLOGY

## 2021-08-03 PROCEDURE — 3288F FALL RISK ASSESSMENT DOCD: CPT | Mod: HCNC,CPTII,S$GLB, | Performed by: PATHOLOGY

## 2021-08-03 PROCEDURE — 99204 PR OFFICE/OUTPT VISIT, NEW, LEVL IV, 45-59 MIN: ICD-10-PCS | Mod: 25,HCNC,S$GLB, | Performed by: PATHOLOGY

## 2021-08-03 PROCEDURE — 88312 SPECIAL STAINS GROUP 1: CPT | Mod: HCNC | Performed by: PATHOLOGY

## 2021-08-03 PROCEDURE — 1159F MED LIST DOCD IN RCRD: CPT | Mod: HCNC,CPTII,S$GLB, | Performed by: PATHOLOGY

## 2021-08-03 PROCEDURE — 1159F PR MEDICATION LIST DOCUMENTED IN MEDICAL RECORD: ICD-10-PCS | Mod: HCNC,CPTII,S$GLB, | Performed by: PATHOLOGY

## 2021-08-03 PROCEDURE — 88312 PR  SPECIAL STAINS,GROUP I: ICD-10-PCS | Mod: 26,HCNC,, | Performed by: PATHOLOGY

## 2021-08-03 PROCEDURE — 1126F AMNT PAIN NOTED NONE PRSNT: CPT | Mod: HCNC,CPTII,S$GLB, | Performed by: PATHOLOGY

## 2021-08-03 RX ORDER — KETOCONAZOLE 20 MG/G
CREAM TOPICAL
Qty: 60 G | Refills: 3 | Status: SHIPPED | OUTPATIENT
Start: 2021-08-03 | End: 2022-02-14

## 2021-08-03 RX ORDER — FLUOCINOLONE ACETONIDE 0.11 MG/ML
OIL TOPICAL
Qty: 1 BOTTLE | Refills: 3 | Status: SHIPPED | OUTPATIENT
Start: 2021-08-03 | End: 2021-08-03

## 2021-08-03 RX ORDER — TRIAMCINOLONE ACETONIDE 0.25 MG/G
CREAM TOPICAL
Qty: 30 G | Refills: 3 | Status: SHIPPED | OUTPATIENT
Start: 2021-08-03 | End: 2022-02-14

## 2021-08-03 RX ORDER — CLOBETASOL PROPIONATE 0.46 MG/ML
SOLUTION TOPICAL
Qty: 50 ML | Refills: 3 | Status: SHIPPED | OUTPATIENT
Start: 2021-08-03 | End: 2022-02-14

## 2021-08-03 RX ORDER — FLUOCINOLONE ACETONIDE 0.11 MG/ML
OIL TOPICAL
Qty: 1 BOTTLE | Refills: 3 | Status: SHIPPED | OUTPATIENT
Start: 2021-08-03 | End: 2022-02-14

## 2021-08-04 ENCOUNTER — PATIENT OUTREACH (OUTPATIENT)
Dept: ADMINISTRATIVE | Facility: OTHER | Age: 81
End: 2021-08-04

## 2021-08-05 ENCOUNTER — TELEPHONE (OUTPATIENT)
Dept: DERMATOLOGY | Facility: CLINIC | Age: 81
End: 2021-08-05

## 2021-08-07 LAB
FINAL PATHOLOGIC DIAGNOSIS: NORMAL
GROSS: NORMAL
Lab: NORMAL
MICROSCOPIC EXAM: NORMAL

## 2021-08-12 ENCOUNTER — PATIENT MESSAGE (OUTPATIENT)
Dept: DERMATOLOGY | Facility: CLINIC | Age: 81
End: 2021-08-12

## 2021-08-17 ENCOUNTER — CLINICAL SUPPORT (OUTPATIENT)
Dept: DERMATOLOGY | Facility: CLINIC | Age: 81
End: 2021-08-17
Payer: MEDICARE

## 2021-08-17 DIAGNOSIS — L40.8 SEBOPSORIASIS: Primary | ICD-10-CM

## 2021-08-17 DIAGNOSIS — L81.9 POST-INFLAMMATORY PIGMENTARY CHANGES: ICD-10-CM

## 2021-08-17 PROCEDURE — 99212 PR OFFICE/OUTPT VISIT, EST, LEVL II, 10-19 MIN: ICD-10-PCS | Mod: HCNC,S$GLB,, | Performed by: PATHOLOGY

## 2021-08-17 PROCEDURE — 99999 PR PBB SHADOW E&M-EST. PATIENT-LVL II: ICD-10-PCS | Mod: PBBFAC,HCNC,,

## 2021-08-17 PROCEDURE — 99212 OFFICE O/P EST SF 10 MIN: CPT | Mod: HCNC,S$GLB,, | Performed by: PATHOLOGY

## 2021-08-17 PROCEDURE — 99999 PR PBB SHADOW E&M-EST. PATIENT-LVL II: CPT | Mod: PBBFAC,HCNC,,

## 2021-08-17 RX ORDER — CICLOPIROX OLAMINE 7.7 MG/G
CREAM TOPICAL 2 TIMES DAILY
Qty: 30 G | Refills: 3 | Status: SHIPPED | OUTPATIENT
Start: 2021-08-17 | End: 2022-02-14

## 2021-08-19 ENCOUNTER — OFFICE VISIT (OUTPATIENT)
Dept: CARDIOLOGY | Facility: CLINIC | Age: 81
End: 2021-08-19
Payer: MEDICARE

## 2021-08-19 VITALS
DIASTOLIC BLOOD PRESSURE: 70 MMHG | OXYGEN SATURATION: 96 % | BODY MASS INDEX: 31.33 KG/M2 | WEIGHT: 231 LBS | HEART RATE: 70 BPM | SYSTOLIC BLOOD PRESSURE: 115 MMHG

## 2021-08-19 DIAGNOSIS — I10 ESSENTIAL HYPERTENSION: ICD-10-CM

## 2021-08-19 DIAGNOSIS — I48.91 ATRIAL FIBRILLATION, UNSPECIFIED TYPE: ICD-10-CM

## 2021-08-19 DIAGNOSIS — Z95.0 PRESENCE OF CARDIAC PACEMAKER: ICD-10-CM

## 2021-08-19 DIAGNOSIS — I25.10 CORONARY ARTERY DISEASE INVOLVING NATIVE CORONARY ARTERY OF NATIVE HEART WITHOUT ANGINA PECTORIS: Primary | ICD-10-CM

## 2021-08-19 DIAGNOSIS — I70.0 AORTIC ATHEROSCLEROSIS: ICD-10-CM

## 2021-08-19 DIAGNOSIS — Z95.0 CARDIAC RESYNCHRONIZATION THERAPY PACEMAKER (CRT-P) IN PLACE: ICD-10-CM

## 2021-08-19 DIAGNOSIS — E78.5 HYPERLIPIDEMIA, UNSPECIFIED HYPERLIPIDEMIA TYPE: ICD-10-CM

## 2021-08-19 PROCEDURE — 99215 PR OFFICE/OUTPT VISIT, EST, LEVL V, 40-54 MIN: ICD-10-PCS | Mod: HCNC,S$GLB,, | Performed by: INTERNAL MEDICINE

## 2021-08-19 PROCEDURE — 99999 PR PBB SHADOW E&M-EST. PATIENT-LVL IV: CPT | Mod: PBBFAC,HCNC,, | Performed by: INTERNAL MEDICINE

## 2021-08-19 PROCEDURE — 99499 RISK ADDL DX/OHS AUDIT: ICD-10-PCS | Mod: HCNC,S$GLB,, | Performed by: INTERNAL MEDICINE

## 2021-08-19 PROCEDURE — 99215 OFFICE O/P EST HI 40 MIN: CPT | Mod: HCNC,S$GLB,, | Performed by: INTERNAL MEDICINE

## 2021-08-19 PROCEDURE — 99999 PR PBB SHADOW E&M-EST. PATIENT-LVL IV: ICD-10-PCS | Mod: PBBFAC,HCNC,, | Performed by: INTERNAL MEDICINE

## 2021-08-19 PROCEDURE — 99499 UNLISTED E&M SERVICE: CPT | Mod: HCNC,S$GLB,, | Performed by: INTERNAL MEDICINE

## 2021-09-30 ENCOUNTER — CLINICAL SUPPORT (OUTPATIENT)
Dept: CARDIOLOGY | Facility: HOSPITAL | Age: 81
End: 2021-09-30
Payer: MEDICARE

## 2021-09-30 DIAGNOSIS — J44.9 CHRONIC OBSTRUCTIVE PULMONARY DISEASE, UNSPECIFIED COPD TYPE: ICD-10-CM

## 2021-09-30 DIAGNOSIS — R00.1 BRADYCARDIA, UNSPECIFIED: ICD-10-CM

## 2021-09-30 DIAGNOSIS — Z95.0 PRESENCE OF CARDIAC PACEMAKER: ICD-10-CM

## 2021-09-30 DIAGNOSIS — I48.0 PAF (PAROXYSMAL ATRIAL FIBRILLATION): ICD-10-CM

## 2021-09-30 DIAGNOSIS — I48.91 UNSPECIFIED ATRIAL FIBRILLATION: ICD-10-CM

## 2021-09-30 PROCEDURE — 93294 CARDIAC DEVICE CHECK - REMOTE: ICD-10-PCS | Mod: HCNC,,, | Performed by: INTERNAL MEDICINE

## 2021-09-30 PROCEDURE — 93296 REM INTERROG EVL PM/IDS: CPT | Mod: HCNC | Performed by: INTERNAL MEDICINE

## 2021-09-30 PROCEDURE — 93294 REM INTERROG EVL PM/LDLS PM: CPT | Mod: HCNC,,, | Performed by: INTERNAL MEDICINE

## 2021-09-30 RX ORDER — TORSEMIDE 10 MG/1
TABLET ORAL
Qty: 90 TABLET | Refills: 3 | Status: SHIPPED | OUTPATIENT
Start: 2021-09-30 | End: 2022-08-15

## 2021-09-30 RX ORDER — ATORVASTATIN CALCIUM 40 MG/1
40 TABLET, FILM COATED ORAL DAILY
Qty: 90 TABLET | Refills: 3 | Status: SHIPPED | OUTPATIENT
Start: 2021-09-30 | End: 2022-07-29

## 2021-09-30 RX ORDER — METOPROLOL SUCCINATE 50 MG/1
50 TABLET, EXTENDED RELEASE ORAL DAILY
Qty: 90 TABLET | Refills: 3 | Status: SHIPPED | OUTPATIENT
Start: 2021-09-30 | End: 2022-02-22

## 2021-10-08 ENCOUNTER — PATIENT MESSAGE (OUTPATIENT)
Dept: ELECTROPHYSIOLOGY | Facility: CLINIC | Age: 81
End: 2021-10-08

## 2021-10-09 ENCOUNTER — IMMUNIZATION (OUTPATIENT)
Dept: FAMILY MEDICINE | Facility: CLINIC | Age: 81
End: 2021-10-09
Payer: MEDICARE

## 2021-10-09 PROCEDURE — 90694 FLU VACCINE - QUADRIVALENT - ADJUVANTED: ICD-10-PCS | Mod: HCNC,S$GLB,, | Performed by: FAMILY MEDICINE

## 2021-10-09 PROCEDURE — 90694 VACC AIIV4 NO PRSRV 0.5ML IM: CPT | Mod: HCNC,S$GLB,, | Performed by: FAMILY MEDICINE

## 2021-10-09 PROCEDURE — G0008 FLU VACCINE - QUADRIVALENT - ADJUVANTED: ICD-10-PCS | Mod: HCNC,S$GLB,, | Performed by: FAMILY MEDICINE

## 2021-10-09 PROCEDURE — G0008 ADMIN INFLUENZA VIRUS VAC: HCPCS | Mod: HCNC,S$GLB,, | Performed by: FAMILY MEDICINE

## 2021-10-21 ENCOUNTER — PATIENT MESSAGE (OUTPATIENT)
Dept: DERMATOLOGY | Facility: CLINIC | Age: 81
End: 2021-10-21
Payer: MEDICARE

## 2021-10-22 ENCOUNTER — IMMUNIZATION (OUTPATIENT)
Dept: FAMILY MEDICINE | Facility: CLINIC | Age: 81
End: 2021-10-22
Payer: MEDICARE

## 2021-10-22 DIAGNOSIS — Z23 NEED FOR VACCINATION: Primary | ICD-10-CM

## 2021-10-22 PROCEDURE — 91300 COVID-19, MRNA, LNP-S, PF, 30 MCG/0.3 ML DOSE VACCINE: CPT | Mod: HCNC,PBBFAC | Performed by: FAMILY MEDICINE

## 2021-10-22 PROCEDURE — 0003A COVID-19, MRNA, LNP-S, PF, 30 MCG/0.3 ML DOSE VACCINE: CPT | Mod: HCNC,PBBFAC | Performed by: FAMILY MEDICINE

## 2021-11-01 DIAGNOSIS — L21.9 SEBORRHEIC DERMATITIS OF SCALP: ICD-10-CM

## 2021-11-01 RX ORDER — KETOCONAZOLE 20 MG/ML
SHAMPOO, SUSPENSION TOPICAL
Qty: 120 ML | Refills: 0 | Status: SHIPPED | OUTPATIENT
Start: 2021-11-01 | End: 2022-02-14

## 2021-11-04 ENCOUNTER — OFFICE VISIT (OUTPATIENT)
Dept: FAMILY MEDICINE | Facility: CLINIC | Age: 81
End: 2021-11-04
Payer: MEDICARE

## 2021-11-04 VITALS
HEIGHT: 73 IN | RESPIRATION RATE: 18 BRPM | BODY MASS INDEX: 29.57 KG/M2 | OXYGEN SATURATION: 96 % | DIASTOLIC BLOOD PRESSURE: 62 MMHG | WEIGHT: 223.13 LBS | SYSTOLIC BLOOD PRESSURE: 112 MMHG | HEART RATE: 76 BPM

## 2021-11-04 DIAGNOSIS — I10 ESSENTIAL HYPERTENSION: Primary | ICD-10-CM

## 2021-11-04 DIAGNOSIS — J44.9 CHRONIC OBSTRUCTIVE PULMONARY DISEASE, UNSPECIFIED COPD TYPE: ICD-10-CM

## 2021-11-04 DIAGNOSIS — G25.0 ESSENTIAL TREMOR: ICD-10-CM

## 2021-11-04 DIAGNOSIS — F02.80 LATE ONSET ALZHEIMER'S DISEASE WITHOUT BEHAVIORAL DISTURBANCE: ICD-10-CM

## 2021-11-04 DIAGNOSIS — G30.1 LATE ONSET ALZHEIMER'S DISEASE WITHOUT BEHAVIORAL DISTURBANCE: ICD-10-CM

## 2021-11-04 DIAGNOSIS — Z95.0 BIVENTRICULAR CARDIAC PACEMAKER IN SITU: ICD-10-CM

## 2021-11-04 DIAGNOSIS — I50.32 CHRONIC DIASTOLIC HEART FAILURE: ICD-10-CM

## 2021-11-04 DIAGNOSIS — G40.909 SEIZURE DISORDER: ICD-10-CM

## 2021-11-04 DIAGNOSIS — E78.2 MIXED HYPERLIPIDEMIA: ICD-10-CM

## 2021-11-04 DIAGNOSIS — I70.0 AORTIC ATHEROSCLEROSIS: ICD-10-CM

## 2021-11-04 DIAGNOSIS — Z79.01 CURRENT USE OF LONG TERM ANTICOAGULATION: ICD-10-CM

## 2021-11-04 DIAGNOSIS — I48.19 PERSISTENT ATRIAL FIBRILLATION: ICD-10-CM

## 2021-11-04 DIAGNOSIS — I25.10 CORONARY ARTERY DISEASE INVOLVING NATIVE CORONARY ARTERY OF NATIVE HEART WITHOUT ANGINA PECTORIS: Chronic | ICD-10-CM

## 2021-11-04 PROBLEM — M79.18 MYOFASCIAL PAIN: Status: RESOLVED | Noted: 2019-11-20 | Resolved: 2021-11-04

## 2021-11-04 PROBLEM — R26.9 ABNORMALITY OF GAIT AND MOBILITY: Status: RESOLVED | Noted: 2021-06-02 | Resolved: 2021-11-04

## 2021-11-04 PROCEDURE — 99999 PR PBB SHADOW E&M-EST. PATIENT-LVL V: ICD-10-PCS | Mod: PBBFAC,HCNC,, | Performed by: FAMILY MEDICINE

## 2021-11-04 PROCEDURE — 1101F PR PT FALLS ASSESS DOC 0-1 FALLS W/OUT INJ PAST YR: ICD-10-PCS | Mod: HCNC,CPTII,S$GLB, | Performed by: FAMILY MEDICINE

## 2021-11-04 PROCEDURE — 1160F RVW MEDS BY RX/DR IN RCRD: CPT | Mod: HCNC,CPTII,S$GLB, | Performed by: FAMILY MEDICINE

## 2021-11-04 PROCEDURE — 3288F PR FALLS RISK ASSESSMENT DOCUMENTED: ICD-10-PCS | Mod: HCNC,CPTII,S$GLB, | Performed by: FAMILY MEDICINE

## 2021-11-04 PROCEDURE — 1126F PR PAIN SEVERITY QUANTIFIED, NO PAIN PRESENT: ICD-10-PCS | Mod: HCNC,CPTII,S$GLB, | Performed by: FAMILY MEDICINE

## 2021-11-04 PROCEDURE — 3078F PR MOST RECENT DIASTOLIC BLOOD PRESSURE < 80 MM HG: ICD-10-PCS | Mod: HCNC,CPTII,S$GLB, | Performed by: FAMILY MEDICINE

## 2021-11-04 PROCEDURE — 3078F DIAST BP <80 MM HG: CPT | Mod: HCNC,CPTII,S$GLB, | Performed by: FAMILY MEDICINE

## 2021-11-04 PROCEDURE — 1126F AMNT PAIN NOTED NONE PRSNT: CPT | Mod: HCNC,CPTII,S$GLB, | Performed by: FAMILY MEDICINE

## 2021-11-04 PROCEDURE — 99999 PR PBB SHADOW E&M-EST. PATIENT-LVL V: CPT | Mod: PBBFAC,HCNC,, | Performed by: FAMILY MEDICINE

## 2021-11-04 PROCEDURE — 1160F PR REVIEW ALL MEDS BY PRESCRIBER/CLIN PHARMACIST DOCUMENTED: ICD-10-PCS | Mod: HCNC,CPTII,S$GLB, | Performed by: FAMILY MEDICINE

## 2021-11-04 PROCEDURE — 3074F SYST BP LT 130 MM HG: CPT | Mod: HCNC,CPTII,S$GLB, | Performed by: FAMILY MEDICINE

## 2021-11-04 PROCEDURE — 3074F PR MOST RECENT SYSTOLIC BLOOD PRESSURE < 130 MM HG: ICD-10-PCS | Mod: HCNC,CPTII,S$GLB, | Performed by: FAMILY MEDICINE

## 2021-11-04 PROCEDURE — 99214 PR OFFICE/OUTPT VISIT, EST, LEVL IV, 30-39 MIN: ICD-10-PCS | Mod: HCNC,S$GLB,, | Performed by: FAMILY MEDICINE

## 2021-11-04 PROCEDURE — 3288F FALL RISK ASSESSMENT DOCD: CPT | Mod: HCNC,CPTII,S$GLB, | Performed by: FAMILY MEDICINE

## 2021-11-04 PROCEDURE — 1159F MED LIST DOCD IN RCRD: CPT | Mod: HCNC,CPTII,S$GLB, | Performed by: FAMILY MEDICINE

## 2021-11-04 PROCEDURE — 1101F PT FALLS ASSESS-DOCD LE1/YR: CPT | Mod: HCNC,CPTII,S$GLB, | Performed by: FAMILY MEDICINE

## 2021-11-04 PROCEDURE — 99214 OFFICE O/P EST MOD 30 MIN: CPT | Mod: HCNC,S$GLB,, | Performed by: FAMILY MEDICINE

## 2021-11-04 PROCEDURE — 1159F PR MEDICATION LIST DOCUMENTED IN MEDICAL RECORD: ICD-10-PCS | Mod: HCNC,CPTII,S$GLB, | Performed by: FAMILY MEDICINE

## 2021-11-17 DIAGNOSIS — I49.8 OTHER SPECIFIED CARDIAC ARRHYTHMIAS: Primary | ICD-10-CM

## 2021-12-01 ENCOUNTER — HOSPITAL ENCOUNTER (OUTPATIENT)
Dept: CARDIOLOGY | Facility: CLINIC | Age: 81
Discharge: HOME | End: 2021-12-01
Payer: MEDICARE

## 2021-12-01 ENCOUNTER — OFFICE VISIT (OUTPATIENT)
Dept: ELECTROPHYSIOLOGY | Facility: CLINIC | Age: 81
End: 2021-12-01
Payer: MEDICARE

## 2021-12-01 ENCOUNTER — CLINICAL SUPPORT (OUTPATIENT)
Dept: CARDIOLOGY | Facility: HOSPITAL | Age: 81
End: 2021-12-01
Attending: INTERNAL MEDICINE
Payer: MEDICARE

## 2021-12-01 VITALS
DIASTOLIC BLOOD PRESSURE: 60 MMHG | BODY MASS INDEX: 30.13 KG/M2 | HEART RATE: 70 BPM | SYSTOLIC BLOOD PRESSURE: 129 MMHG | WEIGHT: 228.38 LBS

## 2021-12-01 DIAGNOSIS — Z95.0 BIVENTRICULAR CARDIAC PACEMAKER IN SITU: Primary | ICD-10-CM

## 2021-12-01 DIAGNOSIS — I50.32 CHRONIC DIASTOLIC HEART FAILURE: ICD-10-CM

## 2021-12-01 DIAGNOSIS — Z79.01 CURRENT USE OF LONG TERM ANTICOAGULATION: ICD-10-CM

## 2021-12-01 DIAGNOSIS — I49.8 OTHER SPECIFIED CARDIAC ARRHYTHMIAS: ICD-10-CM

## 2021-12-01 DIAGNOSIS — I48.19 PERSISTENT ATRIAL FIBRILLATION: ICD-10-CM

## 2021-12-01 DIAGNOSIS — I10 ESSENTIAL HYPERTENSION: ICD-10-CM

## 2021-12-01 PROCEDURE — 99214 OFFICE O/P EST MOD 30 MIN: CPT | Mod: HCNC,S$GLB,, | Performed by: NURSE PRACTITIONER

## 2021-12-01 PROCEDURE — 93010 ELECTROCARDIOGRAM REPORT: CPT | Mod: HCNC,S$GLB,, | Performed by: INTERNAL MEDICINE

## 2021-12-01 PROCEDURE — 93281 PM DEVICE PROGR EVAL MULTI: CPT | Mod: HCNC

## 2021-12-01 PROCEDURE — 93281 PM DEVICE PROGR EVAL MULTI: CPT | Mod: 26,HCNC,, | Performed by: INTERNAL MEDICINE

## 2021-12-01 PROCEDURE — 99999 PR PBB SHADOW E&M-EST. PATIENT-LVL IV: CPT | Mod: PBBFAC,HCNC,, | Performed by: NURSE PRACTITIONER

## 2021-12-01 PROCEDURE — 93005 RHYTHM STRIP: ICD-10-PCS | Mod: HCNC,S$GLB,, | Performed by: INTERNAL MEDICINE

## 2021-12-01 PROCEDURE — 93005 ELECTROCARDIOGRAM TRACING: CPT | Mod: HCNC,S$GLB,, | Performed by: INTERNAL MEDICINE

## 2021-12-01 PROCEDURE — 99999 PR PBB SHADOW E&M-EST. PATIENT-LVL IV: ICD-10-PCS | Mod: PBBFAC,HCNC,, | Performed by: NURSE PRACTITIONER

## 2021-12-01 PROCEDURE — 93281 CARDIAC DEVICE CHECK - IN CLINIC & HOSPITAL: ICD-10-PCS | Mod: 26,HCNC,, | Performed by: INTERNAL MEDICINE

## 2021-12-01 PROCEDURE — 99214 PR OFFICE/OUTPT VISIT, EST, LEVL IV, 30-39 MIN: ICD-10-PCS | Mod: HCNC,S$GLB,, | Performed by: NURSE PRACTITIONER

## 2021-12-01 PROCEDURE — 93010 RHYTHM STRIP: ICD-10-PCS | Mod: HCNC,S$GLB,, | Performed by: INTERNAL MEDICINE

## 2021-12-29 ENCOUNTER — CLINICAL SUPPORT (OUTPATIENT)
Dept: CARDIOLOGY | Facility: HOSPITAL | Age: 81
End: 2021-12-29
Payer: MEDICARE

## 2021-12-29 DIAGNOSIS — I44.2 ATRIOVENTRICULAR BLOCK, COMPLETE: ICD-10-CM

## 2021-12-29 DIAGNOSIS — Z95.0 PRESENCE OF CARDIAC PACEMAKER: ICD-10-CM

## 2021-12-29 DIAGNOSIS — I42.9 CARDIOMYOPATHY, UNSPECIFIED: ICD-10-CM

## 2021-12-29 PROCEDURE — 93294 CARDIAC DEVICE CHECK - REMOTE: ICD-10-PCS | Mod: HCNC,,, | Performed by: INTERNAL MEDICINE

## 2021-12-29 PROCEDURE — 93296 REM INTERROG EVL PM/IDS: CPT | Mod: HCNC | Performed by: INTERNAL MEDICINE

## 2021-12-29 PROCEDURE — 93294 REM INTERROG EVL PM/LDLS PM: CPT | Mod: HCNC,,, | Performed by: INTERNAL MEDICINE

## 2022-01-25 ENCOUNTER — PATIENT MESSAGE (OUTPATIENT)
Dept: NEUROLOGY | Facility: CLINIC | Age: 82
End: 2022-01-25
Payer: MEDICARE

## 2022-01-26 RX ORDER — PRIMIDONE 250 MG/1
500 TABLET ORAL 2 TIMES DAILY
Qty: 360 TABLET | Refills: 0 | Status: SHIPPED | OUTPATIENT
Start: 2022-01-26 | End: 2022-03-31

## 2022-02-01 ENCOUNTER — OFFICE VISIT (OUTPATIENT)
Dept: CARDIOLOGY | Facility: CLINIC | Age: 82
End: 2022-02-01
Payer: MEDICARE

## 2022-02-01 VITALS
SYSTOLIC BLOOD PRESSURE: 108 MMHG | DIASTOLIC BLOOD PRESSURE: 68 MMHG | HEART RATE: 73 BPM | OXYGEN SATURATION: 96 % | WEIGHT: 224 LBS | BODY MASS INDEX: 29.55 KG/M2

## 2022-02-01 DIAGNOSIS — Z95.0 PRESENCE OF CARDIAC PACEMAKER: ICD-10-CM

## 2022-02-01 DIAGNOSIS — I25.10 CORONARY ARTERY DISEASE INVOLVING NATIVE CORONARY ARTERY OF NATIVE HEART WITHOUT ANGINA PECTORIS: Primary | ICD-10-CM

## 2022-02-01 DIAGNOSIS — E78.5 HYPERLIPIDEMIA, UNSPECIFIED HYPERLIPIDEMIA TYPE: ICD-10-CM

## 2022-02-01 DIAGNOSIS — I50.20 HEART FAILURE WITH REDUCED EJECTION FRACTION: ICD-10-CM

## 2022-02-01 DIAGNOSIS — I48.91 ATRIAL FIBRILLATION, UNSPECIFIED TYPE: ICD-10-CM

## 2022-02-01 DIAGNOSIS — I70.0 AORTIC ATHEROSCLEROSIS: ICD-10-CM

## 2022-02-01 DIAGNOSIS — I10 ESSENTIAL HYPERTENSION: ICD-10-CM

## 2022-02-01 PROCEDURE — 99999 PR PBB SHADOW E&M-EST. PATIENT-LVL III: CPT | Mod: PBBFAC,HCNC,, | Performed by: INTERNAL MEDICINE

## 2022-02-01 PROCEDURE — 3074F SYST BP LT 130 MM HG: CPT | Mod: HCNC,CPTII,S$GLB, | Performed by: INTERNAL MEDICINE

## 2022-02-01 PROCEDURE — 3078F DIAST BP <80 MM HG: CPT | Mod: HCNC,CPTII,S$GLB, | Performed by: INTERNAL MEDICINE

## 2022-02-01 PROCEDURE — 1126F AMNT PAIN NOTED NONE PRSNT: CPT | Mod: HCNC,CPTII,S$GLB, | Performed by: INTERNAL MEDICINE

## 2022-02-01 PROCEDURE — 1159F PR MEDICATION LIST DOCUMENTED IN MEDICAL RECORD: ICD-10-PCS | Mod: HCNC,CPTII,S$GLB, | Performed by: INTERNAL MEDICINE

## 2022-02-01 PROCEDURE — 99215 OFFICE O/P EST HI 40 MIN: CPT | Mod: HCNC,S$GLB,, | Performed by: INTERNAL MEDICINE

## 2022-02-01 PROCEDURE — 1160F RVW MEDS BY RX/DR IN RCRD: CPT | Mod: HCNC,CPTII,S$GLB, | Performed by: INTERNAL MEDICINE

## 2022-02-01 PROCEDURE — 99999 PR PBB SHADOW E&M-EST. PATIENT-LVL III: ICD-10-PCS | Mod: PBBFAC,HCNC,, | Performed by: INTERNAL MEDICINE

## 2022-02-01 PROCEDURE — 1159F MED LIST DOCD IN RCRD: CPT | Mod: HCNC,CPTII,S$GLB, | Performed by: INTERNAL MEDICINE

## 2022-02-01 PROCEDURE — 1126F PR PAIN SEVERITY QUANTIFIED, NO PAIN PRESENT: ICD-10-PCS | Mod: HCNC,CPTII,S$GLB, | Performed by: INTERNAL MEDICINE

## 2022-02-01 PROCEDURE — 99215 PR OFFICE/OUTPT VISIT, EST, LEVL V, 40-54 MIN: ICD-10-PCS | Mod: HCNC,S$GLB,, | Performed by: INTERNAL MEDICINE

## 2022-02-01 PROCEDURE — 3074F PR MOST RECENT SYSTOLIC BLOOD PRESSURE < 130 MM HG: ICD-10-PCS | Mod: HCNC,CPTII,S$GLB, | Performed by: INTERNAL MEDICINE

## 2022-02-01 PROCEDURE — 3078F PR MOST RECENT DIASTOLIC BLOOD PRESSURE < 80 MM HG: ICD-10-PCS | Mod: HCNC,CPTII,S$GLB, | Performed by: INTERNAL MEDICINE

## 2022-02-01 PROCEDURE — 1160F PR REVIEW ALL MEDS BY PRESCRIBER/CLIN PHARMACIST DOCUMENTED: ICD-10-PCS | Mod: HCNC,CPTII,S$GLB, | Performed by: INTERNAL MEDICINE

## 2022-02-01 NOTE — PROGRESS NOTES
Cardiology Clinic note    Subjective:   Patient ID:  Heri Muhammad is a 81 y.o. male who presents for CAD FUP    HPI:   CAD: Denies CP; does have FOWLER - attributes to COPD- chronic    HFrEF, improved: No orthopnea, PND; does have leg swelling (chronic) - no recent worsening. Weight 220 lbs. Following salt restriction    Afib: No palpitations, irregular heart beats, syncope or near syncope    HTN: On medications    HLD: Tolerating statin    SH Tobacco Quit 1960s  FH No premature CAD    Patient Active Problem List    Diagnosis Date Noted    Decreased strength, endurance, and mobility 06/02/2021    Chronic obstructive pulmonary disease 11/04/2020     - 7/30/19 PFT: no obstruction. 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      Chronic bilateral low back pain without sciatica 01/15/2019     - acute on chronic  - recommend Toradol 15 mg IM today  - meloxicam 15 mg daily as needed  - cyclobenzaprine 5 mg daily prn  - if does not improve, recommend further evaluation  - recommend core stablization  - has seen Pain Management in the past      Biventricular cardiac pacemaker in situ 09/24/2018     - paced rhythm  - followed by Cardiology      Late onset Alzheimer's disease without behavioral disturbance 02/08/2018     - followed by Neurology  - stable  - continue Namenda      Chronic diastolic heart failure 01/19/2018     - no signs of acute decompensation  - no leg swelling  - medical management      Aortic atherosclerosis 09/18/2017     - LDL goal <70  - BP goal <130/80      Essential hypertension 06/09/2017     - well controlled  - continue  current medication      Seizure disorder 10/24/2016     - stable and no recent seizures since 1990's  - CT Head 2019 with diffuse atrophy  - EEG 1/2019 mild diffuse slowing  - followed by Neurology      Current use of long term anticoagulation 06/02/2016     - Eliquis for Afib  Lab Results   Component Value Date    HGB 16.2 12/05/2020     Lab Results   Component Value Date    HCT 46.9 12/05/2020           Essential tremor 05/12/2016     - followed by Neurology  - on primidone      Persistent atrial fibrillation 07/01/2015     - followed by Dr. Esparza  - history of cardioversion  - on Eliquis OAC 5 mg BID      CAD (coronary artery disease) 12/02/2013     - 9/2013 Left heart Cath: LAD- 60% prox (neg FFR), nl, ramus and RCA 20%  - LDL goal <70  - BP goal <130/80  - ASA 81 mg daily      Mixed hyperlipidemia 07/28/2012     Lab Results   Component Value Date    LDLCALC 97.4 08/24/2021     - on statin  - followed by Cardiology         Patient's Medications   New Prescriptions    No medications on file   Previous Medications    APIXABAN (ELIQUIS) 5 MG TAB    Take 1 tablet (5 mg total) by mouth 2 (two) times daily.    ATORVASTATIN (LIPITOR) 40 MG TABLET    Take 1 tablet (40 mg total) by mouth once daily.    CETIRIZINE (ZYRTEC) 10 MG TABLET    Take 10 mg by mouth every evening.    CICLOPIROX (LOPROX) 0.77 % CREA    Apply topically 2 (two) times daily.    CLOBETASOL (TEMOVATE) 0.05 % EXTERNAL SOLUTION    Use on scalp one - two times daily as needed for scaling or itching to scalp and ears    DICLOFENAC (VOLTAREN) 75 MG EC TABLET    Take 1 tablet (75 mg total) by mouth 2 (two) times daily.    FLUOCINOLONE AND SHOWER CAP (DERMA-SMOOTHE/FS SCALP OIL) 0.01 % OIL    Apply oil to damp scalp nightly and cover with shower cap.    KETOCONAZOLE (NIZORAL) 2 % CREAM    AAA bid to eyelids, neck and ears    KETOCONAZOLE (NIZORAL) 2 % SHAMPOO    Apply topically twice a week.    LAMOTRIGINE (LAMICTAL) 100 MG TABLET    TAKE 1 TABLET  TWICE DAILY    MELOXICAM (MOBIC) 15 MG TABLET    Take 1 tablet (15 mg total) by mouth daily as needed for Pain.    MEMANTINE (NAMENDA) 10 MG TAB    Take 1 tablet (10 mg total) by mouth 2 (two) times daily. NEEDS APPT FOR FURTHER REFILL    METHOCARBAMOL (ROBAXIN) 500 MG TAB    Take 1 tablet (500 mg total) by mouth nightly as needed (muscle spasm).    METOPROLOL SUCCINATE (TOPROL-XL) 50 MG 24 HR TABLET    Take 1 tablet (50 mg total) by mouth once daily.    PRIMIDONE (MYSOLINE) 250 MG TAB    Take 2 tablets (500 mg total) by mouth 2 (two) times daily.    TORSEMIDE (DEMADEX) 10 MG TAB    TAKE 1 TABLET (10 MG TOTAL) BY MOUTH EVERY MORNING.    TRIAMCINOLONE ACETONIDE 0.025% (KENALOG) 0.025 % CREAM    AAA bid to eyelids and neck; do not get in eyes    UMECLIDINIUM (INCRUSE ELLIPTA) 62.5 MCG/ACTUATION INHALATION CAPSULE    Inhale 62.5 mcg into the lungs once daily. Controller   Modified Medications    No medications on file   Discontinued Medications    No medications on file        Review of Systems   Constitutional: Negative for fever.   HENT: Negative for nosebleeds.    Cardiovascular: Negative for chest pain, irregular heartbeat, near-syncope, orthopnea, palpitations, paroxysmal nocturnal dyspnea and syncope.        As above   Respiratory: Negative for hemoptysis.    Hematologic/Lymphatic: Negative for bleeding problem.   Musculoskeletal: Positive for arthritis.   Gastrointestinal: Negative for hematochezia.   Genitourinary: Negative for hematuria.   Neurological: Negative for seizures.   Allergic/Immunologic: Positive for environmental allergies.         Objective:   Vitals  Vitals:    02/01/22 1141   BP: 108/68   Pulse: 73   SpO2: 96%   Weight: 101.6 kg (224 lb)          Physical Exam  Constitutional:       General: He is not in acute distress.     Appearance: He is well-developed. He is not diaphoretic.   Eyes:      Conjunctiva/sclera: Conjunctivae normal.   Neck:      Vascular: No JVD.   Cardiovascular:      Rate and  "Rhythm: Normal rate and regular rhythm.      Heart sounds: No murmur heard.  No friction rub. No gallop.    Pulmonary:      Effort: Pulmonary effort is normal. No respiratory distress.      Breath sounds: Normal breath sounds.   Abdominal:      Palpations: Abdomen is soft.      Tenderness: There is no abdominal tenderness.   Musculoskeletal:         General: No swelling.      Cervical back: Normal range of motion.   Skin:     General: Skin is warm.   Neurological:      Mental Status: He is alert.   Psychiatric:         Mood and Affect: Mood normal.             Assessment:     1. Coronary artery disease involving native coronary artery of native heart without angina pectoris    2. Atrial fibrillation, unspecified type    3. Presence of cardiac pacemaker    4. Heart failure with reduced ejection fraction    5. Aortic atherosclerosis    6. Essential hypertension    7. Hyperlipidemia, unspecified hyperlipidemia type        Plan:   Heri Muhammad is a 81 y.o. male h/o CAD, Afib, CRT-P (for worsening LVEF; now improved), HTN, HLD    Accompanied by wife Ana    EKG personally reviewed. My interpretation:  12/1/21: A-sensed, V-paced rhythm    CAD  - Cath 2018: Nonobstructive CAD.   - Cath 2016: Mild/moderate LAD verified by FFR. Mild diastolic dysfunction. Normal right heart pressures  - Aspirin, statin    Atrial Fibrillation  - 2016 with Dr Esparza: Radiofrequency ablation of atrial flutter. Pulmonary vein isolation.  - 2017 with Dr Esparza: Pulmonary vein isolation. Radiofrequency ablation of mitral annular flutter. Radiofrequency ablation of roof dependent flutter.   - CHADS2-VASc 5 (CHF, HTN, Age x2, CAD)  - HASBLED 1 (Age)  - Apixaban 5 mg bid (Age > 80 y, Wt > 60 kg, Cr <1.5)  Estimated body mass index is 29.55 kg/m² as calculated from the following:    Height as of 11/4/21: 6' 1" (1.854 m).    Weight as of this encounter: 101.6 kg (224 lb).  81 y.o.  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 Successful upgrade to a biventricular permanent pacemaker with the addition of a new LV lead.   - Following with device clinic, EP    HFrEF, improved  NYHA II  - Echo July 2021: LVEF 60%, DD3. Normal RVSF. PASP 39, CVP 3. Severe LAE  - Echo Jan 2018: LVEF 30-35%  - Metoprolol succinate; not on ACEi/ARB  Lab Results   Component Value Date    CREATININE 0.67 08/24/2021    K 4.5 08/24/2021   - Torsemide  - Daily weights  - First thing in the morning: Pee, take off clothes, step on the scale.  - Write down the date, and the weight. Maintain this chart everyday  - If weight increases by > 3 lbs in a day, or > 5 lbs in a week, take an extra pill of torsemide. Call the office.  - If worsening symptoms, go to the ED  - Salt restriction    Aortic atherosclerosis  Noted on CT 2017  Statin as above    HTN  BP well controlled  Metoprolol succinate    HLD  Lab Results   Component Value Date    LDLCALC 97.4 08/24/2021   Statin as above    Continue with current medical plan and lifestyle changes.    No orders of the defined types were placed in this encounter.      Follow up as scheduled  Return sooner for concerns or questions. If symptoms persist go to the ED    He expressed verbal understanding and agreed with the plan      Naz Ruth MD  Interventional Cardiology  [facility]  Phone: [phone]

## 2022-02-14 ENCOUNTER — OFFICE VISIT (OUTPATIENT)
Dept: NEUROLOGY | Facility: CLINIC | Age: 82
End: 2022-02-14
Payer: MEDICARE

## 2022-02-14 VITALS
DIASTOLIC BLOOD PRESSURE: 63 MMHG | HEIGHT: 73 IN | BODY MASS INDEX: 29.84 KG/M2 | HEART RATE: 73 BPM | SYSTOLIC BLOOD PRESSURE: 111 MMHG | WEIGHT: 225.19 LBS

## 2022-02-14 DIAGNOSIS — G30.1 LATE ONSET ALZHEIMER'S DISEASE WITHOUT BEHAVIORAL DISTURBANCE: ICD-10-CM

## 2022-02-14 DIAGNOSIS — G40.909 NONINTRACTABLE EPILEPSY WITHOUT STATUS EPILEPTICUS, UNSPECIFIED EPILEPSY TYPE: ICD-10-CM

## 2022-02-14 DIAGNOSIS — F02.80 LATE ONSET ALZHEIMER'S DISEASE WITHOUT BEHAVIORAL DISTURBANCE: ICD-10-CM

## 2022-02-14 DIAGNOSIS — F32.A DEPRESSION, UNSPECIFIED DEPRESSION TYPE: Primary | ICD-10-CM

## 2022-02-14 PROCEDURE — 99999 PR PBB SHADOW E&M-EST. PATIENT-LVL III: CPT | Mod: PBBFAC,HCNC,, | Performed by: PSYCHIATRY & NEUROLOGY

## 2022-02-14 PROCEDURE — 3288F FALL RISK ASSESSMENT DOCD: CPT | Mod: HCNC,CPTII,S$GLB, | Performed by: PSYCHIATRY & NEUROLOGY

## 2022-02-14 PROCEDURE — 3288F PR FALLS RISK ASSESSMENT DOCUMENTED: ICD-10-PCS | Mod: HCNC,CPTII,S$GLB, | Performed by: PSYCHIATRY & NEUROLOGY

## 2022-02-14 PROCEDURE — 1160F RVW MEDS BY RX/DR IN RCRD: CPT | Mod: HCNC,CPTII,S$GLB, | Performed by: PSYCHIATRY & NEUROLOGY

## 2022-02-14 PROCEDURE — 99999 PR PBB SHADOW E&M-EST. PATIENT-LVL III: ICD-10-PCS | Mod: PBBFAC,HCNC,, | Performed by: PSYCHIATRY & NEUROLOGY

## 2022-02-14 PROCEDURE — 3074F PR MOST RECENT SYSTOLIC BLOOD PRESSURE < 130 MM HG: ICD-10-PCS | Mod: HCNC,CPTII,S$GLB, | Performed by: PSYCHIATRY & NEUROLOGY

## 2022-02-14 PROCEDURE — 1125F AMNT PAIN NOTED PAIN PRSNT: CPT | Mod: HCNC,CPTII,S$GLB, | Performed by: PSYCHIATRY & NEUROLOGY

## 2022-02-14 PROCEDURE — 99214 PR OFFICE/OUTPT VISIT, EST, LEVL IV, 30-39 MIN: ICD-10-PCS | Mod: HCNC,S$GLB,, | Performed by: PSYCHIATRY & NEUROLOGY

## 2022-02-14 PROCEDURE — 1101F PR PT FALLS ASSESS DOC 0-1 FALLS W/OUT INJ PAST YR: ICD-10-PCS | Mod: HCNC,CPTII,S$GLB, | Performed by: PSYCHIATRY & NEUROLOGY

## 2022-02-14 PROCEDURE — 3078F PR MOST RECENT DIASTOLIC BLOOD PRESSURE < 80 MM HG: ICD-10-PCS | Mod: HCNC,CPTII,S$GLB, | Performed by: PSYCHIATRY & NEUROLOGY

## 2022-02-14 PROCEDURE — 1159F MED LIST DOCD IN RCRD: CPT | Mod: HCNC,CPTII,S$GLB, | Performed by: PSYCHIATRY & NEUROLOGY

## 2022-02-14 PROCEDURE — 1125F PR PAIN SEVERITY QUANTIFIED, PAIN PRESENT: ICD-10-PCS | Mod: HCNC,CPTII,S$GLB, | Performed by: PSYCHIATRY & NEUROLOGY

## 2022-02-14 PROCEDURE — 1101F PT FALLS ASSESS-DOCD LE1/YR: CPT | Mod: HCNC,CPTII,S$GLB, | Performed by: PSYCHIATRY & NEUROLOGY

## 2022-02-14 PROCEDURE — 1159F PR MEDICATION LIST DOCUMENTED IN MEDICAL RECORD: ICD-10-PCS | Mod: HCNC,CPTII,S$GLB, | Performed by: PSYCHIATRY & NEUROLOGY

## 2022-02-14 PROCEDURE — 3074F SYST BP LT 130 MM HG: CPT | Mod: HCNC,CPTII,S$GLB, | Performed by: PSYCHIATRY & NEUROLOGY

## 2022-02-14 PROCEDURE — 99214 OFFICE O/P EST MOD 30 MIN: CPT | Mod: HCNC,S$GLB,, | Performed by: PSYCHIATRY & NEUROLOGY

## 2022-02-14 PROCEDURE — 3078F DIAST BP <80 MM HG: CPT | Mod: HCNC,CPTII,S$GLB, | Performed by: PSYCHIATRY & NEUROLOGY

## 2022-02-14 PROCEDURE — 1160F PR REVIEW ALL MEDS BY PRESCRIBER/CLIN PHARMACIST DOCUMENTED: ICD-10-PCS | Mod: HCNC,CPTII,S$GLB, | Performed by: PSYCHIATRY & NEUROLOGY

## 2022-02-14 RX ORDER — MEMANTINE HYDROCHLORIDE 10 MG/1
10 TABLET ORAL 2 TIMES DAILY
Qty: 180 TABLET | Refills: 3 | Status: SHIPPED | OUTPATIENT
Start: 2022-02-14 | End: 2023-01-19

## 2022-02-14 RX ORDER — CITALOPRAM 20 MG/1
20 TABLET, FILM COATED ORAL DAILY
Qty: 90 TABLET | Refills: 3 | Status: SHIPPED | OUTPATIENT
Start: 2022-02-14 | End: 2022-12-09

## 2022-02-14 RX ORDER — LAMOTRIGINE 100 MG/1
100 TABLET ORAL 2 TIMES DAILY
Qty: 180 TABLET | Refills: 3 | Status: SHIPPED | OUTPATIENT
Start: 2022-02-14 | End: 2023-01-31

## 2022-02-14 NOTE — PROGRESS NOTES
[facility]        [facility] [place]    Date: 2/14/22  Patient Name: Heri Muhammad   MRN: [id number]   PCP: Ora Murillo  Referring Provider: Anjum Arellano MD      Notes:   Assessment:   Heri Muhammad is a 81 y.o. male presenting in for a well-controlled seizure disorder and longstanding tremor late onset dementia.  Continue current with no changes.  Discussing switch from metoprolol to propranolol with cardiologist and PCP.  Initiating Celexa for management of depression associated with epilepsy and dementia.  Plan:     Problem List Items Addressed This Visit        Neuro    Late onset Alzheimer's disease without behavioral disturbance    Relevant Medications    memantine (NAMENDA) 10 MG Tab      Other Visit Diagnoses     Depression, unspecified depression type    -  Primary    Relevant Medications    citalopram (CELEXA) 20 MG tablet    Nonintractable epilepsy without status epilepticus, unspecified epilepsy type        Relevant Medications    lamoTRIgine (LAMICTAL) 100 MG tablet        I spent a total of 32 minutes preparing to see the patient, obtaining and reviewing history and results, performing a medically appropriate exam, counseling and educating the patient/family/caregiver, documenting clinical information, coordinating care, and ordering medications, tests, procedures, and referrals.    Anjum Arellano MD  [facility]   Department of Neurology    Patient note was created using MModal Dictation.  Any errors in syntax or even information may not have been identified and edited on initial review prior to signing this note.  Subjective:     HPI:   Mr. Heri Muhammad is a 81 y.o. male presenting in follow-up for management of multiple chronic neurologic issues.  The patient reports today that he has noted gradual decline of his short-term memory.  His wife also notes a mild increase in irritability and apathy clinician is a concern for depression.  He states his  tremor does continue to be disruptive limiting his ability to eat outside of the home.  He denies any breakthrough seizures since his last visit.    PAST MEDICAL HISTORY:  Past Medical History:   Diagnosis Date    A-fib     Anticoagulant long-term use     eliquis    Aortic atherosclerosis 9/18/2017    - LDL goal <70 - BP goal <130/80    Atrial flutter     Benign essential tremor     Biventricular cardiac pacemaker in situ 9/24/2018    CAD (coronary artery disease) 12/2/2013    - 9/2013 Left heart Cath: LAD- 60% prox (neg FFR), nl, ramus and RCA 20% - LDL goal <70 - BP goal <130/80    Cancer     skin cancer on back    Cardiac pacemaker in situ     Cardiomyopathy, nonischemic 2/12/2018    - 8/29/18 Echo EF 55%. Grade III diastolic dysfunction with restrictive physiology - followed by Cardiology    Carpal tunnel syndrome of left wrist 8/8/2017    CHF (congestive heart failure)     Chronic diastolic heart failure 1/19/2018    Chronic obstructive pulmonary disease 10/3/2018    Chronic right-sided thoracic back pain 4/27/2017    Coronary artery disease     Cough     Current use of long term anticoagulation 6/2/2016    - Eliquis for Afib    Dizziness     intermittant    DJD (degenerative joint disease) of knee     ED (erectile dysfunction)     Hard of hearing 11/2016    History of cataract     HTN (hypertension)     Hyperlipidemia     Hyperlipidemia     Hyponatremia 6/11/2019    Memory loss     Mixed hyperlipidemia 7/28/2012    Persistent atrial fibrillation 7/1/2015    - followed by Dr. Esparza - history of cardioversion - on Eliquis OAC 5 mg BID    Seizure disorder     Seizures     last episode 1995    Sinus node dysfunction 08/2016    Subclinical hypothyroidism     Tension type headache 7/2/2019       PAST SURGICAL HISTORY:  Past Surgical History:   Procedure Laterality Date    ANKLE SURGERY Right     pins and screws    ATRIAL ABLATION SURGERY      CARDIAC PACEMAKER PLACEMENT  2014     CARDIAC PACEMAKER PLACEMENT      CARDIAC PACEMAKER PLACEMENT  2014    CARDIOVERSION      CATARACT EXTRACTION      OU    COLONOSCOPY      due in 2014    EYE SURGERY Bilateral     cataracts extraction    FRACTURE SURGERY Right     ankle with hardware    HERNIA REPAIR      abdominal    INJECTION OF ANESTHETIC AGENT AROUND MEDIAL BRANCH NERVES INNERVATING LUMBAR FACET JOINT Bilateral 1/17/2019    Procedure: Block-nerve-medial branch-lumbar L2-L5;  Surgeon: Anthony Moffett MD;  Location: [facility] OR;  Service: Pain Management;  Laterality: Bilateral;    JOINT REPLACEMENT Bilateral     shoulders    RADIOFREQUENCY ABLATION OF LUMBAR MEDIAL BRANCH NERVE AT SINGLE LEVEL Bilateral 1/23/2019    Procedure: Radiofrequency Ablation, Nerve, Spinal, Lumbar, Medial Branch, L2-L5;  Surgeon: Anthony Moffett MD;  Location: [facility] OR;  Service: Pain Management;  Laterality: Bilateral;       CURRENT MEDS:  Current Outpatient Medications   Medication Sig Dispense Refill    apixaban (ELIQUIS) 5 mg Tab Take 1 tablet (5 mg total) by mouth 2 (two) times daily. 60 tablet 11    atorvastatin (LIPITOR) 40 MG tablet Take 1 tablet (40 mg total) by mouth once daily. 90 tablet 3    cetirizine (ZYRTEC) 10 MG tablet Take 10 mg by mouth every evening.      methocarbamoL (ROBAXIN) 500 MG Tab Take 1 tablet (500 mg total) by mouth nightly as needed (muscle spasm). 30 tablet 2    metoprolol succinate (TOPROL-XL) 50 MG 24 hr tablet Take 1 tablet (50 mg total) by mouth once daily. 90 tablet 3    primidone (MYSOLINE) 250 MG Tab Take 2 tablets (500 mg total) by mouth 2 (two) times daily. 360 tablet 0    torsemide (DEMADEX) 10 MG Tab TAKE 1 TABLET (10 MG TOTAL) BY MOUTH EVERY MORNING. 90 tablet 3    umeclidinium (INCRUSE ELLIPTA) 62.5 mcg/actuation inhalation capsule Inhale 62.5 mcg into the lungs once daily. Controller 30 each 11    citalopram (CELEXA) 20 MG tablet Take 1 tablet (20 mg total) by mouth once daily. 90 tablet 3    lamoTRIgine  "(LAMICTAL) 100 MG tablet Take 1 tablet (100 mg total) by mouth 2 (two) times daily. 180 tablet 3    memantine (NAMENDA) 10 MG Tab Take 1 tablet (10 mg total) by mouth 2 (two) times daily. 180 tablet 3     No current facility-administered medications for this visit.       ALLERGIES:  Review of patient's allergies indicates:   Allergen Reactions    Bactroban [mupirocin calcium] Rash     Other reaction(s): Rash       FAMILY HISTORY:  Family History   Problem Relation Age of Onset    Heart disease Mother     Heart failure Mother     Dementia Mother     Cancer Father         colon    Blindness Father         accident    Cataracts Father     Hypertension Father     Tremor Father     Tremor Brother     Tremor Paternal Grandfather     Cancer Daughter     No Known Problems Son     Obesity Daughter     Melanoma Neg Hx     Amblyopia Neg Hx     Diabetes Neg Hx     Glaucoma Neg Hx     Macular degeneration Neg Hx     Retinal detachment Neg Hx     Strabismus Neg Hx     Stroke Neg Hx     Thyroid disease Neg Hx        SOCIAL HISTORY:  Social History     Tobacco Use    Smoking status: Former Smoker     Packs/day: 1.00     Years: 5.00     Pack years: 5.00     Types: Cigarettes     Quit date:      Years since quittin.1    Smokeless tobacco: Never Used   Substance Use Topics    Alcohol use: No    Drug use: No       Review of Systems:  12 system review of systems is negative except for the symptoms mentioned in HPI.      Objective:     Vitals:    22 0941   BP: 111/63   Pulse: 73   Weight: 102.2 kg (225 lb 3.2 oz)   Height: 6' 1" (1.854 m)     General: NAD, well nourished   Eyes: no tearing, discharge, no erythema   ENT: moist mucous membranes of the oral cavity, nares patent    Neck: Supple, full range of motion  Cardiovascular: Appears well perfused  Lungs: Normal work of breathing, normal chest wall excursions  Skin: No rash, lesions, or breakdown on exposed skin  Psychiatry: Mood and affect " are appropriate   Abdomen: nondistended, non tender  Extremeties: No cyanosis, clubbing or edema visible    Neurological   MENTAL STATUS: Alert and oriented to person, place, nottime. Attention and concentration within normal limits. Speech without dysarthria. Recent and remote memory fair   CRANIAL NERVES: Visual fields intact.  EOMI. Facial sensation intact. Face symmetrical. Hearing grossly intact. Full shoulder shrug bilaterally. Tongue protrudes midline   SENSORY: Sensation is intact to light touch throughout.   MOTOR: Normal bulk. Postural tremor of RUE>LUE  REFLEXES: Deferred due to virtual visit  CEREBELLAR/COORDINATION/GAIT: Gait steady with normal arm swing and stride length. Normal rapid alternating movements.

## 2022-02-16 DIAGNOSIS — I50.20 HEART FAILURE WITH REDUCED EJECTION FRACTION: Primary | ICD-10-CM

## 2022-02-18 DIAGNOSIS — J44.9 CHRONIC OBSTRUCTIVE PULMONARY DISEASE, UNSPECIFIED COPD TYPE: ICD-10-CM

## 2022-02-19 NOTE — TELEPHONE ENCOUNTER
No new care gaps identified.  Powered by [employer]. Reference number: [id number].   2/19/2022 1:23:45 PM CST

## 2022-02-21 ENCOUNTER — PATIENT MESSAGE (OUTPATIENT)
Dept: NEUROLOGY | Facility: CLINIC | Age: 82
End: 2022-02-21
Payer: MEDICARE

## 2022-02-21 ENCOUNTER — PATIENT MESSAGE (OUTPATIENT)
Dept: FAMILY MEDICINE | Facility: CLINIC | Age: 82
End: 2022-02-21
Payer: MEDICARE

## 2022-02-21 RX ORDER — TIOTROPIUM BROMIDE AND OLODATEROL 3.124; 2.736 UG/1; UG/1
SPRAY, METERED RESPIRATORY (INHALATION)
Qty: 12 G | Refills: 3 | OUTPATIENT
Start: 2022-02-21

## 2022-02-21 RX ORDER — TIOTROPIUM BROMIDE AND OLODATEROL 3.124; 2.736 UG/1; UG/1
SPRAY, METERED RESPIRATORY (INHALATION)
COMMUNITY
Start: 2021-12-14 | End: 2022-02-21 | Stop reason: SDUPTHER

## 2022-02-21 NOTE — TELEPHONE ENCOUNTER
"Quick DC. Inappropriate Request    Refill Authorization Note   Heri Muhammad  is requesting a refill authorization.  Brief Assessment and Rationale for Refill:  Quick Discontinue  Medication Therapy Plan:  Med. discontinued on 7/8/21 by pcp Ora Murillo for the reason "Therapy Not Effective"    Medication Reconciliation Completed:  No      Comments:   Pended Medication(s)       Requested Prescriptions     Pending Prescriptions Disp Refills    STIOLTO RESPIMAT 2.5-2.5 mcg/actuation Mist [Pharmacy Med Name: STIOLTO RESPIMAT 2.5-2.5 MCG/ACT Aerosol Solution] 12 g 3     Sig: INHALE 2 PUFFS  INTO  THE  LUNGS EVERY DAY (CONTROLLER)        Duplicate Pended Encounter(s)/ Last Prescribed Details: (includes pharmacy & prescriber details)         Start Date: 04/19/21 End Date: 07/08/21 (ordered for 365 doses)   Discontinued by: Ora Murillo DO on 7/8/2021 09:15   Reason: Therapy not effective         Written Date: 04/19/21 Expiration Date: 04/19/22   Medication Notes:    Ashlee Ledesma MA  -- 6/21/2021 12:00   >> ASHLEE LEDESMA   Mon Jun 21, 2021 12:00 PM   Pt states that it doesn't help   Omid Farias MA  -- 5/4/2021 13:29   >> OMID FARIAS May 4, 2021  1:29 PM   Patient will start once he finishes the other       Diagnosis Association: Chronic obstructive pulmonary disease, unspecified COPD type (J44.9)     Providers    Authorizing Provider:    Ora Murillo DO   [address] [address]   Phone:  [phone]   Fax:  [phone]   JAMES #:  [id number]   NPI:  [id number]        Ordering User:  Ora Murillo DO          Pharmacy    [facility] [address]   [address] [facility] [address]   Phone:  [phone]  Fax:  [phone]   JAMES #:  --   ERIKA Reason: --       Outpatient Medication Detail     Disp Refills Start End ERIKA   tiotropium-olodateroL (STIOLTO RESPIMAT) 2.5-2.5 mcg/actuation Mist (Discontinued) 12 g 3 4/19/2021 7/8/2021 " --   Sig - Route: Inhale 2 puffs into the lungs once daily. Controller - Inhalation   Sent to pharmacy as: tiotropium-olodateroL (STIOLTO RESPIMAT) 2.5-2.5 mcg/actuation Mist   Class: Normal   Reason for Discontinue: Therapy not effective   Order: [id number]   Date/Time Signed: 4/19/2021 08:58       E-Prescribing Status: Receipt confirmed by pharmacy (4/19/2021  8:58 AM CDT)                Note composed:8:34 AM 02/21/2022

## 2022-02-22 RX ORDER — PROPRANOLOL HYDROCHLORIDE 10 MG/1
10 TABLET ORAL 2 TIMES DAILY
Qty: 180 TABLET | Refills: 3 | Status: SHIPPED | OUTPATIENT
Start: 2022-02-22 | End: 2022-05-02 | Stop reason: SDUPTHER

## 2022-02-22 RX ORDER — TIOTROPIUM BROMIDE AND OLODATEROL 3.124; 2.736 UG/1; UG/1
2 SPRAY, METERED RESPIRATORY (INHALATION) DAILY
Qty: 0.1 G | Refills: 3 | Status: SHIPPED | OUTPATIENT
Start: 2022-02-22 | End: 2022-03-07 | Stop reason: SDUPTHER

## 2022-03-03 ENCOUNTER — PATIENT MESSAGE (OUTPATIENT)
Dept: NEUROLOGY | Facility: CLINIC | Age: 82
End: 2022-03-03
Payer: MEDICARE

## 2022-03-07 ENCOUNTER — PATIENT MESSAGE (OUTPATIENT)
Dept: FAMILY MEDICINE | Facility: CLINIC | Age: 82
End: 2022-03-07
Payer: MEDICARE

## 2022-03-08 RX ORDER — TIOTROPIUM BROMIDE AND OLODATEROL 3.124; 2.736 UG/1; UG/1
2 SPRAY, METERED RESPIRATORY (INHALATION) DAILY
Qty: 12 G | Refills: 3 | Status: SHIPPED | OUTPATIENT
Start: 2022-03-08 | End: 2023-01-31

## 2022-03-08 NOTE — TELEPHONE ENCOUNTER
No new care gaps identified.  Powered by [email or web address]. Reference number: [id number].   3/07/2022 9:49:17 PM CST

## 2022-03-29 ENCOUNTER — CLINICAL SUPPORT (OUTPATIENT)
Dept: CARDIOLOGY | Facility: HOSPITAL | Age: 82
End: 2022-03-29
Payer: MEDICARE

## 2022-03-29 DIAGNOSIS — I50.9 HEART FAILURE, UNSPECIFIED: ICD-10-CM

## 2022-03-29 DIAGNOSIS — I44.2 ATRIOVENTRICULAR BLOCK, COMPLETE: ICD-10-CM

## 2022-03-29 DIAGNOSIS — Z95.0 PRESENCE OF CARDIAC PACEMAKER: ICD-10-CM

## 2022-03-29 DIAGNOSIS — I48.91 UNSPECIFIED ATRIAL FIBRILLATION: ICD-10-CM

## 2022-03-29 PROCEDURE — 93296 REM INTERROG EVL PM/IDS: CPT | Performed by: INTERNAL MEDICINE

## 2022-05-02 ENCOUNTER — PATIENT MESSAGE (OUTPATIENT)
Dept: NEUROLOGY | Facility: CLINIC | Age: 82
End: 2022-05-02
Payer: MEDICARE

## 2022-05-02 RX ORDER — PROPRANOLOL HYDROCHLORIDE 10 MG/1
10 TABLET ORAL 2 TIMES DAILY
Qty: 180 TABLET | Refills: 3 | Status: SHIPPED | OUTPATIENT
Start: 2022-05-02 | End: 2022-08-24

## 2022-05-11 NOTE — PROGRESS NOTES
FAMILY MEDICINE  [facility]    Reason for visit:   Chief Complaint   Patient presents with    Follow-up       HPI: Heri Muhammad is a 81 y.o. male  - with obesity, dementia, seizure disorder, hyperlipidemia, CAD (non-obstructive and no history of stents), DJD lumbar spine, Afib/AFlutter (on Eliquis, h/o ablation and pacemaker in place), hypertension, aortic atherosclerosis, HFpEF (8/2018 EF 55%), subclinical hypothyroidism, restrictive cardiomyopathy, chronic low back pain, chronic bilateral shoulder pain s/p bilateral shoulder replacements and COPD presents for follow-up blood pressure and labs     Cardiology Dr. Gladys Negron  Neurology Dr. Anjum Arellano    Today he is here with his wife. He has been doing well and denies any new complaints.      1. COPD?   - he has been on COPD treatment since prior to establishing with me  - 2019 PFT mild restriction  - inhaler helps him     Prior hospitalizations: yes (last 6/10/19)  History of intubation: denies     Current regimen:   ANORO ELLIPTA 62.5-25 mcg/actuation DsDv, INHALE 1 PUFF DAILY (CONTROLLER), Disp: 3 each, Rfl: 3     Current symptoms: denies  Recent exacerbations: denies recent issues  How often using rescue inhaler? weekly     Last PFT: 7/30/19 PFT: no obstruction     Vaccines:   Influenza: 10/22/21  Prevnar 13: 10/24/16  Pneumovax 23: 10/27/17  Covid-19 vaccinate including booster     2. Hypertension  - with Afib and CHF     Current medication treatment:   metoprolol succinate (TOPROL-XL) 50 MG 24 hr tablet, TAKE 1 TABLET (50 MG TOTAL) BY MOUTH ONCE DAILY., Disp: 90 tablet, Rfl: 4     Medication side effects: denies  Exercise regimen: none              Review of Systems   All other systems reviewed and are negative.      HISTORY:   Past Medical History:   Diagnosis Date    A-fib     Anticoagulant long-term use     eliquis    Aortic atherosclerosis 9/18/2017    - LDL goal <70 - BP goal <130/80    Atrial flutter      Benign essential tremor     Biventricular cardiac pacemaker in situ 9/24/2018    CAD (coronary artery disease) 12/2/2013    - 9/2013 Left heart Cath: LAD- 60% prox (neg FFR), nl, ramus and RCA 20% - LDL goal <70 - BP goal <130/80    Cancer     skin cancer on back    Cardiac pacemaker in situ     Cardiomyopathy, nonischemic 2/12/2018    - 8/29/18 Echo EF 55%. Grade III diastolic dysfunction with restrictive physiology - followed by Cardiology    Carpal tunnel syndrome of left wrist 8/8/2017    CHF (congestive heart failure)     Chronic diastolic heart failure 1/19/2018    Chronic obstructive pulmonary disease 10/3/2018    Chronic right-sided thoracic back pain 4/27/2017    Coronary artery disease     Cough     Current use of long term anticoagulation 6/2/2016    - Eliquis for Afib    Dizziness     intermittant    DJD (degenerative joint disease) of knee     ED (erectile dysfunction)     Hard of hearing 11/2016    History of cataract     HTN (hypertension)     Hyperlipidemia     Hyperlipidemia     Hyponatremia 6/11/2019    Memory loss     Mixed hyperlipidemia 7/28/2012    Persistent atrial fibrillation 7/1/2015    - followed by Dr. Esparza - history of cardioversion - on Eliquis OAC 5 mg BID    Seizure disorder     Seizures     last episode 1995    Sinus node dysfunction 08/2016    Subclinical hypothyroidism     Tension type headache 7/2/2019       Past Surgical History:   Procedure Laterality Date    ANKLE SURGERY Right     pins and screws    ATRIAL ABLATION SURGERY      CARDIAC PACEMAKER PLACEMENT  2014    CARDIAC PACEMAKER PLACEMENT      CARDIAC PACEMAKER PLACEMENT  2014    CARDIOVERSION      CATARACT EXTRACTION      OU    COLONOSCOPY      due in 2014    EYE SURGERY Bilateral     cataracts extraction    FRACTURE SURGERY Right     ankle with hardware    HERNIA REPAIR      abdominal    INJECTION OF ANESTHETIC AGENT AROUND MEDIAL BRANCH NERVES INNERVATING LUMBAR FACET JOINT  Bilateral 2019    Procedure: Block-nerve-medial branch-lumbar L2-L5;  Surgeon: Anthony Moffett MD;  Location: [facility] OR;  Service: Pain Management;  Laterality: Bilateral;    JOINT REPLACEMENT Bilateral     shoulders    RADIOFREQUENCY ABLATION OF LUMBAR MEDIAL BRANCH NERVE AT SINGLE LEVEL Bilateral 2019    Procedure: Radiofrequency Ablation, Nerve, Spinal, Lumbar, Medial Branch, L2-L5;  Surgeon: Anthony Moffett MD;  Location: [facility] OR;  Service: Pain Management;  Laterality: Bilateral;       Family History   Problem Relation Age of Onset    Heart disease Mother     Heart failure Mother     Dementia Mother     Cancer Father         colon    Blindness Father         accident    Cataracts Father     Hypertension Father     Tremor Father     Tremor Brother     Tremor Paternal Grandfather     Cancer Daughter     No Known Problems Son     Obesity Daughter     Melanoma Neg Hx     Amblyopia Neg Hx     Diabetes Neg Hx     Glaucoma Neg Hx     Macular degeneration Neg Hx     Retinal detachment Neg Hx     Strabismus Neg Hx     Stroke Neg Hx     Thyroid disease Neg Hx        Social History     Tobacco Use    Smoking status: Former Smoker     Packs/day: 1.00     Years: 5.00     Pack years: 5.00     Types: Cigarettes     Quit date:      Years since quittin.4    Smokeless tobacco: Never Used   Substance Use Topics    Alcohol use: No    Drug use: No       Social History     Social History Narrative    . 4 adult children.        ALLERGIES:   Review of patient's allergies indicates:   Allergen Reactions    Bactroban [mupirocin calcium] Rash     Other reaction(s): Rash       MEDS:     Current Outpatient Medications:     apixaban (ELIQUIS) 5 mg Tab, Take 1 tablet (5 mg total) by mouth 2 (two) times daily., Disp: 60 tablet, Rfl: 11    atorvastatin (LIPITOR) 40 MG tablet, Take 1 tablet (40 mg total) by mouth once daily., Disp: 90 tablet, Rfl: 3    cetirizine (ZYRTEC) 10 MG tablet,  "Take 10 mg by mouth every evening., Disp: , Rfl:     citalopram (CELEXA) 20 MG tablet, Take 1 tablet (20 mg total) by mouth once daily., Disp: 90 tablet, Rfl: 3    cyclobenzaprine (FLEXERIL) 5 MG tablet, Take 5 mg by mouth nightly., Disp: , Rfl:     lamoTRIgine (LAMICTAL) 100 MG tablet, Take 1 tablet (100 mg total) by mouth 2 (two) times daily., Disp: 180 tablet, Rfl: 3    memantine (NAMENDA) 10 MG Tab, Take 1 tablet (10 mg total) by mouth 2 (two) times daily., Disp: 180 tablet, Rfl: 3    primidone (MYSOLINE) 250 MG Tab, TAKE 2 TABLETS (500 MG TOTAL) BY MOUTH 2 (TWO) TIMES DAILY., Disp: 360 tablet, Rfl: 0    propranoloL (INDERAL) 10 MG tablet, Take 1 tablet (10 mg total) by mouth 2 (two) times daily., Disp: 180 tablet, Rfl: 3    STIOLTO RESPIMAT 2.5-2.5 mcg/actuation Mist, Inhale 2 puffs into the lungs once daily., Disp: 12 g, Rfl: 3    torsemide (DEMADEX) 10 MG Tab, TAKE 1 TABLET (10 MG TOTAL) BY MOUTH EVERY MORNING., Disp: 90 tablet, Rfl: 3    Vital signs:   Vitals:    05/12/22 1357   BP: 112/60   BP Location: Left arm   Patient Position: Sitting   BP Method: X-Large (Manual)   Pulse: 73   Resp: 18   SpO2: 97%   Weight: 102.2 kg (225 lb 4.8 oz)   Height: 6' 1" (1.854 m)     Body mass index is 29.72 kg/m².    PHYSICAL EXAM:     Physical Exam  Constitutional:       General: He is not in acute distress.  Cardiovascular:      Rate and Rhythm: Normal rate and regular rhythm.      Heart sounds: Normal heart sounds. No murmur heard.    No friction rub. No gallop.   Pulmonary:      Effort: Pulmonary effort is normal.      Breath sounds: Normal breath sounds. No wheezing, rhonchi or rales.   Musculoskeletal:      Cervical back: Neck supple.      Right lower leg: No edema.      Left lower leg: No edema.   Skin:     General: Skin is warm.   Neurological:      Mental Status: He is alert.           PHQ4 = Score: 0    PERTINENT RESULTS:   Lab Visit on 05/06/2022   Component Date Value Ref Range Status    WBC 05/06/2022 " 6.29  3.90 - 12.70 K/uL Final    RBC 05/06/2022 4.62  4.60 - 6.20 M/uL Final    Hemoglobin 05/06/2022 14.3  14.0 - 18.0 g/dL Final    Hematocrit 05/06/2022 43.2  40.0 - 54.0 % Final    MCV 05/06/2022 94  82 - 98 fL Final    MCH 05/06/2022 31.0  27.0 - 31.0 pg Final    MCHC 05/06/2022 33.1  32.0 - 36.0 g/dL Final    RDW 05/06/2022 14.7 (A) 11.5 - 14.5 % Final    Platelets 05/06/2022 201  150 - 450 K/uL Final    MPV 05/06/2022 9.9  9.2 - 12.9 fL Final    Immature Granulocytes 05/06/2022 0.8 (A) 0.0 - 0.5 % Final    Gran # (ANC) 05/06/2022 3.7  1.8 - 7.7 K/uL Final    Immature Grans (Abs) 05/06/2022 0.05 (A) 0.00 - 0.04 K/uL Final    Comment: Mild elevation in immature granulocytes is non specific and   can be seen in a variety of conditions including stress response,   acute inflammation, trauma and pregnancy. Correlation with other   laboratory and clinical findings is essential.      Lymph # 05/06/2022 1.8  1.0 - 4.8 K/uL Final    Mono # 05/06/2022 0.6  0.3 - 1.0 K/uL Final    Eos # 05/06/2022 0.1  0.0 - 0.5 K/uL Final    Baso # 05/06/2022 0.05  0.00 - 0.20 K/uL Final    nRBC 05/06/2022 0  0 /100 WBC Final    Gran % 05/06/2022 58.5  38.0 - 73.0 % Final    Lymph % 05/06/2022 27.8  18.0 - 48.0 % Final    Mono % 05/06/2022 10.0  4.0 - 15.0 % Final    Eosinophil % 05/06/2022 2.1  0.0 - 8.0 % Final    Basophil % 05/06/2022 0.8  0.0 - 1.9 % Final    Differential Method 05/06/2022 Automated   Final    Sodium 05/06/2022 141  136 - 145 mmol/L Final    Potassium 05/06/2022 4.7  3.5 - 5.1 mmol/L Final    Chloride 05/06/2022 100  95 - 110 mmol/L Final    CO2 05/06/2022 38 (A) 23 - 29 mmol/L Final    Glucose 05/06/2022 107  70 - 110 mg/dL Final    BUN 05/06/2022 13  2 - 20 mg/dL Final    Creatinine 05/06/2022 0.61  0.50 - 1.40 mg/dL Final    Calcium 05/06/2022 8.2 (A) 8.7 - 10.5 mg/dL Final    Total Protein 05/06/2022 7.0  6.0 - 8.4 g/dL Final    Albumin 05/06/2022 3.8  3.5 - 5.2 g/dL Final     Total Bilirubin 05/06/2022 0.3  0.1 - 1.0 mg/dL Final    Comment: For infants and newborns, interpretation of results should be based  on gestational age, weight and in agreement with clinical  observations.    Premature Infant recommended reference ranges:  Up to 24 hours.............<8.0 mg/dL  Up to 48 hours............<12.0 mg/dL  3-5 days..................<15.0 mg/dL  6-29 days.................<15.0 mg/dL      Alkaline Phosphatase 05/06/2022 117  38 - 126 U/L Final    AST 05/06/2022 32  15 - 46 U/L Final    ALT 05/06/2022 24  10 - 44 U/L Final    Anion Gap 05/06/2022 3 (A) 8 - 16 mmol/L Final    eGFR if African American 05/06/2022 >60.0  >60 mL/min/1.73 m^2 Final    eGFR if non African American 05/06/2022 >60.0  >60 mL/min/1.73 m^2 Final    Comment: Calculation used to obtain the estimated glomerular filtration  rate (eGFR) is the CKD-EPI equation.       Cholesterol 05/06/2022 146  120 - 199 mg/dL Final    Comment: The National Cholesterol Education Program (NCEP) has set the  following guidelines (reference ranges) for Cholesterol:  Optimal.....................<200 mg/dL  Borderline High.............200-239 mg/dL  High........................> or = 240 mg/dL      Triglycerides 05/06/2022 103  30 - 150 mg/dL Final    Comment: The National Cholesterol Education Program (NCEP) has set the  following guidelines (reference values) for triglycerides:  Normal......................<150 mg/dL  Borderline High.............150-199 mg/dL  High........................200-499 mg/dL      HDL 05/06/2022 37 (A) 40 - 75 mg/dL Final    Comment: The National Cholesterol Education Program (NCEP) has set the  following guidelines (reference values) for HDL Cholesterol:  Low...............<40 mg/dL  Optimal...........>60 mg/dL      LDL Cholesterol 05/06/2022 88.4  63.0 - 159.0 mg/dL Final    Comment: The National Cholesterol Education Program (NCEP) has set the  following guidelines (reference values) for LDL  Cholesterol:  Optimal.......................<130 mg/dL  Borderline High...............130-159 mg/dL  High..........................160-189 mg/dL  Very High.....................>190 mg/dL      HDL/Cholesterol Ratio 05/06/2022 25.3  20.0 - 50.0 % Final    Total Cholesterol/HDL Ratio 05/06/2022 3.9  2.0 - 5.0 Final    Non-HDL Cholesterol 05/06/2022 109  mg/dL Final    Comment: Risk category and Non-HDL cholesterol goals:  Coronary heart disease (CHD)or equivalent (10-year risk of CHD >20%):  Non-HDL cholesterol goal     <130 mg/dL  Two or more CHD risk factors and 10-year risk of CHD <= 20%:  Non-HDL cholesterol goal     <160 mg/dL  0 to 1 CHD risk factor:  Non-HDL cholesterol goal     <190 mg/dL      TSH 05/06/2022 4.860 (A) 0.400 - 4.000 uIU/mL Final    Comment: Warning:  Heterophilic antibodies in serum or plasma of   certain individuals are known to cause interference with   immunoassays. These antibodies may be present in blood samples   from individuals regularly exposed to animal or who have been   treated with animal products.     Patients taking high doses of supplemental biotin may have  negatively biased results.       Free T4 05/06/2022 0.88  0.71 - 1.51 ng/dL Final       ASSESSMENT/PLAN:  Problem List Items Addressed This Visit        Neuro    Essential tremor    Overview     - followed by Neurology  - on primidone and propranolol recently added            Late onset Alzheimer's disease without behavioral disturbance    Overview     - supportive care  - continue namenda  - followed by Neurology           Seizure disorder    Overview     - stable and no recent seizures since 1990's  - CT Head 2019 with diffuse atrophy  - EEG 1/2019 mild diffuse slowing  - followed by Neurology              Pulmonary    Simple chronic bronchitis    Overview     - 7/30/19 PFT: no obstruction. Mild restriction  - improved symptoms with Anoro              Cardiac/Vascular    CAD (coronary artery disease) (Chronic)     Overview     - 9/2013 Left heart Cath: LAD- 60% prox (neg FFR), nl, ramus and RCA 20%  - LDL goal <70  - BP goal <130/80  - ASA 81 mg daily           Relevant Orders    Comprehensive Metabolic Panel    CBC Without Differential    TSH    Aortic atherosclerosis    Overview     - LDL goal <70  - BP goal <130/80           Biventricular cardiac pacemaker in situ    Overview     - paced rhythm  - followed by Cardiology           Chronic diastolic heart failure - Primary    Overview     - no signs of acute decompensation  - no leg swelling  - medical management           Relevant Orders    Comprehensive Metabolic Panel    CBC Without Differential    TSH    Essential hypertension    Overview     - well controlled  - continue current medication           Relevant Orders    Comprehensive Metabolic Panel    CBC Without Differential    TSH    Mixed hyperlipidemia    Overview     Lab Results   Component Value Date    LDLCALC 88.4 05/06/2022     - on statin  - followed by Cardiology           Persistent atrial fibrillation    Overview     - followed by EP  - history of cardioversion  - on Eliquis OAC 5 mg BID              Hematology    Current use of long term anticoagulation    Overview     - Eliquis for Afib  Lab Results   Component Value Date    HGB 14.3 05/06/2022     Lab Results   Component Value Date    HCT 43.2 05/06/2022                   Endocrine    Abnormal thyroid function test    Overview     Lab Results   Component Value Date    TSH 4.860 (H) 05/06/2022    FREET4 0.88 05/06/2022     - recurrent   - waxes and wanes and remain subclinical  - asymptomatic  - continue to monitor           Relevant Medications    cyclobenzaprine (FLEXERIL) 5 MG tablet    Other Relevant Orders    T4, Free    Thyroid Peroxidase Antibody          ORDERS:   Orders Placed This Encounter    Comprehensive Metabolic Panel    CBC Without Differential    TSH    T4, Free    Thyroid Peroxidase Antibody       Vaccines recommended:  4th COVID  vaccination    Follow-up in 6 months with labs or sooner if any concerns.    This note is dictated using the M*Modal Fluency Direct word recognition program. There are word recognition mistakes that are occasionally missed on review.    Dr. Ora Murillo D.O.   [facility]

## 2022-05-12 ENCOUNTER — OFFICE VISIT (OUTPATIENT)
Dept: FAMILY MEDICINE | Facility: CLINIC | Age: 82
End: 2022-05-12
Payer: MEDICARE

## 2022-05-12 VITALS
HEART RATE: 73 BPM | SYSTOLIC BLOOD PRESSURE: 112 MMHG | DIASTOLIC BLOOD PRESSURE: 60 MMHG | HEIGHT: 73 IN | OXYGEN SATURATION: 97 % | BODY MASS INDEX: 29.86 KG/M2 | WEIGHT: 225.31 LBS | RESPIRATION RATE: 18 BRPM

## 2022-05-12 DIAGNOSIS — R94.6 ABNORMAL THYROID FUNCTION TEST: ICD-10-CM

## 2022-05-12 DIAGNOSIS — I25.10 CORONARY ARTERY DISEASE INVOLVING NATIVE CORONARY ARTERY OF NATIVE HEART WITHOUT ANGINA PECTORIS: Chronic | ICD-10-CM

## 2022-05-12 DIAGNOSIS — F02.80 LATE ONSET ALZHEIMER'S DISEASE WITHOUT BEHAVIORAL DISTURBANCE: ICD-10-CM

## 2022-05-12 DIAGNOSIS — I10 ESSENTIAL HYPERTENSION: ICD-10-CM

## 2022-05-12 DIAGNOSIS — E78.2 MIXED HYPERLIPIDEMIA: ICD-10-CM

## 2022-05-12 DIAGNOSIS — Z79.01 CURRENT USE OF LONG TERM ANTICOAGULATION: ICD-10-CM

## 2022-05-12 DIAGNOSIS — G40.909 SEIZURE DISORDER: ICD-10-CM

## 2022-05-12 DIAGNOSIS — I48.19 PERSISTENT ATRIAL FIBRILLATION: ICD-10-CM

## 2022-05-12 DIAGNOSIS — Z95.0 BIVENTRICULAR CARDIAC PACEMAKER IN SITU: ICD-10-CM

## 2022-05-12 DIAGNOSIS — J41.0 SIMPLE CHRONIC BRONCHITIS: ICD-10-CM

## 2022-05-12 DIAGNOSIS — I50.32 CHRONIC DIASTOLIC HEART FAILURE: Primary | ICD-10-CM

## 2022-05-12 DIAGNOSIS — G30.1 LATE ONSET ALZHEIMER'S DISEASE WITHOUT BEHAVIORAL DISTURBANCE: ICD-10-CM

## 2022-05-12 DIAGNOSIS — G25.0 ESSENTIAL TREMOR: ICD-10-CM

## 2022-05-12 DIAGNOSIS — I70.0 AORTIC ATHEROSCLEROSIS: ICD-10-CM

## 2022-05-12 PROCEDURE — 1101F PR PT FALLS ASSESS DOC 0-1 FALLS W/OUT INJ PAST YR: ICD-10-PCS | Mod: CPTII,S$GLB,, | Performed by: FAMILY MEDICINE

## 2022-05-12 PROCEDURE — 99214 OFFICE O/P EST MOD 30 MIN: CPT | Mod: S$GLB,,, | Performed by: FAMILY MEDICINE

## 2022-05-12 PROCEDURE — 3074F SYST BP LT 130 MM HG: CPT | Mod: CPTII,S$GLB,, | Performed by: FAMILY MEDICINE

## 2022-05-12 PROCEDURE — 3288F PR FALLS RISK ASSESSMENT DOCUMENTED: ICD-10-PCS | Mod: CPTII,S$GLB,, | Performed by: FAMILY MEDICINE

## 2022-05-12 PROCEDURE — 3074F PR MOST RECENT SYSTOLIC BLOOD PRESSURE < 130 MM HG: ICD-10-PCS | Mod: CPTII,S$GLB,, | Performed by: FAMILY MEDICINE

## 2022-05-12 PROCEDURE — 99999 PR PBB SHADOW E&M-EST. PATIENT-LVL IV: CPT | Mod: PBBFAC,,, | Performed by: FAMILY MEDICINE

## 2022-05-12 PROCEDURE — 1159F MED LIST DOCD IN RCRD: CPT | Mod: CPTII,S$GLB,, | Performed by: FAMILY MEDICINE

## 2022-05-12 PROCEDURE — 1125F PR PAIN SEVERITY QUANTIFIED, PAIN PRESENT: ICD-10-PCS | Mod: CPTII,S$GLB,, | Performed by: FAMILY MEDICINE

## 2022-05-12 PROCEDURE — 3288F FALL RISK ASSESSMENT DOCD: CPT | Mod: CPTII,S$GLB,, | Performed by: FAMILY MEDICINE

## 2022-05-12 PROCEDURE — 1159F PR MEDICATION LIST DOCUMENTED IN MEDICAL RECORD: ICD-10-PCS | Mod: CPTII,S$GLB,, | Performed by: FAMILY MEDICINE

## 2022-05-12 PROCEDURE — 3078F DIAST BP <80 MM HG: CPT | Mod: CPTII,S$GLB,, | Performed by: FAMILY MEDICINE

## 2022-05-12 PROCEDURE — 3078F PR MOST RECENT DIASTOLIC BLOOD PRESSURE < 80 MM HG: ICD-10-PCS | Mod: CPTII,S$GLB,, | Performed by: FAMILY MEDICINE

## 2022-05-12 PROCEDURE — 99999 PR PBB SHADOW E&M-EST. PATIENT-LVL IV: ICD-10-PCS | Mod: PBBFAC,,, | Performed by: FAMILY MEDICINE

## 2022-05-12 PROCEDURE — 1125F AMNT PAIN NOTED PAIN PRSNT: CPT | Mod: CPTII,S$GLB,, | Performed by: FAMILY MEDICINE

## 2022-05-12 PROCEDURE — 99214 PR OFFICE/OUTPT VISIT, EST, LEVL IV, 30-39 MIN: ICD-10-PCS | Mod: S$GLB,,, | Performed by: FAMILY MEDICINE

## 2022-05-12 PROCEDURE — 1101F PT FALLS ASSESS-DOCD LE1/YR: CPT | Mod: CPTII,S$GLB,, | Performed by: FAMILY MEDICINE

## 2022-05-12 RX ORDER — CYCLOBENZAPRINE HCL 5 MG
5 TABLET ORAL NIGHTLY
COMMUNITY
End: 2023-07-17

## 2022-05-13 PROBLEM — E66.9 OBESITY (BMI 30.0-34.9): Status: RESOLVED | Noted: 2019-06-06 | Resolved: 2022-05-13

## 2022-05-13 PROBLEM — R94.6 ABNORMAL THYROID FUNCTION TEST: Status: ACTIVE | Noted: 2022-05-13

## 2022-05-13 PROBLEM — E66.811 OBESITY (BMI 30.0-34.9): Status: RESOLVED | Noted: 2019-06-06 | Resolved: 2022-05-13

## 2022-05-13 PROBLEM — J41.0 SIMPLE CHRONIC BRONCHITIS: Status: ACTIVE | Noted: 2020-11-04

## 2022-05-20 ENCOUNTER — PES CALL (OUTPATIENT)
Dept: ADMINISTRATIVE | Facility: CLINIC | Age: 82
End: 2022-05-20
Payer: MEDICARE

## 2022-06-06 ENCOUNTER — TELEPHONE (OUTPATIENT)
Dept: ADMINISTRATIVE | Facility: CLINIC | Age: 82
End: 2022-06-06
Payer: MEDICARE

## 2022-06-08 ENCOUNTER — OFFICE VISIT (OUTPATIENT)
Dept: FAMILY MEDICINE | Facility: CLINIC | Age: 82
End: 2022-06-08
Payer: MEDICARE

## 2022-06-08 ENCOUNTER — TELEPHONE (OUTPATIENT)
Dept: FAMILY MEDICINE | Facility: CLINIC | Age: 82
End: 2022-06-08
Payer: MEDICARE

## 2022-06-08 ENCOUNTER — TELEPHONE (OUTPATIENT)
Dept: ADMINISTRATIVE | Facility: CLINIC | Age: 82
End: 2022-06-08
Payer: MEDICARE

## 2022-06-08 VITALS
WEIGHT: 225.31 LBS | DIASTOLIC BLOOD PRESSURE: 63 MMHG | HEIGHT: 73 IN | BODY MASS INDEX: 29.86 KG/M2 | SYSTOLIC BLOOD PRESSURE: 132 MMHG

## 2022-06-08 DIAGNOSIS — G25.0 ESSENTIAL TREMOR: ICD-10-CM

## 2022-06-08 DIAGNOSIS — I50.32 CHRONIC DIASTOLIC HEART FAILURE: ICD-10-CM

## 2022-06-08 DIAGNOSIS — R26.9 ABNORMALITY OF GAIT AND MOBILITY: ICD-10-CM

## 2022-06-08 DIAGNOSIS — I10 ESSENTIAL HYPERTENSION: ICD-10-CM

## 2022-06-08 DIAGNOSIS — J84.10 LUNG GRANULOMA: ICD-10-CM

## 2022-06-08 DIAGNOSIS — G30.1 LATE ONSET ALZHEIMER'S DISEASE WITHOUT BEHAVIORAL DISTURBANCE: ICD-10-CM

## 2022-06-08 DIAGNOSIS — E78.2 MIXED HYPERLIPIDEMIA: ICD-10-CM

## 2022-06-08 DIAGNOSIS — I70.0 AORTIC ATHEROSCLEROSIS: ICD-10-CM

## 2022-06-08 DIAGNOSIS — M54.50 CHRONIC BILATERAL LOW BACK PAIN WITHOUT SCIATICA: ICD-10-CM

## 2022-06-08 DIAGNOSIS — G89.29 CHRONIC BILATERAL LOW BACK PAIN WITHOUT SCIATICA: ICD-10-CM

## 2022-06-08 DIAGNOSIS — I25.10 CORONARY ARTERY DISEASE INVOLVING NATIVE CORONARY ARTERY OF NATIVE HEART WITHOUT ANGINA PECTORIS: Chronic | ICD-10-CM

## 2022-06-08 DIAGNOSIS — I48.19 PERSISTENT ATRIAL FIBRILLATION: ICD-10-CM

## 2022-06-08 DIAGNOSIS — Z00.00 ENCOUNTER FOR PREVENTIVE HEALTH EXAMINATION: Primary | ICD-10-CM

## 2022-06-08 DIAGNOSIS — Z99.89 DEPENDENCE ON OTHER ENABLING MACHINES AND DEVICES: ICD-10-CM

## 2022-06-08 DIAGNOSIS — J41.0 SIMPLE CHRONIC BRONCHITIS: ICD-10-CM

## 2022-06-08 DIAGNOSIS — F02.80 LATE ONSET ALZHEIMER'S DISEASE WITHOUT BEHAVIORAL DISTURBANCE: ICD-10-CM

## 2022-06-08 DIAGNOSIS — E66.3 OVERWEIGHT (BMI 25.0-29.9): ICD-10-CM

## 2022-06-08 DIAGNOSIS — G40.909 SEIZURE DISORDER: ICD-10-CM

## 2022-06-08 PROBLEM — J42 CHRONIC BRONCHITIS: Status: RESOLVED | Noted: 2018-10-03 | Resolved: 2022-06-08

## 2022-06-08 PROCEDURE — G0439 PR MEDICARE ANNUAL WELLNESS SUBSEQUENT VISIT: ICD-10-PCS | Mod: 95,,, | Performed by: NURSE PRACTITIONER

## 2022-06-08 PROCEDURE — 3288F PR FALLS RISK ASSESSMENT DOCUMENTED: ICD-10-PCS | Mod: CPTII,95,, | Performed by: NURSE PRACTITIONER

## 2022-06-08 PROCEDURE — 1160F RVW MEDS BY RX/DR IN RCRD: CPT | Mod: CPTII,95,, | Performed by: NURSE PRACTITIONER

## 2022-06-08 PROCEDURE — G0439 PPPS, SUBSEQ VISIT: HCPCS | Mod: 95,,, | Performed by: NURSE PRACTITIONER

## 2022-06-08 PROCEDURE — 3075F SYST BP GE 130 - 139MM HG: CPT | Mod: CPTII,95,, | Performed by: NURSE PRACTITIONER

## 2022-06-08 PROCEDURE — 1160F PR REVIEW ALL MEDS BY PRESCRIBER/CLIN PHARMACIST DOCUMENTED: ICD-10-PCS | Mod: CPTII,95,, | Performed by: NURSE PRACTITIONER

## 2022-06-08 PROCEDURE — 1101F PR PT FALLS ASSESS DOC 0-1 FALLS W/OUT INJ PAST YR: ICD-10-PCS | Mod: CPTII,95,, | Performed by: NURSE PRACTITIONER

## 2022-06-08 PROCEDURE — 1170F FXNL STATUS ASSESSED: CPT | Mod: CPTII,95,, | Performed by: NURSE PRACTITIONER

## 2022-06-08 PROCEDURE — 99499 UNLISTED E&M SERVICE: CPT | Mod: 95,,, | Performed by: NURSE PRACTITIONER

## 2022-06-08 PROCEDURE — 1170F PR FUNCTIONAL STATUS ASSESSED: ICD-10-PCS | Mod: CPTII,95,, | Performed by: NURSE PRACTITIONER

## 2022-06-08 PROCEDURE — 3078F DIAST BP <80 MM HG: CPT | Mod: CPTII,95,, | Performed by: NURSE PRACTITIONER

## 2022-06-08 PROCEDURE — 3288F FALL RISK ASSESSMENT DOCD: CPT | Mod: CPTII,95,, | Performed by: NURSE PRACTITIONER

## 2022-06-08 PROCEDURE — 1159F MED LIST DOCD IN RCRD: CPT | Mod: CPTII,95,, | Performed by: NURSE PRACTITIONER

## 2022-06-08 PROCEDURE — 1125F AMNT PAIN NOTED PAIN PRSNT: CPT | Mod: CPTII,95,, | Performed by: NURSE PRACTITIONER

## 2022-06-08 PROCEDURE — 1125F PR PAIN SEVERITY QUANTIFIED, PAIN PRESENT: ICD-10-PCS | Mod: CPTII,95,, | Performed by: NURSE PRACTITIONER

## 2022-06-08 PROCEDURE — 3078F PR MOST RECENT DIASTOLIC BLOOD PRESSURE < 80 MM HG: ICD-10-PCS | Mod: CPTII,95,, | Performed by: NURSE PRACTITIONER

## 2022-06-08 PROCEDURE — 1101F PT FALLS ASSESS-DOCD LE1/YR: CPT | Mod: CPTII,95,, | Performed by: NURSE PRACTITIONER

## 2022-06-08 PROCEDURE — 1159F PR MEDICATION LIST DOCUMENTED IN MEDICAL RECORD: ICD-10-PCS | Mod: CPTII,95,, | Performed by: NURSE PRACTITIONER

## 2022-06-08 PROCEDURE — 3075F PR MOST RECENT SYSTOLIC BLOOD PRESS GE 130-139MM HG: ICD-10-PCS | Mod: CPTII,95,, | Performed by: NURSE PRACTITIONER

## 2022-06-08 PROCEDURE — 99499 RISK ADDL DX/OHS AUDIT: ICD-10-PCS | Mod: 95,,, | Performed by: NURSE PRACTITIONER

## 2022-06-08 NOTE — TELEPHONE ENCOUNTER
Okay to scheduled with an available provider or use a same day appt.   Dr. Ora Murillo D.O.   [facility] Medicine

## 2022-06-08 NOTE — PATIENT INSTRUCTIONS
Counseling and Referral of Other Preventative  (Italic type indicates deductible and co-insurance are waived)    Patient Name: Heri Muhammad  Today's Date: 6/8/2022    Health Maintenance       Date Due Completion Date    COVID-19 Vaccine (4 - Booster for Pfizer series) 07/08/2022 (Originally 2/22/2022) 10/22/2021    TETANUS VACCINE 12/06/2026 12/6/2016    Lipid Panel 05/06/2027 5/6/2022        No orders of the defined types were placed in this encounter.    The following information is provided to all patients.  This information is to help you find resources for any of the problems found today that may be affecting your health:                Living healthy guide: www.[facility].[place].gov      Understanding Diabetes: www.diabetes.org      Eating healthy: www.cdc.gov/healthyweight      CDC home safety checklist: www.cdc.gov/steadi/patient.html      Agency on Aging: www.goea.[place].[facility]      Alcoholics anonymous (AA): www.aa.org      Physical Activity: www.mimi.nih.gov/ep0sssk      Tobacco use: www.quitwithusla.org

## 2022-06-08 NOTE — PROGRESS NOTES
"The patient location is: [place]  The chief complaint leading to consultation is: Medicare AWV  Visit type: audiovisual  Face to Face time with patient: 33 minutes  50 minutes of total time spent on the encounter, which includes face to face time and non-face to face time preparing to see the patient (eg, review of tests), Obtaining and/or reviewing separately obtained history, Documenting clinical information in the electronic or other health record, Independently interpreting results (not separately reported) and communicating results to the patient/family/caregiver, or Care coordination (not separately reported).     Each patient to whom he or she provides medical services by telemedicine is:  (1) informed of the relationship between the physician and patient and the respective role of any other health care provider with respect to management of the patient; and (2) notified that he or she may decline to receive medical services by telemedicine and may withdraw from such care at any time.    Heri Muhammad presented for a  Medicare AWV and comprehensive Health Risk Assessment today. The following components were reviewed and updated:    · Medical history  · Family History  · Social history  · Allergies and Current Medications  · Health Risk Assessment  · Health Maintenance  · Care Team         ** See Completed Assessments for Annual Wellness Visit within the encounter summary.**         The following assessments were completed:  · Living Situation  · CAGE  · Depression Screening  · Fall Risk Assessment (MACH 10)  · Hearing Assessment(HHI)  · Cognitive Function Screening  · Nutrition Screening  · ADL Screening  · PAQ Screening      Vitals:    06/08/22 1406   Weight: 102.2 kg (225 lb 5 oz)   Height: 6' 1" (1.854 m)     Body mass index is 29.73 kg/m².     Physical Exam  Constitutional:       General: He is not in acute distress.     Appearance: Normal appearance. He is well-developed and well-groomed.   HENT:      " Head: Normocephalic.   Pulmonary:      Effort: Pulmonary effort is normal. No respiratory distress.   Skin:     Coloration: Skin is not pale.   Neurological:      Mental Status: He is alert and oriented to person, place, and time.   Psychiatric:         Attention and Perception: Attention normal.         Mood and Affect: Mood and affect normal.         Speech: Speech normal.         Behavior: Behavior normal. Behavior is cooperative.         Thought Content: Thought content normal.         Cognition and Memory: Cognition and memory normal.     Physical exam limited d/t virtual visit. Patient does not appear to be in any respiratory distress. Able to speak in complete sentences without becoming short of breath.        Diagnoses and health risks identified today and associated recommendations/orders:    1. Encounter for preventive health examination    2. Chronic diastolic heart failure  Chronic; stable on medication. Followed by Cardiology.    3. Persistent atrial fibrillation  Chronic; stable on medication. Followed by Cardiology.    4. Coronary artery disease involving native coronary artery of native heart without angina pectoris  Chronic; stable on medication. Followed by Cardiology.    5. Aortic atherosclerosis  Chronic; stable on medication. Followed by Cardiology.    6. Essential hypertension  Chronic; stable on medication. Followed by Cardiology.    7. Mixed hyperlipidemia  Chronic; stable on medication. Followed by Cardiology.    8. Seizure disorder  Chronic; stable on medication. Followed by Neurology.    9. Lung granuloma  Chronic; stable. As seen on previous imaging. Follow up with PCP.    10. Simple chronic bronchitis  Chronic; stable on medication. Follow up with PCP.    11. Chronic bilateral low back pain without sciatica  Chronic; stable on medication. Followed by Pain Medicine.    12. Late onset Alzheimer's disease without behavioral disturbance  Chronic; stable on medication. Followed by  Neurology.    13. Essential tremor  Chronic; stable on medication. Follow up with PCP.    14. Dependence on other enabling machines and devices  Ambulates with walking cane; no recent falls. Follow up with PCP.    15. Abnormality of gait and mobility  Ambulates with walking cane; no recent falls. Follow up with PCP.    16. Overweight (BMI 25.0-29.9)  Continue to eat a low salt/low fat diet and discussed importance of engaging in physical activity as tolerated      Provided Heri with a 5-10 year written screening schedule and personal prevention plan. Recommendations were developed using the USPSTF age appropriate recommendations. Education, counseling, and referrals were provided as needed. After Visit Summary given to patient electronically which includes a list of additional screenings/tests needed.    Follow up for your next annual wellness visit.    Gabby Reeves NP    Advance Care Planning     I offered to discuss advanced care planning, including how to pick a person who would make decisions for you if you were unable to make them for yourself, called a health care power of , and what kind of decisions you might make such as use of life sustaining treatments such as ventilators and tube feeding when faced with a life limiting illness recorded on a living will that they will need to know. (How you want to be cared for as you near the end of your natural life)     X Patient is interested in learning more about how to make advanced directives.  I offered to discuss this with them and instructed to follow up with PCP.   Speaking Coherently

## 2022-06-08 NOTE — Clinical Note
Hello,  I just completed a virtual Medicare Welless visit with this patient. He said he just saw Dr. Murillo for a follow up, but has since started suffering with worsening fatigue and sleepiness. Going on for several weeks and is requesting an appt with Dr. Murillo to evaluate further. Please contact the pt to schedule when you can.  Thanks,  JOEY

## 2022-06-21 ENCOUNTER — TELEPHONE (OUTPATIENT)
Dept: ELECTROPHYSIOLOGY | Facility: CLINIC | Age: 82
End: 2022-06-21
Payer: MEDICARE

## 2022-06-21 NOTE — TELEPHONE ENCOUNTER
Atrial fibrillation noted during device remote check: (this is 1st transmission since 5/9/22)    Overall burden:  46%    Persistent AF noted since 5/9/22.    Ventricular rates:   Controlled       Anticoagulation status:  Eliquis    Patient symptoms:  Spoke with wife.  States increase in fatigue, SOB, and palpitations.  States taking naps during the day, which he normally does not do.    PVI's done Jan 2016 and March 2017.

## 2022-06-22 ENCOUNTER — TELEPHONE (OUTPATIENT)
Dept: ELECTROPHYSIOLOGY | Facility: CLINIC | Age: 82
End: 2022-06-22
Payer: MEDICARE

## 2022-06-22 DIAGNOSIS — I49.8 OTHER CARDIAC ARRHYTHMIA: Primary | ICD-10-CM

## 2022-06-24 NOTE — H&P (VIEW-ONLY)
Mr. Muhammad is a patient of Dr. Esparza and was last seen in clinic 12/1/2021.      Subjective:   Patient ID:  Heri Muhammad is an 81 y.o. male who presents for follow-up of Atrial Fibrillation and Pacemaker Check  .     The patient location is: Home in [place]  The chief complaint leading to consultation is: Atrial fibrillation  Visit type: audiovisual  Face to Face time with patient: 15  25 minutes of total time spent on the encounter, which includes face to face time and non-face to face time preparing to see the patient (eg, review of tests), Obtaining and/or reviewing separately obtained history, Documenting clinical information in the electronic or other health record, Independently interpreting results (not separately reported) and communicating results to the patient/family/caregiver, or Care coordination (not separately reported).   Each patient to whom he or she provides medical services by telemedicine is:  (1) informed of the relationship between the physician and patient and the respective role of any other health care provider with respect to management of the patient; and (2) notified that he or she may decline to receive medical services by telemedicine and may withdraw from such care at any time.    Notes:     HPI:    Mr. Muhammad is an 81 y.o. male with CAD, atrial fibrillation (PVI 1/2016; PVI 3/2017), atrial flutter (RFA of CTI 1/2016, mitral annular line 3/2017, roof line 3/2017), sinus node dysfunction, and CRT-P with telemedicine visit follow up.    Background:    Dual chamber PPM implanted (06/30/14).   Presented with fatigue, found to be in atrial fibrillation. Placed on amiodarone, underwent DCCV.  Symptoms with AF have historically been fatigue and lightheadedness.  DCCV (10/15/15) >> recurrence. DCCV (11/23/15), amiodarone increased to 400 mg daily. Developed atrial flutter.  (01/29/16) PVI + RFA for atrial flutter. Amiodarone discontinued.  Felt poorly with Multaq.  Did well for initially  in terms of atrial fibrillation, but continued to note dyspnea.  Underwent LHC/RHC 6/10/16, no obstructive CAD, normal rt sided filling pressures.  Developed persistent recurrence >> DCCV 11/22/16. Recurrence.  RFA 3/23/17 Repeat isolation of LSPV, RSPV, RIPV (LIPV remained isolated).  Atrial flutter c/w mitral annular flutter >> anterior mitral annular line created >> flutter converted to what appeared to be a roof flutter >> roof line created. Tachycardia converted to another tachycardia, ultimately mapped to BERNADETTE >> ablation deferred.  Developed recurrence of flutter during blanking period. Flecainide added > converted without need for DCCV.     Echo1/20/18 EF 30-35%.  Was RV pacing 70% of the time.  C 1/22/18 non-obstructive CAD  1/26/18 Upgraded to CRT-P by Dr. Otto  Echo 8/29/18 EF 55%  4/22/19 device report: AT/AF burden of 31% with A-pacing 52% and BiV pacing 88%  Clinic visit 5/2019:  AF burden <1% and 94% BiV pacing.  Clinic visit 3/2020: AF burden <1%, 99% BiV pacing.  Clinic visit 12/1/2020: Low AF burden. On eliquis. 99% biventricular pacing with narrow QRS. Prior echo 10/2019 EF 60%.  Clinic visit 12/1/2021: AF burden somewhat higher than previous at 2.9% but he is asymptomatic. Will continue to monitor burden. On eliquis for CVA prophylaxis. Biventricular pacing 99%. Increases torsemide PRN but this is stable. Echo 7/2021 shows normal LV function.    Update (06/30/2022):    6/21/2022:  Atrial fibrillation noted during device remote check: (this is 1st transmission since 5/9/22)  Overall burden:  46%  Persistent AF noted since 5/9/22  Ventricular rates:   Controlled     Anticoagulation status:  Eliquis  Patient symptoms:  Spoke with wife.  States increase in fatigue, SOB, and palpitations. States taking naps during the day, which he normally does not do.    Today he reports more fatigue, chronic FOWLER. Had switched from metoprolol to propranolol for tremor - says his tremor persists. Daytime  napping more than usual. No palpitations, CP, syncope.    He is currently taking eliquis 5mg BID for stroke prophylaxis and denies significant bleeding episodes. He is currently being treated with propranolol 10mg BID (switched from metoprolol to help with tremor) for HR control.  Kidney function is stable, with a creatinine of 0.6 on 5/6/2022.    Device Interrogation (6/29/2022- in clinic) reveals an intrinsic AF with stable lead and device function. 100% AF burden. V rates controlled.  He paces <1% in the RA and 96% in the RV. Estimated battery longevity 7.1-7.8 years.     I have personally reviewed the patient's EKG from 6/29/2022, which shows AFVP at 70bpm. QRS is 160. QTc is 470.    Relevant Cardiac Test Results:    2D Echo (7/19/2021):  · The estimated ejection fraction is 60%.  · The left ventricle is normal in size with normal systolic function.  · Grade III left ventricular diastolic dysfunction.  · Normal right ventricular size with normal right ventricular systolic function.  · Severe left atrial enlargement.  · The estimated PA systolic pressure is 39 mmHg.  · Normal central venous pressure (3 mmHg).    Current Outpatient Medications   Medication Sig    apixaban (ELIQUIS) 5 mg Tab Take 1 tablet (5 mg total) by mouth 2 (two) times daily.    atorvastatin (LIPITOR) 40 MG tablet Take 1 tablet (40 mg total) by mouth once daily.    cetirizine (ZYRTEC) 10 MG tablet Take 10 mg by mouth every evening.    citalopram (CELEXA) 20 MG tablet Take 1 tablet (20 mg total) by mouth once daily.    cyclobenzaprine (FLEXERIL) 5 MG tablet Take 5 mg by mouth nightly.    lamoTRIgine (LAMICTAL) 100 MG tablet Take 1 tablet (100 mg total) by mouth 2 (two) times daily.    memantine (NAMENDA) 10 MG Tab Take 1 tablet (10 mg total) by mouth 2 (two) times daily.    primidone (MYSOLINE) 250 MG Tab TAKE 2 TABLETS (500 MG TOTAL) BY MOUTH 2 (TWO) TIMES DAILY.    propranoloL (INDERAL) 10 MG tablet Take 1 tablet (10 mg total) by  mouth 2 (two) times daily.    STIOLTO RESPIMAT 2.5-2.5 mcg/actuation Mist Inhale 2 puffs into the lungs once daily.    torsemide (DEMADEX) 10 MG Tab TAKE 1 TABLET (10 MG TOTAL) BY MOUTH EVERY MORNING.     No current facility-administered medications for this visit.     Review of Systems   Constitutional: Positive for malaise/fatigue.   Cardiovascular: Positive for dyspnea on exertion. Negative for chest pain, irregular heartbeat, leg swelling and palpitations.   Respiratory: Negative for shortness of breath.    Hematologic/Lymphatic: Negative for bleeding problem.   Skin: Negative for rash.   Musculoskeletal: Negative for myalgias.   Gastrointestinal: Negative for hematemesis, hematochezia and nausea.   Genitourinary: Negative for hematuria.   Neurological: Positive for excessive daytime sleepiness and tremors. Negative for light-headedness.   Psychiatric/Behavioral: Negative for altered mental status.   Allergic/Immunologic: Negative for persistent infections.     Objective:        There were no vitals taken for this visit.  - N/A telemedicine visit    Physical Exam  - N/A telemedicine visit    Lab Results   Component Value Date     05/06/2022    K 4.7 05/06/2022    MG 2.0 06/07/2019    BUN 13 05/06/2022    CREATININE 0.61 05/06/2022    ALT 24 05/06/2022    AST 32 05/06/2022    AST 35 11/27/2015    HGB 14.3 05/06/2022    HCT 43.2 05/06/2022    HCT 44 12/04/2020    TSH 4.860 (H) 05/06/2022    LDLCALC 88.4 05/06/2022           Assessment:     1. Biventricular cardiac pacemaker in situ    2. Persistent atrial fibrillation    3. Essential hypertension    4. Chronic diastolic heart failure    5. Overweight (BMI 25.0-29.9)      Plan:     In summary, Mr. Muhammad is an 81 y.o. male with CAD, atrial fibrillation, atrial flutter, sinus node dysfunction, and CRT-P with a telemedicine visit for follow up.   Persistent atrial fibrillation since 5/9/2022. Patient is reporting more FOWLER, fatigue. Otherwise device function  WNL with 96% biv pacing. On eliquis.  Recommend trial of rhythm control to evaluate for symptom improvement. He is s/p PVIx2, RFA of 2 distinct atypical AFL with observation of another tachycardia mapped to BERNADETTE at end of case.  Thus, DCCV+AAD likely best option at this point. Amio stopped due to pulmonary toxicity. Pt felt unwell on multaq (chronic diarrhea). Flec stopped when his EF was 30%, but has since normalized after CRT. Will discuss with Dr. Esparza if this could now be an option for him. No obstructive CAD seen on [facility] 1/2018.    GALILEO/Cardioversion - consider 1C if EF remains normal on GALILEO (UPdate: Will proceed)  Continue eliquis  Continue device checks as scheduled  RTC as scheduled following procedure    *A copy of this note has been sent to Dr. Esparza*    Follow up as scheduled following procedure.    ------------------------------------------------------------------    JANINA Vargas, NP-C  Cardiac Electrophysiology

## 2022-06-24 NOTE — PROGRESS NOTES
Mr. Muhammad is a patient of Dr. Esparza and was last seen in clinic 12/1/2021.      Subjective:   Patient ID:  Heri Muhammad is an 81 y.o. male who presents for follow-up of Atrial Fibrillation and Pacemaker Check  .     The patient location is: Home in [place]  The chief complaint leading to consultation is: Atrial fibrillation  Visit type: audiovisual  Face to Face time with patient: 15  25 minutes of total time spent on the encounter, which includes face to face time and non-face to face time preparing to see the patient (eg, review of tests), Obtaining and/or reviewing separately obtained history, Documenting clinical information in the electronic or other health record, Independently interpreting results (not separately reported) and communicating results to the patient/family/caregiver, or Care coordination (not separately reported).   Each patient to whom he or she provides medical services by telemedicine is:  (1) informed of the relationship between the physician and patient and the respective role of any other health care provider with respect to management of the patient; and (2) notified that he or she may decline to receive medical services by telemedicine and may withdraw from such care at any time.    Notes:     HPI:    Mr. Muhammad is an 81 y.o. male with CAD, atrial fibrillation (PVI 1/2016; PVI 3/2017), atrial flutter (RFA of CTI 1/2016, mitral annular line 3/2017, roof line 3/2017), sinus node dysfunction, and CRT-P with telemedicine visit follow up.    Background:    Dual chamber PPM implanted (06/30/14).   Presented with fatigue, found to be in atrial fibrillation. Placed on amiodarone, underwent DCCV.  Symptoms with AF have historically been fatigue and lightheadedness.  DCCV (10/15/15) >> recurrence. DCCV (11/23/15), amiodarone increased to 400 mg daily. Developed atrial flutter.  (01/29/16) PVI + RFA for atrial flutter. Amiodarone discontinued.  Felt poorly with Multaq.  Did well for initially  in terms of atrial fibrillation, but continued to note dyspnea.  Underwent LHC/RHC 6/10/16, no obstructive CAD, normal rt sided filling pressures.  Developed persistent recurrence >> DCCV 11/22/16. Recurrence.  RFA 3/23/17 Repeat isolation of LSPV, RSPV, RIPV (LIPV remained isolated).  Atrial flutter c/w mitral annular flutter >> anterior mitral annular line created >> flutter converted to what appeared to be a roof flutter >> roof line created. Tachycardia converted to another tachycardia, ultimately mapped to BERNADETTE >> ablation deferred.  Developed recurrence of flutter during blanking period. Flecainide added > converted without need for DCCV.     Echo1/20/18 EF 30-35%.  Was RV pacing 70% of the time.  C 1/22/18 non-obstructive CAD  1/26/18 Upgraded to CRT-P by Dr. Otto  Echo 8/29/18 EF 55%  4/22/19 device report: AT/AF burden of 31% with A-pacing 52% and BiV pacing 88%  Clinic visit 5/2019:  AF burden <1% and 94% BiV pacing.  Clinic visit 3/2020: AF burden <1%, 99% BiV pacing.  Clinic visit 12/1/2020: Low AF burden. On eliquis. 99% biventricular pacing with narrow QRS. Prior echo 10/2019 EF 60%.  Clinic visit 12/1/2021: AF burden somewhat higher than previous at 2.9% but he is asymptomatic. Will continue to monitor burden. On eliquis for CVA prophylaxis. Biventricular pacing 99%. Increases torsemide PRN but this is stable. Echo 7/2021 shows normal LV function.    Update (06/30/2022):    6/21/2022:  Atrial fibrillation noted during device remote check: (this is 1st transmission since 5/9/22)  Overall burden:  46%  Persistent AF noted since 5/9/22  Ventricular rates:   Controlled     Anticoagulation status:  Eliquis  Patient symptoms:  Spoke with wife.  States increase in fatigue, SOB, and palpitations. States taking naps during the day, which he normally does not do.    Today he reports more fatigue, chronic FOWLER. Had switched from metoprolol to propranolol for tremor - says his tremor persists. Daytime  napping more than usual. No palpitations, CP, syncope.    He is currently taking eliquis 5mg BID for stroke prophylaxis and denies significant bleeding episodes. He is currently being treated with propranolol 10mg BID (switched from metoprolol to help with tremor) for HR control.  Kidney function is stable, with a creatinine of 0.6 on 5/6/2022.    Device Interrogation (6/29/2022- in clinic) reveals an intrinsic AF with stable lead and device function. 100% AF burden. V rates controlled.  He paces <1% in the RA and 96% in the RV. Estimated battery longevity 7.1-7.8 years.     I have personally reviewed the patient's EKG from 6/29/2022, which shows AFVP at 70bpm. QRS is 160. QTc is 470.    Relevant Cardiac Test Results:    2D Echo (7/19/2021):  · The estimated ejection fraction is 60%.  · The left ventricle is normal in size with normal systolic function.  · Grade III left ventricular diastolic dysfunction.  · Normal right ventricular size with normal right ventricular systolic function.  · Severe left atrial enlargement.  · The estimated PA systolic pressure is 39 mmHg.  · Normal central venous pressure (3 mmHg).    Current Outpatient Medications   Medication Sig    apixaban (ELIQUIS) 5 mg Tab Take 1 tablet (5 mg total) by mouth 2 (two) times daily.    atorvastatin (LIPITOR) 40 MG tablet Take 1 tablet (40 mg total) by mouth once daily.    cetirizine (ZYRTEC) 10 MG tablet Take 10 mg by mouth every evening.    citalopram (CELEXA) 20 MG tablet Take 1 tablet (20 mg total) by mouth once daily.    cyclobenzaprine (FLEXERIL) 5 MG tablet Take 5 mg by mouth nightly.    lamoTRIgine (LAMICTAL) 100 MG tablet Take 1 tablet (100 mg total) by mouth 2 (two) times daily.    memantine (NAMENDA) 10 MG Tab Take 1 tablet (10 mg total) by mouth 2 (two) times daily.    primidone (MYSOLINE) 250 MG Tab TAKE 2 TABLETS (500 MG TOTAL) BY MOUTH 2 (TWO) TIMES DAILY.    propranoloL (INDERAL) 10 MG tablet Take 1 tablet (10 mg total) by  mouth 2 (two) times daily.    STIOLTO RESPIMAT 2.5-2.5 mcg/actuation Mist Inhale 2 puffs into the lungs once daily.    torsemide (DEMADEX) 10 MG Tab TAKE 1 TABLET (10 MG TOTAL) BY MOUTH EVERY MORNING.     No current facility-administered medications for this visit.     Review of Systems   Constitutional: Positive for malaise/fatigue.   Cardiovascular: Positive for dyspnea on exertion. Negative for chest pain, irregular heartbeat, leg swelling and palpitations.   Respiratory: Negative for shortness of breath.    Hematologic/Lymphatic: Negative for bleeding problem.   Skin: Negative for rash.   Musculoskeletal: Negative for myalgias.   Gastrointestinal: Negative for hematemesis, hematochezia and nausea.   Genitourinary: Negative for hematuria.   Neurological: Positive for excessive daytime sleepiness and tremors. Negative for light-headedness.   Psychiatric/Behavioral: Negative for altered mental status.   Allergic/Immunologic: Negative for persistent infections.     Objective:        There were no vitals taken for this visit.  - N/A telemedicine visit    Physical Exam  - N/A telemedicine visit    Lab Results   Component Value Date     05/06/2022    K 4.7 05/06/2022    MG 2.0 06/07/2019    BUN 13 05/06/2022    CREATININE 0.61 05/06/2022    ALT 24 05/06/2022    AST 32 05/06/2022    AST 35 11/27/2015    HGB 14.3 05/06/2022    HCT 43.2 05/06/2022    HCT 44 12/04/2020    TSH 4.860 (H) 05/06/2022    LDLCALC 88.4 05/06/2022           Assessment:     1. Biventricular cardiac pacemaker in situ    2. Persistent atrial fibrillation    3. Essential hypertension    4. Chronic diastolic heart failure    5. Overweight (BMI 25.0-29.9)      Plan:     In summary, Mr. Muhammad is an 81 y.o. male with CAD, atrial fibrillation, atrial flutter, sinus node dysfunction, and CRT-P with a telemedicine visit for follow up.   Persistent atrial fibrillation since 5/9/2022. Patient is reporting more FOWLER, fatigue. Otherwise device function  WNL with 96% biv pacing. On eliquis.  Recommend trial of rhythm control to evaluate for symptom improvement. He is s/p PVIx2, RFA of 2 distinct atypical AFL with observation of another tachycardia mapped to BERNADETTE at end of case.  Thus, DCCV+AAD likely best option at this point. Amio stopped due to pulmonary toxicity. Pt felt unwell on multaq (chronic diarrhea). Flec stopped when his EF was 30%, but has since normalized after CRT. Will discuss with Dr. Esparza if this could now be an option for him. No obstructive CAD seen on [facility] 1/2018.    GALILEO/Cardioversion - consider 1C if EF remains normal on GALILEO (UPdate: Will proceed)  Continue eliquis  Continue device checks as scheduled  RTC as scheduled following procedure    *A copy of this note has been sent to Dr. Esparza*    Follow up as scheduled following procedure.    ------------------------------------------------------------------    JANINA Vargas, NP-C  Cardiac Electrophysiology

## 2022-06-27 ENCOUNTER — CLINICAL SUPPORT (OUTPATIENT)
Dept: CARDIOLOGY | Facility: HOSPITAL | Age: 82
End: 2022-06-27
Payer: MEDICARE

## 2022-06-27 DIAGNOSIS — I50.9 HEART FAILURE, UNSPECIFIED: ICD-10-CM

## 2022-06-27 DIAGNOSIS — I48.91 UNSPECIFIED ATRIAL FIBRILLATION: ICD-10-CM

## 2022-06-27 DIAGNOSIS — Z95.0 PRESENCE OF CARDIAC PACEMAKER: ICD-10-CM

## 2022-06-27 DIAGNOSIS — I49.5 SICK SINUS SYNDROME: ICD-10-CM

## 2022-06-27 PROCEDURE — 93294 CARDIAC DEVICE CHECK - REMOTE: ICD-10-PCS | Mod: ,,, | Performed by: INTERNAL MEDICINE

## 2022-06-27 PROCEDURE — 93296 REM INTERROG EVL PM/IDS: CPT | Performed by: INTERNAL MEDICINE

## 2022-06-27 PROCEDURE — 93294 REM INTERROG EVL PM/LDLS PM: CPT | Mod: ,,, | Performed by: INTERNAL MEDICINE

## 2022-06-29 ENCOUNTER — HOSPITAL ENCOUNTER (OUTPATIENT)
Dept: CARDIOLOGY | Facility: CLINIC | Age: 82
Discharge: HOME | End: 2022-06-29
Payer: MEDICARE

## 2022-06-29 ENCOUNTER — CLINICAL SUPPORT (OUTPATIENT)
Dept: CARDIOLOGY | Facility: HOSPITAL | Age: 82
End: 2022-06-29
Attending: INTERNAL MEDICINE
Payer: MEDICARE

## 2022-06-29 DIAGNOSIS — I49.8 OTHER SPECIFIED CARDIAC ARRHYTHMIAS: ICD-10-CM

## 2022-06-29 DIAGNOSIS — I49.8 OTHER CARDIAC ARRHYTHMIA: ICD-10-CM

## 2022-06-29 PROCEDURE — 93010 ELECTROCARDIOGRAM REPORT: CPT | Mod: S$GLB,,, | Performed by: INTERNAL MEDICINE

## 2022-06-29 PROCEDURE — 93005 RHYTHM STRIP: ICD-10-PCS | Mod: S$GLB,,, | Performed by: INTERNAL MEDICINE

## 2022-06-29 PROCEDURE — 93281 PM DEVICE PROGR EVAL MULTI: CPT

## 2022-06-29 PROCEDURE — 93005 ELECTROCARDIOGRAM TRACING: CPT | Mod: S$GLB,,, | Performed by: INTERNAL MEDICINE

## 2022-06-29 PROCEDURE — 93281 CARDIAC DEVICE CHECK - IN CLINIC & HOSPITAL: ICD-10-PCS | Mod: 26,,, | Performed by: INTERNAL MEDICINE

## 2022-06-29 PROCEDURE — 93010 RHYTHM STRIP: ICD-10-PCS | Mod: S$GLB,,, | Performed by: INTERNAL MEDICINE

## 2022-06-29 PROCEDURE — 93281 PM DEVICE PROGR EVAL MULTI: CPT | Mod: 26,,, | Performed by: INTERNAL MEDICINE

## 2022-06-30 ENCOUNTER — OFFICE VISIT (OUTPATIENT)
Dept: ELECTROPHYSIOLOGY | Facility: CLINIC | Age: 82
End: 2022-06-30
Payer: MEDICARE

## 2022-06-30 DIAGNOSIS — I48.19 PERSISTENT ATRIAL FIBRILLATION: ICD-10-CM

## 2022-06-30 DIAGNOSIS — E66.3 OVERWEIGHT (BMI 25.0-29.9): ICD-10-CM

## 2022-06-30 DIAGNOSIS — I10 ESSENTIAL HYPERTENSION: ICD-10-CM

## 2022-06-30 DIAGNOSIS — I50.32 CHRONIC DIASTOLIC HEART FAILURE: ICD-10-CM

## 2022-06-30 DIAGNOSIS — Z95.0 BIVENTRICULAR CARDIAC PACEMAKER IN SITU: Primary | ICD-10-CM

## 2022-06-30 PROCEDURE — 99214 PR OFFICE/OUTPT VISIT, EST, LEVL IV, 30-39 MIN: ICD-10-PCS | Mod: 95,,, | Performed by: NURSE PRACTITIONER

## 2022-06-30 PROCEDURE — 1160F PR REVIEW ALL MEDS BY PRESCRIBER/CLIN PHARMACIST DOCUMENTED: ICD-10-PCS | Mod: CPTII,95,, | Performed by: NURSE PRACTITIONER

## 2022-06-30 PROCEDURE — 1160F RVW MEDS BY RX/DR IN RCRD: CPT | Mod: CPTII,95,, | Performed by: NURSE PRACTITIONER

## 2022-06-30 PROCEDURE — 99214 OFFICE O/P EST MOD 30 MIN: CPT | Mod: 95,,, | Performed by: NURSE PRACTITIONER

## 2022-06-30 PROCEDURE — 1159F PR MEDICATION LIST DOCUMENTED IN MEDICAL RECORD: ICD-10-PCS | Mod: CPTII,95,, | Performed by: NURSE PRACTITIONER

## 2022-06-30 PROCEDURE — 1159F MED LIST DOCD IN RCRD: CPT | Mod: CPTII,95,, | Performed by: NURSE PRACTITIONER

## 2022-06-30 NOTE — Clinical Note
Can we schedule pt for GALILEO/DCCV? Plan will be to start flecainide if his EF is still normal. Thanks!

## 2022-06-30 NOTE — Clinical Note
Pt s/p PVIx2 (redo PVI 2017 included 2 AT RFA with 3rd mapped to BERNADETTE and not ablated), CRT-P (with recovery of EF) now in AF x 1 mo. Was taken off amio d/t pulm toxicity and could not tolerate multaq. Was taken off flec when his EF was 30% before his CRT-P. C 2018 non-obstructive CAD. He is symptomatic. Would you be ok with GALILEO/DCCV with plan to restart flecainide if his EF is still normal? thx

## 2022-07-05 ENCOUNTER — PATIENT MESSAGE (OUTPATIENT)
Dept: ELECTROPHYSIOLOGY | Facility: CLINIC | Age: 82
End: 2022-07-05
Payer: MEDICARE

## 2022-07-05 DIAGNOSIS — I48.19 PERSISTENT ATRIAL FIBRILLATION: Primary | ICD-10-CM

## 2022-07-05 NOTE — TELEPHONE ENCOUNTER
Spoke with patient's wife and scheduled his GALILEO/DCCV for 6/11/2022 (Dr Morel to cover for Dr Esparza who will be on vacation). Procedure details reviewed and instructions will be sent via patient portal as requested.

## 2022-07-07 ENCOUNTER — TELEPHONE (OUTPATIENT)
Dept: ELECTROPHYSIOLOGY | Facility: CLINIC | Age: 82
End: 2022-07-07
Payer: MEDICARE

## 2022-07-07 NOTE — TELEPHONE ENCOUNTER
Left message for the patient to return call to review pre op instructions for GALILEO/DCCV on 7/11/2022. The following reminders were left:  · NPO after 12mn on 7/10/2022  · Take Eliquis in am on 7/11/2022 prior to arrival  · Arrival time = 7am  · Call back # left for confirmation of instructions

## 2022-07-11 ENCOUNTER — ANESTHESIA (OUTPATIENT)
Dept: MEDSURG UNIT | Facility: HOSPITAL | Age: 82
End: 2022-07-11
Payer: MEDICARE

## 2022-07-11 ENCOUNTER — HOSPITAL ENCOUNTER (OUTPATIENT)
Dept: CARDIOLOGY | Facility: HOSPITAL | Age: 82
Discharge: HOME | End: 2022-07-11
Attending: INTERNAL MEDICINE | Admitting: STUDENT IN AN ORGANIZED HEALTH CARE EDUCATION/TRAINING PROGRAM
Payer: MEDICARE

## 2022-07-11 ENCOUNTER — HOSPITAL ENCOUNTER (OUTPATIENT)
Facility: HOSPITAL | Age: 82
Discharge: HOME | End: 2022-07-11
Attending: STUDENT IN AN ORGANIZED HEALTH CARE EDUCATION/TRAINING PROGRAM | Admitting: STUDENT IN AN ORGANIZED HEALTH CARE EDUCATION/TRAINING PROGRAM
Payer: MEDICARE

## 2022-07-11 ENCOUNTER — ANESTHESIA EVENT (OUTPATIENT)
Dept: MEDSURG UNIT | Facility: HOSPITAL | Age: 82
End: 2022-07-11
Payer: MEDICARE

## 2022-07-11 VITALS
DIASTOLIC BLOOD PRESSURE: 67 MMHG | WEIGHT: 220 LBS | HEIGHT: 72 IN | SYSTOLIC BLOOD PRESSURE: 112 MMHG | BODY MASS INDEX: 29.8 KG/M2

## 2022-07-11 VITALS
TEMPERATURE: 98 F | OXYGEN SATURATION: 99 % | HEIGHT: 72 IN | WEIGHT: 220 LBS | RESPIRATION RATE: 18 BRPM | DIASTOLIC BLOOD PRESSURE: 67 MMHG | SYSTOLIC BLOOD PRESSURE: 124 MMHG | HEART RATE: 71 BPM | BODY MASS INDEX: 29.8 KG/M2

## 2022-07-11 DIAGNOSIS — I48.91 ATRIAL FIBRILLATION: ICD-10-CM

## 2022-07-11 DIAGNOSIS — I48.19 PERSISTENT ATRIAL FIBRILLATION: ICD-10-CM

## 2022-07-11 DIAGNOSIS — I48.91 AF (ATRIAL FIBRILLATION): ICD-10-CM

## 2022-07-11 DIAGNOSIS — Z95.0 BIVENTRICULAR CARDIAC PACEMAKER IN SITU: Primary | ICD-10-CM

## 2022-07-11 PROCEDURE — 93325 DOPPLER ECHO COLOR FLOW MAPG: CPT

## 2022-07-11 PROCEDURE — 93320 DOPPLER ECHO COMPLETE: CPT | Mod: 26,,, | Performed by: INTERNAL MEDICINE

## 2022-07-11 PROCEDURE — 93312 ECHO TRANSESOPHAGEAL: CPT | Mod: 26,,, | Performed by: INTERNAL MEDICINE

## 2022-07-11 PROCEDURE — 92960 CARDIOVERSION ELECTRIC EXT: CPT | Performed by: STUDENT IN AN ORGANIZED HEALTH CARE EDUCATION/TRAINING PROGRAM

## 2022-07-11 PROCEDURE — 93010 ELECTROCARDIOGRAM REPORT: CPT | Mod: ,,, | Performed by: INTERNAL MEDICINE

## 2022-07-11 PROCEDURE — 93325 TRANSESOPHAGEAL ECHO (TEE) (CUPID ONLY): ICD-10-PCS | Mod: 26,,, | Performed by: INTERNAL MEDICINE

## 2022-07-11 PROCEDURE — 93005 ELECTROCARDIOGRAM TRACING: CPT | Mod: 59

## 2022-07-11 PROCEDURE — 92960 CARDIOVERSION ELECTRIC EXT: CPT | Mod: ,,, | Performed by: STUDENT IN AN ORGANIZED HEALTH CARE EDUCATION/TRAINING PROGRAM

## 2022-07-11 PROCEDURE — 93320 TRANSESOPHAGEAL ECHO (TEE) (CUPID ONLY): ICD-10-PCS | Mod: 26,,, | Performed by: INTERNAL MEDICINE

## 2022-07-11 PROCEDURE — 93010 EKG 12-LEAD: ICD-10-PCS | Mod: ,,, | Performed by: INTERNAL MEDICINE

## 2022-07-11 PROCEDURE — 25000003 PHARM REV CODE 250: Performed by: NURSE ANESTHETIST, CERTIFIED REGISTERED

## 2022-07-11 PROCEDURE — 63600175 PHARM REV CODE 636 W HCPCS: Performed by: NURSE ANESTHETIST, CERTIFIED REGISTERED

## 2022-07-11 PROCEDURE — 93325 DOPPLER ECHO COLOR FLOW MAPG: CPT | Mod: 26,,, | Performed by: INTERNAL MEDICINE

## 2022-07-11 PROCEDURE — D9220A PRA ANESTHESIA: Mod: CRNA,,, | Performed by: NURSE ANESTHETIST, CERTIFIED REGISTERED

## 2022-07-11 PROCEDURE — D9220A PRA ANESTHESIA: ICD-10-PCS | Mod: CRNA,,, | Performed by: NURSE ANESTHETIST, CERTIFIED REGISTERED

## 2022-07-11 PROCEDURE — 37000009 HC ANESTHESIA EA ADD 15 MINS: Performed by: STUDENT IN AN ORGANIZED HEALTH CARE EDUCATION/TRAINING PROGRAM

## 2022-07-11 PROCEDURE — 93005 ELECTROCARDIOGRAM TRACING: CPT

## 2022-07-11 PROCEDURE — 92960 PR CARDIOVERSION, ELECTIVE;EXTERN: ICD-10-PCS | Mod: ,,, | Performed by: STUDENT IN AN ORGANIZED HEALTH CARE EDUCATION/TRAINING PROGRAM

## 2022-07-11 PROCEDURE — 37000008 HC ANESTHESIA 1ST 15 MINUTES: Performed by: STUDENT IN AN ORGANIZED HEALTH CARE EDUCATION/TRAINING PROGRAM

## 2022-07-11 PROCEDURE — 93312 TRANSESOPHAGEAL ECHO (TEE) (CUPID ONLY): ICD-10-PCS | Mod: 26,,, | Performed by: INTERNAL MEDICINE

## 2022-07-11 PROCEDURE — D9220A PRA ANESTHESIA: Mod: ANES,,, | Performed by: ANESTHESIOLOGY

## 2022-07-11 PROCEDURE — D9220A PRA ANESTHESIA: ICD-10-PCS | Mod: ANES,,, | Performed by: ANESTHESIOLOGY

## 2022-07-11 RX ORDER — PHENYLEPHRINE HCL IN 0.9% NACL 1 MG/10 ML
SYRINGE (ML) INTRAVENOUS
Status: DISCONTINUED | OUTPATIENT
Start: 2022-07-11 | End: 2022-07-11

## 2022-07-11 RX ORDER — ONDANSETRON 2 MG/ML
4 INJECTION INTRAMUSCULAR; INTRAVENOUS ONCE AS NEEDED
Status: DISCONTINUED | OUTPATIENT
Start: 2022-07-11 | End: 2022-07-11 | Stop reason: HOSPADM

## 2022-07-11 RX ORDER — LIDOCAINE HYDROCHLORIDE 20 MG/ML
INJECTION, SOLUTION EPIDURAL; INFILTRATION; INTRACAUDAL; PERINEURAL
Status: DISCONTINUED | OUTPATIENT
Start: 2022-07-11 | End: 2022-07-11

## 2022-07-11 RX ORDER — HYDROMORPHONE HYDROCHLORIDE 1 MG/ML
0.2 INJECTION, SOLUTION INTRAMUSCULAR; INTRAVENOUS; SUBCUTANEOUS EVERY 5 MIN PRN
Status: DISCONTINUED | OUTPATIENT
Start: 2022-07-11 | End: 2022-07-11 | Stop reason: HOSPADM

## 2022-07-11 RX ORDER — LIDOCAINE HYDROCHLORIDE 20 MG/ML
SOLUTION OROPHARYNGEAL
Status: DISCONTINUED | OUTPATIENT
Start: 2022-07-11 | End: 2022-07-11

## 2022-07-11 RX ORDER — FLECAINIDE ACETATE 50 MG/1
100 TABLET ORAL EVERY 12 HOURS
Qty: 360 TABLET | Refills: 3 | Status: SHIPPED | OUTPATIENT
Start: 2022-07-11 | End: 2022-07-11 | Stop reason: SDUPTHER

## 2022-07-11 RX ORDER — PROPOFOL 10 MG/ML
VIAL (ML) INTRAVENOUS CONTINUOUS PRN
Status: DISCONTINUED | OUTPATIENT
Start: 2022-07-11 | End: 2022-07-11

## 2022-07-11 RX ORDER — FENTANYL CITRATE 50 UG/ML
25 INJECTION, SOLUTION INTRAMUSCULAR; INTRAVENOUS EVERY 5 MIN PRN
Status: DISCONTINUED | OUTPATIENT
Start: 2022-07-11 | End: 2022-07-11 | Stop reason: HOSPADM

## 2022-07-11 RX ORDER — FLECAINIDE ACETATE 50 MG/1
100 TABLET ORAL EVERY 12 HOURS
Qty: 360 TABLET | Refills: 3 | Status: SHIPPED | OUTPATIENT
Start: 2022-07-11 | End: 2022-08-24

## 2022-07-11 RX ORDER — DIPHENHYDRAMINE HYDROCHLORIDE 50 MG/ML
25 INJECTION INTRAMUSCULAR; INTRAVENOUS EVERY 6 HOURS PRN
Status: DISCONTINUED | OUTPATIENT
Start: 2022-07-11 | End: 2022-07-11 | Stop reason: HOSPADM

## 2022-07-11 RX ORDER — PROPOFOL 10 MG/ML
VIAL (ML) INTRAVENOUS
Status: DISCONTINUED | OUTPATIENT
Start: 2022-07-11 | End: 2022-07-11

## 2022-07-11 RX ADMIN — Medication 100 MCG: at 09:07

## 2022-07-11 RX ADMIN — SODIUM CHLORIDE, SODIUM GLUCONATE, SODIUM ACETATE, POTASSIUM CHLORIDE, MAGNESIUM CHLORIDE, SODIUM PHOSPHATE, DIBASIC, AND POTASSIUM PHOSPHATE: .53; .5; .37; .037; .03; .012; .00082 INJECTION, SOLUTION INTRAVENOUS at 09:07

## 2022-07-11 RX ADMIN — LIDOCAINE HYDROCHLORIDE 60 MG: 20 INJECTION, SOLUTION EPIDURAL; INFILTRATION; INTRACAUDAL at 09:07

## 2022-07-11 RX ADMIN — PROPOFOL 60 MG: 10 INJECTION, EMULSION INTRAVENOUS at 09:07

## 2022-07-11 RX ADMIN — Medication 100 MCG: at 10:07

## 2022-07-11 RX ADMIN — Medication 100 MCG/KG/MIN: at 09:07

## 2022-07-11 RX ADMIN — LIDOCAINE HYDROCHLORIDE 30 ML: 20 SOLUTION ORAL; TOPICAL at 09:07

## 2022-07-11 NOTE — ANESTHESIA PREPROCEDURE EVALUATION
07/11/2022  Heri Muhammad is a 81 y.o., male.  Patient Active Problem List   Diagnosis    Mixed hyperlipidemia    CAD (coronary artery disease)    Persistent atrial fibrillation    Essential tremor    Current use of long term anticoagulation    Seizure disorder    Essential hypertension    Aortic atherosclerosis    Chronic diastolic heart failure    Late onset Alzheimer's disease without behavioral disturbance    Biventricular cardiac pacemaker in situ    Chronic bilateral low back pain without sciatica    History of skin cancer    Bilateral shoulder pain    Chronic pain of right ankle    Chronic foot pain, right    Post-traumatic arthritis of ankle, right    Lung granuloma    Insomnia    Simple chronic bronchitis    Decreased strength, endurance, and mobility    Abnormal thyroid function test    Overweight (BMI 25.0-29.9)           Pre-op Assessment          Review of Systems      Physical Exam    Airway:  No airway management difficulties anticipated  Dental:No active dental issues noted  Chest/Lungs:  Clear to auscultation    Heart:  Rate: Normal  Rhythm: Irregularly Irregular  Sounds: Normal        Anesthesia Plan  Type of Anesthesia, risks & benefits discussed:    Anesthesia Type: Gen Natural Airway, Gen ETT  Informed Consent: Informed consent signed with the Patient and all parties understand the risks and agree with anesthesia plan.  All questions answered.   ASA Score: 3  Anesthesia Plan Notes: Chart reviewed. Patient seen and examined. Anesthesia plan discussed and questions answered. E-consent signed. Gurpreet Forbes MD    Ready For Surgery From Anesthesia Perspective.     .

## 2022-07-11 NOTE — PROGRESS NOTES
Received patient from EP lab; patient is un arousable, due to recent sedation; discontinued oxygen patient is mid to high 90's on room air; patient has oral airway in which I removed; bed is low and locked and side rails up

## 2022-07-11 NOTE — DISCHARGE SUMMARY
[facility] - [facility]  Cardiology  Discharge Summary      Patient Name: Heri Muhammad  MRN: [id number]  Admission Date: 7/11/2022  Hospital Length of Stay: 0 days  Discharge Date and Time:  07/11/2022 11:03 AM  Attending Physician: MIREILLE Morel MD    Discharging Provider: Prieto Monge MD  Primary Care Physician: Ora Murillo,     HPI:   Mr. Muhammad is an 81 y.o. male with CAD, atrial fibrillation (PVI 1/2016; PVI 3/2017), atrial flutter (RFA of CTI 1/2016, mitral annular line 3/2017, roof line 3/2017), sinus node dysfunction, and CRT-P here for GALILEO/DCCV.     Pt with persistent atrial fibrillation since 5/9/2022. Patient was reporting more FOWLER, fatigue. Otherwise device function WNL with 96% biv pacing. On eliquis.  Recommend trial of rhythm control to evaluate for symptom improvement. He is s/p PVIx2, RFA of 2 distinct atypical AFL with observation of another tachycardia mapped to BERNADETTE at end of case.    Procedure(s) (LRB):  Cardioversion or Defibrillation (N/A)  Transesophageal echo (GALILEO) intra-procedure log documentation (N/A)     Indwelling Lines/Drains at time of discharge:  Lines/Drains/Airways     None                 Hospital Course:  Successful DCCV. Pt AP-BP. Pt started on Flecainide 100 mg BID per Dr. Morel and instructed to continue Eliquis.     Goals of Care Treatment Preferences:  Code Status: Full Code      Consults:     Significant Diagnostic Studies: Labs: BMP: No results for input(s): GLU, NA, K, CL, CO2, BUN, CREATININE, CALCIUM, MG in the last 48 hours. and CBC No results for input(s): WBC, HGB, HCT, PLT in the last 48 hours.    Pending Diagnostic Studies:     Procedure Component Value Units Date/Time    EKG 12-lead [[id number]]     Order Status: Sent Lab Status: No result     EKG 12-lead [[id number]]     Order Status: Sent Lab Status: No result           There are no hospital problems to display for this patient.    No new Assessment & Plan notes have been filed under this hospital service  since the last note was generated.  Service: Cardiology      Discharged Condition: stable    Disposition: Home or Self Care    Follow Up:    Patient Instructions:   No discharge procedures on file.  Medications:  Reconciled Home Medications:      Medication List      START taking these medications    flecainide 50 MG Tab  Commonly known as: TAMBOCOR  Take 2 tablets (100 mg total) by mouth every 12 (twelve) hours.        CONTINUE taking these medications    apixaban 5 mg Tab  Commonly known as: ELIQUIS  Take 1 tablet (5 mg total) by mouth 2 (two) times daily.     atorvastatin 40 MG tablet  Commonly known as: LIPITOR  Take 1 tablet (40 mg total) by mouth once daily.     cetirizine 10 MG tablet  Commonly known as: ZYRTEC  Take 10 mg by mouth every evening.     citalopram 20 MG tablet  Commonly known as: CeleXA  Take 1 tablet (20 mg total) by mouth once daily.     cyclobenzaprine 5 MG tablet  Commonly known as: FLEXERIL  Take 5 mg by mouth nightly.     lamoTRIgine 100 MG tablet  Commonly known as: LAMICTAL  Take 1 tablet (100 mg total) by mouth 2 (two) times daily.     memantine 10 MG Tab  Commonly known as: NAMENDA  Take 1 tablet (10 mg total) by mouth 2 (two) times daily.     primidone 250 MG Tab  Commonly known as: MYSOLINE  TAKE 2 TABLETS (500 MG TOTAL) BY MOUTH 2 (TWO) TIMES DAILY.     propranoloL 10 MG tablet  Commonly known as: INDERAL  Take 1 tablet (10 mg total) by mouth 2 (two) times daily.     STIOLTO RESPIMAT 2.5-2.5 mcg/actuation Mist  Generic drug: tiotropium-olodateroL  Inhale 2 puffs into the lungs once daily.     torsemide 10 MG Tab  Commonly known as: DEMADEX  TAKE 1 TABLET (10 MG TOTAL) BY MOUTH EVERY MORNING.            Time spent on the discharge of patient: 30 minutes    Prieto Monge MD  Cardiology  [facility] - [facility]

## 2022-07-11 NOTE — ANESTHESIA POSTPROCEDURE EVALUATION
Anesthesia Post Evaluation    Patient: Heri Muhammad    Procedure(s) Performed: Procedure(s) (LRB):  Cardioversion or Defibrillation (N/A)  Transesophageal echo (GALILEO) intra-procedure log documentation (N/A)    Final Anesthesia Type: general      Level of consciousness: awake and alert  Post-procedure vital signs: reviewed and stable  Pain control: Pain has been treated.  Airway patency: patent    PONV status: Absent or treated.  Anesthetic complications: no      Cardiovascular status: hemodynamically stable  Respiratory status: unassisted  Hydration status: euvolemic            Vitals Value Taken Time   /61 07/11/22 1111   Temp 36.5 °C (97.7 °F) 07/11/22 1022   Pulse 70 07/11/22 1112   Resp 7 07/11/22 1112   SpO2 96 % 07/11/22 1112   Vitals shown include unvalidated device data.      No case tracking events are documented in the log.      Pain/Jono Score: No data recorded

## 2022-07-11 NOTE — NURSING
Patient discharged per MD orders. Instructions given on medications, wound care, activity, signs of infection, when to call MD, and follow up appointments. Pt verbalized understanding.  Patient AAOx3, VSS, no c/o pain or discomfort at this time. Telemetry and PIV removed. Patient left unit via wheelchair with transport and family. Bedside pharmacy delivered flecainide.

## 2022-07-11 NOTE — TRANSFER OF CARE
Anesthesia Transfer of Care Note    Patient: Heri Muhammad    Procedure(s) Performed: Procedure(s) (LRB):  Cardioversion or Defibrillation (N/A)  Transesophageal echo (GALILEO) intra-procedure log documentation (N/A)    Patient location: PACU    Anesthesia Type: general    Transport from OR: Transported from OR on 6-10 L/min O2 by face mask with adequate spontaneous ventilation    Post pain: adequate analgesia    Post assessment: no apparent anesthetic complications and tolerated procedure well    Level of consciousness: sedated    Nausea/Vomiting: no nausea/vomiting    Complications: none    Transfer of care protocol was followed      Last vitals:   Visit Vitals  /70 (BP Location: Right arm, Patient Position: Lying)   Pulse 81   Temp 36.7 °C (98.1 °F) (Temporal)   Resp 18   Ht 6' (1.829 m)   Wt 99.8 kg (220 lb)   SpO2 95%   BMI 29.84 kg/m²

## 2022-07-11 NOTE — INTERVAL H&P NOTE
The patient has been examined and the H&P has been reviewed:    I concur with the findings and no changes have occurred since H&P was written.    Procedure risks, benefits and alternative options discussed and understood by patient/family.      Patient with no new complaints today. No new issues.     There are no hospital problems to display for this patient.

## 2022-07-12 LAB
BSA FOR ECHO PROCEDURE: 2.25 M2
EJECTION FRACTION: 60 %

## 2022-07-15 ENCOUNTER — TELEPHONE (OUTPATIENT)
Dept: CARDIOLOGY | Facility: HOSPITAL | Age: 82
End: 2022-07-15
Payer: MEDICARE

## 2022-07-15 NOTE — TELEPHONE ENCOUNTER
Alert transmission received for AT/AF episodes. Transmission reveals multiple AF episodes recorded beging on 7/14/22 at 9:03 PM. Pt is s/p successful DCCV on 7/11/22.

## 2022-07-19 DIAGNOSIS — I49.8 OTHER SPECIFIED CARDIAC ARRHYTHMIAS: Primary | ICD-10-CM

## 2022-07-25 ENCOUNTER — PATIENT MESSAGE (OUTPATIENT)
Dept: ELECTROPHYSIOLOGY | Facility: CLINIC | Age: 82
End: 2022-07-25
Payer: MEDICARE

## 2022-08-22 NOTE — PROGRESS NOTES
Subjective:    Patient ID:  Heri Muhammad is a 81 y.o. male who presents for follow-up of Pacemaker Check      HPI 81 y.o. male with atrial fibrillation (PVI 1/2016; PVI 3/2017), atrial flutter (RFA of CTI 1/2016, mitral annular line 3/2017, roof line 3/2017), sinus node dysfunction, and CRT-P      Background:   Dual chamber PPM implanted (06/30/14).   Presented with fatigue, found to be in atrial fibrillation. Placed on amiodarone, underwent DCCV.   Symptoms with AF have historically been fatigue and lightheadedness.   DCCV (10/15/15) >> recurrence. DCCV (11/23/15), amiodarone increased to 400 mg daily. Developed atrial flutter.   (01/29/16) PVI + RFA for atrial flutter. Amiodarone discontinued.   Felt poorly with Multaq.   Did well for initially in terms of atrial fibrillation, but continued to note dyspnea.   Underwent LHC/RHC 6/10/16, no obstructive CAD, normal rt sided filling pressures.   Developed persistent recurrence >> DCCV 11/22/16. Recurrence.   RFA 3/23/17 Repeat isolation of LSPV, RSPV, RIPV (LIPV remained isolated). Atrial flutter c/w mitral annular flutter >> anterior mitral annular line created >> flutter converted to what appeared to be a roof flutter >> roof line created. Tachycardia converted to another tachycardia, ultimately mapped to BERNADETTE >> ablation deferred.   Developed recurrence of flutter during blanking period. Flecainide added > converted without need for DCCV.   Echo1/20/18 EF 30-35%. Was RV pacing 70% of the time.   LHC 1/22/18 non-obstructive CAD   1/26/18 Upgraded to CRT-P by Dr. Otto   Echo 8/29/18 EF 55%    Update:    Has noted increasing shortness of breath and fatigue.  Developed symptomatic recurrence of atrial fibrillation.   GALILEO/DCCV 7/11/22. Flecainide 100 mg BID added.  Developed recurrence.  Was switched from Metoprolol to Propanolol due to tremors. Has not seen improvement in tremors.  Device interrogation reveals stable function of the leads, RA pacing 87%  biventricular pacing >99%, persistent atrial flutter rate controlled of unclear duration (was undertracking).        Review of Systems   Constitutional: Positive for malaise/fatigue. Negative for fever.   HENT: Negative for congestion and sore throat.    Cardiovascular: Negative for chest pain, dyspnea on exertion, irregular heartbeat, leg swelling, near-syncope, orthopnea, palpitations, paroxysmal nocturnal dyspnea and syncope.   Respiratory: Positive for shortness of breath. Negative for cough.    Gastrointestinal: Negative for abdominal pain, constipation and diarrhea.   Neurological: Positive for tremors. Negative for dizziness, light-headedness and weakness.   Psychiatric/Behavioral: Negative for depression. The patient is not nervous/anxious.    All other systems reviewed and are negative.       Objective:    Physical Exam      Assessment:       1. Persistent atrial fibrillation    2. Sinus node dysfunction    3. Chronic diastolic heart failure    4. Essential hypertension    5. Aortic atherosclerosis    6. Biventricular cardiac pacemaker in situ    7. Late onset Alzheimer's disease without behavioral disturbance    8. Seizure disorder         Plan:       Discussed options of:  1) rate control, which has been achieved  2) Amiodarone. He has been on this in the past without problems. Discussed risks associated with this  3) repeat ablation. I am less inclined to pursue this given the multiple flutters induced at last case.    Prefers amiodarone.  Discontinue Flecainide.  In 3 days, start amiodarone 200 mg daily.  DCCV in 1 month. Defer GALILEO if remains compliant with Eliquis.    Given lack of improvement in tremors, at patient request we will switch back from Propanolol to Metoprolol.

## 2022-08-24 ENCOUNTER — CLINICAL SUPPORT (OUTPATIENT)
Dept: CARDIOLOGY | Facility: HOSPITAL | Age: 82
End: 2022-08-24
Attending: INTERNAL MEDICINE
Payer: MEDICARE

## 2022-08-24 ENCOUNTER — OFFICE VISIT (OUTPATIENT)
Dept: ELECTROPHYSIOLOGY | Facility: CLINIC | Age: 82
End: 2022-08-24
Payer: MEDICARE

## 2022-08-24 VITALS
HEIGHT: 72 IN | HEART RATE: 70 BPM | WEIGHT: 223.31 LBS | SYSTOLIC BLOOD PRESSURE: 103 MMHG | BODY MASS INDEX: 30.25 KG/M2 | DIASTOLIC BLOOD PRESSURE: 74 MMHG

## 2022-08-24 DIAGNOSIS — G30.1 LATE ONSET ALZHEIMER'S DISEASE WITHOUT BEHAVIORAL DISTURBANCE: ICD-10-CM

## 2022-08-24 DIAGNOSIS — I70.0 AORTIC ATHEROSCLEROSIS: ICD-10-CM

## 2022-08-24 DIAGNOSIS — I50.32 CHRONIC DIASTOLIC HEART FAILURE: ICD-10-CM

## 2022-08-24 DIAGNOSIS — G40.909 SEIZURE DISORDER: ICD-10-CM

## 2022-08-24 DIAGNOSIS — I49.5 SINUS NODE DYSFUNCTION: ICD-10-CM

## 2022-08-24 DIAGNOSIS — I49.8 OTHER SPECIFIED CARDIAC ARRHYTHMIAS: ICD-10-CM

## 2022-08-24 DIAGNOSIS — F02.80 LATE ONSET ALZHEIMER'S DISEASE WITHOUT BEHAVIORAL DISTURBANCE: ICD-10-CM

## 2022-08-24 DIAGNOSIS — I48.19 PERSISTENT ATRIAL FIBRILLATION: Primary | ICD-10-CM

## 2022-08-24 DIAGNOSIS — Z95.0 BIVENTRICULAR CARDIAC PACEMAKER IN SITU: ICD-10-CM

## 2022-08-24 DIAGNOSIS — I10 ESSENTIAL HYPERTENSION: ICD-10-CM

## 2022-08-24 PROCEDURE — 93281 PM DEVICE PROGR EVAL MULTI: CPT | Mod: 26,,, | Performed by: INTERNAL MEDICINE

## 2022-08-24 PROCEDURE — 3288F PR FALLS RISK ASSESSMENT DOCUMENTED: ICD-10-PCS | Mod: CPTII,S$GLB,, | Performed by: INTERNAL MEDICINE

## 2022-08-24 PROCEDURE — 1125F AMNT PAIN NOTED PAIN PRSNT: CPT | Mod: CPTII,S$GLB,, | Performed by: INTERNAL MEDICINE

## 2022-08-24 PROCEDURE — 93281 CARDIAC DEVICE CHECK - IN CLINIC & HOSPITAL: ICD-10-PCS | Mod: 26,,, | Performed by: INTERNAL MEDICINE

## 2022-08-24 PROCEDURE — 3288F FALL RISK ASSESSMENT DOCD: CPT | Mod: CPTII,S$GLB,, | Performed by: INTERNAL MEDICINE

## 2022-08-24 PROCEDURE — 1159F PR MEDICATION LIST DOCUMENTED IN MEDICAL RECORD: ICD-10-PCS | Mod: CPTII,S$GLB,, | Performed by: INTERNAL MEDICINE

## 2022-08-24 PROCEDURE — 1159F MED LIST DOCD IN RCRD: CPT | Mod: CPTII,S$GLB,, | Performed by: INTERNAL MEDICINE

## 2022-08-24 PROCEDURE — 93005 RHYTHM STRIP: ICD-10-PCS | Mod: S$GLB,,, | Performed by: INTERNAL MEDICINE

## 2022-08-24 PROCEDURE — 93281 PM DEVICE PROGR EVAL MULTI: CPT

## 2022-08-24 PROCEDURE — 99999 PR PBB SHADOW E&M-EST. PATIENT-LVL III: ICD-10-PCS | Mod: PBBFAC,,, | Performed by: INTERNAL MEDICINE

## 2022-08-24 PROCEDURE — 1125F PR PAIN SEVERITY QUANTIFIED, PAIN PRESENT: ICD-10-PCS | Mod: CPTII,S$GLB,, | Performed by: INTERNAL MEDICINE

## 2022-08-24 PROCEDURE — 1101F PT FALLS ASSESS-DOCD LE1/YR: CPT | Mod: CPTII,S$GLB,, | Performed by: INTERNAL MEDICINE

## 2022-08-24 PROCEDURE — 93010 ELECTROCARDIOGRAM REPORT: CPT | Mod: S$GLB,,, | Performed by: INTERNAL MEDICINE

## 2022-08-24 PROCEDURE — 3074F SYST BP LT 130 MM HG: CPT | Mod: CPTII,S$GLB,, | Performed by: INTERNAL MEDICINE

## 2022-08-24 PROCEDURE — 1101F PR PT FALLS ASSESS DOC 0-1 FALLS W/OUT INJ PAST YR: ICD-10-PCS | Mod: CPTII,S$GLB,, | Performed by: INTERNAL MEDICINE

## 2022-08-24 PROCEDURE — 99215 PR OFFICE/OUTPT VISIT, EST, LEVL V, 40-54 MIN: ICD-10-PCS | Mod: S$GLB,,, | Performed by: INTERNAL MEDICINE

## 2022-08-24 PROCEDURE — 99215 OFFICE O/P EST HI 40 MIN: CPT | Mod: S$GLB,,, | Performed by: INTERNAL MEDICINE

## 2022-08-24 PROCEDURE — 99999 PR PBB SHADOW E&M-EST. PATIENT-LVL III: CPT | Mod: PBBFAC,,, | Performed by: INTERNAL MEDICINE

## 2022-08-24 PROCEDURE — 93005 ELECTROCARDIOGRAM TRACING: CPT | Mod: S$GLB,,, | Performed by: INTERNAL MEDICINE

## 2022-08-24 PROCEDURE — 93010 RHYTHM STRIP: ICD-10-PCS | Mod: S$GLB,,, | Performed by: INTERNAL MEDICINE

## 2022-08-24 PROCEDURE — 3078F DIAST BP <80 MM HG: CPT | Mod: CPTII,S$GLB,, | Performed by: INTERNAL MEDICINE

## 2022-08-24 PROCEDURE — 3078F PR MOST RECENT DIASTOLIC BLOOD PRESSURE < 80 MM HG: ICD-10-PCS | Mod: CPTII,S$GLB,, | Performed by: INTERNAL MEDICINE

## 2022-08-24 PROCEDURE — 3074F PR MOST RECENT SYSTOLIC BLOOD PRESSURE < 130 MM HG: ICD-10-PCS | Mod: CPTII,S$GLB,, | Performed by: INTERNAL MEDICINE

## 2022-08-24 RX ORDER — METOPROLOL SUCCINATE 50 MG/1
50 TABLET, EXTENDED RELEASE ORAL DAILY
Qty: 90 TABLET | Refills: 3 | Status: SHIPPED | OUTPATIENT
Start: 2022-08-24 | End: 2023-07-03

## 2022-08-24 RX ORDER — AMIODARONE HYDROCHLORIDE 200 MG/1
200 TABLET ORAL DAILY
Qty: 90 TABLET | Refills: 3 | Status: SHIPPED | OUTPATIENT
Start: 2022-08-24 | End: 2022-11-22

## 2022-08-30 ENCOUNTER — PATIENT MESSAGE (OUTPATIENT)
Dept: ELECTROPHYSIOLOGY | Facility: CLINIC | Age: 82
End: 2022-08-30
Payer: MEDICARE

## 2022-09-05 NOTE — TELEPHONE ENCOUNTER
----- Message from Adriana Carroll sent at 5/19/2017 10:18 AM CDT -----  Contact: Wife Leora  Feels like he needs to be seen today for lump on back. It has grown and started to swell and itchy over the past week. Please call back. I gave appointment in August. [phone] thanks!   pain, foot

## 2022-09-13 ENCOUNTER — PATIENT MESSAGE (OUTPATIENT)
Dept: ELECTROPHYSIOLOGY | Facility: CLINIC | Age: 82
End: 2022-09-13
Payer: MEDICARE

## 2022-09-13 DIAGNOSIS — I49.8 OTHER SPECIFIED CARDIAC ARRHYTHMIAS: ICD-10-CM

## 2022-09-13 DIAGNOSIS — I48.19 PERSISTENT ATRIAL FIBRILLATION: Primary | ICD-10-CM

## 2022-09-25 ENCOUNTER — CLINICAL SUPPORT (OUTPATIENT)
Dept: CARDIOLOGY | Facility: HOSPITAL | Age: 82
End: 2022-09-25
Payer: MEDICARE

## 2022-09-25 DIAGNOSIS — I49.5 SICK SINUS SYNDROME: ICD-10-CM

## 2022-09-25 DIAGNOSIS — Z95.0 PRESENCE OF CARDIAC PACEMAKER: ICD-10-CM

## 2022-09-25 DIAGNOSIS — I48.91 UNSPECIFIED ATRIAL FIBRILLATION: ICD-10-CM

## 2022-09-25 DIAGNOSIS — I50.9 HEART FAILURE, UNSPECIFIED: ICD-10-CM

## 2022-09-25 PROCEDURE — 93296 REM INTERROG EVL PM/IDS: CPT | Performed by: INTERNAL MEDICINE

## 2022-10-04 ENCOUNTER — TELEPHONE (OUTPATIENT)
Dept: ELECTROPHYSIOLOGY | Facility: CLINIC | Age: 82
End: 2022-10-04
Payer: MEDICARE

## 2022-10-04 NOTE — TELEPHONE ENCOUNTER
Left message for patient reminding him of pre op instructions for DCCV tomorrow:  NPO after 12mn  Take Eliquis this evening and in am  Arrival time = 8am  Call back # left for confirmation/questions

## 2022-10-05 ENCOUNTER — HOSPITAL ENCOUNTER (OUTPATIENT)
Facility: HOSPITAL | Age: 82
Discharge: HOME | End: 2022-10-05
Attending: INTERNAL MEDICINE | Admitting: INTERNAL MEDICINE
Payer: MEDICARE

## 2022-10-05 VITALS
HEIGHT: 72 IN | BODY MASS INDEX: 30.07 KG/M2 | OXYGEN SATURATION: 94 % | DIASTOLIC BLOOD PRESSURE: 72 MMHG | SYSTOLIC BLOOD PRESSURE: 130 MMHG | HEART RATE: 82 BPM | TEMPERATURE: 98 F | WEIGHT: 222 LBS | RESPIRATION RATE: 16 BRPM

## 2022-10-05 DIAGNOSIS — Z95.0 BIVENTRICULAR CARDIAC PACEMAKER IN SITU: ICD-10-CM

## 2022-10-05 DIAGNOSIS — I48.19 PERSISTENT ATRIAL FIBRILLATION: Primary | ICD-10-CM

## 2022-10-05 DIAGNOSIS — Z79.01 CURRENT USE OF LONG TERM ANTICOAGULATION: ICD-10-CM

## 2022-10-05 DIAGNOSIS — I49.8 OTHER SPECIFIED CARDIAC ARRHYTHMIAS: ICD-10-CM

## 2022-10-05 PROCEDURE — 93005 ELECTROCARDIOGRAM TRACING: CPT

## 2022-10-05 PROCEDURE — 99499 UNLISTED E&M SERVICE: CPT | Mod: ,,, | Performed by: INTERNAL MEDICINE

## 2022-10-05 PROCEDURE — 93010 EKG 12-LEAD: ICD-10-PCS | Mod: ,,, | Performed by: INTERNAL MEDICINE

## 2022-10-05 PROCEDURE — 99499 NO LOS: ICD-10-PCS | Mod: ,,, | Performed by: INTERNAL MEDICINE

## 2022-10-05 PROCEDURE — 93010 ELECTROCARDIOGRAM REPORT: CPT | Mod: ,,, | Performed by: INTERNAL MEDICINE

## 2022-10-05 NOTE — CONSULTS
[facility] [place]  H&P  GALILEO Cardiology      Heri Muhammad  YOB: 1940  Medical Record Number:  [id number]  Attending Physician:  Deep Esparza MD   Date of Admission: 10/5/2022       Hospital Day:  0  Current Principal Problem:  Persistent atrial fibrillation      History     Cc: Persistent atrial fibrillation    HPI  Mr. Muhammad is an 81 y.o. male with a PMH significant for paroxysmal atrial fibrillation s/p PVI in 2016 and 2017, atrial flutter with RFA of CTI in 2016 and mitral annular and roof line creation in 2017, SSS s/p PPM with upgrade to CRT-P in 2018 after systolic function dropped to an EF of 30-35% r/t his RV pacing ~70% of the time, Non-obstructive CAD by [facility] and aortic atherosclerosis.  He had recurrence of symptomatic atrial fibrillation in July 2022 and was started on flecainide.  Failed flecainide and was transitioned to amiodarone in August.  He is here today for GALILEO/DCCV.  He is currently anticoagulated with apixaban 5 mg BID and his last does was this morning, 10/5/22    ECG today shows Biventricular paced rhythm    GALILEO on 7/11/22:   This study was performed for BERNADETTE clearance prior to DCCV  Normal appearing left atrial appendage. No thrombus is present in the appendage. BERNADETTE occluder is absent. Abnormal appendage velocities.  The left ventricle is normal in size with normal systolic function.The estimated ejection fraction is 60%.  Normal right ventricular size with normal right ventricular systolic function.  Biatrial enlargement.  Grade 1 plaque present.    TTE on 7/19/21  The estimated ejection fraction is 60%.  The left ventricle is normal in size with normal systolic function.  Grade III left ventricular diastolic dysfunction.  Normal right ventricular size with normal right ventricular systolic function.  Severe left atrial enlargement.  The estimated PA systolic pressure is 39 mmHg.  Normal central venous pressure (3 mmHg).    Medications - Outpatient  Prior to  Admission medications    Medication Sig Start Date End Date Taking? Authorizing Provider   amiodarone (PACERONE) 200 MG Tab Take 1 tablet (200 mg total) by mouth once daily. 8/24/22 8/24/23  Deep Esparza MD   apixaban (ELIQUIS) 5 mg Tab Take 1 tablet (5 mg total) by mouth 2 (two) times daily. 9/30/21   Naz Ruth MD   atorvastatin (LIPITOR) 40 MG tablet TAKE 1 TABLET EVERY DAY 7/29/22   Naz Ruth MD   cetirizine (ZYRTEC) 10 MG tablet Take 10 mg by mouth every evening.    Historical Provider   citalopram (CELEXA) 20 MG tablet Take 1 tablet (20 mg total) by mouth once daily. 2/14/22 2/14/23  Anjum Arellano MD   cyclobenzaprine (FLEXERIL) 5 MG tablet Take 5 mg by mouth nightly. prn    Historical Provider   lamoTRIgine (LAMICTAL) 100 MG tablet Take 1 tablet (100 mg total) by mouth 2 (two) times daily. 2/14/22   Anjum Arellano MD   memantine (NAMENDA) 10 MG Tab Take 1 tablet (10 mg total) by mouth 2 (two) times daily. 2/14/22   Anjum Arellano MD   metoprolol succinate (TOPROL-XL) 50 MG 24 hr tablet Take 1 tablet (50 mg total) by mouth once daily. 8/24/22 8/24/23  Deep Esparza MD   primidone (MYSOLINE) 250 MG Tab TAKE 2 TABLETS (500 MG TOTAL) BY MOUTH 2 (TWO) TIMES DAILY. 6/6/22   Anjum Arellano MD   STIOLTO RESPIMAT 2.5-2.5 mcg/actuation Mist Inhale 2 puffs into the lungs once daily. 3/8/22 3/8/23  Ora Murillo,    torsemide (DEMADEX) 10 MG Tab TAKE 1 TABLET (10 MG TOTAL) BY MOUTH EVERY MORNING. 8/15/22   Naz Ruth MD         Medications - Current  Scheduled Meds:  Continuous Infusions:  PRN Meds:.      Allergies  Review of patient's allergies indicates:   Allergen Reactions    Bactroban [mupirocin calcium] Rash     Other reaction(s): Rash         Past Medical History  Past Medical History:   Diagnosis Date    A-fib     Anticoagulant long-term use     eliquis    Aortic atherosclerosis 9/18/2017    - LDL goal <70 - BP goal <130/80    Atrial flutter     Benign essential tremor     Biventricular  cardiac pacemaker in situ 9/24/2018    CAD (coronary artery disease) 12/2/2013    - 9/2013 Left heart Cath: LAD- 60% prox (neg FFR), nl, ramus and RCA 20% - LDL goal <70 - BP goal <130/80    Cancer     skin cancer on back    Cardiac pacemaker in situ     Cardiomyopathy, nonischemic 2/12/2018    - 8/29/18 Echo EF 55%. Grade III diastolic dysfunction with restrictive physiology - followed by Cardiology    Carpal tunnel syndrome of left wrist 8/8/2017    CHF (congestive heart failure)     Chronic diastolic heart failure 1/19/2018    Chronic obstructive pulmonary disease 10/3/2018    Chronic right-sided thoracic back pain 4/27/2017    Coronary artery disease     Cough     Current use of long term anticoagulation 6/2/2016    - Eliquis for Afib    Dizziness     intermittant    DJD (degenerative joint disease) of knee     ED (erectile dysfunction)     Hard of hearing 11/2016    History of cataract     HTN (hypertension)     Hyperlipidemia     Hyperlipidemia     Hyponatremia 6/11/2019    Memory loss     Mixed hyperlipidemia 7/28/2012    Persistent atrial fibrillation 7/1/2015    - followed by Dr. Esparza - history of cardioversion - on Eliquis OAC 5 mg BID    Seizure disorder     Seizures     last episode 1995    Sinus node dysfunction 08/2016    Subclinical hypothyroidism     Tension type headache 7/2/2019         Past Surgical History  Past Surgical History:   Procedure Laterality Date    ANKLE SURGERY Right     pins and screws    ATRIAL ABLATION SURGERY      CARDIAC PACEMAKER PLACEMENT  2014    CARDIAC PACEMAKER PLACEMENT      CARDIAC PACEMAKER PLACEMENT  2014    CARDIOVERSION      CATARACT EXTRACTION      [facility]    COLONOSCOPY      due in 2014    EYE SURGERY Bilateral     cataracts extraction    FRACTURE SURGERY Right     ankle with hardware    HERNIA REPAIR      abdominal    INJECTION OF ANESTHETIC AGENT AROUND MEDIAL BRANCH NERVES INNERVATING LUMBAR FACET JOINT Bilateral 1/17/2019    Procedure: Block-nerve-medial  branch-lumbar L2-L5;  Surgeon: Anthony Moffett MD;  Location: [facility] OR;  Service: Pain Management;  Laterality: Bilateral;    JOINT REPLACEMENT Bilateral     shoulders    RADIOFREQUENCY ABLATION OF LUMBAR MEDIAL BRANCH NERVE AT SINGLE LEVEL Bilateral 2019    Procedure: Radiofrequency Ablation, Nerve, Spinal, Lumbar, Medial Branch, L2-L5;  Surgeon: Anthony Moffett MD;  Location: [facility] OR;  Service: Pain Management;  Laterality: Bilateral;    TREATMENT OF CARDIAC ARRHYTHMIA N/A 2022    Procedure: Cardioversion or Defibrillation;  Surgeon: MIREILLE Morel MD;  Location: [facility] EP LAB;  Service: Cardiology;  Laterality: N/A;  AF, GALILEO, DCCV, MAC, EH (to cover for SK), 3 Prep *SJM CRT-P in situ*         Social History  Social History     Socioeconomic History    Marital status:    Occupational History    Occupation: retired   Tobacco Use    Smoking status: Former     Packs/day: 1.00     Years: 5.00     Pack years: 5.00     Types: Cigarettes     Quit date:      Years since quittin.8    Smokeless tobacco: Never   Substance and Sexual Activity    Alcohol use: No    Drug use: No    Sexual activity: Not Currently     Partners: Female   Social History Narrative    . 4 adult children.      Social Determinants of Health     Financial Resource Strain: Low Risk     Difficulty of Paying Living Expenses: Not hard at all   Food Insecurity: No Food Insecurity    Worried About Running Out of Food in the Last Year: Never true    Ran Out of Food in the Last Year: Never true   Transportation Needs: No Transportation Needs    Lack of Transportation (Medical): No    Lack of Transportation (Non-Medical): No   Physical Activity: Inactive    Days of Exercise per Week: 0 days    Minutes of Exercise per Session: 0 min   Stress: No Stress Concern Present    Feeling of Stress : Not at all   Social Connections: Moderately Isolated    Frequency of Communication with Friends and Family: Never    Frequency of Social  Gatherings with Friends and Family: More than three times a week    Attends Taoism Services: Never    Active Member of Clubs or Organizations: No    Attends Club or Organization Meetings: Never    Marital Status:    Housing Stability: Unknown    Unable to Pay for Housing in the Last Year: No    Unstable Housing in the Last Year: No         ROS   Admits Denies   Constitutional  Chills, diaphoresis, malaise   Eyes  Visual changes   ENMT  Dysphagia, Epistaxis, nasal congestion, hearing loss   Cardiovascular  Chest pain, palpitations, edema   Respiratory  Cough, dyspnea, wheezing   Gastrointestinal  Nausea, vomiting, constipation, diarrhea, anorexia.     Genitourinary  Dysuria, incontinence   Musculoskeletal  Myalgias, joint pain, joint swelling   Integumentary  Rash, inflammation, burning   Neruo-Psychiatric  Anxiety, insomnia.  Changes in speech, strength, sensation.     Endocrine     Hematologic  Abnormal bruising, bleeding   Immunologic  Inflammation, pain at IV sites.  Pruritis.     Dysphagia or odynophagia:  No  Liver Disease, esophageal disease, or known varices:  No  Upper GI Bleeding: No  Snoring:  Yes  Sleep Apnea:   Unknown, not using cpaps  Prior neck surgery or radiation:  No  History of anesthetic difficulties:  No  Family history of anesthetic difficulties:  No  Last oral intake:  12 hours ago, 18:00 on 10/4/22  Able to move neck in all directions:  Yes      Physical Examination         Vital Signs             24 Hour VS Range       No intake or output data in the 24 hours ending 10/05/22 0815      General:   Head: NCAT  Eyes: conjunctivae and lids normal, no scleral icterus, EOMI.  ENMT:  no gingival bleeding, normal oral mucosa without pallor or cyanosis.   Neck:  JVP normal.  Trachea non-displaced.     Chest:  Normal respiratory effort.  Chest clear to auscultation.  No wheezes, rales, or rhonchi.  Heart:  Normal PMI, S1 S2 normal quality, splitting.  No murmurs rubs or gallops.  Peripheral  pulses 2+.  Abdomen:  Non-distended, normal bowel sounds, non-tender.  No hepato-jugular reflux.  Extremities:  No edema. Normal capillary refill.    Skin:  Warm and dry.  No cyanosis or pallor.  No ulcers, stasis.  IV sites without tenderness or inflammation.    Neurological / Psychiatric:  Oriented to person, time, and place.  No facial asymmetry, drift.  Fluent without dysarthria.  Mood euthymic, affect normal.         Data       Recent Labs   Lab 09/28/22  1026   WBC 8.83   HGB 14.9   HCT 44.1           Recent Labs   Lab 09/28/22  1026   INR 1.0        Recent Labs   Lab 09/28/22  1026      K 3.9      CO2 30*   BUN 14   CREATININE 0.70   ANIONGAP 9   CALCIUM 8.2*        No results for input(s): PROT, ALBUMIN, BILITOT, ALKPHOS, AST, ALT in the last 168 hours.     No results for input(s): TROPONINI in the last 168 hours.     NT-pro-BNP  ([place]) (pg/mL)   Date Value   11/27/2015 458     BNP (pg/mL)   Date Value   12/04/2020 91   08/29/2018 92   10/13/2015 119 (H)       No results for input(s): LABBLOO in the last 168 hours.       Assessment & Plan     1. GALILEO for evaluation of BERNADETTE prior to GALILEO  -No absolute contraindications of esophageal stricture, tumor, perforation, laceration,or diverticulum and/or active GI bleed  -The risks, benefits & alternatives of the procedure were explained to the patient.   -The risks of transesophageal echo include but are not limited to:  Dental trauma, esophageal trauma/perforation, bleeding, laryngospasm/brochospasm, aspiration, sore throat/hoarseness, & dislodgement of the endotracheal tube/nasogastric tube (where applicable).    -The risks of moderate sedation include hypotension, respiratory depression, arrhythmias, bronchospasm, & death.    -Informed consent was obtained. The patient is agreeable to proceed with the procedure and all questions and concerns addressed.    Case discussed with an attending in echocardiography lab.     Further  recommendations per attending addendum          Signed:  Salina Noe  [phone]  Cardiovascular Nurse Practitioner  [facility]

## 2022-10-05 NOTE — DISCHARGE INSTRUCTIONS
If you have a pacemaker, defibrillator or loop recorder that is monitored remotely by the [facility], you may be eligible to send in a remote transmission on the day of your EKG appointment. Please call [phone] if you would prefer to do this rather than come in for your EKG appointment on 9/20/2022 to see if this is an option for you. If you are eligible, please send a manual transmission from your home monitor one week after your procedure and then call the clinic at [phone], option 4, to confirm that your remote transmission was received.      Any need to reschedule or cancel procedures, or any questions regarding your procedures should be addressed directly with the Arrhythmia Department Nurses at the following phone number: [phone].

## 2022-10-05 NOTE — SUBJECTIVE & OBJECTIVE
Past Medical History:   Diagnosis Date    A-fib     Anticoagulant long-term use     eliquis    Aortic atherosclerosis 9/18/2017    - LDL goal <70 - BP goal <130/80    Atrial flutter     Benign essential tremor     Biventricular cardiac pacemaker in situ 9/24/2018    CAD (coronary artery disease) 12/2/2013    - 9/2013 Left heart Cath: LAD- 60% prox (neg FFR), nl, ramus and RCA 20% - LDL goal <70 - BP goal <130/80    Cancer     skin cancer on back    Cardiac pacemaker in situ     Cardiomyopathy, nonischemic 2/12/2018    - 8/29/18 Echo EF 55%. Grade III diastolic dysfunction with restrictive physiology - followed by Cardiology    Carpal tunnel syndrome of left wrist 8/8/2017    CHF (congestive heart failure)     Chronic diastolic heart failure 1/19/2018    Chronic obstructive pulmonary disease 10/3/2018    Chronic right-sided thoracic back pain 4/27/2017    Coronary artery disease     Cough     Current use of long term anticoagulation 6/2/2016    - Eliquis for Afib    Dizziness     intermittant    DJD (degenerative joint disease) of knee     ED (erectile dysfunction)     Hard of hearing 11/2016    History of cataract     HTN (hypertension)     Hyperlipidemia     Hyperlipidemia     Hyponatremia 6/11/2019    Memory loss     Mixed hyperlipidemia 7/28/2012    Persistent atrial fibrillation 7/1/2015    - followed by Dr. Esparza - history of cardioversion - on Eliquis OAC 5 mg BID    Seizure disorder     Seizures     last episode 1995    Sinus node dysfunction 08/2016    Subclinical hypothyroidism     Tension type headache 7/2/2019       Past Surgical History:   Procedure Laterality Date    ANKLE SURGERY Right     pins and screws    ATRIAL ABLATION SURGERY      CARDIAC PACEMAKER PLACEMENT  2014    CARDIAC PACEMAKER PLACEMENT      CARDIAC PACEMAKER PLACEMENT  2014    CARDIOVERSION      CATARACT EXTRACTION      OU    COLONOSCOPY      due in 2014    EYE SURGERY Bilateral     cataracts extraction    FRACTURE SURGERY Right      ankle with hardware    HERNIA REPAIR      abdominal    INJECTION OF ANESTHETIC AGENT AROUND MEDIAL BRANCH NERVES INNERVATING LUMBAR FACET JOINT Bilateral 1/17/2019    Procedure: Block-nerve-medial branch-lumbar L2-L5;  Surgeon: Anthony Moffett MD;  Location: [facility] OR;  Service: Pain Management;  Laterality: Bilateral;    JOINT REPLACEMENT Bilateral     shoulders    RADIOFREQUENCY ABLATION OF LUMBAR MEDIAL BRANCH NERVE AT SINGLE LEVEL Bilateral 1/23/2019    Procedure: Radiofrequency Ablation, Nerve, Spinal, Lumbar, Medial Branch, L2-L5;  Surgeon: Anthony Moffett MD;  Location: [facility] OR;  Service: Pain Management;  Laterality: Bilateral;    TREATMENT OF CARDIAC ARRHYTHMIA N/A 7/11/2022    Procedure: Cardioversion or Defibrillation;  Surgeon: MIREILLE Morel MD;  Location: [facility] EP LAB;  Service: Cardiology;  Laterality: N/A;  AF, GALILEO, DCCV, MAC, EH (to cover for SK), 3 Prep *SJM CRT-P in situ*       Review of patient's allergies indicates:   Allergen Reactions    Bactroban [mupirocin calcium] Rash     Other reaction(s): Rash       No current facility-administered medications on file prior to encounter.     Current Outpatient Medications on File Prior to Encounter   Medication Sig    amiodarone (PACERONE) 200 MG Tab Take 1 tablet (200 mg total) by mouth once daily.    apixaban (ELIQUIS) 5 mg Tab Take 1 tablet (5 mg total) by mouth 2 (two) times daily.    atorvastatin (LIPITOR) 40 MG tablet TAKE 1 TABLET EVERY DAY    cetirizine (ZYRTEC) 10 MG tablet Take 10 mg by mouth every evening.    citalopram (CELEXA) 20 MG tablet Take 1 tablet (20 mg total) by mouth once daily.    cyclobenzaprine (FLEXERIL) 5 MG tablet Take 5 mg by mouth nightly. prn    lamoTRIgine (LAMICTAL) 100 MG tablet Take 1 tablet (100 mg total) by mouth 2 (two) times daily.    memantine (NAMENDA) 10 MG Tab Take 1 tablet (10 mg total) by mouth 2 (two) times daily.    metoprolol succinate (TOPROL-XL) 50 MG 24 hr tablet Take 1 tablet (50 mg total) by mouth  once daily.    primidone (MYSOLINE) 250 MG Tab TAKE 2 TABLETS (500 MG TOTAL) BY MOUTH 2 (TWO) TIMES DAILY.    STIOLTO RESPIMAT 2.5-2.5 mcg/actuation Mist Inhale 2 puffs into the lungs once daily.    torsemide (DEMADEX) 10 MG Tab TAKE 1 TABLET (10 MG TOTAL) BY MOUTH EVERY MORNING.     Family History       Problem Relation (Age of Onset)    Blindness Father    Cancer Father, Daughter    Cataracts Father    Dementia Mother    Heart disease Mother    Heart failure Mother    Hypertension Father    No Known Problems Son    Obesity Daughter    Tremor Father, Brother, Paternal Grandfather          Tobacco Use    Smoking status: Former     Packs/day: 1.00     Years: 5.00     Pack years: 5.00     Types: Cigarettes     Quit date:      Years since quittin.8    Smokeless tobacco: Never   Substance and Sexual Activity    Alcohol use: No    Drug use: No    Sexual activity: Not Currently     Partners: Female     Review of Systems   Constitutional: Negative for fever.   Cardiovascular:  Positive for dyspnea on exertion. Negative for chest pain.   Respiratory:  Negative for shortness of breath.    Objective:     Vital Signs (Most Recent):  Temp: 97.9 °F (36.6 °C) (10/05/22 0901)  Pulse: 82 (10/05/22 0901)  Resp: 16 (10/05/22 0901)  BP: 130/72 (10/05/22 0902)  SpO2: (!) 94 % (10/05/22 0901)   Vital Signs (24h Range):  Temp:  [97.9 °F (36.6 °C)] 97.9 °F (36.6 °C)  Pulse:  [82] 82  Resp:  [16] 16  SpO2:  [94 %] 94 %  BP: (127-130)/(63-72) 130/72       Weight: 100.7 kg (222 lb)  Body mass index is 30.11 kg/m².    SpO2: (!) 94 %  O2 Device (Oxygen Therapy): room air    Physical Exam  Vitals and nursing note reviewed.   Constitutional:       General: He is not in acute distress.     Appearance: He is not ill-appearing, toxic-appearing or diaphoretic.   Cardiovascular:      Rate and Rhythm: Normal rate and regular rhythm.   Pulmonary:      Effort: Pulmonary effort is normal.   Neurological:      Mental Status: He is alert.        Significant Labs:   Results for orders placed or performed in visit on 09/28/22   APTT   Result Value Ref Range    aPTT 36.2 (H) 21.0 - 32.0 sec   Basic Metabolic Panel   Result Value Ref Range    Sodium 139 136 - 145 mmol/L    Potassium 3.9 3.5 - 5.1 mmol/L    Chloride 100 95 - 110 mmol/L    CO2 30 (H) 23 - 29 mmol/L    Glucose 115 (H) 70 - 110 mg/dL    BUN 14 2 - 20 mg/dL    Creatinine 0.70 0.50 - 1.40 mg/dL    Calcium 8.2 (L) 8.7 - 10.5 mg/dL    Anion Gap 9 8 - 16 mmol/L    eGFR >60.0 >60 mL/min/1.73 m^2   CBC Auto Differential   Result Value Ref Range    WBC 8.83 3.90 - 12.70 K/uL    RBC 4.75 4.60 - 6.20 M/uL    Hemoglobin 14.9 14.0 - 18.0 g/dL    Hematocrit 44.1 40.0 - 54.0 %    MCV 93 82 - 98 fL    MCH 31.4 (H) 27.0 - 31.0 pg    MCHC 33.8 32.0 - 36.0 g/dL    RDW 14.1 11.5 - 14.5 %    Platelets 186 150 - 450 K/uL    MPV 9.4 9.2 - 12.9 fL    Immature Granulocytes 0.3 0.0 - 0.5 %    Gran # (ANC) 6.2 1.8 - 7.7 K/uL    Immature Grans (Abs) 0.03 0.00 - 0.04 K/uL    Lymph # 2.0 1.0 - 4.8 K/uL    Mono # 0.6 0.3 - 1.0 K/uL    Eos # 0.0 0.0 - 0.5 K/uL    Baso # 0.06 0.00 - 0.20 K/uL    nRBC 0 0 /100 WBC    Gran % 70.1 38.0 - 73.0 %    Lymph % 22.3 18.0 - 48.0 %    Mono % 6.6 4.0 - 15.0 %    Eosinophil % 0.0 0.0 - 8.0 %    Basophil % 0.7 0.0 - 1.9 %    Differential Method Automated    Protime-INR   Result Value Ref Range    Prothrombin Time 10.4 9.0 - 12.5 sec    INR 1.0 0.8 - 1.2     *Note: Due to a large number of results and/or encounters for the requested time period, some results have not been displayed. A complete set of results can be found in Results Review.         Significant Imaging: EKG: Biventricular pacing; 75bpm; Device interrogation (per Carmine) reveals sinus rhythm.   Carmine to obtain event strip.

## 2022-10-05 NOTE — H&P
[facility] - Short Stay Cardiac Unit  Cardiac Electrophysiology  History and Physical     Admission Date: 10/5/2022  Code Status: Prior   Attending Provider: Deep Esparza MD   Principal Problem:Persistent atrial fibrillation    Subjective:     Chief Complaint:  Presents for GALILEO/DCCV     HPI:  Heri Muhammad 81 y.o. male with atrial fibrillation (PVI 1/2016; PVI 3/2017), atrial flutter (RFA of CTI 1/2016, mitral annular line 3/2017, roof line 3/2017), sinus node dysfunction, and CRT-P      Background:   Dual chamber PPM implanted (06/30/14).   Presented with fatigue, found to be in atrial fibrillation. Placed on amiodarone, underwent DCCV.   Symptoms with AF have historically been fatigue and lightheadedness.   DCCV (10/15/15) >> recurrence. DCCV (11/23/15), amiodarone increased to 400 mg daily. Developed atrial flutter.   (01/29/16) PVI + RFA for atrial flutter. Amiodarone discontinued.   Felt poorly with Multaq.   Did well for initially in terms of atrial fibrillation, but continued to note dyspnea.   Underwent LHC/RHC 6/10/16, no obstructive CAD, normal rt sided filling pressures.   Developed persistent recurrence >> DCCV 11/22/16. Recurrence.   RFA 3/23/17 Repeat isolation of LSPV, RSPV, RIPV (LIPV remained isolated). Atrial flutter c/w mitral annular flutter >> anterior mitral annular line created >> flutter converted to what appeared to be a roof flutter >> roof line created. Tachycardia converted to another tachycardia, ultimately mapped to BERNADETTE >> ablation deferred.   Developed recurrence of flutter during blanking period. Flecainide added > converted without need for DCCV.   Echo1/20/18 EF 30-35%. Was RV pacing 70% of the time.   LHC 1/22/18 non-obstructive CAD   1/26/18 Upgraded to CRT-P by Dr. Otto   Echo 8/29/18 EF 55%          Has noted increasing shortness of breath and fatigue in the last few months.  Developed symptomatic recurrence of atrial fibrillation.   GALILEO/DCCV 7/11/22. Flecainide 100 mg BID  added.  Developed recurrence.  Was switched from Metoprolol to Propanolol due to tremors. Has not seen improvement in tremors.  Last month, Device interrogation revealed stable function of the leads, RA pacing 87% biventricular pacing >99%, persistent atrial flutter rate controlled of unclear duration (was undertracking).      Past Medical History:   Diagnosis Date    A-fib     Anticoagulant long-term use     eliquis    Aortic atherosclerosis 9/18/2017    - LDL goal <70 - BP goal <130/80    Atrial flutter     Benign essential tremor     Biventricular cardiac pacemaker in situ 9/24/2018    CAD (coronary artery disease) 12/2/2013    - 9/2013 Left heart Cath: LAD- 60% prox (neg FFR), nl, ramus and RCA 20% - LDL goal <70 - BP goal <130/80    Cancer     skin cancer on back    Cardiac pacemaker in situ     Cardiomyopathy, nonischemic 2/12/2018    - 8/29/18 Echo EF 55%. Grade III diastolic dysfunction with restrictive physiology - followed by Cardiology    Carpal tunnel syndrome of left wrist 8/8/2017    CHF (congestive heart failure)     Chronic diastolic heart failure 1/19/2018    Chronic obstructive pulmonary disease 10/3/2018    Chronic right-sided thoracic back pain 4/27/2017    Coronary artery disease     Cough     Current use of long term anticoagulation 6/2/2016    - Eliquis for Afib    Dizziness     intermittant    DJD (degenerative joint disease) of knee     ED (erectile dysfunction)     Hard of hearing 11/2016    History of cataract     HTN (hypertension)     Hyperlipidemia     Hyperlipidemia     Hyponatremia 6/11/2019    Memory loss     Mixed hyperlipidemia 7/28/2012    Persistent atrial fibrillation 7/1/2015    - followed by Dr. Esparza - history of cardioversion - on Eliquis OAC 5 mg BID    Seizure disorder     Seizures     last episode 1995    Sinus node dysfunction 08/2016    Subclinical hypothyroidism     Tension type headache 7/2/2019       Past Surgical History:    Procedure Laterality Date    ANKLE SURGERY Right     pins and screws    ATRIAL ABLATION SURGERY      CARDIAC PACEMAKER PLACEMENT  2014    CARDIAC PACEMAKER PLACEMENT      CARDIAC PACEMAKER PLACEMENT  2014    CARDIOVERSION      CATARACT EXTRACTION      OU    COLONOSCOPY      due in 2014    EYE SURGERY Bilateral     cataracts extraction    FRACTURE SURGERY Right     ankle with hardware    HERNIA REPAIR      abdominal    INJECTION OF ANESTHETIC AGENT AROUND MEDIAL BRANCH NERVES INNERVATING LUMBAR FACET JOINT Bilateral 1/17/2019    Procedure: Block-nerve-medial branch-lumbar L2-L5;  Surgeon: Anthony Moffett MD;  Location: [facility] OR;  Service: Pain Management;  Laterality: Bilateral;    JOINT REPLACEMENT Bilateral     shoulders    RADIOFREQUENCY ABLATION OF LUMBAR MEDIAL BRANCH NERVE AT SINGLE LEVEL Bilateral 1/23/2019    Procedure: Radiofrequency Ablation, Nerve, Spinal, Lumbar, Medial Branch, L2-L5;  Surgeon: Anthony Moffett MD;  Location: [facility] OR;  Service: Pain Management;  Laterality: Bilateral;    TREATMENT OF CARDIAC ARRHYTHMIA N/A 7/11/2022    Procedure: Cardioversion or Defibrillation;  Surgeon: MIREILLE Morel MD;  Location: [facility] EP LAB;  Service: Cardiology;  Laterality: N/A;  AF, GALILEO, DCCV, MAC, EH (to cover for SK), 3 Prep *SJM CRT-P in situ*       Review of patient's allergies indicates:   Allergen Reactions    Bactroban [mupirocin calcium] Rash     Other reaction(s): Rash       No current facility-administered medications on file prior to encounter.     Current Outpatient Medications on File Prior to Encounter   Medication Sig    amiodarone (PACERONE) 200 MG Tab Take 1 tablet (200 mg total) by mouth once daily.    apixaban (ELIQUIS) 5 mg Tab Take 1 tablet (5 mg total) by mouth 2 (two) times daily.    atorvastatin (LIPITOR) 40 MG tablet TAKE 1 TABLET EVERY DAY    cetirizine (ZYRTEC) 10 MG tablet Take 10 mg by mouth every evening.    citalopram (CELEXA) 20 MG tablet Take 1 tablet (20  mg total) by mouth once daily.    cyclobenzaprine (FLEXERIL) 5 MG tablet Take 5 mg by mouth nightly. prn    lamoTRIgine (LAMICTAL) 100 MG tablet Take 1 tablet (100 mg total) by mouth 2 (two) times daily.    memantine (NAMENDA) 10 MG Tab Take 1 tablet (10 mg total) by mouth 2 (two) times daily.    metoprolol succinate (TOPROL-XL) 50 MG 24 hr tablet Take 1 tablet (50 mg total) by mouth once daily.    primidone (MYSOLINE) 250 MG Tab TAKE 2 TABLETS (500 MG TOTAL) BY MOUTH 2 (TWO) TIMES DAILY.    STIOLTO RESPIMAT 2.5-2.5 mcg/actuation Mist Inhale 2 puffs into the lungs once daily.    torsemide (DEMADEX) 10 MG Tab TAKE 1 TABLET (10 MG TOTAL) BY MOUTH EVERY MORNING.     Family History       Problem Relation (Age of Onset)    Blindness Father    Cancer Father, Daughter    Cataracts Father    Dementia Mother    Heart disease Mother    Heart failure Mother    Hypertension Father    No Known Problems Son    Obesity Daughter    Tremor Father, Brother, Paternal Grandfather          Tobacco Use    Smoking status: Former     Packs/day: 1.00     Years: 5.00     Pack years: 5.00     Types: Cigarettes     Quit date:      Years since quittin.8    Smokeless tobacco: Never   Substance and Sexual Activity    Alcohol use: No    Drug use: No    Sexual activity: Not Currently     Partners: Female     Review of Systems   Constitutional: Negative for fever.   Cardiovascular:  Positive for dyspnea on exertion. Negative for chest pain.   Respiratory:  Negative for shortness of breath.    Objective:     Vital Signs (Most Recent):  Temp: 97.9 °F (36.6 °C) (10/05/22 0901)  Pulse: 82 (10/05/22 0901)  Resp: 16 (10/05/22 0901)  BP: 130/72 (10/05/22 0902)  SpO2: (!) 94 % (10/05/22 0901)   Vital Signs (24h Range):  Temp:  [97.9 °F (36.6 °C)] 97.9 °F (36.6 °C)  Pulse:  [82] 82  Resp:  [16] 16  SpO2:  [94 %] 94 %  BP: (127-130)/(63-72) 130/72       Weight: 100.7 kg (222 lb)  Body mass index is 30.11 kg/m².    SpO2: (!) 94 %  O2  Device (Oxygen Therapy): room air    Physical Exam  Vitals and nursing note reviewed.   Constitutional:       General: He is not in acute distress.     Appearance: He is not ill-appearing, toxic-appearing or diaphoretic.   Cardiovascular:      Rate and Rhythm: Normal rate and regular rhythm.   Pulmonary:      Effort: Pulmonary effort is normal.   Neurological:      Mental Status: He is alert.       Significant Labs:   Results for orders placed or performed in visit on 09/28/22   APTT   Result Value Ref Range    aPTT 36.2 (H) 21.0 - 32.0 sec   Basic Metabolic Panel   Result Value Ref Range    Sodium 139 136 - 145 mmol/L    Potassium 3.9 3.5 - 5.1 mmol/L    Chloride 100 95 - 110 mmol/L    CO2 30 (H) 23 - 29 mmol/L    Glucose 115 (H) 70 - 110 mg/dL    BUN 14 2 - 20 mg/dL    Creatinine 0.70 0.50 - 1.40 mg/dL    Calcium 8.2 (L) 8.7 - 10.5 mg/dL    Anion Gap 9 8 - 16 mmol/L    eGFR >60.0 >60 mL/min/1.73 m^2   CBC Auto Differential   Result Value Ref Range    WBC 8.83 3.90 - 12.70 K/uL    RBC 4.75 4.60 - 6.20 M/uL    Hemoglobin 14.9 14.0 - 18.0 g/dL    Hematocrit 44.1 40.0 - 54.0 %    MCV 93 82 - 98 fL    MCH 31.4 (H) 27.0 - 31.0 pg    MCHC 33.8 32.0 - 36.0 g/dL    RDW 14.1 11.5 - 14.5 %    Platelets 186 150 - 450 K/uL    MPV 9.4 9.2 - 12.9 fL    Immature Granulocytes 0.3 0.0 - 0.5 %    Gran # (ANC) 6.2 1.8 - 7.7 K/uL    Immature Grans (Abs) 0.03 0.00 - 0.04 K/uL    Lymph # 2.0 1.0 - 4.8 K/uL    Mono # 0.6 0.3 - 1.0 K/uL    Eos # 0.0 0.0 - 0.5 K/uL    Baso # 0.06 0.00 - 0.20 K/uL    nRBC 0 0 /100 WBC    Gran % 70.1 38.0 - 73.0 %    Lymph % 22.3 18.0 - 48.0 %    Mono % 6.6 4.0 - 15.0 %    Eosinophil % 0.0 0.0 - 8.0 %    Basophil % 0.7 0.0 - 1.9 %    Differential Method Automated    Protime-INR   Result Value Ref Range    Prothrombin Time 10.4 9.0 - 12.5 sec    INR 1.0 0.8 - 1.2     *Note: Due to a large number of results and/or encounters for the requested time period, some results have not been displayed. A complete set of  results can be found in Results Review.         Significant Imaging: EKG: Biventricular pacing; 75bpm; Device interrogation (per Carmine) reveals sinus rhythm.   Carmine to obtain event strip.     Assessment and Plan:     * Persistent atrial fibrillation  Presents for GALILEO/DCCV. Taking amiodarone and toprol xl and eliquis.  EKG shows biventricular pacing. 75 bpm; device interrogation shows sinus rhythm. Procedure canceled.     Current use of long term anticoagulation  Taking Eliquis as prescribed.  Has not missed a dose in > 4 weeks.         Josefa Fernandez, NP  Cardiac Electrophysiology  [facility] - Short Stay Cardiac Unit

## 2022-10-05 NOTE — ASSESSMENT & PLAN NOTE
Presents for GALILEO/DCCV. Taking amiodarone and toprol xl and eliquis.  EKG shows biventricular pacing. 75 bpm; device interrogation shows sinus rhythm. Procedure canceled.

## 2022-10-05 NOTE — ASSESSMENT & PLAN NOTE
GALILEO/DCCV canceled-- Device interrogation: sinus rhythm.   Continue amiodarone, toprol xl and eliquis.

## 2022-10-05 NOTE — DISCHARGE SUMMARY
[facility] - Short Stay Cardiac Unit  Cardiac Electrophysiology  Discharge Summary      Patient Name: Heri Muhammad  MRN: [id number]  Admission Date: 10/5/2022  Hospital Length of Stay: 0 days  Discharge Date and Time: 10/5/2022 10:10 AM  Attending Physician: Deep Esparza MD    Discharging Provider: Josefa Fernandez NP  Primary Care Physician: Ora Murillo DO    HPI:   Heri Muhammad 81 y.o. male with atrial fibrillation (PVI 1/2016; PVI 3/2017), atrial flutter (RFA of CTI 1/2016, mitral annular line 3/2017, roof line 3/2017), sinus node dysfunction, and CRT-P      Background:   Dual chamber PPM implanted (06/30/14).   Presented with fatigue, found to be in atrial fibrillation. Placed on amiodarone, underwent DCCV.   Symptoms with AF have historically been fatigue and lightheadedness.   DCCV (10/15/15) >> recurrence. DCCV (11/23/15), amiodarone increased to 400 mg daily. Developed atrial flutter.   (01/29/16) PVI + RFA for atrial flutter. Amiodarone discontinued.   Felt poorly with Multaq.   Did well for initially in terms of atrial fibrillation, but continued to note dyspnea.   Underwent LHC/RHC 6/10/16, no obstructive CAD, normal rt sided filling pressures.   Developed persistent recurrence >> DCCV 11/22/16. Recurrence.   RFA 3/23/17 Repeat isolation of LSPV, RSPV, RIPV (LIPV remained isolated). Atrial flutter c/w mitral annular flutter >> anterior mitral annular line created >> flutter converted to what appeared to be a roof flutter >> roof line created. Tachycardia converted to another tachycardia, ultimately mapped to BERNADETTE >> ablation deferred.   Developed recurrence of flutter during blanking period. Flecainide added > converted without need for DCCV.   Echo1/20/18 EF 30-35%. Was RV pacing 70% of the time.   LHC 1/22/18 non-obstructive CAD   1/26/18 Upgraded to CRT-P by Dr. Otto   Echo 8/29/18 EF 55%          Has noted increasing shortness of breath and fatigue in the last few months.  Developed symptomatic  recurrence of atrial fibrillation.   GALILEO/DCCV 7/11/22. Flecainide 100 mg BID added.  Developed recurrence.  Was switched from Metoprolol to Propanolol due to tremors. Has not seen improvement in tremors.  Last month, Device interrogation revealed stable function of the leads, RA pacing 87% biventricular pacing >99%, persistent atrial flutter rate controlled of unclear duration (was undertracking).      Procedure(s) (LRB):  Cardioversion or Defibrillation (N/A)  Transesophageal echo (GALILEO) intra-procedure log documentation (N/A)     Indwelling Lines/Drains at time of discharge:  Lines/Drains/Airways       None                   Hospital Course:  Presented for GALILEO/DCCV.  EKG shows sinus rhythm.  Device Interrogation shows sinus rhythm. Discussed plan with patient.  Instructions given to patient.  Patient discharged home.       Goals of Care Treatment Preferences:  Code Status: Full Code    Consults:   Consults (From admission, onward)          Status Ordering Provider     Anesthesiology  Once        Provider:  (Not yet assigned)    Acknowledged JASWANT ESCALONA          Significant Diagnostic Studies: Cardiac Graphics: ECG: Biventricular pacing; 75 bpm; Device interrogation-- sinus rhythm    Pending Diagnostic Studies:       None            Final Active Diagnoses:    Diagnosis Date Noted POA    PRINCIPAL PROBLEM:  Persistent atrial fibrillation [I48.19] 07/01/2015 Yes    Current use of long term anticoagulation [Z79.01] 06/02/2016 Not Applicable      Problems Resolved During this Admission:     * Persistent atrial fibrillation   GALILEO/DCCV canceled-- Device interrogation: sinus rhythm.   Continue amiodarone, toprol xl and eliquis.      Current use of long term anticoagulation  Continue Eliquis        Discharged Condition: stable    Disposition: Home or Self Care    Follow Up:   Follow-up Information       Sylwia Hernandez NP. Schedule an appointment as soon as possible for a visit in 4 week(s).    Specialty:  Cardiology  Why: canceled DCCV-- in sinus rhythm  Contact information:  [address]  [facility] [address]  [phone]               [facility] [address] [address] Call in 1 week(s).    Specialty: Cardiology  Why: for device transmittal instructions-- canceled DCCV 10/5/2022-- in Sinus Rhythm  Contact information:  [address]  [facility] [address] [phone]  Additional information:  [facility] - Main Building, [facility] 3rd Floor   [facility]                         Patient Instructions:      Diet Cardiac     No dressing needed     Notify your health care provider if you experience any of the following:  persistent dizziness, light-headedness, or visual disturbances     Notify your health care provider if you experience any of the following:  increased confusion or weakness     Activity as tolerated     Medications:  Reconciled Home Medications:      Medication List        CONTINUE taking these medications      amiodarone 200 MG Tab  Commonly known as: PACERONE  Take 1 tablet (200 mg total) by mouth once daily.     apixaban 5 mg Tab  Commonly known as: ELIQUIS  Take 1 tablet (5 mg total) by mouth 2 (two) times daily.     atorvastatin 40 MG tablet  Commonly known as: LIPITOR  TAKE 1 TABLET EVERY DAY     cetirizine 10 MG tablet  Commonly known as: ZYRTEC  Take 10 mg by mouth every evening.     citalopram 20 MG tablet  Commonly known as: CeleXA  Take 1 tablet (20 mg total) by mouth once daily.     cyclobenzaprine 5 MG tablet  Commonly known as: FLEXERIL  Take 5 mg by mouth nightly. prn     lamoTRIgine 100 MG tablet  Commonly known as: LAMICTAL  Take 1 tablet (100 mg total) by mouth 2 (two) times daily.     memantine 10 MG Tab  Commonly known as: NAMENDA  Take 1 tablet (10 mg total) by mouth 2 (two) times daily.     metoprolol succinate 50 MG 24 hr tablet  Commonly known as: TOPROL-XL  Take 1 tablet (50 mg total) by mouth once  daily.     primidone 250 MG Tab  Commonly known as: MYSOLINE  TAKE 2 TABLETS (500 MG TOTAL) BY MOUTH 2 (TWO) TIMES DAILY.     STIOLTO RESPIMAT 2.5-2.5 mcg/actuation Mist  Generic drug: tiotropium-olodateroL  Inhale 2 puffs into the lungs once daily.     torsemide 10 MG Tab  Commonly known as: DEMADEX  TAKE 1 TABLET (10 MG TOTAL) BY MOUTH EVERY MORNING.              Time spent on the discharge of patient: 15 minutes    Josefa Fernandez NP  Cardiac Electrophysiology  [facility] - Short Stay Cardiac Unit

## 2022-10-05 NOTE — HOSPITAL COURSE
Presented for GALILEO/DCCV.  EKG shows sinus rhythm.  Device Interrogation shows sinus rhythm. Discussed plan with patient.  Instructions given to patient.  Patient discharged home.

## 2022-10-07 ENCOUNTER — TELEPHONE (OUTPATIENT)
Dept: CARDIOLOGY | Facility: HOSPITAL | Age: 82
End: 2022-10-07
Payer: MEDICARE

## 2022-10-07 ENCOUNTER — PATIENT MESSAGE (OUTPATIENT)
Dept: ELECTROPHYSIOLOGY | Facility: CLINIC | Age: 82
End: 2022-10-07
Payer: MEDICARE

## 2022-10-07 DIAGNOSIS — I49.8 OTHER SPECIFIED CARDIAC ARRHYTHMIAS: Primary | ICD-10-CM

## 2022-10-07 NOTE — TELEPHONE ENCOUNTER
Spoke with patients wife and informed her that we can not get a transmission from his home monitor next week as he is in Tele lock out with [facility].  I informed her that I would let the Nurse know.

## 2022-10-10 ENCOUNTER — TELEPHONE (OUTPATIENT)
Dept: ELECTROPHYSIOLOGY | Facility: CLINIC | Age: 82
End: 2022-10-10
Payer: MEDICARE

## 2022-10-10 NOTE — TELEPHONE ENCOUNTER
----- Message from Maria Alejandra BULMARO Pederson sent at 10/7/2022  3:51 PM CDT -----  Patients wife stated he needs to follow up in 6 weeks not in 10 weeks.  Please call patient back and address this. Thank you.

## 2022-10-10 NOTE — TELEPHONE ENCOUNTER
Returned call to patients wife. She is requesting a sooner appointment for patient. I scheduled him on 11/22/22. Patients wife agreed to appointment.

## 2022-10-28 ENCOUNTER — OFFICE VISIT (OUTPATIENT)
Dept: FAMILY MEDICINE | Facility: CLINIC | Age: 82
End: 2022-10-28
Payer: MEDICARE

## 2022-10-28 VITALS
WEIGHT: 227.06 LBS | OXYGEN SATURATION: 98 % | DIASTOLIC BLOOD PRESSURE: 60 MMHG | HEART RATE: 93 BPM | BODY MASS INDEX: 30.75 KG/M2 | SYSTOLIC BLOOD PRESSURE: 130 MMHG | HEIGHT: 72 IN

## 2022-10-28 DIAGNOSIS — I50.32 CHRONIC DIASTOLIC HEART FAILURE: ICD-10-CM

## 2022-10-28 DIAGNOSIS — G40.909 SEIZURE DISORDER: ICD-10-CM

## 2022-10-28 DIAGNOSIS — E03.8 SUBCLINICAL HYPOTHYROIDISM: ICD-10-CM

## 2022-10-28 DIAGNOSIS — E78.2 MIXED HYPERLIPIDEMIA: ICD-10-CM

## 2022-10-28 DIAGNOSIS — I10 ESSENTIAL HYPERTENSION: ICD-10-CM

## 2022-10-28 DIAGNOSIS — H61.23 BILATERAL IMPACTED CERUMEN: ICD-10-CM

## 2022-10-28 DIAGNOSIS — G25.0 BENIGN ESSENTIAL TREMOR: ICD-10-CM

## 2022-10-28 DIAGNOSIS — J41.0 SIMPLE CHRONIC BRONCHITIS: ICD-10-CM

## 2022-10-28 PROCEDURE — 1101F PT FALLS ASSESS-DOCD LE1/YR: CPT | Mod: CPTII,S$GLB,, | Performed by: STUDENT IN AN ORGANIZED HEALTH CARE EDUCATION/TRAINING PROGRAM

## 2022-10-28 PROCEDURE — 1160F PR REVIEW ALL MEDS BY PRESCRIBER/CLIN PHARMACIST DOCUMENTED: ICD-10-PCS | Mod: CPTII,S$GLB,, | Performed by: STUDENT IN AN ORGANIZED HEALTH CARE EDUCATION/TRAINING PROGRAM

## 2022-10-28 PROCEDURE — 3075F PR MOST RECENT SYSTOLIC BLOOD PRESS GE 130-139MM HG: ICD-10-PCS | Mod: CPTII,S$GLB,, | Performed by: STUDENT IN AN ORGANIZED HEALTH CARE EDUCATION/TRAINING PROGRAM

## 2022-10-28 PROCEDURE — 3078F DIAST BP <80 MM HG: CPT | Mod: CPTII,S$GLB,, | Performed by: STUDENT IN AN ORGANIZED HEALTH CARE EDUCATION/TRAINING PROGRAM

## 2022-10-28 PROCEDURE — 99999 PR PBB SHADOW E&M-EST. PATIENT-LVL III: CPT | Mod: PBBFAC,,, | Performed by: STUDENT IN AN ORGANIZED HEALTH CARE EDUCATION/TRAINING PROGRAM

## 2022-10-28 PROCEDURE — 1126F PR PAIN SEVERITY QUANTIFIED, NO PAIN PRESENT: ICD-10-PCS | Mod: CPTII,S$GLB,, | Performed by: STUDENT IN AN ORGANIZED HEALTH CARE EDUCATION/TRAINING PROGRAM

## 2022-10-28 PROCEDURE — 1126F AMNT PAIN NOTED NONE PRSNT: CPT | Mod: CPTII,S$GLB,, | Performed by: STUDENT IN AN ORGANIZED HEALTH CARE EDUCATION/TRAINING PROGRAM

## 2022-10-28 PROCEDURE — 3075F SYST BP GE 130 - 139MM HG: CPT | Mod: CPTII,S$GLB,, | Performed by: STUDENT IN AN ORGANIZED HEALTH CARE EDUCATION/TRAINING PROGRAM

## 2022-10-28 PROCEDURE — 1159F PR MEDICATION LIST DOCUMENTED IN MEDICAL RECORD: ICD-10-PCS | Mod: CPTII,S$GLB,, | Performed by: STUDENT IN AN ORGANIZED HEALTH CARE EDUCATION/TRAINING PROGRAM

## 2022-10-28 PROCEDURE — 99999 PR PBB SHADOW E&M-EST. PATIENT-LVL III: ICD-10-PCS | Mod: PBBFAC,,, | Performed by: STUDENT IN AN ORGANIZED HEALTH CARE EDUCATION/TRAINING PROGRAM

## 2022-10-28 PROCEDURE — 1159F MED LIST DOCD IN RCRD: CPT | Mod: CPTII,S$GLB,, | Performed by: STUDENT IN AN ORGANIZED HEALTH CARE EDUCATION/TRAINING PROGRAM

## 2022-10-28 PROCEDURE — 99214 PR OFFICE/OUTPT VISIT, EST, LEVL IV, 30-39 MIN: ICD-10-PCS | Mod: S$GLB,,, | Performed by: STUDENT IN AN ORGANIZED HEALTH CARE EDUCATION/TRAINING PROGRAM

## 2022-10-28 PROCEDURE — 3288F PR FALLS RISK ASSESSMENT DOCUMENTED: ICD-10-PCS | Mod: CPTII,S$GLB,, | Performed by: STUDENT IN AN ORGANIZED HEALTH CARE EDUCATION/TRAINING PROGRAM

## 2022-10-28 PROCEDURE — 99214 OFFICE O/P EST MOD 30 MIN: CPT | Mod: S$GLB,,, | Performed by: STUDENT IN AN ORGANIZED HEALTH CARE EDUCATION/TRAINING PROGRAM

## 2022-10-28 PROCEDURE — 3078F PR MOST RECENT DIASTOLIC BLOOD PRESSURE < 80 MM HG: ICD-10-PCS | Mod: CPTII,S$GLB,, | Performed by: STUDENT IN AN ORGANIZED HEALTH CARE EDUCATION/TRAINING PROGRAM

## 2022-10-28 PROCEDURE — 1101F PR PT FALLS ASSESS DOC 0-1 FALLS W/OUT INJ PAST YR: ICD-10-PCS | Mod: CPTII,S$GLB,, | Performed by: STUDENT IN AN ORGANIZED HEALTH CARE EDUCATION/TRAINING PROGRAM

## 2022-10-28 PROCEDURE — 3288F FALL RISK ASSESSMENT DOCD: CPT | Mod: CPTII,S$GLB,, | Performed by: STUDENT IN AN ORGANIZED HEALTH CARE EDUCATION/TRAINING PROGRAM

## 2022-10-28 PROCEDURE — 1160F RVW MEDS BY RX/DR IN RCRD: CPT | Mod: CPTII,S$GLB,, | Performed by: STUDENT IN AN ORGANIZED HEALTH CARE EDUCATION/TRAINING PROGRAM

## 2022-10-28 NOTE — PROGRESS NOTES
[facility] Note    Chief Complaint      Chief Complaint   Patient presents with    Establish Care    Annual Exam     History of Present Illness      HPI    Heri Muhammad is a 81 y.o. male with h/o Afib/Aflutter,Sinus node dysfunction( s/p CRT-P and multiple cardioversion , sHTN, HLD, seizure disorder, DJD lumbar spine, Aortic atherosclerosis, HFpEF, subclinical hypothyroidism, restrictive cardiomyopathy, chronic LBP, chronic bilateral shoulder pain s/p bilateral shoulder replacements and COPD, patient of Dr Murillo, unknown to me who presents accompanied by wife to office to establish care today. He endorses no new complaints except diffuse mild joint pain typical of his baseline pain, described his mood as flat, endorses compliance with all medications and regular follow up with other specialists.    Electrophysiologist : Dr Deep Esparza MD  Cardiologist : Dr Graham  Pain : Dr Negron  Neurologist : Dr Anjum Arellano      Problem List Addressed This Visit:           Health Maintenance   Topic Date Due    TETANUS VACCINE  12/06/2026    Lipid Panel  05/06/2027       Past Medical History:   Diagnosis Date    A-fib     Anticoagulant long-term use     eliquis    Aortic atherosclerosis 9/18/2017    - LDL goal <70 - BP goal <130/80    Atrial flutter     Benign essential tremor     Biventricular cardiac pacemaker in situ 9/24/2018    CAD (coronary artery disease) 12/2/2013    - 9/2013 Left heart Cath: LAD- 60% prox (neg FFR), nl, ramus and RCA 20% - LDL goal <70 - BP goal <130/80    Cancer     skin cancer on back    Cardiac pacemaker in situ     Cardiomyopathy, nonischemic 2/12/2018    - 8/29/18 Echo EF 55%. Grade III diastolic dysfunction with restrictive physiology - followed by Cardiology    Carpal tunnel syndrome of left wrist 8/8/2017    CHF (congestive heart failure)     Chronic diastolic heart failure 1/19/2018    Chronic obstructive pulmonary disease 10/3/2018    Chronic right-sided thoracic back  pain 4/27/2017    Coronary artery disease     Cough     Current use of long term anticoagulation 6/2/2016    - Eliquis for Afib    Dizziness     intermittant    DJD (degenerative joint disease) of knee     ED (erectile dysfunction)     Hard of hearing 11/2016    History of cataract     HTN (hypertension)     Hyperlipidemia     Hyperlipidemia     Hyponatremia 6/11/2019    Memory loss     Mixed hyperlipidemia 7/28/2012    Persistent atrial fibrillation 7/1/2015    - followed by Dr. Esparza - history of cardioversion - on Eliquis OAC 5 mg BID    Seizure disorder     Seizures     last episode 1995    Sinus node dysfunction 08/2016    Subclinical hypothyroidism     Tension type headache 7/2/2019       Past Surgical History:   Procedure Laterality Date    ANKLE SURGERY Right     pins and screws    ATRIAL ABLATION SURGERY      CARDIAC PACEMAKER PLACEMENT  2014    CARDIAC PACEMAKER PLACEMENT      CARDIAC PACEMAKER PLACEMENT  2014    CARDIOVERSION      CATARACT EXTRACTION      [facility]    COLONOSCOPY      due in 2014    EYE SURGERY Bilateral     cataracts extraction    FRACTURE SURGERY Right     ankle with hardware    HERNIA REPAIR      abdominal    INJECTION OF ANESTHETIC AGENT AROUND MEDIAL BRANCH NERVES INNERVATING LUMBAR FACET JOINT Bilateral 1/17/2019    Procedure: Block-nerve-medial branch-lumbar L2-L5;  Surgeon: Anthony Moffett MD;  Location: [facility] OR;  Service: Pain Management;  Laterality: Bilateral;    JOINT REPLACEMENT Bilateral     shoulders    RADIOFREQUENCY ABLATION OF LUMBAR MEDIAL BRANCH NERVE AT SINGLE LEVEL Bilateral 1/23/2019    Procedure: Radiofrequency Ablation, Nerve, Spinal, Lumbar, Medial Branch, L2-L5;  Surgeon: Anthony Moffett MD;  Location: [facility] OR;  Service: Pain Management;  Laterality: Bilateral;    TREATMENT OF CARDIAC ARRHYTHMIA N/A 7/11/2022    Procedure: Cardioversion or Defibrillation;  Surgeon: MIREILLE Morel MD;  Location: [facility] EP LAB;  Service: Cardiology;  Laterality: N/A;  AF, GALILEO, DCCV,  MAC, EH (to cover for SK), 3 Prep *SJM CRT-P in situ*       family history includes Blindness in his father; Cancer in his daughter and father; Cataracts in his father; Dementia in his mother; Heart disease in his mother; Heart failure in his mother; Hypertension in his father; No Known Problems in his son; Obesity in his daughter; Tremor in his brother, father, and paternal grandfather.    Social History     Tobacco Use    Smoking status: Former     Packs/day: 1.00     Years: 5.00     Pack years: 5.00     Types: Cigarettes     Quit date:      Years since quittin.8    Smokeless tobacco: Never   Substance Use Topics    Alcohol use: No    Drug use: No       Review of Systems    Outpatient Encounter Medications as of 10/28/2022   Medication Sig Dispense Refill    amiodarone (PACERONE) 200 MG Tab Take 1 tablet (200 mg total) by mouth once daily. 90 tablet 3    atorvastatin (LIPITOR) 40 MG tablet TAKE 1 TABLET EVERY DAY 90 tablet 3    cetirizine (ZYRTEC) 10 MG tablet Take 10 mg by mouth every evening.      citalopram (CELEXA) 20 MG tablet Take 1 tablet (20 mg total) by mouth once daily. 90 tablet 3    cyclobenzaprine (FLEXERIL) 5 MG tablet Take 5 mg by mouth nightly. prn      ELIQUIS 5 mg Tab TAKE 1 TABLET TWICE DAILY 180 tablet 3    lamoTRIgine (LAMICTAL) 100 MG tablet Take 1 tablet (100 mg total) by mouth 2 (two) times daily. 180 tablet 3    memantine (NAMENDA) 10 MG Tab Take 1 tablet (10 mg total) by mouth 2 (two) times daily. 180 tablet 3    metoprolol succinate (TOPROL-XL) 50 MG 24 hr tablet Take 1 tablet (50 mg total) by mouth once daily. 90 tablet 3    primidone (MYSOLINE) 250 MG Tab TAKE 2 TABLETS TWICE A  tablet 0    STIOLTO RESPIMAT 2.5-2.5 mcg/actuation Mist Inhale 2 puffs into the lungs once daily. 12 g 3    torsemide (DEMADEX) 10 MG Tab TAKE 1 TABLET (10 MG TOTAL) BY MOUTH EVERY MORNING. 90 tablet 3    carbamide peroxide (DEBROX) 6.5 % otic solution Place 5 drops into both ears 2 (two) times  daily. for 5 days 15 mL 0    [DISCONTINUED] primidone (MYSOLINE) 250 MG Tab TAKE 2 TABLETS (500 MG TOTAL) BY MOUTH 2 (TWO) TIMES DAILY. 360 tablet 0     No facility-administered encounter medications on file as of 10/28/2022.        Review of patient's allergies indicates:   Allergen Reactions    Bactroban [mupirocin calcium] Rash     Other reaction(s): Rash       Physical Exam      Vital Signs  Pulse: 93  SpO2: 98 %  BP: 130/60  BP Location: Left arm  Patient Position: Sitting  Pain Score: 0-No pain  Height and Weight  Height: 6' (182.9 cm)  Weight: 103 kg (227 lb 1.2 oz)  BSA (Calculated - sq m): 2.29 sq meters  BMI (Calculated): 30.8  Weight in (lb) to have BMI = 25: 183.9]    Physical Exam     Laboratory:  CBC:  Recent Labs   Lab Result Units 09/28/22  1026 10/24/22  0758   WBC K/uL 8.83 6.88   RBC M/uL 4.75 4.46*   Hemoglobin g/dL 14.9 14.0   Hematocrit % 44.1 42.8   Platelets K/uL 186 195   MCV fL 93 96   MCH pg 31.4* 31.4*   MCHC g/dL 33.8 32.7     CMP:  Recent Labs   Lab Result Units 10/24/22  0758   Glucose mg/dL 111*   Calcium mg/dL 8.1*   Albumin g/dL 4.1   Total Protein g/dL 7.3   Sodium mmol/L 142   Potassium mmol/L 4.9   CO2 mmol/L 28   Chloride mmol/L 103   BUN mg/dL 17   Alkaline Phosphatase U/L 103   ALT U/L 31   AST U/L 36   Total Bilirubin mg/dL 0.2     URINALYSIS:  No results for input(s): COLORU, CLARITYU, SPECGRAV, PHUR, PROTEINUA, GLUCOSEU, BILIRUBINCON, BLOODU, WBCU, RBCU, BACTERIA, MUCUS, NITRITE, LEUKOCYTESUR, UROBILINOGEN, HYALINECASTS in the last 2160 hours.   LIPIDS:  Recent Labs   Lab Result Units 10/24/22  0758   TSH uIU/mL 7.360*     TSH:  Recent Labs   Lab Result Units 10/24/22  0758   TSH uIU/mL 7.360*     A1C:  No results for input(s): HGBA1C in the last 2160 hours.    Radiology:      Assessment/Plan     Heri Muhammad is a 81 y.o.male with:    1. Subclinical hypothyroidism  Overview:  Lab Results   Component Value Date    TSH 7.360 10/24/2022   - TFT worsening since start of  amiodarone  -will start on L-thyroxine if it worsens on next TFT    Orders:  -     TSH; Future; Expected date: 12/07/2022  -     T4, FREE; Future; Expected date: 12/07/2022    2. Bilateral impacted cerumen  -     carbamide peroxide (DEBROX) 6.5 % otic solution; Place 5 drops into both ears 2 (two) times daily. for 5 days  Dispense: 15 mL; Refill: 0    3. Essential hypertension  Overview:  - well controlled  - continue current medication      4. Mixed hyperlipidemia  Overview:  Lab Results   Component Value Date    LDLCALC 88.4 05/06/2022     - on statin  - followed by Cardiology      5. Seizure disorder  Overview:  - stable and no recent seizures since 1990's  - CT Head 2019 with diffuse atrophy  - EEG 1/2019 mild diffuse slowing  - followed by Neurology      6. Chronic diastolic heart failure  Overview:  - no signs of acute decompensation  - no leg swelling  - medical management      7. Benign essential tremor  Overview:  - followed by Neurology  - on primidone and propranolol recently added     8. H/o Afib/Aflutter/Sinus Nod dysfunction  -follows up with neurology    HCM  -UTD on all healthcare maintenance    -Continue current medications and maintain follow up with specialists.      Patient verbalizes understanding and agrees with current treatment plan.      Ivonne Robles MD  [facility]

## 2022-11-18 NOTE — PROGRESS NOTES
Mr. Muhammad is a patient of Dr. Esparza and was last seen in clinic 8/24/2022.      Subjective:   Patient ID:  Heri Muhammad is an 82 y.o. male who presents for follow-up of CRT-D and Atrial Fibrillation  .     HPI:    Mr. Muhammad is an 82 y.o. male with atrial fibrillation (PVI 1/2016; PVI 3/2017), atrial flutter (RFA of CTI 1/2016, mitral annular line 3/2017, roof line 3/2017), sinus node dysfunction, and CRT-P here for follow up.     Background:     Dual chamber PPM implanted (06/30/14).   Presented with fatigue, found to be in atrial fibrillation. Placed on amiodarone, underwent DCCV.   Symptoms with AF have historically been fatigue and lightheadedness.   DCCV (10/15/15) >> recurrence. DCCV (11/23/15), amiodarone increased to 400 mg daily. Developed atrial flutter.   (01/29/16) PVI + RFA for atrial flutter. Amiodarone discontinued.   Felt poorly with Multaq.   Did well for initially in terms of atrial fibrillation, but continued to note dyspnea.   Underwent LHC/RHC 6/10/16, no obstructive CAD, normal rt sided filling pressures.   Developed persistent recurrence >> DCCV 11/22/16. Recurrence.   RFA 3/23/17 Repeat isolation of LSPV, RSPV, RIPV (LIPV remained isolated). Atrial flutter c/w mitral annular flutter >> anterior mitral annular line created >> flutter converted to what appeared to be a roof flutter >> roof line created. Tachycardia converted to another tachycardia, ultimately mapped to BERNADETTE >> ablation deferred.   Developed recurrence of flutter during blanking period. Flecainide added > converted without need for DCCV.   Echo1/20/18 EF 30-35%. Was RV pacing 70% of the time.   LHC 1/22/18 non-obstructive CAD   1/26/18 Upgraded to CRT-P by Dr. Otto   Echo 8/29/18 EF 55%      8/24/2022:  Has noted increasing shortness of breath and fatigue. Developed symptomatic recurrence of atrial fibrillation.   GALILEO/DCCV 7/11/22. Flecainide 100 mg BID added.  Developed recurrence.  Was switched from Metoprolol to  Propanolol due to tremors. Has not seen improvement in tremors.  Device interrogation reveals stable function of the leads, RA pacing 87% biventricular pacing >99%, persistent atrial flutter rate controlled of unclear duration (was undertracking).    Discussed options of:  1) rate control, which has been achieved  2) Amiodarone. He has been on this in the past without problems. Discussed risks associated with this  3) repeat ablation. I am less inclined to pursue this given the multiple flutters induced at last case.    Prefers amiodarone.  Discontinue Flecainide.  In 3 days, start amiodarone 200 mg daily.  DCCV in 1 month. Defer GALILEO if remains compliant with Eliquis.  Given lack of improvement in tremors, at patient request we will switch back from Propanolol to Metoprolol.    Update (11/22/2022):    10/5/2022: Presented for GALILEO/DCCV.  EKG shows sinus rhythm.  Device Interrogation shows sinus rhythm.     Today complaints of some fatigue and spasmodic cough (coughing spell). No palpitations, CP, worsening FOWLER, LH, syncope.    He is currently taking eliquis 5mg BID for stroke prophylaxis and denies significant bleeding episodes. He is currently being treated with amiodarone 200mg daily for rhythm control and toprol 50mg daily for HR control.  Kidney function is stable, with a creatinine of 0.62 on 10/24/2022. TSH and LFTs ok 10/2022.    Device Interrogation (11/22/2022) reveals an intrinsic AF with stable lead and device function. AF in progress, 15%, ongoing. Rates controlled. He paces 69% in the RA and 97% in the BiV. PVC <1%. Estimated battery longevity 2 years.     I have personally reviewed the patient's EKG today, which shows AFBP at 83bpm. QRS is 166. QTc is 535. Corrected QTc (taking pacing into account) 490ms.    Relevant Cardiac Test Results:    GALILEO(7/11/2022):  This study was performed for BERNADETTE clearance prior to DCCV  Normal appearing left atrial appendage. No thrombus is present in the appendage. BERNADETTE  occluder is absent. Abnormal appendage velocities.  The left ventricle is normal in size with normal systolic function.The estimated ejection fraction is 60%.  Normal right ventricular size with normal right ventricular systolic function.  Biatrial enlargement.  Grade 1 plaque present.    Current Outpatient Medications   Medication Sig    amiodarone (PACERONE) 200 MG Tab Take 1 tablet (200 mg total) by mouth once daily.    atorvastatin (LIPITOR) 40 MG tablet TAKE 1 TABLET EVERY DAY    cetirizine (ZYRTEC) 10 MG tablet Take 10 mg by mouth every evening.    citalopram (CELEXA) 20 MG tablet Take 1 tablet (20 mg total) by mouth once daily.    cyclobenzaprine (FLEXERIL) 5 MG tablet Take 5 mg by mouth nightly. prn    ELIQUIS 5 mg Tab TAKE 1 TABLET TWICE DAILY    lamoTRIgine (LAMICTAL) 100 MG tablet Take 1 tablet (100 mg total) by mouth 2 (two) times daily.    memantine (NAMENDA) 10 MG Tab Take 1 tablet (10 mg total) by mouth 2 (two) times daily.    metoprolol succinate (TOPROL-XL) 50 MG 24 hr tablet Take 1 tablet (50 mg total) by mouth once daily.    primidone (MYSOLINE) 250 MG Tab TAKE 2 TABLETS TWICE A DAY    STIOLTO RESPIMAT 2.5-2.5 mcg/actuation Mist Inhale 2 puffs into the lungs once daily.    torsemide (DEMADEX) 10 MG Tab TAKE 1 TABLET (10 MG TOTAL) BY MOUTH EVERY MORNING.     No current facility-administered medications for this visit.       Review of Systems   Constitutional: Positive for malaise/fatigue.   Cardiovascular:  Negative for chest pain, dyspnea on exertion, irregular heartbeat, leg swelling and palpitations.   Respiratory:  Positive for cough. Negative for shortness of breath.    Hematologic/Lymphatic: Negative for bleeding problem.   Skin:  Negative for rash.   Musculoskeletal:  Negative for myalgias.   Gastrointestinal:  Negative for hematemesis, hematochezia and nausea.   Genitourinary:  Negative for hematuria.   Neurological:  Negative for light-headedness.   Psychiatric/Behavioral:  Negative for  altered mental status.    Allergic/Immunologic: Negative for persistent infections.     Objective:          /64 (BP Location: Left arm, Patient Position: Sitting, BP Method: Medium (Manual))   Pulse 83   Ht 6' (1.829 m)   Wt 101.6 kg (223 lb 15.4 oz)   BMI 30.37 kg/m²     Physical Exam  Vitals and nursing note reviewed.   Constitutional:       Appearance: Normal appearance. He is well-developed.   HENT:      Head: Normocephalic.      Nose: Nose normal.   Eyes:      Pupils: Pupils are equal, round, and reactive to light.   Cardiovascular:      Rate and Rhythm: Normal rate. Rhythm irregular.   Pulmonary:      Effort: No respiratory distress.      Breath sounds: Normal breath sounds.   Chest:      Comments: CRT-P in situ  Musculoskeletal:         General: Normal range of motion.   Skin:     General: Skin is warm and dry.      Findings: No erythema.   Neurological:      Mental Status: He is alert and oriented to person, place, and time.   Psychiatric:         Speech: Speech normal.         Behavior: Behavior normal.         Lab Results   Component Value Date     10/24/2022    K 4.9 10/24/2022    MG 2.0 06/07/2019    BUN 17 10/24/2022    CREATININE 0.62 10/24/2022    ALT 31 10/24/2022    AST 36 10/24/2022    AST 35 11/27/2015    HGB 14.0 10/24/2022    HCT 42.8 10/24/2022    HCT 44 12/04/2020    TSH 7.360 (H) 10/24/2022    LDLCALC 88.4 05/06/2022       Recent Labs   Lab 07/07/22  1143 09/28/22  1026   INR 0.9 1.0         Assessment:     1. Biventricular cardiac pacemaker in situ    2. Chronic diastolic heart failure    3. Essential hypertension    4. Persistent atrial fibrillation    5. Sinus node dysfunction    6. Current use of long term anticoagulation      Plan:     In summary, Mr. Muhammad is an 82 y.o. male with atrial fibrillation (PVI 1/2016; PVI 3/2017), atrial flutter (RFA of CTI 1/2016, mitral annular line 3/2017, roof line 3/2017), sinus node dysfunction, and CRT-P here for follow up.   He is one  month after aborted cardioversion due to presenting in sinus rhythm. He is now on amiodarone for rhythm control. Unfortunately back in persistent AF/AFL. He is only mildly symptomatic, reporting some fatigue. We discussed options, including rate control vs RFA. RFA less recommended given age and multiple AFLs identified. Will continue with rate control. Stop amiodarone. Increase base rate to increase BiV pacing in AF. Continue eliquis and metoprolol. RTC 3 mo to review symptoms, pacing, etc.    Stop amiodarone  Continue other meds  Continue device checks  RTC 3 mo, sooner if needed    *A copy of this note has been sent to Dr. Esparza*    Follow up in about 3 months (around 2/22/2023).    ------------------------------------------------------------------    JANINA Vargas, NP-C  Cardiac Electrophysiology

## 2022-11-21 ENCOUNTER — TELEPHONE (OUTPATIENT)
Dept: ELECTROPHYSIOLOGY | Facility: CLINIC | Age: 82
End: 2022-11-21
Payer: MEDICARE

## 2022-11-21 NOTE — TELEPHONE ENCOUNTER
----- Message from Becky Brown MA sent at 11/17/2022 11:15 AM CST -----  Regarding: FW: drug interaction  Contact: [phone]    ----- Message -----  From: Dana Diane  Sent: 11/17/2022  11:13 AM CST  To: [facility] Staff  Subject: drug interaction                                 [facility] calling to speak with the Nurse about the drug interactions citalopram (CELEXA) 20 MG tablet and amiodarone (PACERONE) 200 MG Tab. Thanks, Dana

## 2022-11-21 NOTE — TELEPHONE ENCOUNTER
Margarita is calling regarding a potential drug interaction between Amiodarone and Celexa. He has been on both since August. I discussed with Dr Esparza and his recommendation was to check a 12 lead EKG and if no QT prolongation, he can stay on both. I called to schedule and they reminded me that they have an appt with you tomorrow with device interrogation and EKG. Can you please address in the am?  Thanks

## 2022-11-21 NOTE — TELEPHONE ENCOUNTER
----- Message from Dana Diane sent at 11/21/2022 12:20 PM CST -----  Regarding: RE: drug interaction  [facility] - [facility] [address]   Phone:  [phone]  Fax:  [phone]        ----- Message -----  From: Viktoria Murray RN  Sent: 11/21/2022  11:21 AM CST  To: Dana Diane  Subject: RE: drug interaction                             I don't understand the message. Do you have a call back # as well?  Thanks  ----- Message -----  From: Mauricio Lopez MA  Sent: 11/21/2022  10:56 AM CST  To: Viktoria Murray RN  Subject: FW: drug interaction                             Good morning Leena  See below please advise.   Thanks    ----- Message -----  From: Dana Diane  Sent: 11/21/2022  10:33 AM CST  To: [facility] Staff  Subject: drug interaction                                 [facility] calling report that the interaction between citalopram (CELEXA) 20 MG tablet, citalopram (CELEXA) 20 MG tablet. Thanks, Dana

## 2022-11-21 NOTE — TELEPHONE ENCOUNTER
Discussed with Dr Esparza. Patient has been on both since August. He also has a device. Will check EKG (per Dr Esparza) and if QT looks ok, he can remain on both meds.   Patient actually has appt with Sylwia tomorrow for routine OV. Message sent to her to address.   Margarita informed as well.

## 2022-11-22 ENCOUNTER — CLINICAL SUPPORT (OUTPATIENT)
Dept: CARDIOLOGY | Facility: HOSPITAL | Age: 82
End: 2022-11-22
Attending: INTERNAL MEDICINE
Payer: MEDICARE

## 2022-11-22 ENCOUNTER — OFFICE VISIT (OUTPATIENT)
Dept: ELECTROPHYSIOLOGY | Facility: CLINIC | Age: 82
End: 2022-11-22
Payer: MEDICARE

## 2022-11-22 VITALS
HEART RATE: 83 BPM | DIASTOLIC BLOOD PRESSURE: 64 MMHG | SYSTOLIC BLOOD PRESSURE: 110 MMHG | WEIGHT: 223.94 LBS | BODY MASS INDEX: 30.33 KG/M2 | HEIGHT: 72 IN

## 2022-11-22 DIAGNOSIS — I10 ESSENTIAL HYPERTENSION: ICD-10-CM

## 2022-11-22 DIAGNOSIS — I48.19 PERSISTENT ATRIAL FIBRILLATION: ICD-10-CM

## 2022-11-22 DIAGNOSIS — Z95.0 BIVENTRICULAR CARDIAC PACEMAKER IN SITU: Primary | ICD-10-CM

## 2022-11-22 DIAGNOSIS — I50.32 CHRONIC DIASTOLIC HEART FAILURE: ICD-10-CM

## 2022-11-22 DIAGNOSIS — I49.5 SINUS NODE DYSFUNCTION: ICD-10-CM

## 2022-11-22 DIAGNOSIS — Z79.01 CURRENT USE OF LONG TERM ANTICOAGULATION: ICD-10-CM

## 2022-11-22 DIAGNOSIS — I49.8 OTHER SPECIFIED CARDIAC ARRHYTHMIAS: ICD-10-CM

## 2022-11-22 PROCEDURE — 3078F DIAST BP <80 MM HG: CPT | Mod: CPTII,S$GLB,, | Performed by: NURSE PRACTITIONER

## 2022-11-22 PROCEDURE — 93010 ELECTROCARDIOGRAM REPORT: CPT | Mod: S$GLB,,, | Performed by: INTERNAL MEDICINE

## 2022-11-22 PROCEDURE — 93281 CARDIAC DEVICE CHECK - IN CLINIC & HOSPITAL: ICD-10-PCS | Mod: 26,,, | Performed by: INTERNAL MEDICINE

## 2022-11-22 PROCEDURE — 3074F SYST BP LT 130 MM HG: CPT | Mod: CPTII,S$GLB,, | Performed by: NURSE PRACTITIONER

## 2022-11-22 PROCEDURE — 3288F PR FALLS RISK ASSESSMENT DOCUMENTED: ICD-10-PCS | Mod: CPTII,S$GLB,, | Performed by: NURSE PRACTITIONER

## 2022-11-22 PROCEDURE — 99999 PR PBB SHADOW E&M-EST. PATIENT-LVL IV: ICD-10-PCS | Mod: PBBFAC,,, | Performed by: NURSE PRACTITIONER

## 2022-11-22 PROCEDURE — 1101F PT FALLS ASSESS-DOCD LE1/YR: CPT | Mod: CPTII,S$GLB,, | Performed by: NURSE PRACTITIONER

## 2022-11-22 PROCEDURE — 93010 RHYTHM STRIP: ICD-10-PCS | Mod: S$GLB,,, | Performed by: INTERNAL MEDICINE

## 2022-11-22 PROCEDURE — 1101F PR PT FALLS ASSESS DOC 0-1 FALLS W/OUT INJ PAST YR: ICD-10-PCS | Mod: CPTII,S$GLB,, | Performed by: NURSE PRACTITIONER

## 2022-11-22 PROCEDURE — 3078F PR MOST RECENT DIASTOLIC BLOOD PRESSURE < 80 MM HG: ICD-10-PCS | Mod: CPTII,S$GLB,, | Performed by: NURSE PRACTITIONER

## 2022-11-22 PROCEDURE — 1160F PR REVIEW ALL MEDS BY PRESCRIBER/CLIN PHARMACIST DOCUMENTED: ICD-10-PCS | Mod: CPTII,S$GLB,, | Performed by: NURSE PRACTITIONER

## 2022-11-22 PROCEDURE — 93005 ELECTROCARDIOGRAM TRACING: CPT | Mod: S$GLB,,, | Performed by: INTERNAL MEDICINE

## 2022-11-22 PROCEDURE — 1159F MED LIST DOCD IN RCRD: CPT | Mod: CPTII,S$GLB,, | Performed by: NURSE PRACTITIONER

## 2022-11-22 PROCEDURE — 93281 PM DEVICE PROGR EVAL MULTI: CPT | Mod: 26,,, | Performed by: INTERNAL MEDICINE

## 2022-11-22 PROCEDURE — 3074F PR MOST RECENT SYSTOLIC BLOOD PRESSURE < 130 MM HG: ICD-10-PCS | Mod: CPTII,S$GLB,, | Performed by: NURSE PRACTITIONER

## 2022-11-22 PROCEDURE — 93281 PM DEVICE PROGR EVAL MULTI: CPT

## 2022-11-22 PROCEDURE — 1125F AMNT PAIN NOTED PAIN PRSNT: CPT | Mod: CPTII,S$GLB,, | Performed by: NURSE PRACTITIONER

## 2022-11-22 PROCEDURE — 99999 PR PBB SHADOW E&M-EST. PATIENT-LVL IV: CPT | Mod: PBBFAC,,, | Performed by: NURSE PRACTITIONER

## 2022-11-22 PROCEDURE — 3288F FALL RISK ASSESSMENT DOCD: CPT | Mod: CPTII,S$GLB,, | Performed by: NURSE PRACTITIONER

## 2022-11-22 PROCEDURE — 93005 RHYTHM STRIP: ICD-10-PCS | Mod: S$GLB,,, | Performed by: INTERNAL MEDICINE

## 2022-11-22 PROCEDURE — 1159F PR MEDICATION LIST DOCUMENTED IN MEDICAL RECORD: ICD-10-PCS | Mod: CPTII,S$GLB,, | Performed by: NURSE PRACTITIONER

## 2022-11-22 PROCEDURE — 1125F PR PAIN SEVERITY QUANTIFIED, PAIN PRESENT: ICD-10-PCS | Mod: CPTII,S$GLB,, | Performed by: NURSE PRACTITIONER

## 2022-11-22 PROCEDURE — 1160F RVW MEDS BY RX/DR IN RCRD: CPT | Mod: CPTII,S$GLB,, | Performed by: NURSE PRACTITIONER

## 2022-11-22 PROCEDURE — 99214 OFFICE O/P EST MOD 30 MIN: CPT | Mod: S$GLB,,, | Performed by: NURSE PRACTITIONER

## 2022-11-22 PROCEDURE — 99214 PR OFFICE/OUTPT VISIT, EST, LEVL IV, 30-39 MIN: ICD-10-PCS | Mod: S$GLB,,, | Performed by: NURSE PRACTITIONER

## 2022-12-13 ENCOUNTER — TELEPHONE (OUTPATIENT)
Dept: FAMILY MEDICINE | Facility: CLINIC | Age: 82
End: 2022-12-13
Payer: MEDICARE

## 2022-12-13 NOTE — TELEPHONE ENCOUNTER
----- Message from Kayode De Oliveira sent at 12/13/2022  2:50 PM CST -----  .Type:  Patient Returning Call    Who Called:Pt's Wife  Who Left Message for Patient:Dolly Parra  Would the patient rather a call back or a response via [facility]? Call  Best Call Back Number:[phone]

## 2022-12-13 NOTE — TELEPHONE ENCOUNTER
----- Message from Capri Sorenson sent at 12/13/2022 12:03 PM CST -----  Regarding: sooner appt  Contact: Patient wife  .Type:  Sooner Apoointment Request    Caller is requesting a sooner appointment.  Caller declined first available appointment listed below.  Caller will not accept being placed on the waitlist and is requesting a message be sent to doctor.  Name of Caller:Patient wife  When is the first available appointment?2/13/2023  Symptoms:er follow up  Would the patient rather a call back or a response via [facility]? call  Best Call Back Number:[phone]  Additional Information: wife called

## 2022-12-16 ENCOUNTER — TELEPHONE (OUTPATIENT)
Dept: FAMILY MEDICINE | Facility: CLINIC | Age: 82
End: 2022-12-16
Payer: MEDICARE

## 2022-12-16 DIAGNOSIS — E87.8 ELECTROLYTE IMBALANCE: Primary | ICD-10-CM

## 2022-12-16 NOTE — TELEPHONE ENCOUNTER
Spoke with patients wife.  Appointment changed to virtual due to Dr. Knott out sick.  Would like blood work done before appointment.  Will let Dr. Knott know.

## 2022-12-19 ENCOUNTER — OFFICE VISIT (OUTPATIENT)
Dept: FAMILY MEDICINE | Facility: CLINIC | Age: 82
End: 2022-12-19
Payer: MEDICARE

## 2022-12-19 DIAGNOSIS — E87.8 ELECTROLYTE IMBALANCE: Primary | ICD-10-CM

## 2022-12-19 DIAGNOSIS — E87.6 HYPOKALEMIA: ICD-10-CM

## 2022-12-19 DIAGNOSIS — R19.7 DIARRHEA, UNSPECIFIED TYPE: ICD-10-CM

## 2022-12-19 DIAGNOSIS — E83.51 HYPOCALCEMIA: ICD-10-CM

## 2022-12-19 DIAGNOSIS — R42 EPISODIC LIGHTHEADEDNESS: ICD-10-CM

## 2022-12-19 DIAGNOSIS — I10 ESSENTIAL HYPERTENSION: ICD-10-CM

## 2022-12-19 PROCEDURE — 99213 PR OFFICE/OUTPT VISIT, EST, LEVL III, 20-29 MIN: ICD-10-PCS | Mod: HCNC,95,, | Performed by: STUDENT IN AN ORGANIZED HEALTH CARE EDUCATION/TRAINING PROGRAM

## 2022-12-19 PROCEDURE — 1159F MED LIST DOCD IN RCRD: CPT | Mod: HCNC,CPTII,95, | Performed by: STUDENT IN AN ORGANIZED HEALTH CARE EDUCATION/TRAINING PROGRAM

## 2022-12-19 PROCEDURE — 1159F PR MEDICATION LIST DOCUMENTED IN MEDICAL RECORD: ICD-10-PCS | Mod: HCNC,CPTII,95, | Performed by: STUDENT IN AN ORGANIZED HEALTH CARE EDUCATION/TRAINING PROGRAM

## 2022-12-19 PROCEDURE — 1160F PR REVIEW ALL MEDS BY PRESCRIBER/CLIN PHARMACIST DOCUMENTED: ICD-10-PCS | Mod: HCNC,CPTII,95, | Performed by: STUDENT IN AN ORGANIZED HEALTH CARE EDUCATION/TRAINING PROGRAM

## 2022-12-19 PROCEDURE — 1160F RVW MEDS BY RX/DR IN RCRD: CPT | Mod: HCNC,CPTII,95, | Performed by: STUDENT IN AN ORGANIZED HEALTH CARE EDUCATION/TRAINING PROGRAM

## 2022-12-19 PROCEDURE — 99213 OFFICE O/P EST LOW 20 MIN: CPT | Mod: HCNC,95,, | Performed by: STUDENT IN AN ORGANIZED HEALTH CARE EDUCATION/TRAINING PROGRAM

## 2022-12-19 NOTE — PROGRESS NOTES
[facility] Note    Chief Complaint      No chief complaint on file.  .The patient location is: Home  The chief complaint leading to consultation is: ER follow up visit    Visit type: audio only    Face to Face time with patient: NA  24 minutes of total time spent on the encounter, which includes non-face to face time preparing to see the patient (eg, review of tests), Obtaining and/or reviewing separately obtained history, Documenting clinical information in the electronic or other health record, Independently interpreting results (not separately reported) and communicating results to the patient/family/caregiver, or Care coordination (not separately reported).         Each patient to whom he or she provides medical services by telemedicine is:  (1) informed of the relationship between the physician and patient and the respective role of any other health care provider with respect to management of the patient; and (2) notified that he or she may decline to receive medical services by telemedicine and may withdraw from such care at any time.    Notes:    History of Present Illness      HPI    Heri Muhammad is a 82 y.o. male with h/o Afib/Aflutter,Sinus node dysfunction( s/p CRT-P and multiple cardioversion , sHTN, HLD, seizure disorder, DJD lumbar spine, Aortic atherosclerosis, HFpEF, hypothyroidism, restrictive cardiomyopathy, chronic LBP, chronic bilateral shoulder pain s/p bilateral shoulder replacements and COPD, recently managed for acute watery diarrhea with electrolyte derangement at the ER (12/17/2022) for f/u today. He endorses only 1-2 BM per day now though still with intermittent light headedness. He described his appetite to be good, tries as much as possible to stay hydrated with compliant with all current regimen,    Home BP reading today( taken by wife while on the phone with me): Sitting 120/60, HR 82  Standing (after 3 minutes) : 129/61, HR 86                                                                     Electrophysiologist : Dr Deep Esparza MD  Cardiologist : Dr Graham  Pain : Dr Negron  Neurologist : Dr Anjum Arellano      Problem List Addressed This Visit:    #Electrolyte imbalance  #Episodic Lightheadedness  #Acute watery diarrhea  #Hypokalemia  #Hypocalcemia  #sHTN      Health Maintenance   Topic Date Due    TETANUS VACCINE  12/06/2026    Lipid Panel  05/06/2027       Past Medical History:   Diagnosis Date    A-fib     Anticoagulant long-term use     eliquis    Aortic atherosclerosis 9/18/2017    - LDL goal <70 - BP goal <130/80    Atrial flutter     Benign essential tremor     Biventricular cardiac pacemaker in situ 9/24/2018    CAD (coronary artery disease) 12/2/2013    - 9/2013 Left heart Cath: LAD- 60% prox (neg FFR), nl, ramus and RCA 20% - LDL goal <70 - BP goal <130/80    Cancer     skin cancer on back    Cardiac pacemaker in situ     Cardiomyopathy, nonischemic 2/12/2018    - 8/29/18 Echo EF 55%. Grade III diastolic dysfunction with restrictive physiology - followed by Cardiology    Carpal tunnel syndrome of left wrist 8/8/2017    CHF (congestive heart failure)     Chronic diastolic heart failure 1/19/2018    Chronic obstructive pulmonary disease 10/3/2018    Chronic right-sided thoracic back pain 4/27/2017    Coronary artery disease     Cough     Current use of long term anticoagulation 6/2/2016    - Eliquis for Afib    Dizziness     intermittant    DJD (degenerative joint disease) of knee     ED (erectile dysfunction)     Hard of hearing 11/2016    History of cataract     HTN (hypertension)     Hyperlipidemia     Hyperlipidemia     Hyponatremia 6/11/2019    Memory loss     Mixed hyperlipidemia 7/28/2012    Persistent atrial fibrillation 7/1/2015    - followed by Dr. Esparza - history of cardioversion - on Eliquis OAC 5 mg BID    Seizure disorder     Seizures     last episode 1995    Sinus node dysfunction 08/2016    Subclinical hypothyroidism     Tension type headache  2019       Past Surgical History:   Procedure Laterality Date    ANKLE SURGERY Right     pins and screws    ATRIAL ABLATION SURGERY      CARDIAC PACEMAKER PLACEMENT      CARDIAC PACEMAKER PLACEMENT      CARDIAC PACEMAKER PLACEMENT  2014    CARDIOVERSION      CATARACT EXTRACTION      [facility]    COLONOSCOPY      due in     EYE SURGERY Bilateral     cataracts extraction    FRACTURE SURGERY Right     ankle with hardware    HERNIA REPAIR      abdominal    INJECTION OF ANESTHETIC AGENT AROUND MEDIAL BRANCH NERVES INNERVATING LUMBAR FACET JOINT Bilateral 2019    Procedure: Block-nerve-medial branch-lumbar L2-L5;  Surgeon: Anthony Moffett MD;  Location: [facility] OR;  Service: Pain Management;  Laterality: Bilateral;    JOINT REPLACEMENT Bilateral     shoulders    RADIOFREQUENCY ABLATION OF LUMBAR MEDIAL BRANCH NERVE AT SINGLE LEVEL Bilateral 2019    Procedure: Radiofrequency Ablation, Nerve, Spinal, Lumbar, Medial Branch, L2-L5;  Surgeon: Anthony Moffett MD;  Location: [facility] OR;  Service: Pain Management;  Laterality: Bilateral;    TREATMENT OF CARDIAC ARRHYTHMIA N/A 2022    Procedure: Cardioversion or Defibrillation;  Surgeon: MIREILLE Morel MD;  Location: [facility] EP LAB;  Service: Cardiology;  Laterality: N/A;  AF, GALILEO, DCCV, MAC, EH (to cover for SK), 3 Prep *SJM CRT-P in situ*       family history includes Blindness in his father; Cancer in his daughter and father; Cataracts in his father; Dementia in his mother; Heart disease in his mother; Heart failure in his mother; Hypertension in his father; No Known Problems in his son; Obesity in his daughter; Tremor in his brother, father, and paternal grandfather.    Social History     Tobacco Use    Smoking status: Former     Packs/day: 1.00     Years: 5.00     Pack years: 5.00     Types: Cigarettes     Quit date:      Years since quittin.0    Smokeless tobacco: Never   Substance Use Topics    Alcohol use: No    Drug use: No       Review of Systems    Constitutional:  Negative for activity change and unexpected weight change.   HENT:  Negative for hearing loss, rhinorrhea and trouble swallowing.    Eyes:  Negative for discharge and visual disturbance.   Respiratory:  Negative for chest tightness and wheezing.    Cardiovascular:  Negative for chest pain and palpitations.   Gastrointestinal:  Negative for blood in stool, constipation, diarrhea and vomiting.   Endocrine: Negative for polydipsia and polyuria.   Genitourinary:  Negative for difficulty urinating, hematuria and urgency.   Musculoskeletal:  Negative for arthralgias, joint swelling and neck pain.   Neurological:  Positive for weakness, light-headedness and headaches.   Psychiatric/Behavioral:  Negative for confusion and dysphoric mood.      Outpatient Encounter Medications as of 12/19/2022   Medication Sig Dispense Refill    atorvastatin (LIPITOR) 40 MG tablet TAKE 1 TABLET EVERY DAY 90 tablet 3    cetirizine (ZYRTEC) 10 MG tablet Take 10 mg by mouth every evening.      citalopram (CELEXA) 20 MG tablet TAKE 1 TABLET EVERY DAY 90 tablet 3    cyclobenzaprine (FLEXERIL) 5 MG tablet Take 5 mg by mouth nightly. prn      ELIQUIS 5 mg Tab TAKE 1 TABLET TWICE DAILY 180 tablet 3    lamoTRIgine (LAMICTAL) 100 MG tablet Take 1 tablet (100 mg total) by mouth 2 (two) times daily. 180 tablet 3    memantine (NAMENDA) 10 MG Tab Take 1 tablet (10 mg total) by mouth 2 (two) times daily. 180 tablet 3    metoprolol succinate (TOPROL-XL) 50 MG 24 hr tablet Take 1 tablet (50 mg total) by mouth once daily. 90 tablet 3    primidone (MYSOLINE) 250 MG Tab TAKE 2 TABLETS TWICE A  tablet 0    STIOLTO RESPIMAT 2.5-2.5 mcg/actuation Mist Inhale 2 puffs into the lungs once daily. 12 g 3    torsemide (DEMADEX) 10 MG Tab TAKE 1 TABLET (10 MG TOTAL) BY MOUTH EVERY MORNING. 90 tablet 3     No facility-administered encounter medications on file as of 12/19/2022.        Review of patient's allergies indicates:   Allergen Reactions     Bactroban [mupirocin calcium] Rash     Other reaction(s): Rash       Physical Exam       ]    Physical Exam   NA    Laboratory:  CBC:  Recent Labs   Lab Result Units 09/28/22  1026 10/24/22  0758 12/09/22  1557   WBC K/uL 8.83 6.88 4.50   RBC M/uL 4.75 4.46* 4.05*   Hemoglobin g/dL 14.9 14.0 12.8*   Hematocrit % 44.1 42.8 38.2*   Platelets K/uL 186 195 141*   MCV fL 93 96 94   MCH pg 31.4* 31.4* 31.6*   MCHC g/dL 33.8 32.7 33.5     CMP:  Recent Labs   Lab Result Units 10/24/22  0758 12/09/22  1557 12/19/22  0914   Glucose mg/dL 111* 135* 115*   Calcium mg/dL 8.1* 7.1* 8.7   Albumin g/dL 4.1 3.4* 4.2   Total Protein g/dL 7.3 6.1 7.6   Sodium mmol/L 142 133* 141   Potassium mmol/L 4.9 3.3* 5.0   CO2 mmol/L 28 26 37*   Chloride mmol/L 103 99 100   BUN mg/dL 17 17 8   Alkaline Phosphatase U/L 103 89 114   ALT U/L 31 23 31   AST U/L 36 26 31   Total Bilirubin mg/dL 0.2 0.3 0.4     URINALYSIS:  Recent Labs   Lab Result Units 12/09/22  1737   Color, UA  Yellow   Specific Gravity, UA  1.020   pH, UA  6.0   Protein, UA  Negative   Nitrite, UA  Negative   Leukocytes, UA  Negative   Urobilinogen, UA EU/dL Negative      LIPIDS:  Recent Labs   Lab Result Units 10/24/22  0758 12/19/22  0914   TSH uIU/mL 7.360* 5.520*     TSH:  Recent Labs   Lab Result Units 10/24/22  0758 12/19/22  0914   TSH uIU/mL 7.360* 5.520*     A1C:  No results for input(s): HGBA1C in the last 2160 hours.    Radiology:      Assessment/Plan     Heri Muhammad is a 82 y.o.male with:    Electrolyte imbalance  -hypokalemia/hypocalcemia/metabolic alkalosis  -likely 2/2 recent acute watery diarrhea  -recent CMP (12/19/2022)-now normal except metabolic alkalosis  -patient advised on adequate hydration with water only, keep away from hypertonic solutes( soda, juice)  -hold off torsemide for now    2. Episodic lightheadedness  -likely ppted by recent watery diarrhea  -orthostasis negative  -ensure hydration  -will d/c memantine/primidone if it continues to  worsen  -referral to ENT next clinic visit    3. Subclinical hypothyroidism  Overview:  Lab Results   Component Value Date    TSH 5.520 12/19//2022   - TFT improving  -will c/t monitor for now    4. Essential hypertension  Overview:  - well controlled  - continue current medication    5. Mixed hyperlipidemia  Overview:  Lab Results   Component Value Date    LDLCALC 88.4 05/06/2022     - on statin  - followed by Cardiology    6. Seizure disorder  Overview:  - stable and no recent seizures since 1990's  - CT Head 2019 with diffuse atrophy  - EEG 1/2019 mild diffuse slowing  - followed by Neurology      7. Chronic diastolic heart failure  Overview:  - no signs of acute decompensation  - no leg swelling per wife on evaluation at home  -Hold off torsemide for now  - medical management    8. Benign essential tremor  Overview:  - followed by Neurology  - on primidone and propranolol recently added     9. H/o Afib/Aflutter/Sinus Nod dysfunction  -follows up with neurology    -Continue current medications and maintain follow up with specialists.      Patient and wife verbalized understanding and agree with current treatment plans.      Ivonne Robles MD  Internal Medicine  [facility]

## 2022-12-19 NOTE — PROGRESS NOTES
Patient, Heri Muhammad (MRN #[id number]), presented with a recent Platelet count less than 150 K/uL consistent with the definition of thrombocytopenia (ICD10 - D69.6).    Platelets   Date Value Ref Range Status   12/09/2022 141 (L) 150 - 450 K/uL Final     The patient's thrombocytopenia was monitored, evaluated, addressed and/or treated. This addendum to the medical record is made on 12/19/2022.

## 2022-12-19 NOTE — TELEPHONE ENCOUNTER
Left message for patient that orders were placed for blood work and he can go them done before his appointment today if he wishes to do so.

## 2022-12-24 ENCOUNTER — CLINICAL SUPPORT (OUTPATIENT)
Dept: CARDIOLOGY | Facility: HOSPITAL | Age: 82
End: 2022-12-24
Payer: MEDICARE

## 2022-12-24 DIAGNOSIS — Z95.0 PRESENCE OF CARDIAC PACEMAKER: ICD-10-CM

## 2022-12-24 DIAGNOSIS — I49.5 SICK SINUS SYNDROME: ICD-10-CM

## 2022-12-24 DIAGNOSIS — I48.91 UNSPECIFIED ATRIAL FIBRILLATION: ICD-10-CM

## 2022-12-24 PROCEDURE — 93294 CARDIAC DEVICE CHECK - REMOTE: ICD-10-PCS | Mod: HCNC,,, | Performed by: INTERNAL MEDICINE

## 2022-12-24 PROCEDURE — 93296 REM INTERROG EVL PM/IDS: CPT | Mod: HCNC | Performed by: INTERNAL MEDICINE

## 2022-12-24 PROCEDURE — 93294 REM INTERROG EVL PM/LDLS PM: CPT | Mod: HCNC,,, | Performed by: INTERNAL MEDICINE

## 2023-01-20 ENCOUNTER — OFFICE VISIT (OUTPATIENT)
Dept: FAMILY MEDICINE | Facility: CLINIC | Age: 83
End: 2023-01-20
Payer: MEDICARE

## 2023-01-20 VITALS
SYSTOLIC BLOOD PRESSURE: 114 MMHG | DIASTOLIC BLOOD PRESSURE: 68 MMHG | WEIGHT: 224 LBS | HEART RATE: 85 BPM | OXYGEN SATURATION: 96 % | BODY MASS INDEX: 30.34 KG/M2 | HEIGHT: 72 IN

## 2023-01-20 DIAGNOSIS — M79.674 PAIN DUE TO ONYCHOMYCOSIS OF TOENAILS OF BOTH FEET: Primary | ICD-10-CM

## 2023-01-20 DIAGNOSIS — M79.675 PAIN DUE TO ONYCHOMYCOSIS OF TOENAILS OF BOTH FEET: Primary | ICD-10-CM

## 2023-01-20 DIAGNOSIS — B35.1 PAIN DUE TO ONYCHOMYCOSIS OF TOENAILS OF BOTH FEET: Primary | ICD-10-CM

## 2023-01-20 PROCEDURE — 3288F PR FALLS RISK ASSESSMENT DOCUMENTED: ICD-10-PCS | Mod: HCNC,CPTII,S$GLB, | Performed by: STUDENT IN AN ORGANIZED HEALTH CARE EDUCATION/TRAINING PROGRAM

## 2023-01-20 PROCEDURE — 1101F PT FALLS ASSESS-DOCD LE1/YR: CPT | Mod: HCNC,CPTII,S$GLB, | Performed by: STUDENT IN AN ORGANIZED HEALTH CARE EDUCATION/TRAINING PROGRAM

## 2023-01-20 PROCEDURE — 3078F DIAST BP <80 MM HG: CPT | Mod: HCNC,CPTII,S$GLB, | Performed by: STUDENT IN AN ORGANIZED HEALTH CARE EDUCATION/TRAINING PROGRAM

## 2023-01-20 PROCEDURE — 1101F PR PT FALLS ASSESS DOC 0-1 FALLS W/OUT INJ PAST YR: ICD-10-PCS | Mod: HCNC,CPTII,S$GLB, | Performed by: STUDENT IN AN ORGANIZED HEALTH CARE EDUCATION/TRAINING PROGRAM

## 2023-01-20 PROCEDURE — 3078F PR MOST RECENT DIASTOLIC BLOOD PRESSURE < 80 MM HG: ICD-10-PCS | Mod: HCNC,CPTII,S$GLB, | Performed by: STUDENT IN AN ORGANIZED HEALTH CARE EDUCATION/TRAINING PROGRAM

## 2023-01-20 PROCEDURE — 3074F PR MOST RECENT SYSTOLIC BLOOD PRESSURE < 130 MM HG: ICD-10-PCS | Mod: HCNC,CPTII,S$GLB, | Performed by: STUDENT IN AN ORGANIZED HEALTH CARE EDUCATION/TRAINING PROGRAM

## 2023-01-20 PROCEDURE — 1160F PR REVIEW ALL MEDS BY PRESCRIBER/CLIN PHARMACIST DOCUMENTED: ICD-10-PCS | Mod: HCNC,CPTII,S$GLB, | Performed by: STUDENT IN AN ORGANIZED HEALTH CARE EDUCATION/TRAINING PROGRAM

## 2023-01-20 PROCEDURE — 99499 UNLISTED E&M SERVICE: CPT | Mod: S$GLB,,, | Performed by: STUDENT IN AN ORGANIZED HEALTH CARE EDUCATION/TRAINING PROGRAM

## 2023-01-20 PROCEDURE — 99213 PR OFFICE/OUTPT VISIT, EST, LEVL III, 20-29 MIN: ICD-10-PCS | Mod: HCNC,S$GLB,, | Performed by: STUDENT IN AN ORGANIZED HEALTH CARE EDUCATION/TRAINING PROGRAM

## 2023-01-20 PROCEDURE — 1125F PR PAIN SEVERITY QUANTIFIED, PAIN PRESENT: ICD-10-PCS | Mod: HCNC,CPTII,S$GLB, | Performed by: STUDENT IN AN ORGANIZED HEALTH CARE EDUCATION/TRAINING PROGRAM

## 2023-01-20 PROCEDURE — 3288F FALL RISK ASSESSMENT DOCD: CPT | Mod: HCNC,CPTII,S$GLB, | Performed by: STUDENT IN AN ORGANIZED HEALTH CARE EDUCATION/TRAINING PROGRAM

## 2023-01-20 PROCEDURE — 1125F AMNT PAIN NOTED PAIN PRSNT: CPT | Mod: HCNC,CPTII,S$GLB, | Performed by: STUDENT IN AN ORGANIZED HEALTH CARE EDUCATION/TRAINING PROGRAM

## 2023-01-20 PROCEDURE — 99213 OFFICE O/P EST LOW 20 MIN: CPT | Mod: HCNC,S$GLB,, | Performed by: STUDENT IN AN ORGANIZED HEALTH CARE EDUCATION/TRAINING PROGRAM

## 2023-01-20 PROCEDURE — 1159F MED LIST DOCD IN RCRD: CPT | Mod: HCNC,CPTII,S$GLB, | Performed by: STUDENT IN AN ORGANIZED HEALTH CARE EDUCATION/TRAINING PROGRAM

## 2023-01-20 PROCEDURE — 3074F SYST BP LT 130 MM HG: CPT | Mod: HCNC,CPTII,S$GLB, | Performed by: STUDENT IN AN ORGANIZED HEALTH CARE EDUCATION/TRAINING PROGRAM

## 2023-01-20 PROCEDURE — 99999 PR PBB SHADOW E&M-EST. PATIENT-LVL IV: CPT | Mod: PBBFAC,HCNC,, | Performed by: STUDENT IN AN ORGANIZED HEALTH CARE EDUCATION/TRAINING PROGRAM

## 2023-01-20 PROCEDURE — 99999 PR PBB SHADOW E&M-EST. PATIENT-LVL IV: ICD-10-PCS | Mod: PBBFAC,HCNC,, | Performed by: STUDENT IN AN ORGANIZED HEALTH CARE EDUCATION/TRAINING PROGRAM

## 2023-01-20 PROCEDURE — 1159F PR MEDICATION LIST DOCUMENTED IN MEDICAL RECORD: ICD-10-PCS | Mod: HCNC,CPTII,S$GLB, | Performed by: STUDENT IN AN ORGANIZED HEALTH CARE EDUCATION/TRAINING PROGRAM

## 2023-01-20 PROCEDURE — 1160F RVW MEDS BY RX/DR IN RCRD: CPT | Mod: HCNC,CPTII,S$GLB, | Performed by: STUDENT IN AN ORGANIZED HEALTH CARE EDUCATION/TRAINING PROGRAM

## 2023-01-21 NOTE — PROGRESS NOTES
Patient, Heri Muhammad (MRN #[id number]), presented with a recent Platelet count less than 150 K/uL consistent with the definition of thrombocytopenia (ICD10 - D69.6).    Platelets   Date Value Ref Range Status   12/09/2022 141 (L) 150 - 450 K/uL Final     The patient's thrombocytopenia was monitored, evaluated, addressed and/or treated. This addendum to the medical record is made on 01/20/2023.

## 2023-01-21 NOTE — PROGRESS NOTES
[facility] Note    Chief Complaint      Chief Complaint   Patient presents with    Fall     X 1/19/23 COMING UP STAIRS FROM OUTSIDE AT HOME    Foot Pain     LEFT DUE TO FALL     Hip Injury     RIGHT DUE TO FALL     Notes:    History of Present Illness      Fall  Associated symptoms include headaches. Pertinent negatives include no hematuria or vomiting.   Foot Pain  Associated symptoms include headaches and weakness. Pertinent negatives include no arthralgias, chest pain, joint swelling, neck pain or vomiting.   Hip Injury  Associated symptoms include headaches and weakness. Pertinent negatives include no arthralgias, chest pain, joint swelling, neck pain or vomiting.     Heri Muhammad is a 82 y.o. male with h/o Afib/Aflutter,Sinus node dysfunction( s/p CRT-P and multiple cardioversion , sHTN, HLD, seizure disorder, DJD lumbar spine, Aortic atherosclerosis, HFpEF, hypothyroidism, restrictive cardiomyopathy, chronic LBP, chronic bilateral shoulder pain s/p bilateral shoulder replacements and COPD for follow up s/p fall at home yesterday after arriving from [facility] while trying to ascend his 2-stair outside his home, he missed a step tripped, hit his left foot against the brick and fell on an outstretched arm. He endorses no preceding dizziness, presyncope or syncope, no LOC, worsening headaches, focal weakness.                                                                     Electrophysiologist : Dr Deep Esparza MD  Cardiologist : Dr Graham  Pain : Dr Negron  Neurologist : Dr Anjum Arellano      Problem List Addressed This Visit:    #Left ankle pain s/p mechanical fall from his height  #Episodic Lightheadedness  #Onychomycosis of multiple toes  #sHTN      Health Maintenance   Topic Date Due    TETANUS VACCINE  12/06/2026    Lipid Panel  05/06/2027       Past Medical History:   Diagnosis Date    A-fib     Anticoagulant long-term use     eliquis    Aortic atherosclerosis 9/18/2017    - LDL goal  <70 - BP goal <130/80    Atrial flutter     Benign essential tremor     Biventricular cardiac pacemaker in situ 9/24/2018    CAD (coronary artery disease) 12/2/2013    - 9/2013 Left heart Cath: LAD- 60% prox (neg FFR), nl, ramus and RCA 20% - LDL goal <70 - BP goal <130/80    Cancer     skin cancer on back    Cardiac pacemaker in situ     Cardiomyopathy, nonischemic 2/12/2018    - 8/29/18 Echo EF 55%. Grade III diastolic dysfunction with restrictive physiology - followed by Cardiology    Carpal tunnel syndrome of left wrist 8/8/2017    CHF (congestive heart failure)     Chronic diastolic heart failure 1/19/2018    Chronic obstructive pulmonary disease 10/3/2018    Chronic right-sided thoracic back pain 4/27/2017    Coronary artery disease     Cough     Current use of long term anticoagulation 6/2/2016    - Eliquis for Afib    Dizziness     intermittant    DJD (degenerative joint disease) of knee     ED (erectile dysfunction)     Hard of hearing 11/2016    History of cataract     HTN (hypertension)     Hyperlipidemia     Hyperlipidemia     Hyponatremia 6/11/2019    Memory loss     Mixed hyperlipidemia 7/28/2012    Persistent atrial fibrillation 7/1/2015    - followed by Dr. Esparza - history of cardioversion - on Eliquis OAC 5 mg BID    Seizure disorder     Seizures     last episode 1995    Sinus node dysfunction 08/2016    Subclinical hypothyroidism     Tension type headache 7/2/2019       Past Surgical History:   Procedure Laterality Date    ANKLE SURGERY Right     pins and screws    ATRIAL ABLATION SURGERY      CARDIAC PACEMAKER PLACEMENT  2014    CARDIAC PACEMAKER PLACEMENT      CARDIAC PACEMAKER PLACEMENT  2014    CARDIOVERSION      CATARACT EXTRACTION      [facility]    COLONOSCOPY      due in 2014    EYE SURGERY Bilateral     cataracts extraction    FRACTURE SURGERY Right     ankle with hardware    HERNIA REPAIR      abdominal    INJECTION OF ANESTHETIC AGENT AROUND MEDIAL BRANCH NERVES INNERVATING LUMBAR FACET JOINT  Bilateral 2019    Procedure: Block-nerve-medial branch-lumbar L2-L5;  Surgeon: Anthony Moffett MD;  Location: [facility] OR;  Service: Pain Management;  Laterality: Bilateral;    JOINT REPLACEMENT Bilateral     shoulders    RADIOFREQUENCY ABLATION OF LUMBAR MEDIAL BRANCH NERVE AT SINGLE LEVEL Bilateral 2019    Procedure: Radiofrequency Ablation, Nerve, Spinal, Lumbar, Medial Branch, L2-L5;  Surgeon: Anthony Moffett MD;  Location: [facility] OR;  Service: Pain Management;  Laterality: Bilateral;    TREATMENT OF CARDIAC ARRHYTHMIA N/A 2022    Procedure: Cardioversion or Defibrillation;  Surgeon: MIREILLE Morel MD;  Location: [facility] EP LAB;  Service: Cardiology;  Laterality: N/A;  AF, GALILEO, DCCV, MAC, EH (to cover for SK), 3 Prep *SJM CRT-P in situ*       family history includes Blindness in his father; Cancer in his daughter and father; Cataracts in his father; Dementia in his mother; Heart disease in his mother; Heart failure in his mother; Hypertension in his father; No Known Problems in his son; Obesity in his daughter; Tremor in his brother, father, and paternal grandfather.    Social History     Tobacco Use    Smoking status: Former     Packs/day: 1.00     Years: 5.00     Pack years: 5.00     Types: Cigarettes     Quit date:      Years since quittin.0    Smokeless tobacco: Never   Substance Use Topics    Alcohol use: No    Drug use: No       Review of Systems   Constitutional:  Negative for activity change and unexpected weight change.   HENT:  Negative for hearing loss, rhinorrhea and trouble swallowing.    Eyes:  Negative for discharge and visual disturbance.   Respiratory:  Negative for chest tightness and wheezing.    Cardiovascular:  Negative for chest pain and palpitations.   Gastrointestinal:  Negative for blood in stool, constipation, diarrhea and vomiting.   Endocrine: Negative for polydipsia and polyuria.   Genitourinary:  Negative for difficulty urinating, hematuria and urgency.    Musculoskeletal:  Negative for arthralgias, joint swelling and neck pain.   Neurological:  Positive for weakness, light-headedness and headaches.   Psychiatric/Behavioral:  Negative for confusion and dysphoric mood.      Outpatient Encounter Medications as of 1/20/2023   Medication Sig Dispense Refill    atorvastatin (LIPITOR) 40 MG tablet TAKE 1 TABLET EVERY DAY 90 tablet 3    cetirizine (ZYRTEC) 10 MG tablet Take 10 mg by mouth every evening.      citalopram (CELEXA) 20 MG tablet TAKE 1 TABLET EVERY DAY 90 tablet 3    cyclobenzaprine (FLEXERIL) 5 MG tablet Take 5 mg by mouth nightly. prn      ELIQUIS 5 mg Tab TAKE 1 TABLET TWICE DAILY 180 tablet 3    lamoTRIgine (LAMICTAL) 100 MG tablet Take 1 tablet (100 mg total) by mouth 2 (two) times daily. 180 tablet 3    memantine (NAMENDA) 10 MG Tab TAKE 1 TABLET TWICE DAILY 180 tablet 0    metoprolol succinate (TOPROL-XL) 50 MG 24 hr tablet Take 1 tablet (50 mg total) by mouth once daily. 90 tablet 3    primidone (MYSOLINE) 250 MG Tab TAKE 2 TABLETS TWICE A  tablet 0    STIOLTO RESPIMAT 2.5-2.5 mcg/actuation Mist Inhale 2 puffs into the lungs once daily. 12 g 3    torsemide (DEMADEX) 10 MG Tab TAKE 1 TABLET (10 MG TOTAL) BY MOUTH EVERY MORNING. 90 tablet 3     No facility-administered encounter medications on file as of 1/20/2023.        Review of patient's allergies indicates:   Allergen Reactions    Bactroban [mupirocin calcium] Rash     Other reaction(s): Rash       Physical Exam      Vital Signs  Pulse: 85  SpO2: 96 %  BP: 114/68  BP Location: Left arm  Patient Position: Sitting  Pain Score:   8  Height and Weight  Height: 6' (182.9 cm)  Weight: 101.6 kg (224 lb)  BSA (Calculated - sq m): 2.27 sq meters  BMI (Calculated): 30.4  Weight in (lb) to have BMI = 25: 183.9]    Physical Exam  Vitals reviewed. Nursing note reviewed: Negative orthostasis.  Constitutional:       General: He is not in acute distress.  HENT:      Head: Normocephalic and atraumatic.       Mouth/Throat:      Mouth: Mucous membranes are moist.   Eyes:      Extraocular Movements: Extraocular movements intact.      Pupils: Pupils are equal, round, and reactive to light.   Cardiovascular:      Rate and Rhythm: Normal rate and regular rhythm.      Pulses: Normal pulses.      Heart sounds: Normal heart sounds.   Pulmonary:      Effort: Pulmonary effort is normal.      Breath sounds: Normal breath sounds.   Musculoskeletal:      Right lower leg: No edema.      Left lower leg: No edema.        Feet:    Feet:      Comments: Mildly swollen left ankle with onychomycosis of all toe nails  Skin:     General: Skin is warm.   Neurological:      General: No focal deficit present.      Mental Status: He is alert.      Gait: Gait abnormal (Ambulates with walker).   Psychiatric:         Mood and Affect: Mood normal.      NA    Laboratory:  CBC:  Recent Labs   Lab Result Units 10/24/22  0758 12/09/22  1557   WBC K/uL 6.88 4.50   RBC M/uL 4.46* 4.05*   Hemoglobin g/dL 14.0 12.8*   Hematocrit % 42.8 38.2*   Platelets K/uL 195 141*   MCV fL 96 94   MCH pg 31.4* 31.6*   MCHC g/dL 32.7 33.5       CMP:  Recent Labs   Lab Result Units 10/24/22  0758 12/09/22  1557 12/19/22  0914   Glucose mg/dL 111* 135* 115*   Calcium mg/dL 8.1* 7.1* 8.7   Albumin g/dL 4.1 3.4* 4.2   Total Protein g/dL 7.3 6.1 7.6   Sodium mmol/L 142 133* 141   Potassium mmol/L 4.9 3.3* 5.0   CO2 mmol/L 28 26 37*   Chloride mmol/L 103 99 100   BUN mg/dL 17 17 8   Alkaline Phosphatase U/L 103 89 114   ALT U/L 31 23 31   AST U/L 36 26 31   Total Bilirubin mg/dL 0.2 0.3 0.4       URINALYSIS:  Recent Labs   Lab Result Units 12/09/22  1737   Color, UA  Yellow   Specific Gravity, UA  1.020   pH, UA  6.0   Protein, UA  Negative   Nitrite, UA  Negative   Leukocytes, UA  Negative   Urobilinogen, UA EU/dL Negative        LIPIDS:  Recent Labs   Lab Result Units 10/24/22  0758 12/19/22  0914   TSH uIU/mL 7.360* 5.520*       TSH:  Recent Labs   Lab Result Units  10/24/22  0758 12/19/22  0914   TSH uIU/mL 7.360* 5.520*       A1C:  No results for input(s): HGBA1C in the last 2160 hours.    Radiology:      Assessment/Plan     Heri Muhammad is a 82 y.o.male with:    S/p mechanical fall  - with left ankle pain  -[place] rule for left ankle fracture negative  -No indication for Xray  -c/w tylenol PRN, elevate legs    2. Onychomycosis of multiple toe nails  -referral to podiatry  -likely benefit from oral antifungal instead of topical, defer treatment to podiatry    3. Episodic lightheadedness  -lmproving, negative orthostasis  -orthostasis negative  -ensure hydration  -will d/c memantine/primidone if it continues to worsen  -referral to ENT next clinic visit    3. Subclinical hypothyroidism  Overview:  Lab Results   Component Value Date    TSH 5.520 12/19//2022   - TFT improving  -will c/t monitor for now    4. Essential hypertension  Overview:  - well controlled  - continue current medication    5. Mixed hyperlipidemia  Overview:  Lab Results   Component Value Date    LDLCALC 88.4 05/06/2022     - on statin  - followed by Cardiology    6. Seizure disorder  Overview:  - stable and no recent seizures since 1990's  - CT Head 2019 with diffuse atrophy  - EEG 1/2019 mild diffuse slowing  - followed by Neurology      7. Chronic diastolic heart failure  Overview:  - no signs of acute decompensation  - no leg swelling per wife on evaluation at home  -Hold off torsemide for now  - medical management    8. Benign essential tremor  Overview:  - followed by Neurology  - on primidone and propranolol recently added     9. H/o Afib/Aflutter/Sinus Nod dysfunction  -follows up with neurology    -Continue current medications and maintain follow up with specialists.      Patient and wife verbalized understanding and agree with current treatment plans.      Dr Ivonne Robles MD  Internal Medicine  [facility]

## 2023-01-25 ENCOUNTER — OFFICE VISIT (OUTPATIENT)
Dept: PODIATRY | Facility: CLINIC | Age: 83
End: 2023-01-25
Payer: MEDICARE

## 2023-01-25 VITALS — HEIGHT: 72 IN | WEIGHT: 224 LBS | BODY MASS INDEX: 30.34 KG/M2

## 2023-01-25 DIAGNOSIS — B35.1 ONYCHOMYCOSIS DUE TO DERMATOPHYTE: ICD-10-CM

## 2023-01-25 DIAGNOSIS — M79.675 PAIN DUE TO ONYCHOMYCOSIS OF TOENAILS OF BOTH FEET: ICD-10-CM

## 2023-01-25 DIAGNOSIS — B35.1 PAIN DUE TO ONYCHOMYCOSIS OF TOENAILS OF BOTH FEET: ICD-10-CM

## 2023-01-25 DIAGNOSIS — S93.602A FOOT SPRAIN, LEFT, INITIAL ENCOUNTER: ICD-10-CM

## 2023-01-25 DIAGNOSIS — M79.676 PAIN DUE TO ONYCHOMYCOSIS OF TOENAIL: ICD-10-CM

## 2023-01-25 DIAGNOSIS — I73.9 PVD (PERIPHERAL VASCULAR DISEASE): ICD-10-CM

## 2023-01-25 DIAGNOSIS — B35.1 PAIN DUE TO ONYCHOMYCOSIS OF TOENAIL: ICD-10-CM

## 2023-01-25 DIAGNOSIS — B35.1 TINEA UNGUIUM: ICD-10-CM

## 2023-01-25 DIAGNOSIS — M79.674 PAIN DUE TO ONYCHOMYCOSIS OF TOENAILS OF BOTH FEET: ICD-10-CM

## 2023-01-25 DIAGNOSIS — M79.672 LEFT FOOT PAIN: Primary | ICD-10-CM

## 2023-01-25 PROCEDURE — 11721 PR DEBRIDEMENT OF NAILS, 6 OR MORE: ICD-10-PCS | Mod: Q9,HCNC,S$GLB, | Performed by: PODIATRIST

## 2023-01-25 PROCEDURE — 3288F FALL RISK ASSESSMENT DOCD: CPT | Mod: HCNC,CPTII,S$GLB, | Performed by: PODIATRIST

## 2023-01-25 PROCEDURE — 99999 PR PBB SHADOW E&M-EST. PATIENT-LVL III: CPT | Mod: PBBFAC,HCNC,, | Performed by: PODIATRIST

## 2023-01-25 PROCEDURE — 99204 PR OFFICE/OUTPT VISIT, NEW, LEVL IV, 45-59 MIN: ICD-10-PCS | Mod: 25,HCNC,S$GLB, | Performed by: PODIATRIST

## 2023-01-25 PROCEDURE — 99999 PR PBB SHADOW E&M-EST. PATIENT-LVL III: ICD-10-PCS | Mod: PBBFAC,HCNC,, | Performed by: PODIATRIST

## 2023-01-25 PROCEDURE — 1101F PT FALLS ASSESS-DOCD LE1/YR: CPT | Mod: HCNC,CPTII,S$GLB, | Performed by: PODIATRIST

## 2023-01-25 PROCEDURE — 1159F MED LIST DOCD IN RCRD: CPT | Mod: HCNC,CPTII,S$GLB, | Performed by: PODIATRIST

## 2023-01-25 PROCEDURE — 3288F PR FALLS RISK ASSESSMENT DOCUMENTED: ICD-10-PCS | Mod: HCNC,CPTII,S$GLB, | Performed by: PODIATRIST

## 2023-01-25 PROCEDURE — 1101F PR PT FALLS ASSESS DOC 0-1 FALLS W/OUT INJ PAST YR: ICD-10-PCS | Mod: HCNC,CPTII,S$GLB, | Performed by: PODIATRIST

## 2023-01-25 PROCEDURE — 1160F PR REVIEW ALL MEDS BY PRESCRIBER/CLIN PHARMACIST DOCUMENTED: ICD-10-PCS | Mod: HCNC,CPTII,S$GLB, | Performed by: PODIATRIST

## 2023-01-25 PROCEDURE — 1125F PR PAIN SEVERITY QUANTIFIED, PAIN PRESENT: ICD-10-PCS | Mod: HCNC,CPTII,S$GLB, | Performed by: PODIATRIST

## 2023-01-25 PROCEDURE — 99204 OFFICE O/P NEW MOD 45 MIN: CPT | Mod: 25,HCNC,S$GLB, | Performed by: PODIATRIST

## 2023-01-25 PROCEDURE — 1159F PR MEDICATION LIST DOCUMENTED IN MEDICAL RECORD: ICD-10-PCS | Mod: HCNC,CPTII,S$GLB, | Performed by: PODIATRIST

## 2023-01-25 PROCEDURE — 1125F AMNT PAIN NOTED PAIN PRSNT: CPT | Mod: HCNC,CPTII,S$GLB, | Performed by: PODIATRIST

## 2023-01-25 PROCEDURE — 1160F RVW MEDS BY RX/DR IN RCRD: CPT | Mod: HCNC,CPTII,S$GLB, | Performed by: PODIATRIST

## 2023-01-25 PROCEDURE — 11721 DEBRIDE NAIL 6 OR MORE: CPT | Mod: Q9,HCNC,S$GLB, | Performed by: PODIATRIST

## 2023-01-25 NOTE — PROGRESS NOTES
Subjective:      Patient ID: Heri Muhammad is a 82 y.o. male.    Chief Complaint:  Left foot pain, onychomycosis (Nail fungus on both feet and left foot pain due to falling 3 days ago.)      Heri is a 82 y.o. male who presents to the clinic for evaluation and treatment of high risk feet. Heri has a past medical history of A-fib, Anticoagulant long-term use, Aortic atherosclerosis (9/18/2017), Atrial flutter, Benign essential tremor, Biventricular cardiac pacemaker in situ (9/24/2018), CAD (coronary artery disease) (12/2/2013), Cancer, Cardiac pacemaker in situ, Cardiomyopathy, nonischemic (2/12/2018), Carpal tunnel syndrome of left wrist (8/8/2017), CHF (congestive heart failure), Chronic diastolic heart failure (1/19/2018), Chronic obstructive pulmonary disease (10/3/2018), Chronic right-sided thoracic back pain (4/27/2017), Coronary artery disease, Cough, Current use of long term anticoagulation (6/2/2016), Dizziness, DJD (degenerative joint disease) of knee, ED (erectile dysfunction), Hard of hearing (11/2016), History of cataract, HTN (hypertension), Hyperlipidemia, Hyperlipidemia, Hyponatremia (6/11/2019), Memory loss, Mixed hyperlipidemia (7/28/2012), Persistent atrial fibrillation (7/1/2015), Seizure disorder, Seizures, Sinus node dysfunction (08/2016), Subclinical hypothyroidism, and Tension type headache (7/2/2019). The patient's chief complaint is long, thick toenails. This patient has documented high risk feet requiring routine maintenance secondary to peripheral vascular disease.  Patient is a complains of left foot pain.  Patient points dorsal aspect of the midfoot area for pain.  Patient tells me he had an incident about 3 days ago.  There was walking and tripped.  Patient is presenting with his wife.  Ambulating with cane.     PCP: Ivonne Robles MD    Date Last Seen by PCP: in epic     Last encounter in this department: Visit date not found    Hemoglobin A1C   Date Value Ref Range Status    01/19/2018 6.0 (H) 0.0 - 5.6 % Final     Comment:     Reference Interval:  5.0 - 5.6 Normal   5.7 - 6.4 High Risk   > 6.5 Diabetic    Hgb A1c results are standardized based on the (NGSP) National   Glycohemoglobin Standardization Program.    Hemoglobin A1C levels are related to mean serum/plasma glucose   during the preceding 2-3 months.            Review of Systems   Constitutional: Negative for decreased appetite and malaise/fatigue.   Cardiovascular:  Negative for claudication and leg swelling.   Skin:  Positive for color change.   Musculoskeletal:  Positive for arthritis, back pain, muscle weakness and stiffness. Negative for joint pain and joint swelling.   Neurological:  Negative for numbness and weakness.   Psychiatric/Behavioral:  Negative for altered mental status.            Past Medical History:   Diagnosis Date    A-fib     Anticoagulant long-term use     eliquis    Aortic atherosclerosis 9/18/2017    - LDL goal <70 - BP goal <130/80    Atrial flutter     Benign essential tremor     Biventricular cardiac pacemaker in situ 9/24/2018    CAD (coronary artery disease) 12/2/2013    - 9/2013 Left heart Cath: LAD- 60% prox (neg FFR), nl, ramus and RCA 20% - LDL goal <70 - BP goal <130/80    Cancer     skin cancer on back    Cardiac pacemaker in situ     Cardiomyopathy, nonischemic 2/12/2018    - 8/29/18 Echo EF 55%. Grade III diastolic dysfunction with restrictive physiology - followed by Cardiology    Carpal tunnel syndrome of left wrist 8/8/2017    CHF (congestive heart failure)     Chronic diastolic heart failure 1/19/2018    Chronic obstructive pulmonary disease 10/3/2018    Chronic right-sided thoracic back pain 4/27/2017    Coronary artery disease     Cough     Current use of long term anticoagulation 6/2/2016    - Eliquis for Afib    Dizziness     intermittant    DJD (degenerative joint disease) of knee     ED (erectile dysfunction)     Hard of hearing 11/2016    History of cataract     HTN  (hypertension)     Hyperlipidemia     Hyperlipidemia     Hyponatremia 6/11/2019    Memory loss     Mixed hyperlipidemia 7/28/2012    Persistent atrial fibrillation 7/1/2015    - followed by Dr. Esparza - history of cardioversion - on Eliquis OAC 5 mg BID    Seizure disorder     Seizures     last episode 1995    Sinus node dysfunction 08/2016    Subclinical hypothyroidism     Tension type headache 7/2/2019       Past Surgical History:   Procedure Laterality Date    ANKLE SURGERY Right     pins and screws    ATRIAL ABLATION SURGERY      CARDIAC PACEMAKER PLACEMENT  2014    CARDIAC PACEMAKER PLACEMENT      CARDIAC PACEMAKER PLACEMENT  2014    CARDIOVERSION      CATARACT EXTRACTION      [facility]    COLONOSCOPY      due in 2014    EYE SURGERY Bilateral     cataracts extraction    FRACTURE SURGERY Right     ankle with hardware    HERNIA REPAIR      abdominal    INJECTION OF ANESTHETIC AGENT AROUND MEDIAL BRANCH NERVES INNERVATING LUMBAR FACET JOINT Bilateral 1/17/2019    Procedure: Block-nerve-medial branch-lumbar L2-L5;  Surgeon: Anthony Moffett MD;  Location: [facility] OR;  Service: Pain Management;  Laterality: Bilateral;    JOINT REPLACEMENT Bilateral     shoulders    RADIOFREQUENCY ABLATION OF LUMBAR MEDIAL BRANCH NERVE AT SINGLE LEVEL Bilateral 1/23/2019    Procedure: Radiofrequency Ablation, Nerve, Spinal, Lumbar, Medial Branch, L2-L5;  Surgeon: Anthony Moffett MD;  Location: [facility] OR;  Service: Pain Management;  Laterality: Bilateral;    TREATMENT OF CARDIAC ARRHYTHMIA N/A 7/11/2022    Procedure: Cardioversion or Defibrillation;  Surgeon: MIREILLE Morel MD;  Location: [facility] EP LAB;  Service: Cardiology;  Laterality: N/A;  AF, GALILEO, DCCV, MAC, EH (to cover for SK), 3 Prep *SJM CRT-P in situ*       Family History   Problem Relation Age of Onset    Heart disease Mother     Heart failure Mother     Dementia Mother     Cancer Father         colon    Blindness Father         accident    Cataracts Father     Hypertension Father      Tremor Father     Tremor Brother     Cancer Daughter         cancer as a baby, unsure of what kind    Obesity Daughter     No Known Problems Son     Tremor Paternal Grandfather     Melanoma Neg Hx     Amblyopia Neg Hx     Diabetes Neg Hx     Glaucoma Neg Hx     Macular degeneration Neg Hx     Retinal detachment Neg Hx     Strabismus Neg Hx     Stroke Neg Hx     Thyroid disease Neg Hx        Social History     Socioeconomic History    Marital status:    Occupational History    Occupation: retired   Tobacco Use    Smoking status: Former     Packs/day: 1.00     Years: 5.00     Pack years: 5.00     Types: Cigarettes     Quit date:      Years since quittin.1    Smokeless tobacco: Never   Substance and Sexual Activity    Alcohol use: No    Drug use: No    Sexual activity: Not Currently     Partners: Female   Social History Narrative    . 4 adult children.      Social Determinants of Health     Financial Resource Strain: Low Risk     Difficulty of Paying Living Expenses: Not hard at all   Food Insecurity: No Food Insecurity    Worried About Running Out of Food in the Last Year: Never true    Ran Out of Food in the Last Year: Never true   Transportation Needs: No Transportation Needs    Lack of Transportation (Medical): No    Lack of Transportation (Non-Medical): No   Physical Activity: Inactive    Days of Exercise per Week: 0 days    Minutes of Exercise per Session: 0 min   Stress: No Stress Concern Present    Feeling of Stress : Not at all   Social Connections: Moderately Isolated    Frequency of Communication with Friends and Family: Never    Frequency of Social Gatherings with Friends and Family: More than three times a week    Attends [employer] Services: Never    Active Member of Clubs or Organizations: No    Attends Club or Organization Meetings: Never    Marital Status:    Housing Stability: Unknown    Unable to Pay for Housing in the Last Year: No    Unstable Housing in the Last Year:  No       Current Outpatient Medications   Medication Sig Dispense Refill    atorvastatin (LIPITOR) 40 MG tablet TAKE 1 TABLET EVERY DAY 90 tablet 3    cetirizine (ZYRTEC) 10 MG tablet Take 10 mg by mouth every evening.      citalopram (CELEXA) 20 MG tablet TAKE 1 TABLET EVERY DAY 90 tablet 3    cyclobenzaprine (FLEXERIL) 5 MG tablet Take 5 mg by mouth nightly. prn      ELIQUIS 5 mg Tab TAKE 1 TABLET TWICE DAILY 180 tablet 3    lamoTRIgine (LAMICTAL) 100 MG tablet Take 1 tablet (100 mg total) by mouth 2 (two) times daily. 180 tablet 3    memantine (NAMENDA) 10 MG Tab TAKE 1 TABLET TWICE DAILY 180 tablet 0    metoprolol succinate (TOPROL-XL) 50 MG 24 hr tablet Take 1 tablet (50 mg total) by mouth once daily. 90 tablet 3    primidone (MYSOLINE) 250 MG Tab TAKE 2 TABLETS TWICE A  tablet 0    STIOLTO RESPIMAT 2.5-2.5 mcg/actuation Mist Inhale 2 puffs into the lungs once daily. 12 g 3    torsemide (DEMADEX) 10 MG Tab TAKE 1 TABLET (10 MG TOTAL) BY MOUTH EVERY MORNING. 90 tablet 3     No current facility-administered medications for this visit.       Review of patient's allergies indicates:   Allergen Reactions    Bactroban [mupirocin calcium] Rash     Other reaction(s): Rash       Vitals:    01/25/23 1513   Weight: 101.6 kg (224 lb)   Height: 6' (1.829 m)   PainSc:   6   PainLoc: Foot       Objective:      Physical Exam  Constitutional:       General: He is not in acute distress.     Appearance: He is well-developed.   HENT:      Nose: Nose normal.   Eyes:      Conjunctiva/sclera: Conjunctivae normal.   Pulmonary:      Effort: Pulmonary effort is normal.   Chest:      Chest wall: No tenderness.   Abdominal:      Tenderness: There is no abdominal tenderness.   Musculoskeletal:      Cervical back: Normal range of motion.   Neurological:      Mental Status: He is alert and oriented to person, place, and time.   Psychiatric:         Behavior: Behavior normal.       Vascular: Distal DP pulses palpable 1/4 and 0/4 PT. No  vericosities noted to LEs. + edema noted to LE.    Dermatologic: No open lesions, lacerations or wounds. Interdigital spaces clean, dry and intact. No erythema, rubor, calor noted LE  Toenails 1-5 bilaterally are elongated by 2-3 mm, thickened by 2-3 mm, discolored/yellowed, dystrophic, brittle with subungual debris. No incurvation. Mild xerosis noted with some skin pigmentation changes.     Musculoskeletal: Passive range of motion of ankle and pedal joints is painless. No calf tenderness LE, Compartments soft/compressible.   L foot: pain over midfoot/TMTJ. No ankle pain.     Neurological: Light touch, proprioception, and sharp/dull sensation are all intact. Protective threshold with the [place] Edd[place]-Wienstein monofilament is intact. Vibratory sensation intact.         Assessment:       Encounter Diagnoses   Name Primary?    Left foot pain Yes    Pain due to onychomycosis of toenails of both feet     Foot sprain, left, initial encounter     PVD (peripheral vascular disease)     Pain due to onychomycosis of toenail     Tinea unguium     Onychomycosis due to dermatophyte          Plan:       Heri was seen today for nail problem.    Diagnoses and all orders for this visit:    Left foot pain  -     X-Ray Foot Complete Left; Future  -     X-Ray Ankle Complete Left; Future    Pain due to onychomycosis of toenails of both feet  -     Ambulatory referral/consult to Podiatry    Foot sprain, left, initial encounter    PVD (peripheral vascular disease)    Pain due to onychomycosis of toenail    Tinea unguium    Onychomycosis due to dermatophyte      I counseled the patient on his conditions, their implications and medical management.    82 y.o. male with L foot pain/midfoot sprain, onychomycosis.    -toenails times 10 sharply debrided with nail Nipper.  Patient tolerated well.  -left foot and ankle x-ray reviewed with the patient.  No fracture.  No acute osseous abnormalities.  -left foot pain most likely secondary to foot sprain.   Cam walker is an option however he wont be able to tolerate well given his limited mobility. No WB restriction. Ambulation with a cane recommended.     -I reviewed imaging, clinical history, and diagnosis as above with the patient. I attempted to use layman's terms to educate the patient as well as utilize foot models and/or pictures. I personally went through imaging with the patient.    -The nature of the condition, options for management, as well as potential risks and complications were discussed in detail with patient. Patient was amenable to my recommendations and left my office fully informed and will follow up as instructed or sooner if necessary.    -Patient was advised of signs and symptoms of infection including redness, drainage, purulence, odor, streaking, fever, chills and I advised patient to seek medical attention (ER or urgent care) if these symptoms arise.   -f/u prn      Note dictated with voice recognition software, please excuse any grammatical errors.

## 2023-01-30 ENCOUNTER — OFFICE VISIT (OUTPATIENT)
Dept: NEUROLOGY | Facility: CLINIC | Age: 83
End: 2023-01-30
Payer: MEDICARE

## 2023-01-30 VITALS
WEIGHT: 227.06 LBS | SYSTOLIC BLOOD PRESSURE: 137 MMHG | BODY MASS INDEX: 30.75 KG/M2 | DIASTOLIC BLOOD PRESSURE: 74 MMHG | HEIGHT: 72 IN | HEART RATE: 81 BPM

## 2023-01-30 DIAGNOSIS — R55 SYNCOPE AND COLLAPSE: Primary | ICD-10-CM

## 2023-01-30 PROCEDURE — 3078F DIAST BP <80 MM HG: CPT | Mod: HCNC,CPTII,S$GLB, | Performed by: PSYCHIATRY & NEUROLOGY

## 2023-01-30 PROCEDURE — 3078F PR MOST RECENT DIASTOLIC BLOOD PRESSURE < 80 MM HG: ICD-10-PCS | Mod: HCNC,CPTII,S$GLB, | Performed by: PSYCHIATRY & NEUROLOGY

## 2023-01-30 PROCEDURE — 3288F FALL RISK ASSESSMENT DOCD: CPT | Mod: HCNC,CPTII,S$GLB, | Performed by: PSYCHIATRY & NEUROLOGY

## 2023-01-30 PROCEDURE — 3288F PR FALLS RISK ASSESSMENT DOCUMENTED: ICD-10-PCS | Mod: HCNC,CPTII,S$GLB, | Performed by: PSYCHIATRY & NEUROLOGY

## 2023-01-30 PROCEDURE — 1100F PR PT FALLS ASSESS DOC 2+ FALLS/FALL W/INJURY/YR: ICD-10-PCS | Mod: HCNC,CPTII,S$GLB, | Performed by: PSYCHIATRY & NEUROLOGY

## 2023-01-30 PROCEDURE — 1125F AMNT PAIN NOTED PAIN PRSNT: CPT | Mod: HCNC,CPTII,S$GLB, | Performed by: PSYCHIATRY & NEUROLOGY

## 2023-01-30 PROCEDURE — 1159F MED LIST DOCD IN RCRD: CPT | Mod: HCNC,CPTII,S$GLB, | Performed by: PSYCHIATRY & NEUROLOGY

## 2023-01-30 PROCEDURE — 1125F PR PAIN SEVERITY QUANTIFIED, PAIN PRESENT: ICD-10-PCS | Mod: HCNC,CPTII,S$GLB, | Performed by: PSYCHIATRY & NEUROLOGY

## 2023-01-30 PROCEDURE — 99999 PR PBB SHADOW E&M-EST. PATIENT-LVL III: ICD-10-PCS | Mod: PBBFAC,HCNC,, | Performed by: PSYCHIATRY & NEUROLOGY

## 2023-01-30 PROCEDURE — 3075F SYST BP GE 130 - 139MM HG: CPT | Mod: HCNC,CPTII,S$GLB, | Performed by: PSYCHIATRY & NEUROLOGY

## 2023-01-30 PROCEDURE — 99999 PR PBB SHADOW E&M-EST. PATIENT-LVL III: CPT | Mod: PBBFAC,HCNC,, | Performed by: PSYCHIATRY & NEUROLOGY

## 2023-01-30 PROCEDURE — 1100F PTFALLS ASSESS-DOCD GE2>/YR: CPT | Mod: HCNC,CPTII,S$GLB, | Performed by: PSYCHIATRY & NEUROLOGY

## 2023-01-30 PROCEDURE — 1159F PR MEDICATION LIST DOCUMENTED IN MEDICAL RECORD: ICD-10-PCS | Mod: HCNC,CPTII,S$GLB, | Performed by: PSYCHIATRY & NEUROLOGY

## 2023-01-30 PROCEDURE — 3075F PR MOST RECENT SYSTOLIC BLOOD PRESS GE 130-139MM HG: ICD-10-PCS | Mod: HCNC,CPTII,S$GLB, | Performed by: PSYCHIATRY & NEUROLOGY

## 2023-01-30 PROCEDURE — 99215 OFFICE O/P EST HI 40 MIN: CPT | Mod: HCNC,S$GLB,, | Performed by: PSYCHIATRY & NEUROLOGY

## 2023-01-30 PROCEDURE — 99215 PR OFFICE/OUTPT VISIT, EST, LEVL V, 40-54 MIN: ICD-10-PCS | Mod: HCNC,S$GLB,, | Performed by: PSYCHIATRY & NEUROLOGY

## 2023-01-30 NOTE — PROGRESS NOTES
[facility]    [facility], [place]    Date: 1/30/23  Patient Name: Heri Muhammad   MRN: [id number]   PCP: Ivonne Robles  Referring Provider: No ref. provider found  Notes:   Assessment:   Heri Muhammad is a 82 y.o. male Presenting in follow-up for cognitive decline, seizure, and tremor with new episodes of abrupt loss of consciousness concerning for possible syncope.  Per history episodes do not appear to be consistent with seizure.  Obtaining baseline carotid ultrasounds.  Patient to follow closely with cardiology for possible pacemaker interrogation.  All questions answered.  Plan:     Problem List Items Addressed This Visit    None  Visit Diagnoses       Syncope and collapse    -  Primary    Relevant Orders    CV Ultrasound Bilateral Doppler Carotid          I spent a total of 41 minutes preparing to see the patient, obtaining and reviewing history and results, performing a medically appropriate exam, counseling and educating the patient/family/caregiver, documenting clinical information, coordinating care, and ordering medications, tests, procedures, and referrals.    Anjum Arellano MD  [facility]   Department of Neurology    Patient note was created using MModal Dictation.  Any errors in syntax or even information may not have been identified and edited on initial review prior to signing this note.  Subjective:     HPI:   Mr. Heri Muhammad is a 82 y.o. male presenting in follow-up for cognitive decline, history of seizure, and longstanding tremor.  Patient presents today with his wife who contributes to the history.  Together they report the patient is doing well with no significant cognitive deterioration and no breakthrough seizures.  His tremor remains at his baseline.  Describes 2 recent incidents of an abrupt loss of consciousness lasting approximately a 2nd provoking falls to the ground.  He immediately regained consciousness with no tongue biting or  incontinence.  Denies prodrome prior to this but does state during 1 episode he was attempting to reach the toilet and had diarrhea.  He states he was notably dehydrated at the time.  The 2nd occurred while he was climbing steps however his wife witnessed the event and states it was not a mechanical fall with the patient rather abruptly dropping to the ground again.  He does state that he is being closely followed by Cardiology who has noted that he has been in and out of AFib.  It has been sometime since his pacemaker has been interrogated.    Past Medical History:   Diagnosis Date    A-fib     Anticoagulant long-term use     eliquis    Aortic atherosclerosis 9/18/2017    - LDL goal <70 - BP goal <130/80    Atrial flutter     Benign essential tremor     Biventricular cardiac pacemaker in situ 9/24/2018    CAD (coronary artery disease) 12/2/2013    - 9/2013 Left heart Cath: LAD- 60% prox (neg FFR), nl, ramus and RCA 20% - LDL goal <70 - BP goal <130/80    Cancer     skin cancer on back    Cardiac pacemaker in situ     Cardiomyopathy, nonischemic 2/12/2018    - 8/29/18 Echo EF 55%. Grade III diastolic dysfunction with restrictive physiology - followed by Cardiology    Carpal tunnel syndrome of left wrist 8/8/2017    CHF (congestive heart failure)     Chronic diastolic heart failure 1/19/2018    Chronic obstructive pulmonary disease 10/3/2018    Chronic right-sided thoracic back pain 4/27/2017    Coronary artery disease     Cough     Current use of long term anticoagulation 6/2/2016    - Eliquis for Afib    Dizziness     intermittant    DJD (degenerative joint disease) of knee     ED (erectile dysfunction)     Hard of hearing 11/2016    History of cataract     HTN (hypertension)     Hyperlipidemia     Hyperlipidemia     Hyponatremia 6/11/2019    Memory loss     Mixed hyperlipidemia 7/28/2012    Persistent atrial fibrillation 7/1/2015    - followed by Dr. Esparza - history of cardioversion - on Eliquis OAC 5 mg BID     Seizure disorder     Seizures     last episode 1995    Sinus node dysfunction 08/2016    Subclinical hypothyroidism     Tension type headache 7/2/2019       PAST SURGICAL HISTORY:  Past Surgical History:   Procedure Laterality Date    ANKLE SURGERY Right     pins and screws    ATRIAL ABLATION SURGERY      CARDIAC PACEMAKER PLACEMENT  2014    CARDIAC PACEMAKER PLACEMENT      CARDIAC PACEMAKER PLACEMENT  2014    CARDIOVERSION      CATARACT EXTRACTION      [facility]    COLONOSCOPY      due in 2014    EYE SURGERY Bilateral     cataracts extraction    FRACTURE SURGERY Right     ankle with hardware    HERNIA REPAIR      abdominal    INJECTION OF ANESTHETIC AGENT AROUND MEDIAL BRANCH NERVES INNERVATING LUMBAR FACET JOINT Bilateral 1/17/2019    Procedure: Block-nerve-medial branch-lumbar L2-L5;  Surgeon: Anthony Moffett MD;  Location: [facility] OR;  Service: Pain Management;  Laterality: Bilateral;    JOINT REPLACEMENT Bilateral     shoulders    RADIOFREQUENCY ABLATION OF LUMBAR MEDIAL BRANCH NERVE AT SINGLE LEVEL Bilateral 1/23/2019    Procedure: Radiofrequency Ablation, Nerve, Spinal, Lumbar, Medial Branch, L2-L5;  Surgeon: Anthony Moffett MD;  Location: [facility] OR;  Service: Pain Management;  Laterality: Bilateral;    TREATMENT OF CARDIAC ARRHYTHMIA N/A 7/11/2022    Procedure: Cardioversion or Defibrillation;  Surgeon: MIREILLE Morel MD;  Location: [facility] EP LAB;  Service: Cardiology;  Laterality: N/A;  AF, GALILEO, DCCV, MAC, EH (to cover for SK), 3 Prep *SJM CRT-P in situ*       CURRENT MEDS:  Current Outpatient Medications   Medication Sig Dispense Refill    atorvastatin (LIPITOR) 40 MG tablet TAKE 1 TABLET EVERY DAY 90 tablet 3    cetirizine (ZYRTEC) 10 MG tablet Take 10 mg by mouth every evening.      citalopram (CELEXA) 20 MG tablet TAKE 1 TABLET EVERY DAY 90 tablet 3    cyclobenzaprine (FLEXERIL) 5 MG tablet Take 5 mg by mouth nightly. prn      ELIQUIS 5 mg Tab TAKE 1 TABLET TWICE DAILY 180 tablet 3    lamoTRIgine (LAMICTAL) 100  MG tablet Take 1 tablet (100 mg total) by mouth 2 (two) times daily. 180 tablet 3    memantine (NAMENDA) 10 MG Tab TAKE 1 TABLET TWICE DAILY 180 tablet 0    metoprolol succinate (TOPROL-XL) 50 MG 24 hr tablet Take 1 tablet (50 mg total) by mouth once daily. 90 tablet 3    primidone (MYSOLINE) 250 MG Tab TAKE 2 TABLETS TWICE A  tablet 0    STIOLTO RESPIMAT 2.5-2.5 mcg/actuation Mist Inhale 2 puffs into the lungs once daily. 12 g 3    torsemide (DEMADEX) 10 MG Tab TAKE 1 TABLET (10 MG TOTAL) BY MOUTH EVERY MORNING. 90 tablet 3     No current facility-administered medications for this visit.       ALLERGIES:  Review of patient's allergies indicates:   Allergen Reactions    Bactroban [mupirocin calcium] Rash     Other reaction(s): Rash       FAMILY HISTORY:  Family History   Problem Relation Age of Onset    Heart disease Mother     Heart failure Mother     Dementia Mother     Cancer Father         colon    Blindness Father         accident    Cataracts Father     Hypertension Father     Tremor Father     Tremor Brother     Cancer Daughter         cancer as a baby, unsure of what kind    Obesity Daughter     No Known Problems Son     Tremor Paternal Grandfather     Melanoma Neg Hx     Amblyopia Neg Hx     Diabetes Neg Hx     Glaucoma Neg Hx     Macular degeneration Neg Hx     Retinal detachment Neg Hx     Strabismus Neg Hx     Stroke Neg Hx     Thyroid disease Neg Hx        SOCIAL HISTORY:  Social History     Tobacco Use    Smoking status: Former     Packs/day: 1.00     Years: 5.00     Pack years: 5.00     Types: Cigarettes     Quit date:      Years since quittin.1    Smokeless tobacco: Never   Substance Use Topics    Alcohol use: No    Drug use: No       Review of Systems:  12 system review of systems is negative except for the symptoms mentioned in HPI.      Objective:     Vitals:    23 1033   BP: 137/74   Pulse: 81   Weight: 103 kg (227 lb 1.2 oz)   Height: 6' (1.829 m)     General: NAD, well  nourished   Eyes: no tearing, discharge, no erythema   ENT: moist mucous membranes of the oral cavity, nares patent    Neck: Supple, full range of motion  Cardiovascular: Appears well perfused  Lungs: Normal work of breathing, normal chest wall excursions  Skin: No rash, lesions, or breakdown on exposed skin  Psychiatry: Mood and affect are appropriate   Abdomen: nondistended, non tender  Extremeties: No cyanosis, clubbing or edema visible    Neurological   MENTAL STATUS: Alert and oriented to person, place, nottime. Attention and concentration within normal limits. Speech without dysarthria. Recent and remote memory fair   CRANIAL NERVES: Visual fields intact.  EOMI. Facial sensation intact. Face symmetrical. Hearing grossly intact. Full shoulder shrug bilaterally. Tongue protrudes midline   SENSORY: Sensation is intact to light touch throughout.   MOTOR: Normal bulk. Postural tremor of RUE>LUE  REFLEXES: 2+ BLE and LLE  CEREBELLAR/COORDINATION/GAIT: Gait steady with normal arm swing and stride length. Normal rapid alternating movements.

## 2023-02-07 DIAGNOSIS — Z00.00 ENCOUNTER FOR MEDICARE ANNUAL WELLNESS EXAM: ICD-10-CM

## 2023-02-09 DIAGNOSIS — Z00.00 ENCOUNTER FOR MEDICARE ANNUAL WELLNESS EXAM: ICD-10-CM

## 2023-02-15 NOTE — PROGRESS NOTES
Mr. Muhammad is a patient of Dr. Esparza and was last seen in clinic 11/22/2022.      Subjective:   Patient ID:  Heri Muhammad is an 82 y.o. male who presents for follow-up of Atrial Fibrillation and CRT-P  .     HPI:    Mr. Muhammad is an 82 y.o. male with atrial fibrillation (PVI 1/2016; PVI 3/2017), atrial flutter (RFA of CTI 1/2016, mitral annular line 3/2017, roof line 3/2017), sinus node dysfunction, and CRT-P here for follow up.     Background:     Dual chamber PPM implanted (06/30/14).   Presented with fatigue, found to be in atrial fibrillation. Placed on amiodarone, underwent DCCV.   Symptoms with AF have historically been fatigue and lightheadedness.   DCCV (10/15/15) >> recurrence. DCCV (11/23/15), amiodarone increased to 400 mg daily. Developed atrial flutter.   (01/29/16) PVI + RFA for atrial flutter. Amiodarone discontinued.   Felt poorly with Multaq.   Did well for initially in terms of atrial fibrillation, but continued to note dyspnea.   Underwent LHC/RHC 6/10/16, no obstructive CAD, normal rt sided filling pressures.   Developed persistent recurrence >> DCCV 11/22/16. Recurrence.   RFA 3/23/17 Repeat isolation of LSPV, RSPV, RIPV (LIPV remained isolated). Atrial flutter c/w mitral annular flutter >> anterior mitral annular line created >> flutter converted to what appeared to be a roof flutter >> roof line created. Tachycardia converted to another tachycardia, ultimately mapped to BERNADETTE >> ablation deferred.   Developed recurrence of flutter during blanking period. Flecainide added > converted without need for DCCV.   Echo1/20/18 EF 30-35%. Was RV pacing 70% of the time.   LHC 1/22/18 non-obstructive CAD   1/26/18 Upgraded to CRT-P by Dr. Otto   Echo 8/29/18 EF 55%      8/24/2022:  Has noted increasing shortness of breath and fatigue. Developed symptomatic recurrence of atrial fibrillation.   GALILEO/DCCV 7/11/22. Flecainide 100 mg BID added.  Developed recurrence.  Was switched from Metoprolol to  Propanolol due to tremors. Has not seen improvement in tremors.  Device interrogation reveals stable function of the leads, RA pacing 87% biventricular pacing >99%, persistent atrial flutter rate controlled of unclear duration (was undertracking).    Discussed options of:  1) rate control, which has been achieved  2) Amiodarone. He has been on this in the past without problems. Discussed risks associated with this  3) repeat ablation. I am less inclined to pursue this given the multiple flutters induced at last case.    Prefers amiodarone.  Discontinue Flecainide.  In 3 days, start amiodarone 200 mg daily.  DCCV in 1 month. Defer GALILEO if remains compliant with Eliquis.  Given lack of improvement in tremors, at patient request we will switch back from Propanolol to Metoprolol.    10/5/2022: Presented for GALILEO/DCCV.  EKG shows sinus rhythm.  Device Interrogation shows sinus rhythm.     11/22/2022: He is one month after aborted cardioversion due to presenting in sinus rhythm. He is now on amiodarone for rhythm control. Unfortunately back in persistent AF/AFL. He is only mildly symptomatic, reporting some fatigue. We discussed options, including rate control vs RFA. RFA less recommended given age and multiple AFLs identified. Will continue with rate control. Stop amiodarone. Increase base rate to increase BiV pacing in AF. Continue eliquis and metoprolol. RTC 3 mo to review symptoms, pacing, etc.    Update (02/23/2023):    Today he says he had a fall in January. Thinks he passed out. Having more frequent falls. Otherwise at baseline. No CP, palps, worsening FOWLER. Does have back pain/sciatica.     He is currently taking eliquis 5mg BID for stroke prophylaxis and denies significant bleeding episodes. He is currently being treated with toprol 50mg daily for HR control.  Kidney function is stable, with a creatinine of 0.5 on 12/19/2022.    Device Interrogation (2/23/2023) reveals an intrinsic SR with stable lead and device  function. AF <1%, max 11 hours (1/4/2023). V rates controlled. He paces 66% in the RA and 100% in the BiV. Estimated battery longevity 1.8 years.     I have personally reviewed the patient's EKG today, which shows ASVP at 75bpm. GA interval is 146. QRS is 90. QTc is 444.    Relevant Cardiac Test Results:    GALILEO(7/11/2022):  This study was performed for BERNADETTE clearance prior to DCCV  Normal appearing left atrial appendage. No thrombus is present in the appendage. BERNADETTE occluder is absent. Abnormal appendage velocities.  The left ventricle is normal in size with normal systolic function.The estimated ejection fraction is 60%.  Normal right ventricular size with normal right ventricular systolic function.  Biatrial enlargement.  Grade 1 plaque present.    Current Outpatient Medications   Medication Sig    atorvastatin (LIPITOR) 40 MG tablet TAKE 1 TABLET EVERY DAY    cetirizine (ZYRTEC) 10 MG tablet Take 10 mg by mouth every evening.    citalopram (CELEXA) 20 MG tablet TAKE 1 TABLET EVERY DAY    cyclobenzaprine (FLEXERIL) 5 MG tablet Take 5 mg by mouth nightly. prn    ELIQUIS 5 mg Tab TAKE 1 TABLET TWICE DAILY    lamoTRIgine (LAMICTAL) 100 MG tablet TAKE 1 TABLET TWICE DAILY    memantine (NAMENDA) 10 MG Tab TAKE 1 TABLET TWICE DAILY    metoprolol succinate (TOPROL-XL) 50 MG 24 hr tablet Take 1 tablet (50 mg total) by mouth once daily.    primidone (MYSOLINE) 250 MG Tab TAKE 2 TABLETS TWICE A DAY    STIOLTO RESPIMAT 2.5-2.5 mcg/actuation Mist INHALE 2 PUFFS INTO THE LUNGS ONCE DAILY.    torsemide (DEMADEX) 10 MG Tab TAKE 1 TABLET (10 MG TOTAL) BY MOUTH EVERY MORNING.     No current facility-administered medications for this visit.       Review of Systems   Constitutional: Negative for malaise/fatigue.   Cardiovascular:  Positive for syncope. Negative for chest pain, dyspnea on exertion, irregular heartbeat, leg swelling and palpitations.   Respiratory:  Negative for shortness of breath.    Hematologic/Lymphatic: Negative  for bleeding problem.   Skin:  Negative for rash.   Musculoskeletal:  Positive for back pain and falls. Negative for myalgias.   Gastrointestinal:  Negative for hematemesis, hematochezia and nausea.   Genitourinary:  Negative for hematuria.   Neurological:  Negative for light-headedness.   Psychiatric/Behavioral:  Negative for altered mental status.    Allergic/Immunologic: Negative for persistent infections.     Objective:          /75   Pulse 75   Ht 6' (1.829 m)   Wt 102.9 kg (226 lb 13.7 oz)   BMI 30.77 kg/m²     Physical Exam  Vitals and nursing note reviewed.   Constitutional:       Appearance: Normal appearance. He is well-developed.   HENT:      Head: Normocephalic.      Nose: Nose normal.   Eyes:      Pupils: Pupils are equal, round, and reactive to light.   Cardiovascular:      Rate and Rhythm: Normal rate and regular rhythm.   Pulmonary:      Effort: No respiratory distress.      Breath sounds: Normal breath sounds.   Chest:      Comments: Device to LUCW. Incision and pocket in good repair.    Musculoskeletal:         General: Normal range of motion.   Skin:     General: Skin is warm and dry.      Findings: No erythema.   Neurological:      Mental Status: He is alert and oriented to person, place, and time.   Psychiatric:         Speech: Speech normal.         Behavior: Behavior normal.         Lab Results   Component Value Date     12/19/2022    K 5.0 12/19/2022    MG 1.8 12/09/2022    BUN 8 12/19/2022    CREATININE 0.58 12/19/2022    ALT 31 12/19/2022    AST 31 12/19/2022    AST 35 11/27/2015    HGB 12.8 (L) 12/09/2022    HCT 38.2 (L) 12/09/2022    HCT 44 12/04/2020    TSH 5.520 (H) 12/19/2022    LDLCALC 88.4 05/06/2022       Recent Labs   Lab 07/07/22  1143 09/28/22  1026   INR 0.9 1.0         Assessment:     1. Biventricular cardiac pacemaker in situ    2. Chronic diastolic heart failure    3. Essential hypertension    4. Persistent atrial fibrillation    5. Sinus node dysfunction     6. Current use of long term anticoagulation    7. Frequent falls      Plan:     In summary, Mr. Muhammad is an 82 y.o. male with atrial fibrillation (PVI 1/2016; PVI 3/2017), atrial flutter (RFA of CTI 1/2016, mitral annular line 3/2017, roof line 3/2017), sinus node dysfunction, and CRT-P here for follow up.   Mr. Muhammad is doing well from a device perspective with stable lead and device function. Low AF burden off amiodarone. 100% biventricular pacing. No CHF symptoms. He is reporting more frequent falls/possible syncope. No ventricular arrhythmias. Carotid U/S no obstructive disease. He is following up with neurology for further evaluation. He is on eliquis. We discussed Watchman given frequent falls. He is very interested. Will refer.    EP referral for watchman consideration given multiple falls on OAC  Continue current medication regimen and device settings.   Follow up in device clinic as scheduled.   Follow up in EP clinic in 6 mo, sooner as needed.     *A copy of this note has been sent to Dr. Esparza*    Follow up in about 6 months (around 8/23/2023).    ------------------------------------------------------------------    JANINA Vargas, NP-C  Cardiac Electrophysiology

## 2023-02-20 ENCOUNTER — HOSPITAL ENCOUNTER (OUTPATIENT)
Dept: CARDIOLOGY | Facility: HOSPITAL | Age: 83
Discharge: HOME | End: 2023-02-20
Attending: PSYCHIATRY & NEUROLOGY
Payer: MEDICARE

## 2023-02-20 DIAGNOSIS — R55 SYNCOPE AND COLLAPSE: ICD-10-CM

## 2023-02-20 LAB
LEFT ARM DIASTOLIC BLOOD PRESSURE: 61 MMHG
LEFT ARM SYSTOLIC BLOOD PRESSURE: 107 MMHG
LEFT CBA DIAS: 10 CM/S
LEFT CBA SYS: 46 CM/S
LEFT CCA DIST DIAS: 9 CM/S
LEFT CCA DIST SYS: 76 CM/S
LEFT CCA MID DIAS: 8 CM/S
LEFT CCA MID SYS: 75 CM/S
LEFT CCA PROX DIAS: 11 CM/S
LEFT CCA PROX SYS: 92 CM/S
LEFT ECA DIAS: 9 CM/S
LEFT ECA SYS: 106 CM/S
LEFT ICA DIST DIAS: 18 CM/S
LEFT ICA DIST SYS: 72 CM/S
LEFT ICA MID DIAS: 18 CM/S
LEFT ICA MID SYS: 70 CM/S
LEFT ICA PROX DIAS: 18 CM/S
LEFT ICA PROX SYS: 71 CM/S
LEFT VERTEBRAL DIAS: 8 CM/S
LEFT VERTEBRAL SYS: 33 CM/S
OHS CV CAROTID RIGHT ICA EDV HIGHEST: 24
OHS CV CAROTID ULTRASOUND LEFT ICA/CCA RATIO: 0.95
OHS CV CAROTID ULTRASOUND RIGHT ICA/CCA RATIO: 1.66
OHS CV PV CAROTID LEFT HIGHEST CCA: 92
OHS CV PV CAROTID LEFT HIGHEST ICA: 72
OHS CV PV CAROTID RIGHT HIGHEST CCA: 64
OHS CV PV CAROTID RIGHT HIGHEST ICA: 88
OHS CV US CAROTID LEFT HIGHEST EDV: 18
RIGHT ARM DIASTOLIC BLOOD PRESSURE: 59 MMHG
RIGHT ARM SYSTOLIC BLOOD PRESSURE: 107 MMHG
RIGHT CBA DIAS: 10 CM/S
RIGHT CBA SYS: 50 CM/S
RIGHT CCA DIST DIAS: 9 CM/S
RIGHT CCA DIST SYS: 53 CM/S
RIGHT CCA MID DIAS: 9 CM/S
RIGHT CCA MID SYS: 64 CM/S
RIGHT CCA PROX DIAS: 9 CM/S
RIGHT CCA PROX SYS: 53 CM/S
RIGHT ECA DIAS: 13 CM/S
RIGHT ECA SYS: 118 CM/S
RIGHT ICA DIST DIAS: 24 CM/S
RIGHT ICA DIST SYS: 88 CM/S
RIGHT ICA MID DIAS: 19 CM/S
RIGHT ICA MID SYS: 86 CM/S
RIGHT ICA PROX DIAS: 13 CM/S
RIGHT ICA PROX SYS: 52 CM/S
RIGHT VERTEBRAL DIAS: 9 CM/S
RIGHT VERTEBRAL SYS: 40 CM/S

## 2023-02-20 PROCEDURE — 93880 EXTRACRANIAL BILAT STUDY: CPT | Mod: 26,HCNC,, | Performed by: INTERNAL MEDICINE

## 2023-02-20 PROCEDURE — 93880 CV US DOPPLER CAROTID (CUPID ONLY): ICD-10-PCS | Mod: 26,HCNC,, | Performed by: INTERNAL MEDICINE

## 2023-02-20 PROCEDURE — 93880 EXTRACRANIAL BILAT STUDY: CPT | Mod: HCNC

## 2023-02-23 ENCOUNTER — OFFICE VISIT (OUTPATIENT)
Dept: ELECTROPHYSIOLOGY | Facility: CLINIC | Age: 83
End: 2023-02-23
Payer: MEDICARE

## 2023-02-23 ENCOUNTER — HOSPITAL ENCOUNTER (OUTPATIENT)
Dept: CARDIOLOGY | Facility: CLINIC | Age: 83
Discharge: HOME | End: 2023-02-23
Payer: MEDICARE

## 2023-02-23 ENCOUNTER — CLINICAL SUPPORT (OUTPATIENT)
Dept: CARDIOLOGY | Facility: HOSPITAL | Age: 83
End: 2023-02-23
Attending: INTERNAL MEDICINE
Payer: MEDICARE

## 2023-02-23 VITALS
BODY MASS INDEX: 30.73 KG/M2 | SYSTOLIC BLOOD PRESSURE: 130 MMHG | HEIGHT: 72 IN | DIASTOLIC BLOOD PRESSURE: 75 MMHG | HEART RATE: 75 BPM | WEIGHT: 226.88 LBS

## 2023-02-23 DIAGNOSIS — I10 ESSENTIAL HYPERTENSION: ICD-10-CM

## 2023-02-23 DIAGNOSIS — Z79.01 CURRENT USE OF LONG TERM ANTICOAGULATION: ICD-10-CM

## 2023-02-23 DIAGNOSIS — I49.8 OTHER SPECIFIED CARDIAC ARRHYTHMIAS: ICD-10-CM

## 2023-02-23 DIAGNOSIS — I48.19 PERSISTENT ATRIAL FIBRILLATION: ICD-10-CM

## 2023-02-23 DIAGNOSIS — I50.32 CHRONIC DIASTOLIC HEART FAILURE: ICD-10-CM

## 2023-02-23 DIAGNOSIS — I49.5 SINUS NODE DYSFUNCTION: ICD-10-CM

## 2023-02-23 DIAGNOSIS — Z95.0 BIVENTRICULAR CARDIAC PACEMAKER IN SITU: Primary | ICD-10-CM

## 2023-02-23 DIAGNOSIS — R29.6 FREQUENT FALLS: ICD-10-CM

## 2023-02-23 PROCEDURE — 93281 PM DEVICE PROGR EVAL MULTI: CPT | Mod: 26,HCNC,, | Performed by: INTERNAL MEDICINE

## 2023-02-23 PROCEDURE — 3078F DIAST BP <80 MM HG: CPT | Mod: HCNC,CPTII,S$GLB, | Performed by: NURSE PRACTITIONER

## 2023-02-23 PROCEDURE — 1160F RVW MEDS BY RX/DR IN RCRD: CPT | Mod: HCNC,CPTII,S$GLB, | Performed by: NURSE PRACTITIONER

## 2023-02-23 PROCEDURE — 1100F PR PT FALLS ASSESS DOC 2+ FALLS/FALL W/INJURY/YR: ICD-10-PCS | Mod: HCNC,CPTII,S$GLB, | Performed by: NURSE PRACTITIONER

## 2023-02-23 PROCEDURE — 3288F PR FALLS RISK ASSESSMENT DOCUMENTED: ICD-10-PCS | Mod: HCNC,CPTII,S$GLB, | Performed by: NURSE PRACTITIONER

## 2023-02-23 PROCEDURE — 3075F PR MOST RECENT SYSTOLIC BLOOD PRESS GE 130-139MM HG: ICD-10-PCS | Mod: HCNC,CPTII,S$GLB, | Performed by: NURSE PRACTITIONER

## 2023-02-23 PROCEDURE — 3075F SYST BP GE 130 - 139MM HG: CPT | Mod: HCNC,CPTII,S$GLB, | Performed by: NURSE PRACTITIONER

## 2023-02-23 PROCEDURE — 1160F PR REVIEW ALL MEDS BY PRESCRIBER/CLIN PHARMACIST DOCUMENTED: ICD-10-PCS | Mod: HCNC,CPTII,S$GLB, | Performed by: NURSE PRACTITIONER

## 2023-02-23 PROCEDURE — 99999 PR PBB SHADOW E&M-EST. PATIENT-LVL IV: CPT | Mod: PBBFAC,HCNC,, | Performed by: NURSE PRACTITIONER

## 2023-02-23 PROCEDURE — 1159F PR MEDICATION LIST DOCUMENTED IN MEDICAL RECORD: ICD-10-PCS | Mod: HCNC,CPTII,S$GLB, | Performed by: NURSE PRACTITIONER

## 2023-02-23 PROCEDURE — 3078F PR MOST RECENT DIASTOLIC BLOOD PRESSURE < 80 MM HG: ICD-10-PCS | Mod: HCNC,CPTII,S$GLB, | Performed by: NURSE PRACTITIONER

## 2023-02-23 PROCEDURE — 1100F PTFALLS ASSESS-DOCD GE2>/YR: CPT | Mod: HCNC,CPTII,S$GLB, | Performed by: NURSE PRACTITIONER

## 2023-02-23 PROCEDURE — 93005 RHYTHM STRIP: ICD-10-PCS | Mod: HCNC,S$GLB,, | Performed by: INTERNAL MEDICINE

## 2023-02-23 PROCEDURE — 93281 CARDIAC DEVICE CHECK - IN CLINIC & HOSPITAL: ICD-10-PCS | Mod: 26,HCNC,, | Performed by: INTERNAL MEDICINE

## 2023-02-23 PROCEDURE — 1159F MED LIST DOCD IN RCRD: CPT | Mod: HCNC,CPTII,S$GLB, | Performed by: NURSE PRACTITIONER

## 2023-02-23 PROCEDURE — 3288F FALL RISK ASSESSMENT DOCD: CPT | Mod: HCNC,CPTII,S$GLB, | Performed by: NURSE PRACTITIONER

## 2023-02-23 PROCEDURE — 99214 PR OFFICE/OUTPT VISIT, EST, LEVL IV, 30-39 MIN: ICD-10-PCS | Mod: HCNC,S$GLB,, | Performed by: NURSE PRACTITIONER

## 2023-02-23 PROCEDURE — 93005 ELECTROCARDIOGRAM TRACING: CPT | Mod: HCNC,S$GLB,, | Performed by: INTERNAL MEDICINE

## 2023-02-23 PROCEDURE — 99214 OFFICE O/P EST MOD 30 MIN: CPT | Mod: HCNC,S$GLB,, | Performed by: NURSE PRACTITIONER

## 2023-02-23 PROCEDURE — 93010 ELECTROCARDIOGRAM REPORT: CPT | Mod: HCNC,S$GLB,, | Performed by: INTERNAL MEDICINE

## 2023-02-23 PROCEDURE — 99999 PR PBB SHADOW E&M-EST. PATIENT-LVL IV: ICD-10-PCS | Mod: PBBFAC,HCNC,, | Performed by: NURSE PRACTITIONER

## 2023-02-23 PROCEDURE — 1125F AMNT PAIN NOTED PAIN PRSNT: CPT | Mod: HCNC,CPTII,S$GLB, | Performed by: NURSE PRACTITIONER

## 2023-02-23 PROCEDURE — 93010 RHYTHM STRIP: ICD-10-PCS | Mod: HCNC,S$GLB,, | Performed by: INTERNAL MEDICINE

## 2023-02-23 PROCEDURE — 1125F PR PAIN SEVERITY QUANTIFIED, PAIN PRESENT: ICD-10-PCS | Mod: HCNC,CPTII,S$GLB, | Performed by: NURSE PRACTITIONER

## 2023-02-23 PROCEDURE — 93281 PM DEVICE PROGR EVAL MULTI: CPT | Mod: HCNC

## 2023-03-14 ENCOUNTER — OFFICE VISIT (OUTPATIENT)
Dept: ELECTROPHYSIOLOGY | Facility: CLINIC | Age: 83
End: 2023-03-14
Payer: MEDICARE

## 2023-03-14 ENCOUNTER — HOSPITAL ENCOUNTER (OUTPATIENT)
Dept: CARDIOLOGY | Facility: CLINIC | Age: 83
Discharge: HOME | End: 2023-03-14
Payer: MEDICARE

## 2023-03-14 VITALS
BODY MASS INDEX: 30.4 KG/M2 | HEART RATE: 70 BPM | WEIGHT: 224.44 LBS | SYSTOLIC BLOOD PRESSURE: 124 MMHG | DIASTOLIC BLOOD PRESSURE: 68 MMHG | HEIGHT: 72 IN

## 2023-03-14 DIAGNOSIS — I48.19 PERSISTENT ATRIAL FIBRILLATION: ICD-10-CM

## 2023-03-14 DIAGNOSIS — Z79.01 CURRENT USE OF LONG TERM ANTICOAGULATION: ICD-10-CM

## 2023-03-14 DIAGNOSIS — R29.6 FREQUENT FALLS: ICD-10-CM

## 2023-03-14 PROCEDURE — 3078F DIAST BP <80 MM HG: CPT | Mod: HCNC,CPTII,S$GLB, | Performed by: INTERNAL MEDICINE

## 2023-03-14 PROCEDURE — 3074F SYST BP LT 130 MM HG: CPT | Mod: HCNC,CPTII,S$GLB, | Performed by: INTERNAL MEDICINE

## 2023-03-14 PROCEDURE — 93005 RHYTHM STRIP: ICD-10-PCS | Mod: HCNC,S$GLB,, | Performed by: INTERNAL MEDICINE

## 2023-03-14 PROCEDURE — 93005 ELECTROCARDIOGRAM TRACING: CPT | Mod: HCNC,S$GLB,, | Performed by: INTERNAL MEDICINE

## 2023-03-14 PROCEDURE — 1159F MED LIST DOCD IN RCRD: CPT | Mod: HCNC,CPTII,S$GLB, | Performed by: INTERNAL MEDICINE

## 2023-03-14 PROCEDURE — 93010 RHYTHM STRIP: ICD-10-PCS | Mod: HCNC,S$GLB,, | Performed by: INTERNAL MEDICINE

## 2023-03-14 PROCEDURE — 99999 PR PBB SHADOW E&M-EST. PATIENT-LVL III: CPT | Mod: PBBFAC,HCNC,, | Performed by: INTERNAL MEDICINE

## 2023-03-14 PROCEDURE — 1100F PR PT FALLS ASSESS DOC 2+ FALLS/FALL W/INJURY/YR: ICD-10-PCS | Mod: HCNC,CPTII,S$GLB, | Performed by: INTERNAL MEDICINE

## 2023-03-14 PROCEDURE — 1159F PR MEDICATION LIST DOCUMENTED IN MEDICAL RECORD: ICD-10-PCS | Mod: HCNC,CPTII,S$GLB, | Performed by: INTERNAL MEDICINE

## 2023-03-14 PROCEDURE — 99215 PR OFFICE/OUTPT VISIT, EST, LEVL V, 40-54 MIN: ICD-10-PCS | Mod: HCNC,S$GLB,, | Performed by: INTERNAL MEDICINE

## 2023-03-14 PROCEDURE — 3288F PR FALLS RISK ASSESSMENT DOCUMENTED: ICD-10-PCS | Mod: HCNC,CPTII,S$GLB, | Performed by: INTERNAL MEDICINE

## 2023-03-14 PROCEDURE — 1126F AMNT PAIN NOTED NONE PRSNT: CPT | Mod: HCNC,CPTII,S$GLB, | Performed by: INTERNAL MEDICINE

## 2023-03-14 PROCEDURE — 1126F PR PAIN SEVERITY QUANTIFIED, NO PAIN PRESENT: ICD-10-PCS | Mod: HCNC,CPTII,S$GLB, | Performed by: INTERNAL MEDICINE

## 2023-03-14 PROCEDURE — 3078F PR MOST RECENT DIASTOLIC BLOOD PRESSURE < 80 MM HG: ICD-10-PCS | Mod: HCNC,CPTII,S$GLB, | Performed by: INTERNAL MEDICINE

## 2023-03-14 PROCEDURE — 3074F PR MOST RECENT SYSTOLIC BLOOD PRESSURE < 130 MM HG: ICD-10-PCS | Mod: HCNC,CPTII,S$GLB, | Performed by: INTERNAL MEDICINE

## 2023-03-14 PROCEDURE — 3288F FALL RISK ASSESSMENT DOCD: CPT | Mod: HCNC,CPTII,S$GLB, | Performed by: INTERNAL MEDICINE

## 2023-03-14 PROCEDURE — 1100F PTFALLS ASSESS-DOCD GE2>/YR: CPT | Mod: HCNC,CPTII,S$GLB, | Performed by: INTERNAL MEDICINE

## 2023-03-14 PROCEDURE — 93010 ELECTROCARDIOGRAM REPORT: CPT | Mod: HCNC,S$GLB,, | Performed by: INTERNAL MEDICINE

## 2023-03-14 PROCEDURE — 99215 OFFICE O/P EST HI 40 MIN: CPT | Mod: HCNC,S$GLB,, | Performed by: INTERNAL MEDICINE

## 2023-03-14 PROCEDURE — 99999 PR PBB SHADOW E&M-EST. PATIENT-LVL III: ICD-10-PCS | Mod: PBBFAC,HCNC,, | Performed by: INTERNAL MEDICINE

## 2023-03-14 NOTE — PROGRESS NOTES
Subjective:    Patient ID:  Heri Muhammad is a 82 y.o. male who presents for evaluation of Atrial Fibrillation      82 yoM, patient of Dr Esparza, referred for LAAO consideration. He has permanent AF after two attempts at PVI. He has a DC PM. He is on eliquis for CVA prophylaxis. He has had several falls recently with subsequent bruising prompting referral for LAAO.     GALILEO 7/22:  · This study was performed for BERNADETTE clearance prior to DCCV  · Normal appearing left atrial appendage. No thrombus is present in the appendage. BERNADETTE occluder is absent. Abnormal appendage velocities.  · The left ventricle is normal in size with normal systolic function.The estimated ejection fraction is 60%.  · Normal right ventricular size with normal right ventricular systolic function.  · Biatrial enlargement.  · Grade 1 plaque present.    CHADSVASC of 4  HASBLED of 4    Past Medical History:  No date: A-fib  No date: Anticoagulant long-term use      Comment:  eliquis  9/18/2017: Aortic atherosclerosis      Comment:  - LDL goal <70 - BP goal <130/80  No date: Atrial flutter  No date: Benign essential tremor  9/24/2018: Biventricular cardiac pacemaker in situ  12/2/2013: CAD (coronary artery disease)      Comment:  - 9/2013 Left heart Cath: LAD- 60% prox (neg FFR), nl,                ramus and RCA 20% - LDL goal <70 - BP goal <130/80  No date: Cancer      Comment:  skin cancer on back  No date: Cardiac pacemaker in situ  2/12/2018: Cardiomyopathy, nonischemic      Comment:  - 8/29/18 Echo EF 55%. Grade III diastolic dysfunction                with restrictive physiology - followed by Cardiology  8/8/2017: Carpal tunnel syndrome of left wrist  No date: CHF (congestive heart failure)  1/19/2018: Chronic diastolic heart failure  10/3/2018: Chronic obstructive pulmonary disease  4/27/2017: Chronic right-sided thoracic back pain  No date: Coronary artery disease  No date: Cough  6/2/2016: Current use of long term anticoagulation      Comment:   - Eliquis for Afib  No date: Dizziness      Comment:  intermittant  No date: DJD (degenerative joint disease) of knee  No date: ED (erectile dysfunction)  11/2016: Hard of hearing  No date: History of cataract  No date: HTN (hypertension)  No date: Hyperlipidemia  No date: Hyperlipidemia  6/11/2019: Hyponatremia  No date: Memory loss  7/28/2012: Mixed hyperlipidemia  7/1/2015: Persistent atrial fibrillation      Comment:  - followed by Dr. Esparza - history of cardioversion - on               Eliquis OAC 5 mg BID  No date: Seizure disorder  No date: Seizures      Comment:  last episode 1995 08/2016: Sinus node dysfunction  No date: Subclinical hypothyroidism  7/2/2019: Tension type headache    Past Surgical History:  No date: ANKLE SURGERY; Right      Comment:  pins and screws  No date: ATRIAL ABLATION SURGERY  2014: CARDIAC PACEMAKER PLACEMENT  No date: CARDIAC PACEMAKER PLACEMENT  2014: CARDIAC PACEMAKER PLACEMENT  No date: CARDIOVERSION  No date: CATARACT EXTRACTION      Comment:  OU  No date: COLONOSCOPY      Comment:  due in 2014  No date: EYE SURGERY; Bilateral      Comment:  cataracts extraction  No date: FRACTURE SURGERY; Right      Comment:  ankle with hardware  No date: HERNIA REPAIR      Comment:  abdominal  1/17/2019: INJECTION OF ANESTHETIC AGENT AROUND MEDIAL BRANCH NERVES   INNERVATING LUMBAR FACET JOINT; Bilateral      Comment:  Procedure: Block-nerve-medial branch-lumbar L2-L5;                 Surgeon: Anthony Moffett MD;  Location: [facility] OR;                 Service: Pain Management;  Laterality: Bilateral;  No date: JOINT REPLACEMENT; Bilateral      Comment:  shoulders  1/23/2019: RADIOFREQUENCY ABLATION OF LUMBAR MEDIAL BRANCH NERVE AT   SINGLE LEVEL; Bilateral      Comment:  Procedure: Radiofrequency Ablation, Nerve, Spinal,                Lumbar, Medial Branch, L2-L5;  Surgeon: Anthony Moffett MD;  Location: [facility] OR;  Service: Pain Management;                 Laterality:  Bilateral;  2022: TREATMENT OF CARDIAC ARRHYTHMIA; N/A      Comment:  Procedure: Cardioversion or Defibrillation;  Surgeon: MIREILLE Morel MD;  Location: [facility] LAB;  Service:                Cardiology;  Laterality: N/A;  AF, GALILEO, DCCV, MAC, EH (to               cover for SK), 3 Prep *SJM CRT-P in situ*    Social History    Socioeconomic History      Marital status:     Occupational History      Occupation: retired    Tobacco Use      Smoking status: Former        Packs/day: 1.00        Years: 5.00        Pack years: 5        Types: Cigarettes        Quit date:         Years since quittin.2      Smokeless tobacco: Never    Substance and Sexual Activity      Alcohol use: No      Drug use: No      Sexual activity: Not Currently        Partners: Female    Social History Narrative      . 4 adult children.     Social Determinants of Health  Financial Resource Strain: Low Risk       Difficulty of Paying Living Expenses: Not hard at all  Food Insecurity: No Food Insecurity      Worried About Running Out of Food in the Last Year: Never true      Ran Out of Food in the Last Year: Never true  Transportation Needs: No Transportation Needs      Lack of Transportation (Medical): No      Lack of Transportation (Non-Medical): No  Physical Activity: Inactive      Days of Exercise per Week: 0 days      Minutes of Exercise per Session: 0 min  Stress: No Stress Concern Present      Feeling of Stress : Not at all  Social Connections: Moderately Isolated      Frequency of Communication with Friends and Family: Never      Frequency of Social Gatherings with Friends and Family: More than three times a week      Attends [employer] Services: Never      Active Member of Clubs or Organizations: No      Attends Club or Organization Meetings: Never      Marital Status:   Housing Stability: Unknown      Unable to Pay for Housing in the Last Year: No      Unstable Housing in the Last Year:  No    Review of patient's family history indicates:    Current Outpatient Medications:  atorvastatin (LIPITOR) 40 MG tablet, TAKE 1 TABLET EVERY DAY, Disp: 90 tablet, Rfl: 3  cetirizine (ZYRTEC) 10 MG tablet, Take 10 mg by mouth every evening., Disp: , Rfl:   citalopram (CELEXA) 20 MG tablet, TAKE 1 TABLET EVERY DAY, Disp: 90 tablet, Rfl: 3  cyclobenzaprine (FLEXERIL) 5 MG tablet, Take 5 mg by mouth nightly. prn, Disp: , Rfl:   ELIQUIS 5 mg Tab, TAKE 1 TABLET TWICE DAILY, Disp: 180 tablet, Rfl: 3  lamoTRIgine (LAMICTAL) 100 MG tablet, TAKE 1 TABLET TWICE DAILY, Disp: 180 tablet, Rfl: 3  memantine (NAMENDA) 10 MG Tab, TAKE 1 TABLET TWICE DAILY, Disp: 180 tablet, Rfl: 0  metoprolol succinate (TOPROL-XL) 50 MG 24 hr tablet, Take 1 tablet (50 mg total) by mouth once daily., Disp: 90 tablet, Rfl: 3  primidone (MYSOLINE) 250 MG Tab, TAKE 2 TABLETS TWICE A DAY, Disp: 360 tablet, Rfl: 0  STIOLTO RESPIMAT 2.5-2.5 mcg/actuation Mist, INHALE 2 PUFFS INTO THE LUNGS ONCE DAILY., Disp: 12 g, Rfl: 3  torsemide (DEMADEX) 10 MG Tab, TAKE 1 TABLET (10 MG TOTAL) BY MOUTH EVERY MORNING., Disp: 90 tablet, Rfl: 3    No current facility-administered medications for this visit.          Review of Systems   Constitutional: Negative.   HENT: Negative.     Eyes: Negative.    Cardiovascular:  Negative for chest pain, dyspnea on exertion, leg swelling, near-syncope, palpitations and syncope.   Respiratory: Negative.  Negative for shortness of breath.    Endocrine: Negative.    Hematologic/Lymphatic: Negative.    Skin: Negative.    Musculoskeletal: Negative.    Gastrointestinal: Negative.    Genitourinary: Negative.    Neurological: Negative.  Negative for dizziness and light-headedness.   Psychiatric/Behavioral: Negative.     Allergic/Immunologic: Negative.       Objective:    Physical Exam  Vitals and nursing note reviewed.   Constitutional:       General: He is not in acute distress.     Appearance: Normal appearance. He is well-developed.    HENT:      Head: Normocephalic and atraumatic.      Nose: Nose normal.   Eyes:      Pupils: Pupils are equal, round, and reactive to light.   Neck:      Thyroid: No thyromegaly.      Vascular: No JVD.   Cardiovascular:      Rate and Rhythm: Normal rate and regular rhythm.      Chest Wall: PMI is not displaced.      Heart sounds: Normal heart sounds, S1 normal and S2 normal. No murmur heard.    No friction rub. No gallop.   Pulmonary:      Effort: Pulmonary effort is normal. No respiratory distress.      Breath sounds: Normal breath sounds. No wheezing or rales.   Chest:      Comments: Device to LUCW. Incision and pocket in good repair.    Abdominal:      General: Bowel sounds are normal. There is no distension.      Palpations: Abdomen is soft.      Tenderness: There is no abdominal tenderness. There is no guarding or rebound.   Musculoskeletal:         General: No tenderness. Normal range of motion.      Cervical back: Normal range of motion.   Skin:     General: Skin is warm and dry.      Findings: No erythema or rash.   Neurological:      Mental Status: He is alert and oriented to person, place, and time.      Cranial Nerves: No cranial nerve deficit.   Psychiatric:         Speech: Speech normal.         Behavior: Behavior normal.         Thought Content: Thought content normal.         Judgment: Judgment normal.         Assessment:       1. Persistent atrial fibrillation    2. Current use of long term anticoagulation    3. Frequent falls         Plan:       82 yoM here for LAAO consideration. He has increased risk of bleeding and stroke. The patient can tolerate short term OAC but is not a candidate for long term OAC due to recurrent bleeding issues. I discussed management options regarding LAAO. I discussed the process of LAAO via Watchman including pre-procedure testing  as well as the need to take OAC for 6 weeks post implant. We discussed post procedure GALILEO studies to assess BERNADETTE occlusion. Additionally I  mentioned the need to take DAPT up to the 6 month point post implant.      Watchman with Anesthesia support    Will have him see a non-implanting physician for shared decision making.

## 2023-03-17 ENCOUNTER — TELEPHONE (OUTPATIENT)
Dept: ELECTROPHYSIOLOGY | Facility: CLINIC | Age: 83
End: 2023-03-17
Payer: MEDICARE

## 2023-03-17 NOTE — TELEPHONE ENCOUNTER
Attempted to contact patient to schedule his procedure with Dr. Del Valle. No answer. Left voicemail with callback number.

## 2023-03-21 ENCOUNTER — TELEPHONE (OUTPATIENT)
Dept: ELECTROPHYSIOLOGY | Facility: CLINIC | Age: 83
End: 2023-03-21
Payer: MEDICARE

## 2023-03-21 DIAGNOSIS — I48.19 PERSISTENT ATRIAL FIBRILLATION: Primary | ICD-10-CM

## 2023-03-21 NOTE — TELEPHONE ENCOUNTER
----- Message from Suzanne Weinstein sent at 3/20/2023  9:37 AM CDT -----  Regarding: Missed Call  Melania [phone] pt wife returning missed call from the office.    Thanks

## 2023-03-21 NOTE — TELEPHONE ENCOUNTER
Spoke with Ms. Muhammad, patient's wife,  and scheduled his procedure for 5/5/23. Procedure details reviewed and instructions will be sent via patient portal as requested. I sent a message to the cardiology staff to get him an appointment for Watchman decision making. I let her know this. She appreciated the call.

## 2023-03-23 NOTE — PROGRESS NOTES
The patient location is: [place]  The chief complaint leading to consultation is: Watchman     Visit type: audiovisual    Face to Face time with patient: 25 minutes of total time spent on the encounter, which includes face to face time and non-face to face time preparing to see the patient (eg, review of tests), Obtaining and/or reviewing separately obtained history, Documenting clinical information in the electronic or other health record, Independently interpreting results (not separately reported) and communicating results to the patient/family/caregiver, or Care coordination (not separately reported).     Each patient to whom he or she provides medical services by telemedicine is:  (1) informed of the relationship between the physician and patient and the respective role of any other health care provider with respect to management of the patient; and (2) notified that he or she may decline to receive medical services by telemedicine and may withdraw from such care at any time.    Notes:         Cardiology Clinic Note  Reason for Visit: Watchman     HPI:     Heri SOHAM Muhammad is a 82 y.o. M, who presents for 2nd opinion regarding Watchman implantation.    He is being evaluated for Watchman device due to frequent falls.  He reports two major falls in the past one month. Multiple other minor episodes.  No GI/ bleeding, no nosebleeds.    He did see Dr Graham for general cardiology.  He saw another cardiologist in [place]. Lipitor increased.  LDL not at goal.  He plans to f/u with me.     Medical: HFpEF, permanent atrial fibrillation (s/p PVI 1/2016, 3/2017), atrial flutter (s/p CTI 1/2016, mitral annular life 3/2017, roof line 3/2017), SSS s/p CRT-P, non-obstructive CAD ([facility] 2018; moderate stenosis in LAD), dementia, seizure d/o  Surgical: Reviewed, as below.  Family: Reviewed, as below. No premature CAD, HF, SCD.  Social: Reviewed, as below. Former smoker.    ROS:    Pertinent ROS included in HPI and below.  PMH:      Past Medical History:   Diagnosis Date    Anticoagulant long-term use     eliquis    Aortic atherosclerosis 9/18/2017    - LDL goal <70 - BP goal <130/80    Atrial flutter     Benign essential tremor     Biventricular cardiac pacemaker in situ 9/24/2018    CAD (coronary artery disease) 12/2/2013    - 9/2013 Left heart Cath: LAD- 60% prox (neg FFR), nl, ramus and RCA 20% - LDL goal <70 - BP goal <130/80    Cancer     skin cancer on back    Carpal tunnel syndrome of left wrist 8/8/2017    Chronic diastolic heart failure 1/19/2018    Chronic obstructive pulmonary disease 10/3/2018    Chronic right-sided thoracic back pain 4/27/2017    Cough     Current use of long term anticoagulation 6/2/2016    - Eliquis for Afib    Dizziness     intermittant    DJD (degenerative joint disease) of knee     ED (erectile dysfunction)     Hard of hearing 11/2016    History of cataract     Hyponatremia 6/11/2019    Memory loss     Mixed hyperlipidemia 7/28/2012    Persistent atrial fibrillation 7/1/2015    - followed by Dr. Esparza - history of cardioversion - on Eliquis OAC 5 mg BID    Seizure disorder     Seizures     last episode 1995    Subclinical hypothyroidism     Tension type headache 7/2/2019     Past Surgical History:   Procedure Laterality Date    ANKLE SURGERY Right     pins and screws    ATRIAL ABLATION SURGERY      CARDIAC PACEMAKER PLACEMENT  2014    CARDIAC PACEMAKER PLACEMENT      CARDIAC PACEMAKER PLACEMENT  2014    CARDIOVERSION      CATARACT EXTRACTION      [facility]    COLONOSCOPY      due in 2014    EYE SURGERY Bilateral     cataracts extraction    FRACTURE SURGERY Right     ankle with hardware    HERNIA REPAIR      abdominal    INJECTION OF ANESTHETIC AGENT AROUND MEDIAL BRANCH NERVES INNERVATING LUMBAR FACET JOINT Bilateral 1/17/2019    Procedure: Block-nerve-medial branch-lumbar L2-L5;  Surgeon: Anthony Moffett MD;  Location: [facility];  Service: Pain Management;  Laterality: Bilateral;    JOINT REPLACEMENT Bilateral      shoulders    RADIOFREQUENCY ABLATION OF LUMBAR MEDIAL BRANCH NERVE AT SINGLE LEVEL Bilateral 1/23/2019    Procedure: Radiofrequency Ablation, Nerve, Spinal, Lumbar, Medial Branch, L2-L5;  Surgeon: Anthony Moffett MD;  Location: [facility] OR;  Service: Pain Management;  Laterality: Bilateral;    TREATMENT OF CARDIAC ARRHYTHMIA N/A 7/11/2022    Procedure: Cardioversion or Defibrillation;  Surgeon: MIREILLE Morel MD;  Location: [facility] EP LAB;  Service: Cardiology;  Laterality: N/A;  AF, GALILEO, DCCV, MAC, EH (to cover for SK), 3 Prep *SJM CRT-P in situ*     Allergies:     Review of patient's allergies indicates:   Allergen Reactions    Bactroban [mupirocin calcium] Rash     Other reaction(s): Rash     Medications:     Current Outpatient Medications:     atorvastatin (LIPITOR) 40 MG tablet, TAKE 1 TABLET EVERY DAY, Disp: 90 tablet, Rfl: 3    cetirizine (ZYRTEC) 10 MG tablet, Take 10 mg by mouth every evening., Disp: , Rfl:     citalopram (CELEXA) 20 MG tablet, TAKE 1 TABLET EVERY DAY, Disp: 90 tablet, Rfl: 3    cyclobenzaprine (FLEXERIL) 5 MG tablet, Take 5 mg by mouth nightly. prn, Disp: , Rfl:     ELIQUIS 5 mg Tab, TAKE 1 TABLET TWICE DAILY, Disp: 180 tablet, Rfl: 3    lamoTRIgine (LAMICTAL) 100 MG tablet, TAKE 1 TABLET TWICE DAILY, Disp: 180 tablet, Rfl: 3    memantine (NAMENDA) 10 MG Tab, TAKE 1 TABLET TWICE DAILY, Disp: 180 tablet, Rfl: 0    metoprolol succinate (TOPROL-XL) 50 MG 24 hr tablet, Take 1 tablet (50 mg total) by mouth once daily., Disp: 90 tablet, Rfl: 3    primidone (MYSOLINE) 250 MG Tab, TAKE 2 TABLETS TWICE A DAY, Disp: 360 tablet, Rfl: 0    STIOLTO RESPIMAT 2.5-2.5 mcg/actuation Mist, INHALE 2 PUFFS INTO THE LUNGS ONCE DAILY., Disp: 12 g, Rfl: 3    torsemide (DEMADEX) 10 MG Tab, TAKE 1 TABLET (10 MG TOTAL) BY MOUTH EVERY MORNING., Disp: 90 tablet, Rfl: 3   Social History:     Social History     Tobacco Use    Smoking status: Former     Packs/day: 1.00     Years: 5.00     Pack years: 5.00     Types:  Cigarettes     Quit date:      Years since quittin.2    Smokeless tobacco: Never   Substance Use Topics    Alcohol use: No     Family History:     Family History   Problem Relation Age of Onset    Heart disease Mother     Heart failure Mother     Dementia Mother     Cancer Father         colon    Blindness Father         accident    Cataracts Father     Hypertension Father     Tremor Father     Tremor Brother     Cancer Daughter         cancer as a baby, unsure of what kind    Obesity Daughter     No Known Problems Son     Tremor Paternal Grandfather     Melanoma Neg Hx     Amblyopia Neg Hx     Diabetes Neg Hx     Glaucoma Neg Hx     Macular degeneration Neg Hx     Retinal detachment Neg Hx     Strabismus Neg Hx     Stroke Neg Hx     Thyroid disease Neg Hx      Physical Exam:     Physical not exam was not performed due this encounter being a virtual visit.    Labs:     Blood Tests:  Lab Results   Component Value Date    BNP 91 2020     2015     2022    K 5.0 2022     2022    CO2 37 (H) 2022    BUN 8 2022    CREATININE 0.58 2022     (H) 2022    HGBA1C 6.0 (H) 2018    MG 1.8 2022    AST 31 2022    AST 35 2015    ALT 31 2022    ALBUMIN 4.2 2022    PROT 7.6 2022    BILITOT 0.4 2022    WBC 4.50 2022    HGB 12.8 (L) 2022    HCT 38.2 (L) 2022    HCT 44 2020    MCV 94 2022     (L) 2022    INR 1.0 2022    PSA 3.1 2018    TSH 5.520 (H) 2022       Lab Results   Component Value Date    CHOL 146 2022    HDL 37 (L) 2022    TRIG 103 2022       Lab Results   Component Value Date    LDLCALC 88.4 2022       Urine Tests:  Lab Results   Component Value Date    COLORU Yellow 2022    APPEARANCEUA Clear 2022    PHUR 6.0 2022    SPECGRAV 1.020 2022    PROTEINUA Negative 2022    GLUCUA  Negative 12/09/2022    KETONESU Negative 12/09/2022    BILIRUBINUA Negative 12/09/2022    OCCULTUA Negative 12/09/2022    NITRITE Negative 12/09/2022    UROBILINOGEN Negative 12/09/2022    LEUKOCYTESUR Negative 12/09/2022       Imaging:     Echocardiogram  GALILEO 7/11/22  This study was performed for BERNADETTE clearance prior to DCCV  Normal appearing left atrial appendage. No thrombus is present in the appendage. BERNADETTE occluder is absent. Abnormal appendage velocities.  The left ventricle is normal in size with normal systolic function.The estimated ejection fraction is 60%.  Normal right ventricular size with normal right ventricular systolic function.  Biatrial enlargement.  Grade 1 plaque present.    TTE 7/19/21  The estimated ejection fraction is 60%.  The left ventricle is normal in size with normal systolic function.  Grade III left ventricular diastolic dysfunction.  Normal right ventricular size with normal right ventricular systolic function.  Severe left atrial enlargement.  The estimated PA systolic pressure is 39 mmHg.  Normal central venous pressure (3 mmHg).    Stress testing  None    Cath Lab  [facility] 1/22/18  CORONARY CIRCULATION:   --  Left main: Normal.   --  LAD: This is a medium-sized vessel with a 30-40% stenosis in the proximal to mid segment. The rest of the LAD and its branches are free of significant disease.   --  Circumflex: This is a medium sized nondominant vessel free of significant disease.   --  RCA: This is a medium-sized dominant vessel with luminal irregularities, but no critical lesions.     CONCLUSIONS:   Nonobstructive CAD.     Other  Carotid US 2/20/23  There is 0-19% right Internal Carotid Stenosis.  There is 0-19% left Internal Carotid Stenosis.    EKG:   3/14/23 - BiV paced rhythm (personally reviewed)    Assessment:     1. Coronary artery disease involving native coronary artery of native heart without angina pectoris    2. Mixed hyperlipidemia    3. Persistent atrial fibrillation    4.  Status post catheter ablation of atrial fibrillation    5. Essential hypertension    6. Aortic atherosclerosis    7. Chronic diastolic heart failure    8. Biventricular cardiac pacemaker in situ    9. Current use of long term anticoagulation    10. Seizure disorder    11. 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     Patient has non-valvular atrial fibrillation: Yes  Patient has an increased risk for stroke and is recommended for oral anticoagulation (OAC): Yes  Patient is suitable for short-term anticoagulation therapy but deemed unable to take long-term OAC: Yes  Patient has an appropriate rationale to seek a non-pharmacological alternative to OACs. Specific factors include: History of risk of falls    Agree with proceeding with Watchman implantation    Coronary artery disease involving native coronary artery of native heart without angina pectoris  Mixed hyperlipidemia  Aortic atherosclerosis  LDL above goal  Repeat lipid panel  If LDL >70, increase lipitor to 80mg or change to crestor 40mg qd    Essential hypertension  Continue meds    Chronic diastolic heart failure  Euvolemic by history  Continue torsemide    Seizure disorder  Stable on meds  No recent events    Signed:  Miky Tello MD  Cardiology     Follow-up:     Future Appointments   Date Time Provider Department Center   3/24/2023 10:00 AM HOME MONITOR DEVICE CHECK, [facility]   3/27/2023  9:00 AM Grady Tello III, MD [facility] CARDIO [facility]   5/5/2023  9:00 AM  INPATIENT, [facility] ECHOSTR [facility]

## 2023-03-24 ENCOUNTER — CLINICAL SUPPORT (OUTPATIENT)
Dept: CARDIOLOGY | Facility: HOSPITAL | Age: 83
End: 2023-03-24
Payer: MEDICARE

## 2023-03-24 DIAGNOSIS — I48.91 UNSPECIFIED ATRIAL FIBRILLATION: ICD-10-CM

## 2023-03-24 DIAGNOSIS — Z95.0 PRESENCE OF CARDIAC PACEMAKER: ICD-10-CM

## 2023-03-24 DIAGNOSIS — I49.5 SICK SINUS SYNDROME: ICD-10-CM

## 2023-03-24 PROCEDURE — 93296 REM INTERROG EVL PM/IDS: CPT | Mod: HCNC | Performed by: INTERNAL MEDICINE

## 2023-03-27 ENCOUNTER — OFFICE VISIT (OUTPATIENT)
Dept: CARDIOLOGY | Facility: CLINIC | Age: 83
End: 2023-03-27
Payer: MEDICARE

## 2023-03-27 DIAGNOSIS — I50.32 CHRONIC DIASTOLIC HEART FAILURE: ICD-10-CM

## 2023-03-27 DIAGNOSIS — I25.10 CORONARY ARTERY DISEASE INVOLVING NATIVE CORONARY ARTERY OF NATIVE HEART WITHOUT ANGINA PECTORIS: Primary | Chronic | ICD-10-CM

## 2023-03-27 DIAGNOSIS — Z79.01 CURRENT USE OF LONG TERM ANTICOAGULATION: ICD-10-CM

## 2023-03-27 DIAGNOSIS — I70.0 AORTIC ATHEROSCLEROSIS: ICD-10-CM

## 2023-03-27 DIAGNOSIS — G30.1 LATE ONSET ALZHEIMER'S DISEASE WITHOUT BEHAVIORAL DISTURBANCE: ICD-10-CM

## 2023-03-27 DIAGNOSIS — I48.21 PERMANENT ATRIAL FIBRILLATION: ICD-10-CM

## 2023-03-27 DIAGNOSIS — E78.2 MIXED HYPERLIPIDEMIA: ICD-10-CM

## 2023-03-27 DIAGNOSIS — Z98.890 STATUS POST CATHETER ABLATION OF ATRIAL FIBRILLATION: ICD-10-CM

## 2023-03-27 DIAGNOSIS — Z95.0 BIVENTRICULAR CARDIAC PACEMAKER IN SITU: ICD-10-CM

## 2023-03-27 DIAGNOSIS — G40.909 SEIZURE DISORDER: ICD-10-CM

## 2023-03-27 DIAGNOSIS — F02.80 LATE ONSET ALZHEIMER'S DISEASE WITHOUT BEHAVIORAL DISTURBANCE: ICD-10-CM

## 2023-03-27 DIAGNOSIS — I10 ESSENTIAL HYPERTENSION: ICD-10-CM

## 2023-03-27 PROCEDURE — 99214 PR OFFICE/OUTPT VISIT, EST, LEVL IV, 30-39 MIN: ICD-10-PCS | Mod: HCNC,95,, | Performed by: INTERNAL MEDICINE

## 2023-03-27 PROCEDURE — 99214 OFFICE O/P EST MOD 30 MIN: CPT | Mod: HCNC,95,, | Performed by: INTERNAL MEDICINE

## 2023-04-12 ENCOUNTER — PES CALL (OUTPATIENT)
Dept: ADMINISTRATIVE | Facility: CLINIC | Age: 83
End: 2023-04-12
Payer: MEDICARE

## 2023-04-14 ENCOUNTER — PATIENT MESSAGE (OUTPATIENT)
Dept: ELECTROPHYSIOLOGY | Facility: CLINIC | Age: 83
End: 2023-04-14
Payer: MEDICARE

## 2023-04-14 DIAGNOSIS — I48.19 PERSISTENT ATRIAL FIBRILLATION: Primary | ICD-10-CM

## 2023-04-20 ENCOUNTER — PATIENT MESSAGE (OUTPATIENT)
Dept: ADMINISTRATIVE | Facility: CLINIC | Age: 83
End: 2023-04-20
Payer: MEDICARE

## 2023-04-21 ENCOUNTER — TELEPHONE (OUTPATIENT)
Dept: ADMINISTRATIVE | Facility: CLINIC | Age: 83
End: 2023-04-21
Payer: MEDICARE

## 2023-04-24 ENCOUNTER — TELEPHONE (OUTPATIENT)
Dept: ADMINISTRATIVE | Facility: CLINIC | Age: 83
End: 2023-04-24
Payer: MEDICARE

## 2023-04-24 ENCOUNTER — OFFICE VISIT (OUTPATIENT)
Dept: FAMILY MEDICINE | Facility: CLINIC | Age: 83
End: 2023-04-24
Payer: MEDICARE

## 2023-04-24 VITALS — BODY MASS INDEX: 30.34 KG/M2 | WEIGHT: 224 LBS | HEIGHT: 72 IN

## 2023-04-24 DIAGNOSIS — J41.0 SIMPLE CHRONIC BRONCHITIS: ICD-10-CM

## 2023-04-24 DIAGNOSIS — E03.8 SUBCLINICAL HYPOTHYROIDISM: ICD-10-CM

## 2023-04-24 DIAGNOSIS — I70.0 AORTIC ATHEROSCLEROSIS: ICD-10-CM

## 2023-04-24 DIAGNOSIS — Z79.01 CURRENT USE OF LONG TERM ANTICOAGULATION: ICD-10-CM

## 2023-04-24 DIAGNOSIS — G40.909 SEIZURE DISORDER: ICD-10-CM

## 2023-04-24 DIAGNOSIS — Z99.89 DEPENDENCE ON OTHER ENABLING MACHINES AND DEVICES: ICD-10-CM

## 2023-04-24 DIAGNOSIS — I25.10 CORONARY ARTERY DISEASE INVOLVING NATIVE CORONARY ARTERY OF NATIVE HEART WITHOUT ANGINA PECTORIS: Chronic | ICD-10-CM

## 2023-04-24 DIAGNOSIS — G25.0 BENIGN ESSENTIAL TREMOR: ICD-10-CM

## 2023-04-24 DIAGNOSIS — G30.1 LATE ONSET ALZHEIMER'S DISEASE WITHOUT BEHAVIORAL DISTURBANCE: ICD-10-CM

## 2023-04-24 DIAGNOSIS — I50.32 CHRONIC DIASTOLIC HEART FAILURE: ICD-10-CM

## 2023-04-24 DIAGNOSIS — I48.21 PERMANENT ATRIAL FIBRILLATION: ICD-10-CM

## 2023-04-24 DIAGNOSIS — F02.80 LATE ONSET ALZHEIMER'S DISEASE WITHOUT BEHAVIORAL DISTURBANCE: ICD-10-CM

## 2023-04-24 DIAGNOSIS — G47.00 INSOMNIA, UNSPECIFIED TYPE: ICD-10-CM

## 2023-04-24 DIAGNOSIS — Z95.0 BIVENTRICULAR CARDIAC PACEMAKER IN SITU: ICD-10-CM

## 2023-04-24 DIAGNOSIS — Z00.00 ENCOUNTER FOR MEDICARE ANNUAL WELLNESS EXAM: Primary | ICD-10-CM

## 2023-04-24 DIAGNOSIS — Z00.00 ENCOUNTER FOR PREVENTIVE HEALTH EXAMINATION: ICD-10-CM

## 2023-04-24 DIAGNOSIS — I10 ESSENTIAL HYPERTENSION: ICD-10-CM

## 2023-04-24 DIAGNOSIS — G89.29 CHRONIC PAIN OF BOTH SHOULDERS: ICD-10-CM

## 2023-04-24 DIAGNOSIS — E78.2 MIXED HYPERLIPIDEMIA: ICD-10-CM

## 2023-04-24 DIAGNOSIS — M25.511 CHRONIC PAIN OF BOTH SHOULDERS: ICD-10-CM

## 2023-04-24 DIAGNOSIS — M25.512 CHRONIC PAIN OF BOTH SHOULDERS: ICD-10-CM

## 2023-04-24 DIAGNOSIS — G89.29 CHRONIC BILATERAL LOW BACK PAIN WITHOUT SCIATICA: ICD-10-CM

## 2023-04-24 DIAGNOSIS — M54.50 CHRONIC BILATERAL LOW BACK PAIN WITHOUT SCIATICA: ICD-10-CM

## 2023-04-24 DIAGNOSIS — J84.10 LUNG GRANULOMA: ICD-10-CM

## 2023-04-24 PROCEDURE — 1159F PR MEDICATION LIST DOCUMENTED IN MEDICAL RECORD: ICD-10-PCS | Mod: HCNC,CPTII,95,

## 2023-04-24 PROCEDURE — 1126F AMNT PAIN NOTED NONE PRSNT: CPT | Mod: HCNC,CPTII,95,

## 2023-04-24 PROCEDURE — 3288F FALL RISK ASSESSMENT DOCD: CPT | Mod: HCNC,CPTII,95,

## 2023-04-24 PROCEDURE — 3288F PR FALLS RISK ASSESSMENT DOCUMENTED: ICD-10-PCS | Mod: HCNC,CPTII,95,

## 2023-04-24 PROCEDURE — 1160F RVW MEDS BY RX/DR IN RCRD: CPT | Mod: HCNC,CPTII,95,

## 2023-04-24 PROCEDURE — 1159F MED LIST DOCD IN RCRD: CPT | Mod: HCNC,CPTII,95,

## 2023-04-24 PROCEDURE — 1160F PR REVIEW ALL MEDS BY PRESCRIBER/CLIN PHARMACIST DOCUMENTED: ICD-10-PCS | Mod: HCNC,CPTII,95,

## 2023-04-24 PROCEDURE — G0439 PPPS, SUBSEQ VISIT: HCPCS | Mod: HCNC,95,,

## 2023-04-24 PROCEDURE — 1100F PR PT FALLS ASSESS DOC 2+ FALLS/FALL W/INJURY/YR: ICD-10-PCS | Mod: HCNC,CPTII,95,

## 2023-04-24 PROCEDURE — G0439 PR MEDICARE ANNUAL WELLNESS SUBSEQUENT VISIT: ICD-10-PCS | Mod: HCNC,95,,

## 2023-04-24 PROCEDURE — 1126F PR PAIN SEVERITY QUANTIFIED, NO PAIN PRESENT: ICD-10-PCS | Mod: HCNC,CPTII,95,

## 2023-04-24 PROCEDURE — 1170F PR FUNCTIONAL STATUS ASSESSED: ICD-10-PCS | Mod: HCNC,CPTII,95,

## 2023-04-24 PROCEDURE — 1170F FXNL STATUS ASSESSED: CPT | Mod: HCNC,CPTII,95,

## 2023-04-24 PROCEDURE — 1100F PTFALLS ASSESS-DOCD GE2>/YR: CPT | Mod: HCNC,CPTII,95,

## 2023-04-24 NOTE — PATIENT INSTRUCTIONS
Counseling and Referral of Other Preventative  (Italic type indicates deductible and co-insurance are waived)    Patient Name: Heri Muhammad  Today's Date: 4/24/2023    Health Maintenance       Date Due Completion Date    TETANUS VACCINE 12/06/2026 12/6/2016    Lipid Panel 05/06/2027 5/6/2022        No orders of the defined types were placed in this encounter.      The following information is provided to all patients.  This information is to help you find resources for any of the problems found today that may be affecting your health:                Living healthy guide: www.[facility].[place].[facility]      Understanding Diabetes: www.diabetes.org      Eating healthy: www.cdc.gov/healthyweight      CDC home safety checklist: www.cdc.gov/steadi/patient.html      Agency on Aging: www.goea.[place].gov      Alcoholics anonymous (AA): www.aa.org      Physical Activity: www.mimi.nih.gov/cc2fsqw      Tobacco use: www.quitwithusla.org

## 2023-04-24 NOTE — PROGRESS NOTES
The patient location is: [place]  The chief complaint leading to consultation is: Medicare AWV     Visit type: audiovisual    Face to Face time with patient: 30  60 minutes of total time spent on the encounter, which includes face to face time and non-face to face time preparing to see the patient (eg, review of tests), Obtaining and/or reviewing separately obtained history, Documenting clinical information in the electronic or other health record, Independently interpreting results (not separately reported) and communicating results to the patient/family/caregiver, or Care coordination (not separately reported).         Each patient to whom he or she provides medical services by telemedicine is:  (1) informed of the relationship between the physician and patient and the respective role of any other health care provider with respect to management of the patient; and (2) notified that he or she may decline to receive medical services by telemedicine and may withdraw from such care at any time.    Notes:       Heri Muhammad presented for a  Medicare AWV and comprehensive Health Risk Assessment today. The following components were reviewed and updated:    Medical history  Family History  Social history  Allergies and Current Medications  Health Risk Assessment  Health Maintenance  Care Team         ** See Completed Assessments for Annual Wellness Visit within the encounter summary.**         The following assessments were completed:  Living Situation  CAGE  Depression Screening  Fall Risk Assessment (MACH 10)  Hearing Assessment(HHI)  Cognitive Function Screening  Nutrition Screening  ADL Screening  PAQ Screening      Vitals:    04/24/23 1511   Weight: 101.6 kg (224 lb)   Height: 6' (1.829 m)     Body mass index is 30.38 kg/m².  Physical Exam  Constitutional:       General: He is not in acute distress.     Appearance: Normal appearance. He is well-developed and well-groomed.   Skin:     Coloration: Skin is not pale.    Neurological:      Mental Status: He is alert and oriented to person, place, and time.   Psychiatric:         Mood and Affect: Mood normal.         Speech: Speech normal.         Behavior: Behavior normal. Behavior is cooperative.         Thought Content: Thought content normal.             Diagnoses and health risks identified today and associated recommendations/orders:    1. Encounter for Medicare annual wellness exam  - Ambulatory Referral/Consult to Enhanced Annual Wellness Visit (eAWV)    2. Encounter for preventive health examination    3. Late onset Alzheimer's disease without behavioral disturbance  Chronic; stable on medication. Followed by Neurology.     4. Simple chronic bronchitis  Chronic; stable on medication. Followed by PCP.     5. Lung granuloma  Chronic; stable.  Followed by PCP.     6. Seizure disorder  Chronic; stable on medication. Followed by Neurology.     7. Chronic diastolic heart failure  Chronic; stable on medication. Followed by Cardiology.    8. Permanent atrial fibrillation  Scheduled for watchman device. Followed by Cardiology/EP.      9. Aortic atherosclerosis  Chronic; stable on medication. Followed by Cardiology.    10. Benign essential tremor  Chronic; stable on medication. Followed by Neurology.     11. Coronary artery disease involving native coronary artery of native heart without angina pectoris  12. Mixed hyperlipidemia  13. Essential hypertension  Chronic; stable on medication. Followed by Cardiology.    14. Biventricular cardiac pacemaker in situ  Chronic; stable. Followed by Cardiology.    15. Current use of long term anticoagulation  Chronic; stable on medication. Followed by Cardiology.    16. Subclinical hypothyroidism  Chronic. Stable. Followed by PCP.     17. Chronic bilateral low back pain without sciatica  18. Chronic pain of both shoulders  Chronic. Stable. Followed by PCP.     19. Insomnia, unspecified type  Chronic. Stable. Followed by PCP.     20. Dependence  on other enabling machines and devices  Continue to use cane/assistive devices as needed. Followed by PCP.       Review for Opioid Screening: Patient does not have rx for Opioids.    Review for Substance Use Disorders: Patient does not use substance.    Provided Heri with a 5-10 year written screening schedule and personal prevention plan. Recommendations were developed using the USPSTF age appropriate recommendations. Education, counseling, and referrals were provided as needed. After Visit Summary printed and given to patient which includes a list of additional screenings\tests needed.    Follow up in about 1 year (around 4/24/2024) for your next annual wellness visit.    Melinda Wall, NP      Advance Care Planning     I offered to discuss advanced care planning, including how to pick a person who would make decisions for you if you were unable to make them for yourself, called a health care power of , and what kind of decisions you might make such as use of life sustaining treatments such as ventilators and tube feeding when faced with a life limiting illness recorded on a living will that they will need to know. (How you want to be cared for as you near the end of your natural life)     X Patient is interested in learning more about how to make advanced directives.  I provided them paperwork and offered to discuss this with them.

## 2023-05-03 ENCOUNTER — PATIENT MESSAGE (OUTPATIENT)
Dept: MEDSURG UNIT | Facility: HOSPITAL | Age: 83
End: 2023-05-03
Payer: MEDICARE

## 2023-05-04 ENCOUNTER — TELEPHONE (OUTPATIENT)
Dept: ELECTROPHYSIOLOGY | Facility: CLINIC | Age: 83
End: 2023-05-04
Payer: MEDICARE

## 2023-05-04 NOTE — TELEPHONE ENCOUNTER
Attempted to contact patient to confirm procedure details. No answer. Left detailed voicemail including: arrival time, NPO after midnight and medication instructions, along with callback number.     Labs done, no alerts

## 2023-05-05 ENCOUNTER — HOSPITAL ENCOUNTER (INPATIENT)
Facility: HOSPITAL | Age: 83
LOS: 1 days | Discharge: HOME | DRG: 274 | End: 2023-05-05
Attending: INTERNAL MEDICINE | Admitting: INTERNAL MEDICINE
Payer: MEDICARE

## 2023-05-05 ENCOUNTER — ANESTHESIA (OUTPATIENT)
Dept: MEDSURG UNIT | Facility: HOSPITAL | Age: 83
DRG: 274 | End: 2023-05-05
Payer: MEDICARE

## 2023-05-05 ENCOUNTER — ANESTHESIA EVENT (OUTPATIENT)
Dept: MEDSURG UNIT | Facility: HOSPITAL | Age: 83
DRG: 274 | End: 2023-05-05
Payer: MEDICARE

## 2023-05-05 VITALS
HEIGHT: 72 IN | WEIGHT: 223 LBS | RESPIRATION RATE: 18 BRPM | BODY MASS INDEX: 30.2 KG/M2 | HEART RATE: 77 BPM | DIASTOLIC BLOOD PRESSURE: 66 MMHG | OXYGEN SATURATION: 94 % | SYSTOLIC BLOOD PRESSURE: 140 MMHG | TEMPERATURE: 98 F

## 2023-05-05 DIAGNOSIS — I48.91 ATRIAL FIBRILLATION: ICD-10-CM

## 2023-05-05 DIAGNOSIS — I49.9 ARRHYTHMIA: ICD-10-CM

## 2023-05-05 DIAGNOSIS — I48.19 PERSISTENT ATRIAL FIBRILLATION: ICD-10-CM

## 2023-05-05 DIAGNOSIS — Z95.818 PRESENCE OF WATCHMAN LEFT ATRIAL APPENDAGE CLOSURE DEVICE: ICD-10-CM

## 2023-05-05 LAB
ABO + RH BLD: NORMAL
BLD GP AB SCN CELLS X3 SERPL QL: NORMAL
SPECIMEN OUTDATE: NORMAL

## 2023-05-05 PROCEDURE — D9220A PRA ANESTHESIA: Mod: HCNC,ANES,, | Performed by: ANESTHESIOLOGY

## 2023-05-05 PROCEDURE — D9220A PRA ANESTHESIA: ICD-10-PCS | Mod: HCNC,CRNA,, | Performed by: NURSE ANESTHETIST, CERTIFIED REGISTERED

## 2023-05-05 PROCEDURE — 63600175 PHARM REV CODE 636 W HCPCS: Mod: HCNC | Performed by: INTERNAL MEDICINE

## 2023-05-05 PROCEDURE — C1769 GUIDE WIRE: HCPCS | Mod: HCNC | Performed by: INTERNAL MEDICINE

## 2023-05-05 PROCEDURE — 37000008 HC ANESTHESIA 1ST 15 MINUTES: Mod: HCNC | Performed by: INTERNAL MEDICINE

## 2023-05-05 PROCEDURE — C1894 INTRO/SHEATH, NON-LASER: HCPCS | Mod: HCNC | Performed by: INTERNAL MEDICINE

## 2023-05-05 PROCEDURE — 33340 PR TRANSCATH CLOSURE, PERC, LEFT ATRIAL APPENDAGE, W/IMPLANT: ICD-10-PCS | Mod: Q0,HCNC,, | Performed by: INTERNAL MEDICINE

## 2023-05-05 PROCEDURE — 25000003 PHARM REV CODE 250: Mod: HCNC | Performed by: INTERNAL MEDICINE

## 2023-05-05 PROCEDURE — D9220A PRA ANESTHESIA: Mod: HCNC,CRNA,, | Performed by: NURSE ANESTHETIST, CERTIFIED REGISTERED

## 2023-05-05 PROCEDURE — 21400001 HC TELEMETRY ROOM: Mod: HCNC

## 2023-05-05 PROCEDURE — 33340 PERQ CLSR TCAT L ATR APNDGE: CPT | Mod: Q0,HCNC,, | Performed by: INTERNAL MEDICINE

## 2023-05-05 PROCEDURE — 37000009 HC ANESTHESIA EA ADD 15 MINS: Mod: HCNC | Performed by: INTERNAL MEDICINE

## 2023-05-05 PROCEDURE — 93005 ELECTROCARDIOGRAM TRACING: CPT | Mod: HCNC

## 2023-05-05 PROCEDURE — 63600175 PHARM REV CODE 636 W HCPCS: Mod: HCNC | Performed by: NURSE ANESTHETIST, CERTIFIED REGISTERED

## 2023-05-05 PROCEDURE — C1889 IMPLANT/INSERT DEVICE, NOC: HCPCS | Mod: HCNC | Performed by: INTERNAL MEDICINE

## 2023-05-05 PROCEDURE — 63600175 PHARM REV CODE 636 W HCPCS: Mod: HCNC | Performed by: NURSE PRACTITIONER

## 2023-05-05 PROCEDURE — 93010 EKG 12-LEAD: ICD-10-PCS | Mod: HCNC,,, | Performed by: INTERNAL MEDICINE

## 2023-05-05 PROCEDURE — D9220A PRA ANESTHESIA: ICD-10-PCS | Mod: HCNC,ANES,, | Performed by: ANESTHESIOLOGY

## 2023-05-05 PROCEDURE — 25000003 PHARM REV CODE 250: Mod: HCNC | Performed by: NURSE ANESTHETIST, CERTIFIED REGISTERED

## 2023-05-05 PROCEDURE — 86920 COMPATIBILITY TEST SPIN: CPT | Mod: HCNC | Performed by: NURSE PRACTITIONER

## 2023-05-05 PROCEDURE — 27201423 OPTIME MED/SURG SUP & DEVICES STERILE SUPPLY: Mod: HCNC | Performed by: INTERNAL MEDICINE

## 2023-05-05 PROCEDURE — 93010 ELECTROCARDIOGRAM REPORT: CPT | Mod: HCNC,,, | Performed by: INTERNAL MEDICINE

## 2023-05-05 PROCEDURE — 93010 ELECTROCARDIOGRAM REPORT: CPT | Mod: 76,HCNC,, | Performed by: INTERNAL MEDICINE

## 2023-05-05 PROCEDURE — 33340 PERQ CLSR TCAT L ATR APNDGE: CPT | Mod: HCNC | Performed by: INTERNAL MEDICINE

## 2023-05-05 PROCEDURE — 25000003 PHARM REV CODE 250: Mod: HCNC | Performed by: NURSE PRACTITIONER

## 2023-05-05 PROCEDURE — 86900 BLOOD TYPING SEROLOGIC ABO: CPT | Mod: HCNC | Performed by: NURSE PRACTITIONER

## 2023-05-05 DEVICE — LEFT ATRIAL APPENDAGE CLOSURE DEVICE WITH DELIVERY SYSTEM
Type: IMPLANTABLE DEVICE | Site: HEART | Status: FUNCTIONAL
Brand: WATCHMAN FLX™

## 2023-05-05 RX ORDER — DEXAMETHASONE SODIUM PHOSPHATE 4 MG/ML
INJECTION, SOLUTION INTRA-ARTICULAR; INTRALESIONAL; INTRAMUSCULAR; INTRAVENOUS; SOFT TISSUE
Status: DISCONTINUED | OUTPATIENT
Start: 2023-05-05 | End: 2023-05-05

## 2023-05-05 RX ORDER — SUCCINYLCHOLINE CHLORIDE 20 MG/ML
INJECTION INTRAMUSCULAR; INTRAVENOUS
Status: DISCONTINUED | OUTPATIENT
Start: 2023-05-05 | End: 2023-05-05

## 2023-05-05 RX ORDER — PROPOFOL 10 MG/ML
VIAL (ML) INTRAVENOUS
Status: DISCONTINUED | OUTPATIENT
Start: 2023-05-05 | End: 2023-05-05

## 2023-05-05 RX ORDER — ONDANSETRON 2 MG/ML
4 INJECTION INTRAMUSCULAR; INTRAVENOUS ONCE AS NEEDED
Status: DISCONTINUED | OUTPATIENT
Start: 2023-05-05 | End: 2023-05-05 | Stop reason: HOSPADM

## 2023-05-05 RX ORDER — LIDOCAINE HYDROCHLORIDE 20 MG/ML
INJECTION INTRAVENOUS
Status: DISCONTINUED | OUTPATIENT
Start: 2023-05-05 | End: 2023-05-05

## 2023-05-05 RX ORDER — FENTANYL CITRATE 50 UG/ML
25 INJECTION, SOLUTION INTRAMUSCULAR; INTRAVENOUS EVERY 5 MIN PRN
Status: DISCONTINUED | OUTPATIENT
Start: 2023-05-05 | End: 2023-05-05 | Stop reason: HOSPADM

## 2023-05-05 RX ORDER — HEPARIN SOD,PORCINE/0.9 % NACL 1000/500ML
INTRAVENOUS SOLUTION INTRAVENOUS
Status: DISCONTINUED | OUTPATIENT
Start: 2023-05-05 | End: 2023-05-05 | Stop reason: HOSPADM

## 2023-05-05 RX ORDER — DIPHENHYDRAMINE HYDROCHLORIDE 50 MG/ML
25 INJECTION INTRAMUSCULAR; INTRAVENOUS EVERY 6 HOURS PRN
Status: DISCONTINUED | OUTPATIENT
Start: 2023-05-05 | End: 2023-05-05 | Stop reason: HOSPADM

## 2023-05-05 RX ORDER — LIDOCAINE HYDROCHLORIDE 20 MG/ML
INJECTION, SOLUTION INFILTRATION; PERINEURAL
Status: DISCONTINUED | OUTPATIENT
Start: 2023-05-05 | End: 2023-05-05 | Stop reason: HOSPADM

## 2023-05-05 RX ORDER — PROTAMINE SULFATE 10 MG/ML
INJECTION, SOLUTION INTRAVENOUS
Status: DISCONTINUED | OUTPATIENT
Start: 2023-05-05 | End: 2023-05-05

## 2023-05-05 RX ORDER — ROCURONIUM BROMIDE 10 MG/ML
INJECTION, SOLUTION INTRAVENOUS
Status: DISCONTINUED | OUTPATIENT
Start: 2023-05-05 | End: 2023-05-05

## 2023-05-05 RX ORDER — ONDANSETRON 2 MG/ML
INJECTION INTRAMUSCULAR; INTRAVENOUS
Status: DISCONTINUED | OUTPATIENT
Start: 2023-05-05 | End: 2023-05-05

## 2023-05-05 RX ORDER — HYDROCODONE BITARTRATE AND ACETAMINOPHEN 500; 5 MG/1; MG/1
TABLET ORAL
Status: ACTIVE | OUTPATIENT
Start: 2023-05-05

## 2023-05-05 RX ORDER — HEPARIN SODIUM 1000 [USP'U]/ML
INJECTION, SOLUTION INTRAVENOUS; SUBCUTANEOUS
Status: DISCONTINUED | OUTPATIENT
Start: 2023-05-05 | End: 2023-05-05

## 2023-05-05 RX ORDER — FENTANYL CITRATE 50 UG/ML
INJECTION, SOLUTION INTRAMUSCULAR; INTRAVENOUS
Status: DISCONTINUED | OUTPATIENT
Start: 2023-05-05 | End: 2023-05-05

## 2023-05-05 RX ORDER — ACETAMINOPHEN 325 MG/1
650 TABLET ORAL EVERY 4 HOURS PRN
Status: DISCONTINUED | OUTPATIENT
Start: 2023-05-05 | End: 2023-05-05 | Stop reason: HOSPADM

## 2023-05-05 RX ORDER — HYDROCODONE BITARTRATE AND ACETAMINOPHEN 500; 5 MG/1; MG/1
TABLET ORAL
Status: DISCONTINUED | OUTPATIENT
Start: 2023-05-05 | End: 2023-05-05 | Stop reason: HOSPADM

## 2023-05-05 RX ORDER — HYDROMORPHONE HYDROCHLORIDE 1 MG/ML
0.2 INJECTION, SOLUTION INTRAMUSCULAR; INTRAVENOUS; SUBCUTANEOUS EVERY 5 MIN PRN
Status: DISCONTINUED | OUTPATIENT
Start: 2023-05-05 | End: 2023-05-05 | Stop reason: HOSPADM

## 2023-05-05 RX ADMIN — SODIUM CHLORIDE: 9 INJECTION, SOLUTION INTRAVENOUS at 09:05

## 2023-05-05 RX ADMIN — FENTANYL CITRATE 25 MCG: 50 INJECTION, SOLUTION INTRAMUSCULAR; INTRAVENOUS at 09:05

## 2023-05-05 RX ADMIN — SUGAMMADEX 200 MG: 100 INJECTION, SOLUTION INTRAVENOUS at 10:05

## 2023-05-05 RX ADMIN — HEPARIN SODIUM 10000 UNITS: 1000 INJECTION, SOLUTION INTRAVENOUS; SUBCUTANEOUS at 09:05

## 2023-05-05 RX ADMIN — PROTAMINE SULFATE 100 MG: 10 INJECTION, SOLUTION INTRAVENOUS at 10:05

## 2023-05-05 RX ADMIN — PROPOFOL 150 MG: 10 INJECTION, EMULSION INTRAVENOUS at 09:05

## 2023-05-05 RX ADMIN — CEFAZOLIN 2 G: 2 INJECTION, POWDER, FOR SOLUTION INTRAMUSCULAR; INTRAVENOUS at 09:05

## 2023-05-05 RX ADMIN — DEXAMETHASONE SODIUM PHOSPHATE 4 MG: 4 INJECTION, SOLUTION INTRAMUSCULAR; INTRAVENOUS at 09:05

## 2023-05-05 RX ADMIN — FENTANYL CITRATE 25 MCG: 50 INJECTION, SOLUTION INTRAMUSCULAR; INTRAVENOUS at 10:05

## 2023-05-05 RX ADMIN — ROCURONIUM BROMIDE 30 MG: 10 INJECTION INTRAVENOUS at 09:05

## 2023-05-05 RX ADMIN — ROCURONIUM BROMIDE 40 MG: 10 INJECTION INTRAVENOUS at 09:05

## 2023-05-05 RX ADMIN — ONDANSETRON 4 MG: 2 INJECTION INTRAMUSCULAR; INTRAVENOUS at 10:05

## 2023-05-05 RX ADMIN — SUCCINYLCHOLINE CHLORIDE 160 MG: 20 INJECTION, SOLUTION INTRAMUSCULAR; INTRAVENOUS at 09:05

## 2023-05-05 RX ADMIN — LIDOCAINE HYDROCHLORIDE 40 MG: 20 INJECTION INTRAVENOUS at 09:05

## 2023-05-05 RX ADMIN — PROPOFOL 50 MG: 10 INJECTION, EMULSION INTRAVENOUS at 10:05

## 2023-05-05 RX ADMIN — ROCURONIUM BROMIDE 10 MG: 10 INJECTION INTRAVENOUS at 09:05

## 2023-05-05 NOTE — SUBJECTIVE & OBJECTIVE
Past Medical History:   Diagnosis Date    Anticoagulant long-term use     eliquis    Aortic atherosclerosis 9/18/2017    - LDL goal <70 - BP goal <130/80    Atrial flutter     Benign essential tremor     Biventricular cardiac pacemaker in situ 9/24/2018    CAD (coronary artery disease) 12/2/2013    - 9/2013 Left heart Cath: LAD- 60% prox (neg FFR), nl, ramus and RCA 20% - LDL goal <70 - BP goal <130/80    Cancer     skin cancer on back    Carpal tunnel syndrome of left wrist 8/8/2017    Chronic diastolic heart failure 1/19/2018    Chronic obstructive pulmonary disease 10/3/2018    Chronic right-sided thoracic back pain 4/27/2017    Cough     Current use of long term anticoagulation 6/2/2016    - Eliquis for Afib    Dizziness     intermittant    DJD (degenerative joint disease) of knee     ED (erectile dysfunction)     Hard of hearing 11/2016    History of cataract     Hyponatremia 6/11/2019    Memory loss     Mixed hyperlipidemia 7/28/2012    Persistent atrial fibrillation 7/1/2015    - followed by Dr. Esparza - history of cardioversion - on Eliquis OAC 5 mg BID    Seizure disorder     Seizures     last episode 1995    Subclinical hypothyroidism     Tension type headache 7/2/2019       Past Surgical History:   Procedure Laterality Date    ANKLE SURGERY Right     pins and screws    ATRIAL ABLATION SURGERY      CARDIAC PACEMAKER PLACEMENT  2014    CARDIAC PACEMAKER PLACEMENT      CARDIAC PACEMAKER PLACEMENT  2014    CARDIOVERSION      CATARACT EXTRACTION      [facility]    COLONOSCOPY      due in 2014    EYE SURGERY Bilateral     cataracts extraction    FRACTURE SURGERY Right     ankle with hardware    HERNIA REPAIR      abdominal    INJECTION OF ANESTHETIC AGENT AROUND MEDIAL BRANCH NERVES INNERVATING LUMBAR FACET JOINT Bilateral 1/17/2019    Procedure: Block-nerve-medial branch-lumbar L2-L5;  Surgeon: Anthony Moffett MD;  Location: [facility];  Service: Pain Management;  Laterality: Bilateral;    JOINT REPLACEMENT Bilateral      shoulders    RADIOFREQUENCY ABLATION OF LUMBAR MEDIAL BRANCH NERVE AT SINGLE LEVEL Bilateral 1/23/2019    Procedure: Radiofrequency Ablation, Nerve, Spinal, Lumbar, Medial Branch, L2-L5;  Surgeon: Anthony Moffett MD;  Location: [facility] OR;  Service: Pain Management;  Laterality: Bilateral;    TREATMENT OF CARDIAC ARRHYTHMIA N/A 7/11/2022    Procedure: Cardioversion or Defibrillation;  Surgeon: MIREILLE Morel MD;  Location: [facility] EP LAB;  Service: Cardiology;  Laterality: N/A;  AF, GALILEO, DCCV, MAC, EH (to cover for SK), 3 Prep *SJM CRT-P in situ*       Review of patient's allergies indicates:   Allergen Reactions    Bactroban [mupirocin calcium] Rash     Other reaction(s): Rash       No current facility-administered medications on file prior to encounter.     Current Outpatient Medications on File Prior to Encounter   Medication Sig    atorvastatin (LIPITOR) 40 MG tablet TAKE 1 TABLET EVERY DAY    cetirizine (ZYRTEC) 10 MG tablet Take 10 mg by mouth every evening.    citalopram (CELEXA) 20 MG tablet TAKE 1 TABLET EVERY DAY    ELIQUIS 5 mg Tab TAKE 1 TABLET TWICE DAILY    lamoTRIgine (LAMICTAL) 100 MG tablet TAKE 1 TABLET TWICE DAILY    memantine (NAMENDA) 10 MG Tab TAKE 1 TABLET TWICE DAILY    metoprolol succinate (TOPROL-XL) 50 MG 24 hr tablet Take 1 tablet (50 mg total) by mouth once daily.    STIOLTO RESPIMAT 2.5-2.5 mcg/actuation Mist INHALE 2 PUFFS INTO THE LUNGS ONCE DAILY.    torsemide (DEMADEX) 10 MG Tab TAKE 1 TABLET (10 MG TOTAL) BY MOUTH EVERY MORNING.    cyclobenzaprine (FLEXERIL) 5 MG tablet Take 5 mg by mouth nightly. prn     Family History       Problem Relation (Age of Onset)    Blindness Father    Cancer Father, Daughter    Cataracts Father    Dementia Mother    Heart disease Mother    Heart failure Mother    Hypertension Father    No Known Problems Son    Obesity Daughter    Tremor Father, Brother, Paternal Grandfather          Tobacco Use    Smoking status: Former     Packs/day: 1.00     Years: 5.00      Pack years: 5.00     Types: Cigarettes     Quit date:      Years since quittin.3    Smokeless tobacco: Never   Substance and Sexual Activity    Alcohol use: No    Drug use: No    Sexual activity: Not Currently     Partners: Female     Review of Systems   Constitutional: Negative for chills and fever.   Cardiovascular:  Negative for chest pain, orthopnea and palpitations.   Respiratory:  Negative for cough and shortness of breath.    Gastrointestinal:  Negative for abdominal pain.   Neurological:  Negative for dizziness, headaches and light-headedness.   Psychiatric/Behavioral:  Negative for altered mental status.      Dysphagia or odynophagia:  No  Liver Disease, esophageal disease, or known varices:  No  Upper GI Bleeding: No  Prior neck surgery or radiation:  No  History of anesthetic difficulties:  No  Family history of anesthetic difficulties:  No  Able to move neck in all directions:  Yes  Snoring:  No  Sleep Apnea:  No  Last oral intake:  12 hours ago    Objective:     Vital Signs (Most Recent):  Temp: 98.1 °F (36.7 °C) (23 0745)  Pulse: 70 (23 0745)  Resp: 16 (23 0745)  BP: (!) 121/59 (23 0750)  SpO2: (!) 94 % (23 0745)   Vital Signs (24h Range):  Temp:  [98.1 °F (36.7 °C)] 98.1 °F (36.7 °C)  Pulse:  [70] 70  Resp:  [16] 16  SpO2:  [94 %] 94 %  BP: (121-122)/(58-59) 121/59     Weight: 101.2 kg (223 lb)  Body mass index is 30.24 kg/m².    SpO2: (!) 94 %       No intake or output data in the 24 hours ending 23 0928    Lines/Drains/Airways       Peripheral Intravenous Line  Duration                  Peripheral IV - Single Lumen 23 0813 20 G Left Antecubital <1 day         Peripheral IV - Single Lumen 23 0818 20 G Posterior;Right Wrist <1 day                     Physical Exam  Vitals reviewed.   Constitutional:       General: He is not in acute distress.     Appearance: Normal appearance. He is not toxic-appearing.   HENT:      Head: Normocephalic and  atraumatic.      Mouth/Throat:      Mouth: Mucous membranes are moist.   Cardiovascular:      Rate and Rhythm: Normal rate and regular rhythm.      Heart sounds: No murmur heard.    No friction rub. No gallop.   Pulmonary:      Effort: Pulmonary effort is normal. No respiratory distress.      Breath sounds: Normal breath sounds.   Abdominal:      Palpations: Abdomen is soft.   Musculoskeletal:      Right lower leg: No edema.      Left lower leg: No edema.   Skin:     General: Skin is warm.   Neurological:      Mental Status: He is alert and oriented to person, place, and time. Mental status is at baseline.        Significant Labs: BMP: No results for input(s): GLU, NA, K, CL, CO2, BUN, CREATININE, CALCIUM, MG in the last 48 hours., CMP No results for input(s): NA, K, CL, CO2, GLU, BUN, CREATININE, CALCIUM, PROT, ALBUMIN, BILITOT, ALKPHOS, AST, ALT, ANIONGAP, ESTGFRAFRICA, EGFRNONAA in the last 48 hours., CBC No results for input(s): WBC, HGB, HCT, PLT in the last 48 hours., and All pertinent lab results from the last 24 hours have been reviewed.    Significant Imaging: Echocardiogram: Transthoracic echo (TTE) complete (Cupid Only):   Results for orders placed or performed during the hospital encounter of 07/19/21   Echo Color Flow Doppler? Yes   Result Value Ref Range    Ascending aorta 3.41 cm    STJ 2.88 cm    AV mean gradient 5 mmHg    Ao peak hi 1.61 m/s    Ao VTI 27.70 cm    IVRT 99.90 msec    IVS 0.76 0.6 - 1.1 cm    LA size 4.90 cm    Left Atrium Major Axis 6.90 cm    Left Atrium Minor Axis 6.85 cm    LVIDd 5.52 3.5 - 6.0 cm    LVIDs 4.09 (A) 2.1 - 4.0 cm    LVOT diameter 2.37 cm    LVOT peak VTI 21.64 cm    Posterior Wall 0.87 0.6 - 1.1 cm    MV Peak A Hi 0.26 m/s    E wave deceleration time 231.07 msec    MV Peak E Hi 0.72 m/s    PV Peak D Hi 0.64 m/s    PV Peak S Hi 0.24 m/s    RA Major Axis 5.17 cm    RA Width 3.61 cm    RVDD 2.81 cm    Sinus 3.27 cm    TAPSE 2.04 cm    TR Max Hi 3.02 m/s    TDI  "LATERAL 0.07 m/s    TDI SEPTAL 0.07 m/s    LA WIDTH 4.61 cm    MV stenosis pressure 1/2 time 67.01 ms    LV Diastolic Volume 148.66 mL    LV Systolic Volume 73.93 mL    RV S' 14.71 cm/s    LVOT peak handy 1.23 m/s    LA volume (mod) 81.38 cm3    MV "A" wave duration 11.42 msec    LV LATERAL E/E' RATIO 10.29 m/s    LV SEPTAL E/E' RATIO 10.29 m/s    FS 26 %    LA volume 132.00 cm3    LV mass 164.78 g    Left Ventricle Relative Wall Thickness 0.32 cm    AV valve area 3.44 cm2    AV Velocity Ratio 0.76     AV index (prosthetic) 0.78     MV valve area p 1/2 method 3.28 cm2    E/A ratio 2.77     Mean e' 0.07 m/s    Pulm vein S/D ratio 0.38     LVOT area 4.4 cm2    LVOT stroke volume 95.42 cm3    AV peak gradient 10 mmHg    E/E' ratio 10.29 m/s    Triscuspid Valve Regurgitation Peak Gradient 36 mmHg    BSA 2.33 m2    LV Systolic Volume Index 32.4 mL/m2    LV Diastolic Volume Index 65.20 mL/m2    LA Volume Index 57.9 mL/m2    LV Mass Index 72 g/m2    LA Volume Index (Mod) 35.7 mL/m2    Right Atrial Pressure (from IVC) 3 mmHg    EF 60 %    TV rest pulmonary artery pressure 39 mmHg    Narrative    · The estimated ejection fraction is 60%.  · The left ventricle is normal in size with normal systolic function.  · Grade III left ventricular diastolic dysfunction.  · Normal right ventricular size with normal right ventricular systolic   function.  · Severe left atrial enlargement.  · The estimated PA systolic pressure is 39 mmHg.  · Normal central venous pressure (3 mmHg).        "

## 2023-05-05 NOTE — BRIEF OP NOTE
: Hema Del Valle MD  Date of procedure: 05/05/2023    Post-operative diagnosis: AF    Procedure performed: Left Atrial Appendage Occlusion with Watchman device    Description of procedure: The patient was brought to the EP lab in the fasting state. Prepped and draped in sterile fashion. Safety timeout was performed. Sedation administered by anesthesia staff. Ultrasound guided venous access of the right femoral vein was performed. 16 Fr short sheath placed in right femoral vein. Heparin bolus given. GALILEO and fluoroscopic guided single transseptal puncture performed. Watchman deployed in [place] using GALILEO and fluoroscopic guidance. Tug test confirmed stable position. GALILEO confirmed adequate compression and no significant angeline-device flow. Sheaths removed. Two silk figure of 8 sutures placed at right femoral access site for hemostasis.     EBL: <10 mL    Specimens removed: None  Complications: no immediate    The attending physician was present for entire duration of the procedure    Akshat Can MD PGY 8  Electrophysiology

## 2023-05-05 NOTE — NURSING
Pt walked at 1645. Tolerated well. VSS. BP post walk 140/66 and HR 77. No c/o pain or discomfort at this time, resp even and unlabored. Groin site CDI. No active bleeding. No hematoma noted.

## 2023-05-05 NOTE — ASSESSMENT & PLAN NOTE
Patient with high bleeding risk and history of permanent AF that presents for Watchman.    1. GALILEO for evaluation of LAAO implant  -No absolute contraindications of esophageal stricture, tumor, perforation, laceration,or diverticulum and/or active GI bleed  -The risks, benefits & alternatives of the procedure were explained to the patient.   -The risks of transesophageal echo include but are not limited to:  Dental trauma, esophageal trauma/perforation, bleeding, laryngospasm/brochospasm, aspiration, sore throat/hoarseness, & dislodgement of the endotracheal tube/nasogastric tube (where applicable).    -The risks of moderate sedation include hypotension, respiratory depression, arrhythmias, bronchospasm, & death.    -Informed consent was obtained. The patient is agreeable to proceed with the procedure and all questions and concerns addressed.    Staff attestation to follow. Further recommendations per attending addendum

## 2023-05-05 NOTE — ANESTHESIA PROCEDURE NOTES
Intubation    Date/Time: 5/5/2023 9:26 AM  Performed by: Heena Parsons CRNA  Authorized by: Gurpreet Forbes MD     Intubation:     Induction:  Intravenous    Intubated:  Postinduction    Mask Ventilation:  Moderately difficult with oral airway (two hand mask due to beard)    Attempts:  1    Attempted By:  CRNA    Method of Intubation:  Direct    Blade:  [place] 2    Laryngeal View Grade: Grade I - full view of cords      Difficult Airway Encountered?: No      Complications:  None    Airway Device:  Oral endotracheal tube    Airway Device Size:  8.0    Style/Cuff Inflation:  Cuffed (inflated to minimal occlusive pressure)    Tube secured:  22    Secured at:  The lips    Placement Verified By:  Capnometry    Complicating Factors:  None    Findings Post-Intubation:  BS equal bilateral and atraumatic/condition of teeth unchanged

## 2023-05-05 NOTE — NURSING TRANSFER
Nursing Transfer Note      5/5/2023     Reason patient is being transferred: back to [facility] post recovery    Transfer To: [facility] 3    Transfer via stretcher    Transfer with cardiac monitoring    Transported by RN    Telemetry: Box Number [id number]    Medicines sent: none    Any special needs or follow-up needed: 1st 30 min check due @ 1230    Chart send with patient: Yes    Notified: spouse    Patient reassessed at: to be done by receiving RN (date, time)

## 2023-05-05 NOTE — NURSING
Patient discharged per MD orders. Instructions given on medications, wound care, activity, signs of infection, when to call MD, and follow up appointments. Pt and wife verbalized understanding. Telemetry and PIVs removed. Patient and family used wc to private vehicle escorted by transfer tech.

## 2023-05-05 NOTE — CONSULTS
[facility]  Cardiology  Consult Note    Patient Name: Heri Muhammad  MRN: [id number]  Admission Date: 5/5/2023  Hospital Length of Stay: 0 days  Code Status: Prior   Attending Provider: Hema Del Valle MD   Consulting Provider: Chace Quarles MD  Primary Care Physician: Ivonne Robles MD  Principal Problem:<principal problem not specified>    Patient information was obtained from patient, past medical records and ER records.       Subjective:     Chief Complaint:  LAAO implant     HPI:   Patient is a 82 year old male with a history of AF and high bleeding risk that presents for Watchman implant.    TTE 2021    Summary    The estimated ejection fraction is 60%.  The left ventricle is normal in size with normal systolic function.  Grade III left ventricular diastolic dysfunction.  Normal right ventricular size with normal right ventricular systolic function.  Severe left atrial enlargement.  The estimated PA systolic pressure is 39 mmHg.  Normal central venous pressure (3 mmHg).     GALILEO 07/2022  Summary    This study was performed for BERNADETTE clearance prior to DCCV  Normal appearing left atrial appendage. No thrombus is present in the appendage. BERNADETTE occluder is absent. Abnormal appendage velocities.  The left ventricle is normal in size with normal systolic function.The estimated ejection fraction is 60%.  Normal right ventricular size with normal right ventricular systolic function.  Biatrial enlargement.  Grade 1 plaque present.      Past Medical History:   Diagnosis Date    Anticoagulant long-term use     eliquis    Aortic atherosclerosis 9/18/2017    - LDL goal <70 - BP goal <130/80    Atrial flutter     Benign essential tremor     Biventricular cardiac pacemaker in situ 9/24/2018    CAD (coronary artery disease) 12/2/2013    - 9/2013 Left heart Cath: LAD- 60% prox (neg FFR), nl, ramus and RCA 20% - LDL goal <70 - BP goal <130/80    Cancer     skin cancer on back    Carpal tunnel syndrome of left  wrist 8/8/2017    Chronic diastolic heart failure 1/19/2018    Chronic obstructive pulmonary disease 10/3/2018    Chronic right-sided thoracic back pain 4/27/2017    Cough     Current use of long term anticoagulation 6/2/2016    - Eliquis for Afib    Dizziness     intermittant    DJD (degenerative joint disease) of knee     ED (erectile dysfunction)     Hard of hearing 11/2016    History of cataract     Hyponatremia 6/11/2019    Memory loss     Mixed hyperlipidemia 7/28/2012    Persistent atrial fibrillation 7/1/2015    - followed by Dr. Esparza - history of cardioversion - on Eliquis OAC 5 mg BID    Seizure disorder     Seizures     last episode 1995    Subclinical hypothyroidism     Tension type headache 7/2/2019       Past Surgical History:   Procedure Laterality Date    ANKLE SURGERY Right     pins and screws    ATRIAL ABLATION SURGERY      CARDIAC PACEMAKER PLACEMENT  2014    CARDIAC PACEMAKER PLACEMENT      CARDIAC PACEMAKER PLACEMENT  2014    CARDIOVERSION      CATARACT EXTRACTION      [facility]    COLONOSCOPY      due in 2014    EYE SURGERY Bilateral     cataracts extraction    FRACTURE SURGERY Right     ankle with hardware    HERNIA REPAIR      abdominal    INJECTION OF ANESTHETIC AGENT AROUND MEDIAL BRANCH NERVES INNERVATING LUMBAR FACET JOINT Bilateral 1/17/2019    Procedure: Block-nerve-medial branch-lumbar L2-L5;  Surgeon: Anthony Moffett MD;  Location: [facility] OR;  Service: Pain Management;  Laterality: Bilateral;    JOINT REPLACEMENT Bilateral     shoulders    RADIOFREQUENCY ABLATION OF LUMBAR MEDIAL BRANCH NERVE AT SINGLE LEVEL Bilateral 1/23/2019    Procedure: Radiofrequency Ablation, Nerve, Spinal, Lumbar, Medial Branch, L2-L5;  Surgeon: Anthony Moffett MD;  Location: [facility] OR;  Service: Pain Management;  Laterality: Bilateral;    TREATMENT OF CARDIAC ARRHYTHMIA N/A 7/11/2022    Procedure: Cardioversion or Defibrillation;  Surgeon: MIREILLE Morel MD;  Location: [facility] LAB;  Service: Cardiology;   Laterality: N/A;  AF, GALILEO, DCCV, MAC, EH (to cover for SK), 3 Prep *SJM CRT-P in situ*       Review of patient's allergies indicates:   Allergen Reactions    Bactroban [mupirocin calcium] Rash     Other reaction(s): Rash       No current facility-administered medications on file prior to encounter.     Current Outpatient Medications on File Prior to Encounter   Medication Sig    atorvastatin (LIPITOR) 40 MG tablet TAKE 1 TABLET EVERY DAY    cetirizine (ZYRTEC) 10 MG tablet Take 10 mg by mouth every evening.    citalopram (CELEXA) 20 MG tablet TAKE 1 TABLET EVERY DAY    ELIQUIS 5 mg Tab TAKE 1 TABLET TWICE DAILY    lamoTRIgine (LAMICTAL) 100 MG tablet TAKE 1 TABLET TWICE DAILY    memantine (NAMENDA) 10 MG Tab TAKE 1 TABLET TWICE DAILY    metoprolol succinate (TOPROL-XL) 50 MG 24 hr tablet Take 1 tablet (50 mg total) by mouth once daily.    STIOLTO RESPIMAT 2.5-2.5 mcg/actuation Mist INHALE 2 PUFFS INTO THE LUNGS ONCE DAILY.    torsemide (DEMADEX) 10 MG Tab TAKE 1 TABLET (10 MG TOTAL) BY MOUTH EVERY MORNING.    cyclobenzaprine (FLEXERIL) 5 MG tablet Take 5 mg by mouth nightly. prn     Family History       Problem Relation (Age of Onset)    Blindness Father    Cancer Father, Daughter    Cataracts Father    Dementia Mother    Heart disease Mother    Heart failure Mother    Hypertension Father    No Known Problems Son    Obesity Daughter    Tremor Father, Brother, Paternal Grandfather          Tobacco Use    Smoking status: Former     Packs/day: 1.00     Years: 5.00     Pack years: 5.00     Types: Cigarettes     Quit date:      Years since quittin.3    Smokeless tobacco: Never   Substance and Sexual Activity    Alcohol use: No    Drug use: No    Sexual activity: Not Currently     Partners: Female     Review of Systems   Constitutional: Negative for chills and fever.   Cardiovascular:  Negative for chest pain, orthopnea and palpitations.   Respiratory:  Negative for cough and shortness of breath.     Gastrointestinal:  Negative for abdominal pain.   Neurological:  Negative for dizziness, headaches and light-headedness.   Psychiatric/Behavioral:  Negative for altered mental status.      Dysphagia or odynophagia:  No  Liver Disease, esophageal disease, or known varices:  No  Upper GI Bleeding: No  Prior neck surgery or radiation:  No  History of anesthetic difficulties:  No  Family history of anesthetic difficulties:  No  Able to move neck in all directions:  Yes  Snoring:  No  Sleep Apnea:  No  Last oral intake:  12 hours ago    Objective:     Vital Signs (Most Recent):  Temp: 98.1 °F (36.7 °C) (05/05/23 0745)  Pulse: 70 (05/05/23 0745)  Resp: 16 (05/05/23 0745)  BP: (!) 121/59 (05/05/23 0750)  SpO2: (!) 94 % (05/05/23 0745)   Vital Signs (24h Range):  Temp:  [98.1 °F (36.7 °C)] 98.1 °F (36.7 °C)  Pulse:  [70] 70  Resp:  [16] 16  SpO2:  [94 %] 94 %  BP: (121-122)/(58-59) 121/59     Weight: 101.2 kg (223 lb)  Body mass index is 30.24 kg/m².    SpO2: (!) 94 %       No intake or output data in the 24 hours ending 05/05/23 0928    Lines/Drains/Airways       Peripheral Intravenous Line  Duration                  Peripheral IV - Single Lumen 05/05/23 0813 20 G Left Antecubital <1 day         Peripheral IV - Single Lumen 05/05/23 0818 20 G Posterior;Right Wrist <1 day                     Physical Exam  Vitals reviewed.   Constitutional:       General: He is not in acute distress.     Appearance: Normal appearance. He is not toxic-appearing.   HENT:      Head: Normocephalic and atraumatic.      Mouth/Throat: Chipped tooth     Mouth: Mucous membranes are moist.   Cardiovascular:      Rate and Rhythm: Normal rate and regular rhythm.      Heart sounds: No murmur heard.    No friction rub. No gallop.   Pulmonary:      Effort: Pulmonary effort is normal. No respiratory distress.      Breath sounds: Normal breath sounds.   Abdominal:      Palpations: Abdomen is soft.   Musculoskeletal:      Right lower leg: No edema.       "Left lower leg: No edema.   Skin:     General: Skin is warm.   Neurological:      Mental Status: He is alert and oriented to person, place, and time. Mental status is at baseline.        Significant Labs: BMP: No results for input(s): GLU, NA, K, CL, CO2, BUN, CREATININE, CALCIUM, MG in the last 48 hours., CMP No results for input(s): NA, K, CL, CO2, GLU, BUN, CREATININE, CALCIUM, PROT, ALBUMIN, BILITOT, ALKPHOS, AST, ALT, ANIONGAP, ESTGFRAFRICA, EGFRNONAA in the last 48 hours., CBC No results for input(s): WBC, HGB, HCT, PLT in the last 48 hours., and All pertinent lab results from the last 24 hours have been reviewed.    Significant Imaging: Echocardiogram: Transthoracic echo (TTE) complete (Cupid Only):   Results for orders placed or performed during the hospital encounter of 07/19/21   Echo Color Flow Doppler? Yes   Result Value Ref Range    Ascending aorta 3.41 cm    STJ 2.88 cm    AV mean gradient 5 mmHg    Ao peak hi 1.61 m/s    Ao VTI 27.70 cm    IVRT 99.90 msec    IVS 0.76 0.6 - 1.1 cm    LA size 4.90 cm    Left Atrium Major Axis 6.90 cm    Left Atrium Minor Axis 6.85 cm    LVIDd 5.52 3.5 - 6.0 cm    LVIDs 4.09 (A) 2.1 - 4.0 cm    LVOT diameter 2.37 cm    LVOT peak VTI 21.64 cm    Posterior Wall 0.87 0.6 - 1.1 cm    MV Peak A Hi 0.26 m/s    E wave deceleration time 231.07 msec    MV Peak E Hi 0.72 m/s    PV Peak D Hi 0.64 m/s    PV Peak S Hi 0.24 m/s    RA Major Axis 5.17 cm    RA Width 3.61 cm    RVDD 2.81 cm    Sinus 3.27 cm    TAPSE 2.04 cm    TR Max Hi 3.02 m/s    TDI LATERAL 0.07 m/s    TDI SEPTAL 0.07 m/s    LA WIDTH 4.61 cm    MV stenosis pressure 1/2 time 67.01 ms    LV Diastolic Volume 148.66 mL    LV Systolic Volume 73.93 mL    RV S' 14.71 cm/s    LVOT peak hi 1.23 m/s    LA volume (mod) 81.38 cm3    MV "A" wave duration 11.42 msec    LV LATERAL E/E' RATIO 10.29 m/s    LV SEPTAL E/E' RATIO 10.29 m/s    FS 26 %    LA volume 132.00 cm3    LV mass 164.78 g    Left Ventricle Relative Wall " Thickness 0.32 cm    AV valve area 3.44 cm2    AV Velocity Ratio 0.76     AV index (prosthetic) 0.78     MV valve area p 1/2 method 3.28 cm2    E/A ratio 2.77     Mean e' 0.07 m/s    Pulm vein S/D ratio 0.38     LVOT area 4.4 cm2    LVOT stroke volume 95.42 cm3    AV peak gradient 10 mmHg    E/E' ratio 10.29 m/s    Triscuspid Valve Regurgitation Peak Gradient 36 mmHg    BSA 2.33 m2    LV Systolic Volume Index 32.4 mL/m2    LV Diastolic Volume Index 65.20 mL/m2    LA Volume Index 57.9 mL/m2    LV Mass Index 72 g/m2    LA Volume Index (Mod) 35.7 mL/m2    Right Atrial Pressure (from IVC) 3 mmHg    EF 60 %    TV rest pulmonary artery pressure 39 mmHg    Narrative    · The estimated ejection fraction is 60%.  · The left ventricle is normal in size with normal systolic function.  · Grade III left ventricular diastolic dysfunction.  · Normal right ventricular size with normal right ventricular systolic   function.  · Severe left atrial enlargement.  · The estimated PA systolic pressure is 39 mmHg.  · Normal central venous pressure (3 mmHg).        Assessment and Plan:     Permanent atrial fibrillation  Patient with high bleeding risk and history of permanent AF that presents for Watchman.    1. GALILEO for evaluation of LAAO implant  -No absolute contraindications of esophageal stricture, tumor, perforation, laceration,or diverticulum and/or active GI bleed  -The risks, benefits & alternatives of the procedure were explained to the patient.   -The risks of transesophageal echo include but are not limited to:  Dental trauma, esophageal trauma/perforation, bleeding, laryngospasm/brochospasm, aspiration, sore throat/hoarseness, & dislodgement of the endotracheal tube/nasogastric tube (where applicable).    -The risks of moderate sedation include hypotension, respiratory depression, arrhythmias, bronchospasm, & death.    -Informed consent was obtained. The patient is agreeable to proceed with the procedure and all questions and  concerns addressed.    Staff attestation to follow. Further recommendations per attending addendum            VTE Risk Mitigation (From admission, onward)      None            Thank you for your consult. I will sign off. Please contact us if you have any additional questions.    Chace Quarles MD  Cardiology PGY5  [facility]

## 2023-05-05 NOTE — HPI
Patient is a 82 year old male with a history of AF and high bleeding risk that presents for Watchman implant.    TTE 2021    Summary    The estimated ejection fraction is 60%.  The left ventricle is normal in size with normal systolic function.  Grade III left ventricular diastolic dysfunction.  Normal right ventricular size with normal right ventricular systolic function.  Severe left atrial enlargement.  The estimated PA systolic pressure is 39 mmHg.  Normal central venous pressure (3 mmHg).     GALILEO 07/2022  Summary    This study was performed for BERNADETTE clearance prior to DCCV  Normal appearing left atrial appendage. No thrombus is present in the appendage. BERNADETTE occluder is absent. Abnormal appendage velocities.  The left ventricle is normal in size with normal systolic function.The estimated ejection fraction is 60%.  Normal right ventricular size with normal right ventricular systolic function.  Biatrial enlargement.  Grade 1 plaque present.

## 2023-05-05 NOTE — HPI
Patient ID:  Heri Muhammad is a 82 y.o. male who presents for evaluation of Atrial Fibrillation        82 yoM, patient of Dr Esparza, referred for LAAO consideration. He has permanent AF after two attempts at PVI. He has a DC PM. He is on eliquis for CVA prophylaxis. He has had several falls recently with subsequent bruising prompting referral for LAAO.      GALILEO 7/22:  ·           This study was performed for BERNADETTE clearance prior to DCCV  ·           Normal appearing left atrial appendage. No thrombus is present in the appendage. BERNADETTE occluder is absent. Abnormal appendage velocities.  ·           The left ventricle is normal in size with normal systolic function.The estimated ejection fraction is 60%.  ·           Normal right ventricular size with normal right ventricular systolic function.  ·           Biatrial enlargement.  ·           Grade 1 plaque present.     CHADSVASC of 4  HASBLED of 4

## 2023-05-05 NOTE — DISCHARGE SUMMARY
[facility] - [facility] Cardiac [facility]  Cardiology  Discharge Summary      Patient Name: Heri Muhammad  MRN: [id number]  Admission Date: 5/5/2023  Hospital Length of Stay: 0 days  Discharge Date and Time:  05/05/2023 3:17 PM  Attending Physician: Hema Del Valle MD  Discharging Provider: Akshat Can MD  Primary Care Physician: Ivonne Robles MD    HPI: 82 yoM, patient of Dr Esparza, referred for LAAO consideration. He has permanent AF after two attempts at PVI. He has a DC PM. He is on eliquis for CVA prophylaxis. He has had several falls recently with subsequent bruising prompting referral for LAAO.      GALILEO 7/22:  ·           This study was performed for BERNADETTE clearance prior to DCCV  ·           Normal appearing left atrial appendage. No thrombus is present in the appendage. BERNADETTE occluder is absent. Abnormal appendage velocities.  ·           The left ventricle is normal in size with normal systolic function.The estimated ejection fraction is 60%.  ·           Normal right ventricular size with normal right ventricular systolic function.  ·           Biatrial enlargement.  ·           Grade 1 plaque present.     CHADSVASC of 4  HASBLED of 4        Interval History:  Patient denies chest pain fevers chills SOB.     Procedure(s) (LRB):  Left atrial appendage occlusion (N/A)  ECHOCARDIOGRAM, TRANSESOPHAGEAL (N/A)     Indwelling Lines/Drains at time of discharge:  Lines/Drains/Airways       None                   Hospital Course (synopsis of major diagnoses, care, treatment, and services provided during the course of the hospital stay): Patient arrived fasting for Watchman procedure. Patient monitored post op without issues. Patient tolerated procedure. Discharged.      Pending Diagnostic Studies:       Procedure Component Value Units Date/Time    Transesophageal echo (GALILEO) [[id number]]     Order Status: Sent Lab Status: No result             Final Active Diagnoses:    Diagnosis Date Noted POA    Permanent atrial  fibrillation [I48.21] 07/01/2015 Yes      Problems Resolved During this Admission:       Discharged Condition: good    Follow Up:   Follow-up Information       Hema Del Valle MD Follow up in 3 month(s).    Specialties: Electrophysiology, Cardiovascular Disease, Cardiology  Contact information:  [facility]  [facility] [id number]  [phone]                           Patient Instructions:   Do not strain or lift anything greater than 5 lb for 1 week.  Do not drive or operate any dangerous machinery for 24 hours.   Clean the area with mild soap and water and then cover it with a bandage.   Once the skin has healed, bathing in a tub or swimming is allowed.   Inspect the groin site daily and report to the physician any swelling at the site that   cannot be controlled with manual pressure for 10 minutes, unusual pain at the   access site or affected extremity, unusual swelling at the access site, or signs or   symptoms of infection such as redness, pain, or fever.   Call 911 if you have:   Bleeding from the puncture site that you cannot stop by doing the following:   Relax and lie down right away. Keep your leg flat and apply firm pressure to the site using your fingers and a gauze pad. Keep the pressure on for 20 minutes. Continue this until the bleeding stops. This may take awhile. When bleeding stops, cover the site with a sterile bandage and keep your leg still as much as possible.   Medications:  Reconciled Home Medications:      Medication List        CONTINUE taking these medications      atorvastatin 40 MG tablet  Commonly known as: LIPITOR  TAKE 1 TABLET EVERY DAY     cetirizine 10 MG tablet  Commonly known as: ZYRTEC  Take 10 mg by mouth every evening.     citalopram 20 MG tablet  Commonly known as: CeleXA  TAKE 1 TABLET EVERY DAY     cyclobenzaprine 5 MG tablet  Commonly known as: FLEXERIL  Take 5 mg by mouth nightly. prn     ELIQUIS 5 mg Tab  Generic drug: apixaban  TAKE 1 TABLET TWICE DAILY      lamoTRIgine 100 MG tablet  Commonly known as: LAMICTAL  TAKE 1 TABLET TWICE DAILY     memantine 10 MG Tab  Commonly known as: NAMENDA  TAKE 1 TABLET TWICE DAILY     metoprolol succinate 50 MG 24 hr tablet  Commonly known as: TOPROL-XL  Take 1 tablet (50 mg total) by mouth once daily.     primidone 250 MG Tab  Commonly known as: MYSOLINE  TAKE 2 TABLETS TWICE DAILY     STIOLTO RESPIMAT 2.5-2.5 mcg/actuation Mist  Generic drug: tiotropium-olodateroL  INHALE 2 PUFFS INTO THE LUNGS ONCE DAILY.     torsemide 10 MG Tab  Commonly known as: DEMADEX  TAKE 1 TABLET (10 MG TOTAL) BY MOUTH EVERY MORNING.              Time spent on the discharge of patient: 30 minutes    Akshat Can MD  Cardiology  [facility] - Short Stay Cardiac Unit

## 2023-05-05 NOTE — H&P
Electrophysiology Pre Procedure H&P    HPI:   82 yoM, patient of Dr Esparza, referred for LAAO consideration. He has permanent AF after two attempts at PVI. He has a DC PM. He is on eliquis for CVA prophylaxis. He has had several falls recently with subsequent bruising prompting referral for LAAO.      GALILEO 7/22:  ·           This study was performed for BERNADETTE clearance prior to DCCV  ·           Normal appearing left atrial appendage. No thrombus is present in the appendage. BERNADETTE occluder is absent. Abnormal appendage velocities.  ·           The left ventricle is normal in size with normal systolic function.The estimated ejection fraction is 60%.  ·           Normal right ventricular size with normal right ventricular systolic function.  ·           Biatrial enlargement.  ·           Grade 1 plaque present.     CHADSVASC of 4  HASBLED of 4       Interval History:  Patient denies chest pain fevers chills SOB.     Past Medical History:   Diagnosis Date    Anticoagulant long-term use     eliquis    Aortic atherosclerosis 9/18/2017    - LDL goal <70 - BP goal <130/80    Atrial flutter     Benign essential tremor     Biventricular cardiac pacemaker in situ 9/24/2018    CAD (coronary artery disease) 12/2/2013    - 9/2013 Left heart Cath: LAD- 60% prox (neg FFR), nl, ramus and RCA 20% - LDL goal <70 - BP goal <130/80    Cancer     skin cancer on back    Carpal tunnel syndrome of left wrist 8/8/2017    Chronic diastolic heart failure 1/19/2018    Chronic obstructive pulmonary disease 10/3/2018    Chronic right-sided thoracic back pain 4/27/2017    Cough     Current use of long term anticoagulation 6/2/2016    - Eliquis for Afib    Dizziness     intermittant    DJD (degenerative joint disease) of knee     ED (erectile dysfunction)     Hard of hearing 11/2016    History of cataract     Hyponatremia 6/11/2019    Memory loss     Mixed hyperlipidemia 7/28/2012    Persistent atrial fibrillation 7/1/2015    - followed by Dr. Esparza -  history of cardioversion - on Eliquis OAC 5 mg BID    Seizure disorder     Seizures     last episode     Subclinical hypothyroidism     Tension type headache 2019        Social History     Socioeconomic History    Marital status:    Occupational History    Occupation: retired   Tobacco Use    Smoking status: Former     Packs/day: 1.00     Years: 5.00     Pack years: 5.00     Types: Cigarettes     Quit date:      Years since quittin.3    Smokeless tobacco: Never   Substance and Sexual Activity    Alcohol use: No    Drug use: No    Sexual activity: Not Currently     Partners: Female   Social History Narrative    . 4 adult children.      Social Determinants of Health     Financial Resource Strain: Low Risk     Difficulty of Paying Living Expenses: Not hard at all   Food Insecurity: No Food Insecurity    Worried About Running Out of Food in the Last Year: Never true    Ran Out of Food in the Last Year: Never true   Transportation Needs: No Transportation Needs    Lack of Transportation (Medical): No    Lack of Transportation (Non-Medical): No   Physical Activity: Inactive    Days of Exercise per Week: 0 days    Minutes of Exercise per Session: 0 min   Stress: No Stress Concern Present    Feeling of Stress : Not at all   Social Connections: Socially Isolated    Frequency of Communication with Friends and Family: Never    Frequency of Social Gatherings with Friends and Family: Twice a week    Attends Worship Services: Never    Active Member of Clubs or Organizations: No    Attends Club or Organization Meetings: Never    Marital Status:    Housing Stability: Low Risk     Unable to Pay for Housing in the Last Year: No    Number of Places Lived in the Last Year: 1    Unstable Housing in the Last Year: No        Family History   Problem Relation Age of Onset    Heart disease Mother     Heart failure Mother     Dementia Mother     Cancer Father         colon    Blindness Father          accident    Cataracts Father     Hypertension Father     Tremor Father     Tremor Brother     Cancer Daughter         cancer as a baby, unsure of what kind    Obesity Daughter     No Known Problems Son     Tremor Paternal Grandfather     Melanoma Neg Hx     Amblyopia Neg Hx     Diabetes Neg Hx     Glaucoma Neg Hx     Macular degeneration Neg Hx     Retinal detachment Neg Hx     Strabismus Neg Hx     Stroke Neg Hx     Thyroid disease Neg Hx         Current Facility-Administered Medications   Medication Dose Route Frequency Provider Last Rate Last Admin    0.9%  NaCl infusion (for blood administration)   Intravenous Q24H PRN Batool Rodriguez NP        0.9%  NaCl infusion (for blood administration)   Intravenous Q24H PRN Hema Del Valle MD        ceFAZolin 2 g in dextrose 5 % in water (D5W) 5 % 50 mL IVPB (MB+)  2 g Intravenous On Call Procedure Batool Rodriguez NP          ROS:  Constitutional: (-)fevers, (-)chills, (-)night sweats  HEENT: (-) headaches (-) lightheadedness (-) blurry vision  Cardiovascular: (-)chest pain (-)paroxysmal nocturnal dyspnea (-)orthopnea  Respiratory: (-)shortness of breath, (-)dyspnea on exertion (-)hemoptysis  Gastrointestinal: (-)abdominal pain (-)nausea (-)vomiting (-)tarry stools  Musculoskeletal: (-)arthralgias (-)limited range of motion  Neurologic: (-)parasthesias (-)mood disorder (-)anxiety  Endo: (-)polyuria (-)polydipsia (-)heat/cold intolerance  Skin: (-)rash     Vitals:    05/05/23 0714 05/05/23 0745 05/05/23 0750   BP:  (!) 122/58 (!) 121/59   BP Location:  Right arm Left arm   Patient Position:  Lying Lying   Pulse:  70    Resp:  16    Temp:  98.1 °F (36.7 °C)    TempSrc:  Temporal    SpO2:  (!) 94%    Weight: 101.6 kg (223 lb 15.8 oz) 101.2 kg (223 lb)    Height:  6' (1.829 m)      Physical Exam:   Gen: no acute distress, pleasant patient answering questions appropriately  HEENT: extra-ocular muscles intact, normocephalic-atraumatic  Neck: no jugular venous  distension, no lymphadenopathy, no thyromegaly, no carotid bruits  CVS: regular rate and rhythm, S1S2 normal, no murmurs/rubs/gallops  CHEST: clear to auscultation bilaterally, no wheezes/rales/ronchi  ABD: Soft, non-tender, nondistended, bowel sounds present  EXT: No Edema, 2+ pulses bilaterally (radial, femoral, dorsales pedis, posterior tibial)  NEURO: awake, alert, and oriented   Skin: No rash     Review of Labs:   At baseline    Most recent ECG  Biv paced     Assessment/Plan:  Heri Muhammad is a 82 y.o. male with AF     Watchman with Anesthesia support

## 2023-05-05 NOTE — NURSING
Report received from CLARENCE Deleon. Patient s/p watchman device. R groin with  suture and stopcock. Area soft, cdi. + pulses. Post procedure protocol discussed with patient. Vss. Connected to cont tele monitoring. Call light in reach.

## 2023-05-05 NOTE — ANESTHESIA POSTPROCEDURE EVALUATION
Anesthesia Post Evaluation    Patient: Heri Muhammad    Procedure(s) Performed: Procedure(s) (LRB):  Left atrial appendage occlusion (N/A)  ECHOCARDIOGRAM, TRANSESOPHAGEAL (N/A)    Final Anesthesia Type: general      Level of consciousness: awake and alert  Post-procedure vital signs: reviewed and stable  Pain control: Pain has been treated.  Airway patency: patent    PONV status: Absent or treated.  Anesthetic complications: no      Cardiovascular status: hemodynamically stable  Respiratory status: unassisted  Hydration status: euvolemic            Vitals Value Taken Time   /65 05/05/23 1430   Temp 36.7 °C (98.1 °F) 05/05/23 1230   Pulse 73 05/05/23 1453   Resp 18 05/05/23 1230   SpO2 94 % 05/05/23 1230         No case tracking events are documented in the log.      Pain/Jono Score: Jono Score: 9 (5/5/2023 11:59 AM)

## 2023-05-05 NOTE — Clinical Note
DEVICE [employer] FLX CLSR 31MM was inserted into the left atrial appendage. 70M with PMHx of CABG x3(1998) and PPM ([facility]), CVA (2010), HTN presenting with exertional chest pain  x 2 days admitted with EKG evidence of inferior wall STEMI, late presentation, Patient presented with nausea and chest pain, late presentation EKG evidence of inferior wall STEMI, T troponin elevated to 2K  EKG on presentation sinus rhythm at 60 with LA enlargement, LAD. Patient has CHRISTEN of 1-2 mm in lead II, III, avF with waves.  Continued on aspirin and brillinta, high intensity statin Additionally, flipped T wave with 1-2 mm depression in I and aVL. Flipped T waves in lateral segment C evidence of evidence of total occlusion of SVG to PDA with thrombus and severe disease of SVG to OM1 at anastomotic site, PTCI of total SVG occlusion unsuccessful, complicated by R groin hematoma, requiring IV pressor support, eventually weaned off and transferred to the floors, plan for cath 6/22. 70M with PMHx of CABG x3(1998) and PPM ([facility]), CVA (2010), HTN presenting with exertional chest pain  x 2 days admitted with EKG evidence of inferior wall STEMI, late presentation, Patient presented with nausea and chest pain, late presentation EKG evidence of inferior wall STEMI, T troponin elevated to 2K  EKG on presentation sinus rhythm at 60 with LA enlargement, LAD. Patient has CHRISTEN of 1-2 mm in lead II, III, avF with waves.  Continued on aspirin and brillinta, high intensity statin Additionally, flipped T wave with 1-2 mm depression in I and aVL. Flipped T waves in lateral segment LHC evidence of evidence of total occlusion of SVG to PDA with thrombus and severe disease of SVG to OM1 at anastomotic site, PTCI of total SVG occlusion unsuccessful, complicated by R groin hematoma, requiring IV pressor support, eventually weaned off and transferred to the floors, plan for cath 6/22.  Pt underwent LHC,  SVG to OM1 95% at distal anastomosis x1 JANICE, LFA accessed and closed with Angioseal.

## 2023-05-05 NOTE — ANESTHESIA PREPROCEDURE EVALUATION
05/05/2023  Heri Muhammad is a 82 y.o., male.  Patient Active Problem List   Diagnosis    Mixed hyperlipidemia    CAD (coronary artery disease)    Permanent atrial fibrillation    Sinus node dysfunction    Benign essential tremor    Status post catheter ablation of atrial fibrillation    Current use of long term anticoagulation    Seizure disorder    Essential hypertension    Aortic atherosclerosis    Chronic diastolic heart failure    Subclinical hypothyroidism    Late onset Alzheimer's disease without behavioral disturbance    Biventricular cardiac pacemaker in situ    Chronic bilateral low back pain without sciatica    History of skin cancer    Bilateral shoulder pain    Chronic pain of right ankle    Chronic foot pain, right    Post-traumatic arthritis of ankle, right    Lung granuloma    Insomnia    Simple chronic bronchitis    Decreased strength, endurance, and mobility    Abnormal thyroid function test    Overweight (BMI 25.0-29.9)    Bilateral impacted cerumen    Electrolyte imbalance    Hypokalemia    Hypocalcemia    Diarrhea    Episodic lightheadedness    Pain due to onychomycosis of toenails of both feet           Pre-op Assessment          Review of Systems      Physical Exam    Airway:  No airway management difficulties anticipated  Dental:No active dental issues noted  Chest/Lungs:  Clear to auscultation    Heart:  Rate: Normal  Rhythm: Regular Rhythm  Sounds: Normal        Anesthesia Plan  Type of Anesthesia, risks & benefits discussed:    Anesthesia Type: Gen ETT  Intra-op Monitoring Plan: Art Line  Informed Consent: Informed consent signed with the Patient and all parties understand the risks and agree with anesthesia plan.  All questions answered.   ASA Score: 3  Anesthesia Plan Notes: Chart reviewed. Patient seen and examined. Anesthesia plan discussed and  questions answered. E-consent signed. Gurpreet Forbes MD    Ready For Surgery From Anesthesia Perspective.     .

## 2023-05-05 NOTE — TRANSFER OF CARE
Anesthesia Transfer of Care Note    Patient: Heri Muhammad    Procedure(s) Performed: Procedure(s) (LRB):  Left atrial appendage occlusion (N/A)  ECHOCARDIOGRAM, TRANSESOPHAGEAL (N/A)    Patient location: PACU    Anesthesia Type: general    Transport from OR: Transported from OR on 6-10 L/min O2 by face mask with adequate spontaneous ventilation    Post pain: adequate analgesia    Post assessment: no apparent anesthetic complications and tolerated procedure well    Post vital signs: stable    Level of consciousness: sedated and responds to stimulation    Nausea/Vomiting: no nausea/vomiting    Complications: none    Transfer of care protocol was followed      Last vitals:   Visit Vitals  BP (!) 121/59 (BP Location: Left arm, Patient Position: Lying)   Pulse 70   Temp 36.7 °C (98.1 °F) (Temporal)   Resp 16   Ht 6' (1.829 m)   Wt 101.2 kg (223 lb)   SpO2 (!) 94%   BMI 30.24 kg/m²

## 2023-05-08 ENCOUNTER — PATIENT OUTREACH (OUTPATIENT)
Dept: ADMINISTRATIVE | Facility: CLINIC | Age: 83
End: 2023-05-08
Payer: MEDICARE

## 2023-05-09 LAB
BLD PROD TYP BPU: NORMAL
BLOOD UNIT EXPIRATION DATE: NORMAL
BLOOD UNIT TYPE CODE: 6200
BLOOD UNIT TYPE: NORMAL
CODING SYSTEM: NORMAL
CROSSMATCH INTERPRETATION: NORMAL
DISPENSE STATUS: NORMAL
NUM UNITS TRANS PACKED RBC: NORMAL

## 2023-05-12 LAB
POC ACTIVATED CLOTTING TIME K: 281 SEC (ref 74–137)
SAMPLE: ABNORMAL

## 2023-05-16 ENCOUNTER — OFFICE VISIT (OUTPATIENT)
Dept: FAMILY MEDICINE | Facility: CLINIC | Age: 83
End: 2023-05-16
Payer: MEDICARE

## 2023-05-16 ENCOUNTER — PATIENT MESSAGE (OUTPATIENT)
Dept: ELECTROPHYSIOLOGY | Facility: CLINIC | Age: 83
End: 2023-05-16
Payer: MEDICARE

## 2023-05-16 VITALS
WEIGHT: 233.69 LBS | HEART RATE: 70 BPM | SYSTOLIC BLOOD PRESSURE: 128 MMHG | BODY MASS INDEX: 31.65 KG/M2 | HEIGHT: 72 IN | DIASTOLIC BLOOD PRESSURE: 78 MMHG | OXYGEN SATURATION: 97 %

## 2023-05-16 DIAGNOSIS — G25.0 BENIGN ESSENTIAL TREMOR: ICD-10-CM

## 2023-05-16 DIAGNOSIS — I50.32 CHRONIC DIASTOLIC HEART FAILURE: ICD-10-CM

## 2023-05-16 DIAGNOSIS — I10 ESSENTIAL HYPERTENSION: ICD-10-CM

## 2023-05-16 DIAGNOSIS — G40.909 SEIZURE DISORDER: ICD-10-CM

## 2023-05-16 DIAGNOSIS — F02.80 LATE ONSET ALZHEIMER'S DISEASE WITHOUT BEHAVIORAL DISTURBANCE: ICD-10-CM

## 2023-05-16 DIAGNOSIS — Z91.81 AT HIGH RISK FOR FALLS: ICD-10-CM

## 2023-05-16 DIAGNOSIS — I48.0 PAF (PAROXYSMAL ATRIAL FIBRILLATION): ICD-10-CM

## 2023-05-16 DIAGNOSIS — Z09 HOSPITAL DISCHARGE FOLLOW-UP: ICD-10-CM

## 2023-05-16 DIAGNOSIS — G30.1 LATE ONSET ALZHEIMER'S DISEASE WITHOUT BEHAVIORAL DISTURBANCE: ICD-10-CM

## 2023-05-16 PROCEDURE — 3288F FALL RISK ASSESSMENT DOCD: CPT | Mod: CPTII,,, | Performed by: STUDENT IN AN ORGANIZED HEALTH CARE EDUCATION/TRAINING PROGRAM

## 2023-05-16 PROCEDURE — 1159F PR MEDICATION LIST DOCUMENTED IN MEDICAL RECORD: ICD-10-PCS | Mod: CPTII,,, | Performed by: STUDENT IN AN ORGANIZED HEALTH CARE EDUCATION/TRAINING PROGRAM

## 2023-05-16 PROCEDURE — 1111F PR DISCHARGE MEDS RECONCILED W/ CURRENT OUTPATIENT MED LIST: ICD-10-PCS | Mod: CPTII,,, | Performed by: STUDENT IN AN ORGANIZED HEALTH CARE EDUCATION/TRAINING PROGRAM

## 2023-05-16 PROCEDURE — 3288F PR FALLS RISK ASSESSMENT DOCUMENTED: ICD-10-PCS | Mod: CPTII,,, | Performed by: STUDENT IN AN ORGANIZED HEALTH CARE EDUCATION/TRAINING PROGRAM

## 2023-05-16 PROCEDURE — 3078F DIAST BP <80 MM HG: CPT | Mod: CPTII,,, | Performed by: STUDENT IN AN ORGANIZED HEALTH CARE EDUCATION/TRAINING PROGRAM

## 2023-05-16 PROCEDURE — 1100F PR PT FALLS ASSESS DOC 2+ FALLS/FALL W/INJURY/YR: ICD-10-PCS | Mod: CPTII,,, | Performed by: STUDENT IN AN ORGANIZED HEALTH CARE EDUCATION/TRAINING PROGRAM

## 2023-05-16 PROCEDURE — 3078F PR MOST RECENT DIASTOLIC BLOOD PRESSURE < 80 MM HG: ICD-10-PCS | Mod: CPTII,,, | Performed by: STUDENT IN AN ORGANIZED HEALTH CARE EDUCATION/TRAINING PROGRAM

## 2023-05-16 PROCEDURE — 3074F SYST BP LT 130 MM HG: CPT | Mod: CPTII,,, | Performed by: STUDENT IN AN ORGANIZED HEALTH CARE EDUCATION/TRAINING PROGRAM

## 2023-05-16 PROCEDURE — 3074F PR MOST RECENT SYSTOLIC BLOOD PRESSURE < 130 MM HG: ICD-10-PCS | Mod: CPTII,,, | Performed by: STUDENT IN AN ORGANIZED HEALTH CARE EDUCATION/TRAINING PROGRAM

## 2023-05-16 PROCEDURE — 1159F MED LIST DOCD IN RCRD: CPT | Mod: CPTII,,, | Performed by: STUDENT IN AN ORGANIZED HEALTH CARE EDUCATION/TRAINING PROGRAM

## 2023-05-16 PROCEDURE — 1160F RVW MEDS BY RX/DR IN RCRD: CPT | Mod: CPTII,,, | Performed by: STUDENT IN AN ORGANIZED HEALTH CARE EDUCATION/TRAINING PROGRAM

## 2023-05-16 PROCEDURE — 1160F PR REVIEW ALL MEDS BY PRESCRIBER/CLIN PHARMACIST DOCUMENTED: ICD-10-PCS | Mod: CPTII,,, | Performed by: STUDENT IN AN ORGANIZED HEALTH CARE EDUCATION/TRAINING PROGRAM

## 2023-05-16 PROCEDURE — 99495 TRANSJ CARE MGMT MOD F2F 14D: CPT | Mod: ,,, | Performed by: STUDENT IN AN ORGANIZED HEALTH CARE EDUCATION/TRAINING PROGRAM

## 2023-05-16 PROCEDURE — 99999 PR PBB SHADOW E&M-EST. PATIENT-LVL III: ICD-10-PCS | Mod: PBBFAC,HCNC,, | Performed by: STUDENT IN AN ORGANIZED HEALTH CARE EDUCATION/TRAINING PROGRAM

## 2023-05-16 PROCEDURE — 99999 PR PBB SHADOW E&M-EST. PATIENT-LVL III: CPT | Mod: PBBFAC,HCNC,, | Performed by: STUDENT IN AN ORGANIZED HEALTH CARE EDUCATION/TRAINING PROGRAM

## 2023-05-16 PROCEDURE — 1100F PTFALLS ASSESS-DOCD GE2>/YR: CPT | Mod: CPTII,,, | Performed by: STUDENT IN AN ORGANIZED HEALTH CARE EDUCATION/TRAINING PROGRAM

## 2023-05-16 PROCEDURE — 1111F DSCHRG MED/CURRENT MED MERGE: CPT | Mod: CPTII,,, | Performed by: STUDENT IN AN ORGANIZED HEALTH CARE EDUCATION/TRAINING PROGRAM

## 2023-05-16 PROCEDURE — 1126F PR PAIN SEVERITY QUANTIFIED, NO PAIN PRESENT: ICD-10-PCS | Mod: CPTII,,, | Performed by: STUDENT IN AN ORGANIZED HEALTH CARE EDUCATION/TRAINING PROGRAM

## 2023-05-16 PROCEDURE — 99495 TCM SERVICES (MODERATE COMPLEXITY): ICD-10-PCS | Mod: ,,, | Performed by: STUDENT IN AN ORGANIZED HEALTH CARE EDUCATION/TRAINING PROGRAM

## 2023-05-16 PROCEDURE — 1126F AMNT PAIN NOTED NONE PRSNT: CPT | Mod: CPTII,,, | Performed by: STUDENT IN AN ORGANIZED HEALTH CARE EDUCATION/TRAINING PROGRAM

## 2023-05-16 NOTE — PROGRESS NOTES
[facility] Note    Chief Complaint      Chief Complaint   Patient presents with    Hospital Follow-up     Notes:    History of Present Illness          Heri Muhammad is a 82 y.o. male with Afib/Aflutter,Sinus node dysfunction( s/p CRT-P and multiple cardioversion , sHTN, HLD, seizure disorder, DJD lumbar spine, Aortic atherosclerosis, HFpEF, hypothyroidism, restrictive cardiomyopathy, chronic LBP, chronic bilateral shoulder pain bilateral shoulder replacements and COPD for hospital follow up s/p Watchmann procedure by Dr Akshat Can MD at [facility](05/05/2023), procedure well tolerated . He endorses no complaints today and has not had fall since procedure or bleeding from any orifices , no presyncope or syncope, no LOC, worsening headaches, focal weakness.                                                                Electrophysiologist : Dr Deep Esparza MD  Cardiologist : Dr Graham  Pain : Dr Negron  Neurologist : Dr Anjum Arellano    Problem List Addressed This Visit:    #Hospital f/u s/p watchmann procedure for Persistent Afib at high risk of recurrent  fall on DOAC  #Episodic Lightheadedness  #Onychomycosis of multiple toes  #sHTN    Health Maintenance   Topic Date Due    TETANUS VACCINE  12/06/2026    Lipid Panel  05/06/2027       Past Medical History:   Diagnosis Date    Anticoagulant long-term use     eliquis    Aortic atherosclerosis 9/18/2017    - LDL goal <70 - BP goal <130/80    Atrial flutter     Benign essential tremor     Biventricular cardiac pacemaker in situ 9/24/2018    CAD (coronary artery disease) 12/2/2013    - 9/2013 Left heart Cath: LAD- 60% prox (neg FFR), nl, ramus and RCA 20% - LDL goal <70 - BP goal <130/80    Cancer     skin cancer on back    Carpal tunnel syndrome of left wrist 8/8/2017    Chronic diastolic heart failure 1/19/2018    Chronic obstructive pulmonary disease 10/3/2018    Chronic right-sided thoracic back pain 4/27/2017    Cough     Current  use of long term anticoagulation 6/2/2016    - Eliquis for Afib    Dizziness     intermittant    DJD (degenerative joint disease) of knee     ED (erectile dysfunction)     Hard of hearing 11/2016    History of cataract     Hyponatremia 6/11/2019    Memory loss     Mixed hyperlipidemia 7/28/2012    Persistent atrial fibrillation 7/1/2015    - followed by Dr. Esparza - history of cardioversion - on Eliquis OAC 5 mg BID    Seizure disorder     Seizures     last episode 1995    Subclinical hypothyroidism     Tension type headache 7/2/2019       Past Surgical History:   Procedure Laterality Date    ANKLE SURGERY Right     pins and screws    ATRIAL ABLATION SURGERY      CARDIAC PACEMAKER PLACEMENT  2014    CARDIAC PACEMAKER PLACEMENT      CARDIAC PACEMAKER PLACEMENT  2014    CARDIOVERSION      CATARACT EXTRACTION      [facility]    COLONOSCOPY      due in 2014    EYE SURGERY Bilateral     cataracts extraction    FRACTURE SURGERY Right     ankle with hardware    HERNIA REPAIR      abdominal    INJECTION OF ANESTHETIC AGENT AROUND MEDIAL BRANCH NERVES INNERVATING LUMBAR FACET JOINT Bilateral 1/17/2019    Procedure: Block-nerve-medial branch-lumbar L2-L5;  Surgeon: Anthony Moffett MD;  Location: [facility] OR;  Service: Pain Management;  Laterality: Bilateral;    JOINT REPLACEMENT Bilateral     shoulders    OCCLUSION OF LEFT ATRIAL APPENDAGE N/A 5/5/2023    Procedure: Left atrial appendage occlusion;  Surgeon: Hema Del Valle MD;  Location: [facility] EP LAB;  Service: Cardiology;  Laterality: N/A;  afib, watchman, BSCI, ben, JAMIE dhillon, 3prep    RADIOFREQUENCY ABLATION OF LUMBAR MEDIAL BRANCH NERVE AT SINGLE LEVEL Bilateral 1/23/2019    Procedure: Radiofrequency Ablation, Nerve, Spinal, Lumbar, Medial Branch, L2-L5;  Surgeon: Anthony Moffett MD;  Location: [facility] OR;  Service: Pain Management;  Laterality: Bilateral;    TRANSESOPHAGEAL ECHOCARDIOGRAPHY N/A 5/5/2023    Procedure: ECHOCARDIOGRAM, TRANSESOPHAGEAL;  Surgeon: Dylan Layne MD;   Location: [facility] EP LAB;  Service: Cardiology;  Laterality: N/A;    TREATMENT OF CARDIAC ARRHYTHMIA N/A 2022    Procedure: Cardioversion or Defibrillation;  Surgeon: MIREILLE Morel MD;  Location: [facility] EP LAB;  Service: Cardiology;  Laterality: N/A;  AF, GALILEO, DCCV, MAC, EH (to cover for SK), 3 Prep *SJM CRT-P in situ*       family history includes Blindness in his father; Cancer in his daughter and father; Cataracts in his father; Dementia in his mother; Heart disease in his mother; Heart failure in his mother; Hypertension in his father; No Known Problems in his son; Obesity in his daughter; Tremor in his brother, father, and paternal grandfather.    Social History     Tobacco Use    Smoking status: Former     Packs/day: 1.00     Years: 5.00     Pack years: 5.00     Types: Cigarettes     Quit date:      Years since quittin.4    Smokeless tobacco: Never   Substance Use Topics    Alcohol use: No    Drug use: No       Review of Systems   Constitutional:  Negative for activity change and unexpected weight change.   HENT:  Negative for hearing loss, rhinorrhea and trouble swallowing.    Eyes:  Negative for discharge and visual disturbance.   Respiratory:  Negative for chest tightness and wheezing.    Cardiovascular:  Negative for chest pain and palpitations.   Gastrointestinal:  Negative for blood in stool, constipation, diarrhea and vomiting.   Endocrine: Negative for polydipsia and polyuria.   Genitourinary:  Negative for difficulty urinating, hematuria and urgency.   Musculoskeletal:  Negative for arthralgias, joint swelling and neck pain.   Neurological:  Positive for weakness, light-headedness and headaches.   Psychiatric/Behavioral:  Negative for confusion and dysphoric mood.      Outpatient Encounter Medications as of 2023   Medication Sig Dispense Refill    atorvastatin (LIPITOR) 40 MG tablet TAKE 1 TABLET EVERY DAY 90 tablet 3    cetirizine (ZYRTEC) 10 MG tablet Take 10 mg by mouth every  evening.      citalopram (CELEXA) 20 MG tablet TAKE 1 TABLET EVERY DAY 90 tablet 3    cyclobenzaprine (FLEXERIL) 5 MG tablet Take 5 mg by mouth nightly. prn      lamoTRIgine (LAMICTAL) 100 MG tablet TAKE 1 TABLET TWICE DAILY 180 tablet 3    memantine (NAMENDA) 10 MG Tab TAKE 1 TABLET TWICE DAILY 180 tablet 0    metoprolol succinate (TOPROL-XL) 50 MG 24 hr tablet Take 1 tablet (50 mg total) by mouth once daily. 90 tablet 3    primidone (MYSOLINE) 250 MG Tab TAKE 2 TABLETS TWICE DAILY 360 tablet 0    STIOLTO RESPIMAT 2.5-2.5 mcg/actuation Mist INHALE 2 PUFFS INTO THE LUNGS ONCE DAILY. 12 g 3    torsemide (DEMADEX) 10 MG Tab TAKE 1 TABLET (10 MG TOTAL) BY MOUTH EVERY MORNING. 90 tablet 3    [DISCONTINUED] ELIQUIS 5 mg Tab TAKE 1 TABLET TWICE DAILY 180 tablet 3    [START ON 8/16/2023] apixaban (ELIQUIS) 5 mg Tab Take 1 tablet (5 mg total) by mouth 2 (two) times daily. 180 tablet 3     Facility-Administered Encounter Medications as of 5/16/2023   Medication Dose Route Frequency Provider Last Rate Last Admin    0.9%  NaCl infusion (for blood administration)   Intravenous Q24H PRN Batool Rodriguez NP            Review of patient's allergies indicates:   Allergen Reactions    Bactroban [mupirocin calcium] Rash     Other reaction(s): Rash       Physical Exam      Vital Signs  Pulse: 70  SpO2: 97 %  BP: 132/78  Pain Score: 0-No pain  Height and Weight  Height: 6' (182.9 cm)  Weight: 106 kg (233 lb 11.2 oz)  BSA (Calculated - sq m): 2.32 sq meters  BMI (Calculated): 31.7  Weight in (lb) to have BMI = 25: 183.9]    Physical Exam  Vitals reviewed. Nursing note reviewed: Negative orthostasis.  Constitutional:       General: He is not in acute distress.  HENT:      Head: Normocephalic and atraumatic.      Mouth/Throat:      Mouth: Mucous membranes are moist.   Eyes:      Extraocular Movements: Extraocular movements intact.      Pupils: Pupils are equal, round, and reactive to light.   Cardiovascular:      Rate and Rhythm:  Normal rate and regular rhythm.      Pulses: Normal pulses.      Heart sounds: Normal heart sounds.   Pulmonary:      Effort: Pulmonary effort is normal.      Breath sounds: Normal breath sounds.   Musculoskeletal:      Right lower leg: No edema.      Left lower leg: No edema.        Feet:    Feet:      Comments: Mildly swollen left ankle with onychomycosis of all toe nails  Skin:     General: Skin is warm.   Neurological:      General: No focal deficit present.      Mental Status: He is alert.      Gait: Gait abnormal (Ambulates with walker).   Psychiatric:         Mood and Affect: Mood normal.      NA    Laboratory:  CBC:  Recent Labs   Lab Result Units 04/28/23  0956   WBC K/uL 6.48   RBC M/uL 4.60   Hemoglobin g/dL 14.0   Hematocrit % 42.5   Platelets K/uL 189   MCV fL 92   MCH pg 30.4   MCHC g/dL 32.9       CMP:  Recent Labs   Lab Result Units 04/28/23  0956   Glucose mg/dL 118*   Calcium mg/dL 8.1*   Sodium mmol/L 138   Potassium mmol/L 3.8   CO2 mmol/L 30*   Chloride mmol/L 102   BUN mg/dL 11       URINALYSIS:  No results for input(s): COLORU, CLARITYU, SPECGRAV, PHUR, PROTEINUA, GLUCOSEU, BILIRUBINCON, BLOODU, WBCU, RBCU, BACTERIA, MUCUS, NITRITE, LEUKOCYTESUR, UROBILINOGEN, HYALINECASTS in the last 2160 hours.   LIPIDS:  No results for input(s): TSH, HDL, CHOL, TRIG, LDLCALC, CHOLHDL, NONHDLCHOL, TOTALCHOLEST in the last 2160 hours.  TSH:  No results for input(s): TSH in the last 2160 hours.  A1C:  No results for input(s): HGBA1C in the last 2160 hours.    Radiology:      Assessment/Plan     Heri Muhammad is a 82 y.o.male with:    Hospital follow up visit   -For persistent Afib at high risk of fall while on DOAC  -s/p watchmann procedure at [facility] (05/05/2023)  -procedure well tolerated  -No bleeding from any orifices  -No cardiac symptoms today  -plan for repeat GALILEO (06/16/2023)  -plans to d/c DOAC in the next 6months per EP    2. Episodic lightheadedness  -lmproving, negative  orthostasis  -orthostasis negative  -ensure hydration  -will d/c memantine/primidone if it continues to worsen  -referral to ENT next clinic visit    3. Subclinical hypothyroidism  Overview:  Lab Results   Component Value Date    TSH 5.520 12/19//2022   - TFT improving  -will c/t monitor for now    4. Essential hypertension  Overview:  - well controlled  - continue current medication    5. Mixed hyperlipidemia  Overview:  Lab Results   Component Value Date    LDLCALC 88.4 05/06/2022     - on statin  - followed by Cardiology    6. Seizure disorder  Overview:  - stable and no recent seizures since 1990's  - CT Head 2019 with diffuse atrophy  - EEG 1/2019 mild diffuse slowing  - followed by Neurology    7. Chronic diastolic heart failure  Overview:  - no signs of acute decompensation  - no leg swelling per wife on evaluation at home  -Hold off torsemide for now  - medical management    8. Benign essential tremor  Overview:  - followed by Neurology  - on primidone and propranolol recently added     -Continue current medications and maintain follow up with specialists.      Patient and wife verbalized understanding and agree with current treatment plans.    Transitional Care Note    Family and/or Caretaker present at visit?  Yes. Wife  Diagnostic tests reviewed/disposition: I have reviewed all completed as well as pending diagnostic tests at the time of discharge.  Disease/illness education: yes  Home health/community services discussion/referrals: Patient does not have home health established from hospital visit.  They do not need home health.  If needed, we will set up home health for the patient.   Establishment or re-establishment of referral orders for community resources: No other necessary community resources.   Discussion with other health care providers: No discussion with other health care providers necessary.        Dr Ivonne Robles MD  Internal Medicine  [facility]  [facility]

## 2023-06-15 ENCOUNTER — TELEPHONE (OUTPATIENT)
Dept: ELECTROPHYSIOLOGY | Facility: CLINIC | Age: 83
End: 2023-06-15
Payer: MEDICARE

## 2023-06-15 NOTE — TELEPHONE ENCOUNTER
Spoke to Ms. Melania, patient's wife    CONFIRMED procedure arrival time of 0900    Reiterated instructions including:  -Directions to check in desk  -NPO after midnight night prior to procedure  -Confirmed no fever, cough, or shortness of breath    Patient verbalized understanding of above and appreciated the call.

## 2023-06-16 ENCOUNTER — HOSPITAL ENCOUNTER (OUTPATIENT)
Dept: CARDIOLOGY | Facility: HOSPITAL | Age: 83
Discharge: HOME | End: 2023-06-16
Attending: INTERNAL MEDICINE
Payer: MEDICARE

## 2023-06-16 ENCOUNTER — ANESTHESIA EVENT (OUTPATIENT)
Dept: MEDSURG UNIT | Facility: HOSPITAL | Age: 83
End: 2023-06-16
Payer: MEDICARE

## 2023-06-16 ENCOUNTER — HOSPITAL ENCOUNTER (OUTPATIENT)
Facility: HOSPITAL | Age: 83
Discharge: HOME | End: 2023-06-16
Attending: INTERNAL MEDICINE | Admitting: INTERNAL MEDICINE
Payer: MEDICARE

## 2023-06-16 ENCOUNTER — ANESTHESIA (OUTPATIENT)
Dept: MEDSURG UNIT | Facility: HOSPITAL | Age: 83
End: 2023-06-16
Payer: MEDICARE

## 2023-06-16 VITALS
BODY MASS INDEX: 30.34 KG/M2 | WEIGHT: 224 LBS | OXYGEN SATURATION: 97 % | HEART RATE: 80 BPM | RESPIRATION RATE: 16 BRPM | SYSTOLIC BLOOD PRESSURE: 132 MMHG | TEMPERATURE: 98 F | HEIGHT: 72 IN | DIASTOLIC BLOOD PRESSURE: 65 MMHG

## 2023-06-16 VITALS
BODY MASS INDEX: 30.34 KG/M2 | DIASTOLIC BLOOD PRESSURE: 72 MMHG | SYSTOLIC BLOOD PRESSURE: 141 MMHG | WEIGHT: 224 LBS | HEIGHT: 72 IN

## 2023-06-16 DIAGNOSIS — I48.19 PERSISTENT ATRIAL FIBRILLATION: ICD-10-CM

## 2023-06-16 DIAGNOSIS — Z95.818 PRESENCE OF WATCHMAN LEFT ATRIAL APPENDAGE CLOSURE DEVICE: ICD-10-CM

## 2023-06-16 DIAGNOSIS — I48.91 ATRIAL FIBRILLATION: ICD-10-CM

## 2023-06-16 LAB
ASCENDING AORTA: 4.1 CM
BSA FOR ECHO PROCEDURE: 2.27 M2
EJECTION FRACTION: 55 %
SINUS: 3.1 CM
STJ: 3.2 CM

## 2023-06-16 PROCEDURE — 63600175 PHARM REV CODE 636 W HCPCS: Performed by: NURSE ANESTHETIST, CERTIFIED REGISTERED

## 2023-06-16 PROCEDURE — 93312 TRANSESOPHAGEAL ECHO (TEE) (CUPID ONLY): ICD-10-PCS | Mod: 26,,, | Performed by: INTERNAL MEDICINE

## 2023-06-16 PROCEDURE — 93325 TRANSESOPHAGEAL ECHO (TEE) (CUPID ONLY): ICD-10-PCS | Mod: 26,,, | Performed by: INTERNAL MEDICINE

## 2023-06-16 PROCEDURE — 93320 TRANSESOPHAGEAL ECHO (TEE) (CUPID ONLY): ICD-10-PCS | Mod: 26,,, | Performed by: INTERNAL MEDICINE

## 2023-06-16 PROCEDURE — 37000008 HC ANESTHESIA 1ST 15 MINUTES

## 2023-06-16 PROCEDURE — D9220A PRA ANESTHESIA: ICD-10-PCS | Mod: CRNA,,, | Performed by: NURSE ANESTHETIST, CERTIFIED REGISTERED

## 2023-06-16 PROCEDURE — 93005 ELECTROCARDIOGRAM TRACING: CPT | Mod: 59

## 2023-06-16 PROCEDURE — 93010 EKG 12-LEAD: ICD-10-PCS | Mod: ,,, | Performed by: INTERNAL MEDICINE

## 2023-06-16 PROCEDURE — 93320 DOPPLER ECHO COMPLETE: CPT | Mod: 26,,, | Performed by: INTERNAL MEDICINE

## 2023-06-16 PROCEDURE — D9220A PRA ANESTHESIA: ICD-10-PCS | Mod: ANES,,, | Performed by: ANESTHESIOLOGY

## 2023-06-16 PROCEDURE — 93312 ECHO TRANSESOPHAGEAL: CPT | Mod: 26,,, | Performed by: INTERNAL MEDICINE

## 2023-06-16 PROCEDURE — 25000003 PHARM REV CODE 250: Performed by: NURSE ANESTHETIST, CERTIFIED REGISTERED

## 2023-06-16 PROCEDURE — 93325 DOPPLER ECHO COLOR FLOW MAPG: CPT | Mod: 26,,, | Performed by: INTERNAL MEDICINE

## 2023-06-16 PROCEDURE — D9220A PRA ANESTHESIA: Mod: ANES,,, | Performed by: ANESTHESIOLOGY

## 2023-06-16 PROCEDURE — D9220A PRA ANESTHESIA: Mod: CRNA,,, | Performed by: NURSE ANESTHETIST, CERTIFIED REGISTERED

## 2023-06-16 PROCEDURE — 93010 ELECTROCARDIOGRAM REPORT: CPT | Mod: ,,, | Performed by: INTERNAL MEDICINE

## 2023-06-16 PROCEDURE — 37000009 HC ANESTHESIA EA ADD 15 MINS

## 2023-06-16 PROCEDURE — 93325 DOPPLER ECHO COLOR FLOW MAPG: CPT

## 2023-06-16 RX ORDER — CLOPIDOGREL BISULFATE 75 MG/1
75 TABLET ORAL DAILY
Qty: 90 TABLET | Refills: 1 | Status: SHIPPED | OUTPATIENT
Start: 2023-06-16 | End: 2023-08-17 | Stop reason: SDUPTHER

## 2023-06-16 RX ORDER — FENTANYL CITRATE 50 UG/ML
25 INJECTION, SOLUTION INTRAMUSCULAR; INTRAVENOUS EVERY 5 MIN PRN
Status: DISCONTINUED | OUTPATIENT
Start: 2023-06-16 | End: 2023-06-16 | Stop reason: HOSPADM

## 2023-06-16 RX ORDER — PROPOFOL 10 MG/ML
VIAL (ML) INTRAVENOUS
Status: DISCONTINUED | OUTPATIENT
Start: 2023-06-16 | End: 2023-06-16

## 2023-06-16 RX ORDER — PHENYLEPHRINE HCL IN 0.9% NACL 1 MG/10 ML
SYRINGE (ML) INTRAVENOUS
Status: DISCONTINUED | OUTPATIENT
Start: 2023-06-16 | End: 2023-06-16

## 2023-06-16 RX ORDER — ASPIRIN 81 MG/1
81 TABLET ORAL DAILY
Qty: 90 TABLET | Refills: 1 | Status: SHIPPED | OUTPATIENT
Start: 2023-06-16 | End: 2024-06-15

## 2023-06-16 RX ORDER — PROPOFOL 10 MG/ML
INJECTION, EMULSION INTRAVENOUS CONTINUOUS PRN
Status: DISCONTINUED | OUTPATIENT
Start: 2023-06-16 | End: 2023-06-16

## 2023-06-16 RX ORDER — ONDANSETRON 2 MG/ML
4 INJECTION INTRAMUSCULAR; INTRAVENOUS DAILY PRN
Status: DISCONTINUED | OUTPATIENT
Start: 2023-06-16 | End: 2023-06-16 | Stop reason: HOSPADM

## 2023-06-16 RX ORDER — LIDOCAINE HYDROCHLORIDE 20 MG/ML
INJECTION, SOLUTION EPIDURAL; INFILTRATION; INTRACAUDAL; PERINEURAL
Status: DISCONTINUED | OUTPATIENT
Start: 2023-06-16 | End: 2023-06-16

## 2023-06-16 RX ADMIN — PROPOFOL 100 MCG/KG/MIN: 10 INJECTION, EMULSION INTRAVENOUS at 11:06

## 2023-06-16 RX ADMIN — Medication 200 MCG: at 12:06

## 2023-06-16 RX ADMIN — PROPOFOL 50 MG: 10 INJECTION, EMULSION INTRAVENOUS at 11:06

## 2023-06-16 RX ADMIN — LIDOCAINE HYDROCHLORIDE 100 MG: 20 INJECTION, SOLUTION EPIDURAL; INFILTRATION; INTRACAUDAL; PERINEURAL at 11:06

## 2023-06-16 RX ADMIN — SODIUM CHLORIDE: 0.9 INJECTION, SOLUTION INTRAVENOUS at 11:06

## 2023-06-16 NOTE — PROGRESS NOTES
Dr. Lester came to bedside to explain procedure to Pt. Md okayed Pt not having chest Xray. Pt and wife given discharge paperwork. Education reiterated. All questions and concerns addressed. Pt walked to restroom without issues. Pt urinated without problems. Ivs and tele removed. Pt wheeled out with wife and transporter.

## 2023-06-16 NOTE — ANESTHESIA PREPROCEDURE EVALUATION
06/16/2023  Heri Muhammad is a 82 y.o., male.  Pre-operative evaluation for Procedure(s) (LRB):  Transesophageal echo (GALILEO) intra-procedure log documentation (N/A)    Heri Muhammad is a 82 y.o. male       Patient Active Problem List   Diagnosis    Mixed hyperlipidemia    CAD (coronary artery disease)    Permanent atrial fibrillation    Sinus node dysfunction    Benign essential tremor    Status post catheter ablation of atrial fibrillation    Current use of long term anticoagulation    Seizure disorder    Essential hypertension    Aortic atherosclerosis    Chronic diastolic heart failure    Subclinical hypothyroidism    Late onset Alzheimer's disease without behavioral disturbance    Biventricular cardiac pacemaker in situ    Chronic bilateral low back pain without sciatica    History of skin cancer    Bilateral shoulder pain    Chronic pain of right ankle    Chronic foot pain, right    Post-traumatic arthritis of ankle, right    Lung granuloma    Insomnia    Simple chronic bronchitis    Decreased strength, endurance, and mobility    Abnormal thyroid function test    Overweight (BMI 25.0-29.9)    Bilateral impacted cerumen    Electrolyte imbalance    Hypokalemia    Hypocalcemia    Diarrhea    Episodic lightheadedness    Pain due to onychomycosis of toenails of both feet    PAF (paroxysmal atrial fibrillation)    At high risk for falls       Past Surgical History:   Procedure Laterality Date    ANKLE SURGERY Right     pins and screws    ATRIAL ABLATION SURGERY      CARDIAC PACEMAKER PLACEMENT  2014    CARDIAC PACEMAKER PLACEMENT      CARDIAC PACEMAKER PLACEMENT  2014    CARDIOVERSION      CATARACT EXTRACTION      OU    COLONOSCOPY      due in 2014    EYE SURGERY Bilateral     cataracts extraction    FRACTURE SURGERY Right     ankle with hardware    HERNIA REPAIR       abdominal    INJECTION OF ANESTHETIC AGENT AROUND MEDIAL BRANCH NERVES INNERVATING LUMBAR FACET JOINT Bilateral 2019    Procedure: Block-nerve-medial branch-lumbar L2-L5;  Surgeon: Anthony Moffett MD;  Location: [facility] OR;  Service: Pain Management;  Laterality: Bilateral;    JOINT REPLACEMENT Bilateral     shoulders    OCCLUSION OF LEFT ATRIAL APPENDAGE N/A 2023    Procedure: Left atrial appendage occlusion;  Surgeon: Hema Del Valle MD;  Location: [facility] EP LAB;  Service: Cardiology;  Laterality: N/A;  afib, watchman, BSCI, ben, anes, MB, 3prep    RADIOFREQUENCY ABLATION OF LUMBAR MEDIAL BRANCH NERVE AT SINGLE LEVEL Bilateral 2019    Procedure: Radiofrequency Ablation, Nerve, Spinal, Lumbar, Medial Branch, L2-L5;  Surgeon: Anthony Moffett MD;  Location: [facility] OR;  Service: Pain Management;  Laterality: Bilateral;    TRANSESOPHAGEAL ECHOCARDIOGRAPHY N/A 2023    Procedure: ECHOCARDIOGRAM, TRANSESOPHAGEAL;  Surgeon: Dylan Layne MD;  Location: [facility] EP LAB;  Service: Cardiology;  Laterality: N/A;    TREATMENT OF CARDIAC ARRHYTHMIA N/A 2022    Procedure: Cardioversion or Defibrillation;  Surgeon: MIREILLE Morel MD;  Location: [facility] EP LAB;  Service: Cardiology;  Laterality: N/A;  AF, BEN, DCCV, MAC, EH (to cover for SK), 3 Prep *SJM CRT-P in situ*       Social History     Socioeconomic History    Marital status:    Occupational History    Occupation: retired   Tobacco Use    Smoking status: Former     Packs/day: 1.00     Years: 5.00     Pack years: 5.00     Types: Cigarettes     Quit date:      Years since quittin.4    Smokeless tobacco: Never   Substance and Sexual Activity    Alcohol use: No    Drug use: No    Sexual activity: Not Currently     Partners: Female   Social History Narrative    . 4 adult children.      Social Determinants of Health     Financial Resource Strain: Low Risk     Difficulty of Paying Living Expenses: Not hard at all   Food  Insecurity: No Food Insecurity    Worried About Running Out of Food in the Last Year: Never true    Ran Out of Food in the Last Year: Never true   Transportation Needs: No Transportation Needs    Lack of Transportation (Medical): No    Lack of Transportation (Non-Medical): No   Physical Activity: Inactive    Days of Exercise per Week: 0 days    Minutes of Exercise per Session: 0 min   Stress: No Stress Concern Present    Feeling of Stress : Not at all   Social Connections: Socially Isolated    Frequency of Communication with Friends and Family: Never    Frequency of Social Gatherings with Friends and Family: Twice a week    Attends Tenriism Services: Never    Active Member of Clubs or Organizations: No    Attends Club or Organization Meetings: Never    Marital Status:    Housing Stability: Low Risk     Unable to Pay for Housing in the Last Year: No    Number of Places Lived in the Last Year: 1    Unstable Housing in the Last Year: No       Current Facility-Administered Medications on File Prior to Encounter   Medication Dose Route Frequency Provider Last Rate Last Admin    0.9%  NaCl infusion (for blood administration)   Intravenous Q24H PRN Batool Rodriguez NP         Current Outpatient Medications on File Prior to Encounter   Medication Sig Dispense Refill    atorvastatin (LIPITOR) 40 MG tablet TAKE 1 TABLET EVERY DAY 90 tablet 3    cetirizine (ZYRTEC) 10 MG tablet Take 10 mg by mouth every evening.      citalopram (CELEXA) 20 MG tablet TAKE 1 TABLET EVERY DAY 90 tablet 3    cyclobenzaprine (FLEXERIL) 5 MG tablet Take 5 mg by mouth nightly. prn      lamoTRIgine (LAMICTAL) 100 MG tablet TAKE 1 TABLET TWICE DAILY 180 tablet 3    memantine (NAMENDA) 10 MG Tab TAKE 1 TABLET TWICE DAILY 180 tablet 0    metoprolol succinate (TOPROL-XL) 50 MG 24 hr tablet Take 1 tablet (50 mg total) by mouth once daily. 90 tablet 3    STIOLTO RESPIMAT 2.5-2.5 mcg/actuation Mist INHALE 2 PUFFS INTO  THE LUNGS ONCE DAILY. 12 g 3    torsemide (DEMADEX) 10 MG Tab TAKE 1 TABLET (10 MG TOTAL) BY MOUTH EVERY MORNING. 90 tablet 3       Review of patient's allergies indicates:   Allergen Reactions    Bactroban [mupirocin calcium] Rash     Other reaction(s): Rash         CBC: No results for input(s): WBC, RBC, HGB, HCT, PLT, MCV, MCH, MCHC in the last 72 hours.    CMP: No results for input(s): NA, K, CL, CO2, BUN, CREATININE, GLU, MG, PHOS, CALCIUM, ALBUMIN, PROT, ALKPHOS, ALT, AST, BILITOT in the last 72 hours.    INR  No results for input(s): PT, INR, PROTIME, APTT in the last 72 hours.      Diagnostic Studies:    EKG:   Results for orders placed or performed during the hospital encounter of 05/05/23   EKG 12-lead    Collection Time: 05/05/23 11:02 AM    Narrative    Test Reason : Z95.818,    Vent. Rate : 070 BPM     Atrial Rate : 070 BPM     P-R Int : 000 ms          QRS Dur : 166 ms      QT Int : 476 ms       P-R-T Axes : 000 220 011 degrees     QTc Int : 514 ms    Basic rhythm Atrial fibrillation  Ventricular-paced rhythm  Biventricular pacemaker detected  Abnormal ECG  When compared with ECG of 05-MAY-2023 07:15,  Atrial fibrillation has replaced Sinus rhythm  Ventricular-paced rhythm Now present  Reconfirmed by Santana Florentino MD ([id number]) on 5/6/2023 12:38:51 AM    Referred By: JONEL VANCE           Confirmed By:Santana Florentino MD       TTE:  Results for orders placed or performed during the hospital encounter of 07/19/21   Echo Color Flow Doppler? Yes   Result Value Ref Range    Ascending aorta 3.41 cm    STJ 2.88 cm    AV mean gradient 5 mmHg    Ao peak hi 1.61 m/s    Ao VTI 27.70 cm    IVRT 99.90 msec    IVS 0.76 0.6 - 1.1 cm    LA size 4.90 cm    Left Atrium Major Axis 6.90 cm    Left Atrium Minor Axis 6.85 cm    LVIDd 5.52 3.5 - 6.0 cm    LVIDs 4.09 (A) 2.1 - 4.0 cm    LVOT diameter 2.37 cm    LVOT peak VTI 21.64 cm    Posterior Wall 0.87 0.6 - 1.1 cm    MV Peak A Hi 0.26 m/s    E wave deceleration time 231.07  "msec    MV Peak E Hi 0.72 m/s    PV Peak D Hi 0.64 m/s    PV Peak S Hi 0.24 m/s    RA Major Axis 5.17 cm    RA Width 3.61 cm    RVDD 2.81 cm    Sinus 3.27 cm    TAPSE 2.04 cm    TR Max Hi 3.02 m/s    TDI LATERAL 0.07 m/s    TDI SEPTAL 0.07 m/s    LA WIDTH 4.61 cm    MV stenosis pressure 1/2 time 67.01 ms    LV Diastolic Volume 148.66 mL    LV Systolic Volume 73.93 mL    RV S' 14.71 cm/s    LVOT peak hi 1.23 m/s    LA volume (mod) 81.38 cm3    MV "A" wave duration 11.42 msec    LV LATERAL E/E' RATIO 10.29 m/s    LV SEPTAL E/E' RATIO 10.29 m/s    FS 26 %    LA volume 132.00 cm3    LV mass 164.78 g    Left Ventricle Relative Wall Thickness 0.32 cm    AV valve area 3.44 cm2    AV Velocity Ratio 0.76     AV index (prosthetic) 0.78     MV valve area p 1/2 method 3.28 cm2    E/A ratio 2.77     Mean e' 0.07 m/s    Pulm vein S/D ratio 0.38     LVOT area 4.4 cm2    LVOT stroke volume 95.42 cm3    AV peak gradient 10 mmHg    E/E' ratio 10.29 m/s    Triscuspid Valve Regurgitation Peak Gradient 36 mmHg    BSA 2.33 m2    LV Systolic Volume Index 32.4 mL/m2    LV Diastolic Volume Index 65.20 mL/m2    LA Volume Index 57.9 mL/m2    LV Mass Index 72 g/m2    LA Volume Index (Mod) 35.7 mL/m2    Right Atrial Pressure (from IVC) 3 mmHg    EF 60 %    TV rest pulmonary artery pressure 39 mmHg    Narrative    · The estimated ejection fraction is 60%.  · The left ventricle is normal in size with normal systolic function.  · Grade III left ventricular diastolic dysfunction.  · Normal right ventricular size with normal right ventricular systolic   function.  · Severe left atrial enlargement.  · The estimated PA systolic pressure is 39 mmHg.  · Normal central venous pressure (3 mmHg).        EF   Date Value Ref Range Status   05/05/2023 55 % Final   07/11/2022 60 % Final      Results for orders placed or performed during the hospital encounter of 01/19/18   2D echo with color flow doppler   Result Value Ref Range    EF + QEF 30 (A) 55 - 65 "    Mitral Valve Regurgitation TRIVIAL     Est. PA Systolic Pressure 18.84     Tricuspid Valve Regurgitation MILD        GALILEO:  Results for orders placed or performed during the hospital encounter of 07/11/22   Transesophageal echo (GALILEO)   Result Value Ref Range    BSA 2.25 m2    EF 60 %    Narrative    · This study was performed for BERNADETTE clearance prior to DCCV  · Normal appearing left atrial appendage. No thrombus is present in the   appendage. BERNADETTE occluder is absent. Abnormal appendage velocities.  · The left ventricle is normal in size with normal systolic function.The   estimated ejection fraction is 60%.  · Normal right ventricular size with normal right ventricular systolic   function.  · Biatrial enlargement.  · Grade 1 plaque present.          Stress Test:  No results found for this or any previous visit.       LHC:  Results for orders placed during the hospital encounter of 05/05/23    Cardiac catheterization    Conclusion    The estimated blood loss was none.    The left atrial appendage closure was successful  with a DEVICE PlusFourSix FLX CLSR 31MM device.       PFT:  No results found for: FEV1, FVC, ODT7HIU, TLC, DLCO     ALLERGIES:     Review of patient's allergies indicates:   Allergen Reactions    Bactroban [mupirocin calcium] Rash     Other reaction(s): Rash     LDA:          Lines/Drains/Airways     None                Anesthesia Evaluation      Airway   Mallampati: II  TM distance: > 6 cm  Neck ROM: Normal ROM  Dental    (+) Intact    Pulmonary    (+) COPD,   Cardiovascular   (+) hypertension, dysrhythmias,     Rate: Normal    Neuro/Psych      GI/Hepatic/Renal      Endo/Other    (+) hypothyroidism,   Abdominal                         Pre-op Assessment    I have reviewed the Patient Summary Reports.     I have reviewed the Nursing Notes. I have reviewed the NPO Status.   I have reviewed the Medications.     Review of Systems  Anesthesia Hx:  No problems with previous Anesthesia  Denies Family Hx of  Anesthesia complications.   Denies Personal Hx of Anesthesia complications.   Cardiovascular:   Hypertension Dysrhythmias S/p watchman   Pulmonary:   COPD    Renal/:  Renal/ Normal     Hepatic/GI:  Hepatic/GI Normal    Neurological:  Neurology Normal    Endocrine:   Hypothyroidism        Physical Exam  General: Well nourished    Airway:  Mallampati: II   Mouth Opening: Normal  TM Distance: > 6 cm  Tongue: Normal  Neck ROM: Normal ROM    Dental:  Intact    Chest/Lungs:  Normal Respiratory Rate    Heart:  Rate: Normal        Anesthesia Plan  Type of Anesthesia, risks & benefits discussed:    Anesthesia Type: Gen Natural Airway, MAC  Intra-op Monitoring Plan: Standard ASA Monitors  Post Op Pain Control Plan: multimodal analgesia and IV/PO Opioids PRN  Induction:  IV  Airway Plan: Direct, Post-Induction  Informed Consent: Informed consent signed with the Patient and all parties understand the risks and agree with anesthesia plan.  All questions answered. Patient consented to blood products? Yes  ASA Score: 3  Day of Surgery Review of History & Physical: H&P Update referred to the surgeon/provider.    Ready For Surgery From Anesthesia Perspective.     .

## 2023-06-16 NOTE — ANESTHESIA POSTPROCEDURE EVALUATION
Anesthesia Post Evaluation    Patient: Heri Muhammad    Procedure(s) Performed: Procedure(s) (LRB):  Transesophageal echo (GALILEO) intra-procedure log documentation (N/A)    Final Anesthesia Type: general      Level of consciousness: awake and alert  Post-procedure vital signs: reviewed and stable  Pain control: Pain has been treated.  Airway patency: patent    PONV status: Absent or treated.  Anesthetic complications: no      Cardiovascular status: hemodynamically stable  Respiratory status: unassisted  Hydration status: euvolemic            Vitals Value Taken Time   /65 06/16/23 1431   Temp 36.5 °C (97.7 °F) 06/16/23 1430   Pulse 80 06/16/23 1437   Resp 15 06/16/23 1437   SpO2 97 % 06/16/23 1436   Vitals shown include unvalidated device data.      No case tracking events are documented in the log.      Pain/Jono Score: Jono Score: 8 (6/16/2023 12:45 PM)

## 2023-06-16 NOTE — PLAN OF CARE
Patient arrived to room. PIV placed. Admit assessment completed. Plan of care discussed with patient. Wife at bedside. Nurse call bell within reach. Will monitor

## 2023-06-16 NOTE — TRANSFER OF CARE
Anesthesia Transfer of Care Note    Patient: Heri Muhammad    Procedure(s) Performed: Procedure(s) (LRB):  Transesophageal echo (GALILEO) intra-procedure log documentation (N/A)    Patient location: Cath Lab    Anesthesia Type: MAC    Transport from OR: Transported from OR on 2-3 L/min O2 by NC with adequate spontaneous ventilation    Post pain: adequate analgesia    Post assessment: no apparent anesthetic complications    Post vital signs: stable    Level of consciousness: awake and alert    Nausea/Vomiting: no nausea/vomiting    Complications: none    Transfer of care protocol was followed      Last vitals:   Visit Vitals  /71 (BP Location: Right arm, Patient Position: Lying)   Pulse 80   Temp 36.3 °C (97.3 °F) (Temporal)   Resp 20   Ht 6' (1.829 m)   Wt 101.6 kg (224 lb)   SpO2 96%   BMI 30.38 kg/m²

## 2023-06-16 NOTE — DISCHARGE SUMMARY
[facility] - [facility]  Cardiology   Discharge Summary         Patient Name: Heri Muhammad   MRN: [id number]   Admission Date: 6/16/2023    Hospital Length of Stay: 0   Discharge Date: 06/16/2023   Attending Physician: Sathish Burgess MD   Discharging Provider: Frederick Lester MD      HPI:   Mr Heri Muhammad is an 82 year old male with PMH of atrial fibrillation who underwent Watchman device implant on 5/5/23. He presents today for 6 week follow up GALILEO.     Hospital Course:   Mr Muhammad underwent uncomplicated GALILEO which demonstrated well seated Watchman device with small ~3 mm angeline device leak, similar to what was visualized at the time of implant. He was discharged home in stable condition. Instructed to stop DOAC and transition to DAPT.     Follow up:   No follow-ups on file.    Disposition:   Home or Self Care         Medication List        START taking these medications      aspirin 81 MG EC tablet  Commonly known as: ECOTRIN  Take 1 tablet (81 mg total) by mouth once daily.     clopidogreL 75 mg tablet  Commonly known as: PLAVIX  Take 1 tablet (75 mg total) by mouth once daily.            CONTINUE taking these medications      atorvastatin 40 MG tablet  Commonly known as: LIPITOR  TAKE 1 TABLET EVERY DAY     cetirizine 10 MG tablet  Commonly known as: ZYRTEC     citalopram 20 MG tablet  Commonly known as: CeleXA  TAKE 1 TABLET EVERY DAY     cyclobenzaprine 5 MG tablet  Commonly known as: FLEXERIL     lamoTRIgine 100 MG tablet  Commonly known as: LAMICTAL  TAKE 1 TABLET TWICE DAILY     memantine 10 MG Tab  Commonly known as: NAMENDA  TAKE 1 TABLET TWICE DAILY     metoprolol succinate 50 MG 24 hr tablet  Commonly known as: TOPROL-XL  Take 1 tablet (50 mg total) by mouth once daily.     primidone 250 MG Tab  Commonly known as: MYSOLINE  TAKE 2 TABLETS TWICE DAILY     STIOLTO RESPIMAT 2.5-2.5 mcg/actuation Mist  Generic drug: tiotropium-olodateroL  INHALE 2 PUFFS INTO THE LUNGS ONCE DAILY.     torsemide 10 MG  Tab  Commonly known as: DEMADEX  TAKE 1 TABLET (10 MG TOTAL) BY MOUTH EVERY MORNING.            STOP taking these medications      apixaban 5 mg Tab  Commonly known as: ELIQUIS               Where to Get Your Medications        These medications were sent to [facility]  [address] [address]      Hours: Mon-Fri 7a-7p, Sat-Sun 10a-4p Phone: [phone]   aspirin 81 MG EC tablet  clopidogreL 75 mg tablet           Frederick Lester MD  [facility]  Cardiovascular Disease, PGY-V

## 2023-06-16 NOTE — PROGRESS NOTES
Pt brought over to [facility] with ZANE Griffin and EP CLARENCE Alas for bedside . VS taken and WNL. Safety measures in place. Pt is drowsy/sleepy at this time. RN at bedside.

## 2023-06-19 ENCOUNTER — TELEPHONE (OUTPATIENT)
Dept: ELECTROPHYSIOLOGY | Facility: CLINIC | Age: 83
End: 2023-06-19
Payer: MEDICARE

## 2023-06-19 NOTE — TELEPHONE ENCOUNTER
Merlin alert for persistent AF, onset 5/20/2023, ventricular rate controlled, BiV pacing >99%  S/p w/ hx of AF  PVI 1/2016; PVI 3/2017  RFA of CTI 1/2016, mitral annular line 3/2017, roof line 3/2017  DCCV 10/5/2022,  DCCV 07/11/2022  Watchman device 5/5/2023, discontinued OAC last week    S/w wife by phone, she reports he c/o fatigue and SOB x the past few week.  No longer on amiodarone (stopped 11/2022)    Will notify MD of AF, persistent now x 29 days

## 2023-06-22 ENCOUNTER — CLINICAL SUPPORT (OUTPATIENT)
Dept: CARDIOLOGY | Facility: HOSPITAL | Age: 83
End: 2023-06-22
Payer: MEDICARE

## 2023-06-22 DIAGNOSIS — Z95.0 PRESENCE OF CARDIAC PACEMAKER: ICD-10-CM

## 2023-06-22 PROCEDURE — 93294 REM INTERROG EVL PM/LDLS PM: CPT | Mod: ,,, | Performed by: INTERNAL MEDICINE

## 2023-06-22 PROCEDURE — 93296 REM INTERROG EVL PM/IDS: CPT | Performed by: INTERNAL MEDICINE

## 2023-06-22 PROCEDURE — 93294 CARDIAC DEVICE CHECK - REMOTE: ICD-10-PCS | Mod: ,,, | Performed by: INTERNAL MEDICINE

## 2023-07-03 RX ORDER — METOPROLOL SUCCINATE 50 MG/1
TABLET, EXTENDED RELEASE ORAL
Qty: 90 TABLET | Refills: 2 | Status: SHIPPED | OUTPATIENT
Start: 2023-07-03

## 2023-07-07 ENCOUNTER — PATIENT MESSAGE (OUTPATIENT)
Dept: ELECTROPHYSIOLOGY | Facility: CLINIC | Age: 83
End: 2023-07-07
Payer: MEDICARE

## 2023-07-11 ENCOUNTER — TELEPHONE (OUTPATIENT)
Dept: ELECTROPHYSIOLOGY | Facility: CLINIC | Age: 83
End: 2023-07-11
Payer: MEDICARE

## 2023-07-11 NOTE — TELEPHONE ENCOUNTER
Recieved message to schedule patient's next follow. Called and spoke with patient wife, advised her that they have a post op visit with Eligio in August and then after that we will know when he is recommended for his next follow up with Isaias. Wilian Jb verballized understanding.

## 2023-07-14 ENCOUNTER — PATIENT MESSAGE (OUTPATIENT)
Dept: FAMILY MEDICINE | Facility: CLINIC | Age: 83
End: 2023-07-14
Payer: MEDICARE

## 2023-07-14 ENCOUNTER — PATIENT MESSAGE (OUTPATIENT)
Dept: ELECTROPHYSIOLOGY | Facility: CLINIC | Age: 83
End: 2023-07-14
Payer: MEDICARE

## 2023-07-17 ENCOUNTER — OFFICE VISIT (OUTPATIENT)
Dept: FAMILY MEDICINE | Facility: CLINIC | Age: 83
End: 2023-07-17
Payer: MEDICARE

## 2023-07-17 VITALS
HEART RATE: 109 BPM | OXYGEN SATURATION: 97 % | WEIGHT: 229 LBS | BODY MASS INDEX: 31.02 KG/M2 | DIASTOLIC BLOOD PRESSURE: 60 MMHG | SYSTOLIC BLOOD PRESSURE: 118 MMHG | HEIGHT: 72 IN

## 2023-07-17 DIAGNOSIS — I48.0 PAF (PAROXYSMAL ATRIAL FIBRILLATION): ICD-10-CM

## 2023-07-17 DIAGNOSIS — R53.81 PHYSICAL DECONDITIONING: ICD-10-CM

## 2023-07-17 DIAGNOSIS — E78.49 OTHER HYPERLIPIDEMIA: ICD-10-CM

## 2023-07-17 DIAGNOSIS — G30.1 LATE ONSET ALZHEIMER'S DISEASE WITHOUT BEHAVIORAL DISTURBANCE: ICD-10-CM

## 2023-07-17 DIAGNOSIS — G40.909 SEIZURE DISORDER: ICD-10-CM

## 2023-07-17 DIAGNOSIS — Z91.81 AT HIGH RISK FOR FALLS: ICD-10-CM

## 2023-07-17 DIAGNOSIS — R40.0 SOMNOLENCE: ICD-10-CM

## 2023-07-17 DIAGNOSIS — J41.0 SIMPLE CHRONIC BRONCHITIS: ICD-10-CM

## 2023-07-17 DIAGNOSIS — R73.03 PREDIABETES: ICD-10-CM

## 2023-07-17 DIAGNOSIS — F02.80 LATE ONSET ALZHEIMER'S DISEASE WITHOUT BEHAVIORAL DISTURBANCE: ICD-10-CM

## 2023-07-17 DIAGNOSIS — Z13.1 SCREENING FOR DIABETES MELLITUS (DM): ICD-10-CM

## 2023-07-17 DIAGNOSIS — E03.8 SUBCLINICAL HYPOTHYROIDISM: ICD-10-CM

## 2023-07-17 PROCEDURE — 1160F RVW MEDS BY RX/DR IN RCRD: CPT | Mod: HCNC,CPTII,S$GLB, | Performed by: STUDENT IN AN ORGANIZED HEALTH CARE EDUCATION/TRAINING PROGRAM

## 2023-07-17 PROCEDURE — 3074F PR MOST RECENT SYSTOLIC BLOOD PRESSURE < 130 MM HG: ICD-10-PCS | Mod: HCNC,CPTII,S$GLB, | Performed by: STUDENT IN AN ORGANIZED HEALTH CARE EDUCATION/TRAINING PROGRAM

## 2023-07-17 PROCEDURE — 3288F FALL RISK ASSESSMENT DOCD: CPT | Mod: HCNC,CPTII,S$GLB, | Performed by: STUDENT IN AN ORGANIZED HEALTH CARE EDUCATION/TRAINING PROGRAM

## 2023-07-17 PROCEDURE — 3074F SYST BP LT 130 MM HG: CPT | Mod: HCNC,CPTII,S$GLB, | Performed by: STUDENT IN AN ORGANIZED HEALTH CARE EDUCATION/TRAINING PROGRAM

## 2023-07-17 PROCEDURE — 1126F PR PAIN SEVERITY QUANTIFIED, NO PAIN PRESENT: ICD-10-PCS | Mod: HCNC,CPTII,S$GLB, | Performed by: STUDENT IN AN ORGANIZED HEALTH CARE EDUCATION/TRAINING PROGRAM

## 2023-07-17 PROCEDURE — 1100F PTFALLS ASSESS-DOCD GE2>/YR: CPT | Mod: HCNC,CPTII,S$GLB, | Performed by: STUDENT IN AN ORGANIZED HEALTH CARE EDUCATION/TRAINING PROGRAM

## 2023-07-17 PROCEDURE — 1126F AMNT PAIN NOTED NONE PRSNT: CPT | Mod: HCNC,CPTII,S$GLB, | Performed by: STUDENT IN AN ORGANIZED HEALTH CARE EDUCATION/TRAINING PROGRAM

## 2023-07-17 PROCEDURE — 1159F MED LIST DOCD IN RCRD: CPT | Mod: HCNC,CPTII,S$GLB, | Performed by: STUDENT IN AN ORGANIZED HEALTH CARE EDUCATION/TRAINING PROGRAM

## 2023-07-17 PROCEDURE — 3288F PR FALLS RISK ASSESSMENT DOCUMENTED: ICD-10-PCS | Mod: HCNC,CPTII,S$GLB, | Performed by: STUDENT IN AN ORGANIZED HEALTH CARE EDUCATION/TRAINING PROGRAM

## 2023-07-17 PROCEDURE — 99999 PR PBB SHADOW E&M-EST. PATIENT-LVL III: CPT | Mod: PBBFAC,HCNC,, | Performed by: STUDENT IN AN ORGANIZED HEALTH CARE EDUCATION/TRAINING PROGRAM

## 2023-07-17 PROCEDURE — 1100F PR PT FALLS ASSESS DOC 2+ FALLS/FALL W/INJURY/YR: ICD-10-PCS | Mod: HCNC,CPTII,S$GLB, | Performed by: STUDENT IN AN ORGANIZED HEALTH CARE EDUCATION/TRAINING PROGRAM

## 2023-07-17 PROCEDURE — 99215 PR OFFICE/OUTPT VISIT, EST, LEVL V, 40-54 MIN: ICD-10-PCS | Mod: HCNC,S$GLB,, | Performed by: STUDENT IN AN ORGANIZED HEALTH CARE EDUCATION/TRAINING PROGRAM

## 2023-07-17 PROCEDURE — 1159F PR MEDICATION LIST DOCUMENTED IN MEDICAL RECORD: ICD-10-PCS | Mod: HCNC,CPTII,S$GLB, | Performed by: STUDENT IN AN ORGANIZED HEALTH CARE EDUCATION/TRAINING PROGRAM

## 2023-07-17 PROCEDURE — 3078F DIAST BP <80 MM HG: CPT | Mod: HCNC,CPTII,S$GLB, | Performed by: STUDENT IN AN ORGANIZED HEALTH CARE EDUCATION/TRAINING PROGRAM

## 2023-07-17 PROCEDURE — 3078F PR MOST RECENT DIASTOLIC BLOOD PRESSURE < 80 MM HG: ICD-10-PCS | Mod: HCNC,CPTII,S$GLB, | Performed by: STUDENT IN AN ORGANIZED HEALTH CARE EDUCATION/TRAINING PROGRAM

## 2023-07-17 PROCEDURE — 99215 OFFICE O/P EST HI 40 MIN: CPT | Mod: HCNC,S$GLB,, | Performed by: STUDENT IN AN ORGANIZED HEALTH CARE EDUCATION/TRAINING PROGRAM

## 2023-07-17 PROCEDURE — 99999 PR PBB SHADOW E&M-EST. PATIENT-LVL III: ICD-10-PCS | Mod: PBBFAC,HCNC,, | Performed by: STUDENT IN AN ORGANIZED HEALTH CARE EDUCATION/TRAINING PROGRAM

## 2023-07-17 PROCEDURE — 1160F PR REVIEW ALL MEDS BY PRESCRIBER/CLIN PHARMACIST DOCUMENTED: ICD-10-PCS | Mod: HCNC,CPTII,S$GLB, | Performed by: STUDENT IN AN ORGANIZED HEALTH CARE EDUCATION/TRAINING PROGRAM

## 2023-07-17 NOTE — PROGRESS NOTES
[facility] Note    Chief Complaint      Chief Complaint   Patient presents with    Follow up   Excessive day time sleepiness     Notes:    History of Present Illness          Heri Muhammad is a 82 y.o. male with Afib/Aflutter,Sinus node dysfunction( s/p CRT-P and multiple cardioversion , sHTN, HLD, seizure disorder, DJD lumbar spine, Aortic atherosclerosis, HFpEF, hypothyroidism, restrictive cardiomyopathy, chronic LBP, chronic bilateral shoulder pain bilateral shoulder replacements and COPD for f/u on chronic conditions in addition has multiple complaints:    He c/o excessive daytime sleeplessness. He sleeps well at night but sometimes wakes up multiple times at night with apneic episodes. He however said he feels refreshed in the morning.   He also c/o worsening SOB despite compliance with his current inhaler, the SOB has worsened since he was switched from anoro to stiolto respimat      He has not been exercising daily and according to wife, he prefers to sit mostly in chair watching games and would rather not want to go out . He still enjoys hanging out with his family members especially his great grandson who brings him candy' , wife states he is not depressed because there has been no changes in his behavior since they got .                                                                  Electrophysiologist : Dr Deep Esparza MD  Cardiologist : Dr Graham  Pain : Dr Negron  Neurologist : Dr Anjum Arellano    Problem List Addressed This Visit:    #Excessive daytime sleepiness  # Worsening SOB  #Episodic Lightheadedness  #Onychomycosis of multiple toes  #sHTN  #Afib  #BMI 31  #Physical deconditioning  #Somnolence    Health Maintenance   Topic Date Due    TETANUS VACCINE  12/06/2026    Lipid Panel  05/06/2027       Past Medical History:   Diagnosis Date    Anticoagulant long-term use     eliquis    Aortic atherosclerosis 9/18/2017    - LDL goal <70 - BP goal <130/80    Atrial flutter      Benign essential tremor     Biventricular cardiac pacemaker in situ 9/24/2018    CAD (coronary artery disease) 12/2/2013    - 9/2013 Left heart Cath: LAD- 60% prox (neg FFR), nl, ramus and RCA 20% - LDL goal <70 - BP goal <130/80    Cancer     skin cancer on back    Carpal tunnel syndrome of left wrist 8/8/2017    Chronic diastolic heart failure 1/19/2018    Chronic obstructive pulmonary disease 10/3/2018    Chronic right-sided thoracic back pain 4/27/2017    Cough     Current use of long term anticoagulation 6/2/2016    - Eliquis for Afib    Dizziness     intermittant    DJD (degenerative joint disease) of knee     ED (erectile dysfunction)     Hard of hearing 11/2016    History of cataract     Hyponatremia 6/11/2019    Memory loss     Mixed hyperlipidemia 7/28/2012    Persistent atrial fibrillation 7/1/2015    - followed by Dr. Esparza - history of cardioversion - on Eliquis OAC 5 mg BID    Seizure disorder     Seizures     last episode 1995    Subclinical hypothyroidism     Tension type headache 7/2/2019       Past Surgical History:   Procedure Laterality Date    ANKLE SURGERY Right     pins and screws    ATRIAL ABLATION SURGERY      CARDIAC PACEMAKER PLACEMENT  2014    CARDIAC PACEMAKER PLACEMENT      CARDIAC PACEMAKER PLACEMENT  2014    CARDIOVERSION      CATARACT EXTRACTION      OU    COLONOSCOPY      due in 2014    ECHOCARDIOGRAM,TRANSESOPHAGEAL N/A 6/16/2023    Procedure: Transesophageal echo (GALILEO) intra-procedure log documentation;  Surgeon: [facility] Diagnostic Provider;  Location: [facility] EP LAB;  Service: Cardiology;  Laterality: N/A;  s/p WM, GALILEO, anes, 3 Prep    EYE SURGERY Bilateral     cataracts extraction    FRACTURE SURGERY Right     ankle with hardware    HERNIA REPAIR      abdominal    INJECTION OF ANESTHETIC AGENT AROUND MEDIAL BRANCH NERVES INNERVATING LUMBAR FACET JOINT Bilateral 1/17/2019    Procedure: Block-nerve-medial branch-lumbar L2-L5;  Surgeon: Anthony Moffett MD;  Location: [facility] OR;   Service: Pain Management;  Laterality: Bilateral;    JOINT REPLACEMENT Bilateral     shoulders    OCCLUSION OF LEFT ATRIAL APPENDAGE N/A 2023    Procedure: Left atrial appendage occlusion;  Surgeon: Hema Del Valle MD;  Location: [facility] EP LAB;  Service: Cardiology;  Laterality: N/A;  afib, watchman, BSCI, ben, anes, MB, 3prep    RADIOFREQUENCY ABLATION OF LUMBAR MEDIAL BRANCH NERVE AT SINGLE LEVEL Bilateral 2019    Procedure: Radiofrequency Ablation, Nerve, Spinal, Lumbar, Medial Branch, L2-L5;  Surgeon: Antohny Moffett MD;  Location: [facility] OR;  Service: Pain Management;  Laterality: Bilateral;    TRANSESOPHAGEAL ECHOCARDIOGRAPHY N/A 2023    Procedure: ECHOCARDIOGRAM, TRANSESOPHAGEAL;  Surgeon: Dylan Layne MD;  Location: [facility] EP LAB;  Service: Cardiology;  Laterality: N/A;    TREATMENT OF CARDIAC ARRHYTHMIA N/A 2022    Procedure: Cardioversion or Defibrillation;  Surgeon: MIREILLE Morel MD;  Location: [facility] EP LAB;  Service: Cardiology;  Laterality: N/A;  AF, BEN, DCCV, MAC, EH (to cover for SK), 3 Prep *SJM CRT-P in situ*       family history includes Blindness in his father; Cancer in his daughter and father; Cataracts in his father; Dementia in his mother; Heart disease in his mother; Heart failure in his mother; Hypertension in his father; No Known Problems in his son; Obesity in his daughter; Tremor in his brother, father, and paternal grandfather.    Social History     Tobacco Use    Smoking status: Former     Packs/day: 1.00     Years: 5.00     Pack years: 5.00     Types: Cigarettes     Quit date:      Years since quittin.5    Smokeless tobacco: Never   Substance Use Topics    Alcohol use: No    Drug use: No       Review of Systems   Constitutional:  Negative for activity change and unexpected weight change.   HENT:  Negative for hearing loss, rhinorrhea and trouble swallowing.    Eyes:  Negative for discharge and visual disturbance.   Respiratory:  Positive for shortness  of breath. Negative for chest tightness and wheezing.    Cardiovascular:  Negative for chest pain and palpitations.   Gastrointestinal:  Negative for blood in stool, constipation, diarrhea and vomiting.   Endocrine: Negative for polydipsia and polyuria.   Genitourinary:  Negative for difficulty urinating, hematuria and urgency.   Musculoskeletal:  Negative for arthralgias, joint swelling and neck pain.   Neurological:  Negative for weakness, light-headedness and headaches.   Psychiatric/Behavioral:  Positive for sleep disturbance. Negative for confusion and dysphoric mood.      Outpatient Encounter Medications as of 7/17/2023   Medication Sig Dispense Refill    aspirin (ECOTRIN) 81 MG EC tablet Take 1 tablet (81 mg total) by mouth once daily. 90 tablet 1    atorvastatin (LIPITOR) 40 MG tablet TAKE 1 TABLET EVERY DAY 90 tablet 3    cetirizine (ZYRTEC) 10 MG tablet Take 10 mg by mouth every evening.      citalopram (CELEXA) 20 MG tablet TAKE 1 TABLET EVERY DAY 90 tablet 3    clopidogreL (PLAVIX) 75 mg tablet Take 1 tablet (75 mg total) by mouth once daily. 90 tablet 1    cyclobenzaprine (FLEXERIL) 5 MG tablet Take 5 mg by mouth nightly. prn      lamoTRIgine (LAMICTAL) 100 MG tablet TAKE 1 TABLET TWICE DAILY 180 tablet 3    memantine (NAMENDA) 10 MG Tab TAKE 1 TABLET TWICE DAILY 180 tablet 0    metoprolol succinate (TOPROL-XL) 50 MG 24 hr tablet TAKE 1 TABLET ONE TIME DAILY 90 tablet 2    primidone (MYSOLINE) 250 MG Tab TAKE 2 TABLETS TWICE DAILY 360 tablet 0    torsemide (DEMADEX) 10 MG Tab TAKE 1 TABLET (10 MG TOTAL) BY MOUTH EVERY MORNING. 90 tablet 3    [DISCONTINUED] STIOLTO RESPIMAT 2.5-2.5 mcg/actuation Mist INHALE 2 PUFFS INTO THE LUNGS ONCE DAILY. 12 g 3    fluticasone-umeclidin-vilanter (TRELEGY ELLIPTA) 100-62.5-25 mcg DsDv Inhale 1 puff into the lungs once daily. 60 each 3     Facility-Administered Encounter Medications as of 7/17/2023   Medication Dose Route Frequency Provider Last Rate Last Admin    0.9%   NaCl infusion (for blood administration)   Intravenous Q24H PRN Batool Rodriguez NP            Review of patient's allergies indicates:   Allergen Reactions    Bactroban [mupirocin calcium] Rash     Other reaction(s): Rash       Physical Exam      Vital Signs  Pulse: 109  SpO2: 97 %  BP: 118/60  Pain Score: 0-No pain  Height and Weight  Height: 6' (182.9 cm)  Weight: 103.9 kg (229 lb)  BSA (Calculated - sq m): 2.3 sq meters  BMI (Calculated): 31.1  Weight in (lb) to have BMI = 25: 183.9]    Vital signs reviewed  General PE: In no acute distress, appear stated age  HEENT: PERRL, EOMI, moist mucosa, ears: TM nl, impaired hearing L>R  Chest : few wheezing on posterior lung fields bilaterally  CVS: NRRR,S1 and S2 only, no m/r/g, good peripheral pulses, no pedal edema  Abdomen : NABS, flat, no area of tenderness, no palpable organomegaly     Laboratory:  CBC:  Recent Labs   Lab Result Units 04/28/23  0956   WBC K/uL 6.48   RBC M/uL 4.60   Hemoglobin g/dL 14.0   Hematocrit % 42.5   Platelets K/uL 189   MCV fL 92   MCH pg 30.4   MCHC g/dL 32.9       CMP:  Recent Labs   Lab Result Units 04/28/23  0956   Glucose mg/dL 118*   Calcium mg/dL 8.1*   Sodium mmol/L 138   Potassium mmol/L 3.8   CO2 mmol/L 30*   Chloride mmol/L 102   BUN mg/dL 11       URINALYSIS:  No results for input(s): COLORU, CLARITYU, SPECGRAV, PHUR, PROTEINUA, GLUCOSEU, BILIRUBINCON, BLOODU, WBCU, RBCU, BACTERIA, MUCUS, NITRITE, LEUKOCYTESUR, UROBILINOGEN, HYALINECASTS in the last 2160 hours.   LIPIDS:  No results for input(s): TSH, HDL, CHOL, TRIG, LDLCALC, CHOLHDL, NONHDLCHOL, TOTALCHOLEST in the last 2160 hours.  TSH:  No results for input(s): TSH in the last 2160 hours.  A1C:  No results for input(s): HGBA1C in the last 2160 hours.    Radiology:      Assessment/Plan     Heri Muhammad is a 82 y.o.male with:    Excessive daytime sleepiness  -? Obstructive Sleep apnea  Vs Central, STOP BANG with 3 points  -he declined referral for sleep study, risks  and benefits explained , verbalized understanding    2. Persistent Afib at high risk of fall while on DOAC  -s/p watchmann procedure at [facility] (05/05/2023)  -procedure well tolerated  -No bleeding from any orifices  -No cardiac symptoms today  -DOAC to be stopped by 11/2023 per EP recs    3. Episodic lightheadedness  -lmproving, negative orthostasis  -orthostasis negative  -ensure hydration  -will d/c memantine/primidone if it continues to worsen  -referral to ENT next clinic visit    3. Subclinical hypothyroidism  Overview:  Lab Results   Component Value Date    TSH 5.520 12/19//2022   - TFT improving  -monitor for now    4. Essential hypertension  Overview:  - well controlled  - continue current medication    5. Mixed hyperlipidemia  Overview:  Lab Results   Component Value Date    LDLCALC 88.4 05/06/2022     - on statin  - followed by Cardiology    6. Seizure disorder  Overview:  - stable on lamictal and no recent seizures since 1990's  - CT Head 2019 with diffuse atrophy  - EEG 1/2019 mild diffuse slowing  - followed by Neurology    7. Chronic diastolic heart failure  Overview:  - no signs of acute decompensation  - no leg swelling per wife on evaluation at home  -Torsemide PRN only  - medical management    8. Benign essential tremor  Overview:  - followed by Neurology  - on primidone and propranolol recently added    9. Worsening SOB  -likely due to uncontrolled COPD Vs physical deconditioning  -stiolto resp not effective  -insurance not covering anoro ellpita  -will start on trelergy   -patient counseled on graded exercise therapy    10. BMI 31  -counseled on life style modifications    -Continue current medications and maintain follow up with specialists.      Patient and wife verbalized understanding and agree with current treatment plans.     Dr Ivonne Robles MD  Internal Medicine  [facility]

## 2023-07-18 ENCOUNTER — PATIENT MESSAGE (OUTPATIENT)
Dept: CARDIOLOGY | Facility: HOSPITAL | Age: 83
End: 2023-07-18
Payer: MEDICARE

## 2023-07-18 RX ORDER — ATORVASTATIN CALCIUM 80 MG/1
80 TABLET, FILM COATED ORAL DAILY
Qty: 90 TABLET | Refills: 3 | Status: SHIPPED | OUTPATIENT
Start: 2023-07-18 | End: 2023-09-09 | Stop reason: SDUPTHER

## 2023-08-08 DIAGNOSIS — J41.0 SIMPLE CHRONIC BRONCHITIS: ICD-10-CM

## 2023-08-16 NOTE — PROGRESS NOTES
Subjective:   Patient ID:  Heri Muhammad is a 82 y.o. male who presents for follow-up of Atrial Fibrillation      82 yoM, patient of Dr Esparza, referred for LAAO consideration.     3/23: He has permanent AF after two attempts at PVI. He has a DC PM. He is on eliquis for CVA prophylaxis. He has had several falls recently with subsequent bruising prompting referral for LAAO.     Interval history: LAAO 5/5/23 with follow up GALILEO 6/16/23 showing no leak. On DAPT, plavix stop date 11/5/23.     GALILEO 7/22:  · This study was performed for BERNADETTE clearance prior to DCCV  · Normal appearing left atrial appendage. No thrombus is present in the appendage. BERNADETTE occluder is absent. Abnormal appendage velocities.  · The left ventricle is normal in size with normal systolic function.The estimated ejection fraction is 60%.  · Normal right ventricular size with normal right ventricular systolic function.  · Biatrial enlargement.  · Grade 1 plaque present.    CHADSVASC of 4  HASBLED of 4    Past Medical History:  No date: Anticoagulant long-term use      Comment:  eliquis  9/18/2017: Aortic atherosclerosis      Comment:  - LDL goal <70 - BP goal <130/80  No date: Atrial flutter  No date: Benign essential tremor  9/24/2018: Biventricular cardiac pacemaker in situ  12/2/2013: CAD (coronary artery disease)      Comment:  - 9/2013 Left heart Cath: LAD- 60% prox (neg FFR), nl,                ramus and RCA 20% - LDL goal <70 - BP goal <130/80  No date: Cancer      Comment:  skin cancer on back  8/8/2017: Carpal tunnel syndrome of left wrist  1/19/2018: Chronic diastolic heart failure  10/3/2018: Chronic obstructive pulmonary disease  4/27/2017: Chronic right-sided thoracic back pain  No date: Cough  6/2/2016: Current use of long term anticoagulation      Comment:  - Eliquis for Afib  No date: Dizziness      Comment:  intermittant  No date: DJD (degenerative joint disease) of knee  No date: ED (erectile dysfunction)  11/2016: Hard of hearing  No  date: History of cataract  6/11/2019: Hyponatremia  No date: Memory loss  7/28/2012: Mixed hyperlipidemia  7/1/2015: Persistent atrial fibrillation      Comment:  - followed by Dr. Esparza - history of cardioversion - on               Eliquis OAC 5 mg BID  No date: Seizure disorder  No date: Seizures      Comment:  last episode 1995  No date: Subclinical hypothyroidism  7/2/2019: Tension type headache    Past Surgical History:  No date: ANKLE SURGERY; Right      Comment:  pins and screws  No date: ATRIAL ABLATION SURGERY  2014: CARDIAC PACEMAKER PLACEMENT  No date: CARDIAC PACEMAKER PLACEMENT  2014: CARDIAC PACEMAKER PLACEMENT  No date: CARDIOVERSION  No date: CATARACT EXTRACTION      Comment:  OU  No date: COLONOSCOPY      Comment:  due in 2014 6/16/2023: ECHOCARDIOGRAM,TRANSESOPHAGEAL; N/A      Comment:  Procedure: Transesophageal echo (BEN) intra-procedure                log documentation;  Surgeon: [facility] Diagnostic Provider;                 Location: [facility] EP LAB;  Service: Cardiology;  Laterality:               N/A;  s/p WM, BEN, anes, 3 Prep  No date: EYE SURGERY; Bilateral      Comment:  cataracts extraction  No date: FRACTURE SURGERY; Right      Comment:  ankle with hardware  No date: HERNIA REPAIR      Comment:  abdominal  1/17/2019: INJECTION OF ANESTHETIC AGENT AROUND MEDIAL BRANCH NERVES   INNERVATING LUMBAR FACET JOINT; Bilateral      Comment:  Procedure: Block-nerve-medial branch-lumbar L2-L5;                 Surgeon: Anthony Moffett MD;  Location: [facility] OR;                 Service: Pain Management;  Laterality: Bilateral;  No date: JOINT REPLACEMENT; Bilateral      Comment:  shoulders  5/5/2023: OCCLUSION OF LEFT ATRIAL APPENDAGE; N/A      Comment:  Procedure: Left atrial appendage occlusion;  Surgeon:                Hema Del Valle MD;  Location: [facility] EP LAB;  Service:               Cardiology;  Laterality: N/A;  afib, watchman, BSCI, ben,               anes, MB, 3prep  1/23/2019: RADIOFREQUENCY  ABLATION OF LUMBAR MEDIAL BRANCH NERVE AT   SINGLE LEVEL; Bilateral      Comment:  Procedure: Radiofrequency Ablation, Nerve, Spinal,                Lumbar, Medial Branch, L2-L5;  Surgeon: Anthony Moffett MD;  Location: [facility] OR;  Service: Pain Management;                 Laterality: Bilateral;  2023: TRANSESOPHAGEAL ECHOCARDIOGRAPHY; N/A      Comment:  Procedure: ECHOCARDIOGRAM, TRANSESOPHAGEAL;  Surgeon:                Dylan Layne MD;  Location: [facility] EP LAB;  Service:                Cardiology;  Laterality: N/A;  2022: TREATMENT OF CARDIAC ARRHYTHMIA; N/A      Comment:  Procedure: Cardioversion or Defibrillation;  Surgeon: MIREILLE Morel MD;  Location: [facility] EP LAB;  Service:                Cardiology;  Laterality: N/A;  AF, GALILEO, DCCV, MAC, EH (to               cover for SK), 3 Prep *SJM CRT-P in situ*    Social History    Socioeconomic History      Marital status:     Occupational History      Occupation: retired    Tobacco Use      Smoking status: Former        Packs/day: 0        Years: 1 pack/day for 5.0 years (5.0 ttl pk-yrs)        Types: Cigarettes        Start date:         Quit date:         Years since quittin.6      Smokeless tobacco: Never    Substance and Sexual Activity      Alcohol use: No      Drug use: No      Sexual activity: Not Currently        Partners: Female    Social History Narrative      . 4 adult children.     Social Determinants of Health  Financial Resource Strain: Low Risk  (2023)      Overall Financial Resource Strain (CARDIA)          Difficulty of Paying Living Expenses: Not hard at all  Food Insecurity: No Food Insecurity (2023)      Hunger Vital Sign          Worried About Running Out of Food in the Last Year: Never true          Ran Out of Food in the Last Year: Never true  Transportation Needs: No Transportation Needs (2023)      PRAPARE - Transportation          Lack of Transportation  (Medical): No          Lack of Transportation (Non-Medical): No  Physical Activity: Inactive (4/24/2023)      Exercise Vital Sign          Days of Exercise per Week: 0 days          Minutes of Exercise per Session: 0 min  Stress: No Stress Concern Present (4/24/2023)      [employer] [place] [employer] - Occupational Stress Questionnaire          Feeling of Stress : Not at all  Social Connections: Socially Isolated (4/24/2023)      Social Connection and Isolation Panel [NHANES]          Frequency of Communication with Friends and Family: Never          Frequency of Social Gatherings with Friends and Family: Twice a week           Attends Hindu Services: Never          Active Member of Clubs or Organizations: No          Attends Club or Organization Meetings: Never          Marital Status:   Housing Stability: Low Risk  (4/24/2023)      Housing Stability Vital Sign          Unable to Pay for Housing in the Last Year: No          Number of Places Lived in the Last Year: 1          Unstable Housing in the Last Year: No    Review of patient's family history indicates:  Problem: Heart disease      Relation: Mother          Age of Onset: (Not Specified)  Problem: Heart failure      Relation: Mother          Age of Onset: (Not Specified)  Problem: Dementia      Relation: Mother          Age of Onset: (Not Specified)  Problem: Cancer      Relation: Father          Age of Onset: (Not Specified)          Comment: colon  Problem: Blindness      Relation: Father          Age of Onset: (Not Specified)          Comment: accident  Problem: Cataracts      Relation: Father          Age of Onset: (Not Specified)  Problem: Hypertension      Relation: Father          Age of Onset: (Not Specified)  Problem: Tremor      Relation: Father          Age of Onset: (Not Specified)  Problem: Tremor      Relation: Brother          Age of Onset: (Not Specified)  Problem: Cancer      Relation: Daughter          Age of Onset:  (Not Specified)          Comment: cancer as a baby, unsure of what kind  Problem: Obesity      Relation: Daughter          Age of Onset: (Not Specified)  Problem: No Known Problems      Relation: Son          Age of Onset: (Not Specified)  Problem: Tremor      Relation: Paternal Grandfather          Age of Onset: (Not Specified)  Problem: Melanoma      Relation: Neg Hx          Age of Onset: (Not Specified)  Problem: Amblyopia      Relation: Neg Hx          Age of Onset: (Not Specified)  Problem: Diabetes      Relation: Neg Hx          Age of Onset: (Not Specified)  Problem: Glaucoma      Relation: Neg Hx          Age of Onset: (Not Specified)  Problem: Macular degeneration      Relation: Neg Hx          Age of Onset: (Not Specified)  Problem: Retinal detachment      Relation: Neg Hx          Age of Onset: (Not Specified)  Problem: Strabismus      Relation: Neg Hx          Age of Onset: (Not Specified)  Problem: Stroke      Relation: Neg Hx          Age of Onset: (Not Specified)  Problem: Thyroid disease      Relation: Neg Hx          Age of Onset: (Not Specified)    Current Outpatient Medications:  aspirin (ECOTRIN) 81 MG EC tablet, Take 1 tablet (81 mg total) by mouth once daily., Disp: 90 tablet, Rfl: 1  atorvastatin (LIPITOR) 80 MG tablet, Take 1 tablet (80 mg total) by mouth once daily., Disp: 90 tablet, Rfl: 3  cetirizine (ZYRTEC) 10 MG tablet, Take 10 mg by mouth every evening., Disp: , Rfl:   citalopram (CELEXA) 20 MG tablet, TAKE 1 TABLET EVERY DAY, Disp: 90 tablet, Rfl: 3  clopidogreL (PLAVIX) 75 mg tablet, Take 1 tablet (75 mg total) by mouth once daily., Disp: 90 tablet, Rfl: 1  fluticasone-umeclidin-vilanter (TRELEGY ELLIPTA) 100-62.5-25 mcg DsDv, Inhale 1 puff into the lungs once daily., Disp: 60 each, Rfl: 3  lamoTRIgine (LAMICTAL) 100 MG tablet, TAKE 1 TABLET TWICE DAILY, Disp: 180 tablet, Rfl: 3  memantine (NAMENDA) 10 MG Tab, TAKE 1 TABLET TWICE DAILY, Disp: 180 tablet, Rfl: 0  metoprolol succinate  (TOPROL-XL) 50 MG 24 hr tablet, TAKE 1 TABLET ONE TIME DAILY, Disp: 90 tablet, Rfl: 2  primidone (MYSOLINE) 250 MG Tab, TAKE 2 TABLETS TWICE DAILY, Disp: 360 tablet, Rfl: 0  torsemide (DEMADEX) 10 MG Tab, TAKE 1 TABLET (10 MG TOTAL) BY MOUTH EVERY MORNING., Disp: 90 tablet, Rfl: 3    No current facility-administered medications for this visit.  Facility-Administered Medications Ordered in Other Visits:  0.9%  NaCl infusion (for blood administration), , Intravenous, Q24H PRN, Batool Rodriguez NP                Review of Systems   Constitutional: Negative.   HENT: Negative.     Eyes: Negative.    Cardiovascular:  Negative for chest pain, dyspnea on exertion, leg swelling, near-syncope, palpitations and syncope.   Respiratory: Negative.  Negative for shortness of breath.    Endocrine: Negative.    Hematologic/Lymphatic: Negative.    Skin: Negative.    Musculoskeletal: Negative.    Gastrointestinal: Negative.    Genitourinary: Negative.    Neurological: Negative.  Negative for dizziness and light-headedness.   Psychiatric/Behavioral: Negative.     Allergic/Immunologic: Negative.        Objective:   Physical Exam  Vitals and nursing note reviewed.   Constitutional:       General: He is not in acute distress.     Appearance: Normal appearance. He is well-developed.   HENT:      Head: Normocephalic and atraumatic.      Nose: Nose normal.   Eyes:      Pupils: Pupils are equal, round, and reactive to light.   Neck:      Thyroid: No thyromegaly.      Vascular: No JVD.   Cardiovascular:      Rate and Rhythm: Normal rate and regular rhythm.      Chest Wall: PMI is not displaced.      Heart sounds: Normal heart sounds, S1 normal and S2 normal. No murmur heard.     No friction rub. No gallop.   Pulmonary:      Effort: Pulmonary effort is normal. No respiratory distress.      Breath sounds: Normal breath sounds. No wheezing or rales.   Chest:      Comments: Device to LUCW. Incision and pocket in good repair.    Abdominal:       General: Bowel sounds are normal. There is no distension.      Palpations: Abdomen is soft.      Tenderness: There is no abdominal tenderness. There is no guarding or rebound.   Musculoskeletal:         General: No tenderness. Normal range of motion.      Cervical back: Normal range of motion.   Skin:     General: Skin is warm and dry.      Findings: No erythema or rash.   Neurological:      Mental Status: He is alert and oriented to person, place, and time.      Cranial Nerves: No cranial nerve deficit.   Psychiatric:         Speech: Speech normal.         Behavior: Behavior normal.         Thought Content: Thought content normal.         Judgment: Judgment normal.         Assessment:      1. Permanent atrial fibrillation        Plan:   82 yoM here for post LAAO visit. Can stop plavix 11/5/23. RTC as needed     Follow up with Dr Esparza for device and AF management

## 2023-08-17 ENCOUNTER — OFFICE VISIT (OUTPATIENT)
Dept: ELECTROPHYSIOLOGY | Facility: CLINIC | Age: 83
End: 2023-08-17
Payer: MEDICARE

## 2023-08-17 ENCOUNTER — CLINICAL SUPPORT (OUTPATIENT)
Dept: CARDIOLOGY | Facility: HOSPITAL | Age: 83
End: 2023-08-17
Attending: INTERNAL MEDICINE
Payer: MEDICARE

## 2023-08-17 ENCOUNTER — HOSPITAL ENCOUNTER (OUTPATIENT)
Dept: CARDIOLOGY | Facility: CLINIC | Age: 83
Discharge: HOME | End: 2023-08-17
Payer: MEDICARE

## 2023-08-17 VITALS
BODY MASS INDEX: 31.03 KG/M2 | HEIGHT: 72 IN | DIASTOLIC BLOOD PRESSURE: 78 MMHG | HEART RATE: 83 BPM | WEIGHT: 229.06 LBS | SYSTOLIC BLOOD PRESSURE: 110 MMHG

## 2023-08-17 DIAGNOSIS — I48.19 PERSISTENT ATRIAL FIBRILLATION: ICD-10-CM

## 2023-08-17 DIAGNOSIS — I48.21 PERMANENT ATRIAL FIBRILLATION: Primary | ICD-10-CM

## 2023-08-17 DIAGNOSIS — I49.8 OTHER SPECIFIED CARDIAC ARRHYTHMIAS: ICD-10-CM

## 2023-08-17 PROCEDURE — 3078F DIAST BP <80 MM HG: CPT | Mod: HCNC,CPTII,S$GLB, | Performed by: INTERNAL MEDICINE

## 2023-08-17 PROCEDURE — 3078F PR MOST RECENT DIASTOLIC BLOOD PRESSURE < 80 MM HG: ICD-10-PCS | Mod: HCNC,CPTII,S$GLB, | Performed by: INTERNAL MEDICINE

## 2023-08-17 PROCEDURE — 1159F PR MEDICATION LIST DOCUMENTED IN MEDICAL RECORD: ICD-10-PCS | Mod: HCNC,CPTII,S$GLB, | Performed by: INTERNAL MEDICINE

## 2023-08-17 PROCEDURE — 3074F SYST BP LT 130 MM HG: CPT | Mod: HCNC,CPTII,S$GLB, | Performed by: INTERNAL MEDICINE

## 2023-08-17 PROCEDURE — 1126F AMNT PAIN NOTED NONE PRSNT: CPT | Mod: HCNC,CPTII,S$GLB, | Performed by: INTERNAL MEDICINE

## 2023-08-17 PROCEDURE — 99999 PR PBB SHADOW E&M-EST. PATIENT-LVL III: ICD-10-PCS | Mod: PBBFAC,HCNC,, | Performed by: INTERNAL MEDICINE

## 2023-08-17 PROCEDURE — 99214 OFFICE O/P EST MOD 30 MIN: CPT | Mod: HCNC,S$GLB,, | Performed by: INTERNAL MEDICINE

## 2023-08-17 PROCEDURE — 1126F PR PAIN SEVERITY QUANTIFIED, NO PAIN PRESENT: ICD-10-PCS | Mod: HCNC,CPTII,S$GLB, | Performed by: INTERNAL MEDICINE

## 2023-08-17 PROCEDURE — 93005 RHYTHM STRIP: ICD-10-PCS | Mod: HCNC,S$GLB,, | Performed by: INTERNAL MEDICINE

## 2023-08-17 PROCEDURE — 99999 PR PBB SHADOW E&M-EST. PATIENT-LVL III: CPT | Mod: PBBFAC,HCNC,, | Performed by: INTERNAL MEDICINE

## 2023-08-17 PROCEDURE — 93010 ELECTROCARDIOGRAM REPORT: CPT | Mod: HCNC,S$GLB,, | Performed by: INTERNAL MEDICINE

## 2023-08-17 PROCEDURE — 1159F MED LIST DOCD IN RCRD: CPT | Mod: HCNC,CPTII,S$GLB, | Performed by: INTERNAL MEDICINE

## 2023-08-17 PROCEDURE — 93005 ELECTROCARDIOGRAM TRACING: CPT | Mod: HCNC,S$GLB,, | Performed by: INTERNAL MEDICINE

## 2023-08-17 PROCEDURE — 99214 PR OFFICE/OUTPT VISIT, EST, LEVL IV, 30-39 MIN: ICD-10-PCS | Mod: HCNC,S$GLB,, | Performed by: INTERNAL MEDICINE

## 2023-08-17 PROCEDURE — 3074F PR MOST RECENT SYSTOLIC BLOOD PRESSURE < 130 MM HG: ICD-10-PCS | Mod: HCNC,CPTII,S$GLB, | Performed by: INTERNAL MEDICINE

## 2023-08-17 PROCEDURE — 93010 RHYTHM STRIP: ICD-10-PCS | Mod: HCNC,S$GLB,, | Performed by: INTERNAL MEDICINE

## 2023-08-17 RX ORDER — CLOPIDOGREL BISULFATE 75 MG/1
75 TABLET ORAL DAILY
Qty: 90 TABLET | Refills: 0 | Status: SHIPPED | OUTPATIENT
Start: 2023-08-17 | End: 2024-01-16

## 2023-08-28 ENCOUNTER — PATIENT MESSAGE (OUTPATIENT)
Dept: FAMILY MEDICINE | Facility: CLINIC | Age: 83
End: 2023-08-28
Payer: MEDICARE

## 2023-09-11 RX ORDER — ATORVASTATIN CALCIUM 80 MG/1
80 TABLET, FILM COATED ORAL DAILY
Qty: 90 TABLET | Refills: 3 | Status: SHIPPED | OUTPATIENT
Start: 2023-09-11

## 2023-09-18 RX ORDER — PRIMIDONE 250 MG/1
TABLET ORAL
Qty: 360 TABLET | Refills: 1 | Status: SHIPPED | OUTPATIENT
Start: 2023-09-18

## 2023-09-20 ENCOUNTER — CLINICAL SUPPORT (OUTPATIENT)
Dept: CARDIOLOGY | Facility: HOSPITAL | Age: 83
End: 2023-09-20
Payer: MEDICARE

## 2023-09-20 DIAGNOSIS — Z95.0 PRESENCE OF CARDIAC PACEMAKER: ICD-10-CM

## 2023-09-20 PROCEDURE — 93296 REM INTERROG EVL PM/IDS: CPT | Mod: HCNC | Performed by: INTERNAL MEDICINE

## 2023-09-20 NOTE — TELEPHONE ENCOUNTER
Mom called back    I informed mom    No need to call back    Thank you No new care gaps identified.  Powered by [employer]. Reference number: [id number].   2/21/2022 2:02:29 PM CST

## 2023-10-12 ENCOUNTER — PATIENT MESSAGE (OUTPATIENT)
Dept: FAMILY MEDICINE | Facility: CLINIC | Age: 83
End: 2023-10-12
Payer: MEDICARE

## 2023-10-12 RX ORDER — TORSEMIDE 10 MG/1
10 TABLET ORAL EVERY MORNING
Qty: 90 TABLET | Refills: 3 | Status: SHIPPED | OUTPATIENT
Start: 2023-10-12

## 2023-10-24 ENCOUNTER — OFFICE VISIT (OUTPATIENT)
Dept: FAMILY MEDICINE | Facility: CLINIC | Age: 83
End: 2023-10-24
Payer: MEDICARE

## 2023-10-24 VITALS
OXYGEN SATURATION: 97 % | SYSTOLIC BLOOD PRESSURE: 120 MMHG | BODY MASS INDEX: 30.13 KG/M2 | HEIGHT: 72 IN | WEIGHT: 222.44 LBS | DIASTOLIC BLOOD PRESSURE: 62 MMHG | HEART RATE: 70 BPM

## 2023-10-24 DIAGNOSIS — G25.0 BENIGN ESSENTIAL TREMOR: ICD-10-CM

## 2023-10-24 DIAGNOSIS — G40.909 SEIZURE DISORDER: ICD-10-CM

## 2023-10-24 DIAGNOSIS — Z79.899 LONG TERM CURRENT USE OF DIURETIC: ICD-10-CM

## 2023-10-24 DIAGNOSIS — J41.0 SIMPLE CHRONIC BRONCHITIS: ICD-10-CM

## 2023-10-24 DIAGNOSIS — I10 ESSENTIAL HYPERTENSION: ICD-10-CM

## 2023-10-24 DIAGNOSIS — E66.9 OBESITY, CLASS I, BMI 30-34.9: ICD-10-CM

## 2023-10-24 DIAGNOSIS — E03.8 SUBCLINICAL HYPOTHYROIDISM: ICD-10-CM

## 2023-10-24 DIAGNOSIS — I48.0 PAF (PAROXYSMAL ATRIAL FIBRILLATION): ICD-10-CM

## 2023-10-24 DIAGNOSIS — G30.1 LATE ONSET ALZHEIMER'S DISEASE WITHOUT BEHAVIORAL DISTURBANCE: ICD-10-CM

## 2023-10-24 DIAGNOSIS — F02.80 LATE ONSET ALZHEIMER'S DISEASE WITHOUT BEHAVIORAL DISTURBANCE: ICD-10-CM

## 2023-10-24 PROCEDURE — 3078F DIAST BP <80 MM HG: CPT | Mod: HCNC,CPTII,S$GLB, | Performed by: STUDENT IN AN ORGANIZED HEALTH CARE EDUCATION/TRAINING PROGRAM

## 2023-10-24 PROCEDURE — 99999 PR PBB SHADOW E&M-EST. PATIENT-LVL IV: ICD-10-PCS | Mod: PBBFAC,HCNC,, | Performed by: STUDENT IN AN ORGANIZED HEALTH CARE EDUCATION/TRAINING PROGRAM

## 2023-10-24 PROCEDURE — 1160F RVW MEDS BY RX/DR IN RCRD: CPT | Mod: HCNC,CPTII,S$GLB, | Performed by: STUDENT IN AN ORGANIZED HEALTH CARE EDUCATION/TRAINING PROGRAM

## 2023-10-24 PROCEDURE — 1101F PT FALLS ASSESS-DOCD LE1/YR: CPT | Mod: HCNC,CPTII,S$GLB, | Performed by: STUDENT IN AN ORGANIZED HEALTH CARE EDUCATION/TRAINING PROGRAM

## 2023-10-24 PROCEDURE — 3074F PR MOST RECENT SYSTOLIC BLOOD PRESSURE < 130 MM HG: ICD-10-PCS | Mod: HCNC,CPTII,S$GLB, | Performed by: STUDENT IN AN ORGANIZED HEALTH CARE EDUCATION/TRAINING PROGRAM

## 2023-10-24 PROCEDURE — 99999 PR PBB SHADOW E&M-EST. PATIENT-LVL IV: CPT | Mod: PBBFAC,HCNC,, | Performed by: STUDENT IN AN ORGANIZED HEALTH CARE EDUCATION/TRAINING PROGRAM

## 2023-10-24 PROCEDURE — 3074F SYST BP LT 130 MM HG: CPT | Mod: HCNC,CPTII,S$GLB, | Performed by: STUDENT IN AN ORGANIZED HEALTH CARE EDUCATION/TRAINING PROGRAM

## 2023-10-24 PROCEDURE — 99214 PR OFFICE/OUTPT VISIT, EST, LEVL IV, 30-39 MIN: ICD-10-PCS | Mod: HCNC,S$GLB,, | Performed by: STUDENT IN AN ORGANIZED HEALTH CARE EDUCATION/TRAINING PROGRAM

## 2023-10-24 PROCEDURE — 3288F FALL RISK ASSESSMENT DOCD: CPT | Mod: HCNC,CPTII,S$GLB, | Performed by: STUDENT IN AN ORGANIZED HEALTH CARE EDUCATION/TRAINING PROGRAM

## 2023-10-24 PROCEDURE — 3078F PR MOST RECENT DIASTOLIC BLOOD PRESSURE < 80 MM HG: ICD-10-PCS | Mod: HCNC,CPTII,S$GLB, | Performed by: STUDENT IN AN ORGANIZED HEALTH CARE EDUCATION/TRAINING PROGRAM

## 2023-10-24 PROCEDURE — 99214 OFFICE O/P EST MOD 30 MIN: CPT | Mod: HCNC,S$GLB,, | Performed by: STUDENT IN AN ORGANIZED HEALTH CARE EDUCATION/TRAINING PROGRAM

## 2023-10-24 PROCEDURE — 3288F PR FALLS RISK ASSESSMENT DOCUMENTED: ICD-10-PCS | Mod: HCNC,CPTII,S$GLB, | Performed by: STUDENT IN AN ORGANIZED HEALTH CARE EDUCATION/TRAINING PROGRAM

## 2023-10-24 PROCEDURE — 1159F PR MEDICATION LIST DOCUMENTED IN MEDICAL RECORD: ICD-10-PCS | Mod: HCNC,CPTII,S$GLB, | Performed by: STUDENT IN AN ORGANIZED HEALTH CARE EDUCATION/TRAINING PROGRAM

## 2023-10-24 PROCEDURE — 1126F AMNT PAIN NOTED NONE PRSNT: CPT | Mod: HCNC,CPTII,S$GLB, | Performed by: STUDENT IN AN ORGANIZED HEALTH CARE EDUCATION/TRAINING PROGRAM

## 2023-10-24 PROCEDURE — 1160F PR REVIEW ALL MEDS BY PRESCRIBER/CLIN PHARMACIST DOCUMENTED: ICD-10-PCS | Mod: HCNC,CPTII,S$GLB, | Performed by: STUDENT IN AN ORGANIZED HEALTH CARE EDUCATION/TRAINING PROGRAM

## 2023-10-24 PROCEDURE — 1126F PR PAIN SEVERITY QUANTIFIED, NO PAIN PRESENT: ICD-10-PCS | Mod: HCNC,CPTII,S$GLB, | Performed by: STUDENT IN AN ORGANIZED HEALTH CARE EDUCATION/TRAINING PROGRAM

## 2023-10-24 PROCEDURE — 1159F MED LIST DOCD IN RCRD: CPT | Mod: HCNC,CPTII,S$GLB, | Performed by: STUDENT IN AN ORGANIZED HEALTH CARE EDUCATION/TRAINING PROGRAM

## 2023-10-24 PROCEDURE — 1101F PR PT FALLS ASSESS DOC 0-1 FALLS W/OUT INJ PAST YR: ICD-10-PCS | Mod: HCNC,CPTII,S$GLB, | Performed by: STUDENT IN AN ORGANIZED HEALTH CARE EDUCATION/TRAINING PROGRAM

## 2023-10-24 NOTE — PROGRESS NOTES
[facility] Note    Chief Complaint      Chief Complaint   Patient presents with    Follow up on chronic conditions     History of Present Illness     Heri Muhammad is a 82 y.o. male with Afib/Aflutter,Sinus node dysfunction( s/p CRT-P and multiple cardioversion , sHTN, HLD, seizure disorder, DJD lumbar spine, Aortic atherosclerosis, HFpEF, hypothyroidism, restrictive cardiomyopathy, chronic LBP, chronic bilateral shoulder pain bilateral shoulder replacements and COPD for f/u on chronic conditions. He endorses no new complaints today, compliant with all current regimen and has slightly improved his activity level to walking around for at least 2-5 minutes daily around his house unlike before when he prefers to sits all day.  He described his mood to be great today.                                                                   Electrophysiologist : Dr Deep Esparza MD  Cardiologist : Dr Graham  Pain : Dr Negron  Neurologist : Dr Anjum Arellano    Problem List Addressed This Visit:    As listed below     Health Maintenance   Topic Date Due    TETANUS VACCINE  12/06/2026    Lipid Panel  07/18/2028    Shingles Vaccine  Completed       Past Medical History:   Diagnosis Date    Anticoagulant long-term use     eliquis    Aortic atherosclerosis 9/18/2017    - LDL goal <70 - BP goal <130/80    Atrial flutter     Benign essential tremor     Biventricular cardiac pacemaker in situ 9/24/2018    CAD (coronary artery disease) 12/2/2013    - 9/2013 Left heart Cath: LAD- 60% prox (neg FFR), nl, ramus and RCA 20% - LDL goal <70 - BP goal <130/80    Cancer     skin cancer on back    Carpal tunnel syndrome of left wrist 8/8/2017    Chronic diastolic heart failure 1/19/2018    Chronic obstructive pulmonary disease 10/3/2018    Chronic right-sided thoracic back pain 4/27/2017    Cough     Current use of long term anticoagulation 6/2/2016    - Eliquis for Afib    Dizziness     intermittant    DJD (degenerative  joint disease) of knee     ED (erectile dysfunction)     Hard of hearing 11/2016    History of cataract     Hyponatremia 6/11/2019    Memory loss     Mixed hyperlipidemia 7/28/2012    Persistent atrial fibrillation 7/1/2015    - followed by Dr. Esparza - history of cardioversion - on Eliquis OAC 5 mg BID    Seizure disorder     Seizures     last episode 1995    Subclinical hypothyroidism     Tension type headache 7/2/2019       Past Surgical History:   Procedure Laterality Date    ANKLE SURGERY Right     pins and screws    ATRIAL ABLATION SURGERY      CARDIAC PACEMAKER PLACEMENT  2014    CARDIAC PACEMAKER PLACEMENT      CARDIAC PACEMAKER PLACEMENT  2014    CARDIOVERSION      CATARACT EXTRACTION      OU    COLONOSCOPY      due in 2014    ECHOCARDIOGRAM,TRANSESOPHAGEAL N/A 6/16/2023    Procedure: Transesophageal echo (BEN) intra-procedure log documentation;  Surgeon: [facility] Diagnostic Provider;  Location: [facility] EP LAB;  Service: Cardiology;  Laterality: N/A;  s/p WM, BEN, anes, 3 Prep    EYE SURGERY Bilateral     cataracts extraction    FRACTURE SURGERY Right     ankle with hardware    HERNIA REPAIR      abdominal    INJECTION OF ANESTHETIC AGENT AROUND MEDIAL BRANCH NERVES INNERVATING LUMBAR FACET JOINT Bilateral 1/17/2019    Procedure: Block-nerve-medial branch-lumbar L2-L5;  Surgeon: Anthony Moffett MD;  Location: [facility] OR;  Service: Pain Management;  Laterality: Bilateral;    JOINT REPLACEMENT Bilateral     shoulders    OCCLUSION OF LEFT ATRIAL APPENDAGE N/A 5/5/2023    Procedure: Left atrial appendage occlusion;  Surgeon: eHma Del Valle MD;  Location: [facility] EP LAB;  Service: Cardiology;  Laterality: N/A;  afib, watchman, BSCI, ben, anes, MB, 3prep    RADIOFREQUENCY ABLATION OF LUMBAR MEDIAL BRANCH NERVE AT SINGLE LEVEL Bilateral 1/23/2019    Procedure: Radiofrequency Ablation, Nerve, Spinal, Lumbar, Medial Branch, L2-L5;  Surgeon: Anthony Moffett MD;  Location: [facility] OR;  Service: Pain Management;  Laterality:  Bilateral;    TRANSESOPHAGEAL ECHOCARDIOGRAPHY N/A 2023    Procedure: ECHOCARDIOGRAM, TRANSESOPHAGEAL;  Surgeon: Dylan Layne MD;  Location: [facility] EP LAB;  Service: Cardiology;  Laterality: N/A;    TREATMENT OF CARDIAC ARRHYTHMIA N/A 2022    Procedure: Cardioversion or Defibrillation;  Surgeon: MIREILLE Morel MD;  Location: [facility] EP LAB;  Service: Cardiology;  Laterality: N/A;  AF, GALILEO, DCCV, MAC, EH (to cover for SK), 3 Prep *SJM CRT-P in situ*       family history includes Blindness in his father; Cancer in his daughter and father; Cataracts in his father; Dementia in his mother; Heart disease in his mother; Heart failure in his mother; Hypertension in his father; No Known Problems in his son; Obesity in his daughter; Tremor in his brother, father, and paternal grandfather.    Social History     Tobacco Use    Smoking status: Former     Current packs/day: 0.00     Average packs/day: 1 pack/day for 5.0 years (5.0 ttl pk-yrs)     Types: Cigarettes     Start date:      Quit date:      Years since quittin.8    Smokeless tobacco: Never   Substance Use Topics    Alcohol use: No    Drug use: No       Review of Systems   Constitutional:  Negative for activity change and unexpected weight change.   HENT:  Negative for hearing loss, rhinorrhea and trouble swallowing.    Eyes:  Negative for discharge and visual disturbance.   Respiratory:  Negative for chest tightness, shortness of breath and wheezing.    Cardiovascular:  Negative for chest pain and palpitations.   Gastrointestinal:  Negative for blood in stool, constipation, diarrhea and vomiting.   Endocrine: Negative for polydipsia and polyuria.   Genitourinary:  Negative for difficulty urinating, hematuria and urgency.   Musculoskeletal:  Negative for arthralgias, joint swelling and neck pain.   Neurological:  Negative for weakness, light-headedness and headaches.   Psychiatric/Behavioral:  Positive for sleep disturbance. Negative for confusion  and dysphoric mood.        Outpatient Encounter Medications as of 10/24/2023   Medication Sig Dispense Refill    aspirin (ECOTRIN) 81 MG EC tablet Take 1 tablet (81 mg total) by mouth once daily. 90 tablet 1    atorvastatin (LIPITOR) 80 MG tablet Take 1 tablet (80 mg total) by mouth once daily. 90 tablet 3    cetirizine (ZYRTEC) 10 MG tablet Take 10 mg by mouth every evening.      citalopram (CELEXA) 20 MG tablet TAKE 1 TABLET EVERY DAY 90 tablet 3    clopidogreL (PLAVIX) 75 mg tablet Take 1 tablet (75 mg total) by mouth once daily. 90 tablet 0    lamoTRIgine (LAMICTAL) 100 MG tablet TAKE 1 TABLET TWICE DAILY 180 tablet 3    memantine (NAMENDA) 10 MG Tab TAKE 1 TABLET TWICE DAILY 180 tablet 0    metoprolol succinate (TOPROL-XL) 50 MG 24 hr tablet TAKE 1 TABLET ONE TIME DAILY 90 tablet 2    primidone (MYSOLINE) 250 MG Tab TAKE 2 TABLETS TWICE DAILY 360 tablet 1    torsemide (DEMADEX) 10 MG Tab Take 1 tablet (10 mg total) by mouth every morning. 90 tablet 3    [DISCONTINUED] fluticasone-umeclidin-vilanter (TRELEGY ELLIPTA) 100-62.5-25 mcg DsDv Inhale 1 puff into the lungs once daily. 60 each 3    fluticasone-umeclidin-vilanter (TRELEGY ELLIPTA) 100-62.5-25 mcg DsDv Inhale 1 puff into the lungs once daily. 60 each 3    [DISCONTINUED] torsemide (DEMADEX) 10 MG Tab TAKE 1 TABLET (10 MG TOTAL) BY MOUTH EVERY MORNING. 90 tablet 3     Facility-Administered Encounter Medications as of 10/24/2023   Medication Dose Route Frequency Provider Last Rate Last Admin    0.9%  NaCl infusion (for blood administration)   Intravenous Q24H PRN Batool Rodriguez NP            Review of patient's allergies indicates:   Allergen Reactions    Bactroban [mupirocin calcium] Rash     Other reaction(s): Rash       Physical Exam      Vital Signs  Pulse: 70  SpO2: 97 %  BP: 120/62  Pain Score: 0-No pain  Height and Weight  Height: 6' (182.9 cm)  Weight: 100.9 kg (222 lb 7.1 oz)  BSA (Calculated - sq m): 2.26 sq meters  BMI (Calculated):  "30.2  Weight in (lb) to have BMI = 25: 183.9]    Vital signs reviewed  General PE: In no acute distress, appear stated age  HEENT: PERRL, EOMI, moist mucosa, ears: TM nl  Chest : clinically lung lung fields  CVS: NRRR,S1 and S2 only, no m/r/g, good peripheral pulses, no pedal edema  Abdomen : NABS, flat, no area of tenderness, no palpable organomegaly     Laboratory:  CBC:  No results for input(s): "WBC", "RBC", "HGB", "HCT", "PLT", "MCV", "MCH", "MCHC" in the last 2160 hours.  CMP:  No results for input(s): "GLU", "CALCIUM", "ALBUMIN", "PROT", "NA", "K", "CO2", "CL", "BUN", "ALKPHOS", "ALT", "AST", "BILITOT" in the last 2160 hours.    Invalid input(s): "CREATININ"  URINALYSIS:  No results for input(s): "COLORU", "CLARITYU", "SPECGRAV", "PHUR", "PROTEINUA", "GLUCOSEU", "BILIRUBINCON", "BLOODU", "WBCU", "RBCU", "BACTERIA", "MUCUS", "NITRITE", "LEUKOCYTESUR", "UROBILINOGEN", "HYALINECASTS" in the last 2160 hours.   LIPIDS:  No results for input(s): "TSH", "HDL", "CHOL", "TRIG", "LDLCALC", "CHOLHDL", "NONHDLCHOL", "TOTALCHOLEST" in the last 2160 hours.  TSH:  No results for input(s): "TSH" in the last 2160 hours.  A1C:  No results for input(s): "HGBA1C" in the last 2160 hours.    Radiology:      Assessment/Plan     Heri Muhammad is a 82 y.o.male with:    Excessive daytime sleepiness  -? Obstructive Sleep apnea  Vs Central, STOP BANG with 3 points  -he declined referral for sleep study, risks and benefits explained , verbalized understanding    2. Persistent Afib at high risk of fall while on DOAC  -s/p watchmann procedure at [facility] (05/05/2023)  -No bleeding from any orifices  -No cardiac symptoms today  -DOAC to be stopped by 11/2023 per EP recs    3. Subclinical hypothyroidism  - TFT WNL (07/2023)  -monitor for now    4. Essential hypertension  Overview:  - well controlled  - continue current medication    5. Mixed hyperlipidemia  Overview:  Lab Results   Component Value Date    LDLCALC 88.4 05/06/2022 "     - on statin  - followed by Cardiology    6. Seizure disorder  Overview:  - stable on lamictal and no recent seizures since 1990's  - CT Head 2019 with diffuse atrophy  - EEG 1/2019 mild diffuse slowing  - followed by Neurology    7. Chronic diastolic heart failure  Overview:  - no signs of acute decompensation  - no leg swelling per wife on evaluation at home  -Torsemide PRN only  - medical management    8. Benign essential tremor  Overview:  - followed by Neurology  - on primidone and propranolol recently added    9. BMI 30  -counseled on life style modifications    -Continue current medications and maintain follow up with specialists.      Patient and wife verbalized understanding and agree with current treatment plans.     Dr Ivonne Robles MD  Internal Medicine  [facility]

## 2023-10-25 ENCOUNTER — TELEPHONE (OUTPATIENT)
Dept: FAMILY MEDICINE | Facility: CLINIC | Age: 83
End: 2023-10-25
Payer: MEDICARE

## 2023-10-25 NOTE — TELEPHONE ENCOUNTER
----- Message from Serina Thomas sent at 1/11/2018 11:55 AM CST -----  Wife is calling to say that she thinks that patient is in A-Fib. I am sending her to Nurse on Call.   
Spoke to patient wife informed her Dr. Graham does not have an open for tomorrow and if the patient feels he is in a-fib then we can schedule him with another cardiology, pt. Wife stated they have an appointment with Dr Esparza and will go to the ER if needed.    
today

## 2023-10-25 NOTE — TELEPHONE ENCOUNTER
----- Message from Ivonne Robles MD sent at 10/25/2023  1:08 PM CDT -----  Please let patient know that his recent blood work was fine. Thanks

## 2023-11-20 DIAGNOSIS — F02.80 LATE ONSET ALZHEIMER'S DISEASE WITHOUT BEHAVIORAL DISTURBANCE: ICD-10-CM

## 2023-11-20 DIAGNOSIS — G30.1 LATE ONSET ALZHEIMER'S DISEASE WITHOUT BEHAVIORAL DISTURBANCE: ICD-10-CM

## 2023-11-21 RX ORDER — MEMANTINE HYDROCHLORIDE 10 MG/1
TABLET ORAL
Qty: 180 TABLET | Refills: 3 | Status: SHIPPED | OUTPATIENT
Start: 2023-11-21

## 2023-11-28 ENCOUNTER — PATIENT MESSAGE (OUTPATIENT)
Dept: FAMILY MEDICINE | Facility: CLINIC | Age: 83
End: 2023-11-28
Payer: MEDICARE

## 2023-11-28 ENCOUNTER — TELEPHONE (OUTPATIENT)
Dept: ELECTROPHYSIOLOGY | Facility: CLINIC | Age: 83
End: 2023-11-28
Payer: MEDICARE

## 2023-11-28 NOTE — TELEPHONE ENCOUNTER
Called and spoke to patient's wife. States  is sleeping constantly and sleeping very deeply. When he is awake, he is sob. States this has been going on increasingly over the past month. Denies chest pain. Talked over with Dr. Del Valle. Feels patient could be hypoxic and should go to the nearest ED for evaluation and treatment. Informed patient's wife. Wife state that she will take him to the ED for evaluation All questions answered.

## 2023-11-29 ENCOUNTER — OFFICE VISIT (OUTPATIENT)
Dept: FAMILY MEDICINE | Facility: CLINIC | Age: 83
End: 2023-11-29
Payer: MEDICARE

## 2023-11-29 VITALS
WEIGHT: 219.38 LBS | HEART RATE: 79 BPM | BODY MASS INDEX: 29.71 KG/M2 | OXYGEN SATURATION: 94 % | SYSTOLIC BLOOD PRESSURE: 120 MMHG | HEIGHT: 72 IN | DIASTOLIC BLOOD PRESSURE: 70 MMHG

## 2023-11-29 DIAGNOSIS — R53.83 OTHER FATIGUE: Primary | ICD-10-CM

## 2023-11-29 DIAGNOSIS — R06.83 LOUD SNORING: ICD-10-CM

## 2023-11-29 DIAGNOSIS — I48.0 PAF (PAROXYSMAL ATRIAL FIBRILLATION): ICD-10-CM

## 2023-11-29 DIAGNOSIS — I10 ESSENTIAL HYPERTENSION: ICD-10-CM

## 2023-11-29 DIAGNOSIS — R53.81 PHYSICAL DECONDITIONING: ICD-10-CM

## 2023-11-29 DIAGNOSIS — E03.8 SUBCLINICAL HYPOTHYROIDISM: ICD-10-CM

## 2023-11-29 PROCEDURE — 1159F MED LIST DOCD IN RCRD: CPT | Mod: HCNC,CPTII,S$GLB, | Performed by: STUDENT IN AN ORGANIZED HEALTH CARE EDUCATION/TRAINING PROGRAM

## 2023-11-29 PROCEDURE — 3288F FALL RISK ASSESSMENT DOCD: CPT | Mod: HCNC,CPTII,S$GLB, | Performed by: STUDENT IN AN ORGANIZED HEALTH CARE EDUCATION/TRAINING PROGRAM

## 2023-11-29 PROCEDURE — 1101F PT FALLS ASSESS-DOCD LE1/YR: CPT | Mod: HCNC,CPTII,S$GLB, | Performed by: STUDENT IN AN ORGANIZED HEALTH CARE EDUCATION/TRAINING PROGRAM

## 2023-11-29 PROCEDURE — 1159F PR MEDICATION LIST DOCUMENTED IN MEDICAL RECORD: ICD-10-PCS | Mod: HCNC,CPTII,S$GLB, | Performed by: STUDENT IN AN ORGANIZED HEALTH CARE EDUCATION/TRAINING PROGRAM

## 2023-11-29 PROCEDURE — 99215 PR OFFICE/OUTPT VISIT, EST, LEVL V, 40-54 MIN: ICD-10-PCS | Mod: HCNC,S$GLB,, | Performed by: STUDENT IN AN ORGANIZED HEALTH CARE EDUCATION/TRAINING PROGRAM

## 2023-11-29 PROCEDURE — 1125F AMNT PAIN NOTED PAIN PRSNT: CPT | Mod: HCNC,CPTII,S$GLB, | Performed by: STUDENT IN AN ORGANIZED HEALTH CARE EDUCATION/TRAINING PROGRAM

## 2023-11-29 PROCEDURE — 3288F PR FALLS RISK ASSESSMENT DOCUMENTED: ICD-10-PCS | Mod: HCNC,CPTII,S$GLB, | Performed by: STUDENT IN AN ORGANIZED HEALTH CARE EDUCATION/TRAINING PROGRAM

## 2023-11-29 PROCEDURE — 1160F PR REVIEW ALL MEDS BY PRESCRIBER/CLIN PHARMACIST DOCUMENTED: ICD-10-PCS | Mod: HCNC,CPTII,S$GLB, | Performed by: STUDENT IN AN ORGANIZED HEALTH CARE EDUCATION/TRAINING PROGRAM

## 2023-11-29 PROCEDURE — 99215 OFFICE O/P EST HI 40 MIN: CPT | Mod: HCNC,S$GLB,, | Performed by: STUDENT IN AN ORGANIZED HEALTH CARE EDUCATION/TRAINING PROGRAM

## 2023-11-29 PROCEDURE — 3078F DIAST BP <80 MM HG: CPT | Mod: HCNC,CPTII,S$GLB, | Performed by: STUDENT IN AN ORGANIZED HEALTH CARE EDUCATION/TRAINING PROGRAM

## 2023-11-29 PROCEDURE — 3074F PR MOST RECENT SYSTOLIC BLOOD PRESSURE < 130 MM HG: ICD-10-PCS | Mod: HCNC,CPTII,S$GLB, | Performed by: STUDENT IN AN ORGANIZED HEALTH CARE EDUCATION/TRAINING PROGRAM

## 2023-11-29 PROCEDURE — 1101F PR PT FALLS ASSESS DOC 0-1 FALLS W/OUT INJ PAST YR: ICD-10-PCS | Mod: HCNC,CPTII,S$GLB, | Performed by: STUDENT IN AN ORGANIZED HEALTH CARE EDUCATION/TRAINING PROGRAM

## 2023-11-29 PROCEDURE — 1125F PR PAIN SEVERITY QUANTIFIED, PAIN PRESENT: ICD-10-PCS | Mod: HCNC,CPTII,S$GLB, | Performed by: STUDENT IN AN ORGANIZED HEALTH CARE EDUCATION/TRAINING PROGRAM

## 2023-11-29 PROCEDURE — 3074F SYST BP LT 130 MM HG: CPT | Mod: HCNC,CPTII,S$GLB, | Performed by: STUDENT IN AN ORGANIZED HEALTH CARE EDUCATION/TRAINING PROGRAM

## 2023-11-29 PROCEDURE — 99999 PR PBB SHADOW E&M-EST. PATIENT-LVL IV: CPT | Mod: PBBFAC,HCNC,, | Performed by: STUDENT IN AN ORGANIZED HEALTH CARE EDUCATION/TRAINING PROGRAM

## 2023-11-29 PROCEDURE — 1160F RVW MEDS BY RX/DR IN RCRD: CPT | Mod: HCNC,CPTII,S$GLB, | Performed by: STUDENT IN AN ORGANIZED HEALTH CARE EDUCATION/TRAINING PROGRAM

## 2023-11-29 PROCEDURE — 3078F PR MOST RECENT DIASTOLIC BLOOD PRESSURE < 80 MM HG: ICD-10-PCS | Mod: HCNC,CPTII,S$GLB, | Performed by: STUDENT IN AN ORGANIZED HEALTH CARE EDUCATION/TRAINING PROGRAM

## 2023-11-29 PROCEDURE — 99999 PR PBB SHADOW E&M-EST. PATIENT-LVL IV: ICD-10-PCS | Mod: PBBFAC,HCNC,, | Performed by: STUDENT IN AN ORGANIZED HEALTH CARE EDUCATION/TRAINING PROGRAM

## 2023-11-29 NOTE — PROGRESS NOTES
[facility] Note    Chief Complaint      Chief Complaint   Patient presents with    Fatigue  Excessive sleeping  Shortness of breath     History of Present Illness     Heri Muhammad is a 83 y.o. male with Afib/Aflutter,Sinus node dysfunction( s/p CRT-P and multiple cardioversion , sHTN, HLD, seizure disorder, DJD lumbar spine, Aortic atherosclerosis, HFpEF, hypothyroidism, restrictive cardiomyopathy, chronic LBP, chronic bilateral shoulder pain bilateral shoulder replacements and COPD presents with complaints above in the past few weeks. He also c/o early morning headaches , irritability . He denies any leg swelling, orthopnea , PND, no CP, fever,cough or wheezing. He is compliant with all current regimen and has slightly improved his activity level to walking around for at least 2-5 minutes daily around his house unlike before when he prefers to sits all day.                                                                  Electrophysiologist : Dr Deep Esparza MD  Cardiologist : Dr Graham  Pain : Dr Negron  Neurologist : Dr Anjum Arellano    Problem List Addressed This Visit:    As listed below     Health Maintenance   Topic Date Due    TETANUS VACCINE  12/06/2026    Lipid Panel  07/18/2028    Shingles Vaccine  Completed       Past Medical History:   Diagnosis Date    Anticoagulant long-term use     eliquis    Aortic atherosclerosis 9/18/2017    - LDL goal <70 - BP goal <130/80    Atrial flutter     Benign essential tremor     Biventricular cardiac pacemaker in situ 9/24/2018    CAD (coronary artery disease) 12/2/2013    - 9/2013 Left heart Cath: LAD- 60% prox (neg FFR), nl, ramus and RCA 20% - LDL goal <70 - BP goal <130/80    Cancer     skin cancer on back    Carpal tunnel syndrome of left wrist 8/8/2017    Chronic diastolic heart failure 1/19/2018    Chronic obstructive pulmonary disease 10/3/2018    Chronic right-sided thoracic back pain 4/27/2017    Cough     Current use of long term  anticoagulation 6/2/2016    - Eliquis for Afib    Dizziness     intermittant    DJD (degenerative joint disease) of knee     ED (erectile dysfunction)     Hard of hearing 11/2016    History of cataract     Hyponatremia 6/11/2019    Memory loss     Mixed hyperlipidemia 7/28/2012    Persistent atrial fibrillation 7/1/2015    - followed by Dr. Esparza - history of cardioversion - on Eliquis OAC 5 mg BID    Seizure disorder     Seizures     last episode 1995    Subclinical hypothyroidism     Tension type headache 7/2/2019       Past Surgical History:   Procedure Laterality Date    ANKLE SURGERY Right     pins and screws    ATRIAL ABLATION SURGERY      CARDIAC PACEMAKER PLACEMENT  2014    CARDIAC PACEMAKER PLACEMENT      CARDIAC PACEMAKER PLACEMENT  2014    CARDIOVERSION      CATARACT EXTRACTION      OU    COLONOSCOPY      due in 2014    ECHOCARDIOGRAM,TRANSESOPHAGEAL N/A 6/16/2023    Procedure: Transesophageal echo (BEN) intra-procedure log documentation;  Surgeon: [facility];  Location: [facility] EP LAB;  Service: Cardiology;  Laterality: N/A;  s/p WM, BEN, anes, 3 Prep    EYE SURGERY Bilateral     cataracts extraction    FRACTURE SURGERY Right     ankle with hardware    HERNIA REPAIR      abdominal    INJECTION OF ANESTHETIC AGENT AROUND MEDIAL BRANCH NERVES INNERVATING LUMBAR FACET JOINT Bilateral 1/17/2019    Procedure: Block-nerve-medial branch-lumbar L2-L5;  Surgeon: Anthony Moffett MD;  Location: [facility] OR;  Service: Pain Management;  Laterality: Bilateral;    JOINT REPLACEMENT Bilateral     shoulders    OCCLUSION OF LEFT ATRIAL APPENDAGE N/A 5/5/2023    Procedure: Left atrial appendage occlusion;  Surgeon: Hema Del Valle MD;  Location: [facility] EP LAB;  Service: Cardiology;  Laterality: N/A;  afib, watchman, BSCI, ben, anes, MB, 3prep    RADIOFREQUENCY ABLATION OF LUMBAR MEDIAL BRANCH NERVE AT SINGLE LEVEL Bilateral 1/23/2019    Procedure: Radiofrequency Ablation, Nerve, Spinal, Lumbar, Medial Branch,  L2-L5;  Surgeon: Anthony Moffett MD;  Location: [facility] OR;  Service: Pain Management;  Laterality: Bilateral;    TRANSESOPHAGEAL ECHOCARDIOGRAPHY N/A 2023    Procedure: ECHOCARDIOGRAM, TRANSESOPHAGEAL;  Surgeon: Dylan Layne MD;  Location: [facility] EP LAB;  Service: Cardiology;  Laterality: N/A;    TREATMENT OF CARDIAC ARRHYTHMIA N/A 2022    Procedure: Cardioversion or Defibrillation;  Surgeon: MIREILLE Morel MD;  Location: [facility] EP LAB;  Service: Cardiology;  Laterality: N/A;  AF, GALILEO, DCCV, MAC, EH (to cover for SK), 3 Prep *SJM CRT-P in situ*       family history includes Blindness in his father; Cancer in his daughter and father; Cataracts in his father; Dementia in his mother; Heart disease in his mother; Heart failure in his mother; Hypertension in his father; No Known Problems in his son; Obesity in his daughter; Tremor in his brother, father, and paternal grandfather.    Social History     Tobacco Use    Smoking status: Former     Current packs/day: 0.00     Average packs/day: 1 pack/day for 5.0 years (5.0 ttl pk-yrs)     Types: Cigarettes     Start date:      Quit date:      Years since quittin.9    Smokeless tobacco: Never   Substance Use Topics    Alcohol use: No    Drug use: No       Review of Systems   Constitutional:  Negative for activity change, chills, fatigue, fever and unexpected weight change.   HENT:  Negative for hearing loss, rhinorrhea and trouble swallowing.    Eyes:  Negative for discharge and visual disturbance.   Respiratory:  Positive for shortness of breath. Negative for cough, chest tightness and wheezing.    Cardiovascular:  Negative for chest pain, palpitations and leg swelling.   Gastrointestinal:  Negative for blood in stool, constipation, diarrhea and vomiting.   Endocrine: Negative for polydipsia and polyuria.   Genitourinary:  Negative for difficulty urinating, dysuria, flank pain, frequency, hematuria and urgency.   Musculoskeletal:  Positive for gait  problem. Negative for arthralgias, joint swelling and neck pain.   Neurological:  Positive for headaches. Negative for weakness and light-headedness.   Psychiatric/Behavioral:  Positive for confusion and sleep disturbance. Negative for dysphoric mood.        Outpatient Encounter Medications as of 11/29/2023   Medication Sig Dispense Refill    aspirin (ECOTRIN) 81 MG EC tablet Take 1 tablet (81 mg total) by mouth once daily. 90 tablet 1    atorvastatin (LIPITOR) 80 MG tablet Take 1 tablet (80 mg total) by mouth once daily. 90 tablet 3    cetirizine (ZYRTEC) 10 MG tablet Take 10 mg by mouth every evening.      citalopram (CELEXA) 20 MG tablet TAKE 1 TABLET EVERY DAY 90 tablet 3    clopidogreL (PLAVIX) 75 mg tablet Take 1 tablet (75 mg total) by mouth once daily. 90 tablet 0    fluticasone-umeclidin-vilanter (TRELEGY ELLIPTA) 100-62.5-25 mcg DsDv Inhale 1 puff into the lungs once daily. 60 each 3    lamoTRIgine (LAMICTAL) 100 MG tablet TAKE 1 TABLET TWICE DAILY 180 tablet 3    memantine (NAMENDA) 10 MG Tab TAKE 1 TABLET TWICE DAILY 180 tablet 3    metoprolol succinate (TOPROL-XL) 50 MG 24 hr tablet TAKE 1 TABLET ONE TIME DAILY 90 tablet 2    primidone (MYSOLINE) 250 MG Tab TAKE 2 TABLETS TWICE DAILY 360 tablet 1    torsemide (DEMADEX) 10 MG Tab Take 1 tablet (10 mg total) by mouth every morning. 90 tablet 3     Facility-Administered Encounter Medications as of 11/29/2023   Medication Dose Route Frequency Provider Last Rate Last Admin    0.9%  NaCl infusion (for blood administration)   Intravenous Q24H PRN Batool Rodriguez NP            Review of patient's allergies indicates:   Allergen Reactions    Bactroban [mupirocin calcium] Rash     Other reaction(s): Rash       Physical Exam      Vital Signs  Pulse: 79  SpO2: (!) 94 %  BP: 120/70  Pain Score:   5  Height and Weight  Height: 6' (182.9 cm)  Weight: 99.5 kg (219 lb 5.7 oz)  BSA (Calculated - sq m): 2.25 sq meters  BMI (Calculated): 29.7  Weight in (lb) to have  "BMI = 25: 183.9]    Vital signs reviewed  General PE: In no acute distress, appear stated age  HEENT: PERRL, EOMI, moist mucosa, ears: TM nl  Chest : clinically lung lung fields  CVS: NRRR,S1 and S2 only, no m/r/g, good peripheral pulses, no pedal edema  Abdomen : NABS, flat, no area of tenderness, no palpable organomegaly     Laboratory:  CBC:  No results for input(s): "WBC", "RBC", "HGB", "HCT", "PLT", "MCV", "MCH", "MCHC" in the last 2160 hours.  CMP:  Recent Labs   Lab Result Units 10/25/23  0853   Glucose mg/dL 107   Calcium mg/dL 8.2*   Albumin g/dL 3.7   Total Protein g/dL 7.0   Sodium mmol/L 139   Potassium mmol/L 4.5   CO2 mmol/L 25   Chloride mmol/L 104   BUN mg/dL 19   Alkaline Phosphatase U/L 97   ALT U/L 31   AST U/L 36   Total Bilirubin mg/dL 0.4       URINALYSIS:  No results for input(s): "COLORU", "CLARITYU", "SPECGRAV", "PHUR", "PROTEINUA", "GLUCOSEU", "BILIRUBINCON", "BLOODU", "WBCU", "RBCU", "BACTERIA", "MUCUS", "NITRITE", "LEUKOCYTESUR", "UROBILINOGEN", "HYALINECASTS" in the last 2160 hours.   LIPIDS:  No results for input(s): "TSH", "HDL", "CHOL", "TRIG", "LDLCALC", "CHOLHDL", "NONHDLCHOL", "TOTALCHOLEST" in the last 2160 hours.  TSH:  No results for input(s): "TSH" in the last 2160 hours.  A1C:  No results for input(s): "HGBA1C" in the last 2160 hours.    Radiology:      Assessment/Plan     Heri Muhammad is a 83 y.o.male with:    Excessive daytime sleepiness, HA, fatigue and loud snoring  -? Obstructive Sleep apnea  Vs [place]  -STOP BANG with 6 points  -he declined referral for sleep study several times in the prior clinic visits  -after extensive counseling , now amenable to referral to sleep medicine for sleep study and further evaluation    2. SOB  -no signs of COPD or CHF exacerbation   -likely due to physical deconditioning  -patient counseled on lug exercise therapy( incentive spirometry, balloon blowing, he declined)  -SpO2 @ 94%    3. Persistent Afib at high risk of fall while on " DOAC  -s/p watchmann procedure at [facility] (05/05/2023)  -No bleeding from any orifices  -No cardiac symptoms today  -DOAC stopped by 11/2023 per EP recs    4. Subclinical hypothyroidism  - TFT WNL (07/2023)  -monitor for now    5. Essential hypertension  Overview:  - well controlled  - continue current medication    6. Mixed hyperlipidemia  Overview:  Lab Results   Component Value Date    LDLCALC 88.4 05/06/2022     - on statin  - followed by Cardiology    7. Seizure disorder  Overview:  - stable on lamictal and no recent seizures since 1990's  - CT Head 2019 with diffuse atrophy  - EEG 1/2019 mild diffuse slowing  - followed by Neurology    8. Chronic diastolic heart failure  Overview:  - no signs of acute decompensation  - no leg swelling per wife on evaluation at home  -Torsemide PRN only  - medical management    9. Benign essential tremor  Overview:  - followed by Neurology  - on primidone and propranolol recently added    10. BMI 30  -counseled on life style modifications    -Continue current medications and maintain follow up with specialists.      Patient and wife verbalized understanding and agree with current treatment plans.     Dr Ivonne Robles MD  Internal Medicine  [facility]

## 2023-11-29 NOTE — TELEPHONE ENCOUNTER
Spoke with pt wife. Informed we have appointment at 11am today. Pt wife accepted. Verbally understood

## 2023-12-27 ENCOUNTER — CLINICAL SUPPORT (OUTPATIENT)
Dept: CARDIOLOGY | Facility: HOSPITAL | Age: 83
End: 2023-12-27

## 2023-12-27 ENCOUNTER — CLINICAL SUPPORT (OUTPATIENT)
Dept: CARDIOLOGY | Facility: HOSPITAL | Age: 83
End: 2023-12-27
Attending: INTERNAL MEDICINE
Payer: MEDICARE

## 2023-12-27 DIAGNOSIS — Z95.0 PRESENCE OF CARDIAC PACEMAKER: ICD-10-CM

## 2023-12-27 PROCEDURE — 93294 REM INTERROG EVL PM/LDLS PM: CPT | Mod: HCNC,,, | Performed by: INTERNAL MEDICINE

## 2023-12-27 PROCEDURE — 93296 REM INTERROG EVL PM/IDS: CPT | Mod: HCNC | Performed by: INTERNAL MEDICINE

## 2024-01-01 DIAGNOSIS — F32.A DEPRESSION, UNSPECIFIED DEPRESSION TYPE: ICD-10-CM

## 2024-01-02 RX ORDER — CITALOPRAM 20 MG/1
TABLET, FILM COATED ORAL
Qty: 90 TABLET | Refills: 0 | Status: SHIPPED | OUTPATIENT
Start: 2024-01-02

## 2024-01-08 DIAGNOSIS — J41.0 SIMPLE CHRONIC BRONCHITIS: ICD-10-CM

## 2024-01-08 RX ORDER — FLUTICASONE FUROATE, UMECLIDINIUM BROMIDE AND VILANTEROL TRIFENATATE 100; 62.5; 25 UG/1; UG/1; UG/1
1 POWDER RESPIRATORY (INHALATION)
Qty: 60 EACH | Refills: 3 | Status: SHIPPED | OUTPATIENT
Start: 2024-01-08

## 2024-01-12 ENCOUNTER — TELEPHONE (OUTPATIENT)
Dept: ADMINISTRATIVE | Facility: CLINIC | Age: 84
End: 2024-01-12
Payer: MEDICARE

## 2024-01-12 ENCOUNTER — PATIENT MESSAGE (OUTPATIENT)
Dept: ADMINISTRATIVE | Facility: CLINIC | Age: 84
End: 2024-01-12
Payer: MEDICARE

## 2024-01-12 PROBLEM — I48.0 PAF (PAROXYSMAL ATRIAL FIBRILLATION): Status: RESOLVED | Noted: 2023-05-16 | Resolved: 2024-01-12

## 2024-01-16 ENCOUNTER — OFFICE VISIT (OUTPATIENT)
Dept: INTERNAL MEDICINE | Facility: CLINIC | Age: 84
End: 2024-01-16
Payer: MEDICARE

## 2024-01-16 ENCOUNTER — TELEPHONE (OUTPATIENT)
Dept: ADMINISTRATIVE | Facility: CLINIC | Age: 84
End: 2024-01-16
Payer: MEDICARE

## 2024-01-16 DIAGNOSIS — R68.89 DECREASED STRENGTH, ENDURANCE, AND MOBILITY: ICD-10-CM

## 2024-01-16 DIAGNOSIS — M54.50 CHRONIC BILATERAL LOW BACK PAIN WITHOUT SCIATICA: ICD-10-CM

## 2024-01-16 DIAGNOSIS — J84.10 LUNG GRANULOMA: ICD-10-CM

## 2024-01-16 DIAGNOSIS — Z91.81 AT HIGH RISK FOR FALLS: ICD-10-CM

## 2024-01-16 DIAGNOSIS — E78.2 MIXED HYPERLIPIDEMIA: ICD-10-CM

## 2024-01-16 DIAGNOSIS — R53.1 DECREASED STRENGTH, ENDURANCE, AND MOBILITY: ICD-10-CM

## 2024-01-16 DIAGNOSIS — I70.0 AORTIC ATHEROSCLEROSIS: ICD-10-CM

## 2024-01-16 DIAGNOSIS — E03.8 SUBCLINICAL HYPOTHYROIDISM: ICD-10-CM

## 2024-01-16 DIAGNOSIS — F02.80 LATE ONSET ALZHEIMER'S DISEASE WITHOUT BEHAVIORAL DISTURBANCE: ICD-10-CM

## 2024-01-16 DIAGNOSIS — I25.10 CORONARY ARTERY DISEASE INVOLVING NATIVE CORONARY ARTERY OF NATIVE HEART WITHOUT ANGINA PECTORIS: Chronic | ICD-10-CM

## 2024-01-16 DIAGNOSIS — I49.5 SINUS NODE DYSFUNCTION: ICD-10-CM

## 2024-01-16 DIAGNOSIS — G25.0 BENIGN ESSENTIAL TREMOR: ICD-10-CM

## 2024-01-16 DIAGNOSIS — G40.909 SEIZURE DISORDER: ICD-10-CM

## 2024-01-16 DIAGNOSIS — G47.00 INSOMNIA, UNSPECIFIED TYPE: ICD-10-CM

## 2024-01-16 DIAGNOSIS — G89.29 CHRONIC BILATERAL LOW BACK PAIN WITHOUT SCIATICA: ICD-10-CM

## 2024-01-16 DIAGNOSIS — I10 ESSENTIAL HYPERTENSION: ICD-10-CM

## 2024-01-16 DIAGNOSIS — Z00.00 ENCOUNTER FOR MEDICARE ANNUAL WELLNESS EXAM: Primary | ICD-10-CM

## 2024-01-16 DIAGNOSIS — J41.0 SIMPLE CHRONIC BRONCHITIS: ICD-10-CM

## 2024-01-16 DIAGNOSIS — Z95.0 BIVENTRICULAR CARDIAC PACEMAKER IN SITU: ICD-10-CM

## 2024-01-16 DIAGNOSIS — Z74.09 DECREASED STRENGTH, ENDURANCE, AND MOBILITY: ICD-10-CM

## 2024-01-16 DIAGNOSIS — G30.1 LATE ONSET ALZHEIMER'S DISEASE WITHOUT BEHAVIORAL DISTURBANCE: ICD-10-CM

## 2024-01-16 DIAGNOSIS — I50.32 CHRONIC DIASTOLIC HEART FAILURE: ICD-10-CM

## 2024-01-16 DIAGNOSIS — Z85.828 HISTORY OF SKIN CANCER: ICD-10-CM

## 2024-01-16 DIAGNOSIS — I48.21 PERMANENT ATRIAL FIBRILLATION: ICD-10-CM

## 2024-01-16 PROCEDURE — 1101F PT FALLS ASSESS-DOCD LE1/YR: CPT | Mod: HCNC,CPTII,95, | Performed by: NURSE PRACTITIONER

## 2024-01-16 PROCEDURE — 1170F FXNL STATUS ASSESSED: CPT | Mod: HCNC,CPTII,95, | Performed by: NURSE PRACTITIONER

## 2024-01-16 PROCEDURE — 1125F AMNT PAIN NOTED PAIN PRSNT: CPT | Mod: HCNC,CPTII,95, | Performed by: NURSE PRACTITIONER

## 2024-01-16 PROCEDURE — 3288F FALL RISK ASSESSMENT DOCD: CPT | Mod: HCNC,CPTII,95, | Performed by: NURSE PRACTITIONER

## 2024-01-16 PROCEDURE — 1159F MED LIST DOCD IN RCRD: CPT | Mod: HCNC,CPTII,95, | Performed by: NURSE PRACTITIONER

## 2024-01-16 PROCEDURE — 1160F RVW MEDS BY RX/DR IN RCRD: CPT | Mod: HCNC,CPTII,95, | Performed by: NURSE PRACTITIONER

## 2024-01-16 PROCEDURE — G0439 PPPS, SUBSEQ VISIT: HCPCS | Mod: HCNC,95,, | Performed by: NURSE PRACTITIONER

## 2024-01-16 NOTE — PATIENT INSTRUCTIONS
Counseling and Referral of Other Preventative  (Italic type indicates deductible and co-insurance are waived)    Patient Name: Heri Muhammad  Today's Date: 1/16/2024    Health Maintenance       Date Due Completion Date    RSV Vaccine (Age 60+ and Pregnant patients) (1 - 1-dose 60+ series) Never done ---    Hemoglobin A1c (Prediabetes) 07/18/2024 7/18/2023    Lipid Panel 07/18/2028 7/18/2023    TETANUS VACCINE 09/28/2033 9/28/2023        No orders of the defined types were placed in this encounter.      The following information is provided to all patients.  This information is to help you find resources for any of the problems found today that may be affecting your health:                  Living healthy guide: www.[facility].[place].gov      Understanding Diabetes: www.diabetes.org      Eating healthy: www.cdc.gov/healthyweight      [facility] home safety checklist: www.cdc.gov/steadi/patient.html      Agency on Aging: www.goea.[place].gov      Alcoholics anonymous (AA): www.aa.org      Physical Activity: www.mimi.nih.gov/jc1pcmj      Tobacco use: www.quitwithusla.org

## 2024-01-16 NOTE — PROGRESS NOTES
The patient location is: [place]  The chief complaint leading to consultation is: Medicare eAWV    Visit type: audiovisual    Face to Face time with patient: 25 minutes  50 minutes of total time spent on the encounter, which includes face to face time and non-face to face time preparing to see the patient (eg, review of tests), Obtaining and/or reviewing separately obtained history, Documenting clinical information in the electronic or other health record, Independently interpreting results (not separately reported) and communicating results to the patient/family/caregiver, or Care coordination (not separately reported).         Each patient to whom he or she provides medical services by telemedicine is:  (1) informed of the relationship between the physician and patient and the respective role of any other health care provider with respect to management of the patient; and (2) notified that he or she may decline to receive medical services by telemedicine and may withdraw from such care at any time.    Notes:       Heri Muhammad presented for a Medicare AWV today. The following components were reviewed and updated:    Medical history  Family History  Social history  Allergies and Current Medications  Health Risk Assessment  Health Maintenance  Care Team    **See Completed Assessments for Annual Wellness visit with in the encounter summary    The following assessments were completed:  Depression Screening  Cognitive function Screening      Opioid documentation:      Patient does not have a current opioid prescription.          There were no vitals filed for this visit.  There is no height or weight on file to calculate BMI.       Physical Exam  Constitutional:       Appearance: Normal appearance.   HENT:      Head: Normocephalic and atraumatic.   Pulmonary:      Effort: Pulmonary effort is normal.   Neurological:      General: No focal deficit present.      Mental Status: He is alert and oriented to person, place,  and time.   Psychiatric:         Mood and Affect: Mood normal.         Behavior: Behavior normal.         Thought Content: Thought content normal.         Judgment: Judgment normal.           Diagnoses and health risks identified today and associated recommendations/orders:  1. Encounter for Medicare annual wellness exam  Reviewed and discussed preventive health screenings and vaccinations with the patient and his wife.     2. Late onset Alzheimer's disease without behavioral disturbance  On Namenda / Chronic and stable. Cognitive function screening score today 4/5. Continue current treatment plan as previously prescribed with your Neurologist.     3. Seizure disorder  On Lamictal / Stable. Continue current treatment plan as previously prescribed with your Neurologist.     4. Benign essential tremor  On Primidone / Chronic but overall stable. Continue current treatment plan as previously prescribed with your Neurologist.     5. Simple chronic bronchitis  On Trelegy / Stable. 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 Stable. Continue current treatment plan as previously prescribed with your PCP     7. Chronic diastolic heart failure  On Torsemide / Stable. Continue current treatment plan as previously prescribed with your Cardiologist.     8. Permanent atrial fibrillation  S/P catheter ablation as well as LAAO; currently on Metoprolol with Bi-V PPM in situ. Continue current treatment plan as previously prescribed with your Cardiologist/ EP.     9. Aortic atherosclerosis  On DAPT and Lipitor / Stable. Continue current treatment plan as previously prescribed with your Cardiologist.     10. Sinus node dysfunction   11. Biventricular cardiac pacemaker in situ  Stable. Continue current treatment plan as previously prescribed with your Cardiologist.     12. Coronary artery disease involving native coronary artery of native heart without angina  pectoris  On DAPT, BB, Statin / Stable. Continue current treatment plan as previously prescribed with your Cardiologist.     13. Essential hypertension  On Metoprolol and Torsemide / Stable. Continue current treatment plan as previously prescribed with your PCP     BP Readings from Last 3 Encounters:   11/29/23 120/70   10/24/23 120/62   08/17/23 110/78     14. Mixed hyperlipidemia  On Lipitor / Stable. Continue current treatment plan as previously prescribed with your PCP     15. Subclinical hypothyroidism  Stable. Continue current treatment plan as previously prescribed with your PCP     Lab Results   Component Value Date    TSH 2.550 07/18/2023    FREET4 0.77 07/18/2023     16. Insomnia, unspecified type  Stable. Continue current treatment plan as previously prescribed with your PCP     17. Chronic bilateral low back pain without sciatica  Chronic / Stable. Continue current treatment plan as previously prescribed with your PCP     18. Decreased strength, endurance, and mobility  19. At high risk for falls  Ambulates with cane as needed. No falls within past 12 months. Encouraged increasing physical activity/walking to help keep joints mobile and increase endurance.     20. History of skin cancer  Stable. Continue current treatment plan as previously prescribed with your PCP     Substance abuse screening: No substance abuse per pt /chart     Provided Heri with a 5-10 year written screening schedule and personal prevention plan. Recommendations were developed using the USPSTF age appropriate recommendations. Education, counseling, and referrals were provided as needed.  After Visit Summary printed and given to patient which includes a list of additional screenings\tests needed.    Follow up in about 1 year (around 1/16/2025).      Asuncion Hameed NP    I offered to discuss advanced care planning, including how to pick a person who would make decisions for you if you were unable to make them for yourself, called  a health care power of , and what kind of decisions you might make such as use of life sustaining treatments such as ventilators and tube feeding when faced with a life limiting illness recorded on a living will that they will need to know. (How you want to be cared for as you near the end of your natural life)     X Patient is interested in learning more about how to make advanced directives.  I provided them paperwork and offered to discuss this with them.

## 2024-02-23 LAB
OHS CV AF BURDEN PERCENT: 34
OHS CV BIV PACING PERCENT: 99 %
OHS CV DC REMOTE DEVICE TYPE: NORMAL
OHS CV ICD SHOCK: NO

## 2024-03-11 NOTE — TELEPHONE ENCOUNTER
----- Message from Gina Everett sent at 10/23/2018 10:08 AM CDT -----  Contact: Melania Wife  Type:  RX Refill Request    Who Called:  Wife Melania  Refill or New Rx:  Refill  RX Name and Strength:  memantine (NAMENDA) 10 MG Tab   How is the patient currently taking it? (ex. 1XDay):  2XDay = 28   Is this a 30 day or 90 day RX:  2 weeks worth until she get the new mail order script  Preferred Pharmacy with phone number:   [id number] [place], [place] - [address] [address]  Phone: [phone] Fax: [phone]  Local or Mail Order:  Local  Ordering Provider:  Dr Heena Payton Call Back Number:  [phone] (home)   Additional Information: His mail was sent over yesterday and just needs to have some until he receives it.  Asking for 2 week back up     MOLECULAR PCR

## 2024-03-18 DIAGNOSIS — F32.A DEPRESSION, UNSPECIFIED DEPRESSION TYPE: ICD-10-CM

## 2024-03-18 RX ORDER — CITALOPRAM 20 MG/1
TABLET, FILM COATED ORAL
Qty: 90 TABLET | Refills: 3 | Status: SHIPPED | OUTPATIENT
Start: 2024-03-18

## 2024-03-25 DIAGNOSIS — J41.0 SIMPLE CHRONIC BRONCHITIS: ICD-10-CM

## 2024-03-25 RX ORDER — FLUTICASONE FUROATE, UMECLIDINIUM BROMIDE AND VILANTEROL TRIFENATATE 100; 62.5; 25 UG/1; UG/1; UG/1
POWDER RESPIRATORY (INHALATION)
Qty: 60 EACH | Refills: 3 | Status: SHIPPED | OUTPATIENT
Start: 2024-03-25

## 2024-04-03 ENCOUNTER — CLINICAL SUPPORT (OUTPATIENT)
Dept: CARDIOLOGY | Facility: HOSPITAL | Age: 84
End: 2024-04-03
Attending: INTERNAL MEDICINE
Payer: MEDICARE

## 2024-04-03 ENCOUNTER — CLINICAL SUPPORT (OUTPATIENT)
Dept: CARDIOLOGY | Facility: HOSPITAL | Age: 84
End: 2024-04-03
Payer: MEDICARE

## 2024-04-03 DIAGNOSIS — Z95.0 PRESENCE OF CARDIAC PACEMAKER: ICD-10-CM

## 2024-04-03 PROCEDURE — 93294 REM INTERROG EVL PM/LDLS PM: CPT | Mod: ,,, | Performed by: INTERNAL MEDICINE

## 2024-04-03 PROCEDURE — 93296 REM INTERROG EVL PM/IDS: CPT | Performed by: INTERNAL MEDICINE

## 2024-04-18 LAB
OHS CV AF BURDEN PERCENT: 10
OHS CV BIV PACING PERCENT: 98 %
OHS CV DC REMOTE DEVICE TYPE: NORMAL
OHS CV ICD SHOCK: NO

## 2024-04-25 ENCOUNTER — OFFICE VISIT (OUTPATIENT)
Dept: FAMILY MEDICINE | Facility: CLINIC | Age: 84
End: 2024-04-25
Payer: MEDICARE

## 2024-04-25 VITALS
TEMPERATURE: 98 F | OXYGEN SATURATION: 96 % | HEIGHT: 72 IN | DIASTOLIC BLOOD PRESSURE: 58 MMHG | WEIGHT: 223.88 LBS | HEART RATE: 70 BPM | SYSTOLIC BLOOD PRESSURE: 112 MMHG | BODY MASS INDEX: 30.32 KG/M2

## 2024-04-25 DIAGNOSIS — R53.81 PHYSICAL DECONDITIONING: ICD-10-CM

## 2024-04-25 DIAGNOSIS — Z79.899 LONG TERM CURRENT USE OF DIURETIC: ICD-10-CM

## 2024-04-25 DIAGNOSIS — E78.49 OTHER HYPERLIPIDEMIA: ICD-10-CM

## 2024-04-25 DIAGNOSIS — I48.0 PAF (PAROXYSMAL ATRIAL FIBRILLATION): ICD-10-CM

## 2024-04-25 DIAGNOSIS — E66.9 OBESITY, CLASS I, BMI 30-34.9: ICD-10-CM

## 2024-04-25 DIAGNOSIS — G25.0 BENIGN ESSENTIAL TREMOR: ICD-10-CM

## 2024-04-25 DIAGNOSIS — E03.8 SUBCLINICAL HYPOTHYROIDISM: ICD-10-CM

## 2024-04-25 DIAGNOSIS — I10 ESSENTIAL HYPERTENSION: Primary | ICD-10-CM

## 2024-04-25 DIAGNOSIS — G40.909 SEIZURE DISORDER: ICD-10-CM

## 2024-04-25 DIAGNOSIS — R73.03 PREDIABETES: ICD-10-CM

## 2024-04-25 PROCEDURE — 3078F DIAST BP <80 MM HG: CPT | Mod: HCNC,CPTII,S$GLB, | Performed by: STUDENT IN AN ORGANIZED HEALTH CARE EDUCATION/TRAINING PROGRAM

## 2024-04-25 PROCEDURE — 1101F PT FALLS ASSESS-DOCD LE1/YR: CPT | Mod: HCNC,CPTII,S$GLB, | Performed by: STUDENT IN AN ORGANIZED HEALTH CARE EDUCATION/TRAINING PROGRAM

## 2024-04-25 PROCEDURE — 3074F SYST BP LT 130 MM HG: CPT | Mod: HCNC,CPTII,S$GLB, | Performed by: STUDENT IN AN ORGANIZED HEALTH CARE EDUCATION/TRAINING PROGRAM

## 2024-04-25 PROCEDURE — 1159F MED LIST DOCD IN RCRD: CPT | Mod: HCNC,CPTII,S$GLB, | Performed by: STUDENT IN AN ORGANIZED HEALTH CARE EDUCATION/TRAINING PROGRAM

## 2024-04-25 PROCEDURE — 99214 OFFICE O/P EST MOD 30 MIN: CPT | Mod: HCNC,S$GLB,, | Performed by: STUDENT IN AN ORGANIZED HEALTH CARE EDUCATION/TRAINING PROGRAM

## 2024-04-25 PROCEDURE — 1126F AMNT PAIN NOTED NONE PRSNT: CPT | Mod: HCNC,CPTII,S$GLB, | Performed by: STUDENT IN AN ORGANIZED HEALTH CARE EDUCATION/TRAINING PROGRAM

## 2024-04-25 PROCEDURE — 99999 PR PBB SHADOW E&M-EST. PATIENT-LVL III: CPT | Mod: PBBFAC,,, | Performed by: STUDENT IN AN ORGANIZED HEALTH CARE EDUCATION/TRAINING PROGRAM

## 2024-04-25 PROCEDURE — 3288F FALL RISK ASSESSMENT DOCD: CPT | Mod: HCNC,CPTII,S$GLB, | Performed by: STUDENT IN AN ORGANIZED HEALTH CARE EDUCATION/TRAINING PROGRAM

## 2024-04-25 NOTE — PROGRESS NOTES
[facility] Note    Chief Complaint      Chief Complaint   Patient presents with    Itchy throat  follow up on chronic conditions     History of Present Illness     Heri Muhammad is a 83 y.o. male with Afib/Aflutter,Sinus node dysfunction( s/p CRT-P and multiple cardioversion , sHTN, HLD, seizure disorder, DJD lumbar spine, Aortic atherosclerosis, HFpEF, hypothyroidism, restrictive cardiomyopathy, chronic LBP, chronic bilateral shoulder pain bilateral shoulder replacements and COPD presents for f/u on chronic conditions and with itchy throat today. He also c/o early morning headaches , irritability . He denies any leg swelling, orthopnea , PND, no CP, fever,cough or wheezing. He is compliant with all current regimen and has slightly improved his activity level to walking around for at least 10 minutes daily around his house unlike before when he prefers to sit all day.                                                                  Electrophysiologist : Dr Deep Esparza MD  Cardiologist : Dr Graham  Pain : Dr Negron  Neurologist : Dr Anjum Arellano    Problem List Addressed This Visit:    As listed below     Health Maintenance   Topic Date Due    Lipid Panel  07/18/2028    TETANUS VACCINE  09/28/2033    Shingles Vaccine  Completed       Past Medical History:   Diagnosis Date    Anticoagulant long-term use     eliquis    Aortic atherosclerosis 09/18/2017    - LDL goal <70 - BP goal <130/80    Atrial flutter     Benign essential tremor     Biventricular cardiac pacemaker in situ 09/24/2018    CAD (coronary artery disease) 12/02/2013    - 9/2013 Left heart Cath: LAD- 60% prox (neg FFR), nl, ramus and RCA 20% - LDL goal <70 - BP goal <130/80    Cancer     skin cancer on back    Carpal tunnel syndrome of left wrist 08/08/2017    Chronic bronchitis     Chronic diastolic heart failure 01/19/2018    Chronic obstructive pulmonary disease 10/03/2018    Chronic right-sided thoracic back pain 04/27/2017     Cough     Current use of long term anticoagulation 06/02/2016    - Eliquis for Afib    Dizziness     intermittant    DJD (degenerative joint disease) of knee     ED (erectile dysfunction)     Hard of hearing 11/2016    History of cataract     Hypertension     Hyponatremia 06/11/2019    Memory loss     Mixed hyperlipidemia 07/28/2012    Persistent atrial fibrillation 07/01/2015    - followed by Dr. Esparza - history of cardioversion - on Eliquis OAC 5 mg BID    Seizure disorder     Seizures     last episode 1995    Subclinical hypothyroidism     Tension type headache 07/02/2019       Past Surgical History:   Procedure Laterality Date    ANKLE SURGERY Right     pins and screws    ATRIAL ABLATION SURGERY      CARDIAC PACEMAKER PLACEMENT  2014    CARDIAC PACEMAKER PLACEMENT      CARDIAC PACEMAKER PLACEMENT  2014    CARDIOVERSION      CATARACT EXTRACTION      OU    COLONOSCOPY      due in 2014    ECHOCARDIOGRAM,TRANSESOPHAGEAL N/A 6/16/2023    Procedure: Transesophageal echo (BNE) intra-procedure log documentation;  Surgeon: [facility];  Location: [facility] EP LAB;  Service: Cardiology;  Laterality: N/A;  s/p WM, BEN, anes, 3 Prep    EYE SURGERY Bilateral     cataracts extraction    FRACTURE SURGERY Right     ankle with hardware    HERNIA REPAIR      abdominal    INJECTION OF ANESTHETIC AGENT AROUND MEDIAL BRANCH NERVES INNERVATING LUMBAR FACET JOINT Bilateral 1/17/2019    Procedure: Block-nerve-medial branch-lumbar L2-L5;  Surgeon: Anthony Moffett MD;  Location: [facility] OR;  Service: Pain Management;  Laterality: Bilateral;    JOINT REPLACEMENT Bilateral     shoulders    OCCLUSION OF LEFT ATRIAL APPENDAGE N/A 5/5/2023    Procedure: Left atrial appendage occlusion;  Surgeon: Hema Del Valle MD;  Location: [facility] EP LAB;  Service: Cardiology;  Laterality: N/A;  afib, watchman, BSCI, ben, anes, MB, 3prep    RADIOFREQUENCY ABLATION OF LUMBAR MEDIAL BRANCH NERVE AT SINGLE LEVEL Bilateral 1/23/2019    Procedure:  Radiofrequency Ablation, Nerve, Spinal, Lumbar, Medial Branch, L2-L5;  Surgeon: Anthony Moffett MD;  Location: [facility] OR;  Service: Pain Management;  Laterality: Bilateral;    TRANSESOPHAGEAL ECHOCARDIOGRAPHY N/A 2023    Procedure: ECHOCARDIOGRAM, TRANSESOPHAGEAL;  Surgeon: Dylan Layne MD;  Location: [facility] EP LAB;  Service: Cardiology;  Laterality: N/A;    TREATMENT OF CARDIAC ARRHYTHMIA N/A 2022    Procedure: Cardioversion or Defibrillation;  Surgeon: MIREILLE Morel MD;  Location: [facility] EP LAB;  Service: Cardiology;  Laterality: N/A;  AF, GALILEO, DCCV, MAC, EH (to cover for SK), 3 Prep *SJM CRT-P in situ*       family history includes Blindness in his father; Cancer in his daughter and father; Cataracts in his father; Dementia in his mother; Heart disease in his mother; Heart failure in his mother; Hypertension in his father; No Known Problems in his son; Obesity in his daughter; Tremor in his brother, father, and paternal grandfather.    Social History     Tobacco Use    Smoking status: Former     Current packs/day: 0.00     Average packs/day: 1 pack/day for 5.0 years (5.0 ttl pk-yrs)     Types: Cigarettes     Start date:      Quit date:      Years since quittin.3    Smokeless tobacco: Never    Tobacco comments:     - I   Substance Use Topics    Alcohol use: No    Drug use: No       Review of Systems   Constitutional:  Negative for activity change, chills, fatigue, fever and unexpected weight change.   HENT:  Negative for hearing loss, rhinorrhea and trouble swallowing.    Eyes:  Negative for discharge and visual disturbance.   Respiratory:  Positive for shortness of breath. Negative for cough, chest tightness and wheezing.    Cardiovascular:  Negative for chest pain, palpitations and leg swelling.   Gastrointestinal:  Negative for blood in stool, constipation, diarrhea and vomiting.   Endocrine: Negative for polydipsia and polyuria.   Genitourinary:  Negative for difficulty urinating,  dysuria, flank pain, frequency, hematuria and urgency.   Musculoskeletal:  Positive for gait problem. Negative for arthralgias, joint swelling and neck pain.   Neurological:  Negative for weakness, light-headedness and headaches.   Psychiatric/Behavioral:  Negative for confusion, dysphoric mood and sleep disturbance.        Outpatient Encounter Medications as of 4/25/2024   Medication Sig Dispense Refill    aspirin (ECOTRIN) 81 MG EC tablet Take 1 tablet (81 mg total) by mouth once daily. 90 tablet 1    atorvastatin (LIPITOR) 80 MG tablet Take 1 tablet (80 mg total) by mouth once daily. 90 tablet 3    cetirizine (ZYRTEC) 10 MG tablet Take 10 mg by mouth every evening.      citalopram (CELEXA) 20 MG tablet TAKE 1 TABLET EVERY DAY 90 tablet 3    fluticasone-umeclidin-vilanter (TRELEGY ELLIPTA) 100-62.5-25 mcg DsDv INHALE 1 PUFF ONE TIME DAILY 60 each 3    lamoTRIgine (LAMICTAL) 100 MG tablet TAKE 1 TABLET TWICE DAILY 180 tablet 3    memantine (NAMENDA) 10 MG Tab TAKE 1 TABLET TWICE DAILY 180 tablet 3    metoprolol succinate (TOPROL-XL) 50 MG 24 hr tablet TAKE 1 TABLET ONE TIME DAILY 90 tablet 2    primidone (MYSOLINE) 250 MG Tab TAKE 2 TABLETS TWICE DAILY 360 tablet 1    torsemide (DEMADEX) 10 MG Tab Take 1 tablet (10 mg total) by mouth every morning. 90 tablet 3     Facility-Administered Encounter Medications as of 4/25/2024   Medication Dose Route Frequency Provider Last Rate Last Admin    0.9%  NaCl infusion (for blood administration)   Intravenous Q24H PRN Batool Rodriguez NP            Review of patient's allergies indicates:   Allergen Reactions    Bactroban [mupirocin calcium] Rash     Other reaction(s): Rash       Physical Exam      Vital Signs  Temp: 97.9 °F (36.6 °C)  Temp Source: Temporal  Pulse: 70  SpO2: 96 %  BP: (!) 112/58  BP Location: Right arm  Patient Position: Sitting  Pain Score: 0-No pain  Height and Weight  Height: 6' (182.9 cm)  Weight: 101.5 kg (223 lb 14 oz)  BSA (Calculated - sq m):  "2.27 sq meters  BMI (Calculated): 30.4  Weight in (lb) to have BMI = 25: 183.9]    Vital signs reviewed  General PE: In no acute distress, appear stated age  HEENT: PERRL, EOMI, moist mucosa, ears: TM nl, no oral thrush  Chest : clinically lung lung fields  CVS: NRRR,S1 and S2 only, no m/r/g, good peripheral pulses, no pedal edema  Abdomen : NABS, flat, no area of tenderness, no palpable organomegaly     Laboratory:  CBC:  No results for input(s): "WBC", "RBC", "HGB", "HCT", "PLT", "MCV", "MCH", "MCHC" in the last 2160 hours.  CMP:  No results for input(s): "GLU", "CALCIUM", "ALBUMIN", "PROT", "NA", "K", "CO2", "CL", "BUN", "ALKPHOS", "ALT", "AST", "BILITOT" in the last 2160 hours.    Invalid input(s): "CREATININ"  URINALYSIS:  No results for input(s): "COLORU", "CLARITYU", "SPECGRAV", "PHUR", "PROTEINUA", "GLUCOSEU", "BILIRUBINCON", "BLOODU", "WBCU", "RBCU", "BACTERIA", "MUCUS", "NITRITE", "LEUKOCYTESUR", "UROBILINOGEN", "HYALINECASTS" in the last 2160 hours.   LIPIDS:  No results for input(s): "TSH", "HDL", "CHOL", "TRIG", "LDLCALC", "CHOLHDL", "NONHDLCHOL", "TOTALCHOLEST" in the last 2160 hours.  TSH:  No results for input(s): "TSH" in the last 2160 hours.  A1C:  No results for input(s): "HGBA1C" in the last 2160 hours.    Radiology:      Assessment/Plan     Heri Muhammad is a 83 y.o.male with:    Itchy throat  -likely allergic rhinitis  -c/w current regimen    2. COPD  -no signs of COPD exacerbation  -patient counseled on lug exercise therapy( incentive spirometry, balloon blowing, he declined)  -SpO2 @ 96%    3. Persistent Afib at high risk of fall while on DOAC  -s/p watchmann procedure at [facility] (05/05/2023)  -No bleeding from any orifices  -No cardiac symptoms today  -DOAC stopped by 11/2023 per EP recs    4. Subclinical hypothyroidism  - TFT WNL (07/2023)  -monitor for now    5. Essential hypertension  Overview:  - well controlled  - continue current medication    6. Mixed hyperlipidemia  -well " controlled on current regimen  - on statin  - followed by Cardiology    7. Seizure disorder  Overview:  - stable on lamictal and no recent seizures since 1990's  - CT Head 2019 with diffuse atrophy  - EEG 1/2019 mild diffuse slowing  - followed by Neurology    8. Chronic diastolic heart failure  Overview:  - no signs of acute decompensation  - no leg swelling per wife on evaluation at home  -Torsemide PRN only  - medical management    9. Benign essential tremor  Overview:  - followed by Neurology  - on primidone and propranolol recently added    10. BMI 30/Prediabetes  -counseled on life style modifications    -Continue current medications and maintain follow up with specialists.      Patient and wife verbalized understanding and agree with current treatment plans.     Dr Ivonne Robles MD  Internal Medicine  [facility]

## 2024-05-21 ENCOUNTER — TELEPHONE (OUTPATIENT)
Dept: ELECTROPHYSIOLOGY | Facility: CLINIC | Age: 84
End: 2024-05-21
Payer: MEDICARE

## 2024-05-21 NOTE — TELEPHONE ENCOUNTER
Merlin alert for persistent AF, onset 5/10/24, Ventricular rate controlled.  BiV pacing >99%.    Total AF burden 12%    S/P PVI 1/2026, 3/2017, RFA of CTI 1/2016, Mitral annular line 3/2017, roof line 3/2017    S/P DCCV 10/5/2022 (aborted d/t NSR), 07/11/2022   Watchman device 5/5/2023    Pt taking Metoprolol 50 mg QD    Rate control strategy , Amiodarone was stopped 11/2022.  Will cont to monitor

## 2024-05-22 DIAGNOSIS — I48.21 PERMANENT ATRIAL FIBRILLATION: Primary | ICD-10-CM

## 2024-05-22 RX ORDER — METOPROLOL SUCCINATE 50 MG/1
50 TABLET, EXTENDED RELEASE ORAL
Qty: 90 TABLET | Refills: 0 | OUTPATIENT
Start: 2024-05-22

## 2024-05-22 RX ORDER — METOPROLOL SUCCINATE 50 MG/1
50 TABLET, EXTENDED RELEASE ORAL DAILY
Qty: 90 TABLET | Refills: 0 | Status: SHIPPED | OUTPATIENT
Start: 2024-05-22

## 2024-05-22 RX ORDER — PRIMIDONE 250 MG/1
500 TABLET ORAL 2 TIMES DAILY
Qty: 360 TABLET | Refills: 0 | Status: SHIPPED | OUTPATIENT
Start: 2024-05-22

## 2024-06-06 ENCOUNTER — OFFICE VISIT (OUTPATIENT)
Dept: PODIATRY | Facility: CLINIC | Age: 84
End: 2024-06-06
Payer: MEDICARE

## 2024-06-06 VITALS — WEIGHT: 223.75 LBS | BODY MASS INDEX: 30.31 KG/M2 | HEIGHT: 72 IN

## 2024-06-06 DIAGNOSIS — M20.41 HAMMERTOES OF BOTH FEET: Primary | ICD-10-CM

## 2024-06-06 DIAGNOSIS — M20.42 HAMMERTOES OF BOTH FEET: Primary | ICD-10-CM

## 2024-06-06 PROCEDURE — 1159F MED LIST DOCD IN RCRD: CPT | Mod: HCNC,CPTII,S$GLB, | Performed by: PODIATRIST

## 2024-06-06 PROCEDURE — 3288F FALL RISK ASSESSMENT DOCD: CPT | Mod: HCNC,CPTII,S$GLB, | Performed by: PODIATRIST

## 2024-06-06 PROCEDURE — 99999 PR PBB SHADOW E&M-EST. PATIENT-LVL III: CPT | Mod: PBBFAC,HCNC,, | Performed by: PODIATRIST

## 2024-06-06 PROCEDURE — 1101F PT FALLS ASSESS-DOCD LE1/YR: CPT | Mod: HCNC,CPTII,S$GLB, | Performed by: PODIATRIST

## 2024-06-06 PROCEDURE — 1126F AMNT PAIN NOTED NONE PRSNT: CPT | Mod: HCNC,CPTII,S$GLB, | Performed by: PODIATRIST

## 2024-06-06 PROCEDURE — 1160F RVW MEDS BY RX/DR IN RCRD: CPT | Mod: HCNC,CPTII,S$GLB, | Performed by: PODIATRIST

## 2024-06-06 PROCEDURE — 99213 OFFICE O/P EST LOW 20 MIN: CPT | Mod: HCNC,S$GLB,, | Performed by: PODIATRIST

## 2024-06-06 RX ORDER — DICLOFENAC SODIUM 10 MG/G
4 GEL TOPICAL 4 TIMES DAILY
Qty: 100 G | Refills: 2 | Status: SHIPPED | OUTPATIENT
Start: 2024-06-06 | End: 2024-06-06

## 2024-06-06 RX ORDER — DICLOFENAC SODIUM 10 MG/G
4 GEL TOPICAL 4 TIMES DAILY
Qty: 100 G | Refills: 2 | Status: SHIPPED | OUTPATIENT
Start: 2024-06-06 | End: 2024-06-16

## 2024-06-06 NOTE — PATIENT INSTRUCTIONS
Hammertoe    What Is Hammertoe?    Hammertoe is a contracture (bending) deformity of one or both joints of the second, third, fourth or fifth (little) toes. This abnormal bending can put pressure on the toe when wearing shoes, causing problems to develop.        Hammertoes usually start out as mild deformities and get progressively worse over time. In the earlier stages, hammertoes are flexible and the symptoms can often be managed with noninvasive measures. But if left untreated, hammertoes can become more rigid and will not respond to nonsurgical treatment.        Because of the progressive nature of hammertoes, they should receive early attention. Hammertoes never get better without some kind of intervention.    Causes  The most common cause of hammertoe is a muscle/tendon imbalance. This imbalance, which leads to a bending of the toe, results from mechanical (structural) or neurological changes in the foot that occur over time in some people.  Hammertoes may be aggravated by shoes that do not fit properly. A hammertoe may result if a toe is too long and is forced into a cramped position when a tight shoe is worn. Occasionally, hammertoe is the result of an earlier trauma to the toe. In some people, hammertoes are inherited.    Symptoms  Common symptoms of hammertoes include:    -Pain or irritation of the affected toe when wearing shoes.    -Corns and calluses (a buildup of skin) on the toe, between two toes or on the ball of the foot. Corns are caused by constant friction against the shoe. They may be soft or hard, depending on their location.    -Inflammation, redness or a burning sensation    -Contracture of the toe    -In more severe cases of hammertoe, open sores may form.     Diagnosis  Although hammertoes are readily apparent, to arrive at a diagnosis, the foot and ankle surgeon will obtain a thorough history of your symptoms and examine your foot. During the physical examination, the doctor may attempt to  reproduce your symptoms by manipulating your foot and will study the contractures of the toes. In addition, the foot and ankle surgeon may take x-rays to determine the degree of the deformities and assess any changes that may have occurred.    Hammertoes are progressive--they do not go away by themselves and usually they will get worse over time. However, not all cases are alike--some hammertoes progress more rapidly than others. Once your foot and ankle surgeon has evaluated your hammertoes, a treatment plan can be developed that is suited to your needs.    Nonsurgical Treatment  There is a variety of treatment options for hammertoe. The treatment your foot and ankle surgeon selects will depend on the severity of your hammertoe and other factors.    A number of nonsurgical measures can be undertaken:    -Padding corns and calluses. Your foot and ankle surgeon can provide or prescribe pads designed to shield corns from irritation. If you want to try over-the-counter pads, avoid the medicated types. Medicated pads are generally not recommended because they may contain a small amount of acid that can be harmful. Consult your surgeon about this option.    -Changes in shoewear. Avoid shoes with pointed toes, shoes that are too short, or shoes with high heels--conditions that can force your toe against the front of the shoe. Instead, choose comfortable shoes with a deep, roomy toebox and heels no higher than two inches.    -Orthotic devices. A custom orthotic device placed in your shoe may help control the muscle/tendon imbalance. Injection therapy. Corticosteroid injections are sometimes used to ease pain and inflammation caused by hammertoe.     -Medications. Oral nonsteroidal anti-inflammatory drugs (NSAIDs), such as ibuprofen, may be recommended to reduce pain and inflammation. Splinting/strapping. Splints or small straps may be applied by the surgeon to realign the bent toe.     When Is Surgery Needed?  In some  cases, usually when the hammertoe has become more rigid and painful or when an open sore has developed, surgery is needed.    Often, patients with hammertoe have bunions or other foot deformities corrected at the same time. In selecting the procedure or combination of procedures for your particular case, the foot and ankle surgeon will take into consideration the extent of your deformity, the number of toes involved, your age, your activity level and other factors. The length of the recovery period will vary, depending on the procedure or procedures performed.          What Are Mallet, Hammer, and Claw Toes?  Mallet, hammer, and claw toes are most often caused by wearing shoes that are too short or heels that are too high. This jams the toes against the front of the shoe and causes one or more joints to bend. Rarely, disease can cause the joints in the toes to bend. Mallet, hammer, and claw toes are among the most common toe problems. They occur most often in the longest of the four smaller toes.    Inside your toes  There are 3 bones in each of your 4 smaller toes. Where 2 bones connect is called a joint. Normally the toes lie flat. But pressure on the toes or the front of the foot can cause one or more joints to bend. This curls the toe. Toes that stay curled are called mallet toes, hammer toes, or claw toes, depending on which joints are bent.    Symptoms  You may feel pain in the toe or in the ball of your foot. A corn (a hard growth of skin on the top of the toe) may form where the toe rubs against the top of the shoe. Or a callus (a hard growth of skin on the bottom of the foot) may form under the tip of the toe or on the ball of the foot. Corns and calluses can also be painful.   Date Last Reviewed: 10/18/2015  © 7188-6408 [employer]. [address] [place] [address], [place] [address]. All rights reserved. This information is not intended as a substitute for professional medical care. Always follow your  healthcare professional's instructions.        Treating Mallet, Hammer, and Claw Toes  Definitions  A hammer toe has an abnormal bend in the middle joint of your toe (toe is bent upward at joint).  Mallet toe affects the joint nearest your toenail (toe is bent downward at joint).  Claw toe affects the joint at the ball of your foot (toe is bent upward at joint), as well as both toe joints (toe is bent downward at both joints).  Hammer toe and mallet toe are most likely to occur in the toe next to your big toe.  Causes  The most common cause for all 3 deformities is poorly fitting shoes and tight shoes, especially high heels for women. Trauma and nerve damage from various diseases like diabetes may also cause these deformities.  Treatment  Buying shoes with more room in the toes, filing down corns and calluses, and padding, taping, or strapping the toe most often relieve the pain. Toe stretching and exercises may also be helpful. If these steps dont work, you may need surgery to straighten your toes.  Shoes  Buy low-heeled shoes with plenty of room in the front. This keeps your toes from being jammed against the end of your shoe. It also keeps your shoe from rubbing the tops of your toes.  Corns and calluses    To file down a corn or callus, soak your foot in warm water. This softens the hard skin. Dry your foot. Then gently rub the corn or callus with a pumice stone or nail file.  Pads and splints  If you still have pain, you may need to put a pad or splint on your toe. This helps take pressure off the painful corn or callus.  For a mallet toe, you can put a gel pad on the toe. This keeps the tip of the toe from rubbing against the bottom of the shoe.  For a hammer or claw toe, you can put a felt or foam pad over the bent joint. This keeps the toe from rubbing on the top of the shoe.  For a hammer or claw toe that is still flexible, you can put a splint on the toe. This keeps it straight so it doesn't rub on the  top of the shoe.    Date Last Reviewed: 9/29/2015  © 5048-6216 [employer]. [address] [place] [address], [place] [address]. All rights reserved. This information is not intended as a substitute for professional medical care. Always follow your healthcare professional's instructions.          Foot Surgery: Flexible and Rigid Hammertoes  With hammertoes, one or more toes curl or bend abnormally. This can be caused by an inherited muscle problem, an abnormal bone length, or poor foot mechanics. The affected joints can rub inside shoes, causing corns (buildups of dead skin).  There are many nonsurgical treatments for hammertoes, but if these are not effective, you may want to consider surgery.    Flexible hammertoes  When hammertoes are flexible, you can straighten the buckled joints. Flexible hammertoes may become rigid over time.    Tendon release  This treatment helps release the buckled joint. The bottom (flexor) tendon may be repositioned to the top of the affected toe (flexor tendon transfer). Sometimes, the top or bottom tendon is released but not repositioned (tenotomy).    Rigid hammertoes  Rigid hammertoes are fixed (not flexible). You cannot straighten the buckled joints. Corns, pain, and loss of function may be more severe with rigid hammertoes than with flexible ones.    Arthroplasty  A part of the joint is removed, and the toe is straightened. In some cases, the entire joint may be replaced with an implant. When healed, the bones become connected with scar tissue, making your toe flexible.    Fusion  First, the cartilage and some bone on both sides of the joint are removed. Then, the toe is straightened, and the two bones are held together, often with a pin. The pin is removed after several weeks. Once your foot heals, the toe will be less flexible, but more stable.  Healing after surgery  The severity of your condition, number of toes involved, and type of surgery done will affect your recovery  time. Many people are able to walk right after surgery with a special surgical shoe. Full healing can take several weeks. Your healthcare provider can advise you on what to expect after surgery.     Date Last Reviewed: 10/15/2015  © 4748-6546 [employer]. [address] [place] [address]. All rights reserved. This information is not intended as a substitute for professional medical care. Always follow your healthcare professional's instructions.        Foot Surgery: Curled Fifth Toe  Hammertoes can make walking painful. With hammertoes, one or more toes curl or bend abnormally. This can be caused by an inherited muscle problem, an abnormal bone length, or poor foot mechanics. The affected joints can rub inside shoes, causing corns (buildups of dead skin).    Curled fifth toe  A curled fifth toe is most often inherited. When the fifth toe curls inward, it moves under the next toe. Then the nail of the curled toe starts to face outward. As a result, you may bear weight on the side of your toe instead of the bottom. This can cause corns and painful nails.  There are many nonsurgical treatments available. But if these are not effective, surgery is a choice.    Derotation arthroplasty  A wedge of skin and a section of bone are removed to help straighten (derotate) the toe. You can bear weight on your foot right after surgery. In some cases, you may need to wear a bandage, splint, and surgical shoe for a few weeks. When healed, the bones become connected with scar tissue.  Date Last Reviewed: 10/15/2015  © 9599-1851 [employer]. [address] [place] [address]. All rights reserved. This information is not intended as a substitute for professional medical care. Always follow your healthcare professional's instructions.

## 2024-06-06 NOTE — PROGRESS NOTES
[facility]  [place] - PODIATRY  [address]  [address]  [place] [address]  Dept: [phone]  Dept Fax: [phone]    Geronimo Lehman Jr., DPM     Assessment:   MDM    Coding  1. Hammertoes of both feet  diclofenac sodium (VOLTAREN) 1 % Gel    DISCONTINUED: diclofenac sodium (VOLTAREN) 1 % Gel          Plan:     Procedures  1. Hammertoes of both feet  -     Discontinue: diclofenac sodium (VOLTAREN) 1 % Gel; Apply 4 g topically 4 (four) times daily. for 10 days  Dispense: 100 g; Refill: 2  -     diclofenac sodium (VOLTAREN) 1 % Gel; Apply 4 g topically 4 (four) times daily. for 10 days  Dispense: 100 g; Refill: 2      Heri was seen today for hammer toe.    Diagnoses and all orders for this visit:    Hammertoes of both feet  -     Discontinue: diclofenac sodium (VOLTAREN) 1 % Gel; Apply 4 g topically 4 (four) times daily. for 10 days  -     diclofenac sodium (VOLTAREN) 1 % Gel; Apply 4 g topically 4 (four) times daily. for 10 days        -pt seen, evaluated, and managed  -dx discussed in detail. All questions/concerns addressed  -all tx options discussed. All alternatives, risks, benefits of all txs discussed  -We discussed conservative care options possible including but not limited to shoe wear and/or padding, bracing/strapping, at home ROM, formal PT, medical therapy, injection therapy  - The utilization of NSAIDs can be considered but their benefit has to be tempered against the risk of GI/ concerns  - A steroid injection can be undertaken.  We did discuss the potential mechanism of action of this shot.  Understanding that multiple injections at the same anatomic site do have deleterious effects on the soft tissue.  Generic risks include: steroid flare (advised to ice if necessary), skin hypo-pgimentation (which can be permanent and unsightly), elevation of blood sugar, subcutaneous atrophy (can be permanent) and infection.   -XR/imaging reviewed by me: old xrs reviewed. Would consider new  b/l prior to procedure or surgery  -labs reviewed by me: ok for vgel  -implemented icing/stretching regimen  -offloading pads dispensed  -would need art us prior to considering procedural or surgical intervention    -rxs dispensed: vgel  -referrals: none  -WB: wbat      Follow up if symptoms worsen or fail to improve.    Subjective:      Patient ID: Heri Muhammad is a 83 y.o. male.    Chief Complaint:   Chief Complaint   Patient presents with    Hammer Toe     Bilateral hammer toes  Nail care       CC - foot pain: patient presents to the podiatry clinic  with complaint of  bilateral foot pain. Onset of the symptoms was several years ago. Precipitating event: unk. Current symptoms include: ability to bear weight, but with some pain, stiffness, swelling and worsening symptoms after a period of activity. Aggravating factors: walking and certain shoegear. Symptoms have gradually worsened. Patient has had prior foot problems. Evaluation to date: none. Treatment to date: avoidance of offending activity. Patients rates pain 7/10 on pain scale.        HPI    Last Podiatry Enc: Visit date not found  Last Enc w/ Me: Visit date not found    Outside reports reviewed: historical medical records.  Family hx: as below  Past Medical History:   Diagnosis Date    Anticoagulant long-term use     eliquis    Aortic atherosclerosis 09/18/2017    - LDL goal <70 - BP goal <130/80    Atrial flutter     Benign essential tremor     Biventricular cardiac pacemaker in situ 09/24/2018    CAD (coronary artery disease) 12/02/2013    - 9/2013 Left heart Cath: LAD- 60% prox (neg FFR), nl, ramus and RCA 20% - LDL goal <70 - BP goal <130/80    Cancer     skin cancer on back    Carpal tunnel syndrome of left wrist 08/08/2017    Chronic bronchitis     Chronic diastolic heart failure 01/19/2018    Chronic obstructive pulmonary disease 10/03/2018    Chronic right-sided thoracic back pain 04/27/2017    Cough     Current use of long term anticoagulation  06/02/2016    - Eliquis for Afib    Dizziness     intermittant    DJD (degenerative joint disease) of knee     ED (erectile dysfunction)     Hard of hearing 11/2016    History of cataract     Hypertension     Hyponatremia 06/11/2019    Memory loss     Mixed hyperlipidemia 07/28/2012    Persistent atrial fibrillation 07/01/2015    - followed by Dr. Esparza - history of cardioversion - on Eliquis OAC 5 mg BID    Seizure disorder     Seizures     last episode 1995    Subclinical hypothyroidism     Tension type headache 07/02/2019     Past Surgical History:   Procedure Laterality Date    ANKLE SURGERY Right     pins and screws    ATRIAL ABLATION SURGERY      CARDIAC PACEMAKER PLACEMENT  2014    CARDIAC PACEMAKER PLACEMENT      CARDIAC PACEMAKER PLACEMENT  2014    CARDIOVERSION      CATARACT EXTRACTION      OU    COLONOSCOPY      due in 2014    ECHOCARDIOGRAM,TRANSESOPHAGEAL N/A 6/16/2023    Procedure: Transesophageal echo (BEN) intra-procedure log documentation;  Surgeon: [facility] Provider;  Location: [facility] EP LAB;  Service: Cardiology;  Laterality: N/A;  s/p WM, BEN, anes, 3 Prep    EYE SURGERY Bilateral     cataracts extraction    FRACTURE SURGERY Right     ankle with hardware    HERNIA REPAIR      abdominal    INJECTION OF ANESTHETIC AGENT AROUND MEDIAL BRANCH NERVES INNERVATING LUMBAR FACET JOINT Bilateral 1/17/2019    Procedure: Block-nerve-medial branch-lumbar L2-L5;  Surgeon: Anthony Moffett MD;  Location: [facility] OR;  Service: Pain Management;  Laterality: Bilateral;    JOINT REPLACEMENT Bilateral     shoulders    OCCLUSION OF LEFT ATRIAL APPENDAGE N/A 5/5/2023    Procedure: Left atrial appendage occlusion;  Surgeon: Hema Del Valle MD;  Location: [facility] EP LAB;  Service: Cardiology;  Laterality: N/A;  afib, watchman, BSCI, ben, anes, MB, 3prep    RADIOFREQUENCY ABLATION OF LUMBAR MEDIAL BRANCH NERVE AT SINGLE LEVEL Bilateral 1/23/2019    Procedure: Radiofrequency Ablation, Nerve, Spinal, Lumbar, Medial  Branch, L2-L5;  Surgeon: Anthony Moffett MD;  Location: [facility] OR;  Service: Pain Management;  Laterality: Bilateral;    TRANSESOPHAGEAL ECHOCARDIOGRAPHY N/A 5/5/2023    Procedure: ECHOCARDIOGRAM, TRANSESOPHAGEAL;  Surgeon: Dylan Layne MD;  Location: [facility] EP LAB;  Service: Cardiology;  Laterality: N/A;    TREATMENT OF CARDIAC ARRHYTHMIA N/A 7/11/2022    Procedure: Cardioversion or Defibrillation;  Surgeon: MIREILLE Morel MD;  Location: [facility] EP LAB;  Service: Cardiology;  Laterality: N/A;  AF, GALILEO, DCCV, MAC, EH (to cover for SK), 3 Prep *SJM CRT-P in situ*     Family History   Problem Relation Name Age of Onset    Heart disease Mother Odalis     Heart failure Mother Odalis     Dementia Mother Odalis     Cancer Father Darío         colon    Blindness Father Darío         accident    Cataracts Father Darío     Hypertension Father Darío     Tremor Father Darío     Tremor Brother      Cancer Daughter Shlomo         cancer as a baby, unsure of what kind    Obesity Daughter Judy     No Known Problems Son Giovanny     Tremor Paternal Grandfather      Melanoma Neg Hx      Amblyopia Neg Hx      Diabetes Neg Hx      Glaucoma Neg Hx      Macular degeneration Neg Hx      Retinal detachment Neg Hx      Strabismus Neg Hx      Stroke Neg Hx      Thyroid disease Neg Hx       Current Outpatient Medications   Medication Sig Dispense Refill    aspirin (ECOTRIN) 81 MG EC tablet Take 1 tablet (81 mg total) by mouth once daily. 90 tablet 1    atorvastatin (LIPITOR) 80 MG tablet Take 1 tablet (80 mg total) by mouth once daily. 90 tablet 3    cetirizine (ZYRTEC) 10 MG tablet Take 10 mg by mouth every evening.      citalopram (CELEXA) 20 MG tablet TAKE 1 TABLET EVERY DAY 90 tablet 3    fluticasone-umeclidin-vilanter (TRELEGY ELLIPTA) 100-62.5-25 mcg DsDv INHALE 1 PUFF ONE TIME DAILY 60 each 3    lamoTRIgine (LAMICTAL) 100 MG tablet TAKE 1 TABLET TWICE DAILY 180 tablet 3    memantine (NAMENDA) 10 MG Tab TAKE 1 TABLET TWICE DAILY 180 tablet 3     metoprolol succinate (TOPROL-XL) 50 MG 24 hr tablet Take 1 tablet (50 mg total) by mouth once daily. 90 tablet 0    primidone (MYSOLINE) 250 MG Tab Take 2 tablets (500 mg total) by mouth 2 (two) times daily. NEED APT FOR FURTHER REFILL 360 tablet 0    torsemide (DEMADEX) 10 MG Tab Take 1 tablet (10 mg total) by mouth every morning. 90 tablet 3    diclofenac sodium (VOLTAREN) 1 % Gel Apply 4 g topically 4 (four) times daily. for 10 days 100 g 2     No current facility-administered medications for this visit.     Facility-Administered Medications Ordered in Other Visits   Medication Dose Route Frequency Provider Last Rate Last Admin    0.9%  NaCl infusion (for blood administration)   Intravenous Q24H Batool Pitts NP         Review of patient's allergies indicates:   Allergen Reactions    Bactroban [mupirocin calcium] Rash     Other reaction(s): Rash     Social History     Socioeconomic History    Marital status:    Occupational History    Occupation: retired   Tobacco Use    Smoking status: Former     Current packs/day: 0.00     Average packs/day: 1 pack/day for 5.0 years (5.0 ttl pk-yrs)     Types: Cigarettes     Start date:      Quit date:      Years since quittin.4    Smokeless tobacco: Never    Tobacco comments:     - I   Substance and Sexual Activity    Alcohol use: No    Drug use: No    Sexual activity: Not Currently     Partners: Female   Social History Narrative    . 4 adult children.      Social Determinants of Health     Financial Resource Strain: Low Risk  (2024)    Overall Financial Resource Strain (CARDIA)     Difficulty of Paying Living Expenses: Not very hard   Food Insecurity: No Food Insecurity (2024)    Hunger Vital Sign     Worried About Running Out of Food in the Last Year: Never true     Ran Out of Food in the Last Year: Never true   Transportation Needs: No Transportation Needs (2024)    PRAPARE - Transportation     Lack of Transportation  (Medical): No     Lack of Transportation (Non-Medical): No   Physical Activity: Inactive (1/16/2024)    Exercise Vital Sign     Days of Exercise per Week: 0 days     Minutes of Exercise per Session: 0 min   Stress: No Stress Concern Present (1/16/2024)    [employer] [place] [employer] - Occupational Stress Questionnaire     Feeling of Stress : Not at all   Housing Stability: Low Risk  (1/16/2024)    Housing Stability Vital Sign     Unable to Pay for Housing in the Last Year: No     Number of Places Lived in the Last Year: 1     Unstable Housing in the Last Year: No       ROS    REVIEW OF SYSTEMS: Negative as documented below as well as positive findings in bold.       Constitutional  Respiratory  Gastrointestinal  Skin   - Fever - Cough - Heartburn - Rash   - Chills - Spit blood - Nausea - Itching   - Weight Loss - Shortness of breath - Vomiting - Nail pain   - Malaise/Fatigue - Wheezing - Abdominal Pain  Wound/Ulcer   - Weight Gain   - Blood in Stool  Poor wound healing       - Diarrhea          Cardiovascular  Genitourinary  Neurological  HEENT   - Chest Pain - Dysuria - Burning Sensation of feet - Headache   - Palpitations - Hematuria - Tingling / Paresthesia - Congestion   - Pain at night in legs - Flank Pain - Dizziness - Sore Throat   - Cramping   - Tremor - Blurred Vision   - Leg Swelling   - Sensory Change - Double Vision   - Dizzy when standing   - Speech Change - Eye Redness       - Focal Weakness - Dry Eyes       - Loss of Consciousness          Endocrine  Musculoskeletal  Psychiatric   - Cold intolerance - Muscle Pain - Depression   - Heat intolerance - Neck Pain - Insomnia   - Anemia - Joint Pain - Memory Loss   -  Easy bruising, bleeding - Heel pain - Anxiety      Toe Pain        Leg/Ankle/Foot Pain         Objective:     Ht 6' (1.829 m)   Wt 101.5 kg (223 lb 12.3 oz)   BMI 30.35 kg/m²   Vitals:    06/06/24 1517   Weight: 101.5 kg (223 lb 12.3 oz)   Height: 6' (1.829 m)   PainSc: 0-No pain    PainLoc: Foot       Physical Exam    General Appearance:   Patient appears well developed, well nourished  Patient appears stated age    Psychiatric:   Patient is oriented to time, place, and person.  Patient has appropriate mood and affect    Neck:  Trachea Midline  No visible masses    Respiratory/Ears:  No distress or labored breathing.  Able to differentiate between normal talking voice and whisper.  Able to follow commands    Eyes:  Visual Acuity intact  Lids and conjunctivae normal. No discoloration noted.    Foot Exam  Physical Exam  Ortho Exam  Ortho/SPM Exam  Physical Exam  Foot/Ankle Musculoskeletal Exam    B/l LE exam con't:  V:  DP 1/4, PT 1/4   CRT< 3s to all digits tested   Tibial and popliteal lymph nodes are w/o abnormality   Edema: absent, varicosities: present    N:  Patient displays normal ankle reflexes   SILT in SP/DP/T/Ebonie/Saph distributions    Ortho: +Motor EHL/FHL/TA/GA   Observed enlarged bony prominence at the pipjs of lesser toes   hammertoe deformity present 2-5 b/l; 2-3 rigid and 4-5 semi-rigid  There is moderate pain with palpation of pipjs  Compartments soft/compressible. No pain on passive stretch of big toe. No calf  Pain.    Derm:  skin intact, skin warm and dry, skin without ulcers or lesions, skin without induration, nails normal, no erythema and no ecchymosis    Imaging / Labs:      No results found.      Note: This was dictated using a computer transcription program. Although proofread, it may contain computer transcription errors and phonetic errors. Other human proofreading errors may also exist. Corrections may be performed at a later time. Please contact us for any clarification if needed.    Geronimo Lehman DPM  [facility]

## 2024-06-19 ENCOUNTER — PATIENT MESSAGE (OUTPATIENT)
Dept: NEUROLOGY | Facility: CLINIC | Age: 84
End: 2024-06-19
Payer: MEDICARE

## 2024-06-26 DIAGNOSIS — J41.0 SIMPLE CHRONIC BRONCHITIS: ICD-10-CM

## 2024-06-26 RX ORDER — FLUTICASONE FUROATE, UMECLIDINIUM BROMIDE AND VILANTEROL TRIFENATATE 100; 62.5; 25 UG/1; UG/1; UG/1
POWDER RESPIRATORY (INHALATION)
Qty: 180 EACH | Refills: 3 | Status: SHIPPED | OUTPATIENT
Start: 2024-06-26

## 2024-06-26 NOTE — TELEPHONE ENCOUNTER
No care due was identified.  Health [facility] Embedded Care Due Messages. Reference number: [id number].   6/26/2024 12:03:14 PM CDT

## 2024-06-26 NOTE — TELEPHONE ENCOUNTER
Refill Routing Note   Medication(s) are not appropriate for processing by [facility] for the following reason(s):        Drug-disease interaction    [facility] action(s):  Defer        Medication Therapy Plan: TRELEGY ELLIPTA and CAD (coronary artery disease);TRELEGY ELLIPTA and Hypokalemia    Pharmacist review requested: Yes     Appointments  past 12m or future 3m with PCP    Date Provider   Last Visit   4/25/2024 Ivonne Robles MD   Next Visit   10/28/2024 Ivonne Robles MD   ED visits in past 90 days: 0        Note composed:12:16 PM 06/26/2024

## 2024-06-26 NOTE — TELEPHONE ENCOUNTER
Refill Decision Note   Heri Muhammad  is requesting a refill authorization.  Brief Assessment and Rationale for Refill:  Approve     Medication Therapy Plan:         Pharmacist review requested: Yes   Extended chart review required: Yes   Comments:     Note composed:12:42 PM 06/26/2024

## 2024-07-08 RX ORDER — ATORVASTATIN CALCIUM 80 MG/1
80 TABLET, FILM COATED ORAL
Qty: 90 TABLET | Refills: 3 | Status: SHIPPED | OUTPATIENT
Start: 2024-07-08

## 2024-07-20 ENCOUNTER — CLINICAL SUPPORT (OUTPATIENT)
Dept: CARDIOLOGY | Facility: HOSPITAL | Age: 84
End: 2024-07-20
Payer: MEDICARE

## 2024-07-20 ENCOUNTER — CLINICAL SUPPORT (OUTPATIENT)
Dept: CARDIOLOGY | Facility: HOSPITAL | Age: 84
End: 2024-07-20
Attending: INTERNAL MEDICINE
Payer: MEDICARE

## 2024-07-20 DIAGNOSIS — Z95.0 PRESENCE OF CARDIAC PACEMAKER: ICD-10-CM

## 2024-07-20 PROCEDURE — 93294 REM INTERROG EVL PM/LDLS PM: CPT | Mod: ,,, | Performed by: INTERNAL MEDICINE

## 2024-07-20 PROCEDURE — 93296 REM INTERROG EVL PM/IDS: CPT | Performed by: INTERNAL MEDICINE

## 2024-08-08 RX ORDER — PRIMIDONE 250 MG/1
TABLET ORAL
Qty: 360 TABLET | Refills: 3 | Status: SHIPPED | OUTPATIENT
Start: 2024-08-08

## 2024-08-08 RX ORDER — METOPROLOL SUCCINATE 50 MG/1
50 TABLET, EXTENDED RELEASE ORAL
Qty: 90 TABLET | Refills: 0 | Status: SHIPPED | OUTPATIENT
Start: 2024-08-08

## 2024-08-09 LAB
OHS CV AF BURDEN PERCENT: 99
OHS CV BIV PACING PERCENT: 99 %
OHS CV DC REMOTE DEVICE TYPE: NORMAL
OHS CV ICD SHOCK: NO

## 2024-08-25 PROBLEM — R07.9 CHEST PAIN: Status: ACTIVE | Noted: 2024-08-25

## 2024-08-26 PROBLEM — R07.9 CHEST PAIN: Status: RESOLVED | Noted: 2024-08-25 | Resolved: 2024-08-26

## 2024-08-26 PROBLEM — R19.7 DIARRHEA: Status: RESOLVED | Noted: 2022-12-19 | Resolved: 2024-08-26

## 2024-08-26 RX ORDER — TORSEMIDE 10 MG/1
10 TABLET ORAL EVERY MORNING
Qty: 30 TABLET | Refills: 0 | Status: SHIPPED | OUTPATIENT
Start: 2024-08-26

## 2024-08-26 NOTE — PROGRESS NOTES
Subjective   Patient ID:  Heri Muhammad is a 83 y.o. male who presents for follow-up of Atrial Fibrillation      HPI 84 yo male with atrial fibrillation (PVI 1/2016; PVI 3/2017), atrial flutter (RFA of CTI 1/2016, mitral annular line 3/2017, roof line 3/2017), sinus node dysfunction, and CRT-P        Background:   Dual chamber PPM implanted (06/30/14).   Presented with fatigue, found to be in atrial fibrillation. Placed on amiodarone, underwent DCCV.   Symptoms with AF have historically been fatigue and lightheadedness.   DCCV (10/15/15) >> recurrence. DCCV (11/23/15), amiodarone increased to 400 mg daily. Developed atrial flutter.   (01/29/16) PVI + RFA for atrial flutter. Amiodarone discontinued.   Felt poorly with Multaq.   Did well for initially in terms of atrial fibrillation, but continued to note dyspnea.   Underwent LHC/RHC 6/10/16, no obstructive CAD, normal rt sided filling pressures.   Developed persistent recurrence >> DCCV 11/22/16. Recurrence.   RFA 3/23/17 Repeat isolation of LSPV, RSPV, RIPV (LIPV remained isolated). Atrial flutter c/w mitral annular flutter >> anterior mitral annular line created >> flutter converted to what appeared to be a roof flutter >> roof line created. Tachycardia converted to another tachycardia, ultimately mapped to BERNADETTE >> ablation deferred.   Developed recurrence of flutter during blanking period. Flecainide added > converted without need for DCCV.   Echo1/20/18 EF 30-35%. Was RV pacing 70% of the time.   LHC 1/22/18 non-obstructive CAD   1/26/18 Upgraded to CRT-P by Dr. Otto   Echo 8/29/18 EF 55%    Developed symptomatic recurrence of atrial fibrillation.   GALILEO/DCCV 7/11/22. Flecainide 100 mg BID added.  Developed recurrence.  Placed on amiodarone. Developed paroxysmal recurrence. We elected to discontinue amiodarone and pursue rate control.    BERNADETTE occlusion (Dr. Del Valle) 5/5/23 with follow up GALILEO 6/16/23 showing no leak.     Update:    Recently admitted with chest  discomfort. Pleuritic in nature.  Echo 8/26/24    Left Ventricle: The left ventricle is normal in size. Normal wall thickness. There is mild asymmetric apical hypertrophy. There is hyperdynamic systolic function. Biplane (2D) method of discs ejection fraction is 73%.    Right Ventricle: Normal right ventricular cavity size. Wall thickness is normal. Systolic function is normal.    Aortic Valve: There is mild aortic valve sclerosis.    Mitral Valve: There is mild regurgitation.    Pulmonary Artery: There is pulmonary hypertension. The estimated pulmonary artery systolic pressure is 47 mmHg.    IVC/SVC: Normal venous pressure at 3 mmHg.       Device interrogation reveals stable function of the leads, RA pacing 12% Biventricular pacing 99%. In AF since early May (since discontinuation of amiodarone). Nearing DANNI.      Review of Systems   Constitutional: Negative. Negative for fever and malaise/fatigue.   HENT:  Negative for congestion and sore throat.    Cardiovascular:  Negative for chest pain, dyspnea on exertion, irregular heartbeat, leg swelling, near-syncope, orthopnea, palpitations, paroxysmal nocturnal dyspnea and syncope.   Respiratory:  Positive for shortness of breath. Negative for cough.    Gastrointestinal:  Negative for abdominal pain, constipation and diarrhea.   Neurological:  Negative for dizziness, light-headedness and weakness.   Psychiatric/Behavioral:  Negative for depression. The patient is not nervous/anxious.    All other systems reviewed and are negative.         Objective     Physical Exam  Constitutional:       Appearance: He is well-developed.   Eyes:      General: No scleral icterus.     Conjunctiva/sclera: Conjunctivae normal.   Neck:      Vascular: No JVD.      Trachea: No tracheal deviation.   Cardiovascular:      Rate and Rhythm: Normal rate and regular rhythm.      Chest Wall: PMI is not displaced.      Heart sounds: Normal heart sounds.   Pulmonary:      Effort: Pulmonary effort is  normal. No respiratory distress.      Breath sounds: Normal breath sounds.   Abdominal:      Palpations: Abdomen is soft.      Tenderness: There is no abdominal tenderness.   Musculoskeletal:         General: No tenderness.   Skin:     General: Skin is warm and dry.      Findings: No rash.   Neurological:      Mental Status: He is alert and oriented to person, place, and time.   Psychiatric:         Behavior: Behavior normal.            Assessment and Plan     1. Permanent atrial fibrillation    2. Sinus node dysfunction    3. Essential hypertension    4. Chronic diastolic heart failure    5. Late onset Alzheimer's disease without behavioral disturbance    6. Seizure disorder    7. Biventricular cardiac pacemaker in situ        Plan:     1. Permanent atrial fibrillation  Rate controlled and asymptomatic. S/p BERNADETTE occlusion    2. Sinus node dysfunction  CRT-P continue current settings. Nearing DANNI. We discussed generator change. F/u with monitoring as scheduled    3. Essential hypertension  Optimized    4. Chronic diastolic heart failure  To see Primary Cardiology (Dr. Lynn)    5. Late onset Alzheimer's disease without behavioral disturbance  stable    6. Seizure disorder  stable

## 2024-08-26 NOTE — TELEPHONE ENCOUNTER
No care due was identified.  Health [facility] Embedded Care Due Messages. Reference number: [id number].   8/26/2024 10:42:20 AM CDT

## 2024-08-27 ENCOUNTER — PATIENT OUTREACH (OUTPATIENT)
Dept: ADMINISTRATIVE | Facility: CLINIC | Age: 84
End: 2024-08-27
Payer: MEDICARE

## 2024-08-27 ENCOUNTER — TELEPHONE (OUTPATIENT)
Dept: ELECTROPHYSIOLOGY | Facility: CLINIC | Age: 84
End: 2024-08-27
Payer: MEDICARE

## 2024-08-27 NOTE — PROGRESS NOTES
C3 nurse spoke with Heri SOHAM Coburns' wife Ana for a TCC post hospital discharge follow up call. Pt has establishment of care appt with new PCP, Dr Bailey Sharpe on 09/04/24 @ 1300. Pt's wife Ana reminded of appt time and date and is aware.

## 2024-08-28 ENCOUNTER — OFFICE VISIT (OUTPATIENT)
Dept: ELECTROPHYSIOLOGY | Facility: CLINIC | Age: 84
End: 2024-08-28
Payer: MEDICARE

## 2024-08-28 ENCOUNTER — CLINICAL SUPPORT (OUTPATIENT)
Dept: CARDIOLOGY | Facility: HOSPITAL | Age: 84
End: 2024-08-28
Attending: INTERNAL MEDICINE
Payer: MEDICARE

## 2024-08-28 VITALS
HEART RATE: 80 BPM | SYSTOLIC BLOOD PRESSURE: 118 MMHG | HEIGHT: 72 IN | WEIGHT: 220 LBS | BODY MASS INDEX: 29.8 KG/M2 | DIASTOLIC BLOOD PRESSURE: 69 MMHG

## 2024-08-28 DIAGNOSIS — I49.5 SINUS NODE DYSFUNCTION: ICD-10-CM

## 2024-08-28 DIAGNOSIS — I48.21 PERMANENT ATRIAL FIBRILLATION: Primary | ICD-10-CM

## 2024-08-28 DIAGNOSIS — G30.1 LATE ONSET ALZHEIMER'S DISEASE WITHOUT BEHAVIORAL DISTURBANCE: ICD-10-CM

## 2024-08-28 DIAGNOSIS — G40.909 SEIZURE DISORDER: ICD-10-CM

## 2024-08-28 DIAGNOSIS — I48.21 PERMANENT ATRIAL FIBRILLATION: ICD-10-CM

## 2024-08-28 DIAGNOSIS — I50.32 CHRONIC DIASTOLIC HEART FAILURE: ICD-10-CM

## 2024-08-28 DIAGNOSIS — F02.80 LATE ONSET ALZHEIMER'S DISEASE WITHOUT BEHAVIORAL DISTURBANCE: ICD-10-CM

## 2024-08-28 DIAGNOSIS — I10 ESSENTIAL HYPERTENSION: ICD-10-CM

## 2024-08-28 DIAGNOSIS — Z95.0 BIVENTRICULAR CARDIAC PACEMAKER IN SITU: ICD-10-CM

## 2024-08-28 PROCEDURE — 3074F SYST BP LT 130 MM HG: CPT | Mod: HCNC,CPTII,S$GLB, | Performed by: INTERNAL MEDICINE

## 2024-08-28 PROCEDURE — 1101F PT FALLS ASSESS-DOCD LE1/YR: CPT | Mod: HCNC,CPTII,S$GLB, | Performed by: INTERNAL MEDICINE

## 2024-08-28 PROCEDURE — 99999 PR PBB SHADOW E&M-EST. PATIENT-LVL III: CPT | Mod: PBBFAC,HCNC,, | Performed by: INTERNAL MEDICINE

## 2024-08-28 PROCEDURE — 99215 OFFICE O/P EST HI 40 MIN: CPT | Mod: HCNC,S$GLB,, | Performed by: INTERNAL MEDICINE

## 2024-08-28 PROCEDURE — 3288F FALL RISK ASSESSMENT DOCD: CPT | Mod: HCNC,CPTII,S$GLB, | Performed by: INTERNAL MEDICINE

## 2024-08-28 PROCEDURE — 3078F DIAST BP <80 MM HG: CPT | Mod: HCNC,CPTII,S$GLB, | Performed by: INTERNAL MEDICINE

## 2024-08-28 PROCEDURE — 93281 PM DEVICE PROGR EVAL MULTI: CPT | Mod: HCNC

## 2024-08-28 PROCEDURE — 1159F MED LIST DOCD IN RCRD: CPT | Mod: HCNC,CPTII,S$GLB, | Performed by: INTERNAL MEDICINE

## 2024-08-28 PROCEDURE — 1125F AMNT PAIN NOTED PAIN PRSNT: CPT | Mod: HCNC,CPTII,S$GLB, | Performed by: INTERNAL MEDICINE

## 2024-09-24 DIAGNOSIS — G30.1 LATE ONSET ALZHEIMER'S DISEASE WITHOUT BEHAVIORAL DISTURBANCE: ICD-10-CM

## 2024-09-24 DIAGNOSIS — F02.80 LATE ONSET ALZHEIMER'S DISEASE WITHOUT BEHAVIORAL DISTURBANCE: ICD-10-CM

## 2024-09-24 RX ORDER — TORSEMIDE 10 MG/1
10 TABLET ORAL EVERY MORNING
Qty: 90 TABLET | Refills: 1 | Status: SHIPPED | OUTPATIENT
Start: 2024-09-24

## 2024-09-24 NOTE — TELEPHONE ENCOUNTER
Refill Routing Note   Medication(s) are not appropriate for processing by [facility] for the following reason(s):        ED/Hospital Visit since last OV with provider  Drug-disease interaction    [facility] action(s):  Defer      Medication Therapy Plan: Hypokalemia    Pharmacist review requested: Yes     Appointments  past 12m or future 3m with PCP    Date Provider   Last Visit   4/25/2024 Ivonne Robles MD   Next Visit   Visit date not found Ivonne Robles MD   ED visits in past 90 days: 0        Note composed:12:00 PM 09/24/2024

## 2024-09-24 NOTE — TELEPHONE ENCOUNTER
No care due was identified.  Health [facility] Embedded Care Due Messages. Reference number: [id number].   9/24/2024 8:21:02 AM CDT

## 2024-09-24 NOTE — TELEPHONE ENCOUNTER
Refill Decision Note   Heri Muhammad  is requesting a refill authorization.  Brief Assessment and Rationale for Refill:  Approve     Medication Therapy Plan:  ED visit 8/25/24 for chest pain. Patient followed up with electrophysiololgy & no changes to therapy.      Pharmacist review requested: Yes   Extended chart review required: Yes   Comments:     Note composed:3:57 PM 09/24/2024

## 2024-09-25 RX ORDER — MEMANTINE HYDROCHLORIDE 10 MG/1
TABLET ORAL
Qty: 180 TABLET | Refills: 3 | Status: SHIPPED | OUTPATIENT
Start: 2024-09-25

## 2024-09-30 ENCOUNTER — OFFICE VISIT (OUTPATIENT)
Dept: FAMILY MEDICINE | Facility: CLINIC | Age: 84
End: 2024-09-30
Payer: MEDICARE

## 2024-09-30 VITALS
OXYGEN SATURATION: 97 % | HEIGHT: 72 IN | DIASTOLIC BLOOD PRESSURE: 60 MMHG | WEIGHT: 220.44 LBS | SYSTOLIC BLOOD PRESSURE: 110 MMHG | BODY MASS INDEX: 29.86 KG/M2 | HEART RATE: 77 BPM

## 2024-09-30 DIAGNOSIS — I48.21 PERMANENT ATRIAL FIBRILLATION: ICD-10-CM

## 2024-09-30 DIAGNOSIS — M79.675 PAIN DUE TO ONYCHOMYCOSIS OF TOENAILS OF BOTH FEET: ICD-10-CM

## 2024-09-30 DIAGNOSIS — G30.1 LATE ONSET ALZHEIMER'S DISEASE WITHOUT BEHAVIORAL DISTURBANCE: ICD-10-CM

## 2024-09-30 DIAGNOSIS — M79.671 CHRONIC FOOT PAIN, RIGHT: ICD-10-CM

## 2024-09-30 DIAGNOSIS — Z95.0 BIVENTRICULAR CARDIAC PACEMAKER IN SITU: ICD-10-CM

## 2024-09-30 DIAGNOSIS — Z74.09 DECREASED STRENGTH, ENDURANCE, AND MOBILITY: ICD-10-CM

## 2024-09-30 DIAGNOSIS — M25.512 CHRONIC PAIN OF BOTH SHOULDERS: ICD-10-CM

## 2024-09-30 DIAGNOSIS — M79.674 PAIN DUE TO ONYCHOMYCOSIS OF TOENAILS OF BOTH FEET: ICD-10-CM

## 2024-09-30 DIAGNOSIS — I70.0 AORTIC ATHEROSCLEROSIS: ICD-10-CM

## 2024-09-30 DIAGNOSIS — E66.3 OVERWEIGHT (BMI 25.0-29.9): ICD-10-CM

## 2024-09-30 DIAGNOSIS — G25.0 BENIGN ESSENTIAL TREMOR: ICD-10-CM

## 2024-09-30 DIAGNOSIS — G89.29 CHRONIC PAIN OF BOTH SHOULDERS: ICD-10-CM

## 2024-09-30 DIAGNOSIS — Z23 NEED FOR VACCINATION: Primary | ICD-10-CM

## 2024-09-30 DIAGNOSIS — G89.29 CHRONIC FOOT PAIN, RIGHT: ICD-10-CM

## 2024-09-30 DIAGNOSIS — R42 EPISODIC LIGHTHEADEDNESS: ICD-10-CM

## 2024-09-30 DIAGNOSIS — Z85.828 HISTORY OF SKIN CANCER: ICD-10-CM

## 2024-09-30 DIAGNOSIS — R68.89 DECREASED STRENGTH, ENDURANCE, AND MOBILITY: ICD-10-CM

## 2024-09-30 DIAGNOSIS — Z91.81 AT HIGH RISK FOR FALLS: ICD-10-CM

## 2024-09-30 DIAGNOSIS — J44.9 CHRONIC OBSTRUCTIVE PULMONARY DISEASE, UNSPECIFIED COPD TYPE: ICD-10-CM

## 2024-09-30 DIAGNOSIS — B35.1 PAIN DUE TO ONYCHOMYCOSIS OF TOENAILS OF BOTH FEET: ICD-10-CM

## 2024-09-30 DIAGNOSIS — I49.5 SINUS NODE DYSFUNCTION: ICD-10-CM

## 2024-09-30 DIAGNOSIS — I25.10 CORONARY ARTERY DISEASE INVOLVING NATIVE CORONARY ARTERY OF NATIVE HEART WITHOUT ANGINA PECTORIS: Chronic | ICD-10-CM

## 2024-09-30 DIAGNOSIS — Z98.890 STATUS POST CATHETER ABLATION OF ATRIAL FIBRILLATION: ICD-10-CM

## 2024-09-30 DIAGNOSIS — E78.2 MIXED HYPERLIPIDEMIA: ICD-10-CM

## 2024-09-30 DIAGNOSIS — I50.32 CHRONIC DIASTOLIC HEART FAILURE: ICD-10-CM

## 2024-09-30 DIAGNOSIS — E03.8 SUBCLINICAL HYPOTHYROIDISM: ICD-10-CM

## 2024-09-30 DIAGNOSIS — M25.511 CHRONIC PAIN OF BOTH SHOULDERS: ICD-10-CM

## 2024-09-30 DIAGNOSIS — I10 ESSENTIAL HYPERTENSION: ICD-10-CM

## 2024-09-30 DIAGNOSIS — F02.80 LATE ONSET ALZHEIMER'S DISEASE WITHOUT BEHAVIORAL DISTURBANCE: ICD-10-CM

## 2024-09-30 DIAGNOSIS — G40.909 SEIZURE DISORDER: ICD-10-CM

## 2024-09-30 DIAGNOSIS — R53.1 DECREASED STRENGTH, ENDURANCE, AND MOBILITY: ICD-10-CM

## 2024-09-30 PROBLEM — R40.0 SOMNOLENCE: Status: RESOLVED | Noted: 2023-07-17 | Resolved: 2024-09-30

## 2024-09-30 PROBLEM — M25.571 CHRONIC PAIN OF RIGHT ANKLE: Status: RESOLVED | Noted: 2019-11-20 | Resolved: 2024-09-30

## 2024-09-30 PROBLEM — E83.51 HYPOCALCEMIA: Status: RESOLVED | Noted: 2022-12-19 | Resolved: 2024-09-30

## 2024-09-30 PROBLEM — E87.8 ELECTROLYTE IMBALANCE: Status: RESOLVED | Noted: 2022-12-19 | Resolved: 2024-09-30

## 2024-09-30 PROBLEM — M54.50 CHRONIC BILATERAL LOW BACK PAIN WITHOUT SCIATICA: Status: RESOLVED | Noted: 2019-01-15 | Resolved: 2024-09-30

## 2024-09-30 PROBLEM — R94.6 ABNORMAL THYROID FUNCTION TEST: Status: RESOLVED | Noted: 2022-05-13 | Resolved: 2024-09-30

## 2024-09-30 PROBLEM — E87.6 HYPOKALEMIA: Status: RESOLVED | Noted: 2022-12-19 | Resolved: 2024-09-30

## 2024-09-30 PROBLEM — H61.23 BILATERAL IMPACTED CERUMEN: Status: RESOLVED | Noted: 2022-10-28 | Resolved: 2024-09-30

## 2024-09-30 PROCEDURE — G0008 ADMIN INFLUENZA VIRUS VAC: HCPCS | Mod: HCNC,S$GLB,, | Performed by: STUDENT IN AN ORGANIZED HEALTH CARE EDUCATION/TRAINING PROGRAM

## 2024-09-30 PROCEDURE — 90653 IIV ADJUVANT VACCINE IM: CPT | Mod: HCNC,S$GLB,, | Performed by: STUDENT IN AN ORGANIZED HEALTH CARE EDUCATION/TRAINING PROGRAM

## 2024-09-30 PROCEDURE — 1125F AMNT PAIN NOTED PAIN PRSNT: CPT | Mod: HCNC,CPTII,S$GLB, | Performed by: STUDENT IN AN ORGANIZED HEALTH CARE EDUCATION/TRAINING PROGRAM

## 2024-09-30 PROCEDURE — 1101F PT FALLS ASSESS-DOCD LE1/YR: CPT | Mod: HCNC,CPTII,S$GLB, | Performed by: STUDENT IN AN ORGANIZED HEALTH CARE EDUCATION/TRAINING PROGRAM

## 2024-09-30 PROCEDURE — 3074F SYST BP LT 130 MM HG: CPT | Mod: HCNC,CPTII,S$GLB, | Performed by: STUDENT IN AN ORGANIZED HEALTH CARE EDUCATION/TRAINING PROGRAM

## 2024-09-30 PROCEDURE — 1160F RVW MEDS BY RX/DR IN RCRD: CPT | Mod: HCNC,CPTII,S$GLB, | Performed by: STUDENT IN AN ORGANIZED HEALTH CARE EDUCATION/TRAINING PROGRAM

## 2024-09-30 PROCEDURE — 3078F DIAST BP <80 MM HG: CPT | Mod: HCNC,CPTII,S$GLB, | Performed by: STUDENT IN AN ORGANIZED HEALTH CARE EDUCATION/TRAINING PROGRAM

## 2024-09-30 PROCEDURE — 99999 PR PBB SHADOW E&M-EST. PATIENT-LVL IV: CPT | Mod: PBBFAC,HCNC,, | Performed by: STUDENT IN AN ORGANIZED HEALTH CARE EDUCATION/TRAINING PROGRAM

## 2024-09-30 PROCEDURE — 3288F FALL RISK ASSESSMENT DOCD: CPT | Mod: HCNC,CPTII,S$GLB, | Performed by: STUDENT IN AN ORGANIZED HEALTH CARE EDUCATION/TRAINING PROGRAM

## 2024-09-30 PROCEDURE — 1159F MED LIST DOCD IN RCRD: CPT | Mod: HCNC,CPTII,S$GLB, | Performed by: STUDENT IN AN ORGANIZED HEALTH CARE EDUCATION/TRAINING PROGRAM

## 2024-09-30 PROCEDURE — 99215 OFFICE O/P EST HI 40 MIN: CPT | Mod: HCNC,S$GLB,, | Performed by: STUDENT IN AN ORGANIZED HEALTH CARE EDUCATION/TRAINING PROGRAM

## 2024-09-30 RX ORDER — POLYETHYLENE GLYCOL 3350 17 G/17G
17 POWDER, FOR SOLUTION ORAL DAILY
Qty: 30 EACH | Refills: 0 | Status: SHIPPED | OUTPATIENT
Start: 2024-09-30 | End: 2024-10-30

## 2024-09-30 RX ORDER — POLYETHYLENE GLYCOL 3350 17 G/17G
17 POWDER, FOR SOLUTION ORAL DAILY
Qty: 30 EACH | Refills: 0 | Status: SHIPPED | OUTPATIENT
Start: 2024-09-30 | End: 2024-09-30

## 2024-09-30 NOTE — PROGRESS NOTES
Subjective     Patient ID: Heri Muhammad is a 83 y.o. male.    Chief Complaint: Establish Care, Hoarse, Shortness of Breath, and Chest Pain    Heri Muhammad is a 83 y.o. male with Afib/Aflutter,Sinus node dysfunction( s/p CRT-P and multiple cardioversion , sHTN, HLD, seizure disorder, DJD lumbar spine, Aortic atherosclerosis, HFpEF, hypothyroidism, restrictive cardiomyopathy, chronic LBP, chronic bilateral shoulder pain bilateral shoulder replacements and COPD presenting to establish care. He reports shortness of breath, which has been present for many years but has worsened lately. The patient also mentions lightheadedness after standing up and walking, but denies any vertigo or room spinning. He has a history of seizures, which are well-controlled, and dementia. Mr. Muhammad is currently on multiple medications, including aspirin, Lipitor, Celexa, Lamictal, Memantine, Toprol, torsemide, and Trelegy Ellipta for COPD, although he reports that the inhaler does not seem to be working.    The patient has a history of essential tremor, which he states is inherited and has worsened with age. He has not had any recent back pain, but both shoulders have been replaced. Mr. Muhammad also reports hoarseness for a few months, dry mouth in the morning due to mouth breathing, and snoring at night. He prefers not to undergo a sleep study and does not want to wear a CPAP if diagnosed with sleep apnea.    Regarding gastrointestinal issues, the patient experiences liquid stools with gas and either constipation or diarrhea. He has tried stool softeners and Metamucil, but has not found relief. He also has a history of skin cancer, with one lesion removed from his back, and a fungal infection in his toenails, which has not resolved despite previous treatment.    Mr. Muhammad has a pacemaker for heart failure, managed by Dr. Esparza, and his blood pressure is well-controlled. He has had two unsuccessful ablations for AFib and has a Watchman  device, which allows him to only take aspirin instead of Eliquis. A left heart catheterization in 2013 showed some blockages, but they were not significant enough to require intervention.    Shortness of Breath  Associated symptoms include chest pain. Pertinent negatives include no abdominal pain, fever, leg swelling, vomiting or wheezing.   Chest Pain   Associated symptoms include shortness of breath. Pertinent negatives include no abdominal pain, fever, nausea, numbness, palpitations, vomiting or weakness.   Pertinent negatives for past medical history include no seizures.     Review of Systems   Constitutional:  Negative for appetite change, chills, fever and unexpected weight change.   Respiratory:  Positive for shortness of breath. Negative for wheezing.    Cardiovascular:  Positive for chest pain. Negative for palpitations and leg swelling.   Gastrointestinal:  Negative for abdominal distention, abdominal pain, anal bleeding, blood in stool, constipation, nausea and vomiting.   Endocrine: Negative for cold intolerance, heat intolerance, polydipsia, polyphagia and polyuria.   Musculoskeletal:  Negative for arthralgias and myalgias.   Neurological:  Negative for seizures, syncope, speech difficulty, weakness and numbness.   Psychiatric/Behavioral:  Negative for self-injury and suicidal ideas.           Objective     Physical Exam  Constitutional:       General: He is not in acute distress.     Appearance: He is normal weight.   HENT:      Head: Normocephalic and atraumatic.      Nose: Nose normal.      Mouth/Throat:      Mouth: Mucous membranes are moist.   Eyes:      Extraocular Movements: Extraocular movements intact.      Conjunctiva/sclera: Conjunctivae normal.      Pupils: Pupils are equal, round, and reactive to light.   Cardiovascular:      Rate and Rhythm: Normal rate and regular rhythm.      Heart sounds: No murmur heard.     No friction rub. No gallop.   Pulmonary:      Effort: Pulmonary effort is  normal.      Breath sounds: Normal breath sounds. No stridor. No wheezing, rhonchi or rales.   Abdominal:      General: Abdomen is flat. There is no distension.      Palpations: Abdomen is soft. There is no mass.      Tenderness: There is no abdominal tenderness. There is no guarding.   Musculoskeletal:         General: Normal range of motion.      Cervical back: Normal range of motion.      Right lower leg: No edema.      Left lower leg: No edema.   Skin:     General: Skin is warm.   Neurological:      General: No focal deficit present.      Mental Status: He is alert. Mental status is at baseline.   Psychiatric:         Mood and Affect: Mood normal.         Behavior: Behavior normal.            Assessment and Plan     1. Need for vaccination  -     Discontinue: influenza (Flulaval, Fluzone, Fluarix) 45 mcg/0.5 mL IM vaccine (> or = 6 mo) 0.5 mL  -     influenza (adjuvanted) (Fluad) 45 mcg/0.5 mL IM vaccine (> or = 64 yo) 0.5 mL    2. Seizure disorder  Overview:  - stable and no recent seizures since 1990's  - CT Head 2019 with diffuse atrophy  - EEG 1/2019 mild diffuse slowing  - followed by Neurology    Assessment & Plan:  - stable and no recent seizures since 1990's  - CT Head 2019 with diffuse atrophy  - EEG 1/2019 mild diffuse slowing  - followed by Neurology      3. Late onset Alzheimer's disease without behavioral disturbance  Overview:  - supportive care  - continue namenda  - followed by Neurology    Assessment & Plan:  - supportive care  - continue namenda  - followed by Neurology         4. Benign essential tremor  Overview:  - followed by Neurology  - on primidone and propranolol recently added     Assessment & Plan:  Continue follow up with neurology, on primidone  Stable      5. Pain due to onychomycosis of toenails of both feet  Assessment & Plan:  Follows with Dr. Lehman - podiatry  Stable      6. Coronary artery disease involving native coronary artery of native heart without angina  pectoris  Overview:  - 9/2013 Left heart Cath: LAD- 60% prox (neg FFR), nl, ramus and RCA 20%  - LDL goal <70  - BP goal <130/80  - ASA 81 mg daily    Assessment & Plan:  - 9/2013 Left heart Cath: LAD- 60% prox (neg FFR), nl, ramus and RCA 20%  - LDL goal <70  - BP goal <130/80  - ASA 81 mg daily, Atorvastatin 80 mg daily  - Referral to cardiology - Dr. Lynn      7. Mixed hyperlipidemia  Overview:  Lab Results   Component Value Date    LDLCALC 88.4 05/06/2022     - on statin  - followed by Cardiology    Assessment & Plan:  - On statin  - Follow up with cardiology      8. Permanent atrial fibrillation  Overview:  - followed by EP  - history of cardioversion  - on Eliquis OAC 5 mg BID  - taking amiodarone 200 mg QD    Assessment & Plan:  Followed by Dr. Esparza, EP  Currently on metoprolol      9. Status post catheter ablation of atrial fibrillation  Assessment & Plan:  Continue follow up with Dr. Esparza  On medical management      10. Sinus node dysfunction  Assessment & Plan:  Follows with Dr. Esparza  Stable  S/p PPM      11. Essential hypertension  Overview:  - well controlled  - continue current medication    Assessment & Plan:  Well controlled  C/w metoprolol       12. History of skin cancer  Assessment & Plan:  S/p excision  Encourage daily use of sunscreen, limit sun exposure      13. Chronic diastolic heart failure  Overview:  - no signs of acute decompensation  - no leg swelling  - medical management    Assessment & Plan:  Continue on Torsemide 10 mg daily  SOB stable  Follow up with cardiology    Orders:  -     Ambulatory referral/consult to Cardiology; Future; Expected date: 10/07/2024    14. Chronic obstructive pulmonary disease, unspecified COPD type  -     Complete PFT w/ bronchodilator; Future    15. Aortic atherosclerosis  Overview:  - LDL goal <70  - BP goal <130/80    Assessment & Plan:  C/w aspirin and statin  Followed by cardiology      16. Biventricular cardiac pacemaker in situ  Overview:  -  paced rhythm  - followed by Cardiology    Assessment & Plan:  Follows with Dr. Esparza  Paced rhythm      17. Subclinical hypothyroidism  Overview:  Lab Results   Component Value Date    TSH 3.247 10/26/2020   - 1026/2020 TPO normal  - resolved    Assessment & Plan:  Last TSH stable      18. Overweight (BMI 25.0-29.9)  Assessment & Plan:  Healthy lifestyle encouraged      19. Chronic pain of both shoulders  Assessment & Plan:  Hx of bilateral shoulder replacement  Declines follow up with ortho and pain management, declines PT  Continue current management      20. Chronic foot pain, right  Assessment & Plan:  Continue follow up with podiatry  Plan per podiatry      21. At high risk for falls  Assessment & Plan:  Fall precautions encouraged  Uses cane to walk      22. Decreased strength, endurance, and mobility  Assessment & Plan:  Declines PT/OT      23. Episodic lightheadedness  Assessment & Plan:  Encouraged to stand up slowly, fall precautions  Defer to cardiology for torsemide dose adjustment      Other orders  -     Discontinue: polyethylene glycol (GLYCOLAX) 17 gram PwPk; Take 17 g by mouth once daily.  Dispense: 30 each; Refill: 0  -     polyethylene glycol (GLYCOLAX) 17 gram PwPk; Take 17 g by mouth once daily.  Dispense: 30 each; Refill: 0        3 month follow up         No follow-ups on file.

## 2024-09-30 NOTE — ASSESSMENT & PLAN NOTE
- 9/2013 Left heart Cath: LAD- 60% prox (neg FFR), nl, ramus and RCA 20%  - LDL goal <70  - BP goal <130/80  - ASA 81 mg daily, Atorvastatin 80 mg daily  - Referral to cardiology - Dr. Lynn

## 2024-09-30 NOTE — ASSESSMENT & PLAN NOTE
Hx of bilateral shoulder replacement  Declines follow up with ortho and pain management, declines PT  Continue current management

## 2024-09-30 NOTE — ASSESSMENT & PLAN NOTE
Encouraged to stand up slowly, fall precautions  Defer to cardiology for torsemide dose adjustment

## 2024-09-30 NOTE — ASSESSMENT & PLAN NOTE
- stable and no recent seizures since 1990's  - CT Head 2019 with diffuse atrophy  - EEG 1/2019 mild diffuse slowing  - followed by Neurology

## 2024-10-01 ENCOUNTER — TELEPHONE (OUTPATIENT)
Dept: CARDIOLOGY | Facility: CLINIC | Age: 84
End: 2024-10-01
Payer: MEDICARE

## 2024-10-01 NOTE — TELEPHONE ENCOUNTER
----- Message from Donnie Rowell sent at 9/30/2024  3:40 PM CDT -----    ----- Message -----  From: Viky Hinton  Sent: 9/30/2024   2:42 PM CDT  To: [facility] Staff    Patient has a referral for to see Dr. Lynn. Chronic diastolic heart failure. Can you help schedule the patient. Thanks

## 2024-10-11 ENCOUNTER — OFFICE VISIT (OUTPATIENT)
Dept: CARDIOLOGY | Facility: CLINIC | Age: 84
End: 2024-10-11
Payer: MEDICARE

## 2024-10-11 VITALS
HEIGHT: 72 IN | HEART RATE: 88 BPM | BODY MASS INDEX: 29.9 KG/M2 | SYSTOLIC BLOOD PRESSURE: 110 MMHG | OXYGEN SATURATION: 96 % | DIASTOLIC BLOOD PRESSURE: 69 MMHG

## 2024-10-11 DIAGNOSIS — I27.20 PULMONARY HYPERTENSION: ICD-10-CM

## 2024-10-11 DIAGNOSIS — Z95.0 BIVENTRICULAR CARDIAC PACEMAKER IN SITU: ICD-10-CM

## 2024-10-11 DIAGNOSIS — I50.32 CHRONIC DIASTOLIC HEART FAILURE: ICD-10-CM

## 2024-10-11 DIAGNOSIS — I25.118 CORONARY ARTERY DISEASE INVOLVING NATIVE CORONARY ARTERY OF NATIVE HEART WITH OTHER FORM OF ANGINA PECTORIS: Primary | Chronic | ICD-10-CM

## 2024-10-11 DIAGNOSIS — E78.2 MIXED HYPERLIPIDEMIA: ICD-10-CM

## 2024-10-11 DIAGNOSIS — I48.21 PERMANENT ATRIAL FIBRILLATION: ICD-10-CM

## 2024-10-11 DIAGNOSIS — I10 ESSENTIAL HYPERTENSION: ICD-10-CM

## 2024-10-11 DIAGNOSIS — I70.0 AORTIC ATHEROSCLEROSIS: ICD-10-CM

## 2024-10-11 DIAGNOSIS — I25.10 ATHEROSCLEROSIS OF NATIVE CORONARY ARTERY OF NATIVE HEART WITHOUT ANGINA PECTORIS: ICD-10-CM

## 2024-10-11 PROCEDURE — 99999 PR PBB SHADOW E&M-EST. PATIENT-LVL IV: CPT | Mod: PBBFAC,HCNC,, | Performed by: INTERNAL MEDICINE

## 2024-10-11 NOTE — ASSESSMENT & PLAN NOTE
Intact function.  99% Bi V pacing.  On last interrogation battery longevity 4 months.  He follows with electrophysiology.  He has an [employer] device.

## 2024-10-11 NOTE — ASSESSMENT & PLAN NOTE
Documented at 47 mm Hg by calculation on Doppler ECHO.  He is on diuretics.  He does report dyspnea at times.  Diuretics to continue.  There is a diagnosis of diastolic heart failure but I am not convinced the elevation of PA pressures is secondary to fluid overload

## 2024-10-11 NOTE — ASSESSMENT & PLAN NOTE
Current treatment to continue.  He is quite sedentary at home.  He does complain of shortness of breath with activity.  His BNP levels have not been markedly elevated.  His GFR is greater than 60.

## 2024-10-11 NOTE — ASSESSMENT & PLAN NOTE
Repeat stress test ordered.  Previous workup by heart catheterization negative x3.  Heart catheterization in 2013, 2016, 2018.  I told the patient that unfortunately with his Bi V pacing, we lose the ability to loose the Electrocardiogram assessment for ischemic heart disease.

## 2024-10-11 NOTE — PROGRESS NOTES
[facility]     Subjective:    Patient ID:  Heri Muhammad is a 83 y.o. male who presents for follow-up of Coronary Artery Disease, Atrial Fibrillation, BiV Pacemaker in sutu, Congestive Heart Failure, Chest Pain, Hyperlipidemia, and Shortness of Breath    Review of patient's allergies indicates:   Allergen Reactions    Bactroban [mupirocin calcium] Rash     Other reaction(s): Rash     The patient has followed with Cardiology for years.  In 2023 he had a Watchman device.  He has chronic AFib.  He takes aspirin.  He has normal ejection fraction.    Diastolic heart failure has been confirmed in the past.  He takes Demadex 10 mg daily.  He takes Toprol for heart rate control and history of hypertension.  His blood pressure in my office is normal today.  He states that he urinates a fair amount when he takes his diuretic.    He states he was recently hospitalized.  He was admitted for chest pressure.  He ruled out for MI.  His echo showed normal ejection fraction and wall motion.  There was aortic valve sclerosis.  His pulmonary artery systolic pressure was 47 mm Hg.    He states he is still having chest pressure complaints.  He describes it as a band across the upper chest.  He states he has it every day.  He is quite sedentary at home.  He is with his wife today that confirms he sits all day.    He does have dizziness when he stands up intermittently.  He has a history of memory problems.  Neurologic notes reviewed.  No history of stroke in the past.    He has had cardiac catheterization in 2016, 2018.  30-40% lad plaquing noted.  His echo shows aortic valve sclerosis.  Current LDL is 75 mg% by labs April 20, 2024.  He takes atorvastatin 80 mg.         Review of Systems   Constitutional: Negative for chills, decreased appetite, diaphoresis, fever, malaise/fatigue, night sweats, weight gain and weight loss.   HENT:  Negative for congestion,  ear discharge, ear pain, hearing loss, hoarse voice, nosebleeds, odynophagia, sore throat, stridor and tinnitus.    Eyes:  Negative for blurred vision, discharge, double vision, pain, photophobia, redness, vision loss in left eye, vision loss in right eye, visual disturbance and visual halos.   Cardiovascular:  Positive for dyspnea on exertion. Negative for chest pain, claudication, cyanosis, irregular heartbeat, leg swelling, near-syncope, orthopnea, palpitations, paroxysmal nocturnal dyspnea and syncope.   Respiratory:  Positive for shortness of breath. Negative for cough, hemoptysis, sleep disturbances due to breathing, snoring, sputum production and wheezing.    Endocrine: Negative for cold intolerance, heat intolerance, polydipsia, polyphagia and polyuria.   Hematologic/Lymphatic: Negative for adenopathy and bleeding problem. Does not bruise/bleed easily.   Skin:  Negative for color change, dry skin, flushing, itching, nail changes, poor wound healing, rash, skin cancer, suspicious lesions and unusual hair distribution.   Musculoskeletal:  Negative for arthritis, back pain, falls, gout, joint pain, joint swelling, muscle cramps, muscle weakness, myalgias, neck pain and stiffness.   Gastrointestinal:  Negative for bloating, abdominal pain, anorexia, change in bowel habit, bowel incontinence, constipation, diarrhea, dysphagia, excessive appetite, flatus, heartburn, hematemesis, hematochezia, hemorrhoids, jaundice, melena, nausea and vomiting.   Genitourinary:  Negative for bladder incontinence, decreased libido, dysuria, flank pain, frequency, genital sores, hematuria, hesitancy, incomplete emptying, nocturia and urgency.   Neurological:  Negative for aphonia, brief paralysis, difficulty with concentration, disturbances in coordination, excessive daytime sleepiness, dizziness, focal weakness, headaches, light-headedness, loss of balance, numbness, paresthesias, seizures, sensory change, tremors, vertigo and  weakness.   Psychiatric/Behavioral:  Positive for memory loss. Negative for altered mental status, depression, hallucinations, substance abuse, suicidal ideas and thoughts of violence. The patient does not have insomnia and is not nervous/anxious.    Allergic/Immunologic: Negative for hives and persistent infections.        Objective:       Vitals:    10/11/24 1003   BP: 110/69   Pulse: 88   SpO2: 96%   Height: 6' (1.829 m)    Physical Exam  Constitutional:       General: He is not in acute distress.     Appearance: He is well-developed. He is not diaphoretic.   HENT:      Head: Normocephalic and atraumatic.      Nose: Nose normal.   Eyes:      General: No scleral icterus.        Right eye: No discharge.      Conjunctiva/sclera: Conjunctivae normal.      Pupils: Pupils are equal, round, and reactive to light.   Neck:      Thyroid: No thyromegaly.      Vascular: No JVD.      Trachea: No tracheal deviation.   Cardiovascular:      Rate and Rhythm: Normal rate. Rhythm irregularly irregular.      Pulses:           Carotid pulses are 2+ on the right side and 2+ on the left side.       Radial pulses are 2+ on the right side and 2+ on the left side.        Dorsalis pedis pulses are 2+ on the right side and 2+ on the left side.        Posterior tibial pulses are 2+ on the right side and 2+ on the left side.      Heart sounds: Normal heart sounds. No murmur heard.     No friction rub. No gallop.   Pulmonary:      Effort: Pulmonary effort is normal. No respiratory distress.      Breath sounds: Normal breath sounds. No stridor. No wheezing or rales.   Chest:      Chest wall: No tenderness.   Abdominal:      General: Bowel sounds are normal. There is no distension.      Palpations: Abdomen is soft. There is no mass.      Tenderness: There is no abdominal tenderness. There is no guarding or rebound.   Musculoskeletal:         General: No tenderness. Normal range of motion.      Cervical back: Normal range of motion and neck  supple.   Lymphadenopathy:      Cervical: No cervical adenopathy.   Skin:     General: Skin is warm and dry.      Coloration: Skin is not pale.      Findings: No erythema or rash.   Neurological:      Mental Status: He is alert and oriented to person, place, and time.      Cranial Nerves: No cranial nerve deficit.      Coordination: Coordination normal.   Psychiatric:         Behavior: Behavior normal.         Thought Content: Thought content normal.         Judgment: Judgment normal.           Assessment:       1. Coronary artery disease involving native coronary artery of native heart with other form of angina pectoris    2. Chronic diastolic heart failure    3. Atherosclerosis of native coronary artery of native heart without angina pectoris    4. Aortic atherosclerosis    5. Biventricular cardiac pacemaker in situ    6. Essential hypertension    7. Mixed hyperlipidemia    8. Permanent atrial fibrillation    9. Pulmonary hypertension      Results for orders placed or performed during the hospital encounter of 10/03/24   Complete PFT w/ bronchodilator    Collection Time: 10/08/24  7:11 AM   Result Value Ref Range    Interpretation       Spirometry is normal. Spirometry remains unimproved following bronchodilator. DLCO is mildly decreased.   Â   Notes:  Â   The failure to demonstrate improvement in spirometry does not preclude a clinical response to a trial of bronchodilators. No recent hemoglobin value available. DLCO interpretation assumes a normal hemoglobin value.       Post FVC 3.58 2.82 - 5.14 L    Post FEV1 3.08 1.95 - 3.74 L    Post FEV1 FVC 86.11 58.17 - 87.86 %    Post FEF 25 75 4.86 (H) 0.88 - 4.30 L/s    Post PEF 8.12 4.58 - 9.48 L/s    Post  7.67 sec    Pre DLCO 18.58 (L) 18.98 - 32.84 ml/(min*mmHg)    DLCOVA PRE 3.44 2.38 - 4.50 ml/(min*mmHg*L)    VA PRE 5.40 (L) 7.38 - 7.38 L    IVC PRE 3.29 2.82 - 5.14 L    Pre FVC 3.51 2.82 - 5.14 L    Pre FEV1 3.01 1.95 - 3.74 L    Pre FEV1 FVC 85.91 58.17  - 87.86 %    Pre FEF 25 75 4.57 (H) 0.88 - 4.30 L/s    Pre PEF 7.22 4.58 - 9.48 L/s    Pre  6.46 sec    FVC Ref 3.97     FVC LLN 2.82     FVC Pre Ref 88.4 %    FVC Post Ref 90.2 %    FVC Chg 2.1 %    FEV1 Ref 2.88     FEV1 LLN 1.95     FEV1 Pre Ref 104.5 %    FEV1 Post Ref 106.9 %    FEV1 Chg 2.3 %    FEV1 FVC Ref 74     FEV1 FVC LLN 58     FEV1 FVC Pre Ref 116.7 %    FEV1 FVC Post Ref 116.9 %    FEV1 FVC Chg 0.2 %    FEF 25 75 Ref 2.59     FEF 25 75 LLN 0.88     FEF 25 75 Pre Ref 176.1 %    FEF 25 75 Post Ref 187.5 %    FEF 25 75 Chg 6.5 %    PEF Ref 7.03     PEF LLN 4.58     PEF Pre Ref 102.7 %    PEF Post Ref 115.4 %    PEF Chg 12.4 %    QTN174 Chg 18.8 %    DLCO Single Breath Ref 25.91     DLCO Single Breath LLN 18.98     DLCO Single Breath Pre Ref 71.7 %    DLCOc Single Breath Ref 25.91     DLCOc Single Breath LLN 18.98     DLCOVA Ref 3.44     DLCOVA LLN 2.38     DLCOVA Pre Ref 100.0 %    DLCOc SBVA Ref 3.44     DLCOc SBVA LLN 2.38     VA Single Breath Ref 7.38     VA Single Breath LLN 7.38     VA Single Breath Pre Ref 73.2 %    IVC Single Breath Ref 3.97     IVC Single Breath LLN 2.82     IVC Single Breath Pre Ref 83.0 %     *Note: Due to a large number of results and/or encounters for the requested time period, some results have not been displayed. A complete set of results can be found in Results Review.         Current Outpatient Medications:     aspirin (ECOTRIN) 81 MG EC tablet, Take 1 tablet (81 mg total) by mouth once daily., Disp: 90 tablet, Rfl: 1    atorvastatin (LIPITOR) 80 MG tablet, TAKE 1 TABLET ONE TIME DAILY, Disp: 90 tablet, Rfl: 3    citalopram (CELEXA) 20 MG tablet, TAKE 1 TABLET EVERY DAY, Disp: 90 tablet, Rfl: 3    lamoTRIgine (LAMICTAL) 100 MG tablet, TAKE 1 TABLET TWICE DAILY, Disp: 180 tablet, Rfl: 3    memantine (NAMENDA) 10 MG Tab, TAKE 1 TABLET TWICE DAILY, Disp: 180 tablet, Rfl: 3    metoprolol succinate (TOPROL-XL) 50 MG 24 hr tablet, TAKE 1 TABLET ONE TIME DAILY, Disp: 90  tablet, Rfl: 0    polyethylene glycol (GLYCOLAX) 17 gram PwPk, Take 17 g by mouth once daily., Disp: 30 each, Rfl: 0    primidone (MYSOLINE) 250 MG Tab, TAKE 2 TABLETS TWICE DAILY (NEED MD APPOINTMENT FOR REFILLS), Disp: 360 tablet, Rfl: 3    torsemide (DEMADEX) 10 MG Tab, TAKE 1 TABLET EVERY MORNING, Disp: 90 tablet, Rfl: 1    TRELEGY ELLIPTA 100-62.5-25 mcg DsDv, INHALE 1 PUFF ONE TIME DAILY, Disp: 180 each, Rfl: 3  No current facility-administered medications for this visit.    Facility-Administered Medications Ordered in Other Visits:     0.9%  NaCl infusion (for blood administration), , Intravenous, Q24H PRN, Batool Rodriguez NP     Lab Results   Component Value Date    WBC 7.51 08/26/2024    RBC 4.13 (L) 08/26/2024    HGB 13.0 (L) 08/26/2024    HCT 38.3 (L) 08/26/2024    MCV 93 08/26/2024    MCH 31.5 (H) 08/26/2024    MCHC 33.9 08/26/2024    RDW 14.7 (H) 08/26/2024     08/26/2024    MPV 10.0 08/26/2024    GRAN 4.2 08/26/2024    GRAN 56.1 08/26/2024    LYMPH 2.0 08/26/2024    LYMPH 27.0 08/26/2024    MONO 1.1 (H) 08/26/2024    MONO 14.0 08/26/2024    EOS 0.2 08/26/2024    BASO 0.07 08/26/2024    EOSINOPHIL 2.0 08/26/2024    BASOPHIL 0.9 08/26/2024    MG 1.9 08/26/2024        CMP  Lab Results   Component Value Date     08/26/2024    K 3.6 08/26/2024    CL 99 08/26/2024    CO2 29 08/26/2024     08/26/2024    BUN 12 08/26/2024    CREATININE 0.8 08/26/2024    CALCIUM 8.3 (L) 08/26/2024    PROT 6.1 08/26/2024    ALBUMIN 2.9 (L) 08/26/2024    BILITOT 0.7 08/26/2024    ALKPHOS 84 08/26/2024    AST 19 08/26/2024    ALT 18 08/26/2024    ANIONGAP 9 08/26/2024    ESTGFRAFRICA >60.0 07/07/2022    EGFRNONAA >60.0 07/07/2022        Lab Results   Component Value Date    LABBLOO No growth after 5 days. 12/05/2020    LABURIN No growth 01/23/2018            Results for orders placed or performed during the hospital encounter of 08/25/24   EKG 12-lead    Collection Time: 08/25/24  9:47 AM   Result Value  Ref Range    QRS Duration 154 ms    [facility] QTC Calculation 515 ms    Narrative    Test Reason : R07.9,    Vent. Rate : 096 BPM     Atrial Rate : 096 BPM     P-R Int : 162 ms          QRS Dur : 154 ms      QT Int : 408 ms       P-R-T Axes : 076 264 033 degrees     QTc Int : 515 ms    Atrial-sensed ventricular-paced rhythm  Confirmed by Austin Dean MD ([id number]) on 8/26/2024 12:58:56 PM    Referred By: BERNA BERMUDEZ           Confirmed By:Austin Dean MD        Cardiac device check - [facility] 08/28/2024 [id number] Final   Echo 08/26/2024 [id number] Final   X-Ray Chest AP Portable 08/25/2024 [id number] Final            Coronary artery disease involving native coronary artery of native heart with other form of angina pectoris  Assessment & Plan:  Repeat stress test ordered.  Previous workup by heart catheterization negative x3.  Heart catheterization in 2013, 2016, 2018.  I told the patient that unfortunately with his Bi V pacing, we lose the ability to loose the Electrocardiogram assessment for ischemic heart disease.      Chronic diastolic heart failure  Assessment & Plan:  Current treatment to continue.  He is quite sedentary at home.  He does complain of shortness of breath with activity.  His BNP levels have not been markedly elevated.  His GFR is greater than 60.    Orders:  -     Ambulatory referral/consult to Cardiology    Atherosclerosis of native coronary artery of native heart without angina pectoris  -     NM Myocardial Perfusion Spect Multi Pharmacologic; Future; Expected date: 10/11/2024  -     Nuclear Stress Test; Future    Aortic atherosclerosis  Assessment & Plan:  Condition unchanged.      Biventricular cardiac pacemaker in situ  Assessment & Plan:  Intact function.  99% Bi V pacing.  On last interrogation battery longevity 4 months.  He follows with electrophysiology.  He has an Abbott device.      Essential hypertension  Assessment & Plan:  Toprol to continue.      Mixed  hyperlipidemia  Assessment & Plan:  Current LDL 75 mg%.  Atorvastatin tolerated well.  It will be continued.      Permanent atrial fibrillation  Assessment & Plan:  He has a Watchman device.  He has chronic AFib.  He has a Bi V pacer.      Pulmonary hypertension  Assessment & Plan:  Documented at 47 mm Hg by calculation on Doppler ECHO.  He is on diuretics.  He does report dyspnea at times.  Diuretics to continue.  There is a diagnosis of diastolic heart failure but I am not convinced the elevation of PA pressures is secondary to fluid overload         Plan:       Problem List Items Addressed This Visit          Cardiac/Vascular    CAD (coronary artery disease) - Primary (Chronic)     Repeat stress test ordered.  Previous workup by heart catheterization negative x3.  Heart catheterization in 2013, 2016, 2018.  I told the patient that unfortunately with his Bi V pacing, we lose the ability to loose the Electrocardiogram assessment for ischemic heart disease.         Mixed hyperlipidemia     Current LDL 75 mg%.  Atorvastatin tolerated well.  It will be continued.         Permanent atrial fibrillation     He has a Watchman device.  He has chronic AFib.  He has a Bi V pacer.         Essential hypertension     Toprol to continue.         Aortic atherosclerosis     Condition unchanged.         Chronic diastolic heart failure     Current treatment to continue.  He is quite sedentary at home.  He does complain of shortness of breath with activity.  His BNP levels have not been markedly elevated.  His GFR is greater than 60.         Biventricular cardiac pacemaker in situ     Intact function.  99% Bi V pacing.  On last interrogation battery longevity 4 months.  He follows with electrophysiology.  He has an Abbott device.         Pulmonary hypertension     Documented at 47 mm Hg by calculation on Doppler ECHO.  He is on diuretics.  He does report dyspnea at times.  Diuretics to continue.  There is a diagnosis of diastolic  heart failure but I am not convinced the elevation of PA pressures is secondary to fluid overload          Other Visit Diagnoses       Atherosclerosis of native coronary artery of native heart without angina pectoris        Relevant Orders    NM Myocardial Perfusion Spect Multi Pharmacologic    Nuclear Stress Test                 Unfortunately the patient will not be able to walk on the treadmill.  A nuclear Lexiscan stress test ordered.  He has recognized coronary disease and symptoms suggestive of angina.    There is intact pacemaker function.  I did not add or change medications today.  We discussed his dizziness.  I recommend continuation of his current diuretic dose.  There is no way clinically to confirmed that his dizziness is related to volume depletion and diuretic usage.  I think the benefits outweigh the risks.    His chest pressure symptoms are not classic angina by description.  He states he gets it all day every day.            Santana Lynn MD  10/11/2024   10:25 AM

## 2024-10-20 ENCOUNTER — CLINICAL SUPPORT (OUTPATIENT)
Dept: CARDIOLOGY | Facility: HOSPITAL | Age: 84
End: 2024-10-20
Attending: INTERNAL MEDICINE
Payer: MEDICARE

## 2024-10-20 ENCOUNTER — CLINICAL SUPPORT (OUTPATIENT)
Dept: CARDIOLOGY | Facility: HOSPITAL | Age: 84
End: 2024-10-20
Payer: MEDICARE

## 2024-10-20 DIAGNOSIS — Z95.0 PRESENCE OF CARDIAC PACEMAKER: ICD-10-CM

## 2024-10-20 DIAGNOSIS — I48.91 UNSPECIFIED ATRIAL FIBRILLATION: ICD-10-CM

## 2024-10-20 PROCEDURE — 93294 REM INTERROG EVL PM/LDLS PM: CPT | Mod: HCNC,,, | Performed by: INTERNAL MEDICINE

## 2024-10-20 PROCEDURE — 93296 REM INTERROG EVL PM/IDS: CPT | Mod: HCNC | Performed by: INTERNAL MEDICINE

## 2024-10-25 RX ORDER — METOPROLOL SUCCINATE 50 MG/1
50 TABLET, EXTENDED RELEASE ORAL
Qty: 90 TABLET | Refills: 3 | Status: SHIPPED | OUTPATIENT
Start: 2024-10-25

## 2024-10-31 LAB
OHS CV AF BURDEN PERCENT: 4
OHS CV BIV PACING PERCENT: 99 %
OHS CV DC REMOTE DEVICE TYPE: NORMAL
OHS CV ICD SHOCK: NO

## 2024-11-05 ENCOUNTER — PATIENT MESSAGE (OUTPATIENT)
Dept: CARDIOLOGY | Facility: CLINIC | Age: 84
End: 2024-11-05
Payer: MEDICARE

## 2024-11-11 DIAGNOSIS — F32.A DEPRESSION, UNSPECIFIED DEPRESSION TYPE: ICD-10-CM

## 2024-11-11 RX ORDER — CITALOPRAM 20 MG/1
20 TABLET, FILM COATED ORAL DAILY
Qty: 90 TABLET | Refills: 3 | Status: SHIPPED | OUTPATIENT
Start: 2024-11-11 | End: 2024-11-12 | Stop reason: SDUPTHER

## 2024-11-12 DIAGNOSIS — F32.A DEPRESSION, UNSPECIFIED DEPRESSION TYPE: ICD-10-CM

## 2024-11-12 RX ORDER — CITALOPRAM 20 MG/1
20 TABLET, FILM COATED ORAL DAILY
Qty: 90 TABLET | Refills: 0 | Status: SHIPPED | OUTPATIENT
Start: 2024-11-12

## 2024-11-12 NOTE — TELEPHONE ENCOUNTER
Last Ordered 11/11/2024    Last Appointment: 1/30/2023    Upcoming Appointment: 12/03/2024   Initiate Treatment: pharmacy compounding accessory Bid\\nQuantity: 30.0 g\\nSig: Calcipotriene/fluorouracil cream 0.005%/4.5% cream, Apply to the affected area on forehead and top of right eyebrow twice a day for 4 days. Do not apply to open wound. Render In Strict Bullet Format?: No Detail Level: Zone Continue Regimen: Metronidazole 1%ivermectin 1% Apply to face every morning. Initiate Treatment: doxycycline hyclate 20 mg tablet Bid\\nQuantity: 60.0 Tablet  Days Supply: 30\\nSig: Take one tablet by mouth 2 times daily

## 2024-12-03 ENCOUNTER — OFFICE VISIT (OUTPATIENT)
Dept: NEUROLOGY | Facility: CLINIC | Age: 84
End: 2024-12-03
Payer: MEDICARE

## 2024-12-03 VITALS
BODY MASS INDEX: 30.05 KG/M2 | HEART RATE: 155 BPM | OXYGEN SATURATION: 90 % | WEIGHT: 221.56 LBS | DIASTOLIC BLOOD PRESSURE: 68 MMHG | SYSTOLIC BLOOD PRESSURE: 109 MMHG

## 2024-12-03 DIAGNOSIS — F02.80 LATE ONSET ALZHEIMER'S DISEASE WITHOUT BEHAVIORAL DISTURBANCE: ICD-10-CM

## 2024-12-03 DIAGNOSIS — G40.909 NONINTRACTABLE EPILEPSY WITHOUT STATUS EPILEPTICUS, UNSPECIFIED EPILEPSY TYPE: ICD-10-CM

## 2024-12-03 DIAGNOSIS — G30.1 LATE ONSET ALZHEIMER'S DISEASE WITHOUT BEHAVIORAL DISTURBANCE: ICD-10-CM

## 2024-12-03 PROCEDURE — 99999 PR PBB SHADOW E&M-EST. PATIENT-LVL II: CPT | Mod: PBBFAC,HCNC,, | Performed by: PSYCHIATRY & NEUROLOGY

## 2024-12-03 PROCEDURE — 99214 OFFICE O/P EST MOD 30 MIN: CPT | Mod: HCNC,S$GLB,, | Performed by: PSYCHIATRY & NEUROLOGY

## 2024-12-03 PROCEDURE — 1101F PT FALLS ASSESS-DOCD LE1/YR: CPT | Mod: HCNC,CPTII,S$GLB, | Performed by: PSYCHIATRY & NEUROLOGY

## 2024-12-03 PROCEDURE — 3288F FALL RISK ASSESSMENT DOCD: CPT | Mod: HCNC,CPTII,S$GLB, | Performed by: PSYCHIATRY & NEUROLOGY

## 2024-12-03 RX ORDER — LANOLIN ALCOHOL/MO/W.PET/CERES
400 CREAM (GRAM) TOPICAL DAILY
Qty: 90 TABLET | Refills: 3 | Status: SHIPPED | OUTPATIENT
Start: 2024-12-03

## 2024-12-03 RX ORDER — MEMANTINE HYDROCHLORIDE 10 MG/1
10 TABLET ORAL 2 TIMES DAILY
Qty: 180 TABLET | Refills: 3 | Status: SHIPPED | OUTPATIENT
Start: 2024-12-03

## 2024-12-03 RX ORDER — LAMOTRIGINE 100 MG/1
100 TABLET ORAL 2 TIMES DAILY
Qty: 180 TABLET | Refills: 3 | Status: SHIPPED | OUTPATIENT
Start: 2024-12-03 | End: 2024-12-03 | Stop reason: SDUPTHER

## 2024-12-03 RX ORDER — RIBOFLAVIN (VITAMIN B2) 100 MG
200 TABLET ORAL DAILY
Qty: 180 TABLET | Refills: 3 | Status: SHIPPED | OUTPATIENT
Start: 2024-12-03

## 2024-12-03 RX ORDER — LAMOTRIGINE 100 MG/1
100 TABLET ORAL 2 TIMES DAILY
Qty: 180 TABLET | Refills: 3 | Status: SHIPPED | OUTPATIENT
Start: 2024-12-03

## 2024-12-03 NOTE — PROGRESS NOTES
[facility]    [facility] [place]    Date: 12/3/24  Patient Name: Heri Muhammad   MRN: [id number]   PCP: Jennifer Sharpe  Referring Provider: No ref. provider found  Notes:   Assessment:   Heri Muhammad is a 84 y.o. male Presenting in follow-up for cognitive decline, seizure, and tremor with new episodes of abrupt loss of consciousness concerning for possible syncope.  Patient doing reasonably well with gradual progression of dementia.  Continuing Namenda with no changes.  Continuing Lamictal with no changes.  All questions answered.  Plan:     Problem List Items Addressed This Visit          Neuro    Late onset Alzheimer's disease without behavioral disturbance    Overview   - supportive care  - continue namenda  - followed by Neurology         Relevant Medications    memantine (NAMENDA) 10 MG Tab    Nonintractable epilepsy without status epilepticus    Relevant Medications    lamoTRIgine (LAMICTAL) 100 MG tablet     I spent a total of 30 minutes preparing to see the patient, obtaining and reviewing history and results, performing a medically appropriate exam, counseling and educating the patient/family/caregiver, documenting clinical information, coordinating care, and ordering medications, tests, procedures, and referrals.    Anjum Arellano MD  [facility]   Department of Neurology    Patient note was created using MModal Dictation.  Any errors in syntax or even information may not have been identified and edited on initial review prior to signing this note.  Subjective:     HPI:   Mr. Heri Muhammad is a 84 y.o. male presenting in follow-up for management of tremor, seizure, and Alzheimer's disease.  Patient presents with family who contributes to the history.  They they noted gradual decrease in his short-term memory and increase in forgetfulness.  He is having more difficulty following plots of movies.  They deny any changes in his mood or behavior.  No  hallucinations or behavioral disturbances.  They deny any breakthrough seizures.    Past Medical History:   Diagnosis Date    Anticoagulant long-term use     no longer taking eliquis    Aortic atherosclerosis 09/18/2017    - LDL goal <70 - BP goal <130/80    Benign essential tremor     Biventricular cardiac pacemaker in situ 09/24/2018    CAD (coronary artery disease) 12/02/2013    - 9/2013 Left heart Cath: LAD- 60% prox (neg FFR), nl, ramus and RCA 20% - LDL goal <70 - BP goal <130/80    Cancer     skin cancer on back    Carpal tunnel syndrome of left wrist 08/08/2017    surgery to correct    Chronic bronchitis     Chronic diastolic heart failure 01/19/2018    Chronic obstructive pulmonary disease 10/03/2018    Chronic right-sided thoracic back pain 04/27/2017    Cough     not currently a problem    Current use of long term anticoagulation 06/02/2016    - Eliquis for Afib-no longer taking    Dizziness     intermittent    DJD (degenerative joint disease) of knee     right    ED (erectile dysfunction)     Hammer toe of right foot     Hard of hearing 11/2016    History of cataract     Hyponatremia 06/11/2019    Memory loss     Mixed hyperlipidemia 07/28/2012    Persistent atrial fibrillation 07/01/2015    - followed by Dr. Esparza - history of cardioversion - on Eliquis OAC 5 mg BID    Pulmonary hypertension     Seizure disorder     Seizures     last episode 1995    Subclinical hypothyroidism     no meds    Tension type headache 07/02/2019    not as frequent    Vitamin D deficiency        PAST SURGICAL HISTORY:  Past Surgical History:   Procedure Laterality Date    CARDIAC CATHETERIZATION      CARDIAC PACEMAKER PLACEMENT  2014    CARDIOVERSION      multiple    CARPAL TUNNEL RELEASE Left     CATARACT EXTRACTION Bilateral     COLONOSCOPY      several    CORRECTION OF HAMMER TOE Right 3/21/2025    Procedure: CORRECTION, HAMMER TOE;  Surgeon: Geronimo Lehman Jr., DPM;  [facility];  Service: Podiatry;  Laterality:  Right;  pop saph. Toes 2-5    ECHOCARDIOGRAM,TRANSESOPHAGEAL N/A 06/16/2023    Procedure: Transesophageal echo (BEN) intra-procedure log documentation;  Surgeon: [facility] Provider;  Location: [facility] EP LAB;  Service: Cardiology;  Laterality: N/A;  s/p WM, BEN, anes, 3 Prep    FRACTURE SURGERY Right     ankle with hardware    HERNIA REPAIR      abdominal    INJECTION OF ANESTHETIC AGENT AROUND MEDIAL BRANCH NERVES INNERVATING LUMBAR FACET JOINT Bilateral 01/17/2019    Procedure: Block-nerve-medial branch-lumbar L2-L5;  Surgeon: Anthony Moffett MD;  Location: [facility] OR;  Service: Pain Management;  Laterality: Bilateral;    JOINT REPLACEMENT Bilateral     shoulders    OCCLUSION OF LEFT ATRIAL APPENDAGE N/A 05/05/2023    Procedure: Left atrial appendage occlusion;  Surgeon: Hema Del Valle MD;  Location: [facility] EP LAB;  Service: Cardiology;  Laterality: N/A;  afib, watchman, BSCI, ben, anes, MB, 3prep    RADIOFREQUENCY ABLATION OF LUMBAR MEDIAL BRANCH NERVE AT SINGLE LEVEL Bilateral 01/23/2019    Procedure: Radiofrequency Ablation, Nerve, Spinal, Lumbar, Medial Branch, L2-L5;  Surgeon: Anthony Moffett MD;  Location: [facility] OR;  Service: Pain Management;  Laterality: Bilateral;    SURGICAL REMOVAL OF METATARSAL HEAD Right 3/21/2025    Procedure: OSTECTOMY, METATARSAL BONE, HEAD;  Surgeon: Geronimo Lehman Jr., DP;  [facility];  Service: Podiatry;  Laterality: Right;  pop saph. Toes 2-5    TRANSESOPHAGEAL ECHOCARDIOGRAPHY N/A 05/05/2023    Procedure: ECHOCARDIOGRAM, TRANSESOPHAGEAL;  Surgeon: Dylan Layne MD;  Location: [facility] EP LAB;  Service: Cardiology;  Laterality: N/A;    TREATMENT OF CARDIAC ARRHYTHMIA N/A 07/11/2022    Procedure: Cardioversion or Defibrillation;  Surgeon: MIREILLE Morel MD;  Location: [facility] EP LAB;  Service: Cardiology;  Laterality: N/A;  AF, BEN, DCCV, MAC, EH (to cover for SK), 3 Prep *SJM CRT-P in situ*    WISDOM TOOTH EXTRACTION         CURRENT MEDS:  Current Outpatient  Medications   Medication Sig Dispense Refill    atorvastatin (LIPITOR) 80 MG tablet TAKE 1 TABLET ONE TIME DAILY (Patient taking differently: Take 80 mg by mouth every evening.) 90 tablet 3    metoprolol succinate (TOPROL-XL) 50 MG 24 hr tablet TAKE 1 TABLET EVERY DAY (Patient taking differently: Take 50 mg by mouth once daily.) 90 tablet 3    primidone (MYSOLINE) 250 MG Tab TAKE 2 TABLETS TWICE DAILY (NEED MD APPOINTMENT FOR REFILLS) (Patient taking differently: Take 500 mg by mouth 2 (two) times a day.) 360 tablet 3    TRELEGY ELLIPTA 100-62.5-25 mcg DsDv INHALE 1 PUFF ONE TIME DAILY (Patient taking differently: Inhale 1 puff into the lungs every morning.) 180 each 3    ascorbic acid, vitamin C, (VITAMIN C) 500 MG tablet Take 1 tablet (500 mg total) by mouth once daily. 30 tablet 1    aspirin (ECOTRIN) 81 MG EC tablet Take 1 tablet (81 mg total) by mouth once daily. 90 tablet 1    cetirizine (ZYRTEC) 10 MG tablet Take 10 mg by mouth every evening.      citalopram (CELEXA) 20 MG tablet Take 1 tablet (20 mg total) by mouth once daily. 90 tablet 0    ergocalciferol (ERGOCALCIFEROL) 50,000 unit Cap Take 1 capsule (50,000 Units total) by mouth every 7 days. (Patient taking differently: Take 50,000 Units by mouth every 7 days. Instructed to hold for sx) 8 capsule 0    gabapentin (NEURONTIN) 100 MG capsule Take 1 capsule (100 mg total) by mouth 2 (two) times daily. 60 capsule 1    lamoTRIgine (LAMICTAL) 100 MG tablet Take 1 tablet (100 mg total) by mouth 2 (two) times daily. 180 tablet 3    lamoTRIgine (LAMICTAL) 100 MG tablet Take 1 tablet (100 mg total) by mouth 2 (two) times daily. 10 tablet 0    magnesium oxide (MAG-OX) 400 mg (241.3 mg magnesium) tablet Take 1 tablet (400 mg total) by mouth once daily. (Patient taking differently: Take 400 mg by mouth once daily. Instructed to hold for sx) 90 tablet 3    memantine (NAMENDA) 10 MG Tab Take 1 tablet (10 mg total) by mouth 2 (two) times daily. 180 tablet 3     riboflavin, vitamin B2, (VITAMIN B-2) 100 mg Tab tablet Take 2 tablets (200 mg total) by mouth once daily. (Patient taking differently: Take 100 mg by mouth once daily. Instructed to hold for sx) 180 tablet 3    torsemide (DEMADEX) 10 MG Tab Take 1 tablet (10 mg total) by mouth every morning. (Patient taking differently: Take 10 mg by mouth every morning. Instructed to hold dos) 90 tablet 3    tramadol-acetaminophen 37.5-325 mg (ULTRACET) 37.5-325 mg Tab Take 1 tablet by mouth every 6 (six) hours as needed for Pain. 25 tablet 0     No current facility-administered medications for this visit.     Facility-Administered Medications Ordered in Other Visits   Medication Dose Route Frequency Provider Last Rate Last Admin    0.9%  NaCl infusion (for blood administration)   Intravenous Q24H PRN Batool Rodriguez NP           ALLERGIES:  Review of patient's allergies indicates:   Allergen Reactions    Bactroban [mupirocin calcium] Rash     Other reaction(s): Rash       FAMILY HISTORY:  Family History   Problem Relation Name Age of Onset    Heart disease Mother Odalis     Heart failure Mother Odalis     Dementia Mother Odalis     Cancer Father Darío         colon    Blindness Father Darío         accident    Cataracts Father Darío     Hypertension Father Darío     Tremor Father Darío     Tremor Brother      Cancer Daughter Shlomo         cancer as a baby, unsure of what kind    Obesity Daughter Judy     No Known Problems Son Giovanny     Tremor Paternal Grandfather      Melanoma Neg Hx      Amblyopia Neg Hx      Diabetes Neg Hx      Glaucoma Neg Hx      Macular degeneration Neg Hx      Retinal detachment Neg Hx      Strabismus Neg Hx      Stroke Neg Hx      Thyroid disease Neg Hx         SOCIAL HISTORY:  Social History     Tobacco Use    Smoking status: Former     Current packs/day: 0.00     Average packs/day: 1 pack/day for 5.0 years (5.0 ttl pk-yrs)     Types: Cigarettes     Start date: 1954     Quit date: 1959     Years since  quittin.2    Smokeless tobacco: Never    Tobacco comments:     - I   Substance Use Topics    Alcohol use: No    Drug use: No       Review of Systems:  12 system review of systems is negative except for the symptoms mentioned in HPI.      Objective:     Vitals:    24 1350   BP: 109/68   Pulse: (!) 155   SpO2: (!) 90%   Weight: 100.5 kg (221 lb 9 oz)     General: NAD, well nourished   Eyes: no tearing, discharge, no erythema   ENT: moist mucous membranes of the oral cavity, nares patent    Neck: Supple, full range of motion  Cardiovascular: Appears well perfused  Lungs: Normal work of breathing, normal chest wall excursions  Skin: No rash, lesions, or breakdown on exposed skin  Psychiatry: Mood and affect are appropriate   Abdomen: nondistended, non tender  Extremeties: No cyanosis, clubbing or edema visible    Neurological   MENTAL STATUS: Alert and oriented to person, place, nottime. Attention and concentration within normal limits. Speech without dysarthria. Recent and remote memory fair   CRANIAL NERVES: Visual fields intact.  EOMI. Facial sensation intact. Face symmetrical. Hearing grossly intact. Full shoulder shrug bilaterally. Tongue protrudes midline   SENSORY: Sensation is intact to light touch throughout.   MOTOR: Normal bulk. Postural tremor of RUE>LUE  REFLEXES: 2+ BLE and LLE  CEREBELLAR/COORDINATION/GAIT: Gait steady.. Normal rapid alternating movements.

## 2024-12-06 ENCOUNTER — TELEPHONE (OUTPATIENT)
Dept: PODIATRY | Facility: CLINIC | Age: 84
End: 2024-12-06
Payer: MEDICARE

## 2024-12-06 NOTE — TELEPHONE ENCOUNTER
Called and spoke to patient's wife. Informed of rescheduled appt from 12/10/24 to 12/31/24. Patient's wife understood time, date, and location.

## 2024-12-27 ENCOUNTER — TELEPHONE (OUTPATIENT)
Dept: PODIATRY | Facility: CLINIC | Age: 84
End: 2024-12-27
Payer: MEDICARE

## 2024-12-27 NOTE — TELEPHONE ENCOUNTER
Called and spoke to patient's wife. Informed Dr. Lehman will not be in office the afternoon of the 31st. Asked if they could come in 1/3/25 3:15. Patient's wife understood time, date, and location.

## 2025-01-02 ENCOUNTER — OFFICE VISIT (OUTPATIENT)
Dept: FAMILY MEDICINE | Facility: CLINIC | Age: 85
End: 2025-01-02
Payer: MEDICARE

## 2025-01-02 VITALS
BODY MASS INDEX: 29.41 KG/M2 | HEIGHT: 72 IN | HEART RATE: 80 BPM | DIASTOLIC BLOOD PRESSURE: 60 MMHG | WEIGHT: 217.13 LBS | SYSTOLIC BLOOD PRESSURE: 110 MMHG | OXYGEN SATURATION: 98 %

## 2025-01-02 DIAGNOSIS — E78.2 MIXED HYPERLIPIDEMIA: Primary | ICD-10-CM

## 2025-01-02 DIAGNOSIS — G40.909 NONINTRACTABLE EPILEPSY WITHOUT STATUS EPILEPTICUS, UNSPECIFIED EPILEPSY TYPE: ICD-10-CM

## 2025-01-02 DIAGNOSIS — F02.80 LATE ONSET ALZHEIMER'S DISEASE WITHOUT BEHAVIORAL DISTURBANCE: ICD-10-CM

## 2025-01-02 DIAGNOSIS — J41.0 SIMPLE CHRONIC BRONCHITIS: ICD-10-CM

## 2025-01-02 DIAGNOSIS — I50.32 CHRONIC DIASTOLIC HEART FAILURE: ICD-10-CM

## 2025-01-02 DIAGNOSIS — G30.1 LATE ONSET ALZHEIMER'S DISEASE WITHOUT BEHAVIORAL DISTURBANCE: ICD-10-CM

## 2025-01-02 DIAGNOSIS — I48.21 PERMANENT ATRIAL FIBRILLATION: ICD-10-CM

## 2025-01-02 DIAGNOSIS — I10 ESSENTIAL HYPERTENSION: ICD-10-CM

## 2025-01-02 DIAGNOSIS — D53.9 NUTRITIONAL ANEMIA: ICD-10-CM

## 2025-01-02 PROCEDURE — 99999 PR PBB SHADOW E&M-EST. PATIENT-LVL IV: CPT | Mod: PBBFAC,HCNC,, | Performed by: STUDENT IN AN ORGANIZED HEALTH CARE EDUCATION/TRAINING PROGRAM

## 2025-01-02 RX ORDER — CETIRIZINE HYDROCHLORIDE 5 MG/1
5 TABLET ORAL DAILY
COMMUNITY

## 2025-01-02 NOTE — PROGRESS NOTES
Patient ID: Heri Muhammad is a 84 y.o. male.    Chief Complaint: Follow-up (3 month f/u)    History of Present Illness    CHIEF COMPLAINT:  Heri presents today for follow-up regarding multiple health concerns.    CARDIOVASCULAR:  He has a pacemaker for atrial fibrillation. Previous ablation attempts were unsuccessful - first ablation initially appeared effective but failed, and second ablation was terminated due to AFib foci moving between locations and proximity to cardiac wall. He describes chest pain as feeling like a strap across his chest.    RESPIRATORY:  He reports stable but persistent shortness of breath managed with Trelegy inhaler. He has secondhand smoke exposure but does not smoke.    NEUROLOGICAL:  He is currently on Namenda for memory loss but reports the medication is ineffective.    MUSCULOSKELETAL:  He has hammer toe causing foot pain. Current management with medication and rubber devices has been ineffective.    GASTROINTESTINAL:  He experiences alternating constipation and diarrhea, with specific food triggers including liver cheese and soft-shell crabs.    MEDICATIONS:  He was prescribed B2 and magnesium for headaches. He discontinued B complex due to frequent urination.      ROS:  General: -fever, -chills, -fatigue, -weight gain, -weight loss  Eyes: -vision changes, -redness, -discharge  ENT: -ear pain, -nasal congestion, -sore throat  Cardiovascular: +chest pain, -palpitations, -lower extremity edema  Respiratory: -cough, +shortness of breath  Gastrointestinal: -abdominal pain, -nausea, -vomiting, +diarrhea, +constipation, -blood in stool  Genitourinary: -dysuria, -hematuria, +frequency  Musculoskeletal: -joint pain, -muscle pain  Skin: -rash, -lesion  Neurological: -headache, -dizziness, -numbness, -tingling  Psychiatric: -anxiety, -depression, -sleep difficulty, +memory problems         Physical Exam    Vitals: Oxygenation: 98%.  General: No acute distress. Well-developed.  Well-nourished.  Eyes: EOMI. Sclerae anicteric.  HENT: Normocephalic. Atraumatic. Nares patent. Moist oral mucosa.  Ears: Bilateral TMs clear. Bilateral EACs clear.  Cardiovascular: Regular rate. Regular rhythm. No murmurs. No rubs. No gallops. Normal S1, S2.  Respiratory: Normal respiratory effort. Clear to auscultation bilaterally. No rales. No rhonchi. No wheezing.  Abdomen: Soft. Non-tender. Non-distended. Normoactive bowel sounds.  Musculoskeletal: No  obvious deformity.  Extremities: No lower extremity edema.  Neurological: Alert & oriented x3. No slurred speech. Normal gait.  Psychiatric: Normal mood. Normal affect. Good insight. Good judgment.  Skin: Warm. Dry. No rash.         Assessment & Plan    DISEASE WITH LATE ONSET:  - Continue Namenda for memory loss management.  - Advised patient that while Namenda is not stopping progression, it is slowing it down.  - Recommend activities to maintain cognitive function.    EPILEPSY WITHOUT STATUS EPILEPTICUS, UNSPECIFIED EPILEPSY TYPE:  - Continue Lamictal for neurological symptoms.    DIASTOLIC HEART FAILURE:  - Recent cardiac workup shows normal stress test and good heart function.  - Continue torsemide for fluid management.  - Heri reports stable shortness of breath, with oxygenation at 98%.  - Suggested shortness of breath may be due to deconditioning or lung aging.  - Recommend increasing physical activity.    ATRIAL FIBRILLATION:  - AFib is considered chronic; previous ablation attempts were unsuccessful.  - Heri sees Dr. Esparza for AFib management.  - Noted presence of pacemaker.  - AFib management is currently limited to medications.    CHRONIC BRONCHITIS:  - Heri reports shortness of breath, but pulmonary function tests are normal.  - Continue Trelegy inhaler daily.  - Heri is not on albuterol inhaler.    FIBRILLATION (AFIB):  - Advised to see Dr. Esparza, an electrophysiologist, annually for AFib and pacemaker management.  - Noted history of chest pain  associated with AFib.    OF BREATH:  - Normal pulmonary function tests reduce concern for pulmonary etiology.  - Continue Trelegy inhaler, 1 puff daily in the morning.    LOSS:  - Heri exhibits memory issues, including forgetting recent events.  - Acknowledged that memory loss will progress, but medication slows the progression.    TOE:  - Heri reports having a hammer toe.  - Scheduled appointment with Dr. Wilson on January 9th for evaluation.  - Noted previous treatments including topical medication and toe separators were ineffective.    DIARRHEA/CONSTIPATION:  - Evaluated chronic diarrhea/constipation; considering dietary factors and possible need for further workup.  - Discussed potential dietary triggers, particularly concentrated fats.  - Advised patient to avoid foods high in concentrated fats, such as liver cheese and soft-shell crabs.  - Recommend OTC Imodium as needed for diarrhea, and Miralax or Ex-lax for constipation management.  - Consider stool studies or stool culture to rule out infectious causes of chronic diarrhea.  - Advised increasing fiber intake and maintaining hydration to manage both conditions.    CESSATION:  - Confirmed the patient no longer smokes.    DECONDITIONING:  - Heri reported to be sedentary, sitting too much.    HEART FAILURE:  - Dr. Lynn is not convinced of heart failure, but patient remains on torsemide.    UP:  - Follow up in 6 months.  - Contact the office if any new issues arise before the next scheduled appointment.       1. Mixed hyperlipidemia  Overview:  Lab Results   Component Value Date    LDLCALC 88.4 05/06/2022     - on statin  - followed by Cardiology      2. Late onset Alzheimer's disease without behavioral disturbance  Overview:  - supportive care  - continue namenda  - followed by Neurology      3. 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 Chronic diastolic heart failure  Overview:  - no signs of acute  decompensation  - no leg swelling  - medical management      5. Permanent atrial fibrillation  Overview:  - followed by EP  - history of cardioversion  - on Eliquis OAC 5 mg BID  - taking amiodarone 200 mg QD      6. Simple chronic bronchitis  Overview:  - 7/30/19 PFT: no obstruction. Mild restriction  - improved symptoms with Anoro      7. Essential hypertension  Overview:  - well controlled  - continue current medication    Orders:  -     Comprehensive Metabolic Panel; Future; Expected date: 07/02/2025    8. Nutritional anemia  -     CBC Auto Differential; Future; Expected date: 07/02/2025              No follow-ups on file.    This note was generated with the assistance of ambient listening technology. Verbal consent was obtained by the patient and accompanying visitor(s) for the recording of patient appointment to facilitate this note. I attest to having reviewed and edited the generated note for accuracy, though some syntax or spelling errors may persist. Please contact the author of this note for any clarification.

## 2025-01-08 ENCOUNTER — PATIENT MESSAGE (OUTPATIENT)
Dept: ADMINISTRATIVE | Facility: CLINIC | Age: 85
End: 2025-01-08
Payer: MEDICARE

## 2025-01-08 DIAGNOSIS — M79.672 FOOT PAIN, BILATERAL: Primary | ICD-10-CM

## 2025-01-08 DIAGNOSIS — M79.671 FOOT PAIN, BILATERAL: Primary | ICD-10-CM

## 2025-01-08 PROBLEM — M20.41 HAMMERTOES OF BOTH FEET: Status: ACTIVE | Noted: 2025-01-08

## 2025-01-08 PROBLEM — M20.42 HAMMERTOES OF BOTH FEET: Status: ACTIVE | Noted: 2025-01-08

## 2025-01-09 ENCOUNTER — PATIENT MESSAGE (OUTPATIENT)
Dept: PODIATRY | Facility: CLINIC | Age: 85
End: 2025-01-09

## 2025-01-09 ENCOUNTER — TELEPHONE (OUTPATIENT)
Dept: CARDIOLOGY | Facility: CLINIC | Age: 85
End: 2025-01-09
Payer: MEDICARE

## 2025-01-09 ENCOUNTER — TELEPHONE (OUTPATIENT)
Dept: PODIATRY | Facility: CLINIC | Age: 85
End: 2025-01-09
Payer: MEDICARE

## 2025-01-09 ENCOUNTER — OFFICE VISIT (OUTPATIENT)
Dept: PODIATRY | Facility: CLINIC | Age: 85
End: 2025-01-09
Payer: MEDICARE

## 2025-01-09 VITALS — HEIGHT: 72 IN | WEIGHT: 217.13 LBS | BODY MASS INDEX: 29.41 KG/M2

## 2025-01-09 DIAGNOSIS — M20.42 HAMMERTOES OF BOTH FEET: Primary | ICD-10-CM

## 2025-01-09 DIAGNOSIS — M20.41 HAMMERTOES OF BOTH FEET: Primary | ICD-10-CM

## 2025-01-09 DIAGNOSIS — I73.9 PVD (PERIPHERAL VASCULAR DISEASE): ICD-10-CM

## 2025-01-09 PROCEDURE — 99999 PR PBB SHADOW E&M-EST. PATIENT-LVL III: CPT | Mod: PBBFAC,HCNC,, | Performed by: PODIATRIST

## 2025-01-09 PROCEDURE — 1159F MED LIST DOCD IN RCRD: CPT | Mod: CPTII,S$GLB,, | Performed by: PODIATRIST

## 2025-01-09 PROCEDURE — 99213 OFFICE O/P EST LOW 20 MIN: CPT | Mod: S$GLB,,, | Performed by: PODIATRIST

## 2025-01-09 PROCEDURE — 1125F AMNT PAIN NOTED PAIN PRSNT: CPT | Mod: CPTII,S$GLB,, | Performed by: PODIATRIST

## 2025-01-09 NOTE — TELEPHONE ENCOUNTER
----- Message from Med Assistant Willy sent at 1/9/2025  3:38 PM CST -----  Can you contact him and ask he has left our office  ----- Message -----  From: Lelia Orellana MA  Sent: 1/9/2025   3:24 PM CST  To: Willy Randhawa MA    We have availability in [place] if patient is okay with that.  ----- Message -----  From: Willy Randhawa MA  Sent: 1/9/2025   3:03 PM CST  To: [facility] Staff    Dr Lehman would like this Patient to see Dr Lynn to evaluate for a Possible foot SX,  would like patient to see cardio in 2-3 weeks, your next available appt is in March, can you help scheduling?    Thanks  Lin

## 2025-01-09 NOTE — PROGRESS NOTES
[facility]  [place] [facility]  [address]  [address]  [place] [address]  Dept: [phone]  Dept Fax: [phone]    Geronimo Lehman Jr., DPM     Assessment:   MDM    Coding  1. Hammertoes of both feet  US Lower Extremity Arteries Bilateral    Ambulatory referral/consult to Interventional Cardiology      2. PVD (peripheral vascular disease)  US Lower Extremity Arteries Bilateral    Ambulatory referral/consult to Interventional Cardiology          Plan:     Procedures  1. Hammertoes of both feet  -     US Lower Extremity Arteries Bilateral; Future; Expected date: 01/09/2025  -     Ambulatory referral/consult to Interventional Cardiology; Future; Expected date: 01/16/2025    2. PVD (peripheral vascular disease)  -     US Lower Extremity Arteries Bilateral; Future; Expected date: 01/09/2025  -     Ambulatory referral/consult to Interventional Cardiology; Future; Expected date: 01/16/2025      Heri was seen today for post-op evaluation.    Diagnoses and all orders for this visit:    Hammertoes of both feet  -     US Lower Extremity Arteries Bilateral; Future  -     Ambulatory referral/consult to Interventional Cardiology; Future    PVD (peripheral vascular disease)  -     US Lower Extremity Arteries Bilateral; Future  -     Ambulatory referral/consult to Interventional Cardiology; Future        -pt seen, evaluated, and managed  -dx discussed in detail. All questions/concerns addressed  -all tx options discussed. All alternatives, risks, benefits of all txs discussed  -pt has failed conservative care options possible including but not limited to shoe wear and/or padding, bracing/strapping, at home ROM, formal PT, medical therapy, injection therapy  -XR reviewed  -labs reviewed by me: ok for vgel  -implemented icing/stretching regimen  -offloading pads dispensed  -we discussed his age and medical conditions are a concern but pt is still imploring to look into the idea  -would need art us prior to  considering in office procedural or surgical intervention   Order placed   Pt of dr. Lynn - will get him to eval    -rxs dispensed: none  -referrals: [facility]  -WB: wbat      Follow up in about 6 weeks (around 2/20/2025).    Subjective:      Patient ID: Heri Muhammad is a 84 y.o. male.    Chief Complaint:   Chief Complaint   Patient presents with    Post-op Evaluation     Post op  x ray to both feet       CC - foot pain: patient presents to the podiatry clinic  with complaint of  bilateral foot pain. Onset of the symptoms was several years ago. Precipitating event: unk. Current symptoms include: ability to bear weight, but with some pain, stiffness, swelling and worsening symptoms after a period of activity. Aggravating factors: walking and certain shoegear. Symptoms have gradually worsened. Patient has had prior foot problems. Evaluation to date: none. Treatment to date: avoidance of offending activity. Patients rates pain 7/10 on pain scale.        1/9/25:  Hx as above. Still having hammertoe pain that affects daily routine.        Last Podiatry Enc: Visit date not found  Last Enc w/ Me: Visit date not found    Outside reports reviewed: historical medical records.  Family hx: as below  Past Medical History:   Diagnosis Date    Anticoagulant long-term use     eliquis    Aortic atherosclerosis 09/18/2017    - LDL goal <70 - BP goal <130/80    Benign essential tremor     Biventricular cardiac pacemaker in situ 09/24/2018    CAD (coronary artery disease) 12/02/2013    - 9/2013 Left heart Cath: LAD- 60% prox (neg FFR), nl, ramus and RCA 20% - LDL goal <70 - BP goal <130/80    Cancer     skin cancer on back    Carpal tunnel syndrome of left wrist 08/08/2017    Chronic bronchitis     Chronic diastolic heart failure 01/19/2018    Chronic obstructive pulmonary disease 10/03/2018    Chronic right-sided thoracic back pain 04/27/2017    Cough     Current use of long term anticoagulation 06/02/2016    - Eliquis for Afib     Dizziness     intermittant    DJD (degenerative joint disease) of knee     ED (erectile dysfunction)     Hard of hearing 11/2016    History of cataract     Hyponatremia 06/11/2019    Memory loss     Mixed hyperlipidemia 07/28/2012    Persistent atrial fibrillation 07/01/2015    - followed by Dr. Esparza - history of cardioversion - on Eliquis OAC 5 mg BID    Seizure disorder     Seizures     last episode 1995    Subclinical hypothyroidism     Tension type headache 07/02/2019     Past Surgical History:   Procedure Laterality Date    ANKLE SURGERY Right     pins and screws    ATRIAL ABLATION SURGERY      CARDIAC PACEMAKER PLACEMENT  2014    CARDIAC PACEMAKER PLACEMENT      CARDIAC PACEMAKER PLACEMENT  2014    CARDIOVERSION      CATARACT EXTRACTION      OU    COLONOSCOPY      due in 2014    ECHOCARDIOGRAM,TRANSESOPHAGEAL N/A 6/16/2023    Procedure: Transesophageal echo (BEN) intra-procedure log documentation;  Surgeon: [facility];  Location: [facility] EP LAB;  Service: Cardiology;  Laterality: N/A;  s/p WM, BEN, anes, 3 Prep    EYE SURGERY Bilateral     cataracts extraction    FRACTURE SURGERY Right     ankle with hardware    HERNIA REPAIR      abdominal    INJECTION OF ANESTHETIC AGENT AROUND MEDIAL BRANCH NERVES INNERVATING LUMBAR FACET JOINT Bilateral 1/17/2019    Procedure: Block-nerve-medial branch-lumbar L2-L5;  Surgeon: Anthony Moffett MD;  Location: [facility] OR;  Service: Pain Management;  Laterality: Bilateral;    JOINT REPLACEMENT Bilateral     shoulders    OCCLUSION OF LEFT ATRIAL APPENDAGE N/A 5/5/2023    Procedure: Left atrial appendage occlusion;  Surgeon: Hema Del Valle MD;  Location: [facility] EP LAB;  Service: Cardiology;  Laterality: N/A;  afib, watchman, BSCI, ben, anes, MB, 3prep    RADIOFREQUENCY ABLATION OF LUMBAR MEDIAL BRANCH NERVE AT SINGLE LEVEL Bilateral 1/23/2019    Procedure: Radiofrequency Ablation, Nerve, Spinal, Lumbar, Medial Branch, L2-L5;  Surgeon: Anthony Moffett MD;  Location:  [facility] OR;  Service: Pain Management;  Laterality: Bilateral;    TRANSESOPHAGEAL ECHOCARDIOGRAPHY N/A 5/5/2023    Procedure: ECHOCARDIOGRAM, TRANSESOPHAGEAL;  Surgeon: Dylan Layne MD;  Location: [facility] EP LAB;  Service: Cardiology;  Laterality: N/A;    TREATMENT OF CARDIAC ARRHYTHMIA N/A 7/11/2022    Procedure: Cardioversion or Defibrillation;  Surgeon: MIREILLE Morel MD;  Location: [facility] EP LAB;  Service: Cardiology;  Laterality: N/A;  AF, GALILEO, DCCV, MAC, EH (to cover for SK), 3 Prep *SJM CRT-P in situ*     Family History   Problem Relation Name Age of Onset    Heart disease Mother Odalis     Heart failure Mother Odalis     Dementia Mother Odalis     Cancer Father Darío         colon    Blindness Father Darío         accident    Cataracts Father Darío     Hypertension Father Darío     Tremor Father Darío     Tremor Brother      Cancer Daughter Shlomo         cancer as a baby, unsure of what kind    Obesity Daughter Judy     No Known Problems Son Giovanny     Tremor Paternal Grandfather      Melanoma Neg Hx      Amblyopia Neg Hx      Diabetes Neg Hx      Glaucoma Neg Hx      Macular degeneration Neg Hx      Retinal detachment Neg Hx      Strabismus Neg Hx      Stroke Neg Hx      Thyroid disease Neg Hx       Current Outpatient Medications   Medication Sig Dispense Refill    aspirin (ECOTRIN) 81 MG EC tablet Take 1 tablet (81 mg total) by mouth once daily. 90 tablet 1    atorvastatin (LIPITOR) 80 MG tablet TAKE 1 TABLET ONE TIME DAILY 90 tablet 3    cetirizine (ZYRTEC) 5 MG tablet Take 5 mg by mouth once daily. Unknown dosage      citalopram (CELEXA) 20 MG tablet Take 1 tablet (20 mg total) by mouth once daily. 90 tablet 0    lamoTRIgine (LAMICTAL) 100 MG tablet Take 1 tablet (100 mg total) by mouth 2 (two) times daily. 180 tablet 3    magnesium oxide (MAG-OX) 400 mg (241.3 mg magnesium) tablet Take 1 tablet (400 mg total) by mouth once daily. 90 tablet 3    memantine (NAMENDA) 10 MG Tab Take 1 tablet (10 mg total) by mouth 2  (two) times daily. 180 tablet 3    metoprolol succinate (TOPROL-XL) 50 MG 24 hr tablet TAKE 1 TABLET EVERY DAY 90 tablet 3    primidone (MYSOLINE) 250 MG Tab TAKE 2 TABLETS TWICE DAILY (NEED MD APPOINTMENT FOR REFILLS) 360 tablet 3    riboflavin, vitamin B2, (VITAMIN B-2) 100 mg Tab tablet Take 2 tablets (200 mg total) by mouth once daily. (Patient not taking: Reported on 2025) 180 tablet 3    torsemide (DEMADEX) 10 MG Tab TAKE 1 TABLET EVERY MORNING 90 tablet 1    TRELEGY ELLIPTA 100-62.5-25 mcg DsDv INHALE 1 PUFF ONE TIME DAILY 180 each 3     No current facility-administered medications for this visit.     Facility-Administered Medications Ordered in Other Visits   Medication Dose Route Frequency Provider Last Rate Last Admin    0.9%  NaCl infusion (for blood administration)   Intravenous Q24H Batool Pitts NP         Review of patient's allergies indicates:   Allergen Reactions    Bactroban [mupirocin calcium] Rash     Other reaction(s): Rash     Social History     Socioeconomic History    Marital status:    Occupational History    Occupation: retired   Tobacco Use    Smoking status: Former     Current packs/day: 0.00     Average packs/day: 1 pack/day for 5.0 years (5.0 ttl pk-yrs)     Types: Cigarettes     Start date:      Quit date:      Years since quittin.0    Smokeless tobacco: Never    Tobacco comments:     - I   Substance and Sexual Activity    Alcohol use: No    Drug use: No    Sexual activity: Not Currently     Partners: Female   Social History Narrative    . 4 adult children.      Social Drivers of Health     Financial Resource Strain: High Risk (2024)    Overall Financial Resource Strain (CARDIA)     Difficulty of Paying Living Expenses: Very hard   Food Insecurity: No Food Insecurity (2024)    Hunger Vital Sign     Worried About Running Out of Food in the Last Year: Never true     Ran Out of Food in the Last Year: Never true   Transportation Needs:  No Transportation Needs (8/26/2024)    TRANSPORTATION NEEDS     Transportation : No   Physical Activity: Inactive (8/26/2024)    Exercise Vital Sign     Days of Exercise per Week: 0 days     Minutes of Exercise per Session: 0 min   Stress: No Stress Concern Present (8/26/2024)    [employer] - Occupational Stress Questionnaire     Feeling of Stress : Not at all   Housing Stability: Low Risk  (8/26/2024)    Housing Stability Vital Sign     Unable to Pay for Housing in the Last Year: No     Homeless in the Last Year: No       ROS    REVIEW OF SYSTEMS: Negative as documented below as well as positive findings in bold.       Constitutional  Respiratory  Gastrointestinal  Skin   - Fever - Cough - Heartburn - Rash   - Chills - Spit blood - Nausea - Itching   - Weight Loss - Shortness of breath - Vomiting - Nail pain   - Malaise/Fatigue - Wheezing - Abdominal Pain  Wound/Ulcer   - Weight Gain   - Blood in Stool  Poor wound healing       - Diarrhea          Cardiovascular  Genitourinary  Neurological  HEENT   - Chest Pain - Dysuria - Burning Sensation of feet - Headache   - Palpitations - Hematuria - Tingling / Paresthesia - Congestion   - Pain at night in legs - Flank Pain - Dizziness - Sore Throat   - Cramping   - Tremor - Blurred Vision   - Leg Swelling   - Sensory Change - Double Vision   - Dizzy when standing   - Speech Change - Eye Redness       - Focal Weakness - Dry Eyes       - Loss of Consciousness          Endocrine  Musculoskeletal  Psychiatric   - Cold intolerance - Muscle Pain - Depression   - Heat intolerance - Neck Pain - Insomnia   - Anemia - Joint Pain - Memory Loss   -  Easy bruising, bleeding - Heel pain - Anxiety      Toe Pain        Leg/Ankle/Foot Pain         Objective:     Ht 6' (1.829 m)   Wt 98.5 kg (217 lb 2.5 oz)   BMI 29.45 kg/m²   Vitals:    01/09/25 1411   Weight: 98.5 kg (217 lb 2.5 oz)   Height: 6' (1.829 m)   PainSc:   7       Physical Exam    General  Appearance:   Patient appears well developed, well nourished  Patient appears stated age    Psychiatric:   Patient is oriented to time, place, and person.  Patient has appropriate mood and affect    Neck:  Trachea Midline  No visible masses    Respiratory/Ears:  No distress or labored breathing.  Able to differentiate between normal talking voice and whisper.  Able to follow commands    Eyes:  Visual Acuity intact  Lids and conjunctivae normal. No discoloration noted.    Foot Exam  Physical Exam  Ortho Exam  Ortho/SPM Exam  Physical Exam  Foot/Ankle Musculoskeletal Exam    B/l LE exam con't:  V:  DP 1/4, PT 1/4   CRT< 3s to all digits tested   Tibial and popliteal lymph nodes are w/o abnormality   Edema: absent, varicosities: present    N:  Patient displays normal ankle reflexes   SILT in SP/DP/T/Ebonie/Saph distributions    Ortho: +Motor EHL/FHL/TA/GA   Observed enlarged bony prominence at the pipjs of lesser toes   hammertoe deformity present 2-5 b/l; 2-3 rigid and 4-5 semi-rigid  There is moderate pain with palpation of pipjs  Compartments soft/compressible. No pain on passive stretch of big toe. No calf  Pain.    Derm:  skin intact, skin warm and dry, skin without ulcers or lesions, skin without induration, nails normal, no erythema and no ecchymosis    Imaging / Labs:      No results found.      Note: This was dictated using a computer transcription program. Although proofread, it may contain computer transcription errors and phonetic errors. Other human proofreading errors may also exist. Corrections may be performed at a later time. Please contact us for any clarification if needed.    Geronimo Lehman DPM  [facility]

## 2025-01-09 NOTE — PATIENT INSTRUCTIONS
Hammertoe    What Is Hammertoe?    Hammertoe is a contracture (bending) deformity of one or both joints of the second, third, fourth or fifth (little) toes. This abnormal bending can put pressure on the toe when wearing shoes, causing problems to develop.        Hammertoes usually start out as mild deformities and get progressively worse over time. In the earlier stages, hammertoes are flexible and the symptoms can often be managed with noninvasive measures. But if left untreated, hammertoes can become more rigid and will not respond to nonsurgical treatment.        Because of the progressive nature of hammertoes, they should receive early attention. Hammertoes never get better without some kind of intervention.    Causes  The most common cause of hammertoe is a muscle/tendon imbalance. This imbalance, which leads to a bending of the toe, results from mechanical (structural) or neurological changes in the foot that occur over time in some people.  Hammertoes may be aggravated by shoes that do not fit properly. A hammertoe may result if a toe is too long and is forced into a cramped position when a tight shoe is worn. Occasionally, hammertoe is the result of an earlier trauma to the toe. In some people, hammertoes are inherited.    Symptoms  Common symptoms of hammertoes include:    -Pain or irritation of the affected toe when wearing shoes.    -Corns and calluses (a buildup of skin) on the toe, between two toes or on the ball of the foot. Corns are caused by constant friction against the shoe. They may be soft or hard, depending on their location.    -Inflammation, redness or a burning sensation    -Contracture of the toe    -In more severe cases of hammertoe, open sores may form.     Diagnosis  Although hammertoes are readily apparent, to arrive at a diagnosis, the foot and ankle surgeon will obtain a thorough history of your symptoms and examine your foot. During the physical examination, the doctor may attempt to  reproduce your symptoms by manipulating your foot and will study the contractures of the toes. In addition, the foot and ankle surgeon may take x-rays to determine the degree of the deformities and assess any changes that may have occurred.    Hammertoes are progressive--they do not go away by themselves and usually they will get worse over time. However, not all cases are alike--some hammertoes progress more rapidly than others. Once your foot and ankle surgeon has evaluated your hammertoes, a treatment plan can be developed that is suited to your needs.    Nonsurgical Treatment  There is a variety of treatment options for hammertoe. The treatment your foot and ankle surgeon selects will depend on the severity of your hammertoe and other factors.    A number of nonsurgical measures can be undertaken:    -Padding corns and calluses. Your foot and ankle surgeon can provide or prescribe pads designed to shield corns from irritation. If you want to try over-the-counter pads, avoid the medicated types. Medicated pads are generally not recommended because they may contain a small amount of acid that can be harmful. Consult your surgeon about this option.    -Changes in shoewear. Avoid shoes with pointed toes, shoes that are too short, or shoes with high heels--conditions that can force your toe against the front of the shoe. Instead, choose comfortable shoes with a deep, roomy toebox and heels no higher than two inches.    -Orthotic devices. A custom orthotic device placed in your shoe may help control the muscle/tendon imbalance. Injection therapy. Corticosteroid injections are sometimes used to ease pain and inflammation caused by hammertoe.     -Medications. Oral nonsteroidal anti-inflammatory drugs (NSAIDs), such as ibuprofen, may be recommended to reduce pain and inflammation. Splinting/strapping. Splints or small straps may be applied by the surgeon to realign the bent toe.     When Is Surgery Needed?  In some  cases, usually when the hammertoe has become more rigid and painful or when an open sore has developed, surgery is needed.    Often, patients with hammertoe have bunions or other foot deformities corrected at the same time. In selecting the procedure or combination of procedures for your particular case, the foot and ankle surgeon will take into consideration the extent of your deformity, the number of toes involved, your age, your activity level and other factors. The length of the recovery period will vary, depending on the procedure or procedures performed.          What Are Mallet, Hammer, and Claw Toes?  Mallet, hammer, and claw toes are most often caused by wearing shoes that are too short or heels that are too high. This jams the toes against the front of the shoe and causes one or more joints to bend. Rarely, disease can cause the joints in the toes to bend. Mallet, hammer, and claw toes are among the most common toe problems. They occur most often in the longest of the four smaller toes.    Inside your toes  There are 3 bones in each of your 4 smaller toes. Where 2 bones connect is called a joint. Normally the toes lie flat. But pressure on the toes or the front of the foot can cause one or more joints to bend. This curls the toe. Toes that stay curled are called mallet toes, hammer toes, or claw toes, depending on which joints are bent.    Symptoms  You may feel pain in the toe or in the ball of your foot. A corn (a hard growth of skin on the top of the toe) may form where the toe rubs against the top of the shoe. Or a callus (a hard growth of skin on the bottom of the foot) may form under the tip of the toe or on the ball of the foot. Corns and calluses can also be painful.   Date Last Reviewed: 10/18/2015  © 1396-0481 [employer] [address] [place] [address], [place] [address]. All rights reserved. This information is not intended as a substitute for professional medical care. Always follow your  healthcare professional's instructions.        Treating Mallet, Hammer, and Claw Toes  Definitions  A hammer toe has an abnormal bend in the middle joint of your toe (toe is bent upward at joint).  Mallet toe affects the joint nearest your toenail (toe is bent downward at joint).  Claw toe affects the joint at the ball of your foot (toe is bent upward at joint), as well as both toe joints (toe is bent downward at both joints).  Hammer toe and mallet toe are most likely to occur in the toe next to your big toe.  Causes  The most common cause for all 3 deformities is poorly fitting shoes and tight shoes, especially high heels for women. Trauma and nerve damage from various diseases like diabetes may also cause these deformities.  Treatment  Buying shoes with more room in the toes, filing down corns and calluses, and padding, taping, or strapping the toe most often relieve the pain. Toe stretching and exercises may also be helpful. If these steps dont work, you may need surgery to straighten your toes.  Shoes  Buy low-heeled shoes with plenty of room in the front. This keeps your toes from being jammed against the end of your shoe. It also keeps your shoe from rubbing the tops of your toes.  Corns and calluses    To file down a corn or callus, soak your foot in warm water. This softens the hard skin. Dry your foot. Then gently rub the corn or callus with a pumice stone or nail file.  Pads and splints  If you still have pain, you may need to put a pad or splint on your toe. This helps take pressure off the painful corn or callus.  For a mallet toe, you can put a gel pad on the toe. This keeps the tip of the toe from rubbing against the bottom of the shoe.  For a hammer or claw toe, you can put a felt or foam pad over the bent joint. This keeps the toe from rubbing on the top of the shoe.  For a hammer or claw toe that is still flexible, you can put a splint on the toe. This keeps it straight so it doesn't rub on the  top of the shoe.    Date Last Reviewed: 9/29/2015  © 9601-7842 [employer] [address] [place] [address], [place] [address]. All rights reserved. This information is not intended as a substitute for professional medical care. Always follow your healthcare professional's instructions.          Foot Surgery: Flexible and Rigid Hammertoes  With hammertoes, one or more toes curl or bend abnormally. This can be caused by an inherited muscle problem, an abnormal bone length, or poor foot mechanics. The affected joints can rub inside shoes, causing corns (buildups of dead skin).  There are many nonsurgical treatments for hammertoes, but if these are not effective, you may want to consider surgery.    Flexible hammertoes  When hammertoes are flexible, you can straighten the buckled joints. Flexible hammertoes may become rigid over time.    Tendon release  This treatment helps release the buckled joint. The bottom (flexor) tendon may be repositioned to the top of the affected toe (flexor tendon transfer). Sometimes, the top or bottom tendon is released but not repositioned (tenotomy).    Rigid hammertoes  Rigid hammertoes are fixed (not flexible). You cannot straighten the buckled joints. Corns, pain, and loss of function may be more severe with rigid hammertoes than with flexible ones.    Arthroplasty  A part of the joint is removed, and the toe is straightened. In some cases, the entire joint may be replaced with an implant. When healed, the bones become connected with scar tissue, making your toe flexible.    Fusion  First, the cartilage and some bone on both sides of the joint are removed. Then, the toe is straightened, and the two bones are held together, often with a pin. The pin is removed after several weeks. Once your foot heals, the toe will be less flexible, but more stable.  Healing after surgery  The severity of your condition, number of toes involved, and type of surgery done will affect your recovery  time. Many people are able to walk right after surgery with a special surgical shoe. Full healing can take several weeks. Your healthcare provider can advise you on what to expect after surgery.     Date Last Reviewed: 10/15/2015  © 8742-9879 [employer]. [address] [place] [address]. All rights reserved. This information is not intended as a substitute for professional medical care. Always follow your healthcare professional's instructions.        Foot Surgery: Curled Fifth Toe  Hammertoes can make walking painful. With hammertoes, one or more toes curl or bend abnormally. This can be caused by an inherited muscle problem, an abnormal bone length, or poor foot mechanics. The affected joints can rub inside shoes, causing corns (buildups of dead skin).    Curled fifth toe  A curled fifth toe is most often inherited. When the fifth toe curls inward, it moves under the next toe. Then the nail of the curled toe starts to face outward. As a result, you may bear weight on the side of your toe instead of the bottom. This can cause corns and painful nails.  There are many nonsurgical treatments available. But if these are not effective, surgery is a choice.    Derotation arthroplasty  A wedge of skin and a section of bone are removed to help straighten (derotate) the toe. You can bear weight on your foot right after surgery. In some cases, you may need to wear a bandage, splint, and surgical shoe for a few weeks. When healed, the bones become connected with scar tissue.  Date Last Reviewed: 10/15/2015  © 8288-7216 [employer]. [address] [place] [address]. All rights reserved. This information is not intended as a substitute for professional medical care. Always follow your healthcare professional's instructions.

## 2025-01-09 NOTE — TELEPHONE ENCOUNTER
Called patient, no answer. Left voicemail stating patient needs to receive xrays before today's appt. Provided callback number.

## 2025-01-09 NOTE — Clinical Note
This patient is requesting surgical vs in office procedure (local only) to address his hammertoes.  I ordered an art US. Would you mind assessing him for the above?  Thanks  RM

## 2025-01-13 ENCOUNTER — TELEPHONE (OUTPATIENT)
Dept: ADMINISTRATIVE | Facility: CLINIC | Age: 85
End: 2025-01-13
Payer: MEDICARE

## 2025-01-13 NOTE — TELEPHONE ENCOUNTER
Called pt; no answer; left message informing patient I was calling to confirm his virtual EAWV on 1/14/25 at 2:30pm and to see if he needed any help with setting up for virtual appt or e-pre check and to login 10 minutes prior to scheduled appt; left my name & number for pt to return my call if he has any questions or concerns; sent message through portal

## 2025-01-20 ENCOUNTER — CLINICAL SUPPORT (OUTPATIENT)
Dept: CARDIOLOGY | Facility: HOSPITAL | Age: 85
End: 2025-01-20
Payer: MEDICARE

## 2025-01-20 DIAGNOSIS — Z95.0 PRESENCE OF CARDIAC PACEMAKER: ICD-10-CM

## 2025-01-20 DIAGNOSIS — I48.91 UNSPECIFIED ATRIAL FIBRILLATION: ICD-10-CM

## 2025-01-20 PROCEDURE — 93294 REM INTERROG EVL PM/LDLS PM: CPT | Mod: HCNC,,, | Performed by: INTERNAL MEDICINE

## 2025-01-21 ENCOUNTER — CLINICAL SUPPORT (OUTPATIENT)
Dept: CARDIOLOGY | Facility: HOSPITAL | Age: 85
End: 2025-01-21
Attending: INTERNAL MEDICINE
Payer: MEDICARE

## 2025-01-21 DIAGNOSIS — Z95.0 PRESENCE OF CARDIAC PACEMAKER: ICD-10-CM

## 2025-01-21 DIAGNOSIS — I48.91 UNSPECIFIED ATRIAL FIBRILLATION: ICD-10-CM

## 2025-01-21 PROCEDURE — 93296 REM INTERROG EVL PM/IDS: CPT | Mod: HCNC | Performed by: INTERNAL MEDICINE

## 2025-01-25 DIAGNOSIS — F32.A DEPRESSION, UNSPECIFIED DEPRESSION TYPE: ICD-10-CM

## 2025-01-27 DIAGNOSIS — F32.A DEPRESSION, UNSPECIFIED DEPRESSION TYPE: ICD-10-CM

## 2025-01-27 RX ORDER — CITALOPRAM 20 MG/1
20 TABLET, FILM COATED ORAL DAILY
Qty: 90 TABLET | Refills: 0 | Status: SHIPPED | OUTPATIENT
Start: 2025-01-27

## 2025-01-27 RX ORDER — CITALOPRAM 20 MG/1
20 TABLET, FILM COATED ORAL
Qty: 90 TABLET | Refills: 3 | OUTPATIENT
Start: 2025-01-27

## 2025-02-03 ENCOUNTER — TELEPHONE (OUTPATIENT)
Dept: ELECTROPHYSIOLOGY | Facility: CLINIC | Age: 85
End: 2025-02-03
Payer: MEDICARE

## 2025-02-03 NOTE — TELEPHONE ENCOUNTER
The patients' CIED has reached ELECTIVE REPLACEMENT per battery voltage.     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 N/a     Any history of device treated atrial arrhythmias (atrial ATP)?  N/a       *RN coordinator notified for scheduling; device coordinator notified to contact patient

## 2025-02-04 ENCOUNTER — OFFICE VISIT (OUTPATIENT)
Dept: CARDIOLOGY | Facility: CLINIC | Age: 85
End: 2025-02-04
Payer: MEDICARE

## 2025-02-04 VITALS
OXYGEN SATURATION: 97 % | HEART RATE: 79 BPM | WEIGHT: 220 LBS | DIASTOLIC BLOOD PRESSURE: 65 MMHG | HEIGHT: 72 IN | SYSTOLIC BLOOD PRESSURE: 109 MMHG | BODY MASS INDEX: 29.8 KG/M2

## 2025-02-04 DIAGNOSIS — Z01.818 PREOPERATIVE CLEARANCE: Primary | ICD-10-CM

## 2025-02-04 DIAGNOSIS — I50.32 CHRONIC DIASTOLIC HEART FAILURE: ICD-10-CM

## 2025-02-04 DIAGNOSIS — M20.42 HAMMERTOES OF BOTH FEET: ICD-10-CM

## 2025-02-04 DIAGNOSIS — M20.41 HAMMERTOES OF BOTH FEET: ICD-10-CM

## 2025-02-04 DIAGNOSIS — I70.0 AORTIC ATHEROSCLEROSIS: ICD-10-CM

## 2025-02-04 DIAGNOSIS — I48.21 PERMANENT ATRIAL FIBRILLATION: ICD-10-CM

## 2025-02-04 DIAGNOSIS — I10 ESSENTIAL HYPERTENSION: ICD-10-CM

## 2025-02-04 DIAGNOSIS — I25.10 CORONARY ARTERY DISEASE INVOLVING NATIVE CORONARY ARTERY OF NATIVE HEART WITHOUT ANGINA PECTORIS: Chronic | ICD-10-CM

## 2025-02-04 DIAGNOSIS — I27.20 PULMONARY HYPERTENSION: ICD-10-CM

## 2025-02-04 DIAGNOSIS — I73.9 PVD (PERIPHERAL VASCULAR DISEASE): ICD-10-CM

## 2025-02-04 DIAGNOSIS — Z95.0 BIVENTRICULAR CARDIAC PACEMAKER IN SITU: ICD-10-CM

## 2025-02-04 DIAGNOSIS — E78.2 MIXED HYPERLIPIDEMIA: ICD-10-CM

## 2025-02-04 PROCEDURE — 3288F FALL RISK ASSESSMENT DOCD: CPT | Mod: HCNC,CPTII,S$GLB, | Performed by: INTERNAL MEDICINE

## 2025-02-04 PROCEDURE — 99999 PR PBB SHADOW E&M-EST. PATIENT-LVL III: CPT | Mod: PBBFAC,HCNC,, | Performed by: INTERNAL MEDICINE

## 2025-02-04 PROCEDURE — 99214 OFFICE O/P EST MOD 30 MIN: CPT | Mod: HCNC,S$GLB,, | Performed by: INTERNAL MEDICINE

## 2025-02-04 PROCEDURE — 1101F PT FALLS ASSESS-DOCD LE1/YR: CPT | Mod: HCNC,CPTII,S$GLB, | Performed by: INTERNAL MEDICINE

## 2025-02-04 PROCEDURE — 1160F RVW MEDS BY RX/DR IN RCRD: CPT | Mod: HCNC,CPTII,S$GLB, | Performed by: INTERNAL MEDICINE

## 2025-02-04 PROCEDURE — 1159F MED LIST DOCD IN RCRD: CPT | Mod: HCNC,CPTII,S$GLB, | Performed by: INTERNAL MEDICINE

## 2025-02-04 NOTE — TELEPHONE ENCOUNTER
Called pt and spoke to his wife.  Advised of PPM battery now at [facility], that a message has been sent to Dr. Esparza and his nurse and that they should receive a call from his nurse some time later this week or next week to schedule the Gen change.  Understanding was verbalized.  The wife appreciated the call.

## 2025-02-04 NOTE — ASSESSMENT & PLAN NOTE
He is in need of podiatry surgery.  Recent arterial Doppler study confirming no significant disease.    He only takes aspirin.    He is cleared from a cardiac standpoint without further testing for podiatry surgery.

## 2025-02-04 NOTE — PROGRESS NOTES
[facility] Cardiology     Subjective:    Patient ID:  Heri Muhammad is a 84 y.o. male who presents for follow-up of Atrial Fibrillation, Bi V pacemaker nearing DANNI, Hyperlipidemia, Coronary Artery Disease, and Hypertension    Review of patient's allergies indicates:   Allergen Reactions    Bactroban [mupirocin calcium] Rash     Other reaction(s): Rash     He continues to report chest pain which sounds fairly constant.  It is not with worsening with activity.  I ordered a stress test 2024 November which came back normal perfusion normal ejection fraction.  He states that his symptoms have not changed.  He has had them for years.    He has chronic AFib.  He has a Watchman device, he takes aspirin.  He has a Bi V pacemaker.  There will be switch out for DANNI, four-month left on his last interrogation.  He is working with electrophysiology.    His exercise capacity has not changed.  He reports some shortness of breath with activity.  He does not feel palpitations.         Review of Systems   Constitutional: Negative for chills, decreased appetite, diaphoresis, fever, malaise/fatigue, night sweats, weight gain and weight loss.   HENT:  Negative for congestion, ear discharge, ear pain, hearing loss, hoarse voice, nosebleeds, odynophagia, sore throat, stridor and tinnitus.    Eyes:  Negative for blurred vision, discharge, double vision, pain, photophobia, redness, vision loss in left eye, vision loss in right eye, visual disturbance and visual halos.   Cardiovascular:  Negative for chest pain, claudication, cyanosis, dyspnea on exertion, irregular heartbeat, leg swelling, near-syncope, orthopnea, palpitations, paroxysmal nocturnal dyspnea and syncope.   Respiratory:  Negative for cough, hemoptysis, shortness of breath, sleep disturbances due to breathing, snoring, sputum production and wheezing.    Endocrine: Negative for cold intolerance, heat  intolerance, polydipsia, polyphagia and polyuria.   Hematologic/Lymphatic: Negative for adenopathy and bleeding problem. Does not bruise/bleed easily.   Skin:  Negative for color change, dry skin, flushing, itching, nail changes, poor wound healing, rash, skin cancer, suspicious lesions and unusual hair distribution.   Musculoskeletal:  Positive for joint pain. Negative for arthritis, back pain, falls, gout, joint swelling, muscle cramps, muscle weakness, myalgias, neck pain and stiffness.   Gastrointestinal:  Negative for bloating, abdominal pain, anorexia, change in bowel habit, bowel incontinence, constipation, diarrhea, dysphagia, excessive appetite, flatus, heartburn, hematemesis, hematochezia, hemorrhoids, jaundice, melena, nausea and vomiting.   Genitourinary:  Negative for bladder incontinence, decreased libido, dysuria, flank pain, frequency, genital sores, hematuria, hesitancy, incomplete emptying, nocturia and urgency.   Neurological:  Positive for loss of balance and tremors. Negative for aphonia, brief paralysis, difficulty with concentration, disturbances in coordination, excessive daytime sleepiness, dizziness, focal weakness, headaches, light-headedness, numbness, paresthesias, seizures, sensory change, vertigo and weakness.   Psychiatric/Behavioral:  Negative for altered mental status, depression, hallucinations, memory loss, substance abuse, suicidal ideas and thoughts of violence. The patient does not have insomnia and is not nervous/anxious.    Allergic/Immunologic: Negative for hives and persistent infections.        Objective:       Vitals:    02/04/25 0959   BP: 109/65   Pulse: 79   SpO2: 97%   Weight: 99.8 kg (220 lb)   Height: 6' (1.829 m)    Physical Exam  Constitutional:       General: He is not in acute distress.     Appearance: He is well-developed. He is not diaphoretic.   HENT:      Head: Normocephalic and atraumatic.      Nose: Nose normal.   Eyes:      General: No scleral icterus.         Right eye: No discharge.      Conjunctiva/sclera: Conjunctivae normal.      Pupils: Pupils are equal, round, and reactive to light.   Neck:      Thyroid: No thyromegaly.      Vascular: No JVD.      Trachea: No tracheal deviation.   Cardiovascular:      Rate and Rhythm: Normal rate and regular rhythm.      Pulses:           Carotid pulses are 2+ on the right side and 2+ on the left side.       Radial pulses are 2+ on the right side and 2+ on the left side.        Dorsalis pedis pulses are 1+ on the right side and 1+ on the left side.        Posterior tibial pulses are 1+ on the right side and 1+ on the left side.      Heart sounds: Normal heart sounds. No murmur heard.     No friction rub. No gallop.   Pulmonary:      Effort: Pulmonary effort is normal. No respiratory distress.      Breath sounds: Normal breath sounds. No stridor. No wheezing or rales.   Chest:      Chest wall: No tenderness.   Abdominal:      General: Bowel sounds are normal. There is no distension.      Palpations: Abdomen is soft. There is no mass.      Tenderness: There is no abdominal tenderness. There is no guarding or rebound.   Musculoskeletal:         General: No tenderness. Normal range of motion.      Cervical back: Normal range of motion and neck supple.   Lymphadenopathy:      Cervical: No cervical adenopathy.   Skin:     General: Skin is warm and dry.      Coloration: Skin is not pale.      Findings: No erythema or rash.   Neurological:      Mental Status: He is alert and oriented to person, place, and time.      Cranial Nerves: No cranial nerve deficit.      Coordination: Coordination normal.   Psychiatric:         Behavior: Behavior normal.         Thought Content: Thought content normal.         Judgment: Judgment normal.           Assessment:       1. Preoperative clearance    2. Hammertoes of both feet    3. PVD (peripheral vascular disease)    4. Aortic atherosclerosis    5. Biventricular cardiac pacemaker in situ    6.  Coronary artery disease involving native coronary artery of native heart without angina pectoris    7. Chronic diastolic heart failure    8. Essential hypertension    9. Mixed hyperlipidemia    10. Permanent atrial fibrillation    11. Pulmonary hypertension      Results for orders placed or performed during the hospital encounter of 11/04/24   Nuclear Stress Test    Collection Time: 11/04/24 12:31 PM   Result Value Ref Range    85% Max Predicted      Max Predicted      OHS CV CPX PATIENT IS MALE 1.0     [facility] CV CPX PATIENT IS FEMALE 0.0     HR at rest 70 bpm    Systolic blood pressure 121 mmHg    Diastolic blood pressure 64 mmHg    RPP 8,470     Peak HR 99 bpm    Peak Systolic  mmHg    Peak Diatolic BP 63 mmHg    Peak RPP 13,068     % Max HR Achieved 73     dose 0.0 mg     *Note: Due to a large number of results and/or encounters for the requested time period, some results have not been displayed. A complete set of results can be found in Results Review.         Current Outpatient Medications:     atorvastatin (LIPITOR) 80 MG tablet, TAKE 1 TABLET ONE TIME DAILY, Disp: 90 tablet, Rfl: 3    cetirizine (ZYRTEC) 5 MG tablet, Take 5 mg by mouth once daily. Unknown dosage, Disp: , Rfl:     citalopram (CELEXA) 20 MG tablet, Take 1 tablet (20 mg total) by mouth once daily., Disp: 90 tablet, Rfl: 0    lamoTRIgine (LAMICTAL) 100 MG tablet, Take 1 tablet (100 mg total) by mouth 2 (two) times daily., Disp: 180 tablet, Rfl: 3    magnesium oxide (MAG-OX) 400 mg (241.3 mg magnesium) tablet, Take 1 tablet (400 mg total) by mouth once daily., Disp: 90 tablet, Rfl: 3    memantine (NAMENDA) 10 MG Tab, Take 1 tablet (10 mg total) by mouth 2 (two) times daily., Disp: 180 tablet, Rfl: 3    metoprolol succinate (TOPROL-XL) 50 MG 24 hr tablet, TAKE 1 TABLET EVERY DAY, Disp: 90 tablet, Rfl: 3    primidone (MYSOLINE) 250 MG Tab, TAKE 2 TABLETS TWICE DAILY (NEED MD APPOINTMENT FOR REFILLS), Disp: 360 tablet, Rfl: 3     riboflavin, vitamin B2, (VITAMIN B-2) 100 mg Tab tablet, Take 2 tablets (200 mg total) by mouth once daily., Disp: 180 tablet, Rfl: 3    torsemide (DEMADEX) 10 MG Tab, TAKE 1 TABLET EVERY MORNING, Disp: 90 tablet, Rfl: 1    TRELEGY ELLIPTA 100-62.5-25 mcg DsDv, INHALE 1 PUFF ONE TIME DAILY, Disp: 180 each, Rfl: 3    aspirin (ECOTRIN) 81 MG EC tablet, Take 1 tablet (81 mg total) by mouth once daily., Disp: 90 tablet, Rfl: 1  No current facility-administered medications for this visit.    Facility-Administered Medications Ordered in Other Visits:     0.9%  NaCl infusion (for blood administration), , Intravenous, Q24H PRN, Batool Rodriguez NP     Lab Results   Component Value Date    WBC 7.51 08/26/2024    RBC 4.13 (L) 08/26/2024    HGB 13.0 (L) 08/26/2024    HCT 38.3 (L) 08/26/2024    MCV 93 08/26/2024    MCH 31.5 (H) 08/26/2024    MCHC 33.9 08/26/2024    RDW 14.7 (H) 08/26/2024     08/26/2024    MPV 10.0 08/26/2024    GRAN 4.2 08/26/2024    GRAN 56.1 08/26/2024    LYMPH 2.0 08/26/2024    LYMPH 27.0 08/26/2024    MONO 1.1 (H) 08/26/2024    MONO 14.0 08/26/2024    EOS 0.2 08/26/2024    BASO 0.07 08/26/2024    EOSINOPHIL 2.0 08/26/2024    BASOPHIL 0.9 08/26/2024    MG 1.9 08/26/2024        CMP  Lab Results   Component Value Date     08/26/2024    K 3.6 08/26/2024    CL 99 08/26/2024    CO2 29 08/26/2024     08/26/2024    BUN 12 08/26/2024    CREATININE 0.8 08/26/2024    CALCIUM 8.3 (L) 08/26/2024    PROT 6.1 08/26/2024    ALBUMIN 2.9 (L) 08/26/2024    BILITOT 0.7 08/26/2024    ALKPHOS 84 08/26/2024    AST 19 08/26/2024    ALT 18 08/26/2024    ANIONGAP 9 08/26/2024    ESTGFRAFRICA >60.0 07/07/2022    EGFRNONAA >60.0 07/07/2022        Lab Results   Component Value Date    LABBLOO No growth after 5 days. 12/05/2020    LABURIN No growth 01/23/2018            Results for orders placed or performed during the hospital encounter of 08/25/24   EKG 12-lead    Collection Time: 08/25/24  9:47 AM   Result  Value Ref Range    QRS Duration 154 ms    [facility] QTC Calculation 515 ms    Narrative    Test Reason : R07.9,    Vent. Rate : 096 BPM     Atrial Rate : 096 BPM     P-R Int : 162 ms          QRS Dur : 154 ms      QT Int : 408 ms       P-R-T Axes : 076 264 033 degrees     QTc Int : 515 ms    Atrial-sensed ventricular-paced rhythm  Confirmed by Austin Dean MD ([id number]) on 8/26/2024 12:58:56 PM    Referred By: BERNA BERMUDEZ           Confirmed By:Austin Dean MD         Lower Extremity Arteries Bilateral 01/17/2025 [id number] Final   X-Ray Foot Complete Bilateral 01/09/2025 [id number] Final   NM Myocardial Perfusion Spect Multi Pharmacologic 11/04/2024 [id number] Final             Plan:       Problem List Items Addressed This Visit          Cardiac/Vascular    CAD (coronary artery disease) (Chronic)     Negative Lexiscan 2024 November.  Atypical chest pain.  Ejection fraction normal.  He functions well for a man his age.         Mixed hyperlipidemia     Atorvastatin 80 mg will be continued.  Last LDL that I see 2024 April 75 mg%.         Permanent atrial fibrillation     He has a Watchman device.  He is on aspirin.  He is stable neurologically.         Essential hypertension     Toprol 50 mg to continue.  His blood pressure in my office is normal today.         Aortic atherosclerosis     Condition unchanged.         Chronic diastolic heart failure     He has COPD, AFib.  Condition unchanged.         Biventricular cardiac pacemaker in situ     He was contacted for near EOL.  This is his 1st switch out.         Pulmonary hypertension     Moderate severity.  Furosemide 10 mg utilize daily.            Orthopedic    Hammertoes of both feet       Other    Preoperative clearance - Primary     He is in need of podiatry surgery.  Recent arterial Doppler study confirming no significant disease.    He only takes aspirin.    He is cleared from a cardiac standpoint without further testing for podiatry surgery.          Other Visit  Diagnoses       PVD (peripheral vascular disease)                   No myocardial ischemia on last year stress test.  Continued current therapy advised.  He is on metoprolol.  Takes aspirin.    I made a six-month follow-up.             Santana Lynn MD  02/04/2025   10:44 AM

## 2025-02-04 NOTE — ASSESSMENT & PLAN NOTE
Negative Lexiscan 2024 November.  Atypical chest pain.  Ejection fraction normal.  He functions well for a man his age.

## 2025-02-10 LAB
OHS CV AF BURDEN PERCENT: 56
OHS CV BIV PACING PERCENT: 99 %
OHS CV DC REMOTE DEVICE TYPE: NORMAL
OHS CV ICD SHOCK: NO

## 2025-02-11 ENCOUNTER — TELEPHONE (OUTPATIENT)
Dept: ELECTROPHYSIOLOGY | Facility: CLINIC | Age: 85
End: 2025-02-11
Payer: MEDICARE

## 2025-02-11 NOTE — TELEPHONE ENCOUNTER
Spoke with patient's wife and scheduled procedure: Dual PPM gen change with Dr Esparza on 4/22/2025 with 12pm arrival time   Labs to be done at: [facility] on 4/15/25  Confirmed that patient is not on GLP-1 agonist  Advised to hold the following meds:No meds to hold  Confirmed that patient does not have any implanted device that has remote or monitor that could cause electrical interference  Instructions will be sent via patient portal, as requested

## 2025-02-16 DIAGNOSIS — F32.A DEPRESSION, UNSPECIFIED DEPRESSION TYPE: ICD-10-CM

## 2025-02-17 RX ORDER — CITALOPRAM 20 MG/1
20 TABLET, FILM COATED ORAL DAILY
Qty: 90 TABLET | Refills: 0 | Status: SHIPPED | OUTPATIENT
Start: 2025-02-17

## 2025-02-20 ENCOUNTER — OFFICE VISIT (OUTPATIENT)
Dept: PODIATRY | Facility: CLINIC | Age: 85
End: 2025-02-20
Payer: MEDICARE

## 2025-02-20 DIAGNOSIS — I73.9 PVD (PERIPHERAL VASCULAR DISEASE): ICD-10-CM

## 2025-02-20 DIAGNOSIS — I25.10 CORONARY ARTERY DISEASE INVOLVING NATIVE CORONARY ARTERY OF NATIVE HEART WITHOUT ANGINA PECTORIS: Chronic | ICD-10-CM

## 2025-02-20 DIAGNOSIS — M77.41 METATARSALGIA OF BOTH FEET: ICD-10-CM

## 2025-02-20 DIAGNOSIS — M20.42 HAMMERTOES OF BOTH FEET: Primary | ICD-10-CM

## 2025-02-20 DIAGNOSIS — M77.42 METATARSALGIA OF BOTH FEET: ICD-10-CM

## 2025-02-20 DIAGNOSIS — E55.9 VITAMIN D DEFICIENCY: ICD-10-CM

## 2025-02-20 DIAGNOSIS — I50.32 CHRONIC DIASTOLIC HEART FAILURE: ICD-10-CM

## 2025-02-20 DIAGNOSIS — M20.41 HAMMERTOES OF BOTH FEET: Primary | ICD-10-CM

## 2025-02-20 NOTE — PROGRESS NOTES
[facility]  [address]  [address]  [facility] [address]  Dept: [phone]  Dept Fax: [phone]    Geronimo Lehman Jr., DPM     Assessment:   MDM    Coding  1. Hammertoes of both feet  EKG 12-lead    X-Ray Chest PA And Lateral    Comprehensive Metabolic Panel    Prealbumin    CBC Auto Differential      2. PVD (peripheral vascular disease)  EKG 12-lead    X-Ray Chest PA And Lateral    Comprehensive Metabolic Panel    Prealbumin    CBC Auto Differential      3. Coronary artery disease involving native coronary artery of native heart without angina pectoris        4. Chronic diastolic heart failure        5. Vitamin D deficiency  EKG 12-lead    X-Ray Chest PA And Lateral    Comprehensive Metabolic Panel    Prealbumin    CBC Auto Differential    Calcium    Calcium, Ionized    Phosphorus    PTH, Intact    Vitamin D 25-Hydroxy      6. Metatarsalgia of both feet            Plan:     Procedures  1. Hammertoes of both feet  -     EKG 12-lead; Future  -     X-Ray Chest PA And Lateral; Future; Expected date: 02/20/2025  -     Comprehensive Metabolic Panel; Future; Expected date: 02/20/2025  -     Prealbumin; Future; Expected date: 02/20/2025  -     CBC Auto Differential; Future; Expected date: 02/20/2025    2. PVD (peripheral vascular disease)  -     EKG 12-lead; Future  -     X-Ray Chest PA And Lateral; Future; Expected date: 02/20/2025  -     Comprehensive Metabolic Panel; Future; Expected date: 02/20/2025  -     Prealbumin; Future; Expected date: 02/20/2025  -     CBC Auto Differential; Future; Expected date: 02/20/2025    3. Coronary artery disease involving native coronary artery of native heart without angina pectoris  Overview:  - 9/2013 Left heart Cath: LAD- 60% prox (neg FFR), nl, ramus and RCA 20%  - LDL goal <70  - BP goal <130/80  - ASA 81 mg daily      4. Chronic diastolic heart failure  Overview:  - no signs of acute decompensation  - no leg swelling  - medical  management      5. Vitamin D deficiency  -     EKG 12-lead; Future  -     X-Ray Chest PA And Lateral; Future; Expected date: 02/20/2025  -     Comprehensive Metabolic Panel; Future; Expected date: 02/20/2025  -     Prealbumin; Future; Expected date: 02/20/2025  -     CBC Auto Differential; Future; Expected date: 02/20/2025  -     Calcium; Future; Expected date: 02/20/2025  -     Calcium, Ionized; Future; Expected date: 02/20/2025  -     Phosphorus; Future; Expected date: 02/20/2025  -     PTH, Intact; Future; Expected date: 02/20/2025  -     Vitamin D 25-Hydroxy; Future; Expected date: 02/20/2025    6. Metatarsalgia of both feet      Heri was seen today for hammer toe.    Diagnoses and all orders for this visit:    Hammertoes of both feet  -     EKG 12-lead; Future  -     X-Ray Chest PA And Lateral; Future  -     Comprehensive Metabolic Panel; Future  -     Prealbumin; Future  -     CBC Auto Differential; Future    PVD (peripheral vascular disease)  -     EKG 12-lead; Future  -     X-Ray Chest PA And Lateral; Future  -     Comprehensive Metabolic Panel; Future  -     Prealbumin; Future  -     CBC Auto Differential; Future    Coronary artery disease involving native coronary artery of native heart without angina pectoris    Chronic diastolic heart failure    Vitamin D deficiency  -     EKG 12-lead; Future  -     X-Ray Chest PA And Lateral; Future  -     Comprehensive Metabolic Panel; Future  -     Prealbumin; Future  -     CBC Auto Differential; Future  -     Calcium; Future  -     Calcium, Ionized; Future  -     Phosphorus; Future  -     PTH, Intact; Future  -     Vitamin D 25-Hydroxy; Future    Metatarsalgia of both feet        -pt seen, evaluated, and managed  -dx discussed in detail. All questions/concerns addressed  -all tx options discussed. All alternatives, risks, benefits of all txs discussed  -pt has failed conservative care options possible including but not limited to shoe wear and/or padding,  bracing/strapping, at home ROM, formal PT, medical therapy, injection therapy  -XR reviewed  -labs reviewed by me: ok for vgel  -implemented icing/stretching regimen  -offloading pads dispensed  -we discussed his age and medical conditions are a concern but pt is still imploring to look into the idea  -he has obtained cardio clearance  -given failure of conservative measures, we discussed surgical intervention  -pt would benefit from operative intervention: we discussed the following procedures: R foot hammertoe repair 2-5, possible pan metatarsal head resection, harvest and application of bone marrow aspirate and indicated procedures  -we discussed approx postop course  -would be out pt, elective surgery  -would GA + block, supine position  -labs and xr on way out  -potential DOS: 3/21/25      -rxs dispensed: none  -referrals: none. PT crutch nxt visit  -WB: wbat      Follow up in about 20 days (around 3/12/2025).    Subjective:      Patient ID: Heri Muhammad is a 84 y.o. male.    Chief Complaint:   Chief Complaint   Patient presents with    Hammer Toe       CC - foot pain: patient presents to the podiatry clinic  with complaint of  bilateral foot pain. Onset of the symptoms was several years ago. Precipitating event: unk. Current symptoms include: ability to bear weight, but with some pain, stiffness, swelling and worsening symptoms after a period of activity. Aggravating factors: walking and certain shoegear. Symptoms have gradually worsened. Patient has had prior foot problems. Evaluation to date: none. Treatment to date: avoidance of offending activity. Patients rates pain 7/10 on pain scale.        2/20/25:  Hx as above. Still having hammertoe pain that affects daily routine. Has obtained clearance and requesting surgery.    Hammer Toe        Last Podiatry Enc: Visit date not found  Last Enc w/ Me: Visit date not found    Outside reports reviewed: historical medical records.  Family hx: as below  Past Medical  History:   Diagnosis Date    Anticoagulant long-term use     eliquis    Aortic atherosclerosis 09/18/2017    - LDL goal <70 - BP goal <130/80    Benign essential tremor     Biventricular cardiac pacemaker in situ 09/24/2018    CAD (coronary artery disease) 12/02/2013    - 9/2013 Left heart Cath: LAD- 60% prox (neg FFR), nl, ramus and RCA 20% - LDL goal <70 - BP goal <130/80    Cancer     skin cancer on back    Carpal tunnel syndrome of left wrist 08/08/2017    Chronic bronchitis     Chronic diastolic heart failure 01/19/2018    Chronic obstructive pulmonary disease 10/03/2018    Chronic right-sided thoracic back pain 04/27/2017    Cough     Current use of long term anticoagulation 06/02/2016    - Eliquis for Afib    Dizziness     intermittant    DJD (degenerative joint disease) of knee     ED (erectile dysfunction)     Hard of hearing 11/2016    History of cataract     Hyponatremia 06/11/2019    Memory loss     Mixed hyperlipidemia 07/28/2012    Persistent atrial fibrillation 07/01/2015    - followed by Dr. Esparza - history of cardioversion - on Eliquis OAC 5 mg BID    Seizure disorder     Seizures     last episode 1995    Subclinical hypothyroidism     Tension type headache 07/02/2019     Past Surgical History:   Procedure Laterality Date    ANKLE SURGERY Right     pins and screws    ATRIAL ABLATION SURGERY      CARDIAC PACEMAKER PLACEMENT  2014    CARDIAC PACEMAKER PLACEMENT      CARDIAC PACEMAKER PLACEMENT  2014    CARDIOVERSION      CATARACT EXTRACTION      OU    COLONOSCOPY      due in 2014    ECHOCARDIOGRAM,TRANSESOPHAGEAL N/A 6/16/2023    Procedure: Transesophageal echo (GALILEO) intra-procedure log documentation;  [facility];  Location: [facility] EP LAB;  Service: Cardiology;  Laterality: N/A;  s/p WM, GALILEO, anes, 3 Prep    EYE SURGERY Bilateral     cataracts extraction    FRACTURE SURGERY Right     ankle with hardware    HERNIA REPAIR      abdominal    INJECTION OF ANESTHETIC AGENT AROUND  MEDIAL BRANCH NERVES INNERVATING LUMBAR FACET JOINT Bilateral 1/17/2019    Procedure: Block-nerve-medial branch-lumbar L2-L5;  Surgeon: Anthony Moffett MD;  Location: [facility] OR;  Service: Pain Management;  Laterality: Bilateral;    JOINT REPLACEMENT Bilateral     shoulders    OCCLUSION OF LEFT ATRIAL APPENDAGE N/A 5/5/2023    Procedure: Left atrial appendage occlusion;  Surgeon: Hema Del Valle MD;  Location: [facility] EP LAB;  Service: Cardiology;  Laterality: N/A;  afib, watchman, BSCI, ben, anes, MB, 3prep    RADIOFREQUENCY ABLATION OF LUMBAR MEDIAL BRANCH NERVE AT SINGLE LEVEL Bilateral 1/23/2019    Procedure: Radiofrequency Ablation, Nerve, Spinal, Lumbar, Medial Branch, L2-L5;  Surgeon: Anthony Moffett MD;  Location: [facility] OR;  Service: Pain Management;  Laterality: Bilateral;    TRANSESOPHAGEAL ECHOCARDIOGRAPHY N/A 5/5/2023    Procedure: ECHOCARDIOGRAM, TRANSESOPHAGEAL;  Surgeon: Dylan Layne MD;  Location: [facility] EP LAB;  Service: Cardiology;  Laterality: N/A;    TREATMENT OF CARDIAC ARRHYTHMIA N/A 7/11/2022    Procedure: Cardioversion or Defibrillation;  Surgeon: MIREILLE Morel MD;  Location: [facility] EP LAB;  Service: Cardiology;  Laterality: N/A;  AF, BEN, DCCV, MAC, EH (to cover for SK), 3 Prep *SJM CRT-P in situ*     Family History   Problem Relation Name Age of Onset    Heart disease Mother Odalis     Heart failure Mother Odalis     Dementia Mother Odalis     Cancer Father Darío         colon    Blindness Father Darío         accident    Cataracts Father Darío     Hypertension Father Darío     Tremor Father Darío     Tremor Brother      Cancer Daughter Shlomo         cancer as a baby, unsure of what kind    Obesity Daughter Judy     No Known Problems Son Giovanny     Tremor Paternal Grandfather      Melanoma Neg Hx      Amblyopia Neg Hx      Diabetes Neg Hx      Glaucoma Neg Hx      Macular degeneration Neg Hx      Retinal detachment Neg Hx      Strabismus Neg Hx      Stroke Neg Hx      Thyroid disease Neg Hx        Current Outpatient Medications   Medication Sig Dispense Refill    atorvastatin (LIPITOR) 80 MG tablet TAKE 1 TABLET ONE TIME DAILY 90 tablet 3    cetirizine (ZYRTEC) 5 MG tablet Take 5 mg by mouth once daily. Unknown dosage      citalopram (CELEXA) 20 MG tablet Take 1 tablet (20 mg total) by mouth once daily. 90 tablet 0    lamoTRIgine (LAMICTAL) 100 MG tablet Take 1 tablet (100 mg total) by mouth 2 (two) times daily. 180 tablet 3    magnesium oxide (MAG-OX) 400 mg (241.3 mg magnesium) tablet Take 1 tablet (400 mg total) by mouth once daily. 90 tablet 3    memantine (NAMENDA) 10 MG Tab Take 1 tablet (10 mg total) by mouth 2 (two) times daily. 180 tablet 3    metoprolol succinate (TOPROL-XL) 50 MG 24 hr tablet TAKE 1 TABLET EVERY DAY 90 tablet 3    primidone (MYSOLINE) 250 MG Tab TAKE 2 TABLETS TWICE DAILY (NEED MD APPOINTMENT FOR REFILLS) 360 tablet 3    riboflavin, vitamin B2, (VITAMIN B-2) 100 mg Tab tablet Take 2 tablets (200 mg total) by mouth once daily. 180 tablet 3    torsemide (DEMADEX) 10 MG Tab TAKE 1 TABLET EVERY MORNING 90 tablet 1    TRELEGY ELLIPTA 100-62.5-25 mcg DsDv INHALE 1 PUFF ONE TIME DAILY 180 each 3    aspirin (ECOTRIN) 81 MG EC tablet Take 1 tablet (81 mg total) by mouth once daily. 90 tablet 1     No current facility-administered medications for this visit.     Facility-Administered Medications Ordered in Other Visits   Medication Dose Route Frequency Provider Last Rate Last Admin    0.9%  NaCl infusion (for blood administration)   Intravenous Q24H PRN Batool Rodriguez NP         Review of patient's allergies indicates:   Allergen Reactions    Bactroban [mupirocin calcium] Rash     Other reaction(s): Rash     Social History     Socioeconomic History    Marital status:    Occupational History    Occupation: retired   Tobacco Use    Smoking status: Former     Current packs/day: 0.00     Average packs/day: 1 pack/day for 5.0 years (5.0 ttl pk-yrs)     Types: Cigarettes      Start date:      Quit date:      Years since quittin.1    Smokeless tobacco: Never    Tobacco comments:     - I   Substance and Sexual Activity    Alcohol use: No    Drug use: No    Sexual activity: Not Currently     Partners: Female   Social History Narrative    . 4 adult children.      Social Drivers of Health     Financial Resource Strain: High Risk (2024)    Overall Financial Resource Strain (CARDIA)     Difficulty of Paying Living Expenses: Very hard   Food Insecurity: No Food Insecurity (2024)    Hunger Vital Sign     Worried About Running Out of Food in the Last Year: Never true     Ran Out of Food in the Last Year: Never true   Transportation Needs: No Transportation Needs (2024)    TRANSPORTATION NEEDS     Transportation : No   Physical Activity: Inactive (2024)    Exercise Vital Sign     Days of Exercise per Week: 0 days     Minutes of Exercise per Session: 0 min   Stress: No Stress Concern Present (2024)    [employer] - Occupational Stress Questionnaire     Feeling of Stress : Not at all   Housing Stability: Unknown (2024)    Housing Stability Vital Sign     Unable to Pay for Housing in the Last Year: No     Homeless in the Last Year: No       ROS    REVIEW OF SYSTEMS: Negative as documented below as well as positive findings in bold.       Constitutional  Respiratory  Gastrointestinal  Skin   - Fever - Cough - Heartburn - Rash   - Chills - Spit blood - Nausea - Itching   - Weight Loss - Shortness of breath - Vomiting - Nail pain   - Malaise/Fatigue - Wheezing - Abdominal Pain  Wound/Ulcer   - Weight Gain   - Blood in Stool  Poor wound healing       - Diarrhea          Cardiovascular  Genitourinary  Neurological  HEENT   - Chest Pain - Dysuria - Burning Sensation of feet - Headache   - Palpitations - Hematuria - Tingling / Paresthesia - Congestion   - Pain at night in legs - Flank Pain - Dizziness - Sore Throat   - Cramping    - Tremor - Blurred Vision   - Leg Swelling   - Sensory Change - Double Vision   - Dizzy when standing   - Speech Change - Eye Redness       - Focal Weakness - Dry Eyes       - Loss of Consciousness          Endocrine  Musculoskeletal  Psychiatric   - Cold intolerance - Muscle Pain - Depression   - Heat intolerance - Neck Pain - Insomnia   - Anemia - Joint Pain - Memory Loss   -  Easy bruising, bleeding - Heel pain - Anxiety      Toe Pain        Leg/Ankle/Foot Pain         Objective:     There were no vitals taken for this visit.  Vitals:    02/20/25 1257   PainSc:   5       Physical Exam    General Appearance:   Patient appears well developed, well nourished  Patient appears stated age    Psychiatric:   Patient is oriented to time, place, and person.  Patient has appropriate mood and affect    Neck:  Trachea Midline  No visible masses    Respiratory/Ears:  No distress or labored breathing.  Able to differentiate between normal talking voice and whisper.  Able to follow commands    Eyes:  Visual Acuity intact  Lids and conjunctivae normal. No discoloration noted.    Foot Exam  Physical Exam  Ortho Exam  Ortho/SPM Exam  Physical Exam  Foot/Ankle Musculoskeletal Exam    B/l LE exam con't:  V:  DP 1/4, PT 1/4   CRT< 3s to all digits tested   Tibial and popliteal lymph nodes are w/o abnormality   Edema: absent, varicosities: present    N:  Patient displays normal ankle reflexes   SILT in SP/DP/T/Ebonie/Saph distributions    Ortho: +Motor EHL/FHL/TA/GA   Observed enlarged bony prominence at the pipjs of lesser toes   hammertoe deformity present 2-5 b/l; 2-3 rigid and 4-5 semi-rigid  There is moderate pain with palpation of pipjs and plantar MTPJs 2-5  Compartments soft/compressible. No pain on passive stretch of big toe. No calf  Pain.    Derm:  skin intact, skin warm and dry, skin without ulcers or lesions, skin without induration, nails normal, no erythema and no ecchymosis    Imaging / Labs:      No results  found.      Note: This was dictated using a computer transcription program. Although proofread, it may contain computer transcription errors and phonetic errors. Other human proofreading errors may also exist. Corrections may be performed at a later time. Please contact us for any clarification if needed.    Geronimo Lehman DPM  [facility]

## 2025-02-20 NOTE — PATIENT INSTRUCTIONS
Hammertoe    What Is Hammertoe?    Hammertoe is a contracture (bending) deformity of one or both joints of the second, third, fourth or fifth (little) toes. This abnormal bending can put pressure on the toe when wearing shoes, causing problems to develop.        Hammertoes usually start out as mild deformities and get progressively worse over time. In the earlier stages, hammertoes are flexible and the symptoms can often be managed with noninvasive measures. But if left untreated, hammertoes can become more rigid and will not respond to nonsurgical treatment.        Because of the progressive nature of hammertoes, they should receive early attention. Hammertoes never get better without some kind of intervention.    Causes  The most common cause of hammertoe is a muscle/tendon imbalance. This imbalance, which leads to a bending of the toe, results from mechanical (structural) or neurological changes in the foot that occur over time in some people.  Hammertoes may be aggravated by shoes that do not fit properly. A hammertoe may result if a toe is too long and is forced into a cramped position when a tight shoe is worn. Occasionally, hammertoe is the result of an earlier trauma to the toe. In some people, hammertoes are inherited.    Symptoms  Common symptoms of hammertoes include:    -Pain or irritation of the affected toe when wearing shoes.    -Corns and calluses (a buildup of skin) on the toe, between two toes or on the ball of the foot. Corns are caused by constant friction against the shoe. They may be soft or hard, depending on their location.    -Inflammation, redness or a burning sensation    -Contracture of the toe    -In more severe cases of hammertoe, open sores may form.     Diagnosis  Although hammertoes are readily apparent, to arrive at a diagnosis, the foot and ankle surgeon will obtain a thorough history of your symptoms and examine your foot. During the physical examination, the doctor may attempt to  reproduce your symptoms by manipulating your foot and will study the contractures of the toes. In addition, the foot and ankle surgeon may take x-rays to determine the degree of the deformities and assess any changes that may have occurred.    Hammertoes are progressive--they do not go away by themselves and usually they will get worse over time. However, not all cases are alike--some hammertoes progress more rapidly than others. Once your foot and ankle surgeon has evaluated your hammertoes, a treatment plan can be developed that is suited to your needs.    Nonsurgical Treatment  There is a variety of treatment options for hammertoe. The treatment your foot and ankle surgeon selects will depend on the severity of your hammertoe and other factors.    A number of nonsurgical measures can be undertaken:    -Padding corns and calluses. Your foot and ankle surgeon can provide or prescribe pads designed to shield corns from irritation. If you want to try over-the-counter pads, avoid the medicated types. Medicated pads are generally not recommended because they may contain a small amount of acid that can be harmful. Consult your surgeon about this option.    -Changes in shoewear. Avoid shoes with pointed toes, shoes that are too short, or shoes with high heels--conditions that can force your toe against the front of the shoe. Instead, choose comfortable shoes with a deep, roomy toebox and heels no higher than two inches.    -Orthotic devices. A custom orthotic device placed in your shoe may help control the muscle/tendon imbalance. Injection therapy. Corticosteroid injections are sometimes used to ease pain and inflammation caused by hammertoe.     -Medications. Oral nonsteroidal anti-inflammatory drugs (NSAIDs), such as ibuprofen, may be recommended to reduce pain and inflammation. Splinting/strapping. Splints or small straps may be applied by the surgeon to realign the bent toe.     When Is Surgery Needed?  In some  cases, usually when the hammertoe has become more rigid and painful or when an open sore has developed, surgery is needed.    Often, patients with hammertoe have bunions or other foot deformities corrected at the same time. In selecting the procedure or combination of procedures for your particular case, the foot and ankle surgeon will take into consideration the extent of your deformity, the number of toes involved, your age, your activity level and other factors. The length of the recovery period will vary, depending on the procedure or procedures performed.          What Are Mallet, Hammer, and Claw Toes?  Mallet, hammer, and claw toes are most often caused by wearing shoes that are too short or heels that are too high. This jams the toes against the front of the shoe and causes one or more joints to bend. Rarely, disease can cause the joints in the toes to bend. Mallet, hammer, and claw toes are among the most common toe problems. They occur most often in the longest of the four smaller toes.    Inside your toes  There are 3 bones in each of your 4 smaller toes. Where 2 bones connect is called a joint. Normally the toes lie flat. But pressure on the toes or the front of the foot can cause one or more joints to bend. This curls the toe. Toes that stay curled are called mallet toes, hammer toes, or claw toes, depending on which joints are bent.    Symptoms  You may feel pain in the toe or in the ball of your foot. A corn (a hard growth of skin on the top of the toe) may form where the toe rubs against the top of the shoe. Or a callus (a hard growth of skin on the bottom of the foot) may form under the tip of the toe or on the ball of the foot. Corns and calluses can also be painful.   Date Last Reviewed: 10/18/2015  © 4932-8824 [employer]. [address] [place] [address], [place] [address]. All rights reserved. This information is not intended as a substitute for professional medical care. Always follow your  healthcare professional's instructions.        Treating Mallet, Hammer, and Claw Toes  Definitions  A hammer toe has an abnormal bend in the middle joint of your toe (toe is bent upward at joint).  Mallet toe affects the joint nearest your toenail (toe is bent downward at joint).  Claw toe affects the joint at the ball of your foot (toe is bent upward at joint), as well as both toe joints (toe is bent downward at both joints).  Hammer toe and mallet toe are most likely to occur in the toe next to your big toe.  Causes  The most common cause for all 3 deformities is poorly fitting shoes and tight shoes, especially high heels for women. Trauma and nerve damage from various diseases like diabetes may also cause these deformities.  Treatment  Buying shoes with more room in the toes, filing down corns and calluses, and padding, taping, or strapping the toe most often relieve the pain. Toe stretching and exercises may also be helpful. If these steps dont work, you may need surgery to straighten your toes.  Shoes  Buy low-heeled shoes with plenty of room in the front. This keeps your toes from being jammed against the end of your shoe. It also keeps your shoe from rubbing the tops of your toes.  Corns and calluses    To file down a corn or callus, soak your foot in warm water. This softens the hard skin. Dry your foot. Then gently rub the corn or callus with a pumice stone or nail file.  Pads and splints  If you still have pain, you may need to put a pad or splint on your toe. This helps take pressure off the painful corn or callus.  For a mallet toe, you can put a gel pad on the toe. This keeps the tip of the toe from rubbing against the bottom of the shoe.  For a hammer or claw toe, you can put a felt or foam pad over the bent joint. This keeps the toe from rubbing on the top of the shoe.  For a hammer or claw toe that is still flexible, you can put a splint on the toe. This keeps it straight so it doesn't rub on the  top of the shoe.    Date Last Reviewed: 9/29/2015  © 7675-4608 [employer]. [address] [place] [address], [place] [address]. All rights reserved. This information is not intended as a substitute for professional medical care. Always follow your healthcare professional's instructions.          Foot Surgery: Flexible and Rigid Hammertoes  With hammertoes, one or more toes curl or bend abnormally. This can be caused by an inherited muscle problem, an abnormal bone length, or poor foot mechanics. The affected joints can rub inside shoes, causing corns (buildups of dead skin).  There are many nonsurgical treatments for hammertoes, but if these are not effective, you may want to consider surgery.    Flexible hammertoes  When hammertoes are flexible, you can straighten the buckled joints. Flexible hammertoes may become rigid over time.    Tendon release  This treatment helps release the buckled joint. The bottom (flexor) tendon may be repositioned to the top of the affected toe (flexor tendon transfer). Sometimes, the top or bottom tendon is released but not repositioned (tenotomy).    Rigid hammertoes  Rigid hammertoes are fixed (not flexible). You cannot straighten the buckled joints. Corns, pain, and loss of function may be more severe with rigid hammertoes than with flexible ones.    Arthroplasty  A part of the joint is removed, and the toe is straightened. In some cases, the entire joint may be replaced with an implant. When healed, the bones become connected with scar tissue, making your toe flexible.    Fusion  First, the cartilage and some bone on both sides of the joint are removed. Then, the toe is straightened, and the two bones are held together, often with a pin. The pin is removed after several weeks. Once your foot heals, the toe will be less flexible, but more stable.  Healing after surgery  The severity of your condition, number of toes involved, and type of surgery done will affect your recovery  time. Many people are able to walk right after surgery with a special surgical shoe. Full healing can take several weeks. Your healthcare provider can advise you on what to expect after surgery.     Date Last Reviewed: 10/15/2015  © 6288-4786 [employer]. [address] [place] [address]. All rights reserved. This information is not intended as a substitute for professional medical care. Always follow your healthcare professional's instructions.        Foot Surgery: Curled Fifth Toe  Hammertoes can make walking painful. With hammertoes, one or more toes curl or bend abnormally. This can be caused by an inherited muscle problem, an abnormal bone length, or poor foot mechanics. The affected joints can rub inside shoes, causing corns (buildups of dead skin).    Curled fifth toe  A curled fifth toe is most often inherited. When the fifth toe curls inward, it moves under the next toe. Then the nail of the curled toe starts to face outward. As a result, you may bear weight on the side of your toe instead of the bottom. This can cause corns and painful nails.  There are many nonsurgical treatments available. But if these are not effective, surgery is a choice.    Derotation arthroplasty  A wedge of skin and a section of bone are removed to help straighten (derotate) the toe. You can bear weight on your foot right after surgery. In some cases, you may need to wear a bandage, splint, and surgical shoe for a few weeks. When healed, the bones become connected with scar tissue.  Date Last Reviewed: 10/15/2015  © 5092-8595 [employer]. [address] [place] [address]. All rights reserved. This information is not intended as a substitute for professional medical care. Always follow your healthcare professional's instructions.

## 2025-02-25 ENCOUNTER — OFFICE VISIT (OUTPATIENT)
Dept: FAMILY MEDICINE | Facility: CLINIC | Age: 85
End: 2025-02-25
Payer: MEDICARE

## 2025-02-25 VITALS
BODY MASS INDEX: 29.42 KG/M2 | OXYGEN SATURATION: 98 % | HEART RATE: 80 BPM | WEIGHT: 217.25 LBS | SYSTOLIC BLOOD PRESSURE: 122 MMHG | HEIGHT: 72 IN | DIASTOLIC BLOOD PRESSURE: 76 MMHG

## 2025-02-25 DIAGNOSIS — Z01.818 PREOPERATIVE CLEARANCE: ICD-10-CM

## 2025-02-25 DIAGNOSIS — M20.42 HAMMERTOES OF BOTH FEET: ICD-10-CM

## 2025-02-25 DIAGNOSIS — G44.219 EPISODIC TENSION-TYPE HEADACHE, NOT INTRACTABLE: ICD-10-CM

## 2025-02-25 DIAGNOSIS — E55.9 VITAMIN D DEFICIENCY: ICD-10-CM

## 2025-02-25 DIAGNOSIS — E55.9 HYPOVITAMINOSIS D: ICD-10-CM

## 2025-02-25 DIAGNOSIS — M20.41 HAMMERTOES OF BOTH FEET: ICD-10-CM

## 2025-02-25 DIAGNOSIS — J42 CHRONIC BRONCHITIS, UNSPECIFIED CHRONIC BRONCHITIS TYPE: ICD-10-CM

## 2025-02-25 DIAGNOSIS — G40.A09 NONINTRACTABLE ABSENCE EPILEPSY WITHOUT STATUS EPILEPTICUS: ICD-10-CM

## 2025-02-25 DIAGNOSIS — E44.1 MILD PROTEIN-CALORIE MALNUTRITION: ICD-10-CM

## 2025-02-25 DIAGNOSIS — Z01.818 PRE-OP EVALUATION: Primary | ICD-10-CM

## 2025-02-25 PROCEDURE — 1160F RVW MEDS BY RX/DR IN RCRD: CPT | Mod: HCNC,CPTII,S$GLB, | Performed by: STUDENT IN AN ORGANIZED HEALTH CARE EDUCATION/TRAINING PROGRAM

## 2025-02-25 PROCEDURE — 99214 OFFICE O/P EST MOD 30 MIN: CPT | Mod: HCNC,S$GLB,, | Performed by: STUDENT IN AN ORGANIZED HEALTH CARE EDUCATION/TRAINING PROGRAM

## 2025-02-25 PROCEDURE — G2211 COMPLEX E/M VISIT ADD ON: HCPCS | Mod: HCNC,S$GLB,, | Performed by: STUDENT IN AN ORGANIZED HEALTH CARE EDUCATION/TRAINING PROGRAM

## 2025-02-25 PROCEDURE — 1159F MED LIST DOCD IN RCRD: CPT | Mod: HCNC,CPTII,S$GLB, | Performed by: STUDENT IN AN ORGANIZED HEALTH CARE EDUCATION/TRAINING PROGRAM

## 2025-02-25 PROCEDURE — 3288F FALL RISK ASSESSMENT DOCD: CPT | Mod: HCNC,CPTII,S$GLB, | Performed by: STUDENT IN AN ORGANIZED HEALTH CARE EDUCATION/TRAINING PROGRAM

## 2025-02-25 PROCEDURE — 99999 PR PBB SHADOW E&M-EST. PATIENT-LVL IV: CPT | Mod: PBBFAC,HCNC,, | Performed by: STUDENT IN AN ORGANIZED HEALTH CARE EDUCATION/TRAINING PROGRAM

## 2025-02-25 PROCEDURE — 1125F AMNT PAIN NOTED PAIN PRSNT: CPT | Mod: HCNC,CPTII,S$GLB, | Performed by: STUDENT IN AN ORGANIZED HEALTH CARE EDUCATION/TRAINING PROGRAM

## 2025-02-25 PROCEDURE — 1101F PT FALLS ASSESS-DOCD LE1/YR: CPT | Mod: HCNC,CPTII,S$GLB, | Performed by: STUDENT IN AN ORGANIZED HEALTH CARE EDUCATION/TRAINING PROGRAM

## 2025-02-25 RX ORDER — ERGOCALCIFEROL 1.25 MG/1
50000 CAPSULE ORAL
Qty: 8 CAPSULE | Refills: 0 | Status: SHIPPED | OUTPATIENT
Start: 2025-02-25 | End: 2025-04-26

## 2025-02-25 RX ORDER — ERGOCALCIFEROL 1.25 MG/1
50000 CAPSULE ORAL
Qty: 8 CAPSULE | Refills: 0 | Status: SHIPPED | OUTPATIENT
Start: 2025-02-25 | End: 2025-02-25

## 2025-02-25 NOTE — PROGRESS NOTES
Patient ID: Heri Muhammad is a 84 y.o. male.    Chief Complaint: surgical clearance     History of Present Illness    CHIEF COMPLAINT:  Heri presents today for pre-operative evaluation for hammer toe surgery    MUSCULOSKELETAL:  He reports significant foot pain due to hammer toes. He is scheduled for bilateral surgical correction with Dr. Atkinson, to be performed one foot at a time.    NEUROLOGICAL:  He reports balance issues, particularly to the right side, requiring him to pause after standing to gain stability before walking. He has a history of epilepsy, currently well-controlled, with no major seizures in several years. His last seizure was potentially linked to alcohol consumption at a party. He also has a history of headaches, for which his neurologist recently prescribed magnesium and vitamin B2.    RESPIRATORY:  COPD is stable.    DIET AND WEIGHT:  He reports recent weight loss with decreased appetite. His diet consists mainly of fruits and cheese. He does not consume milk.    LABS:  Vitamin D and calcium levels are low.      ROS:  General: -fever, -chills, -fatigue, -weight gain, +weight loss, +appetite changes  Eyes: -vision changes, -redness, -discharge  ENT: -ear pain, -nasal congestion, -sore throat  Cardiovascular: -chest pain, -palpitations, -lower extremity edema  Respiratory: -cough, -shortness of breath  Gastrointestinal: -abdominal pain, -nausea, -vomiting, -diarrhea, -constipation, -blood in stool  Genitourinary: -dysuria, -hematuria, -frequency  Musculoskeletal: -joint pain, -muscle pain  Skin: -rash, -lesion  Neurological: +headache, -dizziness, -numbness, -tingling, +loss of consciousness  Psychiatric: -anxiety, -depression, -sleep difficulty         Physical Exam    General: No acute distress. Well-developed. Well-nourished.  Eyes: EOMI. Sclerae anicteric.  HENT: Normocephalic. Atraumatic. Nares patent. Moist oral mucosa.  Ears: Bilateral TMs clear. Bilateral EACs clear.  Cardiovascular:  Regular rate. Regular rhythm. No murmurs. No rubs. No gallops. Normal S1, S2.  Respiratory: Normal respiratory effort. Clear to auscultation bilaterally. No rales. No rhonchi. No wheezing.  Abdomen: Soft. Non-tender. Non-distended. Normoactive bowel sounds.  Musculoskeletal: No  obvious deformity.  Extremities: No lower extremity edema.  Neurological: Alert & oriented x3. No slurred speech. Normal gait.  Psychiatric: Normal mood. Normal affect. Good insight. Good judgment.  Skin: Warm. Dry. No rash.       ekg  Assessment & Plan           1. Pre-op evaluation  -     IN OFFICE EKG 12-LEAD (to Muse)  -     SCHEDULED EKG 12-LEAD (to Muse); Future    2. Vitamin D deficiency  -     Misc Sendout Test, Blood vitamin D; Future; Expected date: 02/25/2025  -     Comprehensive Metabolic Panel; Future; Expected date: 02/25/2025    3. Hammertoes of both feet  Assessment & Plan:  Hammer Toe Surgery:  Assessment: Patient with significant foot pain due to hammer toes scheduled for staged bilateral surgical correction with Dr. Atkinson.  Plan:  Cleared from a cardiac perspective; ordered in-office 12-lead EKG for surgical clearance.  Instruct patient to follow up in the office for EKG review.      4. Nonintractable absence epilepsy without status epilepticus  Assessment & Plan:  Epilepsy is well-controlled.   Continue follow up with neurology.      5. Episodic tension-type headache, not intractable  Assessment & Plan:  Assessment: Ongoing headache managed with magnesium and vitamin B2 per neurologists recommendation.  Plan:  Continue current regimen; reassess if frequency or severity increases.      6. Chronic bronchitis, unspecified chronic bronchitis type  Overview:  - 7/30/19 PFT: no obstruction    Assessment & Plan:  Assessment: COPD is stable.  Plan:  Continue current management.      7. Hypovitaminosis D  Assessment & Plan:  Assessment: Low vitamin D and calcium levels may impair recovery, though not affecting surgical  eligibility.  Plan:  Prescribe vitamin D supplementation: 50,000 units weekly for 2 months, then transition to daily dosing.  Order follow-up vitamin D level and Comprehensive Metabolic Panel in 2 months.  Recommend dietary modifications to increase calcium intake.      8. Mild protein-calorie malnutrition  Assessment & Plan:  Assessment: Notable weight loss and decreased appetite, concerning for suboptimal nutrition pre-surgery.  Plan:  Recommend eating small, frequent, well-balanced meals with adequate protein and calcium-rich foods to support surgical recovery.  Consider referral to nutrition counseling if appetite does not improve.      9. Preoperative clearance  Assessment & Plan:  Medically optimized for surgery and anesthesia      Other orders  -     Discontinue: ergocalciferol (ERGOCALCIFEROL) 50,000 unit Cap; Take 1 capsule (50,000 Units total) by mouth every 7 days.  Dispense: 8 capsule; Refill: 0  -     ergocalciferol (ERGOCALCIFEROL) 50,000 unit Cap; Take 1 capsule (50,000 Units total) by mouth every 7 days.  Dispense: 8 capsule; Refill: 0              No follow-ups on file.    This note was generated with the assistance of ambient listening technology. Verbal consent was obtained by the patient and accompanying visitor(s) for the recording of patient appointment to facilitate this note. I attest to having reviewed and edited the generated note for accuracy, though some syntax or spelling errors may persist. Please contact the author of this note for any clarification.

## 2025-02-27 PROBLEM — G44.89 OTHER HEADACHE SYNDROME: Status: RESOLVED | Noted: 2019-07-02 | Resolved: 2025-02-27

## 2025-02-27 PROBLEM — E44.1 MILD PROTEIN-CALORIE MALNUTRITION: Status: ACTIVE | Noted: 2025-02-27

## 2025-02-27 PROBLEM — G44.89 OTHER HEADACHE SYNDROME: Status: ACTIVE | Noted: 2019-07-02

## 2025-02-27 PROBLEM — E55.9 HYPOVITAMINOSIS D: Status: ACTIVE | Noted: 2025-02-27

## 2025-02-27 NOTE — ASSESSMENT & PLAN NOTE
Assessment: Low vitamin D and calcium levels may impair recovery, though not affecting surgical eligibility.  Plan:  Prescribe vitamin D supplementation: 50,000 units weekly for 2 months, then transition to daily dosing.  Order follow-up vitamin D level and Comprehensive Metabolic Panel in 2 months.  Recommend dietary modifications to increase calcium intake.

## 2025-02-27 NOTE — ASSESSMENT & PLAN NOTE
Hammer Toe Surgery:  Assessment: Patient with significant foot pain due to hammer toes scheduled for staged bilateral surgical correction with Dr. Atkinson.  Plan:  Cleared from a cardiac perspective; ordered in-office 12-lead EKG for surgical clearance.  Instruct patient to follow up in the office for EKG review.

## 2025-02-27 NOTE — ASSESSMENT & PLAN NOTE
Assessment: Ongoing headache managed with magnesium and vitamin B2 per neurologists recommendation.  Plan:  Continue current regimen; reassess if frequency or severity increases.

## 2025-02-27 NOTE — ASSESSMENT & PLAN NOTE
Assessment: Notable weight loss and decreased appetite, concerning for suboptimal nutrition pre-surgery.  Plan:  Recommend eating small, frequent, well-balanced meals with adequate protein and calcium-rich foods to support surgical recovery.  Consider referral to nutrition counseling if appetite does not improve.

## 2025-02-28 NOTE — TELEPHONE ENCOUNTER
Refill Routing Note   Medication(s) are not appropriate for processing by [facility] for the following reason(s):        Non-participating provider    [facility] action(s):  Route             Appointments  past 12m or future 3m with PCP    Date Provider   Last Visit   2/25/2025 Jennifer Sharpe MD   Next Visit   7/2/2025 Jennifer Sharpe MD   ED visits in past 90 days: 0        Note composed:4:28 PM 02/28/2025

## 2025-03-03 RX ORDER — TORSEMIDE 10 MG/1
10 TABLET ORAL EVERY MORNING
Qty: 90 TABLET | Refills: 3 | Status: SHIPPED | OUTPATIENT
Start: 2025-03-03

## 2025-03-03 RX ORDER — TORSEMIDE 10 MG/1
10 TABLET ORAL EVERY MORNING
Qty: 90 TABLET | Refills: 3 | Status: SHIPPED | OUTPATIENT
Start: 2025-03-03 | End: 2025-03-03 | Stop reason: SDUPTHER

## 2025-03-07 DIAGNOSIS — I49.5 SINUS NODE DYSFUNCTION: Primary | ICD-10-CM

## 2025-03-07 DIAGNOSIS — I50.32 CHRONIC DIASTOLIC HEART FAILURE: ICD-10-CM

## 2025-03-07 DIAGNOSIS — Z45.010 PACEMAKER BATTERY DEPLETION: ICD-10-CM

## 2025-03-07 DIAGNOSIS — Z95.0 BIVENTRICULAR CARDIAC PACEMAKER IN SITU: ICD-10-CM

## 2025-03-07 DIAGNOSIS — I48.19 PERSISTENT ATRIAL FIBRILLATION: ICD-10-CM

## 2025-03-12 ENCOUNTER — OFFICE VISIT (OUTPATIENT)
Dept: PODIATRY | Facility: CLINIC | Age: 85
End: 2025-03-12
Payer: MEDICARE

## 2025-03-12 VITALS — WEIGHT: 216.94 LBS | HEIGHT: 72 IN | BODY MASS INDEX: 29.38 KG/M2

## 2025-03-12 DIAGNOSIS — I73.9 PVD (PERIPHERAL VASCULAR DISEASE): ICD-10-CM

## 2025-03-12 DIAGNOSIS — M77.42 METATARSALGIA OF BOTH FEET: ICD-10-CM

## 2025-03-12 DIAGNOSIS — M20.42 HAMMERTOES OF BOTH FEET: Primary | ICD-10-CM

## 2025-03-12 DIAGNOSIS — M77.41 METATARSALGIA OF BOTH FEET: ICD-10-CM

## 2025-03-12 DIAGNOSIS — I25.10 CORONARY ARTERY DISEASE INVOLVING NATIVE CORONARY ARTERY OF NATIVE HEART WITHOUT ANGINA PECTORIS: ICD-10-CM

## 2025-03-12 DIAGNOSIS — I50.32 CHRONIC DIASTOLIC HEART FAILURE: ICD-10-CM

## 2025-03-12 DIAGNOSIS — M20.41 HAMMERTOES OF BOTH FEET: Primary | ICD-10-CM

## 2025-03-12 PROCEDURE — 1125F AMNT PAIN NOTED PAIN PRSNT: CPT | Mod: HCNC,CPTII,S$GLB, | Performed by: PODIATRIST

## 2025-03-12 PROCEDURE — 99999 PR PBB SHADOW E&M-EST. PATIENT-LVL IV: CPT | Mod: PBBFAC,HCNC,, | Performed by: PODIATRIST

## 2025-03-12 PROCEDURE — 99214 OFFICE O/P EST MOD 30 MIN: CPT | Mod: HCNC,S$GLB,, | Performed by: PODIATRIST

## 2025-03-12 PROCEDURE — 1159F MED LIST DOCD IN RCRD: CPT | Mod: HCNC,CPTII,S$GLB, | Performed by: PODIATRIST

## 2025-03-12 RX ORDER — CEPHALEXIN 500 MG/1
500 CAPSULE ORAL EVERY 6 HOURS
Qty: 40 CAPSULE | Refills: 0 | Status: SHIPPED | OUTPATIENT
Start: 2025-03-12 | End: 2025-03-22

## 2025-03-12 RX ORDER — HYDROCODONE BITARTRATE AND ACETAMINOPHEN 5; 325 MG/1; MG/1
1 TABLET ORAL EVERY 4 HOURS PRN
Qty: 30 TABLET | Refills: 0 | Status: SHIPPED | OUTPATIENT
Start: 2025-03-12

## 2025-03-12 RX ORDER — GABAPENTIN 100 MG/1
100 CAPSULE ORAL 2 TIMES DAILY
Qty: 60 CAPSULE | Refills: 1 | Status: SHIPPED | OUTPATIENT
Start: 2025-03-12 | End: 2025-05-11

## 2025-03-12 RX ORDER — ASCORBIC ACID 500 MG
500 TABLET ORAL DAILY
Qty: 30 TABLET | Refills: 1 | Status: SHIPPED | OUTPATIENT
Start: 2025-03-12 | End: 2025-05-11

## 2025-03-12 NOTE — PROGRESS NOTES
[facility]  [facility] - PODIATRY  [address]  [address]  [facility] [place] [address]  Dept: [phone]  Dept Fax: [phone]    Geronimo Lehman Jr., CHACE     Assessment:   MDM    Coding  1. Hammertoes of both feet  Ambulatory Referral/Consult to Physical Therapy/Occupational Therapy    cephALEXin (KEFLEX) 500 MG capsule    HYDROcodone-acetaminophen (NORCO) 5-325 mg per tablet    gabapentin (NEURONTIN) 100 MG capsule    ascorbic acid, vitamin C, (VITAMIN C) 500 MG tablet      2. Metatarsalgia of both feet  Ambulatory Referral/Consult to Physical Therapy/Occupational Therapy    cephALEXin (KEFLEX) 500 MG capsule    HYDROcodone-acetaminophen (NORCO) 5-325 mg per tablet    gabapentin (NEURONTIN) 100 MG capsule    ascorbic acid, vitamin C, (VITAMIN C) 500 MG tablet      3. PVD (peripheral vascular disease)        4. Chronic diastolic heart failure        5. Coronary artery disease involving native coronary artery of native heart without angina pectoris            Plan:     Procedures  1. Hammertoes of both feet  -     Ambulatory Referral/Consult to Physical Therapy/Occupational Therapy; Future; Expected date: 03/12/2025  -     cephALEXin (KEFLEX) 500 MG capsule; Take 1 capsule (500 mg total) by mouth every 6 (six) hours. for 10 days  Dispense: 40 capsule; Refill: 0  -     HYDROcodone-acetaminophen (NORCO) 5-325 mg per tablet; Take 1 tablet by mouth every 4 (four) hours as needed for Pain.  Dispense: 30 tablet; Refill: 0  -     gabapentin (NEURONTIN) 100 MG capsule; Take 1 capsule (100 mg total) by mouth 2 (two) times daily.  Dispense: 60 capsule; Refill: 1  -     ascorbic acid, vitamin C, (VITAMIN C) 500 MG tablet; Take 1 tablet (500 mg total) by mouth once daily.  Dispense: 30 tablet; Refill: 1    2. Metatarsalgia of both feet  -     Ambulatory Referral/Consult to Physical Therapy/Occupational Therapy; Future; Expected date: 03/12/2025  -     cephALEXin (KEFLEX) 500 MG capsule; Take 1 capsule (500 mg total) by  mouth every 6 (six) hours. for 10 days  Dispense: 40 capsule; Refill: 0  -     HYDROcodone-acetaminophen (NORCO) 5-325 mg per tablet; Take 1 tablet by mouth every 4 (four) hours as needed for Pain.  Dispense: 30 tablet; Refill: 0  -     gabapentin (NEURONTIN) 100 MG capsule; Take 1 capsule (100 mg total) by mouth 2 (two) times daily.  Dispense: 60 capsule; Refill: 1  -     ascorbic acid, vitamin C, (VITAMIN C) 500 MG tablet; Take 1 tablet (500 mg total) by mouth once daily.  Dispense: 30 tablet; Refill: 1    3. PVD (peripheral vascular disease)    4. Chronic diastolic heart failure  Overview:  - no signs of acute decompensation  - no leg swelling  - medical management      5. Coronary artery disease involving native coronary artery of native heart without angina pectoris  Overview:  - 9/2013 Left heart Cath: LAD- 60% prox (neg FFR), nl, ramus and RCA 20%  - LDL goal <70  - BP goal <130/80  - ASA 81 mg daily        Heri was seen today for hammer toe.    Diagnoses and all orders for this visit:    Hammertoes of both feet  -     Ambulatory Referral/Consult to Physical Therapy/Occupational Therapy; Future  -     cephALEXin (KEFLEX) 500 MG capsule; Take 1 capsule (500 mg total) by mouth every 6 (six) hours. for 10 days  -     HYDROcodone-acetaminophen (NORCO) 5-325 mg per tablet; Take 1 tablet by mouth every 4 (four) hours as needed for Pain.  -     gabapentin (NEURONTIN) 100 MG capsule; Take 1 capsule (100 mg total) by mouth 2 (two) times daily.  -     ascorbic acid, vitamin C, (VITAMIN C) 500 MG tablet; Take 1 tablet (500 mg total) by mouth once daily.    Metatarsalgia of both feet  -     Ambulatory Referral/Consult to Physical Therapy/Occupational Therapy; Future  -     cephALEXin (KEFLEX) 500 MG capsule; Take 1 capsule (500 mg total) by mouth every 6 (six) hours. for 10 days  -     HYDROcodone-acetaminophen (NORCO) 5-325 mg per tablet; Take 1 tablet by mouth every 4 (four) hours as needed for Pain.  -     gabapentin  (NEURONTIN) 100 MG capsule; Take 1 capsule (100 mg total) by mouth 2 (two) times daily.  -     ascorbic acid, vitamin C, (VITAMIN C) 500 MG tablet; Take 1 tablet (500 mg total) by mouth once daily.    PVD (peripheral vascular disease)    Chronic diastolic heart failure    Coronary artery disease involving native coronary artery of native heart without angina pectoris        -pt seen, evaluated, and managed  -dx discussed in detail. All questions/concerns addressed  -all tx options discussed. All alternatives, risks, benefits of all txs discussed  -pt has failed conservative care options possible including but not limited to shoe wear and/or padding, bracing/strapping, at home ROM, formal PT, medical therapy, injection therapy  -XR reviewed  -we discussed his age and medical conditions are a concern but pt is still imploring for surgery  -he has obtained cardio clearance  -given failure of conservative measures, we discussed surgical intervention  -pt would benefit from operative intervention: we discussed the following procedures: R foot hammertoe repair 2-5, possible pan metatarsal head resection, harvest and application of bone marrow aspirate and indicated procedures  -Decision regarding elective surgery was made and the patient opts for surgical intervention: yes  -We discussed the patient risk factors and social determinants of health and their impacts including but not limited to: stress and/or mental health strain, social connections strain, family support strain, financial resource strain , severity of deformity / severity of injury, major medical co-morbidities, alcohol or tobacco abuse, housing resource strain, transportation strain, food insecurity, lack of physical activity  -Long discussion with patient regarding the procedure in detail. Patient understands all risks, possible benefits, potential complications, and alternatives, including, but not limited to those listed on the consent form. Pt  understands consequences of failing to proceed with recommended procedure(s). All questions were answered. No guarantees given or implied as to outcome. Patient is aware of the procedure specific complications including but not limited to infection, wound or bone healing problems, damage to surrounding structures, need for additional surgery, loss of toes/foot/leg/life, scar formation, nerve pain, gait issues. They agree and exhibit appropriate understanding of all discussion points. Patient understands post-operative course and agrees to be compliant with care. Teach back method used as part of informed consent process. Informed verbal and written consent was obtained. Consent forms read, signed, witnessed.  -we discussed postop course  -would be out pt, elective surgery  -would GA + block, supine position  -DOS: 3/21/25      -rxs dispensed: postop  -referrals: none. PT crutch  -WB: wbat      Follow up in 2 weeks (on 3/26/2025).    Subjective:      Patient ID: Heri Muhammad is a 84 y.o. male.    Chief Complaint:   Chief Complaint   Patient presents with    Hammer Toe     Right foot hammer       CC - foot pain: patient presents to the podiatry clinic  with complaint of  bilateral foot pain. Onset of the symptoms was several years ago. Precipitating event: unk. Current symptoms include: ability to bear weight, but with some pain, stiffness, swelling and worsening symptoms after a period of activity. Aggravating factors: walking and certain shoegear. Symptoms have gradually worsened. Patient has had prior foot problems. Evaluation to date: none. Treatment to date: avoidance of offending activity. Patients rates pain 7/10 on pain scale.        3/12/25:  Hx as above. Still having hammertoe pain that affects daily routine. Has obtained clearance and requesting surgery.    Hammer Toe        Last Podiatry Enc: Visit date not found  Last Enc w/ Me: Visit date not found    Outside reports reviewed: historical medical  records.  Family hx: as below  Past Medical History:   Diagnosis Date    Anticoagulant long-term use     no longer taking eliquis    Aortic atherosclerosis 09/18/2017    - LDL goal <70 - BP goal <130/80    Benign essential tremor     Biventricular cardiac pacemaker in situ 09/24/2018    CAD (coronary artery disease) 12/02/2013    - 9/2013 Left heart Cath: LAD- 60% prox (neg FFR), nl, ramus and RCA 20% - LDL goal <70 - BP goal <130/80    Cancer     skin cancer on back    Carpal tunnel syndrome of left wrist 08/08/2017    surgery to correct    Chronic bronchitis     Chronic diastolic heart failure 01/19/2018    Chronic obstructive pulmonary disease 10/03/2018    Chronic right-sided thoracic back pain 04/27/2017    Cough     not currently a problem    Current use of long term anticoagulation 06/02/2016    - Eliquis for Afib-no longer taking    Dizziness     intermittent    DJD (degenerative joint disease) of knee     right    ED (erectile dysfunction)     Hammer toe of right foot     Hard of hearing 11/2016    History of cataract     Hyponatremia 06/11/2019    Memory loss     Mixed hyperlipidemia 07/28/2012    Persistent atrial fibrillation 07/01/2015    - followed by Dr. Esparza - history of cardioversion - on Eliquis OAC 5 mg BID    Pulmonary hypertension     Seizure disorder     Seizures     last episode 1995    Subclinical hypothyroidism     no meds    Tension type headache 07/02/2019    not as frequent    Vitamin D deficiency      Past Surgical History:   Procedure Laterality Date    CARDIAC CATHETERIZATION      CARDIAC PACEMAKER PLACEMENT  2014    CARDIOVERSION      multiple    CARPAL TUNNEL RELEASE Left     CATARACT EXTRACTION Bilateral     COLONOSCOPY      several    ECHOCARDIOGRAM,TRANSESOPHAGEAL N/A 06/16/2023    Procedure: Transesophageal echo (GALILEO) intra-procedure log documentation;  Surgeon: [facility] Provider;  Location: [facility] EP LAB;  Service: Cardiology;  Laterality: N/A;  s/p WM, GALILEO, anes, 3 Prep     FRACTURE SURGERY Right     ankle with hardware    HERNIA REPAIR      abdominal    INJECTION OF ANESTHETIC AGENT AROUND MEDIAL BRANCH NERVES INNERVATING LUMBAR FACET JOINT Bilateral 01/17/2019    Procedure: Block-nerve-medial branch-lumbar L2-L5;  Surgeon: Anthony Moffett MD;  Location: [facility] OR;  Service: Pain Management;  Laterality: Bilateral;    JOINT REPLACEMENT Bilateral     shoulders    OCCLUSION OF LEFT ATRIAL APPENDAGE N/A 05/05/2023    Procedure: Left atrial appendage occlusion;  Surgeon: Hema Del Valle MD;  Location: [facility] EP LAB;  Service: Cardiology;  Laterality: N/A;  afib, watchman, BSCI, ben, anes, MB, 3prep    RADIOFREQUENCY ABLATION OF LUMBAR MEDIAL BRANCH NERVE AT SINGLE LEVEL Bilateral 01/23/2019    Procedure: Radiofrequency Ablation, Nerve, Spinal, Lumbar, Medial Branch, L2-L5;  Surgeon: Anthony Moffett MD;  Location: [facility] OR;  Service: Pain Management;  Laterality: Bilateral;    TRANSESOPHAGEAL ECHOCARDIOGRAPHY N/A 05/05/2023    Procedure: ECHOCARDIOGRAM, TRANSESOPHAGEAL;  Surgeon: Dylan Layne MD;  Location: [facility] EP LAB;  Service: Cardiology;  Laterality: N/A;    TREATMENT OF CARDIAC ARRHYTHMIA N/A 07/11/2022    Procedure: Cardioversion or Defibrillation;  Surgeon: MIREILLE Morel MD;  Location: [facility] EP LAB;  Service: Cardiology;  Laterality: N/A;  AF, BEN, DCCV, MAC, EH (to cover for SK), 3 Prep *SJM CRT-P in situ*    WISDOM TOOTH EXTRACTION       Family History   Problem Relation Name Age of Onset    Heart disease Mother Odalis     Heart failure Mother Odalis     Dementia Mother Odalis     Cancer Father Darío         colon    Blindness Father Darío         accident    Cataracts Father Darío     Hypertension Father Darío     Tremor Father Darío     Tremor Brother      Cancer Daughter Shlomo         cancer as a baby, unsure of what kind    Obesity Daughter Judy     No Known Problems Son Giovanny     Tremor Paternal Grandfather      Melanoma Neg Hx      Amblyopia Neg Hx      Diabetes Neg Hx       Glaucoma Neg Hx      Macular degeneration Neg Hx      Retinal detachment Neg Hx      Strabismus Neg Hx      Stroke Neg Hx      Thyroid disease Neg Hx       Current Outpatient Medications   Medication Sig Dispense Refill    ascorbic acid, vitamin C, (VITAMIN C) 500 MG tablet Take 1 tablet (500 mg total) by mouth once daily. 30 tablet 1    aspirin (ECOTRIN) 81 MG EC tablet Take 1 tablet (81 mg total) by mouth once daily. 90 tablet 1    atorvastatin (LIPITOR) 80 MG tablet TAKE 1 TABLET ONE TIME DAILY (Patient taking differently: Take 80 mg by mouth every evening.) 90 tablet 3    cephALEXin (KEFLEX) 500 MG capsule Take 1 capsule (500 mg total) by mouth every 6 (six) hours. for 10 days 40 capsule 0    cetirizine (ZYRTEC) 10 MG tablet Take 10 mg by mouth every evening.      citalopram (CELEXA) 20 MG tablet Take 1 tablet (20 mg total) by mouth once daily. 90 tablet 0    ergocalciferol (ERGOCALCIFEROL) 50,000 unit Cap Take 1 capsule (50,000 Units total) by mouth every 7 days. (Patient taking differently: Take 50,000 Units by mouth every 7 days. Instructed to hold for sx) 8 capsule 0    gabapentin (NEURONTIN) 100 MG capsule Take 1 capsule (100 mg total) by mouth 2 (two) times daily. 60 capsule 1    HYDROcodone-acetaminophen (NORCO) 5-325 mg per tablet Take 1 tablet by mouth every 4 (four) hours as needed for Pain. 30 tablet 0    lamoTRIgine (LAMICTAL) 100 MG tablet Take 1 tablet (100 mg total) by mouth 2 (two) times daily. 180 tablet 3    magnesium oxide (MAG-OX) 400 mg (241.3 mg magnesium) tablet Take 1 tablet (400 mg total) by mouth once daily. (Patient taking differently: Take 400 mg by mouth once daily. Instructed to hold for sx) 90 tablet 3    memantine (NAMENDA) 10 MG Tab Take 1 tablet (10 mg total) by mouth 2 (two) times daily. 180 tablet 3    metoprolol succinate (TOPROL-XL) 50 MG 24 hr tablet TAKE 1 TABLET EVERY DAY (Patient taking differently: Take 50 mg by mouth once daily.) 90 tablet 3    primidone (MYSOLINE)  250 MG Tab TAKE 2 TABLETS TWICE DAILY (NEED MD APPOINTMENT FOR REFILLS) (Patient taking differently: Take 500 mg by mouth 2 (two) times a day.) 360 tablet 3    riboflavin, vitamin B2, (VITAMIN B-2) 100 mg Tab tablet Take 2 tablets (200 mg total) by mouth once daily. (Patient taking differently: Take 100 mg by mouth once daily. Instructed to hold for sx) 180 tablet 3    torsemide (DEMADEX) 10 MG Tab Take 1 tablet (10 mg total) by mouth every morning. (Patient taking differently: Take 10 mg by mouth every morning. Instructed to hold dos) 90 tablet 3    TRELEGY ELLIPTA 100-62.5-25 mcg DsDv INHALE 1 PUFF ONE TIME DAILY (Patient taking differently: Inhale 1 puff into the lungs every morning.) 180 each 3     No current facility-administered medications for this visit.     Facility-Administered Medications Ordered in Other Visits   Medication Dose Route Frequency Provider Last Rate Last Admin    0.9%  NaCl infusion (for blood administration)   Intravenous Q24H INESN Batool Rodriguez NP         Review of patient's allergies indicates:   Allergen Reactions    Bactroban [mupirocin calcium] Rash     Other reaction(s): Rash     Social History     Socioeconomic History    Marital status:    Occupational History    Occupation: retired   Tobacco Use    Smoking status: Former     Current packs/day: 0.00     Average packs/day: 1 pack/day for 5.0 years (5.0 ttl pk-yrs)     Types: Cigarettes     Start date:      Quit date:      Years since quittin.2    Smokeless tobacco: Never    Tobacco comments:     - I   Substance and Sexual Activity    Alcohol use: No    Drug use: No    Sexual activity: Not Currently     Partners: Female   Social History Narrative    . 4 adult children.      Social Drivers of Health     Financial Resource Strain: High Risk (2024)    Overall Financial Resource Strain (CARDIA)     Difficulty of Paying Living Expenses: Very hard   Food Insecurity: No Food Insecurity (2024)     Hunger Vital Sign     Worried About Running Out of Food in the Last Year: Never true     Ran Out of Food in the Last Year: Never true   Transportation Needs: No Transportation Needs (8/26/2024)    TRANSPORTATION NEEDS     Transportation : No   Physical Activity: Inactive (8/26/2024)    Exercise Vital Sign     Days of Exercise per Week: 0 days     Minutes of Exercise per Session: 0 min   Stress: No Stress Concern Present (8/26/2024)    [employer] - Occupational Stress Questionnaire     Feeling of Stress : Not at all   Housing Stability: Unknown (8/26/2024)    Housing Stability Vital Sign     Unable to Pay for Housing in the Last Year: No     Homeless in the Last Year: No       ROS    REVIEW OF SYSTEMS: Negative as documented below as well as positive findings in bold.       Constitutional  Respiratory  Gastrointestinal  Skin   - Fever - Cough - Heartburn - Rash   - Chills - Spit blood - Nausea - Itching   - Weight Loss - Shortness of breath - Vomiting - Nail pain   - Malaise/Fatigue - Wheezing - Abdominal Pain  Wound/Ulcer   - Weight Gain   - Blood in Stool  Poor wound healing       - Diarrhea          Cardiovascular  Genitourinary  Neurological  HEENT   - Chest Pain - Dysuria - Burning Sensation of feet - Headache   - Palpitations - Hematuria - Tingling / Paresthesia - Congestion   - Pain at night in legs - Flank Pain - Dizziness - Sore Throat   - Cramping   - Tremor - Blurred Vision   - Leg Swelling   - Sensory Change - Double Vision   - Dizzy when standing   - Speech Change - Eye Redness       - Focal Weakness - Dry Eyes       - Loss of Consciousness          Endocrine  Musculoskeletal  Psychiatric   - Cold intolerance - Muscle Pain - Depression   - Heat intolerance - Neck Pain - Insomnia   - Anemia - Joint Pain - Memory Loss   -  Easy bruising, bleeding - Heel pain - Anxiety      Toe Pain        Leg/Ankle/Foot Pain         Objective:     Ht 6' (1.829 m)   Wt 98.4 kg (216 lb 14.9 oz)    BMI 29.42 kg/m²   Vitals:    03/12/25 1102   Weight: 98.4 kg (216 lb 14.9 oz)   Height: 6' (1.829 m)   PainSc:   7       Physical Exam    General Appearance:   Patient appears well developed, well nourished  Patient appears stated age    Psychiatric:   Patient is oriented to time, place, and person.  Patient has appropriate mood and affect    Neck:  Trachea Midline  No visible masses    Respiratory/Ears:  No distress or labored breathing.  Able to differentiate between normal talking voice and whisper.  Able to follow commands    Eyes:  Visual Acuity intact  Lids and conjunctivae normal. No discoloration noted.    Foot Exam  Physical Exam  Ortho Exam  Ortho/SPM Exam  Physical Exam  Foot/Ankle Musculoskeletal Exam    B/l LE exam con't:  V:  DP 1/4, PT 1/4   CRT< 3s to all digits tested   Tibial and popliteal lymph nodes are w/o abnormality   Edema: absent, varicosities: present    N:  Patient displays normal ankle reflexes   SILT in SP/DP/T/Ebonie/Saph distributions    Ortho: +Motor EHL/FHL/TA/GA   Observed enlarged bony prominence at the pipjs of lesser toes   hammertoe deformity present 2-5 b/l; 2-3 rigid and 4-5 semi-rigid  There is moderate pain with palpation of pipjs and plantar MTPJs 2-5  Compartments soft/compressible. No pain on passive stretch of big toe. No calf  Pain.    Derm:  skin intact, skin warm and dry, skin without ulcers or lesions, skin without induration, nails normal, no erythema and no ecchymosis    Imaging / Labs:      No results found.      Note: This was dictated using a computer transcription program. Although proofread, it may contain computer transcription errors and phonetic errors. Other human proofreading errors may also exist. Corrections may be performed at a later time. Please contact us for any clarification if needed.    Geronimo Lehman DPM  [facility]

## 2025-03-12 NOTE — PATIENT INSTRUCTIONS
Report to the Same Day Surgery unit on 3/21/25     1. DO NOT EAT OR DRINK ANYTHING AFTER THE MIDNIGHT BEFORE SURGERY     2. Do not drink any alcohol or take any mind-altering drugs 24 hours before surgery.     3. STOP TAKING: Coumadin, Plavix, Pletal, Aggrenox, Aspirin, Aleve, Naproxen, Advil, Motrin, Ibuprofen, Jacqueline Taopi, Celebrex, Allopurinol, and any medications containing aspirin or antiinflammatories N/A unless instructed otherwise by your Primary Care Provider     4. You may take Tylenol and your other medications up until midnight before your surgery.     5. Take the following medications the morning of your procedure with a sip of water (less than an ounce): Effexor,Depakote     6. Leave all valuables at home.     7. Bathe like you normally do the morning of or the night before surgery, preferably with an anti-bacterial soap     8. Only 2 visitors will be allowed on the unit with patients. Children under 12 years old are not allowed to visit on unit.     9. YOU WILL NOT BE ALLOWED TO DRIVE HOME AFTER YOUR PROCEDURE!!! A family member or friend must be in the unit with you to receive discharge instructions and to sign the papers for you to be discharged home. Also, you must make arrangements before your surgery day to have a ride home in a private vehicle (no buses or public transportation allowed). YOU WILL NOT BE ALLOWED TO WALK HOME, NOR WILL STAFF BE RESPONSIBLE FOR MAKING THESE ARRANGEMENTS FOR YOU!!! FAILURE TO MAKE THE PROPER ARRANGEMENTS FOR YOURSELF MAY RESULT IN THE CANCELLATION OF YOUR PROCEDURE!    10. Obtain a History & Physical for surgical clearance for your surgery from your Primary Care Provider within 30 days of your date of surgery. Have their office fax us a copy of the H&P. Bring a paper copy of the H&P with you to surgery.

## 2025-03-20 PROBLEM — M25.512 BILATERAL SHOULDER PAIN: Status: RESOLVED | Noted: 2019-06-17 | Resolved: 2025-03-20

## 2025-03-20 PROBLEM — Z74.09 DECREASED STRENGTH, ENDURANCE, AND MOBILITY: Status: RESOLVED | Noted: 2021-06-02 | Resolved: 2025-03-20

## 2025-03-20 PROBLEM — R53.1 DECREASED STRENGTH, ENDURANCE, AND MOBILITY: Status: RESOLVED | Noted: 2021-06-02 | Resolved: 2025-03-20

## 2025-03-20 PROBLEM — R68.89 DECREASED STRENGTH, ENDURANCE, AND MOBILITY: Status: RESOLVED | Noted: 2021-06-02 | Resolved: 2025-03-20

## 2025-03-20 PROBLEM — M25.511 BILATERAL SHOULDER PAIN: Status: RESOLVED | Noted: 2019-06-17 | Resolved: 2025-03-20

## 2025-03-24 PROBLEM — M20.41 HAMMERTOE OF RIGHT FOOT: Status: ACTIVE | Noted: 2025-03-24

## 2025-03-24 PROBLEM — M21.961 DEFORMITY OF METATARSAL BONE OF RIGHT FOOT: Status: ACTIVE | Noted: 2025-03-24

## 2025-03-25 ENCOUNTER — PATIENT MESSAGE (OUTPATIENT)
Dept: ELECTROPHYSIOLOGY | Facility: CLINIC | Age: 85
End: 2025-03-25
Payer: MEDICARE

## 2025-03-26 ENCOUNTER — PATIENT MESSAGE (OUTPATIENT)
Dept: PODIATRY | Facility: CLINIC | Age: 85
End: 2025-03-26

## 2025-03-26 ENCOUNTER — OFFICE VISIT (OUTPATIENT)
Dept: PODIATRY | Facility: CLINIC | Age: 85
End: 2025-03-26
Payer: MEDICARE

## 2025-03-26 DIAGNOSIS — Z47.89 AFTERCARE FOLLOWING SURGERY OF THE MUSCULOSKELETAL SYSTEM: Primary | ICD-10-CM

## 2025-03-26 PROCEDURE — 99024 POSTOP FOLLOW-UP VISIT: CPT | Mod: HCNC,S$GLB,, | Performed by: PODIATRIST

## 2025-03-26 PROCEDURE — 99999 PR PBB SHADOW E&M-EST. PATIENT-LVL II: CPT | Mod: PBBFAC,,, | Performed by: PODIATRIST

## 2025-03-26 RX ORDER — TRAMADOL HYDROCHLORIDE AND ACETAMINOPHEN 37.5; 325 MG/1; MG/1
1 TABLET ORAL EVERY 6 HOURS PRN
Qty: 25 TABLET | Refills: 0 | Status: SHIPPED | OUTPATIENT
Start: 2025-03-26

## 2025-03-26 NOTE — PATIENT INSTRUCTIONS
Bone Healing  How Does a Bone Heal?    All broken bones go through the same healing process. This is true whether a bone has been cut as part of a surgical procedure or fractured through an injury.     The bone healing process has three overlapping stages: inflammation, bone production and bone remodeling.        Inflammation starts immediately after the bone is fractured and lasts for several days. When the bone is fractured, there is bleeding into the area, leading to inflammation and clotting of blood at the fracture site. This provides the initial structural stability and framework for producing new bone.        Bone production begins when the clotted blood formed by inflammation is replaced with fibrous tissue and cartilage (known as soft callus). As healing progresses, the soft callus is replaced with hard bone (known as hard callus), which is visible on x-rays several weeks after the fracture.        Bone remodeling, the final phase of bone healing, goes on for several months. In remodeling, bone continues to form and becomes compact, returning to its original shape. In addition, blood circulation in the area improves. Once adequate bone healing has occurred, weightbearing (such as standing or walking) encourages bone remodeling.?  How Long Does Bone Healing Take?   Bone generally takes 6 to 12 weeks to heal to a significant degree. In general, children's bones heal faster than those of adults. The foot and ankle surgeon will determine when the patient is ready to bear weight on the area. This will depend on the location and severity of the fracture, the type of surgical procedure performed and other considerations.    What Helps Promote Bone Healing?  If a bone will be cut during a planned surgical procedure, some steps can be taken pre- and postoperatively to help optimize healing. The surgeon may offer advice on diet and nutritional supplements that are essential to bone growth. Smoking cessation and  adequate control of blood sugar levels in people living with diabetes are important. Smoking and high glucose levels interfere with bone healing.     For all patients with fractured bones, immobilization is a critical part of treatment because any movement of bone fragments slows down the initial healing process. Depending on the type of fracture or surgical procedure, the surgeon may use some form of fixation (such as screws, plates or wires) on the fractured bone and/or a cast to keep the bone from moving. During the immobilization period, weightbearing is restricted as instructed by the surgeon.     Once the bone is adequately healed, physical therapy often plays a key role in rehabilitation. An exercise program designed for the patient can help in regaining strength and balance and can assist in returning to normal activities.    What Can Hinder Bone Healing?   A wide variety of factors can slow down the healing process. These include:    Movement of the bone fragments; weightbearing too soon  Smoking, which constricts the blood vessels and decreases circulation  Medical conditions, such as diabetes, hormone-related problems or vascular disease  Some medications, such as corticosteroids and other immunosuppressants  Fractures that are severe, complicated or become infected  Advanced age  Poor nutrition or impaired metabolism  Low levels of calcium and vitamin D     How Can Slow Healing Be Treated?   If the bone is not healing as well as expected or fails to heal, the foot and ankle surgeon can choose from a variety of treatment options to enhance bone growth, such as continued immobilization for a longer period, bone stimulation or surgery with bone grafting or use of bone growth proteins      R.I.C.E.    R.I.C.E. stands for Rest, Ice, Compression, and Elevation. Doing these things helps limit pain and swelling after an injury. R.I.C.E. also helps injuries heal faster. Use R.I.C.E. for sprains, strains, and  severe bruises or bumps. Follow the tips on this handout and begin R.I.C.E. as soon as possible after an injury.     Rest  Pain is your body's way of telling you to rest an injured area. Whether you have hurt an elbow, hand, foot, or knee, limiting its use will prevent further injury and help you heal.     Ice  Applying ice right after an injury helps prevent swelling and reduce pain. Don't place ice directly on your skin.  Wrap a cold pack or bag of ice in a thin cloth. Place it over the injured area.  Ice for 10 minutes every 3 hours. Don't ice for more than 20 minutes at a time.     Compression  Putting pressure (compression) on an injury helps prevent swelling and provides support.  Wrap the injured area firmly with an elastic bandage. If your hand or foot tingles, becomes discolored, or feels cold to the touch, the bandage may be too tight. Rewrap it more loosely.  If your bandage becomes too loose, rewrap it.  Do not wear an elastic bandage overnight.    Elevation  Keeping an injury elevated helps reduce swelling, pain, and throbbing. Elevation is most effective when the injury is kept elevated higher than the heart.     Call your healthcare provider if you notice any of the following:  Fingers or toes feel numb, are cold to the touch, or change color  Skin looks shiny or tight  Pain, swelling, or bruising worsens and is not improved with elevation

## 2025-03-26 NOTE — PROGRESS NOTES
[facility] - PODIATRY  [address]  [address]  [place] [address]  Dept: [phone]  Dept Fax: [phone]    Geronimo Lehman Jr., DPM   Geronimo Lehman Jr.     Post-Operative Visit  Assessment:     1. Aftercare following surgery of the musculoskeletal system  tramadol-acetaminophen 37.5-325 mg (ULTRACET) 37.5-325 mg Tab    X-Ray Foot Complete Right          Plan:   MDM    Coding    - pt seen evaluated and managed  - skin not ready for suture removal  - dressings re-applied  - wb: pwbat on heel RLE  - rx dispensed: narcotic stepdown to ultracet today  - referrals: none    Follow up in about 2 weeks (around 4/9/2025).      Subjective:      Patient ID: Heri Muhammad is a 84 y.o. male.    Chief Complaint: No chief complaint on file.    There were no vitals filed for this visit.    DOS: 3/21/25  Procedure: R foot pan MT head resection+ HT repair 2-5    Heri Muhammad returns to the clinic today for the 1st postop visit. Pt is s/p above procedure. Heri Muhammad rates pain at a 5/10 on visual analog scale. Denies n/v/f/c.    HPI      Outside reports reviewed: historical medical records.    Past Medical History:   Diagnosis Date    Anticoagulant long-term use     no longer taking eliquis    Aortic atherosclerosis 09/18/2017    - LDL goal <70 - BP goal <130/80    Benign essential tremor     Biventricular cardiac pacemaker in situ 09/24/2018    CAD (coronary artery disease) 12/02/2013    - 9/2013 Left heart Cath: LAD- 60% prox (neg FFR), nl, ramus and RCA 20% - LDL goal <70 - BP goal <130/80    Cancer     skin cancer on back    Carpal tunnel syndrome of left wrist 08/08/2017    surgery to correct    Chronic bronchitis     Chronic diastolic heart failure 01/19/2018    Chronic obstructive pulmonary disease 10/03/2018    Chronic right-sided thoracic back pain 04/27/2017    Cough     not currently a problem    Current use of long term anticoagulation 06/02/2016    -  Eliquis for Afib-no longer taking    Dizziness     intermittent    DJD (degenerative joint disease) of knee     right    ED (erectile dysfunction)     Hammer toe of right foot     Hard of hearing 11/2016    History of cataract     Hyponatremia 06/11/2019    Memory loss     Mixed hyperlipidemia 07/28/2012    Persistent atrial fibrillation 07/01/2015    - followed by Dr. Esparza - history of cardioversion - on Eliquis OAC 5 mg BID    Pulmonary hypertension     Seizure disorder     Seizures     last episode 1995    Subclinical hypothyroidism     no meds    Tension type headache 07/02/2019    not as frequent    Vitamin D deficiency      Past Surgical History:   Procedure Laterality Date    CARDIAC CATHETERIZATION      CARDIAC PACEMAKER PLACEMENT  2014    CARDIOVERSION      multiple    CARPAL TUNNEL RELEASE Left     CATARACT EXTRACTION Bilateral     COLONOSCOPY      several    CORRECTION OF HAMMER TOE Right 3/21/2025    Procedure: CORRECTION, HAMMER TOE;  Surgeon: Geronimo Lehman Jr., DPM;  [facility];  Service: Podiatry;  Laterality: Right;  pop saph. Toes 2-5    ECHOCARDIOGRAM,TRANSESOPHAGEAL N/A 06/16/2023    Procedure: Transesophageal echo (GALILEO) intra-procedure log documentation;  Surgeon: [facility] Diagnostic Provider;  Location: [facility] EP LAB;  Service: Cardiology;  Laterality: N/A;  s/p WM, GALILEO, anes, 3 Prep    FRACTURE SURGERY Right     ankle with hardware    HERNIA REPAIR      abdominal    INJECTION OF ANESTHETIC AGENT AROUND MEDIAL BRANCH NERVES INNERVATING LUMBAR FACET JOINT Bilateral 01/17/2019    Procedure: Block-nerve-medial branch-lumbar L2-L5;  Surgeon: Anthony Moffett MD;  Location: [facility] OR;  Service: Pain Management;  Laterality: Bilateral;    JOINT REPLACEMENT Bilateral     shoulders    OCCLUSION OF LEFT ATRIAL APPENDAGE N/A 05/05/2023    Procedure: Left atrial appendage occlusion;  Surgeon: Hema Del Valle MD;  Location: [facility] EP LAB;  Service: Cardiology;  Laterality: N/A;  afib, watchman, BSCI,  ben, jacquie, MB, 3prep    RADIOFREQUENCY ABLATION OF LUMBAR MEDIAL BRANCH NERVE AT SINGLE LEVEL Bilateral 01/23/2019    Procedure: Radiofrequency Ablation, Nerve, Spinal, Lumbar, Medial Branch, L2-L5;  Surgeon: Anthony Moffett MD;  Location: [facility] OR;  Service: Pain Management;  Laterality: Bilateral;    SURGICAL REMOVAL OF METATARSAL HEAD Right 3/21/2025    Procedure: OSTECTOMY, METATARSAL BONE, HEAD;  Surgeon: Geronimo Lehman Jr., DP;  Location: [facility] OR;  Service: Podiatry;  Laterality: Right;  pop saph. Toes 2-5    TRANSESOPHAGEAL ECHOCARDIOGRAPHY N/A 05/05/2023    Procedure: ECHOCARDIOGRAM, TRANSESOPHAGEAL;  Surgeon: Dylan Layne MD;  Location: [facility] EP LAB;  Service: Cardiology;  Laterality: N/A;    TREATMENT OF CARDIAC ARRHYTHMIA N/A 07/11/2022    Procedure: Cardioversion or Defibrillation;  Surgeon: MIREILLE Morel MD;  Location: [facility] EP LAB;  Service: Cardiology;  Laterality: N/A;  AF, BEN, DCCV, MAC, EH (to cover for SK), 3 Prep *SJM CRT-P in situ*    WISDOM TOOTH EXTRACTION       Family History   Problem Relation Name Age of Onset    Heart disease Mother Odalis     Heart failure Mother Odalis     Dementia Mother Odalis     Cancer Father Darío         colon    Blindness Father Darío         accident    Cataracts Father Darío     Hypertension Father Darío     Tremor Father Darío     Tremor Brother      Cancer Daughter Shlomo         cancer as a baby, unsure of what kind    Obesity Daughter Judy     No Known Problems Son Giovanny     Tremor Paternal Grandfather      Melanoma Neg Hx      Amblyopia Neg Hx      Diabetes Neg Hx      Glaucoma Neg Hx      Macular degeneration Neg Hx      Retinal detachment Neg Hx      Strabismus Neg Hx      Stroke Neg Hx      Thyroid disease Neg Hx       Current Medications[1]  Review of patient's allergies indicates:   Allergen Reactions    Bactroban [mupirocin calcium] Rash     Other reaction(s): Rash     Social History[2]    ROS  REVIEW OF SYSTEMS: Negative as documented below as well  as positive findings in bold.       Constitutional  Respiratory  Gastrointestinal  Skin   - Fever - Cough - Heartburn - Rash   - Chills - Spit blood - Nausea - Itching   - Weight Loss - Shortness of breath - Vomiting - Nail pain   - Malaise/Fatigue - Wheezing - Abdominal Pain  Wound/Ulcer   - Weight Gain   - Blood in Stool         - Diarrhea          Cardiovascular  Genitourinary  Neurological  HEENT   - Chest Pain - Dysuria - Dizziness - Headache   - Palpitations - Hematuria - Tingling - Congestion   - Pain at night in legs - Flank Pain - Tremor - Sore Throat   - Cramping   - Sensory Change - Blurred Vision   - Leg Swelling   - Speech Change - Double Vision   - Dizzy when standing   - Focal Weakness - Eye Redness       - Seizures - Dry Eyes       - Loss of Consciousness          Endocrine  Musculoskeletal  Psychiatric   - Cold intolerance - Muscle Pain - Depression   - Heat intolerance - Neck Pain - Insomnia   - Anemia - Joint Pain - Memory Loss   -  Easy bruising, bleeding - Heel pain - Anxiety      Toe Pain        Leg/Ankle/Foot Pain         Objective:     There were no vitals taken for this visit.    Physical Exam    Neck:  Trachea Midline  No visible masses    Respiratory/Ears:  No distress or labored breathing.  Able to differentiate between normal talking voice and whisper.  Able to follow commands    Eyes:  Visual Acuity intact  No discoloration noted.    Physical Exam  Ortho Exam  Foot Exam    R LE exam con't:  V: DP 2/4, PT 2/4, CRT< 3s to all digits tested.    N: SILT in SP/DP/T/Ebonie/Saph distributions    Ortho: +Motor EHL/FHL/TA/GA   Surgical site pain present as expected    Surgical site swelling present and moderate   Kwires intact   Compartments soft/compressible. No pain on passive stretch of big toe. No calf  Pain.     Derm: Skin intact. Sutures/staples: intact. Signs of infection: none.     Imaging / Labs:    Lab Results   Component Value Date    WBC 7.50 02/20/2025    WBC 7.51 08/26/2024    WBC  11.19 08/25/2024    WBC 6.34 04/26/2024    WBC 6.48 04/28/2023    PROCAL 0.07 12/04/2020    SEDRATE 10 06/15/2016    .5 (H) 12/04/2020    CRP 11.2 (H) 06/15/2016    PREALBUMIN 23 02/20/2025       Lab Results   Component Value Date    PREALBUMIN 23 02/20/2025       X-Ray Foot Complete Right  Result Date: 3/21/2025  EXAMINATION: XR FOOT COMPLETE 3 VIEW RIGHT CLINICAL HISTORY: postop;. TECHNIQUE: AP, lateral, and oblique views of the right foot were performed. COMPARISON: 01/09/2025. FINDINGS: There are postsurgical changes related to hammertoe correction.  There has been surgical resection of the distal heads of the right 2nd through 5th metatarsal bones and the distal aspects of the proximal phalanges of the 2nd through 5th toes.  There are K-wires extending through the phalanges of the 2nd, 3rd, and 4th toes extending to the distal aspects of the 2nd, 3rd, and 4th metatarsal bones.  There are skin staples along the dorsum of the foot at the level of the 2nd through 5th metatarsophalangeal joints.  There are surgical changes related to prior remote open reduction and internal fixation of the distal tibia and fibula.  The remainder of the examination is unchanged.     Postoperative changes as detailed above. Electronically signed by: Terry Gallardo MD Date:    03/21/2025 Time:    11:21    SURG FL Surgery Fluoro Usage  Result Date: 3/21/2025  See OP Notes for results. IMPRESSION: See OP Notes for results. This procedure was auto-finalized by: Virtual Radiologist             [1]   Current Outpatient Medications   Medication Sig Dispense Refill    ascorbic acid, vitamin C, (VITAMIN C) 500 MG tablet Take 1 tablet (500 mg total) by mouth once daily. 30 tablet 1    aspirin (ECOTRIN) 81 MG EC tablet Take 1 tablet (81 mg total) by mouth once daily. 90 tablet 1    atorvastatin (LIPITOR) 80 MG tablet TAKE 1 TABLET ONE TIME DAILY (Patient taking differently: Take 80 mg by mouth every evening.) 90 tablet 3    cetirizine  (ZYRTEC) 10 MG tablet Take 10 mg by mouth every evening.      citalopram (CELEXA) 20 MG tablet Take 1 tablet (20 mg total) by mouth once daily. 90 tablet 0    ergocalciferol (ERGOCALCIFEROL) 50,000 unit Cap Take 1 capsule (50,000 Units total) by mouth every 7 days. (Patient taking differently: Take 50,000 Units by mouth every 7 days. Instructed to hold for sx) 8 capsule 0    gabapentin (NEURONTIN) 100 MG capsule Take 1 capsule (100 mg total) by mouth 2 (two) times daily. 60 capsule 1    lamoTRIgine (LAMICTAL) 100 MG tablet Take 1 tablet (100 mg total) by mouth 2 (two) times daily. 180 tablet 3    magnesium oxide (MAG-OX) 400 mg (241.3 mg magnesium) tablet Take 1 tablet (400 mg total) by mouth once daily. (Patient taking differently: Take 400 mg by mouth once daily. Instructed to hold for sx) 90 tablet 3    memantine (NAMENDA) 10 MG Tab Take 1 tablet (10 mg total) by mouth 2 (two) times daily. 180 tablet 3    metoprolol succinate (TOPROL-XL) 50 MG 24 hr tablet TAKE 1 TABLET EVERY DAY (Patient taking differently: Take 50 mg by mouth once daily.) 90 tablet 3    primidone (MYSOLINE) 250 MG Tab TAKE 2 TABLETS TWICE DAILY (NEED MD APPOINTMENT FOR REFILLS) (Patient taking differently: Take 500 mg by mouth 2 (two) times a day.) 360 tablet 3    riboflavin, vitamin B2, (VITAMIN B-2) 100 mg Tab tablet Take 2 tablets (200 mg total) by mouth once daily. (Patient taking differently: Take 100 mg by mouth once daily. Instructed to hold for sx) 180 tablet 3    torsemide (DEMADEX) 10 MG Tab Take 1 tablet (10 mg total) by mouth every morning. (Patient taking differently: Take 10 mg by mouth every morning. Instructed to hold dos) 90 tablet 3    tramadol-acetaminophen 37.5-325 mg (ULTRACET) 37.5-325 mg Tab Take 1 tablet by mouth every 6 (six) hours as needed for Pain. 25 tablet 0    TRELEGY ELLIPTA 100-62.5-25 mcg DsDv INHALE 1 PUFF ONE TIME DAILY (Patient taking differently: Inhale 1 puff into the lungs every morning.) 180 each 3      No current facility-administered medications for this visit.     Facility-Administered Medications Ordered in Other Visits   Medication Dose Route Frequency Provider Last Rate Last Admin    0.9%  NaCl infusion (for blood administration)   Intravenous Q24H Batool Pitts NP       [2]   Social History  Socioeconomic History    Marital status:    Occupational History    Occupation: retired   Tobacco Use    Smoking status: Former     Current packs/day: 0.00     Average packs/day: 1 pack/day for 5.0 years (5.0 ttl pk-yrs)     Types: Cigarettes     Start date:      Quit date:      Years since quittin.2    Smokeless tobacco: Never    Tobacco comments:     - I   Substance and Sexual Activity    Alcohol use: No    Drug use: No    Sexual activity: Not Currently     Partners: Female   Social History Narrative    . 4 adult children.      Social Drivers of Health     Financial Resource Strain: High Risk (2024)    Overall Financial Resource Strain (CARDIA)     Difficulty of Paying Living Expenses: Very hard   Food Insecurity: No Food Insecurity (2024)    Hunger Vital Sign     Worried About Running Out of Food in the Last Year: Never true     Ran Out of Food in the Last Year: Never true   Transportation Needs: No Transportation Needs (2024)    TRANSPORTATION NEEDS     Transportation : No   Physical Activity: Inactive (2024)    Exercise Vital Sign     Days of Exercise per Week: 0 days     Minutes of Exercise per Session: 0 min   Stress: No Stress Concern Present (2024)    [employer] - Occupational Stress Questionnaire     Feeling of Stress : Not at all   Housing Stability: Unknown (2024)    Housing Stability Vital Sign     Unable to Pay for Housing in the Last Year: No     Homeless in the Last Year: No

## 2025-03-27 ENCOUNTER — PATIENT MESSAGE (OUTPATIENT)
Dept: NEUROLOGY | Facility: CLINIC | Age: 85
End: 2025-03-27
Payer: MEDICARE

## 2025-03-27 DIAGNOSIS — G40.909 NONINTRACTABLE EPILEPSY WITHOUT STATUS EPILEPTICUS, UNSPECIFIED EPILEPSY TYPE: Primary | ICD-10-CM

## 2025-03-27 RX ORDER — LAMOTRIGINE 100 MG/1
100 TABLET ORAL 2 TIMES DAILY
Qty: 10 TABLET | Refills: 0 | Status: SHIPPED | OUTPATIENT
Start: 2025-03-27 | End: 2026-03-27

## 2025-03-27 RX ORDER — LAMOTRIGINE 100 MG/1
100 TABLET ORAL 2 TIMES DAILY
Qty: 14 TABLET | Refills: 0 | Status: SHIPPED | OUTPATIENT
Start: 2025-03-27 | End: 2025-03-27 | Stop reason: SDUPTHER

## 2025-04-04 ENCOUNTER — PATIENT MESSAGE (OUTPATIENT)
Dept: PODIATRY | Facility: CLINIC | Age: 85
End: 2025-04-04
Payer: MEDICARE

## 2025-04-04 DIAGNOSIS — M79.671 BILATERAL FOOT PAIN: Primary | ICD-10-CM

## 2025-04-04 DIAGNOSIS — M79.672 BILATERAL FOOT PAIN: Primary | ICD-10-CM

## 2025-04-09 ENCOUNTER — OFFICE VISIT (OUTPATIENT)
Dept: PODIATRY | Facility: CLINIC | Age: 85
End: 2025-04-09
Payer: MEDICARE

## 2025-04-09 ENCOUNTER — PATIENT MESSAGE (OUTPATIENT)
Dept: PODIATRY | Facility: CLINIC | Age: 85
End: 2025-04-09
Payer: MEDICARE

## 2025-04-09 VITALS — WEIGHT: 212.94 LBS | BODY MASS INDEX: 28.84 KG/M2 | HEIGHT: 72 IN

## 2025-04-09 DIAGNOSIS — Z47.89 AFTERCARE FOLLOWING SURGERY OF THE MUSCULOSKELETAL SYSTEM: Primary | ICD-10-CM

## 2025-04-09 PROCEDURE — 1159F MED LIST DOCD IN RCRD: CPT | Mod: HCNC,CPTII,S$GLB, | Performed by: PODIATRIST

## 2025-04-09 PROCEDURE — 99999 PR PBB SHADOW E&M-EST. PATIENT-LVL III: CPT | Mod: PBBFAC,HCNC,, | Performed by: PODIATRIST

## 2025-04-09 PROCEDURE — 99024 POSTOP FOLLOW-UP VISIT: CPT | Mod: HCNC,S$GLB,, | Performed by: PODIATRIST

## 2025-04-09 PROCEDURE — 1126F AMNT PAIN NOTED NONE PRSNT: CPT | Mod: HCNC,CPTII,S$GLB, | Performed by: PODIATRIST

## 2025-04-09 RX ORDER — CEPHALEXIN 500 MG/1
500 CAPSULE ORAL EVERY 6 HOURS
Qty: 28 CAPSULE | Refills: 0 | Status: SHIPPED | OUTPATIENT
Start: 2025-04-09 | End: 2025-04-16

## 2025-04-09 NOTE — PROGRESS NOTES
[place] - PODIATRY  [address]  [address]  [place] [address]  Dept: [phone]  Dept Fax: [phone]    Geronimo Lehman Jr., DPM   Geronimo Lehman Jr.     Post-Operative Visit  Assessment:     1. Aftercare following surgery of the musculoskeletal system            Plan:   MDM    Coding  3 wk s/p    - pt seen evaluated and managed  -XR reviewed   Postop changes s/p pan metatarsal head resection with hammertoe repair 2-5. Hardware in place and intact but kwires look to have migrated out some compared to immediate postoperative images. Pipj fusion sites appear to be still reduced but loss of correction noted.     - skin healed. Sutures removed and steris applied  - final dressings re-applied. Keep c/d/I x 1 wk then ok to remove at home  - kwires in place and intact but have moved since last visit due to pt adherence to precautions - administered precautions once again  - wb: pwbat on heel RLE  - rx dispensed: none  - referrals: none    Follow up in about 3 weeks (around 4/30/2025).      Subjective:      Patient ID: Heri Muhammad is a 84 y.o. male.    Chief Complaint:   Chief Complaint   Patient presents with    Post-op Evaluation    Foot Injury     Right foot post op     There were no vitals filed for this visit.    DOS: 3/21/25  Procedure: R foot pan MT head resection+ HT repair 2-5    Heri Muhammad returns to the clinic today for a postop visit. Pt is s/p above procedure. Heri Muhammad rates pain at a 4/10 on visual analog scale. Denies n/v/f/c. Pt reports may have bumped operative foot.    HPI      Outside reports reviewed: historical medical records.    Past Medical History:   Diagnosis Date    Anticoagulant long-term use     no longer taking eliquis    Aortic atherosclerosis 09/18/2017    - LDL goal <70 - BP goal <130/80    Benign essential tremor     Biventricular cardiac pacemaker in situ 09/24/2018    CAD (coronary artery disease) 12/02/2013    -  9/2013 Left heart Cath: LAD- 60% prox (neg FFR), nl, ramus and RCA 20% - LDL goal <70 - BP goal <130/80    Cancer     skin cancer on back    Carpal tunnel syndrome of left wrist 08/08/2017    surgery to correct    Chronic bronchitis     Chronic diastolic heart failure 01/19/2018    Chronic obstructive pulmonary disease 10/03/2018    Chronic right-sided thoracic back pain 04/27/2017    Cough     not currently a problem    Current use of long term anticoagulation 06/02/2016    - Eliquis for Afib-no longer taking    Dizziness     intermittent    DJD (degenerative joint disease) of knee     right    ED (erectile dysfunction)     Hammer toe of right foot     Hard of hearing 11/2016    History of cataract     Hyponatremia 06/11/2019    Memory loss     Mixed hyperlipidemia 07/28/2012    Persistent atrial fibrillation 07/01/2015    - followed by Dr. Esparza - history of cardioversion - on Eliquis OAC 5 mg BID    Pulmonary hypertension     Seizure disorder     Seizures     last episode 1995    Subclinical hypothyroidism     no meds    Tension type headache 07/02/2019    not as frequent    Vitamin D deficiency      Past Surgical History:   Procedure Laterality Date    CARDIAC CATHETERIZATION      CARDIAC PACEMAKER PLACEMENT  2014    CARDIOVERSION      multiple    CARPAL TUNNEL RELEASE Left     CATARACT EXTRACTION Bilateral     COLONOSCOPY      several    CORRECTION OF HAMMER TOE Right 3/21/2025    Procedure: CORRECTION, HAMMER TOE;  Surgeon: Geronimo Lehman Jr., CHACE;  Location: [facility] OR;  Service: Podiatry;  Laterality: Right;  pop saph. Toes 2-5    ECHOCARDIOGRAM,TRANSESOPHAGEAL N/A 06/16/2023    Procedure: Transesophageal echo (GALILEO) intra-procedure log documentation;  Surgeon: Karen Diagnostic Provider;  Location: [facility] EP LAB;  Service: Cardiology;  Laterality: N/A;  s/p WM, GALILEO, anes, 3 Prep    FRACTURE SURGERY Right     ankle with hardware    HERNIA REPAIR      abdominal    INJECTION OF ANESTHETIC AGENT AROUND MEDIAL  BRANCH NERVES INNERVATING LUMBAR FACET JOINT Bilateral 01/17/2019    Procedure: Block-nerve-medial branch-lumbar L2-L5;  Surgeon: Anthony Moffett MD;  Location: [facility] OR;  Service: Pain Management;  Laterality: Bilateral;    JOINT REPLACEMENT Bilateral     shoulders    OCCLUSION OF LEFT ATRIAL APPENDAGE N/A 05/05/2023    Procedure: Left atrial appendage occlusion;  Surgeon: Hema Del Valle MD;  Location: [facility] EP LAB;  Service: Cardiology;  Laterality: N/A;  afib, watchman, BSCI, ben, anes, MB, 3prep    RADIOFREQUENCY ABLATION OF LUMBAR MEDIAL BRANCH NERVE AT SINGLE LEVEL Bilateral 01/23/2019    Procedure: Radiofrequency Ablation, Nerve, Spinal, Lumbar, Medial Branch, L2-L5;  Surgeon: Anthony Moffett MD;  Location: [facility] OR;  Service: Pain Management;  Laterality: Bilateral;    SURGICAL REMOVAL OF METATARSAL HEAD Right 3/21/2025    Procedure: OSTECTOMY, METATARSAL BONE, HEAD;  Surgeon: Geronimo Lehman Jr. DPM;  Location: [facility] OR;  Service: Podiatry;  Laterality: Right;  pop saph. Toes 2-5    TRANSESOPHAGEAL ECHOCARDIOGRAPHY N/A 05/05/2023    Procedure: ECHOCARDIOGRAM, TRANSESOPHAGEAL;  Surgeon: Dylan Layne MD;  Location: [facility] EP LAB;  Service: Cardiology;  Laterality: N/A;    TREATMENT OF CARDIAC ARRHYTHMIA N/A 07/11/2022    Procedure: Cardioversion or Defibrillation;  Surgeon: MIREILLE Morel MD;  Location: [facility] EP LAB;  Service: Cardiology;  Laterality: N/A;  AF, BEN, DCCV, MAC, EH (to cover for SK), 3 Prep *SJM CRT-P in situ*    WISDOM TOOTH EXTRACTION       Family History   Problem Relation Name Age of Onset    Heart disease Mother Odalis     Heart failure Mother Odalis     Dementia Mother Odalis     Cancer Father Darío         colon    Blindness Father Darío         accident    Cataracts Father Darío     Hypertension Father Darío     Tremor Father Darío     Tremor Brother      Cancer Daughter Shlomo         cancer as a baby, unsure of what kind    Obesity Daughter Judy     No Known Problems Son Giovanny      Tremor Paternal Grandfather      Melanoma Neg Hx      Amblyopia Neg Hx      Diabetes Neg Hx      Glaucoma Neg Hx      Macular degeneration Neg Hx      Retinal detachment Neg Hx      Strabismus Neg Hx      Stroke Neg Hx      Thyroid disease Neg Hx       Current Medications[1]  Review of patient's allergies indicates:   Allergen Reactions    Bactroban [mupirocin calcium] Rash     Other reaction(s): Rash     Social History[2]    ROS  REVIEW OF SYSTEMS: Negative as documented below as well as positive findings in bold.       Constitutional  Respiratory  Gastrointestinal  Skin   - Fever - Cough - Heartburn - Rash   - Chills - Spit blood - Nausea - Itching   - Weight Loss - Shortness of breath - Vomiting - Nail pain   - Malaise/Fatigue - Wheezing - Abdominal Pain  Wound/Ulcer   - Weight Gain   - Blood in Stool         - Diarrhea          Cardiovascular  Genitourinary  Neurological  HEENT   - Chest Pain - Dysuria - Dizziness - Headache   - Palpitations - Hematuria - Tingling - Congestion   - Pain at night in legs - Flank Pain - Tremor - Sore Throat   - Cramping   - Sensory Change - Blurred Vision   - Leg Swelling   - Speech Change - Double Vision   - Dizzy when standing   - Focal Weakness - Eye Redness       - Seizures - Dry Eyes       - Loss of Consciousness          Endocrine  Musculoskeletal  Psychiatric   - Cold intolerance - Muscle Pain - Depression   - Heat intolerance - Neck Pain - Insomnia   - Anemia - Joint Pain - Memory Loss   -  Easy bruising, bleeding - Heel pain - Anxiety      Toe Pain        Leg/Ankle/Foot Pain         Objective:     Ht 6' (1.829 m)   Wt 96.6 kg (212 lb 15.4 oz)   BMI 28.88 kg/m²     Physical Exam    Neck:  Trachea Midline  No visible masses    Respiratory/Ears:  No distress or labored breathing.  Able to differentiate between normal talking voice and whisper.  Able to follow commands    Eyes:  Visual Acuity intact  No discoloration noted.    Physical Exam  Ortho Exam  Foot Exam    R LE  exam con't:  V: DP 2/4, PT 2/4, CRT< 3s to all digits tested.    N: SILT in SP/DP/T/Ebonie/Saph distributions    Ortho: +Motor EHL/FHL/TA/GA   Surgical site pain present as expected    Surgical site swelling present and moderate   Kwires intact   Compartments soft/compressible. No pain on passive stretch of big toe. No calf  Pain.     Derm: Skin intact. Sutures/staples: intact. Signs of infection: none.     Imaging / Labs:    Lab Results   Component Value Date    WBC 7.50 02/20/2025    WBC 7.51 08/26/2024    WBC 11.19 08/25/2024    WBC 6.34 04/26/2024    WBC 6.48 04/28/2023    PROCAL 0.07 12/04/2020    SEDRATE 10 06/15/2016    .5 (H) 12/04/2020    CRP 11.2 (H) 06/15/2016    PREALBUMIN 23 02/20/2025       Lab Results   Component Value Date    PREALBUMIN 23 02/20/2025       X-Ray Foot Complete 3 view Bilateral  Result Date: 4/9/2025  EXAMINATION: XR FOOT COMPLETE 3 VIEW BILATERAL CLINICAL HISTORY: Pain in right foot TECHNIQUE: AP, lateral, and oblique views of both feet were performed. COMPARISON: Right foot dated 03/26/2025 and bilateral feet dated 01/09/2025. FINDINGS: Right foot: Once again, there are postoperative changes of the right foot with resection of portions of the heads of the 2nd through 5th metatarsal bones with hammertoe repair of the 2nd through 5th toes.  Note that there are K-wires identified within the 2nd, 3rd, and 4th toes.  Since the prior examination the K-wires of the 2nd and 3rd toes have been retracted with the more proximal tips now located near the resection planes of the heads of the 2nd and 3rd metatarsal bones.  There are skin staples present.  There are degenerative changes involving the 1st metatarsophalangeal joint as before.  Small calcaneal spur is present at the insertion of the plantar fascia.  There is fixation hardware within the distal tibia and fibula. Left foot: The bones are intact.  There is no evidence for acute fracture or bone destruction.  There appear to be  hammertoe deformities of the 2nd through 5th toes.  There are degenerative changes within the small joints of the foot.  Calcaneal spurs are present at the insertions of the Achilles tendon and plantar fascia.  Soft tissues appear grossly unremarkable.     Postoperative changes of the right foot.  Interval resection of the K-wires within the 2nd and 3rd toes. Hammertoe deformities of the left 2nd through 5th toes with mild degenerative changes. Calcaneal spurs at the insertions of the plantar fascia, larger on the left than right.  There is also a small bony spur at the insertion of the Achilles tendon on the left. Electronically signed by: Terry Gallardo MD Date:    04/09/2025 Time:    10:01    X-Ray Foot Complete Right  Result Date: 3/26/2025  EXAMINATION: XR FOOT COMPLETE 3 VIEW RIGHT CLINICAL HISTORY: . Encounter for other orthopedic aftercare TECHNIQUE: AP, lateral, and oblique views of the right foot were performed. FINDINGS: There is extensive fixation hardware identified within the distal tibia and fibula as well as the 2nd through 4th toes and metatarsals.  There is satisfactory osseous alignment.  There is no dislocation.     As above. Electronically signed by: Terry Collins MD Date:    03/26/2025 Time:    12:43    X-Ray Foot Complete Right  Result Date: 3/21/2025  EXAMINATION: XR FOOT COMPLETE 3 VIEW RIGHT CLINICAL HISTORY: postop;. TECHNIQUE: AP, lateral, and oblique views of the right foot were performed. COMPARISON: 01/09/2025. FINDINGS: There are postsurgical changes related to hammertoe correction.  There has been surgical resection of the distal heads of the right 2nd through 5th metatarsal bones and the distal aspects of the proximal phalanges of the 2nd through 5th toes.  There are K-wires extending through the phalanges of the 2nd, 3rd, and 4th toes extending to the distal aspects of the 2nd, 3rd, and 4th metatarsal bones.  There are skin staples along the dorsum of the foot at the level of the 2nd  through 5th metatarsophalangeal joints.  There are surgical changes related to prior remote open reduction and internal fixation of the distal tibia and fibula.  The remainder of the examination is unchanged.     Postoperative changes as detailed above. Electronically signed by: Terry Gallardo MD Date:    03/21/2025 Time:    11:21    SURG FL Surgery Fluoro Usage  Result Date: 3/21/2025  See OP Notes for results. IMPRESSION: See OP Notes for results. This procedure was auto-finalized by: Virtual Radiologist               [1]   Current Outpatient Medications   Medication Sig Dispense Refill    ascorbic acid, vitamin C, (VITAMIN C) 500 MG tablet Take 1 tablet (500 mg total) by mouth once daily. 30 tablet 1    aspirin (ECOTRIN) 81 MG EC tablet Take 1 tablet (81 mg total) by mouth once daily. 90 tablet 1    atorvastatin (LIPITOR) 80 MG tablet TAKE 1 TABLET ONE TIME DAILY (Patient taking differently: Take 80 mg by mouth every evening.) 90 tablet 3    cetirizine (ZYRTEC) 10 MG tablet Take 10 mg by mouth every evening.      citalopram (CELEXA) 20 MG tablet Take 1 tablet (20 mg total) by mouth once daily. 90 tablet 0    ergocalciferol (ERGOCALCIFEROL) 50,000 unit Cap Take 1 capsule (50,000 Units total) by mouth every 7 days. (Patient taking differently: Take 50,000 Units by mouth every 7 days. Instructed to hold for sx) 8 capsule 0    gabapentin (NEURONTIN) 100 MG capsule Take 1 capsule (100 mg total) by mouth 2 (two) times daily. 60 capsule 1    lamoTRIgine (LAMICTAL) 100 MG tablet Take 1 tablet (100 mg total) by mouth 2 (two) times daily. 180 tablet 3    lamoTRIgine (LAMICTAL) 100 MG tablet Take 1 tablet (100 mg total) by mouth 2 (two) times daily. 10 tablet 0    magnesium oxide (MAG-OX) 400 mg (241.3 mg magnesium) tablet Take 1 tablet (400 mg total) by mouth once daily. (Patient taking differently: Take 400 mg by mouth once daily. Instructed to hold for sx) 90 tablet 3    memantine (NAMENDA) 10 MG Tab Take 1 tablet (10 mg  total) by mouth 2 (two) times daily. 180 tablet 3    metoprolol succinate (TOPROL-XL) 50 MG 24 hr tablet TAKE 1 TABLET EVERY DAY (Patient taking differently: Take 50 mg by mouth once daily.) 90 tablet 3    primidone (MYSOLINE) 250 MG Tab TAKE 2 TABLETS TWICE DAILY (NEED MD APPOINTMENT FOR REFILLS) (Patient taking differently: Take 500 mg by mouth 2 (two) times a day.) 360 tablet 3    riboflavin, vitamin B2, (VITAMIN B-2) 100 mg Tab tablet Take 2 tablets (200 mg total) by mouth once daily. (Patient taking differently: Take 100 mg by mouth once daily. Instructed to hold for sx) 180 tablet 3    torsemide (DEMADEX) 10 MG Tab Take 1 tablet (10 mg total) by mouth every morning. (Patient taking differently: Take 10 mg by mouth every morning. Instructed to hold dos) 90 tablet 3    tramadol-acetaminophen 37.5-325 mg (ULTRACET) 37.5-325 mg Tab Take 1 tablet by mouth every 6 (six) hours as needed for Pain. 25 tablet 0    TRELEGY ELLIPTA 100-62.5-25 mcg DsDv INHALE 1 PUFF ONE TIME DAILY (Patient taking differently: Inhale 1 puff into the lungs every morning.) 180 each 3     No current facility-administered medications for this visit.     Facility-Administered Medications Ordered in Other Visits   Medication Dose Route Frequency Provider Last Rate Last Admin    0.9%  NaCl infusion (for blood administration)   Intravenous Q24H Batool Pitts NP       [2]   Social History  Socioeconomic History    Marital status:    Occupational History    Occupation: retired   Tobacco Use    Smoking status: Former     Current packs/day: 0.00     Average packs/day: 1 pack/day for 5.0 years (5.0 ttl pk-yrs)     Types: Cigarettes     Start date:      Quit date:      Years since quittin.3    Smokeless tobacco: Never    Tobacco comments:     - I   Substance and Sexual Activity    Alcohol use: No    Drug use: No    Sexual activity: Not Currently     Partners: Female   Social History Narrative    . 4 adult  children.      Social Drivers of Health     Financial Resource Strain: High Risk (8/26/2024)    Overall Financial Resource Strain (CARDIA)     Difficulty of Paying Living Expenses: Very hard   Food Insecurity: No Food Insecurity (8/26/2024)    Hunger Vital Sign     Worried About Running Out of Food in the Last Year: Never true     Ran Out of Food in the Last Year: Never true   Transportation Needs: No Transportation Needs (8/26/2024)    TRANSPORTATION NEEDS     Transportation : No   Physical Activity: Inactive (8/26/2024)    Exercise Vital Sign     Days of Exercise per Week: 0 days     Minutes of Exercise per Session: 0 min   Stress: No Stress Concern Present (8/26/2024)    [employer] - Occupational Stress Questionnaire     Feeling of Stress : Not at all   Housing Stability: Unknown (8/26/2024)    Housing Stability Vital Sign     Unable to Pay for Housing in the Last Year: No     Homeless in the Last Year: No

## 2025-04-14 DIAGNOSIS — Z47.89 AFTERCARE FOLLOWING SURGERY OF THE MUSCULOSKELETAL SYSTEM: Primary | ICD-10-CM

## 2025-04-17 ENCOUNTER — TELEPHONE (OUTPATIENT)
Dept: PODIATRY | Facility: CLINIC | Age: 85
End: 2025-04-17
Payer: MEDICARE

## 2025-04-17 ENCOUNTER — OFFICE VISIT (OUTPATIENT)
Dept: PODIATRY | Facility: CLINIC | Age: 85
End: 2025-04-17
Payer: MEDICARE

## 2025-04-17 ENCOUNTER — PATIENT MESSAGE (OUTPATIENT)
Dept: PODIATRY | Facility: CLINIC | Age: 85
End: 2025-04-17

## 2025-04-17 ENCOUNTER — PATIENT MESSAGE (OUTPATIENT)
Dept: ELECTROPHYSIOLOGY | Facility: CLINIC | Age: 85
End: 2025-04-17
Payer: MEDICARE

## 2025-04-17 DIAGNOSIS — M77.42 METATARSALGIA OF BOTH FEET: ICD-10-CM

## 2025-04-17 DIAGNOSIS — M20.42 HAMMERTOES OF BOTH FEET: ICD-10-CM

## 2025-04-17 DIAGNOSIS — M77.41 METATARSALGIA OF BOTH FEET: ICD-10-CM

## 2025-04-17 DIAGNOSIS — M20.41 HAMMERTOES OF BOTH FEET: ICD-10-CM

## 2025-04-17 DIAGNOSIS — Z47.89 AFTERCARE FOLLOWING SURGERY OF THE MUSCULOSKELETAL SYSTEM: Primary | ICD-10-CM

## 2025-04-17 PROCEDURE — 1159F MED LIST DOCD IN RCRD: CPT | Mod: CPTII,S$GLB,, | Performed by: PODIATRIST

## 2025-04-17 PROCEDURE — 1126F AMNT PAIN NOTED NONE PRSNT: CPT | Mod: CPTII,S$GLB,, | Performed by: PODIATRIST

## 2025-04-17 PROCEDURE — 1101F PT FALLS ASSESS-DOCD LE1/YR: CPT | Mod: CPTII,S$GLB,, | Performed by: PODIATRIST

## 2025-04-17 PROCEDURE — 99999 PR PBB SHADOW E&M-EST. PATIENT-LVL III: CPT | Mod: PBBFAC,HCNC,, | Performed by: PODIATRIST

## 2025-04-17 PROCEDURE — 3288F FALL RISK ASSESSMENT DOCD: CPT | Mod: CPTII,S$GLB,, | Performed by: PODIATRIST

## 2025-04-17 PROCEDURE — 99024 POSTOP FOLLOW-UP VISIT: CPT | Mod: S$GLB,,, | Performed by: PODIATRIST

## 2025-04-17 RX ORDER — GABAPENTIN 100 MG/1
100 CAPSULE ORAL 2 TIMES DAILY
Qty: 60 CAPSULE | Refills: 1 | Status: SHIPPED | OUTPATIENT
Start: 2025-04-17 | End: 2025-06-16

## 2025-04-17 NOTE — TELEPHONE ENCOUNTER
Called and spoke to patient's wife Christopherdeepti. Asked if patient would be able to arrive earlier for appt. Patient's wife agreed. Patient's wife understood time, date, and location.

## 2025-04-17 NOTE — PROGRESS NOTES
[facility] - PODIATRY  [address]  [address]  [place] [address]  Dept: [phone]  Dept Fax: [phone]    Geronimo Lehman Jr., DPM   Geronimo Lehman Jr.     Post-Operative Visit  Assessment:     1. Aftercare following surgery of the musculoskeletal system  gabapentin (NEURONTIN) 100 MG capsule    X-Ray Foot Complete Right      2. Hammertoes of both feet  gabapentin (NEURONTIN) 100 MG capsule      3. Metatarsalgia of both feet  gabapentin (NEURONTIN) 100 MG capsule          Plan:   MDM    Coding  6 wk s/p    - pt seen evaluated and managed  -XR reviewed   Postop changes s/p pan metatarsal head resection with hammertoe repair 2-5. Hardware in place and intact. Pipj fusion sites with bone callus on 1 views indicative of some osseous healing. Alignment improved compared to preoperative images.     - kwires removed. Final XRs on way out  - wb: pwbat on heel RLE  - rx dispensed: gabapentin renewed  - referrals: none  PT nxt visit    No follow-ups on file.      Subjective:      Patient ID: Heri Muhammad is a 84 y.o. male.    Chief Complaint:   Chief Complaint   Patient presents with    Post-op Evaluation     There were no vitals filed for this visit.    DOS: 3/21/25  Procedure: R foot pan MT head resection+ HT repair 2-5    Heri Muhammad returns to the clinic today for a postop visit. Pt is s/p above procedure. Heri Muhammad rates pain at a 4/10 on visual analog scale. Denies n/v/f/c. Pt reports may have bumped operative foot again.    HPI      Outside reports reviewed: historical medical records.    Past Medical History:   Diagnosis Date    Anticoagulant long-term use     no longer taking eliquis    Aortic atherosclerosis 09/18/2017    - LDL goal <70 - BP goal <130/80    Benign essential tremor     Biventricular cardiac pacemaker in situ 09/24/2018    CAD (coronary artery disease) 12/02/2013    - 9/2013 Left heart Cath: LAD- 60% prox (neg FFR), nl, ramus and  RCA 20% - LDL goal <70 - BP goal <130/80    Cancer     skin cancer on back    Carpal tunnel syndrome of left wrist 08/08/2017    surgery to correct    Chronic bronchitis     Chronic diastolic heart failure 01/19/2018    Chronic obstructive pulmonary disease 10/03/2018    Chronic right-sided thoracic back pain 04/27/2017    Cough     not currently a problem    Current use of long term anticoagulation 06/02/2016    - Eliquis for Afib-no longer taking    Dizziness     intermittent    DJD (degenerative joint disease) of knee     right    ED (erectile dysfunction)     Hammer toe of right foot     Hard of hearing 11/2016    History of cataract     Hyponatremia 06/11/2019    Memory loss     Mixed hyperlipidemia 07/28/2012    Persistent atrial fibrillation 07/01/2015    - followed by Dr. Esparza - history of cardioversion - on Eliquis OAC 5 mg BID    Pulmonary hypertension     Seizure disorder     Seizures     last episode 1995    Subclinical hypothyroidism     no meds    Tension type headache 07/02/2019    not as frequent    Vitamin D deficiency      Past Surgical History:   Procedure Laterality Date    CARDIAC CATHETERIZATION      CARDIAC PACEMAKER PLACEMENT  2014    CARDIOVERSION      multiple    CARPAL TUNNEL RELEASE Left     CATARACT EXTRACTION Bilateral     COLONOSCOPY      several    CORRECTION OF HAMMER TOE Right 3/21/2025    Procedure: CORRECTION, HAMMER TOE;  Surgeon: Geronimo Lehman Jr., DPBRITTANY;  Location: [facility] OR;  Service: Podiatry;  Laterality: Right;  pop saph. Toes 2-5    ECHOCARDIOGRAM,TRANSESOPHAGEAL N/A 06/16/2023    Procedure: Transesophageal echo (GALILEO) intra-procedure log documentation;  Surgeon: Karen Diagnostic Provider;  Location: [facility] EP LAB;  Service: Cardiology;  Laterality: N/A;  s/p WM, GALILEO, anes, 3 Prep    FRACTURE SURGERY Right     ankle with hardware    HERNIA REPAIR      abdominal    INJECTION OF ANESTHETIC AGENT AROUND MEDIAL BRANCH NERVES INNERVATING LUMBAR FACET JOINT Bilateral 01/17/2019     Procedure: Block-nerve-medial branch-lumbar L2-L5;  Surgeon: Anthony Moffett MD;  Location: [facility] OR;  Service: Pain Management;  Laterality: Bilateral;    JOINT REPLACEMENT Bilateral     shoulders    OCCLUSION OF LEFT ATRIAL APPENDAGE N/A 05/05/2023    Procedure: Left atrial appendage occlusion;  Surgeon: Hema Del Valle MD;  Location: [facility] EP LAB;  Service: Cardiology;  Laterality: N/A;  afib, watchman, BSCI, ben, anes, MB, 3prep    RADIOFREQUENCY ABLATION OF LUMBAR MEDIAL BRANCH NERVE AT SINGLE LEVEL Bilateral 01/23/2019    Procedure: Radiofrequency Ablation, Nerve, Spinal, Lumbar, Medial Branch, L2-L5;  Surgeon: Anthony Moffett MD;  Location: [facility] OR;  Service: Pain Management;  Laterality: Bilateral;    SURGICAL REMOVAL OF METATARSAL HEAD Right 3/21/2025    Procedure: OSTECTOMY, METATARSAL BONE, HEAD;  Surgeon: Geronimo Lehman Jr., DPM;  Location: [facility] OR;  Service: Podiatry;  Laterality: Right;  pop saph. Toes 2-5    TRANSESOPHAGEAL ECHOCARDIOGRAPHY N/A 05/05/2023    Procedure: ECHOCARDIOGRAM, TRANSESOPHAGEAL;  Surgeon: Dylan Layne MD;  Location: [facility] EP LAB;  Service: Cardiology;  Laterality: N/A;    TREATMENT OF CARDIAC ARRHYTHMIA N/A 07/11/2022    Procedure: Cardioversion or Defibrillation;  Surgeon: MIREILLE Morel MD;  Location: [facility] EP LAB;  Service: Cardiology;  Laterality: N/A;  AF, BEN, DCCV, MAC, EH (to cover for SK), 3 Prep *SJM CRT-P in situ*    WISDOM TOOTH EXTRACTION       Family History   Problem Relation Name Age of Onset    Heart disease Mother Odalis     Heart failure Mother Odalis     Dementia Mother Odalis     Cancer Father Darío         colon    Blindness Father Darío         accident    Cataracts Father Darío     Hypertension Father Darío     Tremor Father Darío     Tremor Brother      Cancer Daughter Shlomo         cancer as a baby, unsure of what kind    Obesity Daughter Judy     No Known Problems Son Giovanny     Tremor Paternal Grandfather      Melanoma Neg Hx      Amblyopia Neg Hx       Diabetes Neg Hx      Glaucoma Neg Hx      Macular degeneration Neg Hx      Retinal detachment Neg Hx      Strabismus Neg Hx      Stroke Neg Hx      Thyroid disease Neg Hx       Current Medications[1]  Review of patient's allergies indicates:   Allergen Reactions    Bactroban [mupirocin calcium] Rash     Other reaction(s): Rash     Social History[2]    ROS  REVIEW OF SYSTEMS: Negative as documented below as well as positive findings in bold.       Constitutional  Respiratory  Gastrointestinal  Skin   - Fever - Cough - Heartburn - Rash   - Chills - Spit blood - Nausea - Itching   - Weight Loss - Shortness of breath - Vomiting - Nail pain   - Malaise/Fatigue - Wheezing - Abdominal Pain  Wound/Ulcer   - Weight Gain   - Blood in Stool         - Diarrhea          Cardiovascular  Genitourinary  Neurological  HEENT   - Chest Pain - Dysuria - Dizziness - Headache   - Palpitations - Hematuria - Tingling - Congestion   - Pain at night in legs - Flank Pain - Tremor - Sore Throat   - Cramping   - Sensory Change - Blurred Vision   - Leg Swelling   - Speech Change - Double Vision   - Dizzy when standing   - Focal Weakness - Eye Redness       - Seizures - Dry Eyes       - Loss of Consciousness          Endocrine  Musculoskeletal  Psychiatric   - Cold intolerance - Muscle Pain - Depression   - Heat intolerance - Neck Pain - Insomnia   - Anemia - Joint Pain - Memory Loss   -  Easy bruising, bleeding - Heel pain - Anxiety      Toe Pain        Leg/Ankle/Foot Pain         Objective:     There were no vitals taken for this visit.    Physical Exam    Neck:  Trachea Midline  No visible masses    Respiratory/Ears:  No distress or labored breathing.  Able to differentiate between normal talking voice and whisper.  Able to follow commands    Eyes:  Visual Acuity intact  No discoloration noted.    Physical Exam  Ortho Exam  Foot Exam    R LE exam con't:  V: DP 2/4, PT 2/4, CRT< 3s to all digits tested.    N: SILT in SP/DP/T/Ebonie/Saph  distributions    Ortho: +Motor EHL/FHL/TA/GA   Surgical site pain present and mild   Surgical site swelling present and mild   Kwires intact   Hammertoe deformity 2-5 improved compared to preop   Compartments soft/compressible. No pain on passive stretch of big toe. No calf  Pain.     Derm: Skin intact. Sutures/staples: removed. Signs of infection: none.     Imaging / Labs:    Lab Results   Component Value Date    WBC 7.50 02/20/2025    WBC 7.51 08/26/2024    WBC 11.19 08/25/2024    WBC 6.34 04/26/2024    WBC 6.48 04/28/2023    PROCAL 0.07 12/04/2020    SEDRATE 10 06/15/2016    .5 (H) 12/04/2020    CRP 11.2 (H) 06/15/2016    PREALBUMIN 23 02/20/2025       Lab Results   Component Value Date    PREALBUMIN 23 02/20/2025       X-Ray Foot Complete Right  Result Date: 4/15/2025  EXAMINATION: XR FOOT COMPLETE 3 VIEW RIGHT CLINICAL HISTORY: . Encounter for other orthopedic aftercare TECHNIQUE: Weightbearing AP, lateral, and oblique views of the right foot were performed. COMPARISON: 04/09/2025. FINDINGS: Since the prior examination there has been interval removal of the skin staples.  There are surgical changes as before.  There are K-wires again identified within the 2nd, 3rd, and 4th toes.  There may have been advancement of the K-wire within the right 2nd toe when compared to the prior study with the tip now projecting over the distal aspect of the 2nd metatarsal bone.  There is fixation hardware within the distal tibia and fibula as before.  The remainder of the examination does not appear significantly changed.     Postsurgical changes.  K-wires are again identified within the 2nd, 3rd, and 4th toes with possible advancement of the K-wire within the right 2nd toe with the tip now projecting over the distal aspect of the 2nd metatarsal bone. No detrimental interval change noted. Electronically signed by: Terry Gallardo MD Date:    04/15/2025 Time:    15:04    X-Ray Foot Complete 3 view Bilateral  Result Date:  4/9/2025  EXAMINATION: XR FOOT COMPLETE 3 VIEW BILATERAL CLINICAL HISTORY: Pain in right foot TECHNIQUE: AP, lateral, and oblique views of both feet were performed. COMPARISON: Right foot dated 03/26/2025 and bilateral feet dated 01/09/2025. FINDINGS: Right foot: Once again, there are postoperative changes of the right foot with resection of portions of the heads of the 2nd through 5th metatarsal bones with hammertoe repair of the 2nd through 5th toes.  Note that there are K-wires identified within the 2nd, 3rd, and 4th toes.  Since the prior examination the K-wires of the 2nd and 3rd toes have been retracted with the more proximal tips now located near the resection planes of the heads of the 2nd and 3rd metatarsal bones.  There are skin staples present.  There are degenerative changes involving the 1st metatarsophalangeal joint as before.  Small calcaneal spur is present at the insertion of the plantar fascia.  There is fixation hardware within the distal tibia and fibula. Left foot: The bones are intact.  There is no evidence for acute fracture or bone destruction.  There appear to be hammertoe deformities of the 2nd through 5th toes.  There are degenerative changes within the small joints of the foot.  Calcaneal spurs are present at the insertions of the Achilles tendon and plantar fascia.  Soft tissues appear grossly unremarkable.     Postoperative changes of the right foot.  Interval resection of the K-wires within the 2nd and 3rd toes. Hammertoe deformities of the left 2nd through 5th toes with mild degenerative changes. Calcaneal spurs at the insertions of the plantar fascia, larger on the left than right.  There is also a small bony spur at the insertion of the Achilles tendon on the left. Electronically signed by: Terry Gallardo MD Date:    04/09/2025 Time:    10:01    X-Ray Foot Complete Right  Result Date: 3/26/2025  EXAMINATION: XR FOOT COMPLETE 3 VIEW RIGHT CLINICAL HISTORY: . Encounter for other  orthopedic aftercare TECHNIQUE: AP, lateral, and oblique views of the right foot were performed. FINDINGS: There is extensive fixation hardware identified within the distal tibia and fibula as well as the 2nd through 4th toes and metatarsals.  There is satisfactory osseous alignment.  There is no dislocation.     As above. Electronically signed by: Terry Collins MD Date:    03/26/2025 Time:    12:43    X-Ray Foot Complete Right  Result Date: 3/21/2025  EXAMINATION: XR FOOT COMPLETE 3 VIEW RIGHT CLINICAL HISTORY: postop;. TECHNIQUE: AP, lateral, and oblique views of the right foot were performed. COMPARISON: 01/09/2025. FINDINGS: There are postsurgical changes related to hammertoe correction.  There has been surgical resection of the distal heads of the right 2nd through 5th metatarsal bones and the distal aspects of the proximal phalanges of the 2nd through 5th toes.  There are K-wires extending through the phalanges of the 2nd, 3rd, and 4th toes extending to the distal aspects of the 2nd, 3rd, and 4th metatarsal bones.  There are skin staples along the dorsum of the foot at the level of the 2nd through 5th metatarsophalangeal joints.  There are surgical changes related to prior remote open reduction and internal fixation of the distal tibia and fibula.  The remainder of the examination is unchanged.     Postoperative changes as detailed above. Electronically signed by: Terry Gallardo MD Date:    03/21/2025 Time:    11:21    SURG FL Surgery Fluoro Usage  Result Date: 3/21/2025  See OP Notes for results. IMPRESSION: See OP Notes for results. This procedure was auto-finalized by: Virtual Radiologist                 [1]   Current Outpatient Medications   Medication Sig Dispense Refill    ascorbic acid, vitamin C, (VITAMIN C) 500 MG tablet Take 1 tablet (500 mg total) by mouth once daily. 30 tablet 1    atorvastatin (LIPITOR) 80 MG tablet TAKE 1 TABLET ONE TIME DAILY (Patient taking differently: Take 80 mg by mouth every  evening.) 90 tablet 3    cetirizine (ZYRTEC) 10 MG tablet Take 10 mg by mouth every evening.      citalopram (CELEXA) 20 MG tablet Take 1 tablet (20 mg total) by mouth once daily. 90 tablet 0    ergocalciferol (ERGOCALCIFEROL) 50,000 unit Cap Take 1 capsule (50,000 Units total) by mouth every 7 days. (Patient taking differently: Take 50,000 Units by mouth every 7 days. Instructed to hold for sx) 8 capsule 0    lamoTRIgine (LAMICTAL) 100 MG tablet Take 1 tablet (100 mg total) by mouth 2 (two) times daily. 180 tablet 3    lamoTRIgine (LAMICTAL) 100 MG tablet Take 1 tablet (100 mg total) by mouth 2 (two) times daily. 10 tablet 0    magnesium oxide (MAG-OX) 400 mg (241.3 mg magnesium) tablet Take 1 tablet (400 mg total) by mouth once daily. (Patient taking differently: Take 400 mg by mouth once daily. Instructed to hold for sx) 90 tablet 3    memantine (NAMENDA) 10 MG Tab Take 1 tablet (10 mg total) by mouth 2 (two) times daily. 180 tablet 3    metoprolol succinate (TOPROL-XL) 50 MG 24 hr tablet TAKE 1 TABLET EVERY DAY (Patient taking differently: Take 50 mg by mouth once daily.) 90 tablet 3    primidone (MYSOLINE) 250 MG Tab TAKE 2 TABLETS TWICE DAILY (NEED MD APPOINTMENT FOR REFILLS) (Patient taking differently: Take 500 mg by mouth 2 (two) times a day.) 360 tablet 3    riboflavin, vitamin B2, (VITAMIN B-2) 100 mg Tab tablet Take 2 tablets (200 mg total) by mouth once daily. (Patient taking differently: Take 100 mg by mouth once daily. Instructed to hold for sx) 180 tablet 3    torsemide (DEMADEX) 10 MG Tab Take 1 tablet (10 mg total) by mouth every morning. (Patient taking differently: Take 10 mg by mouth every morning. Instructed to hold dos) 90 tablet 3    TRELEGY ELLIPTA 100-62.5-25 mcg DsDv INHALE 1 PUFF ONE TIME DAILY (Patient taking differently: Inhale 1 puff into the lungs every morning.) 180 each 3    aspirin (ECOTRIN) 81 MG EC tablet Take 1 tablet (81 mg total) by mouth once daily. 90 tablet 1    gabapentin  (NEURONTIN) 100 MG capsule Take 1 capsule (100 mg total) by mouth 2 (two) times daily. 60 capsule 1     No current facility-administered medications for this visit.     Facility-Administered Medications Ordered in Other Visits   Medication Dose Route Frequency Provider Last Rate Last Admin    0.9%  NaCl infusion (for blood administration)   Intravenous Q24H Batool Pitts NP       [2]   Social History  Socioeconomic History    Marital status:    Occupational History    Occupation: retired   Tobacco Use    Smoking status: Former     Current packs/day: 0.00     Average packs/day: 1 pack/day for 5.0 years (5.0 ttl pk-yrs)     Types: Cigarettes     Start date:      Quit date:      Years since quittin.3    Smokeless tobacco: Never    Tobacco comments:     - I   Substance and Sexual Activity    Alcohol use: No    Drug use: No    Sexual activity: Not Currently     Partners: Female   Social History Narrative    . 4 adult children.      Social Drivers of Health     Financial Resource Strain: High Risk (2024)    Overall Financial Resource Strain (CARDIA)     Difficulty of Paying Living Expenses: Very hard   Food Insecurity: No Food Insecurity (2024)    Hunger Vital Sign     Worried About Running Out of Food in the Last Year: Never true     Ran Out of Food in the Last Year: Never true   Transportation Needs: No Transportation Needs (2024)    TRANSPORTATION NEEDS     Transportation : No   Physical Activity: Inactive (2024)    Exercise Vital Sign     Days of Exercise per Week: 0 days     Minutes of Exercise per Session: 0 min   Stress: No Stress Concern Present (2024)    [employer] - Occupational Stress Questionnaire     Feeling of Stress : Not at all   Housing Stability: Unknown (2024)    Housing Stability Vital Sign     Unable to Pay for Housing in the Last Year: No     Homeless in the Last Year: No

## 2025-04-21 ENCOUNTER — PATIENT MESSAGE (OUTPATIENT)
Dept: FAMILY MEDICINE | Facility: CLINIC | Age: 85
End: 2025-04-21
Payer: MEDICARE

## 2025-04-21 ENCOUNTER — PATIENT MESSAGE (OUTPATIENT)
Dept: PODIATRY | Facility: CLINIC | Age: 85
End: 2025-04-21
Payer: MEDICARE

## 2025-04-21 ENCOUNTER — TELEPHONE (OUTPATIENT)
Dept: ELECTROPHYSIOLOGY | Facility: CLINIC | Age: 85
End: 2025-04-21
Payer: MEDICARE

## 2025-04-21 ENCOUNTER — CLINICAL SUPPORT (OUTPATIENT)
Dept: CARDIOLOGY | Facility: HOSPITAL | Age: 85
End: 2025-04-21
Attending: INTERNAL MEDICINE
Payer: MEDICARE

## 2025-04-21 ENCOUNTER — CLINICAL SUPPORT (OUTPATIENT)
Dept: CARDIOLOGY | Facility: HOSPITAL | Age: 85
End: 2025-04-21
Payer: MEDICARE

## 2025-04-21 DIAGNOSIS — Z95.0 PRESENCE OF CARDIAC PACEMAKER: ICD-10-CM

## 2025-04-21 DIAGNOSIS — I48.91 UNSPECIFIED ATRIAL FIBRILLATION: ICD-10-CM

## 2025-04-21 PROCEDURE — 93296 REM INTERROG EVL PM/IDS: CPT | Mod: HCNC | Performed by: INTERNAL MEDICINE

## 2025-04-21 PROCEDURE — 93294 REM INTERROG EVL PM/LDLS PM: CPT | Mod: HCNC,,, | Performed by: INTERNAL MEDICINE

## 2025-04-21 NOTE — TELEPHONE ENCOUNTER
Spoke to patient's wife    CONFIRMED procedure arrival time of 12pm for CRT-D gen change    Reiterated instructions including:  -Directions to check in desk  -NPO after midnight night prior to procedure  -Confirmed that he is not on any GLP-1 agonists  -Pre-procedure LABS reviewed; no alerts noted  -Confirmed absence of implanted device/stimulator   -Confirmed no fever, cough, or shortness of breath in the past 30 days  -Bathe night prior and morning prior to procedure with Hibiclens solution or an antibacterial soap  -Reviewed current visitor policy    Patient's wife verbalized understanding of above and appreciated the call.

## 2025-04-22 ENCOUNTER — HOSPITAL ENCOUNTER (OUTPATIENT)
Facility: HOSPITAL | Age: 85
Discharge: HOME | End: 2025-04-22
Attending: INTERNAL MEDICINE | Admitting: INTERNAL MEDICINE
Payer: MEDICARE

## 2025-04-22 ENCOUNTER — ANESTHESIA EVENT (OUTPATIENT)
Dept: MEDSURG UNIT | Facility: HOSPITAL | Age: 85
End: 2025-04-22
Payer: MEDICARE

## 2025-04-22 ENCOUNTER — ANESTHESIA (OUTPATIENT)
Dept: MEDSURG UNIT | Facility: HOSPITAL | Age: 85
End: 2025-04-22
Payer: MEDICARE

## 2025-04-22 VITALS
WEIGHT: 220 LBS | HEART RATE: 80 BPM | HEIGHT: 72 IN | RESPIRATION RATE: 16 BRPM | DIASTOLIC BLOOD PRESSURE: 60 MMHG | BODY MASS INDEX: 29.8 KG/M2 | SYSTOLIC BLOOD PRESSURE: 132 MMHG | OXYGEN SATURATION: 96 % | TEMPERATURE: 98 F

## 2025-04-22 DIAGNOSIS — I50.32 CHRONIC DIASTOLIC HEART FAILURE: ICD-10-CM

## 2025-04-22 DIAGNOSIS — Z45.010 PACEMAKER BATTERY DEPLETION: ICD-10-CM

## 2025-04-22 DIAGNOSIS — Z95.0 BIVENTRICULAR CARDIAC PACEMAKER IN SITU: ICD-10-CM

## 2025-04-22 DIAGNOSIS — I49.5 SINUS NODE DYSFUNCTION: ICD-10-CM

## 2025-04-22 DIAGNOSIS — I48.19 PERSISTENT ATRIAL FIBRILLATION: ICD-10-CM

## 2025-04-22 DIAGNOSIS — Z95.9 CARDIAC DEVICE IN SITU: ICD-10-CM

## 2025-04-22 LAB
OHS QRS DURATION: 162 MS
OHS QTC CALCULATION: 513 MS

## 2025-04-22 PROCEDURE — 63600175 PHARM REV CODE 636 W HCPCS: Performed by: INTERNAL MEDICINE

## 2025-04-22 PROCEDURE — 37000009 HC ANESTHESIA EA ADD 15 MINS: Performed by: INTERNAL MEDICINE

## 2025-04-22 PROCEDURE — 25000003 PHARM REV CODE 250: Performed by: NURSE ANESTHETIST, CERTIFIED REGISTERED

## 2025-04-22 PROCEDURE — 25000003 PHARM REV CODE 250: Performed by: INTERNAL MEDICINE

## 2025-04-22 PROCEDURE — 37000008 HC ANESTHESIA 1ST 15 MINUTES: Performed by: INTERNAL MEDICINE

## 2025-04-22 PROCEDURE — C1882 AICD, OTHER THAN SING/DUAL: HCPCS | Performed by: INTERNAL MEDICINE

## 2025-04-22 PROCEDURE — 93005 ELECTROCARDIOGRAM TRACING: CPT

## 2025-04-22 PROCEDURE — 33229 REMV&REPLC PM GEN MULT LEADS: CPT | Mod: HCNC | Performed by: INTERNAL MEDICINE

## 2025-04-22 PROCEDURE — 33229 REMV&REPLC PM GEN MULT LEADS: CPT | Mod: HCNC,,, | Performed by: INTERNAL MEDICINE

## 2025-04-22 PROCEDURE — 93010 ELECTROCARDIOGRAM REPORT: CPT | Mod: ,,, | Performed by: INTERNAL MEDICINE

## 2025-04-22 PROCEDURE — 27201423 OPTIME MED/SURG SUP & DEVICES STERILE SUPPLY: Performed by: INTERNAL MEDICINE

## 2025-04-22 PROCEDURE — 63600175 PHARM REV CODE 636 W HCPCS: Performed by: NURSE ANESTHETIST, CERTIFIED REGISTERED

## 2025-04-22 DEVICE — CRT CARDIAC RESYNCHRONIZATION THERAPY PACEMAKER DDDRV
Type: IMPLANTABLE DEVICE | Site: CHEST | Status: FUNCTIONAL
Brand: QUADRA ALLURE MP™

## 2025-04-22 RX ORDER — PROPOFOL 10 MG/ML
VIAL (ML) INTRAVENOUS CONTINUOUS PRN
Status: DISCONTINUED | OUTPATIENT
Start: 2025-04-22 | End: 2025-04-22

## 2025-04-22 RX ORDER — CEFAZOLIN 2 G/1
2 INJECTION, POWDER, FOR SOLUTION INTRAMUSCULAR; INTRAVENOUS
Status: DISCONTINUED | OUTPATIENT
Start: 2025-04-22 | End: 2025-04-22 | Stop reason: HOSPADM

## 2025-04-22 RX ORDER — CEFAZOLIN 2 G/1
INJECTION, POWDER, FOR SOLUTION INTRAMUSCULAR; INTRAVENOUS
Status: DISCONTINUED | OUTPATIENT
Start: 2025-04-22 | End: 2025-04-22

## 2025-04-22 RX ORDER — DOXYCYCLINE 100 MG/1
100 CAPSULE ORAL EVERY 12 HOURS
Qty: 10 CAPSULE | Refills: 0 | Status: SHIPPED | OUTPATIENT
Start: 2025-04-22 | End: 2025-04-27

## 2025-04-22 RX ORDER — LIDOCAINE HYDROCHLORIDE 20 MG/ML
INJECTION, SOLUTION INFILTRATION; PERINEURAL
Status: DISCONTINUED | OUTPATIENT
Start: 2025-04-22 | End: 2025-04-22 | Stop reason: HOSPADM

## 2025-04-22 RX ORDER — SODIUM CHLORIDE 9 MG/ML
INJECTION, SOLUTION INTRAVENOUS CONTINUOUS PRN
Status: DISCONTINUED | OUTPATIENT
Start: 2025-04-22 | End: 2025-04-22

## 2025-04-22 RX ORDER — LIDOCAINE HYDROCHLORIDE 20 MG/ML
INJECTION INTRAVENOUS
Status: DISCONTINUED | OUTPATIENT
Start: 2025-04-22 | End: 2025-04-22

## 2025-04-22 RX ORDER — VANCOMYCIN HYDROCHLORIDE 1 G/20ML
INJECTION, POWDER, LYOPHILIZED, FOR SOLUTION INTRAVENOUS
Status: DISCONTINUED | OUTPATIENT
Start: 2025-04-22 | End: 2025-04-22 | Stop reason: HOSPADM

## 2025-04-22 RX ORDER — FENTANYL CITRATE 50 UG/ML
25 INJECTION, SOLUTION INTRAMUSCULAR; INTRAVENOUS EVERY 5 MIN PRN
Status: DISCONTINUED | OUTPATIENT
Start: 2025-04-22 | End: 2025-04-22 | Stop reason: HOSPADM

## 2025-04-22 RX ORDER — SODIUM CHLORIDE 0.9 G/100ML
INJECTION, SOLUTION IRRIGATION
Status: DISCONTINUED | OUTPATIENT
Start: 2025-04-22 | End: 2025-04-22 | Stop reason: HOSPADM

## 2025-04-22 RX ORDER — ACETAMINOPHEN 325 MG/1
650 TABLET ORAL EVERY 4 HOURS PRN
Status: DISCONTINUED | OUTPATIENT
Start: 2025-04-22 | End: 2025-04-22 | Stop reason: HOSPADM

## 2025-04-22 RX ORDER — PHENYLEPHRINE HYDROCHLORIDE 10 MG/ML
INJECTION INTRAVENOUS
Status: DISCONTINUED | OUTPATIENT
Start: 2025-04-22 | End: 2025-04-22

## 2025-04-22 RX ORDER — PROPOFOL 10 MG/ML
VIAL (ML) INTRAVENOUS
Status: DISCONTINUED | OUTPATIENT
Start: 2025-04-22 | End: 2025-04-22

## 2025-04-22 RX ORDER — BUPIVACAINE HYDROCHLORIDE 2.5 MG/ML
INJECTION, SOLUTION EPIDURAL; INFILTRATION; INTRACAUDAL; PERINEURAL
Status: DISCONTINUED | OUTPATIENT
Start: 2025-04-22 | End: 2025-04-22 | Stop reason: HOSPADM

## 2025-04-22 RX ORDER — SODIUM CHLORIDE 0.9 % (FLUSH) 0.9 %
10 SYRINGE (ML) INJECTION
Status: DISCONTINUED | OUTPATIENT
Start: 2025-04-22 | End: 2025-04-22 | Stop reason: HOSPADM

## 2025-04-22 RX ORDER — GLUCAGON 1 MG
1 KIT INJECTION
Status: DISCONTINUED | OUTPATIENT
Start: 2025-04-22 | End: 2025-04-22 | Stop reason: HOSPADM

## 2025-04-22 RX ADMIN — PHENYLEPHRINE HYDROCHLORIDE 100 MCG: 10 INJECTION INTRAVENOUS at 02:04

## 2025-04-22 RX ADMIN — PROPOFOL 50 MG: 10 INJECTION, EMULSION INTRAVENOUS at 01:04

## 2025-04-22 RX ADMIN — LIDOCAINE HYDROCHLORIDE 60 MG: 20 INJECTION INTRAVENOUS at 01:04

## 2025-04-22 RX ADMIN — PHENYLEPHRINE HYDROCHLORIDE 100 MCG: 10 INJECTION INTRAVENOUS at 01:04

## 2025-04-22 RX ADMIN — SODIUM CHLORIDE: 0.9 INJECTION, SOLUTION INTRAVENOUS at 01:04

## 2025-04-22 RX ADMIN — CEFAZOLIN 2 G: 2 INJECTION, POWDER, FOR SOLUTION INTRAMUSCULAR; INTRAVENOUS at 01:04

## 2025-04-22 RX ADMIN — PROPOFOL 150 MCG/KG/MIN: 10 INJECTION, EMULSION INTRAVENOUS at 01:04

## 2025-04-22 NOTE — Clinical Note
There was an existing generator. The generator was removed. The leads were disconnected. The generator was returned to . The generator was returned due to DANNI/EOL

## 2025-04-22 NOTE — Clinical Note
The lead was connected to generator.      The chronic LV, RA and RV lead was disconnected from chronic generator and connected to a replacement chronic generator.

## 2025-04-22 NOTE — NURSING TRANSFER
Nursing Transfer Note      4/22/2025   1512 PM    Nurse giving handoff:Ashley GERMAN PACU RN  Nurse receiving handoff:Lucy    Reason patient is being transferred: post procedure    Transfer To: SSCU 1    Transfer via stretcher    Transfer with cardiac monitoring    Transported by Lin    Transfer Vital Signs:  Blood Pressure:121/58  Heart Rate:80  O2:96%  Temperature:97.7  Respirations:10    Telemetry: Box Number [id number], Rate 88, Rhythm A Fib, and Telemetry  Milady  Order for Tele Monitor? Yes    Patient belongings transferred with patient: No    Chart send with patient: Yes    Notified: spouse    Patient reassessed at: 1512 (date, time)    Upon arrival to floor: cardiac monitor applied, patient oriented to room, call bell in reach, and bed in lowest position

## 2025-04-22 NOTE — DISCHARGE SUMMARY
Electrophysiology  Discharge Summary      Admit Date: 4/22/2025  Discharge Date:  4/22/2025  Attending Physician: No att. providers found  Discharge Physician: Nedra Feldman MD    Principal Diagnoses: <principal problem not specified>  Indication for Admission: REPLACEMENT, PACEMAKER GENERATOR (N/A)    Discharged Condition: Good    Hospital Course:   Patient underwent successful generator change without evidence of complications intra- and post-procedure.     EP medications at discharge:  Initiation of doxycyline 100 mg BID x 5 days.  Patient was instructed to hold their anticoagulation for 5 days (when they complete their antibiotics)     Patient given activity restrictions and warning signs/symptoms to monitor for, including chest pain, shortness of breath, fevers, or evidence of complications at the generator site [swelling, drainage, redness, tenderness, or warmth]. They were instructed to seek immediate medical attention if these occur and to contract the EP department. Follow up with device clinic in 1 week. No CP, SOB, or complications at the generator site on discharge. All questions and concerns answered prior to discharge.     Medication instructions:  Complete your 5 days of antibiotics  If you are on a blood thinner (examples: apixiban [Eliquis], rivaroxaban [Xarelto], dabigatran [Pradaxa], or enoxaparin [Lovenox]), do not take your blood thinner for the next 5 days after your procedure. You can resume after 5 days post-procedure (i.e., when you complete your antibiotics). If you are on Coumadin you can continue to take it the day of your procedure  Pain control: you can take up to Tylenol 1000 mg every 6 hours as needed or (if you do not have kidney disease) ibuprofen 600 mg every 6 hours as needed as needed for pain control (if you do not have kidney disease). If unsure, please contact your primary care physician for recommendations.    Activity restrictions & precautions:    Surgical site    Dressing (see images below)  **Note: these are general rules to follow unless instructed otherwise** - see images below  If you have a brown rectangular gel bandage (A.K.A. Aquacel Ag dressing) over your surgical incision do not remove it. This will be removed at your device clinic follow up appointment in 1 week.  If you have a square light brown bandage (A.K.A Mepilex dressing) you should remove in 48 hours after your procedure.  If you have a pressure dressing (white elastic tape with gauze underneath or a very large bandage that covers your left chest and is shaped like a triangle) over your surgical site, this should be removed the morning after your procedure unless instructed otherwise.  Do not place any creams or ointments over the surgical site. This could effect the integrity of the Dermabond glue.  You can shower after 24 hours post-procedure, but do not let the jet directly hit your pocket site for at least 2 weeks. Recommend showering with your back to the shower head.   Do not reach your arm (on the same side as your device) around your back during showering for 6 weeks.  Do not submerge your surgical incision site under water (swimming, bathing if you submerge the actual surgical site) for at least 6 week. It is imperative that the surgical site is completely healed prior to doing so    Follow up in device clinic in 1 week to check your incision and device function  Please contact the electrophysiology clinic if you have any questions or if you experience: potential surgical/pocket site complications (pain, swelling, bleeding, drainage), fevers, chest pain/shortness of breath, or for any other concerns.         Diet: Cardiac diet  Disposition: Home or Self Care  Follow Up:   Follow-up Information       Deep Esparza MD. Schedule an appointment as soon as possible for a visit in 1 week(s).    Specialties: Electrophysiology, Cardiology  Why: device check  Contact information:  [address]  [facility] [address]  [phone]                             Discharge Medications:      Medication List        START taking these medications      doxycycline 100 MG Cap  Commonly known as: VIBRAMYCIN  Take 1 capsule (100 mg total) by mouth every 12 (twelve) hours. for 5 days            ASK your doctor about these medications      ascorbic acid (vitamin C) 500 MG tablet  Commonly known as: VITAMIN C  Take 1 tablet (500 mg total) by mouth once daily.     aspirin 81 MG EC tablet  Commonly known as: ECOTRIN  Take 1 tablet (81 mg total) by mouth once daily.     atorvastatin 80 MG tablet  Commonly known as: LIPITOR  TAKE 1 TABLET ONE TIME DAILY     cetirizine 10 MG tablet  Commonly known as: ZYRTEC     citalopram 20 MG tablet  Commonly known as: CeleXA  Take 1 tablet (20 mg total) by mouth once daily.     ergocalciferol 50,000 unit Cap  Commonly known as: ERGOCALCIFEROL  Take 1 capsule (50,000 Units total) by mouth every 7 days.     gabapentin 100 MG capsule  Commonly known as: NEURONTIN  Take 1 capsule (100 mg total) by mouth 2 (two) times daily.     * lamoTRIgine 100 MG tablet  Commonly known as: LAMICTAL  Take 1 tablet (100 mg total) by mouth 2 (two) times daily.     * lamoTRIgine 100 MG tablet  Commonly known as: LAMICTAL  Take 1 tablet (100 mg total) by mouth 2 (two) times daily.     magnesium oxide 400 mg (241.3 mg magnesium) tablet  Commonly known as: MAG-OX  Take 1 tablet (400 mg total) by mouth once daily.     memantine 10 MG Tab  Commonly known as: NAMENDA  Take 1 tablet (10 mg total) by mouth 2 (two) times daily.     metoprolol succinate 50 MG 24 hr tablet  Commonly known as: TOPROL-XL  TAKE 1 TABLET EVERY DAY     primidone 250 MG Tab  Commonly known as: MYSOLINE  TAKE 2 TABLETS TWICE DAILY (NEED MD APPOINTMENT FOR REFILLS)     riboflavin (vitamin B2) 100 mg Tab tablet  Commonly known as: VITAMIN B-2  Take 2 tablets (200 mg total) by mouth once daily.     torsemide 10 MG Tab  Commonly known as: DEMADEX  Take 1  tablet (10 mg total) by mouth every morning.     TRELEGY ELLIPTA 100-62.5-25 mcg Dsdv  Generic drug: fluticasone-umeclidin-vilanter  INHALE 1 PUFF ONE TIME DAILY           * This list has 2 medication(s) that are the same as other medications prescribed for you. Read the directions carefully, and ask your doctor or other care provider to review them with you.                   Where to Get Your Medications        These medications were sent to [facility]  [address] [place] [id number]      Hours: Always Open Phone: [phone]   doxycycline 100 MG Cap         Nedra Feldman MD  Cardiovascular Disease PGY-V  [facility]

## 2025-04-22 NOTE — PLAN OF CARE
Received report from Ashley GERMAN pacu RN. Patient s/p generator change, AAOx3. VSS, mild soreness to incisional site, resp even and unlabored on RA. Ice pack to chest and foam dressing to left chest is CDI. No active bleeding. No hematoma noted. Post procedure protocol reviewed with patient and patient's family. Understanding verbalized. Family members at bedside. Nurse call bell within reach.     Contacted wife and picking up doxycycline down stairs.  Bedrest up at 1520.

## 2025-04-22 NOTE — TRANSFER OF CARE
Anesthesia Transfer of Care Note    Patient: Heri Muhammad    Procedure(s) Performed: Procedure(s) (LRB):  REPLACEMENT, PACEMAKER GENERATOR (N/A)    Patient location: PACU    Anesthesia Type: general    Transport from OR: Transported from OR on 6-10 L/min O2 by face mask with adequate spontaneous ventilation    Post pain: adequate analgesia    Post assessment: no apparent anesthetic complications and tolerated procedure well    Post vital signs: stable    Level of consciousness: sedated    Nausea/Vomiting: no nausea/vomiting    Complications: none    Transfer of care protocol was followed      Last vitals: Visit Vitals  /65 (BP Location: Right arm, Patient Position: Lying)   Pulse 80   Temp 36.3 °C (97.4 °F) (Temporal)   Resp 20   Ht 6' (1.829 m)   Wt 99.8 kg (220 lb)   SpO2 95%   BMI 29.84 kg/m²

## 2025-04-22 NOTE — PLAN OF CARE
Pt ambulates with walker and right foot boot post bedrest completion. Right chest foam dressing CDI. No bleeding, no hematoma to left chest. Ice pack to left chest in place. Patient discharged per MD orders. Instructions given on medications, wound care, activity, signs of infection, when to call MD, and follow up appointments. Pt verbalized understanding. PIV removed. Patient and family used wc off unit to private vehicle

## 2025-04-22 NOTE — ANESTHESIA PREPROCEDURE EVALUATION
04/22/2025  Pre-operative evaluation for Procedure(s) (LRB):  REPLACEMENT, PACEMAKER GENERATOR (N/A)    Heri Muhammad is a 84 y.o. male      Left Ventricle: The left ventricle is normal in size. Normal wall thickness. There is mild asymmetric apical hypertrophy. There is hyperdynamic systolic function. Biplane (2D) method of discs ejection fraction is 73%.    Right Ventricle: Normal right ventricular cavity size. Wall thickness is normal. Systolic function is normal.    Aortic Valve: There is mild aortic valve sclerosis.    Mitral Valve: There is mild regurgitation.    Pulmonary Artery: There is pulmonary hypertension. The estimated pulmonary artery systolic pressure is 47 mmHg.    IVC/SVC: Normal venous pressure at 3 mmHg.    he ECG portion of the study is negative for ischemia.    The patient reported no chest pain during the stress test.    There were no arrhythmias during stress.    The nuclear portion of this study will be reported separately.    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 behavioral disturbance    Biventricular cardiac pacemaker in situ    Chronic bronchitis    History of skin cancer    Tension type headache    Chronic foot pain, right    Post-traumatic arthritis of ankle, right    Lung granuloma    Insomnia    Simple chronic bronchitis    Gait abnormality    Overweight (BMI 25.0-29.9)    Episodic lightheadedness    Pain due to onychomycosis of toenails of both feet    At high risk for falls    Pulmonary hypertension    Nonintractable  epilepsy without status epilepticus    Hammertoes of both feet    Preoperative clearance    Hypovitaminosis D    Mild protein-calorie malnutrition    Deformity of metatarsal bone of right foot    Hammertoe of right foot       Review of patient's allergies indicates:   Allergen Reactions    Bactroban [mupirocin calcium] Rash     Other reaction(s): Rash       Medications Ordered Prior to Encounter[1]    Past Surgical History:   Procedure Laterality Date    CARDIAC CATHETERIZATION      CARDIAC PACEMAKER PLACEMENT  2014    CARDIOVERSION      multiple    CARPAL TUNNEL RELEASE Left     CATARACT EXTRACTION Bilateral     COLONOSCOPY      several    CORRECTION OF HAMMER TOE Right 3/21/2025    Procedure: CORRECTION, HAMMER TOE;  Surgeon: Geronimo Lehman Jr., DPBRITTANY;  [facility];  Service: Podiatry;  Laterality: Right;  pop saph. Toes 2-5    ECHOCARDIOGRAM,TRANSESOPHAGEAL N/A 06/16/2023    Procedure: Transesophageal echo (BEN) intra-procedure log documentation;  Surgeon: Han Diagnostic Provider;  Location: [facility] EP LAB;  Service: Cardiology;  Laterality: N/A;  s/p WM, BEN, anes, 3 Prep    FRACTURE SURGERY Right     ankle with hardware    HERNIA REPAIR      abdominal    INJECTION OF ANESTHETIC AGENT AROUND MEDIAL BRANCH NERVES INNERVATING LUMBAR FACET JOINT Bilateral 01/17/2019    Procedure: Block-nerve-medial branch-lumbar L2-L5;  Surgeon: Anthony Moffett MD;  Location: [facility] OR;  Service: Pain Management;  Laterality: Bilateral;    JOINT REPLACEMENT Bilateral     shoulders    OCCLUSION OF LEFT ATRIAL APPENDAGE N/A 05/05/2023    Procedure: Left atrial appendage occlusion;  Surgeon: Hema Del Valle MD;  Location: [facility] EP LAB;  Service: Cardiology;  Laterality: N/A;  afib, watchman, BSCI, ben, anes, MB, 3prep    RADIOFREQUENCY ABLATION OF LUMBAR MEDIAL BRANCH NERVE AT SINGLE LEVEL Bilateral 01/23/2019    Procedure: Radiofrequency Ablation, Nerve, Spinal, Lumbar, Medial Branch, L2-L5;  Surgeon: Anthony Moffett MD;   Location: [facility] OR;  Service: Pain Management;  Laterality: Bilateral;    SURGICAL REMOVAL OF METATARSAL HEAD Right 3/21/2025    Procedure: OSTECTOMY, METATARSAL BONE, HEAD;  Surgeon: Geronimo Lehman Jr., DPM;  Location: [facility] OR;  Service: Podiatry;  Laterality: Right;  pop saph. Toes 2-5    TRANSESOPHAGEAL ECHOCARDIOGRAPHY N/A 2023    Procedure: ECHOCARDIOGRAM, TRANSESOPHAGEAL;  Surgeon: Dylan Layne MD;  Location: [facility] EP LAB;  Service: Cardiology;  Laterality: N/A;    TREATMENT OF CARDIAC ARRHYTHMIA N/A 2022    Procedure: Cardioversion or Defibrillation;  Surgeon: MIREILLE Morel MD;  Location: [facility] EP LAB;  Service: Cardiology;  Laterality: N/A;  AF, GALILEO, DCCV, MAC, EH (to cover for SK), 3 Prep *SJM CRT-P in situ*    WISDOM TOOTH EXTRACTION         Social History[2]      CBC:   Recent Labs     25  0944   WBC 8.42   RBC 4.40*   HGB 14.0   HCT 42.4      MCV 96   MCH 31.8*   MCHC 33.0       CMP:   Recent Labs     25  0944      K 4.4      CO2 27   BUN 10   CREATININE 0.7   CALCIUM 8.4*   ALBUMIN 3.0*   ALKPHOS 134   ALT 25   AST 26   BILITOT 0.4       INR  Recent Labs     25  0944   INR 0.9   APTT 34.9*           Diagnostic Studies:      EKD Echo:  Results for orders placed or performed during the hospital encounter of 18   2D echo with color flow doppler    Collection Time: 18  4:27 PM   Result Value Ref Range    EF + QEF 30 (A) 55 - 65    Mitral Valve Regurgitation TRIVIAL     Est. PA Systolic Pressure 18.84     Tricuspid Valve Regurgitation MILD            Pre-op Assessment    I have reviewed the Patient Summary Reports.     I have reviewed the Nursing Notes. I have reviewed the NPO Status.   I have reviewed the Medications.     Review of Systems  Anesthesia Hx:  No problems with previous Anesthesia   History of prior surgery of interest to airway management or planning:          Denies Family Hx of Anesthesia complications.    Denies  Personal Hx of Anesthesia complications.                    Hematology/Oncology:       -- Denies Anemia:                                  Cardiovascular:     Hypertension   CAD              ECG has been reviewed.                            Pulmonary:   COPD, mild      Denies Sleep Apnea.                Renal/:   Denies Chronic Renal Disease.                Hepatic/GI:      Denies GERD. Denies Liver Disease.   Not Taking GLP-1 Agonists            Neurological:       Seizures, well controlled                                Endocrine:  Denies Diabetes. Hypothyroidism              Physical Exam  General: Well nourished, Cooperative, Oriented and Alert    Airway:  Mallampati: III / II  Mouth Opening: Normal  TM Distance: Normal  Tongue: Normal  Neck ROM: Normal ROM    Dental:  Intact    Chest/Lungs:  Normal Respiratory Rate    Heart:  Rate: Normal  Rhythm: Regular Rhythm        Anesthesia Plan  Type of Anesthesia, risks & benefits discussed:    Anesthesia Type: Gen Natural Airway  Intra-op Monitoring Plan: Standard ASA Monitors  Post Op Pain Control Plan: multimodal analgesia  Induction:  IV  Informed Consent: Informed consent signed with the Patient and all parties understand the risks and agree with anesthesia plan.  All questions answered.   ASA Score: 3  Day of Surgery Review of History & Physical: H&P Update referred to the surgeon/provider.    Ready For Surgery From Anesthesia Perspective.     .           [1]   Current Facility-Administered Medications on File Prior to Encounter   Medication Dose Route Frequency Provider Last Rate Last Admin    0.9%  NaCl infusion (for blood administration)   Intravenous Q24H INESN Batool Rodriguez NP         Current Outpatient Medications on File Prior to Encounter   Medication Sig Dispense Refill    aspirin (ECOTRIN) 81 MG EC tablet Take 1 tablet (81 mg total) by mouth once daily. 90 tablet 1    atorvastatin (LIPITOR) 80 MG tablet TAKE 1 TABLET ONE TIME DAILY (Patient  taking differently: Take 80 mg by mouth every evening.) 90 tablet 3    cetirizine (ZYRTEC) 10 MG tablet Take 10 mg by mouth every evening.      citalopram (CELEXA) 20 MG tablet Take 1 tablet (20 mg total) by mouth once daily. 90 tablet 0    lamoTRIgine (LAMICTAL) 100 MG tablet Take 1 tablet (100 mg total) by mouth 2 (two) times daily. 180 tablet 3    magnesium oxide (MAG-OX) 400 mg (241.3 mg magnesium) tablet Take 1 tablet (400 mg total) by mouth once daily. (Patient taking differently: Take 400 mg by mouth once daily. Instructed to hold for sx) 90 tablet 3    memantine (NAMENDA) 10 MG Tab Take 1 tablet (10 mg total) by mouth 2 (two) times daily. 180 tablet 3    metoprolol succinate (TOPROL-XL) 50 MG 24 hr tablet TAKE 1 TABLET EVERY DAY (Patient taking differently: Take 50 mg by mouth once daily.) 90 tablet 3    primidone (MYSOLINE) 250 MG Tab TAKE 2 TABLETS TWICE DAILY (NEED MD APPOINTMENT FOR REFILLS) (Patient taking differently: Take 500 mg by mouth 2 (two) times a day.) 360 tablet 3    riboflavin, vitamin B2, (VITAMIN B-2) 100 mg Tab tablet Take 2 tablets (200 mg total) by mouth once daily. (Patient taking differently: Take 100 mg by mouth once daily. Instructed to hold for sx) 180 tablet 3    torsemide (DEMADEX) 10 MG Tab Take 1 tablet (10 mg total) by mouth every morning. (Patient taking differently: Take 10 mg by mouth every morning. Instructed to hold dos) 90 tablet 3    TRELEGY ELLIPTA 100-62.5-25 mcg DsDv INHALE 1 PUFF ONE TIME DAILY (Patient taking differently: Inhale 1 puff into the lungs every morning.) 180 each 3    ergocalciferol (ERGOCALCIFEROL) 50,000 unit Cap Take 1 capsule (50,000 Units total) by mouth every 7 days. (Patient taking differently: Take 50,000 Units by mouth every 7 days. Instructed to hold for sx) 8 capsule 0   [2]   Social History  Socioeconomic History    Marital status:    Occupational History    Occupation: retired   Tobacco Use    Smoking status: Former     Current  packs/day: 0.00     Average packs/day: 1 pack/day for 5.0 years (5.0 ttl pk-yrs)     Types: Cigarettes     Start date:      Quit date:      Years since quittin.3    Smokeless tobacco: Never    Tobacco comments:     - I   Substance and Sexual Activity    Alcohol use: No    Drug use: No    Sexual activity: Not Currently     Partners: Female   Social History Narrative    . 4 adult children.      Social Drivers of Health     Financial Resource Strain: High Risk (2024)    Overall Financial Resource Strain (CARDIA)     Difficulty of Paying Living Expenses: Very hard   Food Insecurity: No Food Insecurity (2024)    Hunger Vital Sign     Worried About Running Out of Food in the Last Year: Never true     Ran Out of Food in the Last Year: Never true   Transportation Needs: No Transportation Needs (2024)    TRANSPORTATION NEEDS     Transportation : No   Physical Activity: Inactive (2024)    Exercise Vital Sign     Days of Exercise per Week: 0 days     Minutes of Exercise per Session: 0 min   Stress: No Stress Concern Present (2024)    [employer] [place] [employer] - Occupational Stress Questionnaire     Feeling of Stress : Not at all   Housing Stability: Unknown (2024)    Housing Stability Vital Sign     Unable to Pay for Housing in the Last Year: No     Homeless in the Last Year: No

## 2025-04-22 NOTE — H&P
[facility]  Electrophysiology  H&P      Heri Muhammad   YOB: 1940   Medical Record Number: [id number]   Attending Physician:    Date of Admission: 04/22/2025           History       HPI  Heri Muhammad is a 84 y.o. male with  Atrial Fibrillation s/p watchman (PVI 1/2016; PVI 3/2017), atrial flutter (RFA of CTI 1/2016, mitral annular line 3/2017, roof line 3/2017), sinus node dysfunction, and CRT-P  Hyperlipidemia, Coronary Artery Disease, and Hypertension who presents today for generator change       Background:   Dual chamber PPM implanted (06/30/14). 1/26/18 Upgraded to CRT-P by Dr. Otto       Presenting EKG:  BiV pacing   LV EF: 73%  Pertinent labs: hgb 14, plts 191        Medications - Outpatient  Prior to Admission medications    Medication Sig Start Date End Date Taking? Authorizing Provider   ascorbic acid, vitamin C, (VITAMIN C) 500 MG tablet Take 1 tablet (500 mg total) by mouth once daily. 3/12/25 5/11/25  Geronimo Lehman Jr., DPM   aspirin (ECOTRIN) 81 MG EC tablet Take 1 tablet (81 mg total) by mouth once daily. 6/16/23 3/7/25  Frederick Lester MD   atorvastatin (LIPITOR) 80 MG tablet TAKE 1 TABLET ONE TIME DAILY  Patient taking differently: Take 80 mg by mouth every evening. 7/8/24   Grady Tello III, MD   cetirizine (ZYRTEC) 10 MG tablet Take 10 mg by mouth every evening.    Provider, Historical   citalopram (CELEXA) 20 MG tablet Take 1 tablet (20 mg total) by mouth once daily. 2/17/25   Anjum Arellano MD   ergocalciferol (ERGOCALCIFEROL) 50,000 unit Cap Take 1 capsule (50,000 Units total) by mouth every 7 days.  Patient taking differently: Take 50,000 Units by mouth every 7 days. Instructed to hold for sx 2/25/25 4/26/25  Jennifer Sharpe MD   gabapentin (NEURONTIN) 100 MG capsule Take 1 capsule (100 mg total) by mouth 2 (two) times daily. 4/17/25 6/16/25  Lehman, Geronimo Jr., DPM   lamoTRIgine (LAMICTAL) 100 MG tablet Take 1 tablet (100 mg total)  by mouth 2 (two) times daily. 12/3/24   Anjum Arellano MD   lamoTRIgine (LAMICTAL) 100 MG tablet Take 1 tablet (100 mg total) by mouth 2 (two) times daily. 3/27/25 3/27/26  Anjum Arellano MD   magnesium oxide (MAG-OX) 400 mg (241.3 mg magnesium) tablet Take 1 tablet (400 mg total) by mouth once daily.  Patient taking differently: Take 400 mg by mouth once daily. Instructed to hold for sx 12/3/24   Anjum Arellano MD   memantine (NAMENDA) 10 MG Tab Take 1 tablet (10 mg total) by mouth 2 (two) times daily. 12/3/24   Anjum Arellano MD   metoprolol succinate (TOPROL-XL) 50 MG 24 hr tablet TAKE 1 TABLET EVERY DAY  Patient taking differently: Take 50 mg by mouth once daily. 10/25/24   Deep Esparza MD   primidone (MYSOLINE) 250 MG Tab TAKE 2 TABLETS TWICE DAILY (NEED MD APPOINTMENT FOR REFILLS)  Patient taking differently: Take 500 mg by mouth 2 (two) times a day. 8/8/24   Anjum Arellano MD   riboflavin, vitamin B2, (VITAMIN B-2) 100 mg Tab tablet Take 2 tablets (200 mg total) by mouth once daily.  Patient taking differently: Take 100 mg by mouth once daily. Instructed to hold for sx 12/3/24   Anjum Arellano MD   torsemide (DEMADEX) 10 MG Tab Take 1 tablet (10 mg total) by mouth every morning.  Patient taking differently: Take 10 mg by mouth every morning. Instructed to hold dos 3/3/25   Jennifer Sharpe MD   TRELEGY ELLIPTA 100-62.5-25 mcg DsDv INHALE 1 PUFF ONE TIME DAILY  Patient taking differently: Inhale 1 puff into the lungs every morning. 6/26/24   Ivonne Robles MD         Medications - Current  Scheduled Meds:  Continuous Infusions:  PRN Meds:.  Current Facility-Administered Medications:     ceFAZolin (Ancef) IV (PEDS and ADULTS), 2 g, Intravenous, On Call Procedure      Allergies  Review of patient's allergies indicates:   Allergen Reactions    Bactroban [mupirocin calcium] Rash     Other reaction(s): Rash         Past Medical History  Past Medical History:   Diagnosis Date     Anticoagulant long-term use     no longer taking eliquis    Aortic atherosclerosis 09/18/2017    - LDL goal <70 - BP goal <130/80    Benign essential tremor     Biventricular cardiac pacemaker in situ 09/24/2018    CAD (coronary artery disease) 12/02/2013    - 9/2013 Left heart Cath: LAD- 60% prox (neg FFR), nl, ramus and RCA 20% - LDL goal <70 - BP goal <130/80    Cancer     skin cancer on back    Carpal tunnel syndrome of left wrist 08/08/2017    surgery to correct    Chronic bronchitis     Chronic diastolic heart failure 01/19/2018    Chronic obstructive pulmonary disease 10/03/2018    Chronic right-sided thoracic back pain 04/27/2017    Cough     not currently a problem    Current use of long term anticoagulation 06/02/2016    - Eliquis for Afib-no longer taking    Dizziness     intermittent    DJD (degenerative joint disease) of knee     right    ED (erectile dysfunction)     Hammer toe of right foot     Hard of hearing 11/2016    History of cataract     Hyponatremia 06/11/2019    Memory loss     Mixed hyperlipidemia 07/28/2012    Persistent atrial fibrillation 07/01/2015    - followed by Dr. Esparza - history of cardioversion - on Eliquis OAC 5 mg BID    Pulmonary hypertension     Seizure disorder     Seizures     last episode 1995    Subclinical hypothyroidism     no meds    Tension type headache 07/02/2019    not as frequent    Vitamin D deficiency          Past Surgical History  Past Surgical History:   Procedure Laterality Date    CARDIAC CATHETERIZATION      CARDIAC PACEMAKER PLACEMENT  2014    CARDIOVERSION      multiple    CARPAL TUNNEL RELEASE Left     CATARACT EXTRACTION Bilateral     COLONOSCOPY      several    CORRECTION OF HAMMER TOE Right 3/21/2025    Procedure: CORRECTION, HAMMER TOE;  Surgeon: Geronimo Lehman Jr., DPM;  Location: [facility];  Service: Podiatry;  Laterality: Right;  pop saph. Toes 2-5    ECHOCARDIOGRAM,TRANSESOPHAGEAL N/A 06/16/2023    Procedure: Transesophageal echo (GALILEO)  intra-procedure log documentation;  Surgeon: [facility] Diagnostic Provider;  Location: [facility] EP LAB;  Service: Cardiology;  Laterality: N/A;  s/p WM, BEN, anes, 3 Prep    FRACTURE SURGERY Right     ankle with hardware    HERNIA REPAIR      abdominal    INJECTION OF ANESTHETIC AGENT AROUND MEDIAL BRANCH NERVES INNERVATING LUMBAR FACET JOINT Bilateral 01/17/2019    Procedure: Block-nerve-medial branch-lumbar L2-L5;  Surgeon: Anthony Moffett MD;  Location: [facility] OR;  Service: Pain Management;  Laterality: Bilateral;    JOINT REPLACEMENT Bilateral     shoulders    OCCLUSION OF LEFT ATRIAL APPENDAGE N/A 05/05/2023    Procedure: Left atrial appendage occlusion;  Surgeon: Hema Del Valle MD;  Location: [facility] EP LAB;  Service: Cardiology;  Laterality: N/A;  afib, watchman, BSCI, ben, anes, MB, 3prep    RADIOFREQUENCY ABLATION OF LUMBAR MEDIAL BRANCH NERVE AT SINGLE LEVEL Bilateral 01/23/2019    Procedure: Radiofrequency Ablation, Nerve, Spinal, Lumbar, Medial Branch, L2-L5;  Surgeon: Anthony Moffett MD;  Location: [facility] OR;  Service: Pain Management;  Laterality: Bilateral;    SURGICAL REMOVAL OF METATARSAL HEAD Right 3/21/2025    Procedure: OSTECTOMY, METATARSAL BONE, HEAD;  Surgeon: Geronimo Lehman Jr., DPM;  Location: [facility] OR;  Service: Podiatry;  Laterality: Right;  pop saph. Toes 2-5    TRANSESOPHAGEAL ECHOCARDIOGRAPHY N/A 05/05/2023    Procedure: ECHOCARDIOGRAM, TRANSESOPHAGEAL;  Surgeon: Dylan Layne MD;  Location: [facility] EP LAB;  Service: Cardiology;  Laterality: N/A;    TREATMENT OF CARDIAC ARRHYTHMIA N/A 07/11/2022    Procedure: Cardioversion or Defibrillation;  Surgeon: MIREILLE Morel MD;  Location: [facility] EP LAB;  Service: Cardiology;  Laterality: N/A;  AF, BEN, DCCV, MAC, EH (to cover for SK), 3 Prep *SJM CRT-P in situ*    WISDOM TOOTH EXTRACTION           Social History  Social History     Socioeconomic History    Marital status:    Occupational History    Occupation: retired   Tobacco Use    Smoking  status: Former     Current packs/day: 0.00     Average packs/day: 1 pack/day for 5.0 years (5.0 ttl pk-yrs)     Types: Cigarettes     Start date:      Quit date:      Years since quittin.3    Smokeless tobacco: Never    Tobacco comments:     - I   Substance and Sexual Activity    Alcohol use: No    Drug use: No    Sexual activity: Not Currently     Partners: Female   Social History Narrative    . 4 adult children.      Social Drivers of Health     Financial Resource Strain: High Risk (2024)    Overall Financial Resource Strain (CARDIA)     Difficulty of Paying Living Expenses: Very hard   Food Insecurity: No Food Insecurity (2024)    Hunger Vital Sign     Worried About Running Out of Food in the Last Year: Never true     Ran Out of Food in the Last Year: Never true   Transportation Needs: No Transportation Needs (2024)    TRANSPORTATION NEEDS     Transportation : No   Physical Activity: Inactive (2024)    Exercise Vital Sign     Days of Exercise per Week: 0 days     Minutes of Exercise per Session: 0 min   Stress: No Stress Concern Present (2024)    [employer] - Occupational Stress Questionnaire     Feeling of Stress : Not at all   Housing Stability: Unknown (2024)    Housing Stability Vital Sign     Unable to Pay for Housing in the Last Year: No     Homeless in the Last Year: No         ROS  10 point ROS performed and negative except as stated in HPI     Physical Examination         Vital Signs             24 Hour VS Range       No intake or output data in the 24 hours ending 25 1223        Physical Exam:   Constitutional: no acute distress  HEENT: NCAT, EOMI, no scleral icterus  Cardiovascular: Regular rate and rhythm  Pulmonary: Normal respiratory effort   Abdomen: nontender, non-distended   Neuro: alert and oriented, no focal deficits  Extremities: warm, no edema   MSK: no deformities  Integument: intact, no rashes       Data  "      Recent Labs   Lab 04/21/25  0944   WBC 8.42   HGB 14.0   HCT 42.4           Recent Labs   Lab 04/21/25  0944   INR 0.9        Recent Labs   Lab 04/21/25  0944      K 4.4      CO2 27   BUN 10   CREATININE 0.7   ANIONGAP 11   CALCIUM 8.4*        Recent Labs   Lab 04/21/25  0944   ALBUMIN 3.0*   BILITOT 0.4   ALKPHOS 134   AST 26   ALT 25        No results for input(s): "TROPONINI" in the last 168 hours.     NT-pro-BNP  ([place]) (pg/mL)   Date Value   11/27/2015 458     BNP (pg/mL)   Date Value   08/25/2024 103 (H)   12/04/2020 91   08/29/2018 92   10/13/2015 119 (H)       No results for input(s): "LABBLOO" in the last 168 hours.         Assessment & Plan   Patient is a 84 year old with CRT-P with device at [facility] who presents for generator change with Dr. Esparza     The indication(s), risks, benefits and alternatives of the procedure were explained to the patient, patient's family and/or surrogate decision maker. Risks include (but not limited to) bleeding, pocket site hematoma, skin reactions, pocket and/or device infection which would often require removal, device migration potentially requiring revision, pain, chest wall injury. All questions were answered. Patient is understanding of these risks, and wishes to proceed. Consents signed.           Nedra CORONADO MD  [facility]   Cardiovascular Disease   "

## 2025-04-22 NOTE — ANESTHESIA POSTPROCEDURE EVALUATION
Anesthesia Post Evaluation    Patient: Heri Muhammad    Procedure(s) Performed: Procedure(s) (LRB):  REPLACEMENT, PACEMAKER GENERATOR (N/A)    Final Anesthesia Type: general      Patient location during evaluation: PACU  Patient participation: Yes- Able to Participate  Level of consciousness: awake and alert and oriented  Post-procedure vital signs: reviewed and stable  Pain management: adequate  Airway patency: patent    PONV status at discharge: No PONV  Anesthetic complications: no      Cardiovascular status: blood pressure returned to baseline, hemodynamically stable and stable  Respiratory status: unassisted, room air and spontaneous ventilation  Hydration status: euvolemic  Follow-up not needed.              Vitals Value Taken Time   /60 04/22/25 15:51   Temp 36.5 °C (97.7 °F) 04/22/25 15:00   Pulse 80 04/22/25 15:51   Resp 16 04/22/25 15:51   SpO2 96 % 04/22/25 15:14   Vitals shown include unfiled device data.      No case tracking events are documented in the log.      Pain/Jono Score: Jono Score: 10 (4/22/2025  3:18 PM)

## 2025-04-22 NOTE — DISCHARGE INSTRUCTIONS
Medication instructions:    Complete your 5 days of antibiotics  If you are on a blood thinner (examples: apixiban [Eliquis], rivaroxaban [Xarelto], dabigatran [Pradaxa], or enoxaparin [Lovenox]), do not take your blood thinner for the next 5 days after your procedure. You can resume after 5 days post-procedure (i.e., when you complete your antibiotics). If you are on Coumadin you can continue to take it the day of your procedure  Pain control: you can take up to Tylenol 1000 mg every 6 hours as needed or (if you do not have kidney disease) ibuprofen 600 mg every 6 hours as needed as needed for pain control (if you do not have kidney disease). If unsure, please contact your primary care physician for recommendations.      Activity restrictions & precautions:      Surgical site   Dressing (see images below)  **Note: these are general rules to follow unless instructed otherwise** - see images below  If you have a brown rectangular gel bandage (A.K.A. Aquacel Ag dressing) over your surgical incision do not remove it. This will be removed at your device clinic follow up appointment in 1 week.  If you have a square light brown bandage (A.K.A Mepilex dressing) you should remove in 48 hours after your procedure.  If you have a pressure dressing (white elastic tape with gauze underneath or a very large bandage that covers your left chest and is shaped like a triangle) over your surgical site, this should be removed the morning after your procedure unless instructed otherwise.  Do not place any creams or ointments over the surgical site. This could effect the integrity of the Dermabond glue.  You can shower after 24 hours post-procedure, but do not let the jet directly hit your pocket site for at least 2 weeks. Recommend showering with your back to the shower head.   Do not reach your arm (on the same side as your device) around your back during showering for 6 weeks.  Do not submerge your surgical incision site under water  (swimming, bathing if you submerge the actual surgical site) for at least 6 week. It is imperative that the surgical site is completely healed prior to doing so    Follow up in device clinic in 1 week to check your incision and device function  Please contact the electrophysiology clinic if you have any questions or if you experience: potential surgical/pocket site complications (pain, swelling, bleeding, drainage), fevers, chest pain/shortness of breath, or for any other concerns.

## 2025-04-24 ENCOUNTER — OFFICE VISIT (OUTPATIENT)
Dept: FAMILY MEDICINE | Facility: CLINIC | Age: 85
End: 2025-04-24
Payer: MEDICARE

## 2025-04-24 VITALS
HEIGHT: 72 IN | WEIGHT: 214.19 LBS | HEART RATE: 85 BPM | BODY MASS INDEX: 29.01 KG/M2 | OXYGEN SATURATION: 97 % | SYSTOLIC BLOOD PRESSURE: 118 MMHG | TEMPERATURE: 98 F | DIASTOLIC BLOOD PRESSURE: 62 MMHG

## 2025-04-24 DIAGNOSIS — I10 ESSENTIAL HYPERTENSION: ICD-10-CM

## 2025-04-24 DIAGNOSIS — M25.511 ACUTE PAIN OF RIGHT SHOULDER: ICD-10-CM
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
## 2025-04-24 DIAGNOSIS — K64.9 HEMORRHOIDS, UNSPECIFIED HEMORRHOID TYPE: Primary | ICD-10-CM

## 2025-04-24 PROCEDURE — 1159F MED LIST DOCD IN RCRD: CPT | Mod: CPTII,S$GLB,,

## 2025-04-24 PROCEDURE — 99215 OFFICE O/P EST HI 40 MIN: CPT | Mod: S$GLB,,,

## 2025-04-24 PROCEDURE — 3288F FALL RISK ASSESSMENT DOCD: CPT | Mod: CPTII,S$GLB,,

## 2025-04-24 PROCEDURE — 1100F PTFALLS ASSESS-DOCD GE2>/YR: CPT | Mod: CPTII,S$GLB,,

## 2025-04-24 PROCEDURE — 1160F RVW MEDS BY RX/DR IN RCRD: CPT | Mod: CPTII,S$GLB,,

## 2025-04-24 PROCEDURE — 1125F AMNT PAIN NOTED PAIN PRSNT: CPT | Mod: CPTII,S$GLB,,

## 2025-04-24 PROCEDURE — 99999 PR PBB SHADOW E&M-EST. PATIENT-LVL V: CPT | Mod: PBBFAC,,,

## 2025-04-24 PROCEDURE — G2211 COMPLEX E/M VISIT ADD ON: HCPCS | Mod: S$GLB,,,

## 2025-04-24 RX ORDER — HYDROCORTISONE 25 MG/G
CREAM TOPICAL 2 TIMES DAILY PRN
Qty: 28 G | Refills: 0 | Status: SHIPPED | OUTPATIENT
Start: 2025-04-24 | End: 2025-05-01

## 2025-04-24 RX ORDER — HYDROCORTISONE ACETATE 25 MG/1
25 SUPPOSITORY RECTAL 2 TIMES DAILY PRN
Qty: 20 SUPPOSITORY | Refills: 0 | Status: SHIPPED | OUTPATIENT
Start: 2025-04-24 | End: 2025-05-04

## 2025-04-24 NOTE — PROGRESS NOTES
Subjective:      Heri Muhammad is a 84 y.o. male, patient of Jennifer Sharpe MD with a PMH listed below.       History of Present Illness    CHIEF COMPLAINT:  Heri presents today for hemorrhoid pain.    HEMORRHOIDS AND GI:  He reports a painful lump to rectal area x 1 week. Reports symptoms worsen when sitting or having BM and improve when lying flat. He underwent foot surgery at the end of March and has been taking pain medication, potentially contributing to constipation. He denies blood in stool. He uses Preparation H cream and medicated wipes for symptom management. He reports constipation with last bowel movement 1-2 days ago and experienced severe straining approximately one week ago prior to symptoms starting.     RECENT INJURY:  He fell in the shower 1.5-2 weeks ago, hitting his head. Since the fall, he has been experiencing right shoulder pain with limited range of motion, now only able to partially raise arm 2/2 pain compared to previous ability to raise arm above head. Denies being on blood thinners. Family at visit denies any changes in behavior or confusion.     MEDICATIONS:  He takes hydrocodone for pain management with stool softeners for associated constipation. He also takes aspirin.          Past Medical History:   Diagnosis Date    Anticoagulant long-term use     no longer taking eliquis    Aortic atherosclerosis 09/18/2017    - LDL goal <70 - BP goal <130/80    Benign essential tremor     Biventricular cardiac pacemaker in situ 09/24/2018    CAD (coronary artery disease) 12/02/2013    - 9/2013 Left heart Cath: LAD- 60% prox (neg FFR), nl, ramus and RCA 20% - LDL goal <70 - BP goal <130/80    Cancer     skin cancer on back    Carpal tunnel syndrome of left wrist 08/08/2017    surgery to correct    Chronic bronchitis     Chronic diastolic heart failure 01/19/2018    Chronic obstructive pulmonary disease 10/03/2018    Chronic right-sided thoracic back pain 04/27/2017    Cough     not  currently a problem    Current use of long term anticoagulation 06/02/2016    - Eliquis for Afib-no longer taking    Dizziness     intermittent    DJD (degenerative joint disease) of knee     right    ED (erectile dysfunction)     Hammer toe of right foot     Hard of hearing 11/2016    History of cataract     Hyponatremia 06/11/2019    Memory loss     Mixed hyperlipidemia 07/28/2012    Persistent atrial fibrillation 07/01/2015    - followed by Dr. Esparza - history of cardioversion - on Eliquis OAC 5 mg BID    Pulmonary hypertension     Seizure disorder     Seizures     last episode 1995    Subclinical hypothyroidism     no meds    Tension type headache 07/02/2019    not as frequent    Vitamin D deficiency        Review of Systems   Constitutional:  Negative for chills and fever.   Eyes:  Negative for visual disturbance.   Respiratory:  Negative for cough and shortness of breath.    Cardiovascular:  Negative for chest pain.   Gastrointestinal:  Positive for constipation and rectal pain. Negative for abdominal distention, abdominal pain, anal bleeding, blood in stool, nausea and vomiting.   Musculoskeletal:  Positive for arthralgias (R shoulder). Negative for back pain, joint swelling, myalgias, neck pain and neck stiffness.   Skin:  Negative for rash.   Neurological:  Negative for dizziness, facial asymmetry, speech difficulty, weakness, light-headedness, numbness and headaches.   Psychiatric/Behavioral:  Negative for confusion and dysphoric mood.           Objective:     Vitals:    04/24/25 0853   BP: 118/62   BP Location: Left arm   Patient Position: Sitting   Pulse: 85   Temp: 98.3 °F (36.8 °C)   TempSrc: Oral   SpO2: 97%   Weight: 97.1 kg (214 lb 2.8 oz)   Height: 6' (1.829 m)          Physical Exam  Vitals reviewed. Exam conducted with a chaperone present.   Constitutional:       General: He is awake. He is not in acute distress.     Appearance: He is overweight.   HENT:      Mouth/Throat:      Mouth: Mucous  membranes are moist.   Eyes:      General:         Right eye: No discharge.         Left eye: No discharge.      Pupils: Pupils are equal, round, and reactive to light.   Cardiovascular:      Rate and Rhythm: Normal rate and regular rhythm.   Pulmonary:      Effort: Pulmonary effort is normal. No respiratory distress.      Breath sounds: Normal breath sounds.   Chest:      Chest wall: No deformity, swelling, tenderness or crepitus.   Abdominal:      General: Bowel sounds are normal. There is no distension.      Palpations: Abdomen is soft.      Tenderness: There is no abdominal tenderness.   Genitourinary:     Rectum: External hemorrhoid and internal hemorrhoid present.          Comments: 2 non thrombosed external hemorrhoids noted at sites marked above. No active bleeding observed. No signs of excoriation, fissures, or infection.    Digital Rectal Exam (TEZ): Normal anal sphincter tone. Internal Hemorrhoids present. No masses, irregularities, or tenderness in the rectal wall. No palpable fecal masses or impaction.    Musculoskeletal:      Right shoulder: No swelling, deformity, effusion, tenderness or bony tenderness. Decreased range of motion (with flexion and abduction.). Normal pulse.      Left shoulder: Normal.   Skin:     General: Skin is warm and dry.   Neurological:      General: No focal deficit present.      Mental Status: He is alert and oriented to person, place, and time.      Sensory: No sensory deficit.      Motor: No weakness.      Gait: Gait abnormal (in wheelchair but able to ambulate to exam table with minimal assistance).   Psychiatric:         Mood and Affect: Mood normal.         Behavior: Behavior normal. Behavior is cooperative.             Assessment:         ICD-10-CM ICD-9-CM   1. Hemorrhoids, unspecified hemorrhoid type  K64.9 455.6   2. Fall, initial encounter  W19.XXXA E888.9   3. Acute pain of right shoulder  M25.511 719.41   4. Essential hypertension  I10 401.9       Plan:        Hemorrhoids, unspecified hemorrhoid type  -     hydrocortisone (ANUSOL-HC) 25 mg suppository; Place 1 suppository (25 mg total) rectally 2 (two) times daily as needed for Hemorrhoids.  Dispense: 20 suppository; Refill: 0  -     hydrocortisone 2.5 % cream; Apply topically 2 (two) times daily as needed (hemorrhoid pain).  Dispense: 28 g; Refill: 0  -Hydrocortisone suppository and/or cream BID PRN rectal pain/hemorrhoids. Advised to not use longer than 7 days.   -Recommend dietary changes (increase fiber intake, hydration) to   alleviate constipation; Warm sitz baths- handout provided; purchasing   donut from local drug store for relief.   -Recommended Miralax, 1 cap full daily PRN constipation (has   medication at home).   -Advised narcotic pain medication he is taking for recent foot surgery   Can be contributing to constipation issues.   -Follow up 1 week. If symptoms not improving or worsening advised   would refer to CRS, patient agreeable.   -RTC or follow up with PCP if symptoms worsen or not improving with treatment.      Fall, initial encounter  -     X-ray Shoulder 2 or More Views Right; Future; Expected date: 04/24/2025  -     X-Ray Ribs 2 View Left; Future; Expected date: 04/24/2025  -     X-Ray Ribs 2 View Right; Future; Expected date: 04/24/2025  -Requesting x-rays of right shoulder and ribs.   -No obvious deformity on PE but limited ROM noted to R shoulder with abduction and flexion.   -Does not wish to complete x-rays today 2/2 rectal pain. Will notify office when ready for imaging.     Acute pain of right shoulder  -     X-ray Shoulder 2 or More Views Right; Future; Expected date: 04/24/2025  -Recommend OTC tylenol and/or ibuprofen PRN pain at this time.   -RTC or follow up with PCP if symptoms worsen or not improving with treatment.     Essential hypertension  -Chronic.   -Controlled at visit today,118/62.  -On metoprolol 50 mg daily, torsemide 10 mg daily.   -Followed by PCP/Cardiology.          RTC/ED precautions discussed where applicable.   Encouraged patient/caregiver to let me know if there are any further questions/concerns.   Patient/caretaker agrees with the treatment plan.     Follow up if symptoms worsen or fail to improve.      JASPREET Quiroz  Family Medicine  [facility]     This note was generated with the assistance of ambient listening technology. Verbal consent was obtained by the patient and accompanying visitor(s) for the recording of patient appointment to facilitate this note. I attest to having reviewed and edited the generated note for accuracy, though some syntax or spelling errors may persist. Please contact the author of this note for any clarification.       I spent a total of 49 minutes on the day of the visit.This includes face to face time and non-face to face time preparing to see the patient (eg, review of tests), obtaining and/or reviewing separately obtained history, documenting clinical information in the electronic or other health record, independently interpreting results and communicating results to the patient/family/caregiver, or care coordinator.

## 2025-04-24 NOTE — PATIENT INSTRUCTIONS
-Sitz bath as needed- hand out provided.  -OTC metamucil daily.  -Miralax 1 cap full daily as needed for constipation.  -Increase water intake daily.   -Avoid straining with bowel movements.   -Can get inflatable donut from drug store to help relieve pressure to rectum when sitting.

## 2025-04-27 ENCOUNTER — PATIENT MESSAGE (OUTPATIENT)
Dept: FAMILY MEDICINE | Facility: CLINIC | Age: 85
End: 2025-04-27
Payer: MEDICARE

## 2025-04-28 DIAGNOSIS — K64.9 HEMORRHOIDS, UNSPECIFIED HEMORRHOID TYPE: Primary | ICD-10-CM

## 2025-04-29 ENCOUNTER — OFFICE VISIT (OUTPATIENT)
Dept: SURGERY | Facility: CLINIC | Age: 85
End: 2025-04-29
Payer: MEDICARE

## 2025-04-29 ENCOUNTER — CLINICAL SUPPORT (OUTPATIENT)
Dept: CARDIOLOGY | Facility: HOSPITAL | Age: 85
End: 2025-04-29
Attending: INTERNAL MEDICINE
Payer: MEDICARE

## 2025-04-29 DIAGNOSIS — Z45.010 PACEMAKER BATTERY DEPLETION: ICD-10-CM

## 2025-04-29 DIAGNOSIS — Z95.0 BIVENTRICULAR CARDIAC PACEMAKER IN SITU: ICD-10-CM

## 2025-04-29 DIAGNOSIS — I48.19 PERSISTENT ATRIAL FIBRILLATION: ICD-10-CM

## 2025-04-29 DIAGNOSIS — I49.5 SINUS NODE DYSFUNCTION: ICD-10-CM

## 2025-04-29 DIAGNOSIS — K64.9 HEMORRHOIDS, UNSPECIFIED HEMORRHOID TYPE: ICD-10-CM

## 2025-04-29 DIAGNOSIS — I50.32 CHRONIC DIASTOLIC HEART FAILURE: ICD-10-CM

## 2025-04-29 PROCEDURE — 93281 PM DEVICE PROGR EVAL MULTI: CPT | Mod: HCNC

## 2025-04-29 PROCEDURE — 99999 PR PBB SHADOW E&M-EST. PATIENT-LVL III: CPT | Mod: PBBFAC,HCNC,, | Performed by: NURSE PRACTITIONER

## 2025-04-29 NOTE — PROGRESS NOTES
CRS Office Visit History and Physical    Referring Md:   Allegra Del Rosario, Alex  [address]  [address]  [place] [id number]    SUBJECTIVE:     Chief Complaint: anal pain    History of Present Illness:  The patient is new patient to this practice.   Course is as follows:  Patient is a 84 y.o. male presents with anal pain thought to be d/t hemorrhoids.  Symptoms have been present for approx 2 weeks now.  Has tried hydrocortisone cream and warm soaks without improvement.  Pain progressively worsening. Unable to sit down.    1 month ago had foot sx with p/o narcotic pain medication.  This contributed to worsening constipation, then took miralax which gave loose stools.  Has since stopped miralax  Pain is worsened with sitting/having bms, improved with lying flat.  Denies bleeding or drainage.      Review of patient's allergies indicates:   Allergen Reactions    Bactroban [mupirocin calcium] Rash     Other reaction(s): Rash       Past Medical History:   Diagnosis Date    Anticoagulant long-term use     no longer taking eliquis    Aortic atherosclerosis 09/18/2017    - LDL goal <70 - BP goal <130/80    Benign essential tremor     Biventricular cardiac pacemaker in situ 09/24/2018    CAD (coronary artery disease) 12/02/2013    - 9/2013 Left heart Cath: LAD- 60% prox (neg FFR), nl, ramus and RCA 20% - LDL goal <70 - BP goal <130/80    Cancer     skin cancer on back    Carpal tunnel syndrome of left wrist 08/08/2017    surgery to correct    Chronic bronchitis     Chronic diastolic heart failure 01/19/2018    Chronic obstructive pulmonary disease 10/03/2018    Chronic right-sided thoracic back pain 04/27/2017    Cough     not currently a problem    Current use of long term anticoagulation 06/02/2016    - Eliquis for Afib-no longer taking    Dizziness     intermittent    DJD (degenerative joint disease) of knee     right    ED (erectile dysfunction)     Hammer toe of right foot     Hard of hearing 11/2016    History  of cataract     Hyponatremia 06/11/2019    Memory loss     Mixed hyperlipidemia 07/28/2012    Persistent atrial fibrillation 07/01/2015    - followed by Dr. Esparza - history of cardioversion - on Eliquis OAC 5 mg BID    Pulmonary hypertension     Seizure disorder     Seizures     last episode 1995    Subclinical hypothyroidism     no meds    Tension type headache 07/02/2019    not as frequent    Vitamin D deficiency      Past Surgical History:   Procedure Laterality Date    CARDIAC CATHETERIZATION      CARDIAC PACEMAKER PLACEMENT  2014    CARDIOVERSION      multiple    CARPAL TUNNEL RELEASE Left     CATARACT EXTRACTION Bilateral     COLONOSCOPY      several    CORRECTION OF HAMMER TOE Right 3/21/2025    Procedure: CORRECTION, HAMMER TOE;  Surgeon: Geronimo Lehman Jr., CHACE;  Location: [facility] OR;  Service: Podiatry;  Laterality: Right;  pop saph. Toes 2-5    ECHOCARDIOGRAM,TRANSESOPHAGEAL N/A 06/16/2023    Procedure: Transesophageal echo (BEN) intra-procedure log documentation;  Surgeon: Karen Diagnostic Provider;  Location: [facility] EP LAB;  Service: Cardiology;  Laterality: N/A;  s/p WM, BEN, anes, 3 Prep    FRACTURE SURGERY Right     ankle with hardware    HERNIA REPAIR      abdominal    INJECTION OF ANESTHETIC AGENT AROUND MEDIAL BRANCH NERVES INNERVATING LUMBAR FACET JOINT Bilateral 01/17/2019    Procedure: Block-nerve-medial branch-lumbar L2-L5;  Surgeon: Anthony Moffett MD;  Location: [facility] OR;  Service: Pain Management;  Laterality: Bilateral;    JOINT REPLACEMENT Bilateral     shoulders    OCCLUSION OF LEFT ATRIAL APPENDAGE N/A 05/05/2023    Procedure: Left atrial appendage occlusion;  Surgeon: Hema Del Valle MD;  Location: [facility] EP LAB;  Service: Cardiology;  Laterality: N/A;  afib, watchman, BSCI, ben, anes, MB, 3prep    RADIOFREQUENCY ABLATION OF LUMBAR MEDIAL BRANCH NERVE AT SINGLE LEVEL Bilateral 01/23/2019    Procedure: Radiofrequency Ablation, Nerve, Spinal, Lumbar, Medial Branch, L2-L5;  Surgeon: Anthony  INESSA Moffett MD;  Location: [facility] OR;  Service: Pain Management;  Laterality: Bilateral;    REPLACEMENT OF PACEMAKER GENERATOR N/A 4/22/2025    Procedure: REPLACEMENT, PACEMAKER GENERATOR;  Surgeon: Deep Esparza MD;  Location: [facility] EP LAB;  Service: Cardiology;  Laterality: N/A;  DANNI, CRT-P gen change, SJM, MAC, SK, 3 Prep    SURGICAL REMOVAL OF METATARSAL HEAD Right 3/21/2025    Procedure: OSTECTOMY, METATARSAL BONE, HEAD;  Surgeon: Geronimo Lehman Jr., DPM;  Location: [facility] OR;  Service: Podiatry;  Laterality: Right;  pop saph. Toes 2-5    TRANSESOPHAGEAL ECHOCARDIOGRAPHY N/A 05/05/2023    Procedure: ECHOCARDIOGRAM, TRANSESOPHAGEAL;  Surgeon: Dylan Layne MD;  Location: [facility] EP LAB;  Service: Cardiology;  Laterality: N/A;    TREATMENT OF CARDIAC ARRHYTHMIA N/A 07/11/2022    Procedure: Cardioversion or Defibrillation;  Surgeon: MIREILLE Morel MD;  Location: [facility] EP LAB;  Service: Cardiology;  Laterality: N/A;  AF, GALILEO, DCCV, MAC, EH (to cover for SK), 3 Prep *SJM CRT-P in situ*    WISDOM TOOTH EXTRACTION       Family History   Problem Relation Name Age of Onset    Heart disease Mother Odalis     Heart failure Mother Odalis     Dementia Mother Odalis     Cancer Father Darío         colon    Blindness Father Darío         accident    Cataracts Father Darío     Hypertension Father Darío     Tremor Father Darío     Tremor Brother      Cancer Daughter Shlomo         cancer as a baby, unsure of what kind    Obesity Daughter Judy     No Known Problems Son Giovanny     Tremor Paternal Grandfather      Melanoma Neg Hx      Amblyopia Neg Hx      Diabetes Neg Hx      Glaucoma Neg Hx      Macular degeneration Neg Hx      Retinal detachment Neg Hx      Strabismus Neg Hx      Stroke Neg Hx      Thyroid disease Neg Hx       Social History[1]     Review of Systems:  Review of Systems   Gastrointestinal:         Anal pain       OBJECTIVE:     Vital Signs (Most Recent)  There were no vitals taken for this visit.    Physical  Exam:  General: White male in no distress   Neuro: Alert and oriented to person, place, and time.  Moves all extremities.     HEENT: No icterus.  Trachea midline  Respiratory: Respirations are even and unlabored, no cough or audible wheezing  Skin: Warm dry and intact, No visible rashes, no jaundice    Labs reviewed today:  Lab Results   Component Value Date    WBC 8.42 04/21/2025    HGB 14.0 04/21/2025    HCT 42.4 04/21/2025     04/21/2025    CHOL 126 04/26/2024    TRIG 67 04/26/2024    HDL 38 (L) 04/26/2024    ALT 25 04/21/2025    AST 26 04/21/2025     04/21/2025    K 4.4 04/21/2025     04/21/2025    CREATININE 0.7 04/21/2025    BUN 10 04/21/2025    CO2 27 04/21/2025    TSH 2.928 04/26/2024    PSA 3.1 08/29/2018    INR 0.9 04/21/2025    GLUF 90 04/05/2004    HGBA1C 5.6 04/26/2024       Imaging reviewed today:  none    Endoscopy reviewed today:  none    Anorectal Exam:    Anal Skin:  large RAL area of erythema, induration, and fluctuance, very ttp    Digital Rectal Exam:  Deferred      Procedure : Incision and Drainage    The procedure was explained to the patient and gave verbal informed consent for Incision and Drainage.    The area was cleansed with betadine.    The skin overlying the abscess was anesthetized with subcutaneous infiltration of 1:1 lidocaine and marcaine.     The abscess in the RAL position was incised with a #11 scalpel and the abscess cavity was drained.    Large volume purulent drainage  A 10 Fr Pezzer drain was placed into the cavity.  Dry dressings were placed.    The patient tolerated the procedure well, without complications.      Postoperative instructions were given to patient.       ASSESSMENT/PLAN:     Diagnoses and all orders for this visit:    Hemorrhoids, unspecified hemorrhoid type  -     Ambulatory referral/consult to Colorectal Surgery    Pt recently on 2 rounds of abx in past 3 weeks. Last dose 2 days ago.  Will hold off on abx at this time.    The patient was  instructed to:  Follow up on Friday for drain removal.        Jada Hansen, KASIAP-C  Colon and Rectal Surgery             [1]   Social History  Tobacco Use    Smoking status: Former     Current packs/day: 0.00     Average packs/day: 1 pack/day for 5.0 years (5.0 ttl pk-yrs)     Types: Cigarettes     Start date:      Quit date:      Years since quittin.3    Smokeless tobacco: Never    Tobacco comments:     - I   Substance Use Topics    Alcohol use: No    Drug use: No

## 2025-04-30 ENCOUNTER — OFFICE VISIT (OUTPATIENT)
Dept: FAMILY MEDICINE | Facility: CLINIC | Age: 85
End: 2025-04-30
Payer: MEDICARE

## 2025-04-30 ENCOUNTER — TELEPHONE (OUTPATIENT)
Dept: FAMILY MEDICINE | Facility: CLINIC | Age: 85
End: 2025-04-30
Payer: MEDICARE

## 2025-04-30 VITALS
SYSTOLIC BLOOD PRESSURE: 110 MMHG | OXYGEN SATURATION: 96 % | HEART RATE: 81 BPM | TEMPERATURE: 98 F | HEIGHT: 72 IN | WEIGHT: 206.88 LBS | BODY MASS INDEX: 28.02 KG/M2 | DIASTOLIC BLOOD PRESSURE: 60 MMHG

## 2025-04-30 DIAGNOSIS — I10 ESSENTIAL HYPERTENSION: ICD-10-CM

## 2025-04-30 DIAGNOSIS — J41.0 SIMPLE CHRONIC BRONCHITIS: ICD-10-CM

## 2025-04-30 DIAGNOSIS — R63.4 UNINTENTIONAL WEIGHT LOSS: Primary | ICD-10-CM

## 2025-04-30 PROCEDURE — 1159F MED LIST DOCD IN RCRD: CPT | Mod: CPTII,S$GLB,,

## 2025-04-30 PROCEDURE — 1160F RVW MEDS BY RX/DR IN RCRD: CPT | Mod: CPTII,S$GLB,,

## 2025-04-30 PROCEDURE — 99999 PR PBB SHADOW E&M-EST. PATIENT-LVL V: CPT | Mod: PBBFAC,HCNC,,

## 2025-04-30 PROCEDURE — 3288F FALL RISK ASSESSMENT DOCD: CPT | Mod: CPTII,S$GLB,,

## 2025-04-30 PROCEDURE — 1126F AMNT PAIN NOTED NONE PRSNT: CPT | Mod: CPTII,S$GLB,,

## 2025-04-30 PROCEDURE — G2211 COMPLEX E/M VISIT ADD ON: HCPCS | Mod: S$GLB,,,

## 2025-04-30 PROCEDURE — 99215 OFFICE O/P EST HI 40 MIN: CPT | Mod: S$GLB,,,

## 2025-04-30 PROCEDURE — 1100F PTFALLS ASSESS-DOCD GE2>/YR: CPT | Mod: CPTII,S$GLB,,

## 2025-04-30 RX ORDER — FLUTICASONE PROPIONATE 50 MCG
1 SPRAY, SUSPENSION (ML) NASAL DAILY
Qty: 15.8 ML | Refills: 2 | Status: SHIPPED | OUTPATIENT
Start: 2025-04-30

## 2025-04-30 NOTE — TELEPHONE ENCOUNTER
Spoke with pt wife regarding appt for cough/copd. Informed we do have an opening for today if pt would like to be seen pt wife verbally understood and appt scheduled

## 2025-04-30 NOTE — PROGRESS NOTES
Subjective:         Heri Muhammad is a 84 y.o. male, patient of Jennifer Sharpe MD with a PMH listed below.       History of Present Illness    CHIEF COMPLAINT:  Heri presents today for chronic cough that has been worsening.    HISTORY OF PRESENT ILLNESS:  He reports an occasional chronic cough that he has had for years but worsened in the last 4-5 weeks. The cough has become daily, predominantly dry with occasional clear white sputum production. His voice has become raspy and low, making it difficult for others to understand him. Wife at visit reports constantly clearing his throat and seem to be worse at night. Patient denies cough worsening with laying down. He denies any associated URI symptoms. Hx of chronic bronchitis, on Trelegy. Takes Zyrtec daily. Does not follow up Pulmonology. Smoking hx when he was 19 but quit 1 year later.     WEIGHT AND DIET:  He reports significant recent weight loss recently. He was 220 lbs on 4/22/2025, 214 lbs on 4/24/2025, and now 206 lbs today.  Wife at visit reports his diet consists of two meals per day with fruit in between and at least one protein shake daily. Notes decreased appetite recently-reduced desire to eat fruit.     FAMILY HISTORY:  Father had colon cancer.         Past Medical History:   Diagnosis Date    Anticoagulant long-term use     no longer taking eliquis    Aortic atherosclerosis 09/18/2017    - LDL goal <70 - BP goal <130/80    Benign essential tremor     Biventricular cardiac pacemaker in situ 09/24/2018    CAD (coronary artery disease) 12/02/2013    - 9/2013 Left heart Cath: LAD- 60% prox (neg FFR), nl, ramus and RCA 20% - LDL goal <70 - BP goal <130/80    Cancer     skin cancer on back    Carpal tunnel syndrome of left wrist 08/08/2017    surgery to correct    Chronic bronchitis     Chronic diastolic heart failure 01/19/2018    Chronic obstructive pulmonary disease 10/03/2018    Chronic right-sided thoracic back pain 04/27/2017    Cough     not  currently a problem    Current use of long term anticoagulation 06/02/2016    - Eliquis for Afib-no longer taking    Dizziness     intermittent    DJD (degenerative joint disease) of knee     right    ED (erectile dysfunction)     Hammer toe of right foot     Hard of hearing 11/2016    History of cataract     Hyponatremia 06/11/2019    Memory loss     Mixed hyperlipidemia 07/28/2012    Persistent atrial fibrillation 07/01/2015    - followed by Dr. Esparza - history of cardioversion - on Eliquis OAC 5 mg BID    Pulmonary hypertension     Seizure disorder     Seizures     last episode 1995    Subclinical hypothyroidism     no meds    Tension type headache 07/02/2019    not as frequent    Vitamin D deficiency        Review of Systems   Constitutional:  Positive for unexpected weight change. Negative for chills and fever.   HENT:  Positive for voice change. Negative for congestion, ear pain, postnasal drip, rhinorrhea, sinus pressure, sinus pain, sneezing, sore throat and trouble swallowing.    Eyes:  Negative for photophobia, pain, discharge, redness, itching and visual disturbance.   Respiratory:  Positive for cough and shortness of breath (Chronic, unchanged). Negative for chest tightness and wheezing.    Cardiovascular:  Negative for chest pain, palpitations and leg swelling.   Gastrointestinal:  Negative for abdominal pain, blood in stool, constipation, diarrhea, nausea and vomiting.   Genitourinary:  Negative for dysuria, flank pain, frequency, hematuria and urgency.   Musculoskeletal:  Negative for back pain.   Skin:  Negative for rash.   Neurological:  Negative for dizziness, light-headedness, numbness and headaches.   Psychiatric/Behavioral:  Negative for confusion and dysphoric mood.           Objective:     Vitals:    04/30/25 1435   BP: 110/60   BP Location: Left arm   Patient Position: Sitting   Pulse: 81   Temp: 98.4 °F (36.9 °C)   TempSrc: Oral   SpO2: 96%   Weight: 93.9 kg (206 lb 14.4 oz)   Height: 6'  (1.829 m)          Physical Exam  Vitals reviewed.   Constitutional:       General: He is awake. He is not in acute distress.     Appearance: He is not ill-appearing or toxic-appearing.   HENT:      Right Ear: External ear normal. There is impacted cerumen.      Left Ear: External ear normal. Tympanic membrane is not perforated, erythematous, retracted or bulging.      Nose: No congestion or rhinorrhea.      Mouth/Throat:      Mouth: Mucous membranes are moist.      Pharynx: Posterior oropharyngeal erythema present. No oropharyngeal exudate.   Eyes:      General:         Right eye: No discharge.         Left eye: No discharge.      Conjunctiva/sclera: Conjunctivae normal.      Pupils: Pupils are equal, round, and reactive to light.   Cardiovascular:      Rate and Rhythm: Normal rate and regular rhythm.   Pulmonary:      Effort: Pulmonary effort is normal. No respiratory distress.      Breath sounds: Normal breath sounds. No wheezing, rhonchi or rales.   Abdominal:      General: Bowel sounds are normal. There is no distension.      Palpations: Abdomen is soft.      Tenderness: There is no abdominal tenderness. There is no right CVA tenderness, left CVA tenderness, guarding or rebound.   Musculoskeletal:         General: Normal range of motion.      Right lower leg: No edema.      Left lower leg: No edema.      Comments: Hard sole shoe noted to right foot with CDI dressing to right foot (recent foot surgery).    Lymphadenopathy:      Cervical: No cervical adenopathy.   Skin:     General: Skin is warm and dry.      Findings: No rash.   Neurological:      General: No focal deficit present.      Mental Status: He is alert and oriented to person, place, and time.      Gait: Gait abnormal (in wheelchair).   Psychiatric:         Behavior: Behavior normal. Behavior is cooperative.             Assessment:         ICD-10-CM ICD-9-CM   1. Unintentional weight loss  R63.4 783.21   2. Simple chronic bronchitis  J41.0 491.0   3.  Essential hypertension  I10 401.9       Plan:       Unintentional weight loss  -     CBC auto differential; Future; Expected date: 04/30/2025  -     Comprehensive Metabolic Panel; Future; Expected date: 04/30/2025  -     TSH; Future; Expected date: 04/30/2025  -     Hepatitis Panel, Acute; Future; Expected date: 04/30/2025  -     HIV 1/2 Ag/Ab (4th Gen); Future; Expected date: 04/30/2025  -     X-Ray Chest PA And Lateral; Future; Expected date: 04/30/2025  -     Urinalysis, Reflex to Urine Culture Urine, Clean Catch; Future; Expected date: 04/30/2025  -     CT Abdomen Pelvis With IV Contrast Routine Oral Contrast; Future; Expected date: 04/30/2025  -14 lb weight loss in 8 days, between 4/22/2025-4/30/2025.  -Patient and wife agreeable to workup. Labs, CXR, and urine today. CT to be scheduled at soonest convenience.   -Follow up/treatment pending results.     Simple chronic bronchitis        -     fluticasone propionate (FLONASE) 50 mcg/actuation nasal spray; 1 spray (50 mcg total) by Each Nostril route once daily.  Dispense: 15.8 mL; Refill: 2  -Chronic.   -Cough worse in last 4-5 weeks, now daily.   -On Telergy daily.   -Suspect possible reflux or PND 2/2 frequent throat clearing and worse at night.   -Advised to trial flonase daily along with continued use of Zyrtec daily to see if symptoms improve. If not improved, possibly trial reflux medication or refer to ENT for LPR evaluation.   -Followed by PCP.  -RTC or follow up with PCP if symptoms worsen or not improving with treatment.      Essential hypertension  -Chronic.   -Controlled at visit today,110/60.  -On metoprolol 50 mg daily, torsemide 10 mg daily.   -Followed by PCP/Cardiology.       RTC/ED precautions discussed where applicable.   Encouraged patient/caregiver to let me know if there are any further questions/concerns.   Patient/caretaker agrees with the treatment plan.     No follow-ups on file.      Allegra Del Rosario, JASPREET  Family Medicine [facility]  [facility]     This note was generated with the assistance of ambient listening technology. Verbal consent was obtained by the patient and accompanying visitor(s) for the recording of patient appointment to facilitate this note. I attest to having reviewed and edited the generated note for accuracy, though some syntax or spelling errors may persist. Please contact the author of this note for any clarification.       I spent a total of 75 minutes on the day of the visit.This includes face to face time and non-face to face time preparing to see the patient (eg, review of tests), obtaining and/or reviewing separately obtained history, documenting clinical information in the electronic or other health record, independently interpreting results and communicating results to the patient/family/caregiver, or care coordinator.

## 2025-05-01 ENCOUNTER — RESULTS FOLLOW-UP (OUTPATIENT)
Dept: FAMILY MEDICINE | Facility: CLINIC | Age: 85
End: 2025-05-01

## 2025-05-01 ENCOUNTER — PATIENT MESSAGE (OUTPATIENT)
Dept: FAMILY MEDICINE | Facility: CLINIC | Age: 85
End: 2025-05-01
Payer: MEDICARE

## 2025-05-01 DIAGNOSIS — R63.4 UNINTENTIONAL WEIGHT LOSS: Primary | ICD-10-CM

## 2025-05-02 ENCOUNTER — TELEPHONE (OUTPATIENT)
Dept: PODIATRY | Facility: CLINIC | Age: 85
End: 2025-05-02
Payer: MEDICARE

## 2025-05-02 ENCOUNTER — OFFICE VISIT (OUTPATIENT)
Dept: SURGERY | Facility: CLINIC | Age: 85
End: 2025-05-02
Payer: MEDICARE

## 2025-05-02 VITALS
DIASTOLIC BLOOD PRESSURE: 72 MMHG | WEIGHT: 207.44 LBS | HEIGHT: 72 IN | HEART RATE: 74 BPM | BODY MASS INDEX: 28.1 KG/M2 | SYSTOLIC BLOOD PRESSURE: 116 MMHG

## 2025-05-02 DIAGNOSIS — K61.2 ANORECTAL ABSCESS: Primary | ICD-10-CM

## 2025-05-02 PROCEDURE — 99999 PR PBB SHADOW E&M-EST. PATIENT-LVL III: CPT | Mod: PBBFAC,,, | Performed by: NURSE PRACTITIONER

## 2025-05-02 NOTE — TELEPHONE ENCOUNTER
Called and spoke to patient's wife. Informed that appt scheduled for 11:30 but they can come earlier. Patient's wife understood.

## 2025-05-02 NOTE — PROGRESS NOTES
CRS Office Visit History and Physical    Referring Md:   No referring provider defined for this encounter.    SUBJECTIVE:        History of Present Illness 4/29/2025:  The patient is new patient to this practice.   Course is as follows:  Patient is a 84 y.o. male presents with anal pain thought to be d/t hemorrhoids.  Symptoms have been present for approx 2 weeks now.  Has tried hydrocortisone cream and warm soaks without improvement.  Pain progressively worsening. Unable to sit down.     1 month ago had foot sx with p/o narcotic pain medication.  This contributed to worsening constipation, then took miralax which gave loose stools.  Has since stopped miralax  Pain is worsened with sitting/having bms, improved with lying flat.  Denies bleeding or drainage.    Interval hx 5/2/2025  Pt here with wife.  Pain muhc better. Drain is putting out a decent bit  No fevers or chills    Review of patient's allergies indicates:   Allergen Reactions    Bactroban [mupirocin calcium] Rash     Other reaction(s): Rash       Past Medical History:   Diagnosis Date    Anticoagulant long-term use     no longer taking eliquis    Aortic atherosclerosis 09/18/2017    - LDL goal <70 - BP goal <130/80    Benign essential tremor     Biventricular cardiac pacemaker in situ 09/24/2018    CAD (coronary artery disease) 12/02/2013    - 9/2013 Left heart Cath: LAD- 60% prox (neg FFR), nl, ramus and RCA 20% - LDL goal <70 - BP goal <130/80    Cancer     skin cancer on back    Carpal tunnel syndrome of left wrist 08/08/2017    surgery to correct    Chronic bronchitis     Chronic diastolic heart failure 01/19/2018    Chronic obstructive pulmonary disease 10/03/2018    Chronic right-sided thoracic back pain 04/27/2017    Cough     not currently a problem    Current use of long term anticoagulation 06/02/2016    - Eliquis for Afib-no longer taking    Dizziness     intermittent    DJD (degenerative joint disease) of knee     right    ED (erectile  dysfunction)     Hammer toe of right foot     Hard of hearing 11/2016    History of cataract     Hyponatremia 06/11/2019    Memory loss     Mixed hyperlipidemia 07/28/2012    Persistent atrial fibrillation 07/01/2015    - followed by Dr. Esparza - history of cardioversion - on Eliquis OAC 5 mg BID    Pulmonary hypertension     Seizure disorder     Seizures     last episode 1995    Subclinical hypothyroidism     no meds    Tension type headache 07/02/2019    not as frequent    Vitamin D deficiency      Past Surgical History:   Procedure Laterality Date    CARDIAC CATHETERIZATION      CARDIAC PACEMAKER PLACEMENT  2014    CARDIOVERSION      multiple    CARPAL TUNNEL RELEASE Left     CATARACT EXTRACTION Bilateral     COLONOSCOPY      several    CORRECTION OF HAMMER TOE Right 3/21/2025    Procedure: CORRECTION, HAMMER TOE;  Surgeon: Geronimo Lehman Jr., CHACE;  Location: [facility] OR;  Service: Podiatry;  Laterality: Right;  pop saph. Toes 2-5    ECHOCARDIOGRAM,TRANSESOPHAGEAL N/A 06/16/2023    Procedure: Transesophageal echo (BEN) intra-procedure log documentation;  Surgeon: Karen Diagnostic Provider;  Location: [facility] LAB;  Service: Cardiology;  Laterality: N/A;  s/p WM, BEN, anes, 3 Prep    FRACTURE SURGERY Right     ankle with hardware    HERNIA REPAIR      abdominal    INJECTION OF ANESTHETIC AGENT AROUND MEDIAL BRANCH NERVES INNERVATING LUMBAR FACET JOINT Bilateral 01/17/2019    Procedure: Block-nerve-medial branch-lumbar L2-L5;  Surgeon: Anthony Moffett MD;  Location: [facility] OR;  Service: Pain Management;  Laterality: Bilateral;    JOINT REPLACEMENT Bilateral     shoulders    OCCLUSION OF LEFT ATRIAL APPENDAGE N/A 05/05/2023    Procedure: Left atrial appendage occlusion;  Surgeon: Hema Del Valle MD;  Location: [facility];  Service: Cardiology;  Laterality: N/A;  afib, watchman, BSCI, ben, anes, MB, 3prep    RADIOFREQUENCY ABLATION OF LUMBAR MEDIAL BRANCH NERVE AT SINGLE LEVEL Bilateral 01/23/2019    Procedure:  Radiofrequency Ablation, Nerve, Spinal, Lumbar, Medial Branch, L2-L5;  Surgeon: Anthony Moffett MD;  Location: [facility] OR;  Service: Pain Management;  Laterality: Bilateral;    REPLACEMENT OF PACEMAKER GENERATOR N/A 4/22/2025    Procedure: REPLACEMENT, PACEMAKER GENERATOR;  Surgeon: Deep Esparza MD;  Location: [facility] EP LAB;  Service: Cardiology;  Laterality: N/A;  DANNI, CRT-P gen change, SJM, MAC, SK, 3 Prep    SURGICAL REMOVAL OF METATARSAL HEAD Right 3/21/2025    Procedure: OSTECTOMY, METATARSAL BONE, HEAD;  Surgeon: Geronimo Lehman Jr., DPM;  Location: [facility] OR;  Service: Podiatry;  Laterality: Right;  pop saph. Toes 2-5    TRANSESOPHAGEAL ECHOCARDIOGRAPHY N/A 05/05/2023    Procedure: ECHOCARDIOGRAM, TRANSESOPHAGEAL;  Surgeon: Dylan Layne MD;  Location: [facility] EP LAB;  Service: Cardiology;  Laterality: N/A;    TREATMENT OF CARDIAC ARRHYTHMIA N/A 07/11/2022    Procedure: Cardioversion or Defibrillation;  Surgeon: MIREILLE Morel MD;  Location: [facility] EP LAB;  Service: Cardiology;  Laterality: N/A;  AF, GALILEO, DCCV, MAC, EH (to cover for SK), 3 Prep *SJM CRT-P in situ*    WISDOM TOOTH EXTRACTION       Family History   Problem Relation Name Age of Onset    Heart disease Mother Odalis     Heart failure Mother Odalis     Dementia Mother Odalis     Cancer Father Darío         colon    Blindness Father Darío         accident    Cataracts Father Darío     Hypertension Father Darío     Tremor Father Darío     Tremor Brother      Cancer Daughter Shlomo         cancer as a baby, unsure of what kind    Obesity Daughter Judy     No Known Problems Son Giovanny     Tremor Paternal Grandfather      Melanoma Neg Hx      Amblyopia Neg Hx      Diabetes Neg Hx      Glaucoma Neg Hx      Macular degeneration Neg Hx      Retinal detachment Neg Hx      Strabismus Neg Hx      Stroke Neg Hx      Thyroid disease Neg Hx       Social History[1]     Review of Systems:  ROS    OBJECTIVE:     Vital Signs (Most Recent)  /72   Pulse 74   Ht 6' (1.829  m)   Wt 94.1 kg (207 lb 7.3 oz)   BMI 28.14 kg/m²     Physical Exam:  General: White male in no distress   Neuro: Alert and oriented to person, place, and time.  Moves all extremities.     HEENT: No icterus.  Trachea midline  Respiratory: Respirations are even and unlabored, no cough or audible wheezing  Skin: Warm dry and intact, No visible rashes, no jaundice    Labs reviewed today:  Lab Results   Component Value Date    WBC 8.09 2025    HGB 14.5 2025    HCT 41.8 2025     2025    CHOL 126 2024    TRIG 67 2024    HDL 38 (L) 2024    ALT 37 2025    AST 41 2025     2025    K 4.1 2025     2025    CREATININE 0.7 2025    BUN 18 2025    CO2 25 2025    TSH 1.709 2025    PSA 3.1 2018    INR 0.9 2025    GLUF 90 2004    HGBA1C 5.6 2024         Anorectal Exam:    Anal Skin: RLA abscess with pezzer drain in place, serosang with pustulant drainage on depends.     Digital Rectal Exam:  deferred      ASSESSMENT/PLAN:     Diagnoses and all orders for this visit:    Anorectal abscess    83 yo M here with abscess f/u and pezzer placement . Still draining a decent bit, will leave in place and bring back to clinic on 25 and reassess for drain pull then.        DARON Soriano-ODALIS  Colon and Rectal Surgery           [1]   Social History  Tobacco Use    Smoking status: Former     Current packs/day: 0.00     Average packs/day: 1 pack/day for 5.0 years (5.0 ttl pk-yrs)     Types: Cigarettes     Start date:      Quit date:      Years since quittin.3    Smokeless tobacco: Never    Tobacco comments:     - I   Substance Use Topics    Alcohol use: No    Drug use: No

## 2025-05-05 ENCOUNTER — OFFICE VISIT (OUTPATIENT)
Dept: PODIATRY | Facility: CLINIC | Age: 85
End: 2025-05-05
Payer: MEDICARE

## 2025-05-05 DIAGNOSIS — Z47.89 AFTERCARE FOLLOWING SURGERY OF THE MUSCULOSKELETAL SYSTEM: Primary | ICD-10-CM

## 2025-05-05 DIAGNOSIS — M79.671 RIGHT FOOT PAIN: Primary | ICD-10-CM

## 2025-05-05 PROCEDURE — 3288F FALL RISK ASSESSMENT DOCD: CPT | Mod: CPTII,HCNC,S$GLB, | Performed by: PODIATRIST

## 2025-05-05 PROCEDURE — 1101F PT FALLS ASSESS-DOCD LE1/YR: CPT | Mod: CPTII,HCNC,S$GLB, | Performed by: PODIATRIST

## 2025-05-05 PROCEDURE — 99999 PR PBB SHADOW E&M-EST. PATIENT-LVL III: CPT | Mod: PBBFAC,HCNC,, | Performed by: PODIATRIST

## 2025-05-05 PROCEDURE — 1126F AMNT PAIN NOTED NONE PRSNT: CPT | Mod: CPTII,HCNC,S$GLB, | Performed by: PODIATRIST

## 2025-05-05 PROCEDURE — 99024 POSTOP FOLLOW-UP VISIT: CPT | Mod: HCNC,S$GLB,, | Performed by: PODIATRIST

## 2025-05-05 PROCEDURE — 1159F MED LIST DOCD IN RCRD: CPT | Mod: CPTII,HCNC,S$GLB, | Performed by: PODIATRIST

## 2025-05-05 NOTE — PROGRESS NOTES
[facility] - PODIATRY  [address]  [address]  [place] [address]  Dept: [phone]  Dept Fax: [phone]    Geronimo Lehman Jr., DPM   Geronimo Lehman Jr.     Post-Operative Visit  Assessment:     1. Aftercare following surgery of the musculoskeletal system  Ambulatory Referral/Consult to Physical Therapy          Plan:   MDM    Coding  6 wk s/p    - pt seen evaluated and managed  -XR reviewed   Postop changes s/p pan metatarsal head resection with hammertoe repair 2-5. Hardware in place and intact. Pipj fusion sites with bone callus on 1 views indicative of some osseous healing. Alignment improved compared to preoperative images.     - kwires removed. Final XRs on way out  - wb: pwbat on heel RLE  - rx dispensed: none  - referrals: PT    Follow up in about 3 weeks (around 5/26/2025).      Subjective:      Patient ID: Heri Muhammad is a 84 y.o. male.    Chief Complaint:   Chief Complaint   Patient presents with    Post-op Evaluation     There were no vitals filed for this visit.    DOS: 3/21/25  Procedure: R foot pan MT head resection+ HT repair 2-5    Heri Muhammad returns to the clinic today for a postop visit. Pt is s/p above procedure. Heri Muhammad rates pain at a 2/10 on visual analog scale. Denies n/v/f/c.     HPI      Outside reports reviewed: historical medical records.    Past Medical History:   Diagnosis Date    Anticoagulant long-term use     no longer taking eliquis    Aortic atherosclerosis 09/18/2017    - LDL goal <70 - BP goal <130/80    Benign essential tremor     Biventricular cardiac pacemaker in situ 09/24/2018    CAD (coronary artery disease) 12/02/2013    - 9/2013 Left heart Cath: LAD- 60% prox (neg FFR), nl, ramus and RCA 20% - LDL goal <70 - BP goal <130/80    Cancer     skin cancer on back    Carpal tunnel syndrome of left wrist 08/08/2017    surgery to correct    Chronic bronchitis     Chronic diastolic heart failure 01/19/2018     Chronic obstructive pulmonary disease 10/03/2018    Chronic right-sided thoracic back pain 04/27/2017    Cough     not currently a problem    Current use of long term anticoagulation 06/02/2016    - Eliquis for Afib-no longer taking    Dizziness     intermittent    DJD (degenerative joint disease) of knee     right    ED (erectile dysfunction)     Hammer toe of right foot     Hard of hearing 11/2016    History of cataract     Hyponatremia 06/11/2019    Memory loss     Mixed hyperlipidemia 07/28/2012    Persistent atrial fibrillation 07/01/2015    - followed by Dr. Esparza - history of cardioversion - on Eliquis OAC 5 mg BID    Pulmonary hypertension     Seizure disorder     Seizures     last episode 1995    Subclinical hypothyroidism     no meds    Tension type headache 07/02/2019    not as frequent    Vitamin D deficiency      Past Surgical History:   Procedure Laterality Date    CARDIAC CATHETERIZATION      CARDIAC PACEMAKER PLACEMENT  2014    CARDIOVERSION      multiple    CARPAL TUNNEL RELEASE Left     CATARACT EXTRACTION Bilateral     COLONOSCOPY      several    CORRECTION OF HAMMER TOE Right 3/21/2025    Procedure: CORRECTION, HAMMER TOE;  Surgeon: Geronimo Lehman Jr., DPM;  Location: [facility] OR;  Service: Podiatry;  Laterality: Right;  pop saph. Toes 2-5    ECHOCARDIOGRAM,TRANSESOPHAGEAL N/A 06/16/2023    Procedure: Transesophageal echo (GALILEO) intra-procedure log documentation;  Surgeon: Karen Diagnostic Provider;  Location: [facility] EP LAB;  Service: Cardiology;  Laterality: N/A;  s/p WM, GALILEO, anes, 3 Prep    FRACTURE SURGERY Right     ankle with hardware    HERNIA REPAIR      abdominal    INJECTION OF ANESTHETIC AGENT AROUND MEDIAL BRANCH NERVES INNERVATING LUMBAR FACET JOINT Bilateral 01/17/2019    Procedure: Block-nerve-medial branch-lumbar L2-L5;  Surgeon: Anthony Moffett MD;  Location: [facility] OR;  Service: Pain Management;  Laterality: Bilateral;    JOINT REPLACEMENT Bilateral     shoulders    OCCLUSION OF LEFT  ATRIAL APPENDAGE N/A 05/05/2023    Procedure: Left atrial appendage occlusion;  Surgeon: Hema Del Valle MD;  Location: [facility] EP LAB;  Service: Cardiology;  Laterality: N/A;  afib, watchman, BSCI, ben, anes, MB, 3prep    RADIOFREQUENCY ABLATION OF LUMBAR MEDIAL BRANCH NERVE AT SINGLE LEVEL Bilateral 01/23/2019    Procedure: Radiofrequency Ablation, Nerve, Spinal, Lumbar, Medial Branch, L2-L5;  Surgeon: Anthony Moffett MD;  Location: [facility] OR;  Service: Pain Management;  Laterality: Bilateral;    REPLACEMENT OF PACEMAKER GENERATOR N/A 4/22/2025    Procedure: REPLACEMENT, PACEMAKER GENERATOR;  Surgeon: Deep Esparza MD;  Location: [facility] EP LAB;  Service: Cardiology;  Laterality: N/A;  DANNI, CRT-P gen change, SJM, MAC, SK, 3 Prep    SURGICAL REMOVAL OF METATARSAL HEAD Right 3/21/2025    Procedure: OSTECTOMY, METATARSAL BONE, HEAD;  Surgeon: Geronimo Lehman Jr. DP;  Location: [facility] OR;  Service: Podiatry;  Laterality: Right;  pop saph. Toes 2-5    TRANSESOPHAGEAL ECHOCARDIOGRAPHY N/A 05/05/2023    Procedure: ECHOCARDIOGRAM, TRANSESOPHAGEAL;  Surgeon: Dylan Layne MD;  Location: [facility] EP LAB;  Service: Cardiology;  Laterality: N/A;    TREATMENT OF CARDIAC ARRHYTHMIA N/A 07/11/2022    Procedure: Cardioversion or Defibrillation;  Surgeon: MIREILLE Morel MD;  Location: [facility] EP LAB;  Service: Cardiology;  Laterality: N/A;  AF, BEN, DCCV, MAC, EH (to cover for SK), 3 Prep *SJM CRT-P in situ*    WISDOM TOOTH EXTRACTION       Family History   Problem Relation Name Age of Onset    Heart disease Mother Odalis     Heart failure Mother Odalis     Dementia Mother Odalis     Cancer Father Darío         colon    Blindness Father Darío         accident    Cataracts Father Darío     Hypertension Father Darío     Tremor Father Darío     Tremor Brother      Cancer Daughter Shlomo         cancer as a baby, unsure of what kind    Obesity Daughter Judy     No Known Problems Son Giovnany     Tremor Paternal Grandfather      Melanoma Neg Hx       Amblyopia Neg Hx      Diabetes Neg Hx      Glaucoma Neg Hx      Macular degeneration Neg Hx      Retinal detachment Neg Hx      Strabismus Neg Hx      Stroke Neg Hx      Thyroid disease Neg Hx       Current Medications[1]  Review of patient's allergies indicates:   Allergen Reactions    Bactroban [mupirocin calcium] Rash     Other reaction(s): Rash     Social History[2]    ROS  REVIEW OF SYSTEMS: Negative as documented below as well as positive findings in bold.       Constitutional  Respiratory  Gastrointestinal  Skin   - Fever - Cough - Heartburn - Rash   - Chills - Spit blood - Nausea - Itching   - Weight Loss - Shortness of breath - Vomiting - Nail pain   - Malaise/Fatigue - Wheezing - Abdominal Pain  Wound/Ulcer   - Weight Gain   - Blood in Stool         - Diarrhea          Cardiovascular  Genitourinary  Neurological  HEENT   - Chest Pain - Dysuria - Dizziness - Headache   - Palpitations - Hematuria - Tingling - Congestion   - Pain at night in legs - Flank Pain - Tremor - Sore Throat   - Cramping   - Sensory Change - Blurred Vision   - Leg Swelling   - Speech Change - Double Vision   - Dizzy when standing   - Focal Weakness - Eye Redness       - Seizures - Dry Eyes       - Loss of Consciousness          Endocrine  Musculoskeletal  Psychiatric   - Cold intolerance - Muscle Pain - Depression   - Heat intolerance - Neck Pain - Insomnia   - Anemia - Joint Pain - Memory Loss   -  Easy bruising, bleeding - Heel pain - Anxiety      Toe Pain        Leg/Ankle/Foot Pain         Objective:     There were no vitals taken for this visit.    Physical Exam    Neck:  Trachea Midline  No visible masses    Respiratory/Ears:  No distress or labored breathing.  Able to differentiate between normal talking voice and whisper.  Able to follow commands    Eyes:  Visual Acuity intact  No discoloration noted.    Physical Exam  Ortho Exam  Foot Exam    R LE exam con't:  V: DP 2/4, PT 2/4, CRT< 3s to all digits tested.    N: SILT in  SP/DP/T/Ebonie/Saph distributions    Ortho: +Motor EHL/FHL/TA/GA   Surgical site pain present and mild   Surgical site swelling absent   Kwires removed   Hammertoe deformity 2-5 improved compared to preop   Compartments soft/compressible. No pain on passive stretch of big toe. No calf  Pain.     Derm: Skin intact. Sutures/staples: removed. Signs of infection: none.     Imaging / Labs:    Lab Results   Component Value Date    WBC 8.09 04/30/2025    WBC 8.42 04/21/2025    WBC 7.50 02/20/2025    WBC 7.51 08/26/2024    WBC 11.19 08/25/2024    PROCAL 0.07 12/04/2020    SEDRATE 10 06/15/2016    .5 (H) 12/04/2020    CRP 11.2 (H) 06/15/2016    PREALBUMIN 23 02/20/2025       Lab Results   Component Value Date    PREALBUMIN 23 02/20/2025       X-Ray Foot Complete Right  Result Date: 5/5/2025  EXAMINATION: XR FOOT COMPLETE 3 VIEW RIGHT CLINICAL HISTORY: Encounter for other orthopedic aftercare TECHNIQUE: XR FOOT COMPLETE 3 VIEW RIGHT COMPARISON: 04/09/2025 FINDINGS: Surgical pins spanning the PIP joints of the 2nd, 3rd, and 4th digits have been removed.  Surgical changes of suspected osteotomies of the 2nd through 4th proximal phalanges and 2nd through 5th metatarsal heads noted.  There is 1st MTP joint osteoarthritis and generalized bone demineralization.  Operative changes of ORIF of bimalleolar fracture which appears well healed partially imaged.  No complicating features are seen.     See above Electronically signed by: Keyshawn Dacosta Jr Date:    05/05/2025 Time:    08:15    X-Ray Chest PA And Lateral  Result Date: 4/30/2025  EXAMINATION: XR CHEST PA AND LATERAL CLINICAL HISTORY: Abnormal weight loss TECHNIQUE: PA and lateral views of the chest were performed. COMPARISON: 02/20/2025. FINDINGS: There is a pacemaker present as before.  The heart and mediastinal structures appear unchanged.  Pulmonary vasculature is within normal limits.  The lungs are free of focal consolidations.  There is no evidence for  pneumothorax or pleural effusions.  Bony structures appear intact.  There are bilateral shoulder prostheses present.     Pacemaker. No acute chest disease identified. Electronically signed by: Terry Gallardo MD Date:    04/30/2025 Time:    16:21    Electrophysiology Procedure  Result Date: 4/22/2025    Successful generator change. I certify that I was present for the critical steps of the procedure including the diagnostic, surgical and/or interventional portions. Procedure Log documented by Documenter: Tiny Del Valle RN and verified by Deep Esparza MD. Date: 4/22/2025  Time: 2:12 PM    X-Ray Foot Complete Right  Result Date: 4/15/2025  EXAMINATION: XR FOOT COMPLETE 3 VIEW RIGHT CLINICAL HISTORY: . Encounter for other orthopedic aftercare TECHNIQUE: Weightbearing AP, lateral, and oblique views of the right foot were performed. COMPARISON: 04/09/2025. FINDINGS: Since the prior examination there has been interval removal of the skin staples.  There are surgical changes as before.  There are K-wires again identified within the 2nd, 3rd, and 4th toes.  There may have been advancement of the K-wire within the right 2nd toe when compared to the prior study with the tip now projecting over the distal aspect of the 2nd metatarsal bone.  There is fixation hardware within the distal tibia and fibula as before.  The remainder of the examination does not appear significantly changed.     Postsurgical changes.  K-wires are again identified within the 2nd, 3rd, and 4th toes with possible advancement of the K-wire within the right 2nd toe with the tip now projecting over the distal aspect of the 2nd metatarsal bone. No detrimental interval change noted. Electronically signed by: Terry Gallardo MD Date:    04/15/2025 Time:    15:04    X-Ray Foot Complete 3 view Bilateral  Result Date: 4/9/2025  EXAMINATION: XR FOOT COMPLETE 3 VIEW BILATERAL CLINICAL HISTORY: Pain in right foot TECHNIQUE: AP, lateral, and oblique views of both  feet were performed. COMPARISON: Right foot dated 03/26/2025 and bilateral feet dated 01/09/2025. FINDINGS: Right foot: Once again, there are postoperative changes of the right foot with resection of portions of the heads of the 2nd through 5th metatarsal bones with hammertoe repair of the 2nd through 5th toes.  Note that there are K-wires identified within the 2nd, 3rd, and 4th toes.  Since the prior examination the K-wires of the 2nd and 3rd toes have been retracted with the more proximal tips now located near the resection planes of the heads of the 2nd and 3rd metatarsal bones.  There are skin staples present.  There are degenerative changes involving the 1st metatarsophalangeal joint as before.  Small calcaneal spur is present at the insertion of the plantar fascia.  There is fixation hardware within the distal tibia and fibula. Left foot: The bones are intact.  There is no evidence for acute fracture or bone destruction.  There appear to be hammertoe deformities of the 2nd through 5th toes.  There are degenerative changes within the small joints of the foot.  Calcaneal spurs are present at the insertions of the Achilles tendon and plantar fascia.  Soft tissues appear grossly unremarkable.     Postoperative changes of the right foot.  Interval resection of the K-wires within the 2nd and 3rd toes. Hammertoe deformities of the left 2nd through 5th toes with mild degenerative changes. Calcaneal spurs at the insertions of the plantar fascia, larger on the left than right.  There is also a small bony spur at the insertion of the Achilles tendon on the left. Electronically signed by: eTrry Gallardo MD Date:    04/09/2025 Time:    10:01                   [1]   Current Outpatient Medications   Medication Sig Dispense Refill    ascorbic acid, vitamin C, (VITAMIN C) 500 MG tablet Take 1 tablet (500 mg total) by mouth once daily. 30 tablet 1    atorvastatin (LIPITOR) 80 MG tablet TAKE 1 TABLET ONE TIME DAILY 90 tablet 3     cetirizine (ZYRTEC) 10 MG tablet Take 10 mg by mouth every evening.      citalopram (CELEXA) 20 MG tablet Take 1 tablet (20 mg total) by mouth once daily. 90 tablet 0    fluticasone propionate (FLONASE) 50 mcg/actuation nasal spray 1 spray (50 mcg total) by Each Nostril route once daily. 15.8 mL 2    gabapentin (NEURONTIN) 100 MG capsule Take 1 capsule (100 mg total) by mouth 2 (two) times daily. 60 capsule 1    lamoTRIgine (LAMICTAL) 100 MG tablet Take 1 tablet (100 mg total) by mouth 2 (two) times daily. 180 tablet 3    lamoTRIgine (LAMICTAL) 100 MG tablet Take 1 tablet (100 mg total) by mouth 2 (two) times daily. 10 tablet 0    magnesium oxide (MAG-OX) 400 mg (241.3 mg magnesium) tablet Take 1 tablet (400 mg total) by mouth once daily. 90 tablet 3    memantine (NAMENDA) 10 MG Tab Take 1 tablet (10 mg total) by mouth 2 (two) times daily. 180 tablet 3    metoprolol succinate (TOPROL-XL) 50 MG 24 hr tablet TAKE 1 TABLET EVERY DAY 90 tablet 3    primidone (MYSOLINE) 250 MG Tab TAKE 2 TABLETS TWICE DAILY (NEED MD APPOINTMENT FOR REFILLS) 360 tablet 3    riboflavin, vitamin B2, (VITAMIN B-2) 100 mg Tab tablet Take 2 tablets (200 mg total) by mouth once daily. 180 tablet 3    torsemide (DEMADEX) 10 MG Tab Take 1 tablet (10 mg total) by mouth every morning. 90 tablet 3    TRELEGY ELLIPTA 100-62.5-25 mcg DsDv INHALE 1 PUFF ONE TIME DAILY 180 each 3    aspirin (ECOTRIN) 81 MG EC tablet Take 1 tablet (81 mg total) by mouth once daily. 90 tablet 1    hydrocortisone 2.5 % cream Apply topically 2 (two) times daily as needed (hemorrhoid pain). 28 g 0     No current facility-administered medications for this visit.     Facility-Administered Medications Ordered in Other Visits   Medication Dose Route Frequency Provider Last Rate Last Admin    0.9%  NaCl infusion (for blood administration)   Intravenous Q24H PRN Batool Rodriguez NP       [2]   Social History  Socioeconomic History    Marital status:    Occupational  History    Occupation: retired   Tobacco Use    Smoking status: Former     Current packs/day: 0.00     Average packs/day: 1 pack/day for 5.0 years (5.0 ttl pk-yrs)     Types: Cigarettes     Start date:      Quit date:      Years since quittin.3    Smokeless tobacco: Never    Tobacco comments:     - I   Substance and Sexual Activity    Alcohol use: No    Drug use: No    Sexual activity: Not Currently     Partners: Female   Social History Narrative    . 4 adult children.      Social Drivers of Health     Financial Resource Strain: High Risk (2024)    Overall Financial Resource Strain (CARDIA)     Difficulty of Paying Living Expenses: Very hard   Food Insecurity: No Food Insecurity (2024)    Hunger Vital Sign     Worried About Running Out of Food in the Last Year: Never true     Ran Out of Food in the Last Year: Never true   Transportation Needs: No Transportation Needs (2024)    TRANSPORTATION NEEDS     Transportation : No   Physical Activity: Inactive (2024)    Exercise Vital Sign     Days of Exercise per Week: 0 days     Minutes of Exercise per Session: 0 min   Stress: No Stress Concern Present (2024)    [employer] - Occupational Stress Questionnaire     Feeling of Stress : Not at all   Housing Stability: Unknown (2024)    Housing Stability Vital Sign     Unable to Pay for Housing in the Last Year: No     Homeless in the Last Year: No

## 2025-05-06 ENCOUNTER — OFFICE VISIT (OUTPATIENT)
Dept: SURGERY | Facility: CLINIC | Age: 85
End: 2025-05-06
Payer: MEDICARE

## 2025-05-06 ENCOUNTER — PATIENT MESSAGE (OUTPATIENT)
Dept: FAMILY MEDICINE | Facility: CLINIC | Age: 85
End: 2025-05-06
Payer: MEDICARE

## 2025-05-06 DIAGNOSIS — J47.9 BRONCHIECTASIS WITHOUT COMPLICATION: ICD-10-CM

## 2025-05-06 DIAGNOSIS — J84.9 CHRONIC INTERSTITIAL LUNG DISEASE: ICD-10-CM

## 2025-05-06 DIAGNOSIS — F32.A DEPRESSION, UNSPECIFIED DEPRESSION TYPE: ICD-10-CM

## 2025-05-06 DIAGNOSIS — N32.89 BLADDER WALL THICKENING: Primary | ICD-10-CM

## 2025-05-06 DIAGNOSIS — K61.2 ANORECTAL ABSCESS: Primary | ICD-10-CM

## 2025-05-06 PROCEDURE — 99999 PR PBB SHADOW E&M-EST. PATIENT-LVL III: CPT | Mod: PBBFAC,HCNC,, | Performed by: NURSE PRACTITIONER

## 2025-05-06 PROCEDURE — 1160F RVW MEDS BY RX/DR IN RCRD: CPT | Mod: CPTII,HCNC,S$GLB, | Performed by: NURSE PRACTITIONER

## 2025-05-06 PROCEDURE — 99024 POSTOP FOLLOW-UP VISIT: CPT | Mod: HCNC,S$GLB,, | Performed by: NURSE PRACTITIONER

## 2025-05-06 PROCEDURE — 1159F MED LIST DOCD IN RCRD: CPT | Mod: CPTII,HCNC,S$GLB, | Performed by: NURSE PRACTITIONER

## 2025-05-06 RX ORDER — CITALOPRAM 20 MG/1
20 TABLET, FILM COATED ORAL
Qty: 90 TABLET | Refills: 3 | Status: SHIPPED | OUTPATIENT
Start: 2025-05-06

## 2025-05-06 NOTE — TELEPHONE ENCOUNTER
Last Ordered 02/17/2025    Last Appointment:12/03/2024    Upcoming Appointment: no up coming appts

## 2025-05-06 NOTE — PROGRESS NOTES
CRS Office Visit History and Physical    Referring Md:   No referring provider defined for this encounter.    SUBJECTIVE:     History of Present Illness 4/29/2025:  The patient is new patient to this practice.   Course is as follows:  Patient is a 84 y.o. male presents with anal pain thought to be d/t hemorrhoids.  Symptoms have been present for approx 2 weeks now.  Has tried hydrocortisone cream and warm soaks without improvement.  Pain progressively worsening. Unable to sit down.     1 month ago had foot sx with p/o narcotic pain medication.  This contributed to worsening constipation, then took miralax which gave loose stools.  Has since stopped miralax  Pain is worsened with sitting/having bms, improved with lying flat.  Denies bleeding or drainage.     Interval hx 5/2/2025  Pt here with wife.  Pain muhc better. Drain is putting out a decent bit  No fevers or chills     Interval hx 5/6/25  Denies anorectal pain  Drainage has significantly decreased  No fevers or chills.      Review of patient's allergies indicates:   Allergen Reactions    Bactroban [mupirocin calcium] Rash     Other reaction(s): Rash       Past Medical History:   Diagnosis Date    Anticoagulant long-term use     no longer taking eliquis    Aortic atherosclerosis 09/18/2017    - LDL goal <70 - BP goal <130/80    Benign essential tremor     Biventricular cardiac pacemaker in situ 09/24/2018    CAD (coronary artery disease) 12/02/2013    - 9/2013 Left heart Cath: LAD- 60% prox (neg FFR), nl, ramus and RCA 20% - LDL goal <70 - BP goal <130/80    Cancer     skin cancer on back    Carpal tunnel syndrome of left wrist 08/08/2017    surgery to correct    Chronic bronchitis     Chronic diastolic heart failure 01/19/2018    Chronic obstructive pulmonary disease 10/03/2018    Chronic right-sided thoracic back pain 04/27/2017    Cough     not currently a problem    Current use of long term anticoagulation 06/02/2016    - Eliquis for Afib-no longer taking     Dizziness     intermittent    DJD (degenerative joint disease) of knee     right    ED (erectile dysfunction)     Hammer toe of right foot     Hard of hearing 11/2016    History of cataract     Hyponatremia 06/11/2019    Memory loss     Mixed hyperlipidemia 07/28/2012    Persistent atrial fibrillation 07/01/2015    - followed by Dr. Esparza - history of cardioversion - on Eliquis OAC 5 mg BID    Pulmonary hypertension     Seizure disorder     Seizures     last episode 1995    Subclinical hypothyroidism     no meds    Tension type headache 07/02/2019    not as frequent    Vitamin D deficiency      Past Surgical History:   Procedure Laterality Date    CARDIAC CATHETERIZATION      CARDIAC PACEMAKER PLACEMENT  2014    CARDIOVERSION      multiple    CARPAL TUNNEL RELEASE Left     CATARACT EXTRACTION Bilateral     COLONOSCOPY      several    CORRECTION OF HAMMER TOE Right 3/21/2025    Procedure: CORRECTION, HAMMER TOE;  Surgeon: Geronimo Lehman Jr., DPM;  Location: [facility] OR;  Service: Podiatry;  Laterality: Right;  pop saph. Toes 2-5    ECHOCARDIOGRAM,TRANSESOPHAGEAL N/A 06/16/2023    Procedure: Transesophageal echo (BEN) intra-procedure log documentation;  Surgeon: Karen Diagnostic Provider;  Location: [facility] EP LAB;  Service: Cardiology;  Laterality: N/A;  s/p WM, BEN, anes, 3 Prep    FRACTURE SURGERY Right     ankle with hardware    HERNIA REPAIR      abdominal    INJECTION OF ANESTHETIC AGENT AROUND MEDIAL BRANCH NERVES INNERVATING LUMBAR FACET JOINT Bilateral 01/17/2019    Procedure: Block-nerve-medial branch-lumbar L2-L5;  Surgeon: Anthony Moffett MD;  Location: [facility] OR;  Service: Pain Management;  Laterality: Bilateral;    JOINT REPLACEMENT Bilateral     shoulders    OCCLUSION OF LEFT ATRIAL APPENDAGE N/A 05/05/2023    Procedure: Left atrial appendage occlusion;  Surgeon: Hema Del Valle MD;  Location: [facility] EP LAB;  Service: Cardiology;  Laterality: N/A;  afib, watchman, BSCI, ben, anes, MB, 3prep     RADIOFREQUENCY ABLATION OF LUMBAR MEDIAL BRANCH NERVE AT SINGLE LEVEL Bilateral 01/23/2019    Procedure: Radiofrequency Ablation, Nerve, Spinal, Lumbar, Medial Branch, L2-L5;  Surgeon: Anthony Moffett MD;  Location: [facility] OR;  Service: Pain Management;  Laterality: Bilateral;    REPLACEMENT OF PACEMAKER GENERATOR N/A 4/22/2025    Procedure: REPLACEMENT, PACEMAKER GENERATOR;  Surgeon: Deep Esparza MD;  Location: [facility] EP LAB;  Service: Cardiology;  Laterality: N/A;  DANNI, CRT-P gen change, SJM, MAC, SK, 3 Prep    SURGICAL REMOVAL OF METATARSAL HEAD Right 3/21/2025    Procedure: OSTECTOMY, METATARSAL BONE, HEAD;  Surgeon: Geronimo Lehman Jr., DPM;  Location: [facility] OR;  Service: Podiatry;  Laterality: Right;  pop saph. Toes 2-5    TRANSESOPHAGEAL ECHOCARDIOGRAPHY N/A 05/05/2023    Procedure: ECHOCARDIOGRAM, TRANSESOPHAGEAL;  Surgeon: Dylan Layne MD;  Location: [facility] EP LAB;  Service: Cardiology;  Laterality: N/A;    TREATMENT OF CARDIAC ARRHYTHMIA N/A 07/11/2022    Procedure: Cardioversion or Defibrillation;  Surgeon: MIREILLE Morel MD;  Location: [facility] EP LAB;  Service: Cardiology;  Laterality: N/A;  AF, GALILEO, DCCV, MAC, EH (to cover for SK), 3 Prep *SJM CRT-P in situ*    WISDOM TOOTH EXTRACTION       Family History   Problem Relation Name Age of Onset    Heart disease Mother Odalis     Heart failure Mother Odalis     Dementia Mother Odalis     Cancer Father Darío         colon    Blindness Father Darío         accident    Cataracts Father Darío     Hypertension Father Darío     Tremor Father Darío     Tremor Brother      Cancer Daughter Shlomo         cancer as a baby, unsure of what kind    Obesity Daughter Judy     No Known Problems Son Giovanny     Tremor Paternal Grandfather      Melanoma Neg Hx      Amblyopia Neg Hx      Diabetes Neg Hx      Glaucoma Neg Hx      Macular degeneration Neg Hx      Retinal detachment Neg Hx      Strabismus Neg Hx      Stroke Neg Hx      Thyroid disease Neg Hx       Social History[1]      Review of Systems:  ROS    OBJECTIVE:     Vital Signs (Most Recent)  There were no vitals taken for this visit.    Physical Exam:  General: White male in no distress   Neuro: Alert and oriented to person, place, and time.  Moves all extremities.     HEENT: No icterus.  Trachea midline  Respiratory: Respirations are even and unlabored, no cough or audible wheezing  Skin: Warm dry and intact, No visible rashes, no jaundice    Labs reviewed today:  Lab Results   Component Value Date    WBC 8.09 2025    HGB 14.5 2025    HCT 41.8 2025     2025    CHOL 126 2024    TRIG 67 2024    HDL 38 (L) 2024    ALT 37 2025    AST 41 2025     2025    K 4.1 2025     2025    CREATININE 0.7 2025    BUN 18 2025    CO2 25 2025    TSH 1.709 2025    PSA 3.1 2018    INR 0.9 2025    GLUF 90 2004    HGBA1C 5.6 2024     Anorectal Exam:    Anal Skin: R anterior abscess appears much better, no induration or erythema, no TTP. R pezzer drain removed without issue with cath tip intact    Digital Rectal Exam:  deferred      ASSESSMENT/PLAN:     Diagnoses and all orders for this visit:    Anorectal abscess    83 yo M here with abscess f/u, sit looks much better. Drain pulled today. We discussed if pain redevelops, fevers or chills to call us but for now f/u w MD in about a month due to severity of initial abscess to r/o fistula.       DARON Soriano-ODALIS  Colon and Rectal Surgery           [1]   Social History  Tobacco Use    Smoking status: Former     Current packs/day: 0.00     Average packs/day: 1 pack/day for 5.0 years (5.0 ttl pk-yrs)     Types: Cigarettes     Start date:      Quit date:      Years since quittin.3    Smokeless tobacco: Never    Tobacco comments:     - I   Substance Use Topics    Alcohol use: No    Drug use: No

## 2025-05-13 ENCOUNTER — OFFICE VISIT (OUTPATIENT)
Dept: UROLOGY | Facility: CLINIC | Age: 85
End: 2025-05-13
Payer: MEDICARE

## 2025-05-13 VITALS
HEIGHT: 72 IN | DIASTOLIC BLOOD PRESSURE: 61 MMHG | HEART RATE: 80 BPM | SYSTOLIC BLOOD PRESSURE: 96 MMHG | BODY MASS INDEX: 27.95 KG/M2 | WEIGHT: 206.38 LBS

## 2025-05-13 DIAGNOSIS — R63.4 UNINTENTIONAL WEIGHT LOSS: ICD-10-CM

## 2025-05-13 DIAGNOSIS — N40.1 BENIGN PROSTATIC HYPERPLASIA WITH URINARY FREQUENCY: Primary | ICD-10-CM

## 2025-05-13 DIAGNOSIS — N32.89 BLADDER WALL THICKENING: ICD-10-CM

## 2025-05-13 DIAGNOSIS — R35.0 BENIGN PROSTATIC HYPERPLASIA WITH URINARY FREQUENCY: Primary | ICD-10-CM

## 2025-05-13 PROCEDURE — 99999 PR PBB SHADOW E&M-EST. PATIENT-LVL IV: CPT | Mod: PBBFAC,HCNC,, | Performed by: NURSE PRACTITIONER

## 2025-05-13 RX ORDER — DUTASTERIDE 0.5 MG/1
0.5 CAPSULE, LIQUID FILLED ORAL DAILY
Qty: 30 CAPSULE | Refills: 11 | Status: SHIPPED | OUTPATIENT
Start: 2025-05-13 | End: 2025-05-13

## 2025-05-13 RX ORDER — TAMSULOSIN HYDROCHLORIDE 0.4 MG/1
0.4 CAPSULE ORAL NIGHTLY
Qty: 30 CAPSULE | Refills: 11 | Status: CANCELLED | OUTPATIENT
Start: 2025-05-13 | End: 2026-05-13

## 2025-05-13 RX ORDER — DUTASTERIDE 0.5 MG/1
0.5 CAPSULE, LIQUID FILLED ORAL DAILY
Qty: 90 CAPSULE | Refills: 3 | Status: SHIPPED | OUTPATIENT
Start: 2025-05-13 | End: 2026-05-13

## 2025-05-13 NOTE — PROGRESS NOTES
Subjective:       Patient ID: Heri Muhammad is a 84 y.o. male.    Chief Complaint: thickened bladder wall     History of Present Illness    CHIEF COMPLAINT:  Patient is new to me. He is a 85 yo WM who presents today for evaluation of bladder wall thickening found on CT that was performed by PCP for unintentional weight loss. Patient is here today with his wife.     GENITOURINARY:  CT showed bladder wall thickening, discovered during weight loss workup. He takes diuretic medication. Following hammer toe surgery in March, he experienced frequent urination, waking up 5-6 times per night. This symptom has since resolved, and he now sleeps through the night.    SURGICAL HISTORY:  He underwent hammer toe surgery in March 2023. Last week, he had an incision and drainage procedure by colorectal surgery for an anal ulcer that was initially thought to be a hemorrhoid.    ANORECTAL:  He continues to experience drainage from recent anal ulcer requiring adult diapers for containment. He denies current pain at the site.    WEIGHT:  He reports weight loss.      ROS:  General: -fever, -chills, -fatigue, -weight gain, +weight loss  Eyes: -vision changes, -redness, -discharge  ENT: -ear pain, -nasal congestion, -sore throat  Cardiovascular: -chest pain, -palpitations, -lower extremity edema  Respiratory: -cough, -shortness of breath  Gastrointestinal: -abdominal pain, -nausea, -vomiting, -diarrhea, -constipation, -blood in stool, +bowel incontinence  Genitourinary: -dysuria, -hematuria, +frequency (taking a diuretic)  Musculoskeletal: -joint pain, -muscle pain  Skin: -rash, -lesion  Neurological: -headache, -dizziness, -numbness, -tingling  Psychiatric: -anxiety, -depression, -sleep difficulty           Objective:      Physical Exam  Vitals and nursing note reviewed.   Constitutional:       General: He is not in acute distress.     Appearance: He is well-developed. He is not ill-appearing.   HENT:      Head: Normocephalic and  atraumatic.   Eyes:      Pupils: Pupils are equal, round, and reactive to light.   Cardiovascular:      Rate and Rhythm: Normal rate.   Pulmonary:      Effort: Pulmonary effort is normal. No respiratory distress.   Abdominal:      Palpations: Abdomen is soft.      Tenderness: There is no abdominal tenderness.   Genitourinary:     Prostate: Enlarged (slightly). Not tender and no nodules present.   Musculoskeletal:      Cervical back: Normal range of motion.      Comments: Ambulates with a walker.    Skin:     General: Skin is warm and dry.   Neurological:      Mental Status: He is alert and oriented to person, place, and time.      Coordination: Coordination normal.   Psychiatric:         Mood and Affect: Mood normal.         Behavior: Behavior normal.         Thought Content: Thought content normal.         Judgment: Judgment normal.         Assessment:       Problem List Items Addressed This Visit    None  Visit Diagnoses         Benign prostatic hyperplasia with urinary frequency    -  Primary    Relevant Medications    dutasteride (AVODART) 0.5 mg capsule    Other Relevant Orders    PSA, Total and Free      Bladder wall thickening                Plan:           Heri was seen today for thickened bladder wall .    Diagnoses and all orders for this visit:    Benign prostatic hyperplasia with urinary frequency  -     dutasteride (AVODART) 0.5 mg capsule; Take 1 capsule (0.5 mg total) by mouth once daily.  -     PSA, Total and Free; Future    Bladder wall thickening  -     PSA, Total and Free; Future    Unintentional weight loss  -     PSA, Total and Free; Future    Other orders  TEZ today  Start trial of dutasteride (Avodart) 0.5 mg daily for BPH.     Follow-up in 6 months or sooner if needed.       This note was generated with the assistance of ambient listening technology. Verbal consent was obtained by the patient and accompanying visitor(s) for the recording of patient appointment to facilitate this note. I  attest to having reviewed and edited the generated note for accuracy, though some syntax or spelling errors may persist. Please contact the author of this note for any clarification.      YJai Nieves, DNP

## 2025-05-13 NOTE — PATIENT INSTRUCTIONS
TEZ today  PSA, total and free today  Start trial of dutasteride (Avodart) 0.5 mg daily for BPH.   Follow-up in 6 months or sooner if needed.

## 2025-05-15 ENCOUNTER — RESULTS FOLLOW-UP (OUTPATIENT)
Dept: UROLOGY | Facility: CLINIC | Age: 85
End: 2025-05-15

## 2025-05-15 DIAGNOSIS — R35.0 BENIGN PROSTATIC HYPERPLASIA WITH URINARY FREQUENCY: ICD-10-CM

## 2025-05-15 DIAGNOSIS — R63.4 UNINTENTIONAL WEIGHT LOSS: ICD-10-CM

## 2025-05-15 DIAGNOSIS — N40.1 BENIGN PROSTATIC HYPERPLASIA WITH URINARY FREQUENCY: ICD-10-CM

## 2025-05-15 DIAGNOSIS — R97.20 ELEVATED PSA: Primary | ICD-10-CM

## 2025-05-16 ENCOUNTER — TELEPHONE (OUTPATIENT)
Dept: UROLOGY | Facility: CLINIC | Age: 85
End: 2025-05-16
Payer: MEDICARE

## 2025-05-16 NOTE — TELEPHONE ENCOUNTER
Spoke to wife about psa results and let her know that he will need a u/a and urine cx, they can go at their convenience. Ms. Whitedeepti verbally understood.

## 2025-05-16 NOTE — TELEPHONE ENCOUNTER
----- Message from Sonia Nieves DNP sent at 5/15/2025 10:31 PM CDT -----  Please inform patient via telephone that his PSA is elevated at 9.76 ng/mL. U/A and urine cx needed at this time to evaluate if infection is the cause of his elevated PSA. Orders placed.   ----- Message -----  From: Lab, Background User  Sent: 5/14/2025  12:00 AM CDT  To: Sonia Nieves DNP

## 2025-05-19 PROBLEM — Z74.09 IMPAIRED FUNCTIONAL MOBILITY, BALANCE, GAIT, AND ENDURANCE: Status: ACTIVE | Noted: 2025-05-19

## 2025-05-19 PROBLEM — R29.898 ANKLE WEAKNESS: Status: ACTIVE | Noted: 2025-05-19

## 2025-05-19 LAB
OHS CV AF BURDEN PERCENT: 99
OHS CV BIV PACING PERCENT: 98 %
OHS CV DC REMOTE DEVICE TYPE: NORMAL
OHS CV ICD SHOCK: NO

## 2025-05-21 ENCOUNTER — OFFICE VISIT (OUTPATIENT)
Dept: PODIATRY | Facility: CLINIC | Age: 85
End: 2025-05-21
Payer: MEDICARE

## 2025-05-21 ENCOUNTER — PATIENT MESSAGE (OUTPATIENT)
Dept: PODIATRY | Facility: CLINIC | Age: 85
End: 2025-05-21

## 2025-05-21 DIAGNOSIS — Z47.89 AFTERCARE FOLLOWING SURGERY OF THE MUSCULOSKELETAL SYSTEM: Primary | ICD-10-CM

## 2025-05-21 PROCEDURE — 1126F AMNT PAIN NOTED NONE PRSNT: CPT | Mod: CPTII,HCNC,S$GLB, | Performed by: PODIATRIST

## 2025-05-21 PROCEDURE — 99024 POSTOP FOLLOW-UP VISIT: CPT | Mod: HCNC,S$GLB,, | Performed by: PODIATRIST

## 2025-05-21 PROCEDURE — 3288F FALL RISK ASSESSMENT DOCD: CPT | Mod: CPTII,HCNC,S$GLB, | Performed by: PODIATRIST

## 2025-05-21 PROCEDURE — 1100F PTFALLS ASSESS-DOCD GE2>/YR: CPT | Mod: CPTII,HCNC,S$GLB, | Performed by: PODIATRIST

## 2025-05-21 PROCEDURE — 99999 PR PBB SHADOW E&M-EST. PATIENT-LVL III: CPT | Mod: PBBFAC,HCNC,, | Performed by: PODIATRIST

## 2025-05-21 PROCEDURE — 1159F MED LIST DOCD IN RCRD: CPT | Mod: CPTII,HCNC,S$GLB, | Performed by: PODIATRIST

## 2025-05-21 NOTE — PROGRESS NOTES
[facility] - PODIATRY  [address]  [address]  [place] [address]  Dept: [phone]  Dept Fax: [phone]    Geronimo Lehman Jr., DPM   Geronimo Lehman Jr.     Post-Operative Visit  Assessment:     1. Aftercare following surgery of the musculoskeletal system            Plan:   MDM    Coding  9 wk s/p    - pt seen evaluated and managed  -XR reviewed   Postop changes s/p pan metatarsal head resection with hammertoe repair 2-5. Hardware in place and intact. Pipj fusion sites with bone callus on 2 views indicative of normal osseous healing. Alignment improved compared to preoperative images.   -pt healing normal for postop period  -deformity improved compared to preop  -pt happy and satisfied with result      - wb: wbat  - rx dispensed: none  - referrals: none   Cont PT    Follow up in about 3 weeks (around 6/11/2025).      Subjective:      Patient ID: Heri Muhammad is a 84 y.o. male.    Chief Complaint:   Chief Complaint   Patient presents with    Post-op Evaluation     Right foot     There were no vitals filed for this visit.    DOS: 3/21/25  Procedure: R foot pan MT head resection+ HT repair 2-5    Heri Muhammad returns to the clinic today for a postop visit. Pt is s/p above procedure. Heri Muhammad rates pain at a 0/10 on visual analog scale. Denies n/v/f/c. In formal PT.    HPI      Outside reports reviewed: historical medical records.    Past Medical History:   Diagnosis Date    Anticoagulant long-term use     no longer taking eliquis    Aortic atherosclerosis 09/18/2017    - LDL goal <70 - BP goal <130/80    Benign essential tremor     Biventricular cardiac pacemaker in situ 09/24/2018    CAD (coronary artery disease) 12/02/2013    - 9/2013 Left heart Cath: LAD- 60% prox (neg FFR), nl, ramus and RCA 20% - LDL goal <70 - BP goal <130/80    Cancer     skin cancer on back    Carpal tunnel syndrome of left wrist 08/08/2017    surgery to correct    Chronic  bronchitis     Chronic diastolic heart failure 01/19/2018    Chronic obstructive pulmonary disease 10/03/2018    Chronic right-sided thoracic back pain 04/27/2017    Cough     not currently a problem    Current use of long term anticoagulation 06/02/2016    - Eliquis for Afib-no longer taking    Dizziness     intermittent    DJD (degenerative joint disease) of knee     right    ED (erectile dysfunction)     Hammer toe of right foot     Hard of hearing 11/2016    History of cataract     Hyponatremia 06/11/2019    Memory loss     Mixed hyperlipidemia 07/28/2012    Persistent atrial fibrillation 07/01/2015    - followed by Dr. Esparza - history of cardioversion - on Eliquis OAC 5 mg BID    Pulmonary hypertension     Seizure disorder     Seizures     last episode 1995    Subclinical hypothyroidism     no meds    Tension type headache 07/02/2019    not as frequent    Vitamin D deficiency      Past Surgical History:   Procedure Laterality Date    CARDIAC CATHETERIZATION      CARDIAC PACEMAKER PLACEMENT  2014    CARDIOVERSION      multiple    CARPAL TUNNEL RELEASE Left     CATARACT EXTRACTION Bilateral     COLONOSCOPY      several    CORRECTION OF HAMMER TOE Right 3/21/2025    Procedure: CORRECTION, HAMMER TOE;  Surgeon: Geronimo Lehman Jr., DPM;  Location: [facility] OR;  Service: Podiatry;  Laterality: Right;  pop saph. Toes 2-5    ECHOCARDIOGRAM,TRANSESOPHAGEAL N/A 06/16/2023    Procedure: Transesophageal echo (GALILEO) intra-procedure log documentation;  Surgeon: [facility] Diagnostic Provider;  Location: [facility] EP LAB;  Service: Cardiology;  Laterality: N/A;  s/p WM, GALILEO, anes, 3 Prep    FRACTURE SURGERY Right     ankle with hardware    HERNIA REPAIR      abdominal    INJECTION OF ANESTHETIC AGENT AROUND MEDIAL BRANCH NERVES INNERVATING LUMBAR FACET JOINT Bilateral 01/17/2019    Procedure: Block-nerve-medial branch-lumbar L2-L5;  Surgeon: Anthony Moffett MD;  Location: [facility] OR;  Service: Pain Management;  Laterality: Bilateral;     JOINT REPLACEMENT Bilateral     shoulders    OCCLUSION OF LEFT ATRIAL APPENDAGE N/A 05/05/2023    Procedure: Left atrial appendage occlusion;  Surgeon: Hema Del Valle MD;  Location: [facility] EP LAB;  Service: Cardiology;  Laterality: N/A;  afib, watchman, BSCI, ben, anes, MB, 3prep    RADIOFREQUENCY ABLATION OF LUMBAR MEDIAL BRANCH NERVE AT SINGLE LEVEL Bilateral 01/23/2019    Procedure: Radiofrequency Ablation, Nerve, Spinal, Lumbar, Medial Branch, L2-L5;  Surgeon: Anthony Moffett MD;  Location: [facility] OR;  Service: Pain Management;  Laterality: Bilateral;    REPLACEMENT OF PACEMAKER GENERATOR N/A 4/22/2025    Procedure: REPLACEMENT, PACEMAKER GENERATOR;  Surgeon: Deep Esparza MD;  Location: [facility] EP LAB;  Service: Cardiology;  Laterality: N/A;  DANNI, CRT-P gen change, SJM, MAC, SK, 3 Prep    SURGICAL REMOVAL OF METATARSAL HEAD Right 3/21/2025    Procedure: OSTECTOMY, METATARSAL BONE, HEAD;  Surgeon: Geronimo Lehman Jr. DP;  Location: [facility] OR;  Service: Podiatry;  Laterality: Right;  pop saph. Toes 2-5    TRANSESOPHAGEAL ECHOCARDIOGRAPHY N/A 05/05/2023    Procedure: ECHOCARDIOGRAM, TRANSESOPHAGEAL;  Surgeon: Dylan Layne MD;  Location: [facility] EP LAB;  Service: Cardiology;  Laterality: N/A;    TREATMENT OF CARDIAC ARRHYTHMIA N/A 07/11/2022    Procedure: Cardioversion or Defibrillation;  Surgeon: MIREILLE Morel MD;  Location: [facility] EP LAB;  Service: Cardiology;  Laterality: N/A;  AF, BEN, DCCV, MAC, EH (to cover for SK), 3 Prep *SJM CRT-P in situ*    WISDOM TOOTH EXTRACTION       Family History   Problem Relation Name Age of Onset    Heart disease Mother Odalis     Heart failure Mother Odalis     Dementia Mother Odalis     Cancer Father Darío         colon    Blindness Father Darío         accident    Cataracts Father Darío     Hypertension Father Darío     Tremor Father Darío     Tremor Brother      Cancer Daughter Shlomo         cancer as a baby, unsure of what kind    Obesity Daughter Judy     No Known Problems  Son Giovanny Blancas Paternal Grandfather      Melanoma Neg Hx      Amblyopia Neg Hx      Diabetes Neg Hx      Glaucoma Neg Hx      Macular degeneration Neg Hx      Retinal detachment Neg Hx      Strabismus Neg Hx      Stroke Neg Hx      Thyroid disease Neg Hx       Current Medications[1]  Review of patient's allergies indicates:   Allergen Reactions    Bactroban [mupirocin calcium] Rash     Other reaction(s): Rash     Social History[2]    ROS  REVIEW OF SYSTEMS: Negative as documented below as well as positive findings in bold.       Constitutional  Respiratory  Gastrointestinal  Skin   - Fever - Cough - Heartburn - Rash   - Chills - Spit blood - Nausea - Itching   - Weight Loss - Shortness of breath - Vomiting - Nail pain   - Malaise/Fatigue - Wheezing - Abdominal Pain  Wound/Ulcer   - Weight Gain   - Blood in Stool         - Diarrhea          Cardiovascular  Genitourinary  Neurological  HEENT   - Chest Pain - Dysuria - Dizziness - Headache   - Palpitations - Hematuria - Tingling - Congestion   - Pain at night in legs - Flank Pain - Tremor - Sore Throat   - Cramping   - Sensory Change - Blurred Vision   - Leg Swelling   - Speech Change - Double Vision   - Dizzy when standing   - Focal Weakness - Eye Redness       - Seizures - Dry Eyes       - Loss of Consciousness          Endocrine  Musculoskeletal  Psychiatric   - Cold intolerance - Muscle Pain - Depression   - Heat intolerance - Neck Pain - Insomnia   - Anemia - Joint Pain - Memory Loss   -  Easy bruising, bleeding - Heel pain - Anxiety      Toe Pain        Leg/Ankle/Foot Pain         Objective:     There were no vitals taken for this visit.    Physical Exam    Neck:  Trachea Midline  No visible masses    Respiratory/Ears:  No distress or labored breathing.  Able to differentiate between normal talking voice and whisper.  Able to follow commands    Eyes:  Visual Acuity intact  No discoloration noted.    Physical Exam  Ortho Exam  Foot Exam    R LE exam  con't:  V: DP 2/4, PT 2/4, CRT< 3s to all digits tested.    N: SILT in SP/DP/T/Ebonie/Saph distributions    Ortho: +Motor EHL/FHL/TA/GA   Surgical site pain absent   Surgical site swelling absent   Kwires removed   Hammertoe deformity 2-5 improved compared to preop   Compartments soft/compressible. No pain on passive stretch of big toe. No calf  Pain.     Derm: Skin intact. Sutures/staples: removed. Signs of infection: none.     Imaging / Labs:    Lab Results   Component Value Date    WBC 8.09 04/30/2025    WBC 8.42 04/21/2025    WBC 7.50 02/20/2025    WBC 7.51 08/26/2024    WBC 11.19 08/25/2024    PROCAL 0.07 12/04/2020    SEDRATE 10 06/15/2016    .5 (H) 12/04/2020    CRP 11.2 (H) 06/15/2016    PREALBUMIN 23 02/20/2025       Lab Results   Component Value Date    PREALBUMIN 23 02/20/2025       X-Ray Foot Complete Right  Result Date: 5/21/2025  EXAMINATION: XR FOOT COMPLETE 3 VIEW RIGHT CLINICAL HISTORY: . Pain in right foot TECHNIQUE: AP, lateral, and oblique views of the right foot were performed. COMPARISON: 05/05/2025 FINDINGS: Stable postoperative and degenerative changes of the foot including osteotomies of the 2nd through 5th metatarsal heads as well as the probably of the 2nd through 5th proximal phalanges.  Alignment is stable.  No acute fracture.  Partially visualized hardware of the ankle.     See above Electronically signed by: Gurpreet Spann Date:    05/21/2025 Time:    09:37    CT Chest Abdomen Pelvis With IV Contrast (XPD) Routine Oral Contrast  Result Date: 5/5/2025  EXAMINATION: CT CHEST ABDOMEN PELVIS WITH IV CONTRAST (XPD) CLINICAL HISTORY: Weight loss, unintended;Abnormal weight loss TECHNIQUE: Low dose axial images, sagittal and coronal reformations were obtained from the thoracic inlet to the pubic symphysis after IV administration of 100 cc of Omnipaque. Oral contrast was also administered. COMPARISON: CT chest 01/23/2018 FINDINGS: Chest: Heart and mediastinal vasculature: There is right  ventricular chamber enlargement with a pacemaker care for a left-sided approach.  There is aortic and multi-vessel coronary atherosclerosis.  There is a left atrial appendage closure device. Ana/Mediastinum: A calcified and noncalcified mediastinal nodes compatible with previous granulomatous disease. Lungs: There is a central bronchiectasis and peripheral reticulation in the mid and lower lung zones associated with a few areas of pleural based nodularity. Abdomen and pelvis: Liver: Normal in size and attenuation, with no focal hepatic lesions. Gallbladder: Few calcified stones. Bile Ducts: No evidence of dilated ducts. Pancreas: No mass or peripancreatic fat stranding. Spleen: Unremarkable. Adrenals: Unremarkable. Kidneys/ Ureters: Normal in size and location. No hydronephrosis, solid mass, or nephrolithiasis. GI Tract/Mesentery: No evidence of bowel obstruction or inflammation. Sigmoid diverticulosis is present.  Small bowel loops unremarkable. Peritoneal Space: No ascites. No free air. Retroperitoneum: No significant adenopathy. Vasculature: Aortic atherosclerosis is present. Bladder: Mild bladder wall thickening. Reproductive organs: Unremarkable. Bones: No suspicious lytic or blastic lesions.  DISH of the thoracic spine.     Lung changes suggesting chronic interstitial lung disease.  Interstitial edema thought less likely. Atherosclerosis, cholelithiasis, and colonic diverticulosis Bladder wall thickening could be related to acute and or chronic inflammation. Electronically signed by: Keyshawn Dacosta Jr Date:    05/05/2025 Time:    09:16    X-Ray Foot Complete Right  Result Date: 5/5/2025  EXAMINATION: XR FOOT COMPLETE 3 VIEW RIGHT CLINICAL HISTORY: Encounter for other orthopedic aftercare TECHNIQUE: XR FOOT COMPLETE 3 VIEW RIGHT COMPARISON: 04/09/2025 FINDINGS: Surgical pins spanning the PIP joints of the 2nd, 3rd, and 4th digits have been removed.  Surgical changes of suspected osteotomies of the 2nd  through 4th proximal phalanges and 2nd through 5th metatarsal heads noted.  There is 1st MTP joint osteoarthritis and generalized bone demineralization.  Operative changes of ORIF of bimalleolar fracture which appears well healed partially imaged.  No complicating features are seen.     See above Electronically signed by: Keyshawn Dacosta Jr Date:    05/05/2025 Time:    08:15    X-Ray Chest PA And Lateral  Result Date: 4/30/2025  EXAMINATION: XR CHEST PA AND LATERAL CLINICAL HISTORY: Abnormal weight loss TECHNIQUE: PA and lateral views of the chest were performed. COMPARISON: 02/20/2025. FINDINGS: There is a pacemaker present as before.  The heart and mediastinal structures appear unchanged.  Pulmonary vasculature is within normal limits.  The lungs are free of focal consolidations.  There is no evidence for pneumothorax or pleural effusions.  Bony structures appear intact.  There are bilateral shoulder prostheses present.     Pacemaker. No acute chest disease identified. Electronically signed by: Terry Gallardo MD Date:    04/30/2025 Time:    16:21    Electrophysiology Procedure  Result Date: 4/22/2025    Successful generator change. I certify that I was present for the critical steps of the procedure including the diagnostic, surgical and/or interventional portions. Procedure Log documented by Documenter: Tiny Del Valle RN and verified by Deep Esparza MD. Date: 4/22/2025  Time: 2:12 PM                     [1]   Current Outpatient Medications   Medication Sig Dispense Refill    atorvastatin (LIPITOR) 80 MG tablet TAKE 1 TABLET ONE TIME DAILY 90 tablet 3    cetirizine (ZYRTEC) 10 MG tablet Take 10 mg by mouth every evening.      citalopram (CELEXA) 20 MG tablet TAKE 1 TABLET ONE TIME DAILY 90 tablet 3    dutasteride (AVODART) 0.5 mg capsule Take 1 capsule (0.5 mg total) by mouth once daily. 90 capsule 3    fluticasone propionate (FLONASE) 50 mcg/actuation nasal spray 1 spray (50 mcg total) by Each Nostril route  once daily. 15.8 mL 2    lamoTRIgine (LAMICTAL) 100 MG tablet Take 1 tablet (100 mg total) by mouth 2 (two) times daily. 10 tablet 0    magnesium oxide (MAG-OX) 400 mg (241.3 mg magnesium) tablet Take 1 tablet (400 mg total) by mouth once daily. 90 tablet 3    memantine (NAMENDA) 10 MG Tab Take 1 tablet (10 mg total) by mouth 2 (two) times daily. 180 tablet 3    metoprolol succinate (TOPROL-XL) 50 MG 24 hr tablet TAKE 1 TABLET EVERY DAY 90 tablet 3    primidone (MYSOLINE) 250 MG Tab TAKE 2 TABLETS TWICE DAILY (NEED MD APPOINTMENT FOR REFILLS) 360 tablet 3    riboflavin, vitamin B2, (VITAMIN B-2) 100 mg Tab tablet Take 2 tablets (200 mg total) by mouth once daily. 180 tablet 3    torsemide (DEMADEX) 10 MG Tab Take 1 tablet (10 mg total) by mouth every morning. 90 tablet 3    TRELEGY ELLIPTA 100-62.5-25 mcg DsDv INHALE 1 PUFF ONE TIME DAILY 180 each 3    aspirin (ECOTRIN) 81 MG EC tablet Take 1 tablet (81 mg total) by mouth once daily. 90 tablet 1     No current facility-administered medications for this visit.     Facility-Administered Medications Ordered in Other Visits   Medication Dose Route Frequency Provider Last Rate Last Admin    0.9%  NaCl infusion (for blood administration)   Intravenous Q24H Batool Pitts NP       [2]   Social History  Socioeconomic History    Marital status:    Occupational History    Occupation: retired   Tobacco Use    Smoking status: Former     Current packs/day: 0.00     Average packs/day: 1 pack/day for 5.0 years (5.0 ttl pk-yrs)     Types: Cigarettes     Start date:      Quit date:      Years since quittin.4    Smokeless tobacco: Never    Tobacco comments:     - I   Substance and Sexual Activity    Alcohol use: No    Drug use: No    Sexual activity: Not Currently     Partners: Female   Social History Narrative    . 4 adult children.      Social Drivers of Health     Financial Resource Strain: High Risk (2024)    Overall Financial Resource  Strain (CARDIA)     Difficulty of Paying Living Expenses: Very hard   Food Insecurity: No Food Insecurity (8/26/2024)    Hunger Vital Sign     Worried About Running Out of Food in the Last Year: Never true     Ran Out of Food in the Last Year: Never true   Transportation Needs: No Transportation Needs (8/26/2024)    TRANSPORTATION NEEDS     Transportation : No   Physical Activity: Inactive (8/26/2024)    Exercise Vital Sign     Days of Exercise per Week: 0 days     Minutes of Exercise per Session: 0 min   Stress: No Stress Concern Present (8/26/2024)    [employer] [place] [employer] - Occupational Stress Questionnaire     Feeling of Stress : Not at all   Housing Stability: Unknown (8/26/2024)    Housing Stability Vital Sign     Unable to Pay for Housing in the Last Year: No     Homeless in the Last Year: No

## 2025-05-27 ENCOUNTER — TELEPHONE (OUTPATIENT)
Dept: UROLOGY | Facility: CLINIC | Age: 85
End: 2025-05-27
Payer: MEDICARE

## 2025-05-27 ENCOUNTER — RESULTS FOLLOW-UP (OUTPATIENT)
Dept: UROLOGY | Facility: CLINIC | Age: 85
End: 2025-05-27

## 2025-05-27 NOTE — TELEPHONE ENCOUNTER
----- Message from Sonia Nieves DNP sent at 2025  3:40 PM CDT -----  Please inform patient via telephone that his urine cx was normal. Patient does not have a UTI. Unfortunately, a prostate biopsy is recommended for further evaluation of elevated PSA of 9.76 ng/mL.   Case also reviewed with Dr. Villalpando. Let me know if patient agrees to moving forward so I can place sx case request. If he agree, he will need the followin. Pre-op labs needed (CBC, BMP).  2. Inform patient that surgery team will call him to select surgery date and to discuss pre-op instructions.   3. Provide patient with surgery packet and bowel prep instructions.  4. Temporarily hold baby aspirin for 7 days prior to surgery to reduce risk of bleeding.   5. After sx patient can expect blood in his semen, urine, and/or stool.  6. Follow-up post op.   #If patient has any questions please let me know. Thanks.    ----- Message -----  From: Lab, Background User  Sent: 2025   5:29 PM CDT  To: Sonia Nieves DNP

## 2025-05-28 ENCOUNTER — TELEPHONE (OUTPATIENT)
Dept: UROLOGY | Facility: CLINIC | Age: 85
End: 2025-05-28
Payer: MEDICARE

## 2025-05-28 DIAGNOSIS — N40.1 BENIGN PROSTATIC HYPERPLASIA WITH URINARY FREQUENCY: ICD-10-CM

## 2025-05-28 DIAGNOSIS — R97.20 ELEVATED PSA: Primary | ICD-10-CM

## 2025-05-28 DIAGNOSIS — R35.0 BENIGN PROSTATIC HYPERPLASIA WITH URINARY FREQUENCY: ICD-10-CM

## 2025-05-28 DIAGNOSIS — R97.20 ELEVATED PSA MEASUREMENT: ICD-10-CM

## 2025-05-28 DIAGNOSIS — R63.4 UNINTENTIONAL WEIGHT LOSS: ICD-10-CM

## 2025-05-28 RX ORDER — SODIUM CHLORIDE 9 MG/ML
INJECTION, SOLUTION INTRAVENOUS CONTINUOUS
OUTPATIENT
Start: 2025-05-28

## 2025-05-28 NOTE — PROGRESS NOTES
Diagnoses and all orders for this visit:    Elevated PSA  -     Case Request Operating Room: BIOPSY, PROSTATE, RECTAL APPROACH, WITH US GUIDANCE  -     Vital Signs ; Standing  -     Notify physician ; Standing  -     Diet NPO; Standing  -     Place in Outpatient; Standing  -     Place SHELBY hose; Standing  -     vancomycin 1,500 mg in 0.9% NaCl 250 mL IVPB (admixture device)  -     Prior authorization Order  -     Diet NPO    Benign prostatic hyperplasia with urinary frequency  -     Case Request Operating Room: BIOPSY, PROSTATE, RECTAL APPROACH, WITH US GUIDANCE  -     Vital Signs ; Standing  -     Notify physician ; Standing  -     Diet NPO; Standing  -     Place in Outpatient; Standing  -     Place SHELBY hose; Standing  -     vancomycin 1,500 mg in 0.9% NaCl 250 mL IVPB (admixture device)  -     Prior authorization Order  -     Diet NPO    Unintentional weight loss  -     Case Request Operating Room: BIOPSY, PROSTATE, RECTAL APPROACH, WITH US GUIDANCE  -     Vital Signs ; Standing  -     Notify physician ; Standing  -     Diet NPO; Standing  -     Place in Outpatient; Standing  -     Place SHELBY hose; Standing  -     vancomycin 1,500 mg in 0.9% NaCl 250 mL IVPB (admixture device)  -     Prior authorization Order  -     Diet NPO    Elevated PSA measurement    Other orders  -     0.9% NaCl infusion  -     IP VTE LOW RISK PATIENT; Standing  -     cefTRIAXone (Rocephin) 1 g in D5W 100 mL IVPB (MB+)    1. Pre-op labs needed (CBC, BMP).  2. Inform patient that surgery team will call him to select surgery date and to discuss pre-op instructions.   3. Provide patient with surgery packet and bowel prep instructions.  4. Temporarily hold baby aspirin for 7 days prior to surgery to reduce risk of bleeding.   5. After sx patient can expect blood in his semen, urine, and/or stool.    Follow-up post op.     Sonia Nieves, DNP

## 2025-05-28 NOTE — TELEPHONE ENCOUNTER
Spoke with patient's wife but I could hear her explaining everything to the patient and when I start explaining the procedure and prep she had me on speaker phone with Mr. Muhammad. Pt and wife verbally understood and would like to move forward on the date provided.

## 2025-06-05 DIAGNOSIS — Z47.89 AFTERCARE FOLLOWING SURGERY OF THE MUSCULOSKELETAL SYSTEM: Primary | ICD-10-CM

## 2025-06-05 PROBLEM — N40.1 BENIGN PROSTATIC HYPERPLASIA WITH URINARY FREQUENCY: Status: ACTIVE | Noted: 2025-06-05

## 2025-06-05 PROBLEM — R97.20 ELEVATED PSA: Status: ACTIVE | Noted: 2025-06-05

## 2025-06-05 PROBLEM — R35.0 BENIGN PROSTATIC HYPERPLASIA WITH URINARY FREQUENCY: Status: ACTIVE | Noted: 2025-06-05

## 2025-06-08 ENCOUNTER — PATIENT MESSAGE (OUTPATIENT)
Dept: FAMILY MEDICINE | Facility: CLINIC | Age: 85
End: 2025-06-08
Payer: MEDICARE

## 2025-06-10 ENCOUNTER — RESULTS FOLLOW-UP (OUTPATIENT)
Dept: FAMILY MEDICINE | Facility: CLINIC | Age: 85
End: 2025-06-10

## 2025-06-10 ENCOUNTER — OFFICE VISIT (OUTPATIENT)
Dept: FAMILY MEDICINE | Facility: CLINIC | Age: 85
End: 2025-06-10
Payer: MEDICARE

## 2025-06-10 ENCOUNTER — TELEPHONE (OUTPATIENT)
Dept: FAMILY MEDICINE | Facility: CLINIC | Age: 85
End: 2025-06-10

## 2025-06-10 VITALS
DIASTOLIC BLOOD PRESSURE: 60 MMHG | HEIGHT: 72 IN | OXYGEN SATURATION: 97 % | HEART RATE: 81 BPM | WEIGHT: 211.56 LBS | BODY MASS INDEX: 28.65 KG/M2 | SYSTOLIC BLOOD PRESSURE: 110 MMHG

## 2025-06-10 DIAGNOSIS — M25.561 CHRONIC PAIN OF RIGHT KNEE: Primary | ICD-10-CM

## 2025-06-10 DIAGNOSIS — G89.29 CHRONIC PAIN OF RIGHT KNEE: Primary | ICD-10-CM

## 2025-06-10 PROCEDURE — 1160F RVW MEDS BY RX/DR IN RCRD: CPT | Mod: CPTII,HCNC,S$GLB, | Performed by: STUDENT IN AN ORGANIZED HEALTH CARE EDUCATION/TRAINING PROGRAM

## 2025-06-10 PROCEDURE — 99214 OFFICE O/P EST MOD 30 MIN: CPT | Mod: HCNC,S$GLB,, | Performed by: STUDENT IN AN ORGANIZED HEALTH CARE EDUCATION/TRAINING PROGRAM

## 2025-06-10 PROCEDURE — 99999 PR PBB SHADOW E&M-EST. PATIENT-LVL IV: CPT | Mod: PBBFAC,HCNC,, | Performed by: STUDENT IN AN ORGANIZED HEALTH CARE EDUCATION/TRAINING PROGRAM

## 2025-06-10 PROCEDURE — 1126F AMNT PAIN NOTED NONE PRSNT: CPT | Mod: CPTII,HCNC,S$GLB, | Performed by: STUDENT IN AN ORGANIZED HEALTH CARE EDUCATION/TRAINING PROGRAM

## 2025-06-10 PROCEDURE — 1100F PTFALLS ASSESS-DOCD GE2>/YR: CPT | Mod: CPTII,HCNC,S$GLB, | Performed by: STUDENT IN AN ORGANIZED HEALTH CARE EDUCATION/TRAINING PROGRAM

## 2025-06-10 PROCEDURE — 1159F MED LIST DOCD IN RCRD: CPT | Mod: CPTII,HCNC,S$GLB, | Performed by: STUDENT IN AN ORGANIZED HEALTH CARE EDUCATION/TRAINING PROGRAM

## 2025-06-10 PROCEDURE — 3288F FALL RISK ASSESSMENT DOCD: CPT | Mod: CPTII,HCNC,S$GLB, | Performed by: STUDENT IN AN ORGANIZED HEALTH CARE EDUCATION/TRAINING PROGRAM

## 2025-06-10 NOTE — PROGRESS NOTES
Patient ID: Heri Muhammad is a 84 y.o. male.    Chief Complaint: Knee Pain (During therapy- 6/10 pain. Worse with use, and weakness. Rest and bracing worked to help/)    History of Present Illness    CHIEF COMPLAINT:  Heri presents today for knee pain    MUSCULOSKELETAL:  He reports knee pain that started a few days ago, localized to specific areas. Pain worsens with physical therapy exercises and improves with rest. He denies knee instability or swelling compared to contralateral knee. He currently uses Voltaren gel for pain management and previously tried lidocaine patches which were ineffective due to poor adhesion.      ROS:  General: -fever, -chills, -fatigue, -weight gain, -weight loss  Eyes: -vision changes, -redness, -discharge  ENT: -ear pain, -nasal congestion, -sore throat  Cardiovascular: -chest pain, -palpitations, -lower extremity edema  Respiratory: -cough, -shortness of breath  Gastrointestinal: -abdominal pain, -nausea, -vomiting, -diarrhea, -constipation, -blood in stool  Genitourinary: -dysuria, -hematuria, -frequency  Musculoskeletal: +joint pain, -muscle pain, +pain with movement, +limb pain  Skin: -rash, -lesion  Neurological: -headache, -dizziness, -numbness, -tingling  Psychiatric: -anxiety, -depression, -sleep difficulty         Physical Exam    General: No acute distress. Well-developed. Well-nourished.  Eyes: EOMI. Sclerae anicteric.  HENT: Normocephalic. Atraumatic. Nares patent. Moist oral mucosa.  Ears: Bilateral TMs clear. Bilateral EACs clear.  Cardiovascular: Regular rate. Regular rhythm. No murmurs. No rubs. No gallops. Normal S1, S2.  Respiratory: Normal respiratory effort. Clear to auscultation bilaterally. No rales. No rhonchi. No wheezing.  Abdomen: Soft. Non-tender. Non-distended. Normoactive bowel sounds.  Musculoskeletal: No  obvious deformity.  Extremities: No lower extremity edema.  Neurological: Alert & oriented x3. No slurred speech. Normal gait.  Psychiatric: Normal  mood. Normal affect. Good insight. Good judgment.  Skin: Warm. Dry. No rash.         Assessment & Plan    RIGHT KNEE PAIN:  - Assessed patient's knee pain which worsens with physical therapy and improves with rest.  - Pain occurs mostly when walking, with no comparative swelling.  - Ordered XR Right Knee to evaluate for potential arthritis or other joint issues.  - Advised rest and avoidance of movements that exacerbate pain, particularly during physical therapy.  - Continue Voltaren gel application with addition of lidocaine roll-on for topical relief, and Tylenol for pain management.  - Will consider orthopedic referral pending XR results.    FOLLOW-UP:  - Instructed patient to follow up if knee pain persists for 4-6 weeks without improvement, at which point MRI and definite orthopedic referral will be considered.  - Heri should contact office regarding XR results.       1. Chronic pain of right knee  -     X-Ray Knee 1 or 2 View Bilateral; Future; Expected date: 06/10/2025  -     Cancel: Ambulatory referral/consult to Orthopedics; Future; Expected date: 06/17/2025              No follow-ups on file.    This note was generated with the assistance of ambient listening technology. Verbal consent was obtained by the patient and accompanying visitor(s) for the recording of patient appointment to facilitate this note. I attest to having reviewed and edited the generated note for accuracy, though some syntax or spelling errors may persist. Please contact the author of this note for any clarification.

## 2025-06-10 NOTE — TELEPHONE ENCOUNTER
I have called patients spouse and informed of patients X-Ray results and that a referral for Orthopedics was placed . Patient verbally understood , and was told to contact the office if symptoms are becoming worse.

## 2025-06-10 NOTE — TELEPHONE ENCOUNTER
----- Message from Jennifer Sharpe MD sent at 6/10/2025  3:00 PM CDT -----  Please call the patient with the attached results.Your X-ray shows arthritis in both knees, more significant on the right, along with a small fluid buildup. Since you're finding that physical therapy   is making the pain worse and the imaging confirms degenerative changes, I'll place a referral to orthopedics so they can evaluate for further options, including possible injections or other   interventions. Let us know if symptoms worsen in the meantime.  ----- Message -----  From: Interface, Rad Results In  Sent: 6/10/2025   2:51 PM CDT  To: Jennifer Sharpe MD

## 2025-06-16 ENCOUNTER — OFFICE VISIT (OUTPATIENT)
Dept: PODIATRY | Facility: CLINIC | Age: 85
End: 2025-06-16
Payer: MEDICARE

## 2025-06-16 DIAGNOSIS — Z47.89 AFTERCARE FOLLOWING SURGERY OF THE MUSCULOSKELETAL SYSTEM: Primary | ICD-10-CM

## 2025-06-16 PROCEDURE — 3288F FALL RISK ASSESSMENT DOCD: CPT | Mod: CPTII,HCNC,S$GLB, | Performed by: PODIATRIST

## 2025-06-16 PROCEDURE — 1101F PT FALLS ASSESS-DOCD LE1/YR: CPT | Mod: CPTII,HCNC,S$GLB, | Performed by: PODIATRIST

## 2025-06-16 PROCEDURE — 99999 PR PBB SHADOW E&M-EST. PATIENT-LVL III: CPT | Mod: PBBFAC,HCNC,, | Performed by: PODIATRIST

## 2025-06-16 PROCEDURE — 99024 POSTOP FOLLOW-UP VISIT: CPT | Mod: HCNC,S$GLB,, | Performed by: PODIATRIST

## 2025-06-16 PROCEDURE — 1159F MED LIST DOCD IN RCRD: CPT | Mod: CPTII,HCNC,S$GLB, | Performed by: PODIATRIST

## 2025-06-16 PROCEDURE — 1126F AMNT PAIN NOTED NONE PRSNT: CPT | Mod: CPTII,HCNC,S$GLB, | Performed by: PODIATRIST

## 2025-06-16 NOTE — PROGRESS NOTES
[facility] - PODIATRY  [address]  [address]  [place] [address]  Dept: [phone]  Dept Fax: [phone]    Geronimo Lehman Jr., DPM   Geronimo Lehman Jr.     Post-Operative Visit  Assessment:     1. Aftercare following surgery of the musculoskeletal system            Plan:   MDM    Coding  12 wk s/p    - pt seen evaluated and managed  -XR reviewed   Postop changes s/p pan metatarsal head resection with hammertoe repair 2-5. Pipj fusion sites with bone callus on 2 views indicative of normal osseous healing. Alignment improved compared to preoperative images.   -pt healing normal for postop period  -deformity improved compared to preop clinically and on XR  -pt happy and satisfied with result  -some postop swelling/edema/discomfort expected up to 1 yr postop        - wb: wbat  - rx dispensed: none  - referrals: none       Follow up if symptoms worsen or fail to improve.      Subjective:      Patient ID: Heri Muhammad is a 84 y.o. male.    Chief Complaint:   Chief Complaint   Patient presents with    Follow-up     Right foot- post op     There were no vitals filed for this visit.    DOS: 3/21/25  Procedure: R foot pan MT head resection+ HT repair 2-5    Heri Muhammad returns to the clinic today for a postop visit. Pt is s/p above procedure. Heri Muhammad rates pain at a 0/10 on visual analog scale. Denies n/v/f/c. Finished with formal PT.    HPI      Outside reports reviewed: historical medical records.    Past Medical History:   Diagnosis Date    Anticoagulant long-term use     no longer taking eliquis    Aortic atherosclerosis 09/18/2017    - LDL goal <70 - BP goal <130/80    Benign essential tremor     Biventricular cardiac pacemaker in situ 09/24/2018    CAD (coronary artery disease) 12/02/2013    - 9/2013 Left heart Cath: LAD- 60% prox (neg FFR), nl, ramus and RCA 20% - LDL goal <70 - BP goal <130/80    Cancer     skin cancer on back    Carpal tunnel  syndrome of left wrist 08/08/2017    surgery to correct    Chronic bronchitis     Chronic diastolic heart failure 01/19/2018    Chronic obstructive pulmonary disease 10/03/2018    Chronic right-sided thoracic back pain 04/27/2017    Cough     not currently a problem    Current use of long term anticoagulation 06/02/2016    - Eliquis for Afib-no longer taking    Dizziness     intermittent    DJD (degenerative joint disease) of knee     right    ED (erectile dysfunction)     Hammer toe of right foot     Hard of hearing 11/2016    History of cataract     Hyponatremia 06/11/2019    Memory loss     Mixed hyperlipidemia 07/28/2012    Persistent atrial fibrillation 07/01/2015    - followed by Dr. Esparza - history of cardioversion - on Eliquis OAC 5 mg BID    Pulmonary hypertension     Seizure disorder     Seizures     last episode 1995    Subclinical hypothyroidism     no meds    Tension type headache 07/02/2019    not as frequent    Vitamin D deficiency      Past Surgical History:   Procedure Laterality Date    CARDIAC CATHETERIZATION      CARDIAC PACEMAKER PLACEMENT  2014    CARDIOVERSION      multiple    CARPAL TUNNEL RELEASE Left     CATARACT EXTRACTION Bilateral     COLONOSCOPY      several    CORRECTION OF HAMMER TOE Right 3/21/2025    Procedure: CORRECTION, HAMMER TOE;  Surgeon: Geronimo Lehman Jr., DPM;  [facility];  Service: Podiatry;  Laterality: Right;  pop saph. Toes 2-5    ECHOCARDIOGRAM,TRANSESOPHAGEAL N/A 06/16/2023    Procedure: Transesophageal echo (GALILEO) intra-procedure log documentation;  Surgeon: Karen Diagnostic Provider;  Location: [facility] EP LAB;  Service: Cardiology;  Laterality: N/A;  s/p WM, GALILEO, anes, 3 Prep    FRACTURE SURGERY Right     ankle with hardware    HERNIA REPAIR      abdominal    INJECTION OF ANESTHETIC AGENT AROUND MEDIAL BRANCH NERVES INNERVATING LUMBAR FACET JOINT Bilateral 01/17/2019    Procedure: Block-nerve-medial branch-lumbar L2-L5;  Surgeon: Anthony Moffett MD;  Location:  [facility] OR;  Service: Pain Management;  Laterality: Bilateral;    JOINT REPLACEMENT Bilateral     shoulders    OCCLUSION OF LEFT ATRIAL APPENDAGE N/A 05/05/2023    Procedure: Left atrial appendage occlusion;  Surgeon: Hema Del Valle MD;  Location: [facility] EP LAB;  Service: Cardiology;  Laterality: N/A;  afib, watchman, BSCI, ben, anes, MB, 3prep    RADIOFREQUENCY ABLATION OF LUMBAR MEDIAL BRANCH NERVE AT SINGLE LEVEL Bilateral 01/23/2019    Procedure: Radiofrequency Ablation, Nerve, Spinal, Lumbar, Medial Branch, L2-L5;  Surgeon: Anthony Moffett MD;  Location: [facility] OR;  Service: Pain Management;  Laterality: Bilateral;    REPLACEMENT OF PACEMAKER GENERATOR N/A 4/22/2025    Procedure: REPLACEMENT, PACEMAKER GENERATOR;  Surgeon: Deep Esparza MD;  Location: [facility] EP LAB;  Service: Cardiology;  Laterality: N/A;  DANNI, CRT-P gen change, SJM, MAC, SK, 3 Prep    SURGICAL REMOVAL OF METATARSAL HEAD Right 3/21/2025    Procedure: OSTECTOMY, METATARSAL BONE, HEAD;  Surgeon: Geronimo Lehman Jr. DPM;  Location: [facility] OR;  Service: Podiatry;  Laterality: Right;  pop saph. Toes 2-5    TRANSESOPHAGEAL ECHOCARDIOGRAPHY N/A 05/05/2023    Procedure: ECHOCARDIOGRAM, TRANSESOPHAGEAL;  Surgeon: Dylan Layne MD;  Location: [facility] EP LAB;  Service: Cardiology;  Laterality: N/A;    TREATMENT OF CARDIAC ARRHYTHMIA N/A 07/11/2022    Procedure: Cardioversion or Defibrillation;  Surgeon: MIREILLE Morel MD;  Location: [facility] EP LAB;  Service: Cardiology;  Laterality: N/A;  AF, BEN, DCCV, MAC, EH (to cover for SK), 3 Prep *SJM CRT-P in situ*    ULTRASOUND, RECTAL APPROACH, WITH PROSTATE BIOPSY AND PROSTATE VOLUME DETERMINATION N/A 6/5/2025    Procedure: ULTRASOUND, RECTAL APPROACH, WITH PROSTATE BIOPSY AND PROSTATE VOLUME DETERMINATION;  Surgeon: Jose Villalpando MD;  Location: [facility] OR;  Service: Urology;  Laterality: N/A;    WISDOM TOOTH EXTRACTION       Family History   Problem Relation Name Age of Onset    Heart disease Mother Odalis      Heart failure Mother Odalis     Dementia Mother Odalis     Cancer Father Darío         colon    Blindness Father Darío         accident    Cataracts Father Darío     Hypertension Father Darío     Tremor Father Darío     Tremor Brother      Cancer Daughter Shlomo         cancer as a baby, unsure of what kind    Obesity Daughter Judy     No Known Problems Son Giovanny     Tremor Paternal Grandfather      Melanoma Neg Hx      Amblyopia Neg Hx      Diabetes Neg Hx      Glaucoma Neg Hx      Macular degeneration Neg Hx      Retinal detachment Neg Hx      Strabismus Neg Hx      Stroke Neg Hx      Thyroid disease Neg Hx       Current Medications[1]  Review of patient's allergies indicates:   Allergen Reactions    Bactroban [mupirocin calcium] Rash     Other reaction(s): Rash     Social History[2]    ROS  REVIEW OF SYSTEMS: Negative as documented below as well as positive findings in bold.       Constitutional  Respiratory  Gastrointestinal  Skin   - Fever - Cough - Heartburn - Rash   - Chills - Spit blood - Nausea - Itching   - Weight Loss - Shortness of breath - Vomiting - Nail pain   - Malaise/Fatigue - Wheezing - Abdominal Pain  Wound/Ulcer   - Weight Gain   - Blood in Stool         - Diarrhea          Cardiovascular  Genitourinary  Neurological  HEENT   - Chest Pain - Dysuria - Dizziness - Headache   - Palpitations - Hematuria - Tingling - Congestion   - Pain at night in legs - Flank Pain - Tremor - Sore Throat   - Cramping   - Sensory Change - Blurred Vision   - Leg Swelling   - Speech Change - Double Vision   - Dizzy when standing   - Focal Weakness - Eye Redness       - Seizures - Dry Eyes       - Loss of Consciousness          Endocrine  Musculoskeletal  Psychiatric   - Cold intolerance - Muscle Pain - Depression   - Heat intolerance - Neck Pain - Insomnia   - Anemia - Joint Pain - Memory Loss   -  Easy bruising, bleeding - Heel pain - Anxiety      Toe Pain        Leg/Ankle/Foot Pain         Objective:     There were no  vitals taken for this visit.    Physical Exam    Neck:  Trachea Midline  No visible masses    Respiratory/Ears:  No distress or labored breathing.  Able to differentiate between normal talking voice and whisper.  Able to follow commands    Eyes:  Visual Acuity intact  No discoloration noted.    Physical Exam  Ortho Exam  Foot Exam    R LE exam con't:  V: DP 2/4, PT 2/4, CRT< 3s to all digits tested.    N: SILT in SP/DP/T/Ebonie/Saph distributions    Ortho: +Motor EHL/FHL/TA/GA   Surgical site pain absent   Surgical site swelling absent   Hammertoe deformity 2-5 improved compared to preop   Compartments soft/compressible. No pain on passive stretch of big toe. No calf  Pain.     Derm: Skin intact. Sutures/staples: removed. Signs of infection: none.     Imaging / Labs:    Lab Results   Component Value Date    WBC 8.09 04/30/2025    WBC 8.42 04/21/2025    WBC 7.50 02/20/2025    WBC 7.51 08/26/2024    WBC 11.19 08/25/2024    PROCAL 0.07 12/04/2020    SEDRATE 10 06/15/2016    .5 (H) 12/04/2020    CRP 11.2 (H) 06/15/2016    PREALBUMIN 23 02/20/2025       Lab Results   Component Value Date    PREALBUMIN 23 02/20/2025       X-Ray Knee 1 or 2 View Bilateral  Result Date: 6/10/2025  EXAMINATION: XR KNEE 1 OR 2 VIEW BILATERAL CLINICAL HISTORY: Pain in right knee TECHNIQUE: Weightbearing AP and lateral radiographs of both knees were obtained. COMPARISON: None FINDINGS: Right knee: The bones are intact.  There is no evidence for acute fracture or bone destruction.  There are degenerative changes with spurring of the tibial spines with osteophytes at the femorotibial and patellofemoral joints.  There is moderate to marked narrowing of the lateral compartment of the femorotibial joint.  There is genu valgus deformity present.  A small joint effusion may be present.  Soft tissues appear grossly unremarkable. Left knee: The bones are intact.  There is no evidence for acute fracture or bone destruction.  There are degenerative  changes with spurring of the tibial spines with small osteophytes at the femorotibial and patellofemoral joints.  There is mild to moderate narrowing of the medial and lateral compartments of the femorotibial joint.  There is no evidence for suprapatellar joint effusion.     Tricompartmental degenerative changes noted bilaterally. Moderate to marked narrowing of the lateral compartment of the right femorotibial joint resulting in genu valgus deformity. Mild to moderate narrowing of both the medial and lateral compartments of the left femorotibial joint. Probable small right suprapatellar joint effusion. Electronically signed by: Terry Gallardo MD Date:    06/10/2025 Time:    14:49    US Biopsy Prostate Singl Multi Specimens W Guidance (XPD)  Result Date: 6/5/2025  EXAMINATION: US BIOPSY PROSTATE SINGLE MULTI SPECIMENS W GUIDANCE (XPD) CLINICAL HISTORY: prostate biopsy; TECHNIQUE: Sonographic services provided during transrectal ultrasound-guided prostate biopsy performed by the urologist. COMPARISON: None FINDINGS: Sonographic services were provided during biopsy performed by the urologist.  Multiple images were obtained of the prostate gland which measures 4.1 x 4.8 x 3.4 cm in size corresponding to an estimated prostate volume of 35 mL.  Please see the urology procedure report for further details.     Sonographic services provided during biopsy performed by the urologist.  Please see procedure report for further details. Mild prostatomegaly. Electronically signed by: Terry Gallardo MD Date:    06/05/2025 Time:    08:20    X-Ray Foot Complete Right  Result Date: 5/21/2025  EXAMINATION: XR FOOT COMPLETE 3 VIEW RIGHT CLINICAL HISTORY: . Pain in right foot TECHNIQUE: AP, lateral, and oblique views of the right foot were performed. COMPARISON: 05/05/2025 FINDINGS: Stable postoperative and degenerative changes of the foot including osteotomies of the 2nd through 5th metatarsal heads as well as the probably of the 2nd  through 5th proximal phalanges.  Alignment is stable.  No acute fracture.  Partially visualized hardware of the ankle.     See above Electronically signed by: Gurpreet Spann Date:    05/21/2025 Time:    09:37                       [1]   Current Outpatient Medications   Medication Sig Dispense Refill    atorvastatin (LIPITOR) 80 MG tablet TAKE 1 TABLET ONE TIME DAILY 90 tablet 3    cetirizine (ZYRTEC) 10 MG tablet Take 10 mg by mouth every evening.      citalopram (CELEXA) 20 MG tablet TAKE 1 TABLET ONE TIME DAILY 90 tablet 3    dutasteride (AVODART) 0.5 mg capsule Take 1 capsule (0.5 mg total) by mouth once daily. 90 capsule 3    fluticasone propionate (FLONASE) 50 mcg/actuation nasal spray 1 spray (50 mcg total) by Each Nostril route once daily. 15.8 mL 2    lamoTRIgine (LAMICTAL) 100 MG tablet Take 1 tablet (100 mg total) by mouth 2 (two) times daily. 10 tablet 0    magnesium oxide (MAG-OX) 400 mg (241.3 mg magnesium) tablet Take 1 tablet (400 mg total) by mouth once daily. 90 tablet 3    memantine (NAMENDA) 10 MG Tab Take 1 tablet (10 mg total) by mouth 2 (two) times daily. 180 tablet 3    metoprolol succinate (TOPROL-XL) 50 MG 24 hr tablet TAKE 1 TABLET EVERY DAY 90 tablet 3    primidone (MYSOLINE) 250 MG Tab TAKE 2 TABLETS TWICE DAILY (NEED MD APPOINTMENT FOR REFILLS) 360 tablet 3    riboflavin, vitamin B2, (VITAMIN B-2) 100 mg Tab tablet Take 2 tablets (200 mg total) by mouth once daily. 180 tablet 3    torsemide (DEMADEX) 10 MG Tab Take 1 tablet (10 mg total) by mouth every morning. 90 tablet 3    TRELEGY ELLIPTA 100-62.5-25 mcg DsDv INHALE 1 PUFF ONE TIME DAILY 180 each 3    aspirin (ECOTRIN) 81 MG EC tablet Take 1 tablet (81 mg total) by mouth once daily. 90 tablet 1     No current facility-administered medications for this visit.     Facility-Administered Medications Ordered in Other Visits   Medication Dose Route Frequency Provider Last Rate Last Admin    0.9%  NaCl infusion (for blood administration)    Intravenous Q24H Batool Pitts NP       [2]   Social History  Socioeconomic History    Marital status:    Occupational History    Occupation: retired   Tobacco Use    Smoking status: Former     Current packs/day: 0.00     Average packs/day: 1 pack/day for 5.0 years (5.0 ttl pk-yrs)     Types: Cigarettes     Start date:      Quit date:      Years since quittin.5    Smokeless tobacco: Never    Tobacco comments:     - I   Substance and Sexual Activity    Alcohol use: Never    Drug use: Never    Sexual activity: Not Currently     Partners: Female   Social History Narrative    . 4 adult children.      Social Drivers of Health     Financial Resource Strain: High Risk (2024)    Overall Financial Resource Strain (CARDIA)     Difficulty of Paying Living Expenses: Very hard   Food Insecurity: No Food Insecurity (2024)    Hunger Vital Sign     Worried About Running Out of Food in the Last Year: Never true     Ran Out of Food in the Last Year: Never true   Transportation Needs: No Transportation Needs (2024)    TRANSPORTATION NEEDS     Transportation : No   Physical Activity: Inactive (2024)    Exercise Vital Sign     Days of Exercise per Week: 0 days     Minutes of Exercise per Session: 0 min   Stress: No Stress Concern Present (2024)    [employer] - Occupational Stress Questionnaire     Feeling of Stress : Not at all   Housing Stability: Unknown (2024)    Housing Stability Vital Sign     Unable to Pay for Housing in the Last Year: No     Homeless in the Last Year: No

## 2025-06-17 DIAGNOSIS — C61 PROSTATE CANCER: Primary | ICD-10-CM

## 2025-06-20 ENCOUNTER — TELEPHONE (OUTPATIENT)
Dept: UROLOGY | Facility: CLINIC | Age: 85
End: 2025-06-20
Payer: MEDICARE

## 2025-06-20 RX ORDER — PRIMIDONE 250 MG/1
TABLET ORAL
Qty: 360 TABLET | Refills: 3 | Status: SHIPPED | OUTPATIENT
Start: 2025-06-20

## 2025-06-20 NOTE — TELEPHONE ENCOUNTER
Copied from CRM #[id number]. Topic: Appointments - Appointment Scheduling  >> Jun 20, 2025  2:32 PM Sanchez wrote:  Type: Requesting to speak with nurse    Who Called: PT  Regarding: ready to scan bone scan, ct and creatinine  Would the patient rather a call back or a response via [facility]? Call back  Best Call Back Number:  [phone]   Additional Information:

## 2025-06-23 ENCOUNTER — PATIENT MESSAGE (OUTPATIENT)
Dept: UROLOGY | Facility: CLINIC | Age: 85
End: 2025-06-23
Payer: MEDICARE

## 2025-06-24 ENCOUNTER — TELEPHONE (OUTPATIENT)
Dept: UROLOGY | Facility: CLINIC | Age: 85
End: 2025-06-24
Payer: MEDICARE

## 2025-06-24 NOTE — TELEPHONE ENCOUNTER
Spoke with patient's wife Ana at length about plans to come regarding prostate ca treatment. Patient is scheduled for all of the scans ordered by Dr. Villalpando but he has not yet received his Casodex from [facility] so I cannot schedule first Trelstar just yet. I asked Ms. Muhammad to call me when he starts the Casodex so we can schedule Trelstar. Patient recently had CT in May of chest/abd/pelvis, they are asking if we need to repeat the upcoming CT that is scheduled or if you can use the one completed on 5/5/25. Please advise.

## 2025-06-24 NOTE — TELEPHONE ENCOUNTER
Copied from CRM #[id number]. Topic: General Inquiry - Return Call  >> Jun 24, 2025 12:32 PM Sanchez wrote:  Type:  Patient Returning Call    Who Called:pt spouse   Who Left Message for Patient:Gillian   Does the patient know what this is regarding?: yes  Would the patient rather a call back or a response via [facility]?  Call   Best Call Back Number:[phone] (M)

## 2025-06-25 ENCOUNTER — RESULTS FOLLOW-UP (OUTPATIENT)
Dept: UROLOGY | Facility: CLINIC | Age: 85
End: 2025-06-25

## 2025-06-26 ENCOUNTER — TELEPHONE (OUTPATIENT)
Dept: UROLOGY | Facility: CLINIC | Age: 85
End: 2025-06-26
Payer: MEDICARE

## 2025-06-26 NOTE — TELEPHONE ENCOUNTER
----- Message from Jose Villalpando MD sent at 6/25/2025  1:15 PM CDT -----  CT scan shows no evidence of metastatic disease.  ----- Message -----  From: Elsie, Rad Results In  Sent: 6/25/2025  11:08 AM CDT  To: Jose Villalpando MD

## 2025-06-26 NOTE — TELEPHONE ENCOUNTER
Patient spouse stated her  does not talk on the phone but he was there to listen to results. She verbally understand the results of the CT scan shows no evidence of metastatic disease.

## 2025-06-26 NOTE — TELEPHONE ENCOUNTER
----- Message from Jose Villalpando MD sent at 6/25/2025  1:18 PM CDT -----  Bone scan shows no evidence of metastatic disease  ----- Message -----  From: Elsie, Rad Results In  Sent: 6/25/2025  10:51 AM CDT  To: Jose Villalpando MD

## 2025-06-26 NOTE — TELEPHONE ENCOUNTER
Patient spouse stated her  does not talk on the phone but he was there to listen to results. She verbally understand the results of the bone scan and that it showed no evidence of metastatic disease.

## 2025-06-26 NOTE — TELEPHONE ENCOUNTER
Copied from CRM #[id number]. Topic: Medications - Medication Status Check   >> Jun 26, 2025  3:33 PM Juliann wrote:  .Type: Patient Call Back    Who called:Melania/wife    What is the request in detail: a call back in regards to patients medication. Was told to call Gillian when his medication arrived, he had gotten it.    Can the clinic reply by [facility]?    Would the patient rather a call back or a response via [facility]? call    Best call back number:    Additional Information:

## 2025-06-27 NOTE — TELEPHONE ENCOUNTER
Spoke with patient's wife Melania, she states patient started Casodex on 6/26/25. I made them an appt for nurse visit on 7/15/25 to get his first Trelstar injection per Dr. Villalpando's order.

## 2025-07-01 ENCOUNTER — OFFICE VISIT (OUTPATIENT)
Dept: FAMILY MEDICINE | Facility: CLINIC | Age: 85
End: 2025-07-01
Payer: MEDICARE

## 2025-07-01 VITALS
BODY MASS INDEX: 28.83 KG/M2 | DIASTOLIC BLOOD PRESSURE: 70 MMHG | WEIGHT: 212.88 LBS | HEIGHT: 72 IN | OXYGEN SATURATION: 96 % | HEART RATE: 80 BPM | SYSTOLIC BLOOD PRESSURE: 110 MMHG

## 2025-07-01 DIAGNOSIS — E87.5 HYPERKALEMIA: Primary | ICD-10-CM

## 2025-07-01 PROCEDURE — 99214 OFFICE O/P EST MOD 30 MIN: CPT | Mod: HCNC,S$GLB,, | Performed by: STUDENT IN AN ORGANIZED HEALTH CARE EDUCATION/TRAINING PROGRAM

## 2025-07-01 PROCEDURE — 3288F FALL RISK ASSESSMENT DOCD: CPT | Mod: CPTII,HCNC,S$GLB, | Performed by: STUDENT IN AN ORGANIZED HEALTH CARE EDUCATION/TRAINING PROGRAM

## 2025-07-01 PROCEDURE — 1160F RVW MEDS BY RX/DR IN RCRD: CPT | Mod: CPTII,HCNC,S$GLB, | Performed by: STUDENT IN AN ORGANIZED HEALTH CARE EDUCATION/TRAINING PROGRAM

## 2025-07-01 PROCEDURE — 1125F AMNT PAIN NOTED PAIN PRSNT: CPT | Mod: CPTII,HCNC,S$GLB, | Performed by: STUDENT IN AN ORGANIZED HEALTH CARE EDUCATION/TRAINING PROGRAM

## 2025-07-01 PROCEDURE — 99999 PR PBB SHADOW E&M-EST. PATIENT-LVL IV: CPT | Mod: PBBFAC,HCNC,, | Performed by: STUDENT IN AN ORGANIZED HEALTH CARE EDUCATION/TRAINING PROGRAM

## 2025-07-01 PROCEDURE — 1159F MED LIST DOCD IN RCRD: CPT | Mod: CPTII,HCNC,S$GLB, | Performed by: STUDENT IN AN ORGANIZED HEALTH CARE EDUCATION/TRAINING PROGRAM

## 2025-07-01 PROCEDURE — 1100F PTFALLS ASSESS-DOCD GE2>/YR: CPT | Mod: CPTII,HCNC,S$GLB, | Performed by: STUDENT IN AN ORGANIZED HEALTH CARE EDUCATION/TRAINING PROGRAM

## 2025-07-01 NOTE — PROGRESS NOTES
"Patient ID: Heri Muhammad is a 84 y.o. male.    Chief Complaint: Follow-up (6 month f/u ) and abnormal blood work    History of Present Illness    CHIEF COMPLAINT:  Heri presents today for follow up.    PROSTATE CANCER:  He reports recent diagnosis of prostate cancer with elevated PSA. He is currently undergoing treatment with Dr. Villalpando, consisting of an initial medication regimen and planned hormonal therapy. He is currently on an unspecified medication and will receive an injection in two weeks. He appears reassured by physician's assessment that the cancer "did not look bad" and understands the treatment approach involves hormonal management, likely targeting testosterone levels to control cancer progression.    WEIGHT LOSS:  He reports unintentional weight loss which appears to be a primary concern prompting medical evaluation.    GASTROINTESTINAL:  He reports chronic diarrhea occurring consistently. This week, he experienced diarrhea for approximately two days. He is taking Imodium 2 mg, which has been helpful in managing symptoms. He denies associated fevers or chills with diarrhea episodes. He is uncertain about the underlying cause of diarrhea, potentially questioning if medication may be contributing to the condition.    CARDIOVASCULAR:  He reports recent cardiac device battery change and is scheduled for upcoming device check and EKG with cardiology team. He has atrial fibrillation and will follow up with Dr. Hernandez's nurse practitioner for management.    LAB ABNORMALITIES:  He reports elevated potassium level at 5.4, which is higher than normal range. He is asymptomatic and clinician plans to repeat lab test to confirm whether this is a true value or potential lab error.    MEDICATIONS:  He reports taking torsemide fluid pill only on weekends, specifically on Saturday and Sunday. He experiences frequent urination when taking torsemide, reporting urination occurring every 50 minutes. He is hesitant to " take the medication consistently throughout the week.    DIET:  He reports consuming a significant amount of fruit daily and drinks a protein shake almost every day.    UPCOMING MEDICAL APPOINTMENTS:  He has a first-time consultation with pulmonologist tomorrow, related to previous weight loss workup, colorectal doctor appointment following previous abscess, and cardiology follow-up.    COVID VACCINATION:  He received COVID booster vaccination in October with no adverse reactions.    FAMILY HISTORY:  He reports recent loss of brother who was one year older. He expresses concern about his own mortality and desire to live at least one year longer than his  brother.      ROS:  General: -fever, -chills, -fatigue, -weight gain, +weight loss  Eyes: -vision changes, -redness, -discharge  ENT: -ear pain, -nasal congestion, -sore throat  Cardiovascular: -chest pain, -palpitations, -lower extremity edema  Respiratory: -cough, -shortness of breath  Gastrointestinal: -abdominal pain, -nausea, -vomiting, +diarrhea, -constipation, -blood in stool  Genitourinary: -dysuria, -hematuria, -frequency, +excessive urination  Musculoskeletal: -joint pain, -muscle pain  Skin: -rash, -lesion  Neurological: -headache, -dizziness, -numbness, -tingling  Psychiatric: -anxiety, -depression, -sleep difficulty         Physical Exam    General: No acute distress. Well-developed. Well-nourished.  Eyes: EOMI. Sclerae anicteric.  HENT: Normocephalic. Atraumatic. Nares patent. Moist oral mucosa.  Ears: Bilateral TMs clear. Bilateral EACs clear.  Cardiovascular: Regular rate. Regular rhythm. No murmurs. No rubs. No gallops. Normal S1, S2.  Respiratory: Normal respiratory effort. Clear to auscultation bilaterally. No rales. No rhonchi. No wheezing.  Abdomen: Soft. Non-tender. Non-distended. Normoactive bowel sounds.  Musculoskeletal: No  obvious deformity.  Extremities: No lower extremity edema.  Neurological: Alert & oriented x3. No slurred  speech. Normal gait.  Psychiatric: Normal mood. Normal affect. Good insight. Good judgment.  Skin: Warm. Dry. No rash.         Assessment & Plan    PROSTATE CANCER:  - Monitored PSA levels which were elevated, confirming prostate cancer diagnosis under Dr. Villalpando's care.  - Treatment plan includes hormone therapy with prescribed oral medication and scheduled injection in 2 weeks.    ATRIAL FIBRILLATION:  - Monitored arrhythmia under cardiologist supervision.  - Emphasized importance of potassium monitoring due to atrial fibrillation history.  - Scheduled device check and EKG for cardiac evaluation.    HYPERKALEMIA:  - Reviewed lab results showing elevated potassium level of 5.4 as primary concern.  - Ordered repeat BMP to confirm, considering possibility of laboratory error due to hemolysis.  - Normal renal function reduces likelihood of persistent hyperkalemia.  - Discussed potential etiologies including dietary factors and laboratory error, and explained kidney's role in potassium regulation.  - Educated patient on risks of hyperkalemia, particularly related to cardiac arrhythmias.  - Ordered additional tests including magnesium and phosphorus levels.  - Heri instructed to contact office when repeat results are available or if potassium remains elevated.    DIARRHEA:  - Monitored diarrhea episode which lasted 2 days this week but has since resolved.  - Heri self-administered Imodium effectively.  - Discussed potential etiologies and plan to obtain stool studies if symptoms persist.  - Advised adequate hydration and to contact office if diarrhea persists/worsens or if fever/chills develop.    WEIGHT LOSS:  - Monitored unexplained weight loss, which prompted diagnostic testing and referral to pulmonologist.  - Recommend continuing protein supplementation (e.g., protein shakes) to address slightly low albumin levels.    CARDIAC PACEMAKER:  - Monitored cardiac pacemaker status, noting recent battery replacement.  -  Scheduled device check and EKG for pacemaker function evaluation.  - Emphasized importance of potassium monitoring due to cardiac device.    FOLLOW-UP:  - Follow up in 6 months.       1. Hyperkalemia  -     Basic Metabolic Panel; Future; Expected date: 07/01/2025  -     MAGNESIUM; Future; Expected date: 07/01/2025  -     PHOSPHORUS; Future; Expected date: 07/01/2025              No follow-ups on file.    This note was generated with the assistance of ambient listening technology. Verbal consent was obtained by the patient and accompanying visitor(s) for the recording of patient appointment to facilitate this note. I attest to having reviewed and edited the generated note for accuracy, though some syntax or spelling errors may persist. Please contact the author of this note for any clarification.

## 2025-07-02 ENCOUNTER — TELEPHONE (OUTPATIENT)
Dept: FAMILY MEDICINE | Facility: CLINIC | Age: 85
End: 2025-07-02
Payer: MEDICARE

## 2025-07-02 ENCOUNTER — RESULTS FOLLOW-UP (OUTPATIENT)
Dept: FAMILY MEDICINE | Facility: CLINIC | Age: 85
End: 2025-07-02
Payer: MEDICARE

## 2025-07-02 ENCOUNTER — OFFICE VISIT (OUTPATIENT)
Facility: CLINIC | Age: 85
End: 2025-07-02
Payer: MEDICARE

## 2025-07-02 VITALS
HEIGHT: 72 IN | OXYGEN SATURATION: 97 % | WEIGHT: 212.5 LBS | BODY MASS INDEX: 28.78 KG/M2 | SYSTOLIC BLOOD PRESSURE: 110 MMHG | DIASTOLIC BLOOD PRESSURE: 67 MMHG | HEART RATE: 80 BPM

## 2025-07-02 DIAGNOSIS — J44.9 CHRONIC OBSTRUCTIVE PULMONARY DISEASE, UNSPECIFIED COPD TYPE: ICD-10-CM

## 2025-07-02 DIAGNOSIS — J84.9 ILD (INTERSTITIAL LUNG DISEASE): Primary | ICD-10-CM

## 2025-07-02 PROCEDURE — 99999 PR PBB SHADOW E&M-EST. PATIENT-LVL V: CPT | Mod: PBBFAC,HCNC,, | Performed by: INTERNAL MEDICINE

## 2025-07-02 NOTE — PROGRESS NOTES
[facility] Office Visit Follow-up  Referring Md:   No referring provider defined for this encounter.    SUBJECTIVE:     Chief Complaint:  Anorectal abscess    History of Present Illness:  Patient is a 84 y.o. male presents with anorectal abscess. The patient is a established patient to this practice.     Course is as follows:  04/09/2025: Presented to clinic with a anorectal abscess.  Incision and drainage was performed.    07/03/2025: Presents for clinic for evaluation.  He states that he has healed up completely following his incision and drainage.  No further drainage.  No pain.  He has 1 bowel movement per day.  He does have some issues with fecal incontinence with diarrhea.  No family history of inflammatory bowel disease.  No past anorectal surgeries.  Only past abdominal surgery of an inguinal hernia.    Last Colonoscopy: 2011: unsure of outcome    Review of Systems:  Review of Systems   Constitutional:  Negative for chills, diaphoresis, fever, malaise/fatigue and weight loss.   HENT:  Negative for congestion.    Respiratory:  Negative for shortness of breath.    Cardiovascular:  Negative for chest pain and leg swelling.   Gastrointestinal:  Negative for abdominal pain, blood in stool, constipation, nausea and vomiting.   Genitourinary:  Negative for dysuria.   Musculoskeletal:  Negative for back pain and myalgias.   Skin:  Negative for rash.   Neurological:  Negative for dizziness and weakness.   Endo/Heme/Allergies:  Does not bruise/bleed easily.   Psychiatric/Behavioral:  Negative for depression.        OBJECTIVE:     Vital Signs (Most Recent)  /74 (BP Location: Left arm, Patient Position: Sitting)   Pulse 75   Ht 6' (1.829 m)   Wt 96.4 kg (212 lb 8.4 oz)   BMI 28.82 kg/m²     Physical Exam:  General: White male in no distress   Neuro: alert and oriented x 4.  Moves all extremities.     HEENT: no icterus.  Trachea midline  Respiratory: respirations are even and unlabored  Cardiac: regular rate  Abdomen:   Soft, nontender, no masses  Extremities: Warm dry and intact  Skin: no rashes  Anorectal:  External exam was normal.  Scar right anterior.  Underlying scar with no fistulous tract palpated.  Digital exam performed with no obvious internal abnormalities.  Detailed anoscopy performed with no internal abnormalities.    Labs:  H&H 14 and 44.  Albumin 3.4.  Normal renal function.    Imaging:  CT abdomen pelvis 06/25/2025 reviewed with no obvious perianal fistula or abscess      ASSESSMENT/PLAN:     Heri was seen today for abscess.    Diagnoses and all orders for this visit:    Anorectal abscess        84 y.o. male with history of prostate cancer and coronary disease who had a perianal abscess status post incision and drainage in April of 2025.  He presents for follow-up with no sign of fistula formation.  He can follow up with me with any future concerns or issues.  Reassurance provided.    JESUS Yan MD, FACS, FASCRS  Staff Surgeon  Colon & Rectal Surgery

## 2025-07-02 NOTE — TELEPHONE ENCOUNTER
----- Message from Jennifer Sharpe MD sent at 7/2/2025 10:44 AM CDT -----  Blood work, including potassium, looks like it's back to normal! No concerns, thanks  ----- Message -----  From: Lab, Background User  Sent: 7/1/2025   5:19 PM CDT  To: Jennifer hSarpe MD

## 2025-07-02 NOTE — PROGRESS NOTES
[facility]    Today's Date: 07/03/2025    subjective    History of Present Illness    CHIEF COMPLAINT:  - Mr. Muhammad presents with shortness of breath and for evaluation of abnormal lung findings on a recent CT.    HPI:  Mr. Muhammad has had shortness of breath for at least 20 years, becoming short of breath after activities such as taking a shower or walking too far. Mr. Muhammad has been treated for presumed COPD with various medications, including Trelegy, for approximately 20 years, but these treatments have not been effective in managing his symptoms. His wife reports that they have repeatedly expressed concerns about the inefficacy of his prescribed treatments.    His wife notes that he has been losing weight, which prompted further medical investigation that revealed prostate cancer. A recent CT, initially ordered due to prostate cancer, revealed abnormal findings in the lungs.      He also mentions having atrial fibrillation and a pacemaker.    Mr. Muhammad has a history of smoking for about 2 years between the ages of 18 and 19.     MEDICATIONS:  - Trelegy, duration of use: at least 20 years, reason: initially prescribed for COPD (though doctor suspects misdiagnosis), indicator of effectiveness: not effective  - Discontinued medication that began with an E in a red container, reason: taken off the market  - Changed from Spiriva to Trelegy at some point in the past    MEDICAL HISTORY:  - Prostate cancer  - COPD: Diagnosed at least 20 years ago, but doctor suspects this may have been a misdiagnosis  - Atrial fibrillation      ROS:  ROS findings as noted in HPI.          CT Chest Abd Pelvis 5.2025  FINDINGS:  Chest:     Heart and mediastinal vasculature: There is right ventricular chamber enlargement with a pacemaker care for a left-sided approach.  There is aortic and multi-vessel coronary atherosclerosis.  There is a left atrial appendage closure device.     Ana/Mediastinum: A calcified and  noncalcified mediastinal nodes compatible with previous granulomatous disease.     Lungs: There is a central bronchiectasis and peripheral reticulation in the mid and lower lung zones associated with a few areas of pleural based nodularity.     Abdomen and pelvis:     Liver: Normal in size and attenuation, with no focal hepatic lesions.     Gallbladder: Few calcified stones.     Bile Ducts: No evidence of dilated ducts.     Pancreas: No mass or peripancreatic fat stranding.     Spleen: Unremarkable.     Adrenals: Unremarkable.     Kidneys/ Ureters: Normal in size and location. No hydronephrosis, solid mass, or nephrolithiasis.     GI Tract/Mesentery: No evidence of bowel obstruction or inflammation. Sigmoid diverticulosis is present.  Small bowel loops unremarkable.     Peritoneal Space: No ascites. No free air.     Retroperitoneum: No significant adenopathy.     Vasculature: Aortic atherosclerosis is present.     Bladder: Mild bladder wall thickening.     Reproductive organs: Unremarkable.     Bones: No suspicious lytic or blastic lesions.  DISH of the thoracic spine.     Impression:     Lung changes suggesting chronic interstitial lung disease.  Interstitial edema thought less likely.     Atherosclerosis, cholelithiasis, and colonic diverticulosis     Bladder wall thickening could be related to acute and or chronic inflammation.             objective     Vitals:    07/02/25 1400   BP: 110/67   Pulse: 80     Physical Exam    Vitals: O2 saturation: 97%.  Cardiovascular: All normal.  Lungs: All normal.  IMAGING:  - CT (full body): recently, abnormal findings in lungs, interstitial lung disease pattern noted       assessment & plan     Assessment & Plan    ILD (INTERSTITIAL LUNG DISEASE):   Assessed history and CT results, concluding patient likely has interstitial lung disease rather than COPD.   Previous COPD diagnosis and treatment with Trelegy likely incorrect, given limited smoking history (2 years at age 20-21)  is unlikely to cause COPD.   Noted shortness of breath with activities like showering and walking.   Will order additional tests to further evaluate interstitial lung disease and assess oxygen needs.   Discussed possibility that lung condition may have been present for many years.   Explained concept of interstitial lung disease as scarring or coarsening of the lungs.   Discontinued Trelegy.   Ordered CT Lungs.   Ordered pulmonary function tests.   Ordered 6-minute walk test to assess oxygen levels during activity.   Ordered labs to be obtained during next convenient appointment, not requiring a separate visit.        Summary/Orders  1. ILD (interstitial lung disease)  - Stress test, pulmonary; Future  - Complete PFT with bronchodilator; Future  - MyoMarker Panel 3; Future  - Anti-Centromere Antibody; Future  - RNA polymerase III Ab; Future  - SCL-70 Antibodies; Future  - KEV Screen w/Reflex; Future  - RHEUMATOID FACTOR; Future  - Cyclic Citrullinated Peptide Antibody, IgG; Future  - CT Chest High Resolution Without Contrast; Future    2. Chronic obstructive pulmonary disease, unspecified COPD type  - Ambulatory referral/consult to Pulmonology         Jorge Birmingham MD  Pulmonary & Critical Care Medicine  [facility] - [phone]  [facility] [phone]    This note was generated with the assistance of ambient listening technology. Verbal consent was obtained by the patient and accompanying visitor(s) for the recording of patient appointment to facilitate this note. I attest to having reviewed and edited the generated note for accuracy, though some syntax or spelling errors may persist. Please contact the author of this note for any clarification.

## 2025-07-02 NOTE — TELEPHONE ENCOUNTER
Spoke with pt spouse in regard to his results she said thank you so much and pt verbally understood.

## 2025-07-03 ENCOUNTER — OFFICE VISIT (OUTPATIENT)
Dept: SURGERY | Facility: CLINIC | Age: 85
End: 2025-07-03
Payer: MEDICARE

## 2025-07-03 VITALS
WEIGHT: 212.5 LBS | BODY MASS INDEX: 28.78 KG/M2 | HEART RATE: 75 BPM | SYSTOLIC BLOOD PRESSURE: 112 MMHG | HEIGHT: 72 IN | DIASTOLIC BLOOD PRESSURE: 74 MMHG

## 2025-07-03 DIAGNOSIS — K61.2 ANORECTAL ABSCESS: Primary | ICD-10-CM

## 2025-07-03 PROCEDURE — 99999 PR PBB SHADOW E&M-EST. PATIENT-LVL III: CPT | Mod: PBBFAC,HCNC,, | Performed by: COLON & RECTAL SURGERY

## 2025-07-15 ENCOUNTER — CLINICAL SUPPORT (OUTPATIENT)
Dept: UROLOGY | Facility: CLINIC | Age: 85
End: 2025-07-15
Payer: MEDICARE

## 2025-07-15 VITALS — DIASTOLIC BLOOD PRESSURE: 58 MMHG | HEART RATE: 91 BPM | SYSTOLIC BLOOD PRESSURE: 103 MMHG

## 2025-07-15 DIAGNOSIS — C61 PROSTATE CANCER: Primary | ICD-10-CM

## 2025-07-15 PROBLEM — Z95.818 PRESENCE OF WATCHMAN LEFT ATRIAL APPENDAGE CLOSURE DEVICE: Status: ACTIVE | Noted: 2025-07-15

## 2025-07-15 PROCEDURE — 99999 PR PBB SHADOW E&M-EST. PATIENT-LVL II: CPT | Mod: PBBFAC,HCNC,,

## 2025-07-15 NOTE — PROGRESS NOTES
Patient here for first Trelstar injection as directed by Dr. Villalpando. Patient confirmed he has been on Casodex for two weeks. I explained the role of Trelstar and described possible side effects. Also explained that he would receive the injections about every 3 months with a lab just before the appt. I injected 11.25 mg of Trelstar IM in the right upper outer quad of the right buttock. Hemostasis observed, bandage applied. Patient then waited in clinic for 15 min to observe for s/s of adverse reaction. None was reported or observed. AVS with next visit given to patient.

## 2025-07-21 ENCOUNTER — CLINICAL SUPPORT (OUTPATIENT)
Dept: CARDIOLOGY | Facility: HOSPITAL | Age: 85
End: 2025-07-21
Attending: INTERNAL MEDICINE
Payer: MEDICARE

## 2025-07-21 ENCOUNTER — CLINICAL SUPPORT (OUTPATIENT)
Dept: CARDIOLOGY | Facility: HOSPITAL | Age: 85
End: 2025-07-21
Payer: MEDICARE

## 2025-07-21 DIAGNOSIS — Z95.0 PRESENCE OF CARDIAC PACEMAKER: ICD-10-CM

## 2025-07-21 DIAGNOSIS — I48.91 UNSPECIFIED ATRIAL FIBRILLATION: ICD-10-CM

## 2025-07-21 PROCEDURE — 93294 REM INTERROG EVL PM/LDLS PM: CPT | Mod: HCNC,,, | Performed by: INTERNAL MEDICINE

## 2025-07-21 PROCEDURE — 93296 REM INTERROG EVL PM/IDS: CPT | Mod: HCNC | Performed by: INTERNAL MEDICINE

## 2025-07-30 NOTE — PROGRESS NOTES
Mr. Muhammad is a patient of Dr. Esparza and was last seen in clinic 8/28/2024.      Subjective:   Patient ID:  Heri Muhammad is an 84 y.o. male who presents for follow up of CRT-P and Atrial Fibrillation  .     HPI:    Mr. Muhammad is an 84 y.o. male with atrial fibrillation (PVI 1/2016; PVI 3/2017), atrial flutter (RFA of CTI 1/2016, mitral annular line 3/2017, roof line 3/2017), sinus node dysfunction, Watchman, and CRT-P here for follow up after generator change.      Background:      Dual chamber PPM implanted (06/30/14).   Presented with fatigue, found to be in atrial fibrillation. Placed on amiodarone, underwent DCCV.   Symptoms with AF have historically been fatigue and lightheadedness.   DCCV (10/15/15) >> recurrence. DCCV (11/23/15), amiodarone increased to 400 mg daily. Developed atrial flutter.   (01/29/16) PVI + RFA for atrial flutter. Amiodarone discontinued.   Felt poorly with Multaq.   Did well for initially in terms of atrial fibrillation, but continued to note dyspnea.   Underwent LHC/RHC 6/10/16, no obstructive CAD, normal rt sided filling pressures.   Developed persistent recurrence >> DCCV 11/22/16. Recurrence.   RFA 3/23/17 Repeat isolation of LSPV, RSPV, RIPV (LIPV remained isolated). Atrial flutter c/w mitral annular flutter >> anterior mitral annular line created >> flutter converted to what appeared to be a roof flutter >> roof line created. Tachycardia converted to another tachycardia, ultimately mapped to BERNADETTE >> ablation deferred.   Developed recurrence of flutter during blanking period. Flecainide added > converted without need for DCCV.   Echo1/20/18 EF 30-35%. Was RV pacing 70% of the time.   LHC 1/22/18 non-obstructive CAD   1/26/18 Upgraded to CRT-P by Dr. Otto   Echo 8/29/18 EF 55%     8/24/2022:  Has noted increasing shortness of breath and fatigue. Developed symptomatic recurrence of atrial fibrillation.   GALILEO/DCCV 7/11/22. Flecainide 100 mg BID added.  Developed recurrence.  Was  switched from Metoprolol to Propanolol due to tremors. Has not seen improvement in tremors.  Device interrogation reveals stable function of the leads, RA pacing 87% biventricular pacing >99%, persistent atrial flutter rate controlled of unclear duration (was undertracking).     Discussed options of:  1) rate control, which has been achieved  2) Amiodarone. He has been on this in the past without problems. Discussed risks associated with this  3) repeat ablation. I am less inclined to pursue this given the multiple flutters induced at last case.     Prefers amiodarone.  Discontinue Flecainide.  In 3 days, start amiodarone 200 mg daily.  DCCV in 1 month. Defer GALILEO if remains compliant with Eliquis.  Given lack of improvement in tremors, at patient request we will switch back from Propanolol to Metoprolol.     10/5/2022: Presented for GALILEO/DCCV.  EKG shows sinus rhythm.  Device Interrogation shows sinus rhythm.      11/22/2022: He is one month after aborted cardioversion due to presenting in sinus rhythm. He is now on amiodarone for rhythm control. Unfortunately back in persistent AF/AFL. He is only mildly symptomatic, reporting some fatigue. We discussed options, including rate control vs RFA. RFA less recommended given age and multiple AFLs identified. Will continue with rate control. Stop amiodarone. Increase base rate to increase BiV pacing in AF. Continue eliquis and metoprolol. RTC 3 mo to review symptoms, pacing, etc.    5/5/2023: S/p successful 31 mm Watchman FLX implantation with 25-30% compression. (Dr. Del Valle). Follow up GALILEO 6/16/23 showing no leak.     11/4/2024: SPECT showing no evidence of ischemia or scar     Update (08/07/2025):    4/22/2025: Successful generator change.     Today he says he has chronic FOWLER. No CP, palps, LH, syncope reported. He has been losing weight despite no decrease in appetite or eating. Being worked up by pulmonology. Also diagnosed with prostate CA and has started Trelstar.      On ASA s/p watchman. He is currently being treated with toprol 50mg daily for HR control.  Kidney function is stable, with a creatinine of 0.7 on 7/1/2025.    Device Interrogation (7/16/2025) reveals an intrinsic AF with CHB with stable lead and device function. 100% AF. No ventricular arrhythmias. He paces 99% in the BiV. Estimated battery longevity 7.2-7.9 years.     I have personally reviewed the patient's EKG today, which shows AFBP at 80bpm. QRS is 150. QTc is 493.    Relevant Cardiac Test Results:    2D Echo (8/26/2024):    Left Ventricle: The left ventricle is normal in size. Normal wall thickness. There is mild asymmetric apical hypertrophy. There is hyperdynamic systolic function. Biplane (2D) method of discs ejection fraction is 73%.    Right Ventricle: Normal right ventricular cavity size. Wall thickness is normal. Systolic function is normal.    Aortic Valve: There is mild aortic valve sclerosis.    Mitral Valve: There is mild regurgitation.    Pulmonary Artery: There is pulmonary hypertension. The estimated pulmonary artery systolic pressure is 47 mmHg.    IVC/SVC: Normal venous pressure at 3 mmHg.     Current Outpatient Medications   Medication Sig    aspirin (ECOTRIN) 81 MG EC tablet Take 1 tablet (81 mg total) by mouth once daily.    atorvastatin (LIPITOR) 80 MG tablet TAKE 1 TABLET ONE TIME DAILY (Patient not taking: Reported on 7/1/2025)    bicalutamide (CASODEX) 50 MG Tab Take 1 tablet (50 mg total) by mouth once daily.    cetirizine (ZYRTEC) 10 MG tablet Take 10 mg by mouth every evening.    citalopram (CELEXA) 20 MG tablet TAKE 1 TABLET ONE TIME DAILY    dutasteride (AVODART) 0.5 mg capsule Take 1 capsule (0.5 mg total) by mouth once daily.    etodolac (LODINE) 400 MG tablet Take 1 tablet (400 mg total) by mouth 2 (two) times daily. As needed for back pain    fluticasone propionate (FLONASE) 50 mcg/actuation nasal spray 1 spray (50 mcg total) by Each Nostril route once daily.    lamoTRIgine  (LAMICTAL) 100 MG tablet Take 1 tablet (100 mg total) by mouth 2 (two) times daily.    magnesium oxide (MAG-OX) 400 mg (241.3 mg magnesium) tablet Take 1 tablet (400 mg total) by mouth once daily.    memantine (NAMENDA) 10 MG Tab Take 1 tablet (10 mg total) by mouth 2 (two) times daily.    metoprolol succinate (TOPROL-XL) 50 MG 24 hr tablet TAKE 1 TABLET EVERY DAY    primidone (MYSOLINE) 250 MG Tab TAKE 2 TABLETS TWICE DAILY (NEED MD APPOINTMENT FOR REFILLS)    riboflavin, vitamin B2, (VITAMIN B-2) 100 mg Tab tablet Take 2 tablets (200 mg total) by mouth once daily.    torsemide (DEMADEX) 10 MG Tab Take 1 tablet (10 mg total) by mouth every morning.    TRELEGY ELLIPTA 100-62.5-25 mcg DsDv INHALE 1 PUFF ONE TIME DAILY     Current Facility-Administered Medications   Medication    triptorelin pamoate SusR 11.25 mg     Facility-Administered Medications Ordered in Other Visits   Medication    0.9%  NaCl infusion (for blood administration)       Review of Systems   Constitutional: Positive for weight loss. Negative for malaise/fatigue.   Cardiovascular:  Positive for dyspnea on exertion. Negative for chest pain, irregular heartbeat, leg swelling and palpitations.   Respiratory:  Positive for shortness of breath.    Hematologic/Lymphatic: Negative for bleeding problem.   Skin:  Negative for rash.   Musculoskeletal:  Negative for myalgias.   Gastrointestinal:  Negative for hematemesis, hematochezia and nausea.   Genitourinary:  Negative for hematuria.   Neurological:  Negative for light-headedness.   Psychiatric/Behavioral:  Negative for altered mental status.    Allergic/Immunologic: Negative for persistent infections.       Objective:          /70 (Patient Position: Sitting)   Pulse 80   Ht 6' (1.829 m)   Wt 96.9 kg (213 lb 10 oz)   BMI 28.97 kg/m²     Physical Exam  Vitals and nursing note reviewed.   Constitutional:       Appearance: Normal appearance. He is well-developed.   HENT:      Head: Normocephalic.       Nose: Nose normal.   Eyes:      Pupils: Pupils are equal, round, and reactive to light.   Cardiovascular:      Rate and Rhythm: Normal rate and regular rhythm.   Pulmonary:      Effort: No respiratory distress.   Chest:      Comments: Device to LUCW. Incision and pocket in good repair.    Musculoskeletal:         General: Normal range of motion.   Skin:     General: Skin is warm and dry.      Findings: No erythema.   Neurological:      Mental Status: He is alert and oriented to person, place, and time.   Psychiatric:         Speech: Speech normal.         Behavior: Behavior normal.           Lab Results   Component Value Date     07/01/2025     02/20/2025    K 4.6 07/01/2025    K 3.5 02/20/2025    MG 2.2 07/01/2025    BUN 15 07/01/2025    CREATININE 0.7 07/01/2025    ALT 25 06/30/2025    ALT 25 02/20/2025    AST 23 06/30/2025    AST 26 02/20/2025    AST 35 11/27/2015    HGB 14.3 06/30/2025    HGB 15.5 02/20/2025    HCT 44.0 06/30/2025    HCT 47.7 02/20/2025    HCT 44 12/04/2020    TSH 1.709 04/30/2025    TSH 2.928 04/26/2024    LDLCALC 74.6 04/26/2024       Recent Labs   Lab 09/28/22  1026 04/28/23  0956 04/21/25  0944   INR 1.0 0.9 0.9   PT  --   --  10.6       Assessment:     1. Biventricular cardiac pacemaker in situ    2. Chronic diastolic heart failure    3. Essential hypertension    4. Permanent atrial fibrillation    5. Sinus node dysfunction    6. Presence of Watchman left atrial appendage closure device        Plan:     In summary, Mr. Muhammad is an 84 y.o. male with atrial fibrillation (PVI 1/2016; PVI 3/2017), atrial flutter (RFA of CTI 1/2016, mitral annular line 3/2017, roof line 3/2017), sinus node dysfunction, Watchman, and CRT-P here for follow up after generator change.   Pt is 2.5 mo s/p generator change. Mr. Muhammad is doing well from a device perspective with stable lead and device function. 100% AF s/p AVN RFA. On ASA s/p watchman. No ventricular arrhythmia noted. 99% biventricular  pacing. Echo 8/2024 EF 70%.  Being worked up by pulmonology for FOWLER. He does not appear fluid overloaded. CT showed ILD. He also was diagnosed with prostate CA and is undergoing treatment. Follows with general cardiology.       Continue current medication regimen and device settings.   Follow up in device clinic as scheduled.   Follow up in EP clinic in 1 year, sooner as needed.     *A copy of this note has been sent to Dr. Esparza*    Follow up in about 1 year (around 8/7/2026).      ------------------------------------------------------------------    JANINA Vargas, NP-C  Cardiac Electrophysiology

## 2025-08-07 ENCOUNTER — OFFICE VISIT (OUTPATIENT)
Dept: ELECTROPHYSIOLOGY | Facility: CLINIC | Age: 85
End: 2025-08-07
Payer: MEDICARE

## 2025-08-07 ENCOUNTER — CLINICAL SUPPORT (OUTPATIENT)
Dept: CARDIOLOGY | Facility: HOSPITAL | Age: 85
End: 2025-08-07
Attending: INTERNAL MEDICINE
Payer: MEDICARE

## 2025-08-07 VITALS
DIASTOLIC BLOOD PRESSURE: 70 MMHG | BODY MASS INDEX: 28.93 KG/M2 | HEIGHT: 72 IN | WEIGHT: 213.63 LBS | HEART RATE: 80 BPM | SYSTOLIC BLOOD PRESSURE: 110 MMHG

## 2025-08-07 DIAGNOSIS — Z95.818 PRESENCE OF WATCHMAN LEFT ATRIAL APPENDAGE CLOSURE DEVICE: ICD-10-CM

## 2025-08-07 DIAGNOSIS — I50.32 CHRONIC DIASTOLIC HEART FAILURE: ICD-10-CM

## 2025-08-07 DIAGNOSIS — Z95.0 BIVENTRICULAR CARDIAC PACEMAKER IN SITU: Primary | ICD-10-CM

## 2025-08-07 DIAGNOSIS — I49.5 SINUS NODE DYSFUNCTION: ICD-10-CM

## 2025-08-07 DIAGNOSIS — I48.19 PERSISTENT ATRIAL FIBRILLATION: ICD-10-CM

## 2025-08-07 DIAGNOSIS — I48.21 PERMANENT ATRIAL FIBRILLATION: ICD-10-CM

## 2025-08-07 DIAGNOSIS — Z95.0 BIVENTRICULAR CARDIAC PACEMAKER IN SITU: ICD-10-CM

## 2025-08-07 DIAGNOSIS — I10 ESSENTIAL HYPERTENSION: ICD-10-CM

## 2025-08-07 DIAGNOSIS — Z45.010 PACEMAKER BATTERY DEPLETION: ICD-10-CM

## 2025-08-07 LAB
OHS QRS DURATION: 150 MS
OHS QTC CALCULATION: 493 MS

## 2025-08-07 PROCEDURE — 93010 ELECTROCARDIOGRAM REPORT: CPT | Mod: HCNC,S$GLB,, | Performed by: INTERNAL MEDICINE

## 2025-08-07 PROCEDURE — 93281 PM DEVICE PROGR EVAL MULTI: CPT | Mod: HCNC

## 2025-08-07 PROCEDURE — 1126F AMNT PAIN NOTED NONE PRSNT: CPT | Mod: CPTII,HCNC,S$GLB, | Performed by: NURSE PRACTITIONER

## 2025-08-07 PROCEDURE — 3288F FALL RISK ASSESSMENT DOCD: CPT | Mod: CPTII,HCNC,S$GLB, | Performed by: NURSE PRACTITIONER

## 2025-08-07 PROCEDURE — 99999 PR PBB SHADOW E&M-EST. PATIENT-LVL IV: CPT | Mod: PBBFAC,HCNC,, | Performed by: NURSE PRACTITIONER

## 2025-08-07 PROCEDURE — 1100F PTFALLS ASSESS-DOCD GE2>/YR: CPT | Mod: CPTII,HCNC,S$GLB, | Performed by: NURSE PRACTITIONER

## 2025-08-07 PROCEDURE — 1159F MED LIST DOCD IN RCRD: CPT | Mod: CPTII,HCNC,S$GLB, | Performed by: NURSE PRACTITIONER

## 2025-08-07 PROCEDURE — 99214 OFFICE O/P EST MOD 30 MIN: CPT | Mod: HCNC,S$GLB,, | Performed by: NURSE PRACTITIONER

## 2025-08-07 NOTE — Clinical Note
Hi patient had a lot of questions about his lung testing  - can someone help him get a follow up with Dr. [place]? Thanks!

## 2025-08-10 ENCOUNTER — PATIENT MESSAGE (OUTPATIENT)
Facility: CLINIC | Age: 85
End: 2025-08-10
Payer: MEDICARE

## 2025-08-11 ENCOUNTER — TELEPHONE (OUTPATIENT)
Facility: CLINIC | Age: 85
End: 2025-08-11
Payer: MEDICARE

## 2025-08-11 RX ORDER — RIBOFLAVIN (VITAMIN B2) 100 MG
200 TABLET ORAL DAILY
Qty: 180 TABLET | Refills: 3 | OUTPATIENT
Start: 2025-08-11

## 2025-08-11 RX ORDER — ATORVASTATIN CALCIUM 80 MG/1
80 TABLET, FILM COATED ORAL DAILY
Qty: 90 TABLET | Refills: 3 | Status: SHIPPED | OUTPATIENT
Start: 2025-08-11

## 2025-08-12 ENCOUNTER — OFFICE VISIT (OUTPATIENT)
Facility: CLINIC | Age: 85
End: 2025-08-12
Payer: MEDICARE

## 2025-08-12 VITALS
HEART RATE: 70 BPM | WEIGHT: 216.69 LBS | OXYGEN SATURATION: 98 % | HEIGHT: 72 IN | SYSTOLIC BLOOD PRESSURE: 102 MMHG | BODY MASS INDEX: 29.35 KG/M2 | DIASTOLIC BLOOD PRESSURE: 58 MMHG

## 2025-08-12 DIAGNOSIS — R06.02 SOB (SHORTNESS OF BREATH): ICD-10-CM

## 2025-08-12 DIAGNOSIS — J84.9 ILD (INTERSTITIAL LUNG DISEASE): Primary | ICD-10-CM

## 2025-08-12 PROCEDURE — 1101F PT FALLS ASSESS-DOCD LE1/YR: CPT | Mod: CPTII,HCNC,S$GLB, | Performed by: INTERNAL MEDICINE

## 2025-08-12 PROCEDURE — 1159F MED LIST DOCD IN RCRD: CPT | Mod: CPTII,HCNC,S$GLB, | Performed by: INTERNAL MEDICINE

## 2025-08-12 PROCEDURE — 3288F FALL RISK ASSESSMENT DOCD: CPT | Mod: CPTII,HCNC,S$GLB, | Performed by: INTERNAL MEDICINE

## 2025-08-12 PROCEDURE — 99214 OFFICE O/P EST MOD 30 MIN: CPT | Mod: HCNC,S$GLB,, | Performed by: INTERNAL MEDICINE

## 2025-08-12 PROCEDURE — 1125F AMNT PAIN NOTED PAIN PRSNT: CPT | Mod: CPTII,HCNC,S$GLB, | Performed by: INTERNAL MEDICINE

## 2025-08-12 PROCEDURE — 99999 PR PBB SHADOW E&M-EST. PATIENT-LVL IV: CPT | Mod: PBBFAC,HCNC,, | Performed by: INTERNAL MEDICINE

## 2025-08-12 PROCEDURE — 1160F RVW MEDS BY RX/DR IN RCRD: CPT | Mod: CPTII,HCNC,S$GLB, | Performed by: INTERNAL MEDICINE

## 2025-08-15 ENCOUNTER — PATIENT MESSAGE (OUTPATIENT)
Facility: CLINIC | Age: 85
End: 2025-08-15
Payer: MEDICARE

## 2025-08-15 ENCOUNTER — TELEPHONE (OUTPATIENT)
Dept: TRANSPLANT | Facility: CLINIC | Age: 85
End: 2025-08-15
Payer: MEDICARE

## 2025-08-18 ENCOUNTER — TELEPHONE (OUTPATIENT)
Dept: TRANSPLANT | Facility: CLINIC | Age: 85
End: 2025-08-18
Payer: MEDICARE

## 2025-08-18 ENCOUNTER — TELEPHONE (OUTPATIENT)
Dept: PODIATRY | Facility: CLINIC | Age: 85
End: 2025-08-18
Payer: MEDICARE

## 2025-08-18 ENCOUNTER — TELEPHONE (OUTPATIENT)
Facility: CLINIC | Age: 85
End: 2025-08-18
Payer: MEDICARE

## 2025-08-18 DIAGNOSIS — J84.9 ILD (INTERSTITIAL LUNG DISEASE): Primary | ICD-10-CM

## 2025-08-18 PROBLEM — R06.02 SOB (SHORTNESS OF BREATH): Status: ACTIVE | Noted: 2025-08-18

## 2025-08-20 ENCOUNTER — HOSPITAL ENCOUNTER (OUTPATIENT)
Dept: CARDIOLOGY | Facility: HOSPITAL | Age: 85
Discharge: HOME | End: 2025-08-20
Attending: INTERNAL MEDICINE
Payer: MEDICARE

## 2025-08-20 VITALS — BODY MASS INDEX: 29.26 KG/M2 | HEIGHT: 72 IN | WEIGHT: 216 LBS

## 2025-08-20 DIAGNOSIS — R06.02 SOB (SHORTNESS OF BREATH): ICD-10-CM

## 2025-08-20 LAB
AORTIC SIZE INDEX (SOV): 1.4 CM/M2
AORTIC SIZE INDEX: 1.7 CM/M2
APICAL FOUR CHAMBER EJECTION FRACTION: 68 %
APICAL TWO CHAMBER EJECTION FRACTION: 46 %
ASCENDING AORTA: 3.7 CM
AV INDEX (PROSTH): 0.64
AV MEAN GRADIENT: 4 MMHG
AV PEAK GRADIENT: 7 MMHG
AV REGURGITATION PRESSURE HALF TIME: 839 MS
AV VALVE AREA BY VELOCITY RATIO: 2.4 CM²
AV VALVE AREA: 2.2 CM²
AV VELOCITY RATIO: 0.69
BSA FOR ECHO PROCEDURE: 2.23 M2
CV ECHO LV RWT: 0.38 CM
DOP CALC AO PEAK VEL: 1.3 M/S
DOP CALC AO VTI: 22.6 CM
DOP CALC LVOT AREA: 3.5 CM2
DOP CALC LVOT DIAMETER: 2.1 CM
DOP CALC LVOT PEAK VEL: 0.9 M/S
DOP CALC MV VTI: 13.9 CM
DOP CALCLVOT PEAK VEL VTI: 14.5 CM
E WAVE DECELERATION TIME: 209 MSEC
E/E' RATIO: 9 M/S
ECHO LV POSTERIOR WALL: 1 CM (ref 0.6–1.1)
FRACTIONAL SHORTENING: 32.1 % (ref 28–44)
INTERVENTRICULAR SEPTUM: 1 CM (ref 0.6–1.1)
LEFT ATRIUM AREA SYSTOLIC (APICAL 2 CHAMBER): 23.63 CM2
LEFT ATRIUM AREA SYSTOLIC (APICAL 4 CHAMBER): 26.16 CM2
LEFT ATRIUM VOLUME INDEX MOD: 35 ML/M2
LEFT ATRIUM VOLUME MOD: 77 ML
LEFT INTERNAL DIMENSION IN SYSTOLE: 3.6 CM (ref 2.1–4)
LEFT VENTRICLE DIASTOLIC VOLUME INDEX: 62.27 ML/M2
LEFT VENTRICLE DIASTOLIC VOLUME: 137 ML
LEFT VENTRICLE END DIASTOLIC VOLUME APICAL 2 CHAMBER: 28.25 ML
LEFT VENTRICLE END DIASTOLIC VOLUME APICAL 4 CHAMBER INDEX BSA: 22.44 ML/M2
LEFT VENTRICLE END DIASTOLIC VOLUME APICAL 4 CHAMBER: 49.36 ML
LEFT VENTRICLE END SYSTOLIC VOLUME APICAL 2 CHAMBER: 71.23 ML
LEFT VENTRICLE END SYSTOLIC VOLUME APICAL 4 CHAMBER: 82.48 ML
LEFT VENTRICLE MASS INDEX: 91.1 G/M2
LEFT VENTRICLE SYSTOLIC VOLUME INDEX: 24.5 ML/M2
LEFT VENTRICLE SYSTOLIC VOLUME: 54 ML
LEFT VENTRICULAR INTERNAL DIMENSION IN DIASTOLE: 5.3 CM (ref 3.5–6)
LEFT VENTRICULAR MASS: 200.4 G
LV LATERAL E/E' RATIO: 8.3 M/S
LV SEPTAL E/E' RATIO: 10.7 M/S
LVED V (TEICH): 137.09 ML
LVES V (TEICH): 54.45 ML
LVOT MG: 1.59 MMHG
LVOT MV: 0.6 CM/S
Lab: 1.6 CM/M
Lab: 2 CM/M
MV MEAN GRADIENT: 1 MMHG
MV PEAK E VEL: 0.75 M/S
MV PEAK GRADIENT: 2 MMHG
MV STENOSIS PRESSURE HALF TIME: 60.5 MS
MV VALVE AREA BY CONTINUITY EQUATION: 3.61 CM2
MV VALVE AREA P 1/2 METHOD: 3.64 CM2
OHS CV CPX PATIENT HEIGHT IN: 72
OHS CV RV/LV RATIO: 0.7 CM
OHS LV EJECTION FRACTION SIMPSONS BIPLANE MOD: 57 %
PISA AR MAX VEL: 2.99 M/S
PISA MRMAX VEL: 3.09 M/S
PISA TR MAX VEL: 2.3 M/S
PV MV: 0.63 M/S
PV PEAK GRADIENT: 3 MMHG
PV PEAK VELOCITY: 0.83 M/S
RA PRESSURE ESTIMATED: 3 MMHG
RA VOL SYS: 60.09 ML
RIGHT ATRIAL AREA: 20.2 CM2
RIGHT ATRIUM END SYSTOLIC VOLUME APICAL 4 CHAMBER INDEX BSA: 25.8 ML/M2
RIGHT ATRIUM VOLUME AREA LENGTH APICAL 4 CHAMBER: 56.75 ML
RIGHT VENTRICLE DIASTOLIC BASEL DIMENSION: 3.7 CM
RV TB RVSP: 5 MMHG
RV TISSUE DOPPLER FREE WALL SYSTOLIC VELOCITY 1 (APICAL 4 CHAMBER VIEW): 13.14 CM/S
SINUS: 3 CM
STJ: 3.1 CM
TDI LATERAL: 0.09 M/S
TDI SEPTAL: 0.07 M/S
TDI: 0.08 M/S
TR MAX PG: 22 MMHG
TRICUSPID ANNULAR PLANE SYSTOLIC EXCURSION: 2 CM
TV REST PULMONARY ARTERY PRESSURE: 24 MMHG
Z-SCORE OF LEFT VENTRICULAR DIMENSION IN END DIASTOLE: -3.49
Z-SCORE OF LEFT VENTRICULAR DIMENSION IN END SYSTOLE: -1.88

## 2025-08-20 PROCEDURE — 93306 TTE W/DOPPLER COMPLETE: CPT | Mod: 26,HCNC,NTX, | Performed by: INTERNAL MEDICINE

## 2025-08-20 PROCEDURE — 93306 TTE W/DOPPLER COMPLETE: CPT | Mod: HCNC,TXP

## 2025-08-22 ENCOUNTER — TELEPHONE (OUTPATIENT)
Facility: CLINIC | Age: 85
End: 2025-08-22
Payer: MEDICARE

## 2025-09-02 ENCOUNTER — OFFICE VISIT (OUTPATIENT)
Dept: FAMILY MEDICINE | Facility: CLINIC | Age: 85
End: 2025-09-02
Payer: MEDICARE

## 2025-09-02 ENCOUNTER — PATIENT OUTREACH (OUTPATIENT)
Facility: OTHER | Age: 85
End: 2025-09-02
Payer: MEDICARE

## 2025-09-02 ENCOUNTER — OCHSNER VIRTUAL EMERGENCY DEPARTMENT (OUTPATIENT)
Facility: CLINIC | Age: 85
End: 2025-09-02
Payer: MEDICARE

## 2025-09-02 VITALS
BODY MASS INDEX: 27.21 KG/M2 | DIASTOLIC BLOOD PRESSURE: 56 MMHG | SYSTOLIC BLOOD PRESSURE: 106 MMHG | TEMPERATURE: 98 F | HEIGHT: 72 IN | OXYGEN SATURATION: 97 % | HEART RATE: 81 BPM | WEIGHT: 200.94 LBS

## 2025-09-02 DIAGNOSIS — R53.1 GENERALIZED WEAKNESS: ICD-10-CM

## 2025-09-02 DIAGNOSIS — K92.1 BLACK STOOLS: Primary | ICD-10-CM

## 2025-09-02 DIAGNOSIS — R23.1 PALENESS: ICD-10-CM

## 2025-09-02 PROCEDURE — 3288F FALL RISK ASSESSMENT DOCD: CPT | Mod: CPTII,HCNC,S$GLB,

## 2025-09-02 PROCEDURE — 99999 PR PBB SHADOW E&M-EST. PATIENT-LVL IV: CPT | Mod: PBBFAC,HCNC,,

## 2025-09-02 PROCEDURE — 1126F AMNT PAIN NOTED NONE PRSNT: CPT | Mod: CPTII,HCNC,S$GLB,

## 2025-09-02 PROCEDURE — 1160F RVW MEDS BY RX/DR IN RCRD: CPT | Mod: CPTII,HCNC,S$GLB,

## 2025-09-02 PROCEDURE — 1100F PTFALLS ASSESS-DOCD GE2>/YR: CPT | Mod: CPTII,HCNC,S$GLB,

## 2025-09-02 PROCEDURE — 1159F MED LIST DOCD IN RCRD: CPT | Mod: CPTII,HCNC,S$GLB,

## 2025-09-02 PROCEDURE — 99215 OFFICE O/P EST HI 40 MIN: CPT | Mod: HCNC,S$GLB,,

## 2025-09-03 ENCOUNTER — PATIENT OUTREACH (OUTPATIENT)
Facility: OTHER | Age: 85
End: 2025-09-03
Payer: MEDICARE

## 2025-09-03 PROBLEM — K92.2 UPPER GI BLEED: Status: ACTIVE | Noted: 2025-09-03

## 2025-09-05 ENCOUNTER — TELEPHONE (OUTPATIENT)
Facility: CLINIC | Age: 85
End: 2025-09-05
Payer: MEDICARE

## 2025-09-05 PROBLEM — K92.2 GI BLEED: Status: ACTIVE | Noted: 2025-09-05

## (undated) DEVICE — KIT PROBE COVER WITH GEL

## (undated) DEVICE — DRAPE INCISE IOBAN 2 23X17IN

## (undated) DEVICE — ELECTRODE REM PLYHSV RETURN 9

## (undated) DEVICE — BANDAGE ACE NON LATEX 2IN

## (undated) DEVICE — DEVICE PLASMABLADE X 3.0S LT

## (undated) DEVICE — DRESSING XEROFORM FOIL PK 1X8

## (undated) DEVICE — KIT WRENCH

## (undated) DEVICE — GUIDEWIRE AMPLATZ XSTIFF 260CM

## (undated) DEVICE — DILATOR COONS TAPER 14FR

## (undated) DEVICE — CHLORAPREP W TINT 26ML APPL

## (undated) DEVICE — BANDAGE SOFFORM STER 2IN

## (undated) DEVICE — PAD DEFIB CADENCE ADULT R2

## (undated) DEVICE — GLOVE 7.5 PROTEXIS PI MICRO

## (undated) DEVICE — PAD PREP 50/CA

## (undated) DEVICE — SEE MEDLINE ITEM 152622

## (undated) DEVICE — SEE MEDLINE ITEM 157117

## (undated) DEVICE — PACK UPPER EXTREMITY BAPTIST

## (undated) DEVICE — SPLINT WRST COCKUP RIGHT UNIV

## (undated) DEVICE — TRAY NERVE BLOCK

## (undated) DEVICE — SEE MEDLINE ITEM 146347

## (undated) DEVICE — SYS WATCHMAN FXD CURVE DBL US

## (undated) DEVICE — INTRO SWARTZ SL2 8.5FR 63CM

## (undated) DEVICE — ADHESIVE DERMABOND ADVANCED

## (undated) DEVICE — NDL HYPO REG 25G X 1 1/2

## (undated) DEVICE — CATH ANGIO DIAG PIG 6FX110

## (undated) DEVICE — NDL TRNSSPTL BRK-1 18GA 71CM

## (undated) DEVICE — PADDING CAST SPECIALIST 3X4YD

## (undated) DEVICE — GLOVE BIOGEL SKINSENSE PI 7.5

## (undated) DEVICE — PAD ELECTROSURGICAL PAT PLATE

## (undated) DEVICE — KIT CUSTOM MANIFOLD

## (undated) DEVICE — GAUZE SPONGE 4X4 12PLY

## (undated) DEVICE — PENCIL SMK EVAC CONNECTOR 10FT

## (undated) DEVICE — TOWEL OR DISP STRL BLUE 4/PK

## (undated) DEVICE — DIALATOR COONS TAPER 12F 20CM

## (undated) DEVICE — SET INTRO PERFRMR SHTH 16FR

## (undated) DEVICE — APPLICATOR CHLORAPREP CLR 10.5

## (undated) DEVICE — SEE MEDLINE ITEM 157131

## (undated) DEVICE — CANNULA VENOM 20G 10X100MM

## (undated) DEVICE — NDL SPINAL 25GX3.5 SPINOCAN

## (undated) DEVICE — SOL 9P NACL IRR PIC IL

## (undated) DEVICE — SUT MCRYL PLUS 4-0 PS2 27IN

## (undated) DEVICE — BLADE SCALP OPHTL BEVEL STR

## (undated) DEVICE — SPIKE CONTRAST CONTROLLER

## (undated) DEVICE — BOWL FLUID - BACK STOP

## (undated) DEVICE — PACK EP DRAPE OMC

## (undated) DEVICE — SEE MEDLINE ITEM 152487

## (undated) DEVICE — SYR B-D DISP CONTROL 10CC100/C

## (undated) DEVICE — PACK PACER PERMANENT OMC

## (undated) DEVICE — TOURNIQUET SB QC DP 18X4IN